# Patient Record
Sex: MALE | Race: WHITE | Employment: OTHER | URBAN - METROPOLITAN AREA
[De-identification: names, ages, dates, MRNs, and addresses within clinical notes are randomized per-mention and may not be internally consistent; named-entity substitution may affect disease eponyms.]

---

## 2017-05-03 DIAGNOSIS — N18.30 CKD (CHRONIC KIDNEY DISEASE) STAGE 3, GFR 30-59 ML/MIN (HCC): ICD-10-CM

## 2017-05-03 DIAGNOSIS — K21.9 GASTROESOPHAGEAL REFLUX DISEASE WITHOUT ESOPHAGITIS: ICD-10-CM

## 2017-05-03 DIAGNOSIS — Z86.73 HISTORY OF TIA (TRANSIENT ISCHEMIC ATTACK): ICD-10-CM

## 2017-05-03 DIAGNOSIS — E78.5 HYPERLIPIDEMIA, UNSPECIFIED HYPERLIPIDEMIA TYPE: ICD-10-CM

## 2017-05-03 DIAGNOSIS — I10 ESSENTIAL HYPERTENSION WITH GOAL BLOOD PRESSURE LESS THAN 140/90: ICD-10-CM

## 2017-05-03 DIAGNOSIS — C73 THYROID CANCER (HCC): ICD-10-CM

## 2017-05-05 RX ORDER — LISINOPRIL 5 MG/1
TABLET ORAL
Qty: 90 TAB | Refills: 3 | Status: SHIPPED | OUTPATIENT
Start: 2017-05-05 | End: 2018-06-26

## 2017-05-05 RX ORDER — ATORVASTATIN CALCIUM 20 MG/1
TABLET, FILM COATED ORAL
Qty: 90 TAB | Refills: 3 | Status: SHIPPED | OUTPATIENT
Start: 2017-05-05 | End: 2018-06-21 | Stop reason: SDUPTHER

## 2017-05-05 RX ORDER — AMLODIPINE BESYLATE 5 MG/1
TABLET ORAL
Qty: 90 TAB | Refills: 3 | Status: SHIPPED | OUTPATIENT
Start: 2017-05-05 | End: 2018-06-21 | Stop reason: SDUPTHER

## 2017-05-05 RX ORDER — LEVOTHYROXINE SODIUM 125 UG/1
TABLET ORAL
Qty: 90 TAB | Refills: 3 | Status: SHIPPED | OUTPATIENT
Start: 2017-05-05 | End: 2018-02-05 | Stop reason: SDUPTHER

## 2017-05-05 RX ORDER — CLOPIDOGREL BISULFATE 75 MG/1
TABLET ORAL
Qty: 90 TAB | Refills: 3 | Status: SHIPPED | OUTPATIENT
Start: 2017-05-05 | End: 2018-05-13 | Stop reason: SDUPTHER

## 2017-05-05 RX ORDER — PANTOPRAZOLE SODIUM 40 MG/1
TABLET, DELAYED RELEASE ORAL
Qty: 90 TAB | Refills: 3 | Status: SHIPPED | OUTPATIENT
Start: 2017-05-05 | End: 2018-06-21 | Stop reason: SDUPTHER

## 2017-07-24 DIAGNOSIS — I10 ESSENTIAL HYPERTENSION WITH GOAL BLOOD PRESSURE LESS THAN 140/90: ICD-10-CM

## 2017-07-24 DIAGNOSIS — K21.9 GASTROESOPHAGEAL REFLUX DISEASE WITHOUT ESOPHAGITIS: ICD-10-CM

## 2017-07-24 DIAGNOSIS — Z86.73 HISTORY OF TIA (TRANSIENT ISCHEMIC ATTACK): ICD-10-CM

## 2017-07-24 DIAGNOSIS — C73 THYROID CANCER (HCC): ICD-10-CM

## 2017-07-24 DIAGNOSIS — E78.5 HYPERLIPIDEMIA, UNSPECIFIED HYPERLIPIDEMIA TYPE: ICD-10-CM

## 2017-07-24 DIAGNOSIS — N18.30 CKD (CHRONIC KIDNEY DISEASE) STAGE 3, GFR 30-59 ML/MIN (HCC): ICD-10-CM

## 2017-07-24 RX ORDER — ATORVASTATIN CALCIUM 20 MG/1
TABLET, FILM COATED ORAL
Qty: 90 TAB | Refills: 3 | Status: CANCELLED | OUTPATIENT
Start: 2017-07-24

## 2017-07-24 RX ORDER — CLOPIDOGREL BISULFATE 75 MG/1
TABLET ORAL
Qty: 90 TAB | Refills: 3 | Status: CANCELLED | OUTPATIENT
Start: 2017-07-24

## 2017-07-24 RX ORDER — AMLODIPINE BESYLATE 5 MG/1
TABLET ORAL
Qty: 90 TAB | Refills: 3 | Status: CANCELLED | OUTPATIENT
Start: 2017-07-24

## 2017-07-24 RX ORDER — LISINOPRIL 5 MG/1
TABLET ORAL
Qty: 90 TAB | Refills: 3 | Status: CANCELLED | OUTPATIENT
Start: 2017-07-24

## 2017-07-24 RX ORDER — LEVOTHYROXINE SODIUM 125 UG/1
TABLET ORAL
Qty: 90 TAB | Refills: 3 | Status: CANCELLED | OUTPATIENT
Start: 2017-07-24

## 2017-07-24 RX ORDER — PANTOPRAZOLE SODIUM 40 MG/1
TABLET, DELAYED RELEASE ORAL
Qty: 90 TAB | Refills: 3 | Status: CANCELLED | OUTPATIENT
Start: 2017-07-24

## 2017-08-18 ENCOUNTER — OFFICE VISIT (OUTPATIENT)
Dept: FAMILY MEDICINE CLINIC | Age: 82
End: 2017-08-18

## 2017-08-18 ENCOUNTER — HOSPITAL ENCOUNTER (OUTPATIENT)
Dept: LAB | Age: 82
Discharge: HOME | End: 2017-08-18
Payer: MEDICARE

## 2017-08-18 VITALS
RESPIRATION RATE: 17 BRPM | TEMPERATURE: 98.2 F | HEIGHT: 72 IN | WEIGHT: 230 LBS | DIASTOLIC BLOOD PRESSURE: 82 MMHG | OXYGEN SATURATION: 96 % | HEART RATE: 90 BPM | BODY MASS INDEX: 31.15 KG/M2 | SYSTOLIC BLOOD PRESSURE: 124 MMHG

## 2017-08-18 DIAGNOSIS — Z86.39 HISTORY OF HYPERPARATHYROIDISM: ICD-10-CM

## 2017-08-18 DIAGNOSIS — Z13.31 SCREENING FOR DEPRESSION: ICD-10-CM

## 2017-08-18 DIAGNOSIS — M47.812 CERVICAL ARTHRITIS: ICD-10-CM

## 2017-08-18 DIAGNOSIS — N18.30 CKD (CHRONIC KIDNEY DISEASE) STAGE 3, GFR 30-59 ML/MIN (HCC): ICD-10-CM

## 2017-08-18 DIAGNOSIS — E78.5 HYPERLIPIDEMIA, UNSPECIFIED HYPERLIPIDEMIA TYPE: ICD-10-CM

## 2017-08-18 DIAGNOSIS — Z85.850 HISTORY OF THYROID CANCER: ICD-10-CM

## 2017-08-18 DIAGNOSIS — Z13.39 SCREENING FOR ALCOHOLISM: ICD-10-CM

## 2017-08-18 DIAGNOSIS — I10 ESSENTIAL HYPERTENSION: ICD-10-CM

## 2017-08-18 DIAGNOSIS — Z00.00 GENERAL MEDICAL EXAM: Primary | ICD-10-CM

## 2017-08-18 DIAGNOSIS — Z86.73 HISTORY OF CVA (CEREBROVASCULAR ACCIDENT): ICD-10-CM

## 2017-08-18 PROCEDURE — 80053 COMPREHEN METABOLIC PANEL: CPT

## 2017-08-18 PROCEDURE — 85025 COMPLETE CBC W/AUTO DIFF WBC: CPT

## 2017-08-18 PROCEDURE — 84443 ASSAY THYROID STIM HORMONE: CPT

## 2017-08-18 PROCEDURE — 36415 COLL VENOUS BLD VENIPUNCTURE: CPT

## 2017-08-18 PROCEDURE — 80061 LIPID PANEL: CPT

## 2017-08-18 PROCEDURE — 83970 ASSAY OF PARATHORMONE: CPT

## 2017-08-18 NOTE — MR AVS SNAPSHOT
Visit Information Date & Time Provider Department Dept. Phone Encounter #  
 8/18/2017 10:15 AM Stephanie Muse MD [address] [facility] [phone] [id number] Upcoming Health Maintenance Date Due INFLUENZA AGE 9 TO ADULT 8/1/2017 MEDICARE YEARLY EXAM 8/3/2017 GLAUCOMA SCREENING Q2Y 2/2/2018 COLONOSCOPY 8/1/2018 DTaP/Tdap/Td series (2 - Td) 10/14/2026 Allergies as of 8/18/2017  Review Complete On: 8/18/2017 By: Stephanie Muse MD  
  
 Severity Noted Reaction Type Reactions Sulfa (Sulfonamide Antibiotics)  10/26/2011    Hives Current Immunizations  Reviewed on 11/14/2016 Name Date Influenza High Dose Vaccine PF 9/22/2014 Pneumococcal Conjugate (PCV-13) 8/2/2016 Not reviewed this visit You Were Diagnosed With   
  
 Codes Comments History of hyperparathyroidism    -  Primary ICD-10-CM: Z86.39 
ICD-9-CM: V12.29 History of thyroid cancer     ICD-10-CM: Z85.850 ICD-9-CM: V10.87 Hyperlipidemia, unspecified hyperlipidemia type     ICD-10-CM: E78.5 ICD-9-CM: 272.4 CKD (chronic kidney disease) stage 3, GFR 30-59 ml/min     ICD-10-CM: N18.3 ICD-9-CM: 719. 3 Essential hypertension     ICD-10-CM: I10 
ICD-9-CM: 401.9 Cervical arthritis (HCC)     ICD-10-CM: M46.92 
ICD-9-CM: 721.0 Vitals BP Pulse Temp Resp Height(growth percentile) Weight(growth percentile) 124/82 (BP 1 Location: Left arm, BP Patient Position: Sitting) 90 98.2 °F (36.8 °C) (Oral) 17 6' (1.829 m) 230 lb (104.3 kg) SpO2 BMI Smoking Status 96% 31.19 kg/m2 Former Smoker Vitals History BMI and BSA Data Body Mass Index Body Surface Area  
 31.19 kg/m 2 2.3 m 2 Preferred Pharmacy Pharmacy Name Phone [address] [facility], [address] [place] [address] [address] Your Updated Medication List  
  
   
This list is accurate as of: 8/18/17 11:31 AM.  Always use your most recent med list.  
  
 amLODIPine 5 mg tablet Commonly known as:  [place] Bars Take 1 tablet by mouth  daily  
  
 atorvastatin 20 mg tablet Commonly known as:  LIPITOR Take 1 tablet by mouth  daily  
  
 clopidogrel 75 mg Tab Commonly known as:  PLAVIX Take 1 tablet by mouth  daily  
  
 levothyroxine 125 mcg tablet Commonly known as:  SYNTHROID Take 1 tablet by mouth  daily  
  
 lisinopril 5 mg tablet Commonly known as:  Dorathy Massed Take 1 tablet by mouth  daily  
  
 pantoprazole 40 mg tablet Commonly known as:  PROTONIX Take 1 tablet by mouth  daily VITAMIN D3 2,000 unit Tab Generic drug:  cholecalciferol (vitamin D3) Take  by mouth daily. We Performed the Following CBC WITH AUTOMATED DIFF [83354 CPT(R)] LIPID PANEL [88373 CPT(R)] METABOLIC PANEL, COMPREHENSIVE [22156 CPT(R)] PTH INTACT [00356 CPT(R)] TSH 3RD GENERATION [86934 CPT(R)] Patient Instructions Medicare Wellness Visit, Male The best way to live healthy is to have a healthy lifestyle by eating a well-balanced diet, exercising regularly, limiting alcohol and stopping smoking. Regular physical exams and screening tests are another way to keep healthy. Preventive exams provided by your health care provider can find health problems before they become diseases or illnesses. Preventive services including immunizations, screening tests, monitoring and exams can help you take care of your own health. All people over age 72 should have a pneumovax  and and a prevnar shot to prevent pneumonia. These are once in a lifetime unless you and your provider decide differently. All people over 65 should have a yearly flu shot and a tetanus vaccine every 10 years. Screening for diabetes mellitus with a blood sugar test should be done every year.  
 
Glaucoma is a disease of the eye due to increased ocular pressure that can lead to blindness and it should be done every year by an eye professional. 
 
Cardiovascular screening tests that check for elevated lipids (fatty part of blood) which can lead to heart disease and strokes should be done every 5 years. Colorectal screening that evaluates for blood or polyps in your colon should be done yearly as a stool test or every five years as a flexible sigmoidoscope or every 10 years as a colonoscopy up to age 76. Men up to age 76 may need a screening blood test for prostate cancer at certain intervals, depending on their personal and family history. This decision is between the patient and his provider. If you have been a smoker or had family history of abdominal aortic aneurysms, you and your provider may decide to schedule an ultrasound test of your aorta. Hepatitis C screening is also recommended for anyone born between [birth year] through [address]. A shingles vaccine is also recommended once in a lifetime after age 61. Your Medicare Wellness Exam is recommended annually. Here is a list of your current Health Maintenance items with a due date: 
Health Maintenance Due Topic Date Due  
 Flu Vaccine  [facility]! Dear Naeem Palomo: Thank you for requesting a milabent account. Our records indicate that you already have an active milabent account. You can access your account anytime at [email or web address]. "[employer] Did you know that you can access your hospital and ER discharge instructions at any time in milabent? You can also review all of your test results from your hospital stay or ER visit. Additional Information If you have questions, please visit the Frequently Asked Questions section of the milabent website at [email or web address]. "[employer] Remember, [employer]ent is NOT to be used for urgent needs. For medical emergencies, dial 911. Now available from your iPhone and Android! Please provide this summary of care documentation to your next provider. Your primary care clinician is listed as Herve Valdovinos. If you have any questions after today's visit, please call [phone].

## 2017-08-18 NOTE — PROGRESS NOTES
This is a Subsequent Medicare Annual Wellness Visit providing Personalized Prevention Plan Services (PPPS) (Performed 12 months after initial AWV and PPPS )    I have reviewed the patient's medical history in detail and updated the computerized patient record. History     Past Medical History:   Diagnosis Date    Carotid artery stenosis, asymptomatic 8/1/2014    Right side 50-79%     Chronic kidney disease     stage 3    GERD (gastroesophageal reflux disease)     Gout     Hyperlipidemia     [facility]par[facility]roidism ([facility] [address]     Hypertension     Long term (current) use of anticoagulants     Prostate cancer ([facility] [address]     seeds, then XRT, then seed again.   Dr. German Solis Stroke [facility]) 2002    TIA, Dr. Roma Aguayo Thyroid cancer [facility]) Jan 2015    Unspecified vitamin D deficiency       Past Surgical History:   Procedure Laterality Date   Catalino Carcamo HX CHOLECYSTECTOMY      HX HEENT      tonsillectomy    HX OTHER SURGICAL      vasectomy    HX PARATHYROIDECTOMY  01/14/2015    right superior parathyroid gland removed and left superior parathyroid gland removed    HX PARTIAL THYROIDECTOMY  1/14/15    right lobectomy    HX PARTIAL THYROIDECTOMY  1/2015    HX UROLOGICAL      Seeds for prostate cancer , 4 dialations     HX UROLOGICAL  1/14/15    BLADDER NECK INCISION, Cold knife incision of bladder neck contracture, cystoscopy     Social History   Substance Use Topics    Smoking status: Former Smoker     Years: 5.00     Quit date: 2/5/1955    Smokeless tobacco: Never Used    Alcohol use Yes      Comment: occassional mixed drink or beer     Current Outpatient Prescriptions   Medication Sig Dispense Refill    pantoprazole (PROTONIX) 40 mg tablet Take 1 tablet by mouth  daily 90 Tab 3    levothyroxine (SYNTHROID) 125 mcg tablet Take 1 tablet by mouth  daily 90 Tab 3    amLODIPine (NORVASC) 5 mg tablet Take 1 tablet by mouth  daily 90 Tab 3    atorvastatin (LIPITOR) 20 mg tablet Take 1 tablet by mouth  daily 90 Tab 3    clopidogrel (PLAVIX) 75 mg tab Take 1 tablet by mouth  daily 90 Tab 3    lisinopril (PRINIVIL, ZESTRIL) 5 mg tablet Take 1 tablet by mouth  daily 90 Tab 3    cholecalciferol, vitamin D3, (VITAMIN D3) 2,000 unit tab Take  by mouth daily. Allergies   Allergen Reactions    Sulfa (Sulfonamide Antibiotics) Hives     Family History   Problem Relation Age of Onset   [de-identified] Cancer Mother      hodgkins disease    Heart Disease Father     Hypertension Father     Other Neg Hx      no hypercalcemia or kidney stones       Patient Active Problem List    Diagnosis    Stenosis of both internal carotid arteries    History of thyroid cancer     Found on biopsy 2015, per endocrine does not need treatment, just monitor TSH - see 8/15 note. 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 Calcium and PTH monitored by Dr. Ana Wood, renal.  Levels normalized after surgery. 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 Some residual bladder dysfunction. Dr. Hamilton Marguerite      HBP (high blood pressure)    GERD (gastroesophageal reflux disease)    CKD (chronic kidney disease) stage 3, GFR 30-59 ml/min     Since last visit, patient's wife has passed away. He is still living with family members. He states he is doing okay. He is sleeping and eating well. He had been the caregiver for his wife who had severe dementia, and he is consult that Krish Caruso is in a better place\". He is tearful speaks of her loss. Carissa Quinonez He denies any chest pain, shortness of breath, mass, cough.   He knows he has missed some follow-up appointments with his specialists in the time surrounding his wife's death. However, he has seen his nephrologist.  He is not having any problems with headaches, he denies abdominal pain, he has had no syncopal episodes. Patient Care Team:  Joseph Li MD as PCP - General (Internal Medicine)  Ricardo Abebe MD as Surgeon (General Surgery)  Marissa Siu MD as Physician (Nephrology)  Silvestre Llanes MD as Physician (Cardiology)  Rosalva Sibley MD (Endocrinology)    Depression Risk Factor Screening:     PHQ over the last two weeks 8/18/2017   Little interest or pleasure in doing things Not at all   Feeling down, depressed or hopeless Not at all   Total Score PHQ 2 0     Alcohol Risk Factor Screening: On any occasion during the past 3 months, have you had more than 4 drinks containing alcohol? No    Do you average more than 14 drinks per week? No      Functional Ability and Level of Safety:     Fall Risk   Fall Risk Assessment, last 12 mths 8/18/2017   Able to walk? Yes   Fall in past 12 months? No       Hearing Loss   mild    Activities of Daily Living   Self-care. ADL Assessment 8/2/2016   Feeding yourself No Help Needed   Getting from bed to chair No Help Needed   Getting dressed No Help Needed   Bathing or showering No Help Needed   Walk across the room (includes cane/walker) No Help Needed   Using the telphone No Help Needed   Taking your medications No Help Needed   Preparing meals No Help Needed   Managing money (expenses/bills) No Help Needed   Moderately strenuous housework (laundry) No Help Needed   Shopping for personal items (toiletries/medicines) No Help Needed   Shopping for groceries No Help Needed   Driving No Help Needed   Climbing a flight of stairs No Help Needed   Getting to places beyond walking distances No Help Needed       Abuse Screen   Patient is not abused    Social History     Social History Narrative     Had 6 children and one daughter passed away in 1984 in a car accident. Has one living daughter and 4 sons.   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  April 2017 after his wife suffered with dementia. Review of Systems   A comprehensive review of systems was negative except for that written in the HPI. Physical Examination     Evaluation of Cognitive Function:  Mood/affect:  happy  Appearance: age appropriate  Family member/caregiver input: none    Visit Vitals    /82 (BP 1 Location: Left arm, BP Patient Position: Sitting)    Pulse 90    Temp 98.2 °F (36.8 °C) (Oral)    Resp 17    Ht 6' (1.829 m)    Wt 230 lb (104.3 kg)    SpO2 96%    BMI 31.19 kg/m2     Gen: alert, oriented, no acute distress  Ears: external auditory canals clear, TMs without erythema or effusion  Eyes: pupils equal round reactive to light, sclera clear, conjunctiva clear  Oral: moist mucus membranes, no oral lesions, no pharyngeal inflammation or exudate  Neck: thyroid symmetric and not enlarged, no carotid bruits, no jugular vein distention  Resp: no increase work of breathing, lungs clear to ausculation bilaterally, no wheezing, rales or rhonchi  CV: S1, S2 normal. No murmurs, rubs, or gallops. Abd: soft, not tender, not distended. No hepatosplenomegaly. Normal bowel sounds. Neuro: cranial nerves intact, normal strength and movement in all extremities, sensation intact and symmetric. Skin: no lesion or rash  Extremities: no cyanosis or edema      Advice/Referrals/Counseling   Education and counseling provided:  Are appropriate based on today's review and evaluation  End-of-Life planning (with patient's consent)  Pneumococcal Vaccine  Influenza Vaccine  Colorectal cancer screening tests      Assessment/Plan     1. General medical exam  Age appropriate health maintenance measures discussed with patient and ordered. 2. History of hyperparathyroidism  No change pending lab work. - PTH INTACT    3. History of thyroid cancer  No change pending lab work.   - TSH 3RD GENERATION    4. Hyperlipidemia, unspecified hyperlipidemia type  Continue current medicines pending lab work. - METABOLIC PANEL, COMPREHENSIVE  - LIPID PANEL    5. CKD (chronic kidney disease) stage 3, GFR 30-59 ml/min  Continue current medicines pending lab work. - CBC WITH AUTOMATED DIFF    6. Essential hypertension  At goal on current medication.  - CBC WITH AUTOMATED DIFF    7. Cervical arthritis (HCC)  No signs of radiculopathy. 8. Screening for depression  Currently grieving, not depressed. 9. Screening for alcoholism    10. History of CVA (cerebrovascular accident)  Continue statin, blood pressure control, and aspirin. Recommended healthy diet low in carbohydrates, fats, sodium and cholesterol. Recommended regular cardiovascular exercise 3-6 times per week for 30-60 minutes daily. Current Outpatient Prescriptions   Medication Sig Dispense Refill    pantoprazole (PROTONIX) 40 mg tablet Take 1 tablet by mouth  daily 90 Tab 3    levothyroxine (SYNTHROID) 125 mcg tablet Take 1 tablet by mouth  daily 90 Tab 3    amLODIPine (NORVASC) 5 mg tablet Take 1 tablet by mouth  daily 90 Tab 3    atorvastatin (LIPITOR) 20 mg tablet Take 1 tablet by mouth  daily 90 Tab 3    clopidogrel (PLAVIX) 75 mg tab Take 1 tablet by mouth  daily 90 Tab 3    lisinopril (PRINIVIL, ZESTRIL) 5 mg tablet Take 1 tablet by mouth  daily 90 Tab 3    cholecalciferol, vitamin D3, (VITAMIN D3) 2,000 unit tab Take  by mouth daily. Verbal and written instructions (see AVS) provided. Patient expresses understanding of diagnosis and treatment plan.     Tyson Lieberman MD

## 2017-08-18 NOTE — PATIENT INSTRUCTIONS

## 2017-08-19 LAB
ALBUMIN SERPL-MCNC: 4.2 G/DL (ref 3.5–4.7)
ALBUMIN/GLOB SERPL: 1.7 {RATIO} (ref 1.2–2.2)
ALP SERPL-CCNC: 89 IU/L (ref 39–117)
ALT SERPL-CCNC: 20 IU/L (ref 0–44)
AST SERPL-CCNC: 21 IU/L (ref 0–40)
BASOPHILS # BLD AUTO: 0 X10E3/UL (ref 0–0.2)
BASOPHILS NFR BLD AUTO: 0 %
BILIRUB SERPL-MCNC: 0.6 MG/DL (ref 0–1.2)
BUN SERPL-MCNC: 17 MG/DL (ref 8–27)
BUN/CREAT SERPL: 9 (ref 10–24)
CALCIUM SERPL-MCNC: 9 MG/DL (ref 8.6–10.2)
CHLORIDE SERPL-SCNC: 100 MMOL/L (ref 96–106)
CHOLEST SERPL-MCNC: 188 MG/DL (ref 100–199)
CO2 SERPL-SCNC: 22 MMOL/L (ref 18–29)
CREAT SERPL-MCNC: 1.81 MG/DL (ref 0.76–1.27)
EOSINOPHIL # BLD AUTO: 0.1 X10E3/UL (ref 0–0.4)
EOSINOPHIL NFR BLD AUTO: 1 %
ERYTHROCYTE [DISTWIDTH] IN BLOOD BY AUTOMATED COUNT: 13.7 % (ref 12.3–15.4)
GLOBULIN SER CALC-MCNC: 2.5 G/DL (ref 1.5–4.5)
GLUCOSE SERPL-MCNC: 92 MG/DL (ref 65–99)
HCT VFR BLD AUTO: 43.7 % (ref 37.5–51)
HDLC SERPL-MCNC: 71 MG/DL
HGB BLD-MCNC: 14.9 G/DL (ref 12.6–17.7)
IMM GRANULOCYTES # BLD: 0 X10E3/UL (ref 0–0.1)
IMM GRANULOCYTES NFR BLD: 0 %
INTERPRETATION, 910389: NORMAL
INTERPRETATION: NORMAL
LDLC SERPL CALC-MCNC: 92 MG/DL (ref 0–99)
LYMPHOCYTES # BLD AUTO: 1.6 X10E3/UL (ref 0.7–3.1)
LYMPHOCYTES NFR BLD AUTO: 19 %
MCH RBC QN AUTO: 32.2 PG (ref 26.6–33)
MCHC RBC AUTO-ENTMCNC: 34.1 G/DL (ref 31.5–35.7)
MCV RBC AUTO: 94 FL (ref 79–97)
MONOCYTES # BLD AUTO: 0.5 X10E3/UL (ref 0.1–0.9)
MONOCYTES NFR BLD AUTO: 7 %
NEUTROPHILS # BLD AUTO: 5.9 X10E3/UL (ref 1.4–7)
NEUTROPHILS NFR BLD AUTO: 73 %
PDF IMAGE, 910387: NORMAL
PLATELET # BLD AUTO: 327 X10E3/UL (ref 150–379)
POTASSIUM SERPL-SCNC: 5.1 MMOL/L (ref 3.5–5.2)
PROT SERPL-MCNC: 6.7 G/DL (ref 6–8.5)
PTH-INTACT SERPL-MCNC: 39 PG/ML (ref 15–65)
RBC # BLD AUTO: 4.63 X10E6/UL (ref 4.14–5.8)
SODIUM SERPL-SCNC: 138 MMOL/L (ref 134–144)
TRIGL SERPL-MCNC: 124 MG/DL (ref 0–149)
TSH SERPL DL<=0.005 MIU/L-ACNC: 0.39 UIU/ML (ref 0.45–4.5)
VLDLC SERPL CALC-MCNC: 25 MG/DL (ref 5–40)
WBC # BLD AUTO: 8.1 X10E3/UL (ref 3.4–10.8)

## 2017-08-28 PROBLEM — Z86.73 HISTORY OF CVA (CEREBROVASCULAR ACCIDENT): Status: ACTIVE | Noted: 2017-08-28

## 2017-09-26 ENCOUNTER — HOSPITAL ENCOUNTER (EMERGENCY)
Age: 82
Discharge: HOME | End: 2017-09-26
Attending: EMERGENCY MEDICINE
Payer: MEDICARE

## 2017-09-26 VITALS
DIASTOLIC BLOOD PRESSURE: 79 MMHG | HEART RATE: 75 BPM | TEMPERATURE: 98.2 F | HEIGHT: 72 IN | BODY MASS INDEX: 31.23 KG/M2 | SYSTOLIC BLOOD PRESSURE: 153 MMHG | RESPIRATION RATE: 16 BRPM | OXYGEN SATURATION: 96 % | WEIGHT: 230.6 LBS

## 2017-09-26 DIAGNOSIS — R42 POSTURAL DIZZINESS WITH PRESYNCOPE: Primary | ICD-10-CM

## 2017-09-26 DIAGNOSIS — R55 POSTURAL DIZZINESS WITH PRESYNCOPE: Primary | ICD-10-CM

## 2017-09-26 LAB
ALBUMIN SERPL-MCNC: 3.9 G/DL (ref 3.5–5)
ALBUMIN/GLOB SERPL: 1 {RATIO} (ref 1.1–2.2)
ALP SERPL-CCNC: 103 U/L (ref 45–117)
ALT SERPL-CCNC: 27 U/L (ref 12–78)
ANION GAP SERPL CALC-SCNC: 8 MMOL/L (ref 5–15)
AST SERPL-CCNC: 17 U/L (ref 15–37)
BASOPHILS # BLD: 0 K/UL (ref 0–0.1)
BASOPHILS NFR BLD: 0 % (ref 0–1)
BILIRUB SERPL-MCNC: 0.7 MG/DL (ref 0.2–1)
BUN SERPL-MCNC: 22 MG/DL (ref 6–20)
BUN/CREAT SERPL: 12 (ref 12–20)
CALCIUM SERPL-MCNC: 9 MG/DL (ref 8.5–10.1)
CHLORIDE SERPL-SCNC: 103 MMOL/L (ref 97–108)
CO2 SERPL-SCNC: 24 MMOL/L (ref 21–32)
CREAT SERPL-MCNC: 1.78 MG/DL (ref 0.7–1.3)
EOSINOPHIL # BLD: 0 K/UL (ref 0–0.4)
EOSINOPHIL NFR BLD: 0 % (ref 0–7)
ERYTHROCYTE [DISTWIDTH] IN BLOOD BY AUTOMATED COUNT: 13.3 % (ref 11.5–14.5)
GLOBULIN SER CALC-MCNC: 3.9 G/DL (ref 2–4)
GLUCOSE SERPL-MCNC: 115 MG/DL (ref 65–100)
HCT VFR BLD AUTO: 43.8 % (ref 36.6–50.3)
HGB BLD-MCNC: 15.1 G/DL (ref 12.1–17)
LYMPHOCYTES # BLD: 1.1 K/UL (ref 0.8–3.5)
LYMPHOCYTES NFR BLD: 9 % (ref 12–49)
MCH RBC QN AUTO: 31.5 PG (ref 26–34)
MCHC RBC AUTO-ENTMCNC: 34.5 G/DL (ref 30–36.5)
MCV RBC AUTO: 91.4 FL (ref 80–99)
MONOCYTES # BLD: 0.4 K/UL (ref 0–1)
MONOCYTES NFR BLD: 3 % (ref 5–13)
NEUTS SEG # BLD: 10.8 K/UL (ref 1.8–8)
NEUTS SEG NFR BLD: 88 % (ref 32–75)
PLATELET # BLD AUTO: 323 K/UL (ref 150–400)
POTASSIUM SERPL-SCNC: 4 MMOL/L (ref 3.5–5.1)
PROT SERPL-MCNC: 7.8 G/DL (ref 6.4–8.2)
RBC # BLD AUTO: 4.79 M/UL (ref 4.1–5.7)
SODIUM SERPL-SCNC: 135 MMOL/L (ref 136–145)
WBC # BLD AUTO: 12.4 K/UL (ref 4.1–11.1)

## 2017-09-26 PROCEDURE — 36415 COLL VENOUS BLD VENIPUNCTURE: CPT | Performed by: EMERGENCY MEDICINE

## 2017-09-26 PROCEDURE — 74011250636 HC RX REV CODE- 250/636: Performed by: EMERGENCY MEDICINE

## 2017-09-26 PROCEDURE — 99285 EMERGENCY DEPT VISIT HI MDM: CPT

## 2017-09-26 PROCEDURE — 96361 HYDRATE IV INFUSION ADD-ON: CPT

## 2017-09-26 PROCEDURE — 80053 COMPREHEN METABOLIC PANEL: CPT | Performed by: EMERGENCY MEDICINE

## 2017-09-26 PROCEDURE — 96360 HYDRATION IV INFUSION INIT: CPT

## 2017-09-26 PROCEDURE — 85025 COMPLETE CBC W/AUTO DIFF WBC: CPT | Performed by: EMERGENCY MEDICINE

## 2017-09-26 PROCEDURE — 93005 ELECTROCARDIOGRAM TRACING: CPT

## 2017-09-26 RX ADMIN — SODIUM CHLORIDE 1000 ML: 900 INJECTION, SOLUTION INTRAVENOUS at 20:32

## 2017-09-27 LAB
ATRIAL RATE: 85 BPM
CALCULATED P AXIS, ECG09: 59 DEGREES
CALCULATED R AXIS, ECG10: -3 DEGREES
CALCULATED T AXIS, ECG11: 30 DEGREES
DIAGNOSIS, 93000: NORMAL
P-R INTERVAL, ECG05: 216 MS
Q-T INTERVAL, ECG07: 380 MS
QRS DURATION, ECG06: 90 MS
QTC CALCULATION (BEZET), ECG08: 452 MS
VENTRICULAR RATE, ECG03: 85 BPM

## 2017-09-27 NOTE — ED NOTES
Discharge instructions reviewed with pt. Discharge instructions given to pt per Dr. Horace Ennis. Pt able to return/verbalize discharge instructions. Copy of discharge instructions given. Pt condition stable, respiratory status within normal limits, neuro status intact. Pt ambulatory with steady gait out of ER, accompanied by daughter.

## 2017-09-27 NOTE — ED PROVIDER NOTES
[facility].  EMERGENCY DEPARTMENT HISTORY AND PHYSICAL EXAM       Date of Service: 9/26/2017   Patient Name: Jericho Brandon. YOB: 1935  Medical Record Number: [id number]    History of Presenting Illness     Chief Complaint   Patient presents with    Dizziness     Pt reports \"I almost thought I turned around too fast\". Pt denies falling and denies hitting his head. Pt reports sitting down in the chair. Pt ambulatory to triage with steady gait. Pt reports having a near syncopal episode around 1700 this evening that lasted about 45 minutes. Pt reports he \"felt sick\" and reports his stomach was \"hurting\". PT reports having diarrhea. Pt reports \"my stomach is queezy like\". Pt's daughter reports \"this has happened before\". Pt denies having any dizziness in triage.  Abdominal Pain     epigatric     Numbness     Pt reports having intermittent \"numbness\" in bilateral arms since last year. Pt reports intermittently having \"blurry vision\" from time to time and it goes away. History Provided By:  patient and daughter    Additional History:   Jericho Luis is a 81 y.o. male with PMHx significant for HTN, HCL, TIA, CKD, and right carotid stenosis who presents ambulatory to the ED with cc of one episode of light-headedness and nausea at 1700 today and lasted ~ 45 minutes. He states that his episode onset while putting groceries in his refrigerator. He notes that he was frequently bending over when onset of symptoms occurred. He also c/o epigastric abdominal pain, but he states that it has resolved since arrival to the ER. He reports that he has had a recent cardiac catheterization this year with no significant findings. He notes that he has had frequent loose stools, but he denies diarrhea. He notes that he had a double cheeseburger today at 1400. He denies a history of CHF. He specifically denies vomiting, chest pain, SOB, or syncope.     Social Hx: - Tobacco (former), + EtOH, - Illicit Drugs    There are no other complaints, changes or physical findings at this time. 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   Dr. Jazmyn Chapman Stroke [facility]) 2002    TIA, Dr. Bryan Nicole Thyroid cancer [facility]) Jan 2015    Unspecified vitamin D deficiency         Past Surgical History:   Past Surgical History:   Procedure Laterality Date   [facility] Cath   Dr. Jose Kerns HX CHOLECYSTECTOMY      HX HEENT      tonsillectomy    HX OTHER SURGICAL      vasectomy    HX PARATHYROIDECTOMY  01/14/2015    right superior parathyroid gland removed and left superior parathyroid gland removed    HX PARTIAL THYROIDECTOMY  1/14/15    right lobectomy    HX PARTIAL THYROIDECTOMY  1/2015    HX UROLOGICAL      Seeds for prostate cancer , 4 dialations     HX UROLOGICAL  1/14/15    BLADDER NECK INCISION, Cold knife incision of bladder neck contracture, cystoscopy        Family History:   Family History   Problem Relation Age of Onset    Cancer Mother      hodgkins disease    Heart Disease Father     Hypertension Father     Other Neg Hx      no hypercalcemia or kidney stones        Social History:   Social History   Substance Use Topics    Smoking status: Former Smoker     Years: 5.00     Quit date: 2/5/1955    Smokeless tobacco: Never Used    Alcohol use Yes      Comment: occassional mixed drink or beer        Allergies: Allergies   Allergen Reactions    Sulfa (Sulfonamide Antibiotics) Hives         Review of Systems   Review of Systems   Constitutional: Negative for chills and fever. Respiratory: Negative for cough and shortness of breath. Cardiovascular: Negative for chest pain. Gastrointestinal: Positive for abdominal pain and nausea. Negative for constipation, diarrhea and vomiting. Neurological: Positive for light-headedness. Negative for weakness and numbness. All other systems reviewed and are negative. Physical Exam  Physical Exam   Constitutional: He is oriented to person, place, and time. He appears well-developed and well-nourished. HENT:   Head: Normocephalic and atraumatic. Mouth/Throat: Mucous membranes are dry. Eyes: Conjunctivae and EOM are normal. Pupils are equal, round, and reactive to light. Neck: Normal range of motion. Neck supple. Cardiovascular: Normal rate and regular rhythm. Pulmonary/Chest: Effort normal and breath sounds normal. No respiratory distress. Abdominal: Soft. He exhibits no distension. There is no tenderness. Musculoskeletal: Normal range of motion. Neurological: He is alert and oriented to person, place, and time. No cranial nerve deficit. CN 2-12 intact, 5/5 strength in all 4 extremities, face is symmetric. Skin: Skin is warm and dry. Psychiatric: He has a normal mood and affect. Nursing note and vitals reviewed. Medical Decision Making   I am the first provider for this patient. I reviewed the vital signs, available nursing notes, past medical history, past surgical history, family history and social history. Old Medical Records: Old medical records. Nursing notes. Provider Notes:   Pt presents with lightheadedness. Most likely due to repeated positional changes with putting away groceries.  DDx: dehydration, anemia, electrolyte abnormality, infection, orthostatic hypotension    ED Course:  8:09 PM   Initial assessment performed. The patients presenting problems have been discussed, and they are in agreement with the care plan formulated and outlined with them. I have encouraged them to ask questions as they arise throughout their visit. Progress Note:  9:05 PM  The patient was updated on his available results, and he is in understanding of these results. Pt was also counseled on adequate fluid intake given his chronic kidney disease, and he is in understanding of these results. Written by Karen Fuller ED Scribe, as dictated by Tree Sanchez MD.    Diagnostic Study Results     Labs -      Recent Results (from the past 12 hour(s))   EKG, 12 LEAD, INITIAL    Collection Time: 09/26/17  7:52 PM   Result Value Ref Range    Ventricular Rate 85 BPM    Atrial Rate 85 BPM    P-R Interval 216 ms    QRS Duration 90 ms    Q-T Interval 380 ms    QTC Calculation (Bezet) 452 ms    Calculated P Axis 59 degrees    Calculated R Axis -3 degrees    Calculated T Axis 30 degrees    Diagnosis       Sinus rhythm with 1st degree AV block  Otherwise normal ECG  When compared with ECG of 14-JAN-2015 11:47,  No significant change was found     CBC WITH AUTOMATED DIFF    Collection Time: 09/26/17  8:17 PM   Result Value Ref Range    WBC 12.4 (H) 4.1 - 11.1 K/uL    RBC 4.79 4.10 - 5.70 M/uL    HGB 15.1 12.1 - 17.0 g/dL    HCT 43.8 36.6 - 50.3 %    MCV 91.4 80.0 - 99.0 FL    MCH 31.5 26.0 - 34.0 PG    MCHC 34.5 30.0 - 36.5 g/dL    RDW 13.3 11.5 - 14.5 %    PLATELET 143 802 - 966 K/uL    NEUTROPHILS 88 (H) 32 - 75 %    LYMPHOCYTES 9 (L) 12 - 49 %    MONOCYTES 3 (L) 5 - 13 %    EOSINOPHILS 0 0 - 7 %    BASOPHILS 0 0 - 1 %    ABS. NEUTROPHILS 10.8 (H) 1.8 - 8.0 K/UL    ABS. LYMPHOCYTES 1.1 0.8 - 3.5 K/UL    ABS. MONOCYTES 0.4 0.0 - 1.0 K/UL    ABS. EOSINOPHILS 0.0 0.0 - 0.4 K/UL    ABS.  BASOPHILS 0.0 0.0 - 0.1 K/UL   METABOLIC PANEL, COMPREHENSIVE    Collection Time: 09/26/17  8:17 PM   Result Value Ref Range    Sodium 135 (L) 136 - 145 mmol/L    Potassium 4.0 3.5 - 5.1 mmol/L    Chloride 103 97 - 108 mmol/L    CO2 24 21 - 32 mmol/L    Anion gap 8 5 - 15 mmol/L    Glucose 115 (H) 65 - 100 mg/dL    BUN 22 (H) 6 - 20 MG/DL    Creatinine 1.78 (H) 0.70 - 1.30 MG/DL    BUN/Creatinine ratio 12 12 - 20      GFR est AA 45 (L) >60 ml/min/1.73m2    GFR est non-AA 37 (L) >60 ml/min/1.73m2    Calcium 9.0 8.5 - 10.1 MG/DL    Bilirubin, total 0.7 0.2 - 1.0 MG/DL    ALT (SGPT) 27 12 - 78 U/L    AST (SGOT) 17 15 - 37 U/L    Alk. phosphatase 103 45 - 117 U/L    Protein, total 7.8 6.4 - 8.2 g/dL    Albumin 3.9 3.5 - 5.0 g/dL    Globulin 3.9 2.0 - 4.0 g/dL    A-G Ratio 1.0 (L) 1.1 - 2.2         Vital Signs-Reviewed the patient's vital signs. Patient Vitals for the past 12 hrs:   Temp Pulse Resp BP SpO2   09/26/17 2145 - 75 16 153/79 96 %   09/26/17 2130 - 82 18 162/84 99 %   09/26/17 2115 - 65 14 140/64 97 %   09/26/17 2100 - 72 15 158/81 97 %   09/26/17 2045 - 82 15 154/77 96 %   09/26/17 2030 - 97 11 (!) 176/91 99 %   09/26/17 2029 - 94 17 (!) 161/93 99 %   09/26/17 2027 - 86 14 159/75 97 %   09/26/17 2015 - 94 16 173/90 98 %   09/26/17 2004 - 87 17 - 98 %   09/26/17 2003 - 85 17 - 99 %   09/26/17 2002 - - - 151/81 -   09/26/17 1945 98.2 °F (36.8 °C) 95 17 (!) 174/101 99 %       Medications Given in the ED:  Medications   sodium chloride 0.9 % bolus infusion 1,000 mL (0 mL IntraVENous IV Completed 9/26/17 8033)       EKG interpretation: (Preliminary) 19:52  Rhythm: Sinus rhythm with 1st degree AV block; and regular . Rate (approx.): 85; Axis: normal; MI interval: normal; QRS interval: normal ; ST/T wave: normal; Other findings: No other findings. Written by ROBIN Lehmanibbonnie, as dictated by Thanh Jefferson MD    Diagnosis   Clinical Impression:   1.  Postural dizziness with presyncope         Plan:  1:   Follow-up Information     Follow up With Details Comments Contact Info    Rishi Yu MD  As needed [address], [address] [place] [address]  [phone]          2:   Discharge Medication List as of 9/26/2017  9:04 PM        Return to ED if Worse    Disposition Note:  Discharge Note:  9:05 PM  The pt is ready for discharge. The pt's signs, symptoms, diagnosis, and discharge instructions have been discussed and pt has conveyed their understanding. The pt is to follow up as recommended or return to ER should their symptoms worsen. Plan has been discussed and pt is in agreement. Attestations:     Lena Person, attest that this documentation has been prepared under the direction and in the presence of Keira Arauz MD.  9/26/2017 10:29 PM    I, Keira Arauz MD, personally performed the services described in this documentation. All medical record entries made by the scribe were at my direction and in my presence. I have reviewed the chart and discharge instructions and agree that the record reflects my personal performance and is accurate and complete.

## 2017-09-27 NOTE — ED NOTES
Pt presents thru triage with daughter. Pt reports near syncopal episode PTA \"after finished putting the groceries away--when it hit me\". + positional changes. Pt complains of upper abdominal pain; non radiating; that has resolved PTA. Pt currently A&Ox4, and complains of \"feeling woozy\". Monitor x 3. Call bell in reach. Bed in lowest position, with side rails up x 2. Pt updated on plan of care.

## 2017-09-27 NOTE — DISCHARGE INSTRUCTIONS
Dizziness: Care Instructions  Your Care Instructions  Dizziness is the feeling of unsteadiness or fuzziness in your head. It is different than having vertigo, which is a feeling that the room is spinning or that you are moving or falling. It is also different from lightheadedness, which is the feeling that you are about to faint. It can be hard to know what causes dizziness. Some people feel dizzy when they have migraine headaches. Sometimes bouts of flu can make you feel dizzy. Some medical conditions, such as heart problems or high blood pressure, can make you feel dizzy. Many medicines can cause dizziness, including medicines for high blood pressure, pain, or anxiety. If a medicine causes your symptoms, your doctor may recommend that you stop or change the medicine. If it is a problem with your heart, you may need medicine to help your heart work better. If there is no clear reason for your symptoms, your doctor may suggest watching and waiting for a while to see if the dizziness goes away on its own. Follow-up care is a key part of your treatment and safety. Be sure to make and go to all appointments, and call your doctor if you are having problems. It's also a good idea to know your test results and keep a list of the medicines you take. How can you care for yourself at home? · If your doctor recommends or prescribes medicine, take it exactly as directed. Call your doctor if you think you are having a problem with your medicine. · Do not drive while you feel dizzy. · Try to prevent falls. Steps you can take include:  ¨ Using nonskid mats, adding grab bars near the tub, and using night-lights. ¨ Clearing your home so that walkways are free of anything you might trip on. ¨ Letting family and friends know that you have been feeling dizzy. This will help them know how to help you. When should you call for help? Call 911 anytime you think you may need emergency care.  For example, call if:  · You passed out (lost consciousness). · You have dizziness along with symptoms of a heart attack. These may include:  ¨ Chest pain or pressure, or a strange feeling in the chest.  ¨ Sweating. ¨ Shortness of breath. ¨ Nausea or vomiting. ¨ Pain, pressure, or a strange feeling in the back, neck, jaw, or upper belly or in one or both shoulders or arms. ¨ Lightheadedness or sudden weakness. ¨ A fast or irregular heartbeat. · You have symptoms of a stroke. These may include:  ¨ Sudden numbness, tingling, weakness, or loss of movement in your face, arm, or leg, especially on only one side of your body. ¨ Sudden vision changes. ¨ Sudden trouble speaking. ¨ Sudden confusion or trouble understanding simple statements. ¨ Sudden problems with walking or balance. ¨ A sudden, severe headache that is different from past headaches. Call your doctor now or seek immediate medical care if:  · You feel dizzy and have a fever, headache, or ringing in your ears. · You have new or increased nausea and vomiting. · Your dizziness does not go away or comes back. Watch closely for changes in your health, and be sure to contact your doctor if:  · You do not get better as expected. Where can you learn more? Go to http://jagdeep-pham.info/. Enter B007 in the search box to learn more about \"Dizziness: Care Instructions. \"  Current as of: March 20, 2017  Content Version: 11.3  © 7609-6186 [employer]. Care instructions adapted under license by [employer] (which disclaims liability or warranty for this information). If you have questions about a medical condition or this instruction, always ask your healthcare professional. [place] [address] any warranty or liability for your use of this information.

## 2017-12-15 ENCOUNTER — CLINICAL SUPPORT (OUTPATIENT)
Dept: FAMILY MEDICINE CLINIC | Age: 82
End: 2017-12-15

## 2017-12-15 DIAGNOSIS — Z23 ENCOUNTER FOR IMMUNIZATION: ICD-10-CM

## 2017-12-15 NOTE — PATIENT INSTRUCTIONS
Vaccine Information Statement    Influenza (Flu) Vaccine (Inactivated or Recombinant): What you need to know    Many Vaccine Information Statements are available in [place] and other languages. See www.immunize.org/vis  Hojas de Información Sobre Vacunas están disponibles en Español y en muchos otros idiomas. Visite www.immunize.org/vis    1. Why get vaccinated? Influenza (flu) is a contagious disease that spreads around the [place] every year, usually between October and May. Flu is caused by influenza viruses, and is spread mainly by coughing, sneezing, and close contact. Anyone can get flu. Flu strikes suddenly and can last several days. Symptoms vary by age, but can include:   fever/chills   sore throat   muscle aches   fatigue   cough   headache    runny or stuffy nose    Flu can also lead to pneumonia and blood infections, and cause diarrhea and seizures in children. If you have a medical condition, such as heart or lung disease, flu can make it worse. Flu is more dangerous for some people. Infants and young children, people 72years of age and older, pregnant women, and people with certain health conditions or a weakened immune system are at greatest risk. Each year thousands of people in the [employer] die from flu, and many more are hospitalized. Flu vaccine can:   keep you from getting flu,   make flu less severe if you do get it, and   keep you from spreading flu to your family and other people. 2. Inactivated and recombinant flu vaccines    A dose of flu vaccine is recommended every flu season. Children 6 months through 6years of age may need two doses during the same flu season. Everyone else needs only one dose each flu season.        Some inactivated flu vaccines contain a very small amount of a mercury-based preservative called thimerosal. Studies have not shown thimerosal in vaccines to be harmful, but flu vaccines that do not contain thimerosal are available. There is no live flu virus in flu shots. They cannot cause the flu. There are many flu viruses, and they are always changing. Each year a new flu vaccine is made to protect against three or four viruses that are likely to cause disease in the upcoming flu season. But even when the vaccine doesnt exactly match these viruses, it may still provide some protection    Flu vaccine cannot prevent:   flu that is caused by a virus not covered by the vaccine, or   illnesses that look like flu but are not. It takes about 2 weeks for protection to develop after vaccination, and protection lasts through the flu season. 3. Some people should not get this vaccine    Tell the person who is giving you the vaccine:     If you have any severe, life-threatening allergies. If you ever had a life-threatening allergic reaction after a dose of flu vaccine, or have a severe allergy to any part of this vaccine, you may be advised not to get vaccinated. Most, but not all, types of flu vaccine contain a small amount of egg protein.  If you ever had Guillain-Barré Syndrome (also called GBS). Some people with a history of GBS should not get this vaccine. This should be discussed with your doctor.  If you are not feeling well. It is usually okay to get flu vaccine when you have a mild illness, but you might be asked to come back when you feel better. 4. Risks of a vaccine reaction    With any medicine, including vaccines, there is a chance of reactions. These are usually mild and go away on their own, but serious reactions are also possible. Most people who get a flu shot do not have any problems with it.      Minor problems following a flu shot include:    soreness, redness, or swelling where the shot was given     hoarseness   sore, red or itchy eyes   cough   fever   aches   headache   itching   fatigue  If these problems occur, they usually begin soon after the shot and last 1 or 2 days. More serious problems following a flu shot can include the following:     There may be a small increased risk of Guillain-Barré Syndrome (GBS) after inactivated flu vaccine. This risk has been estimated at 1 or 2 additional cases per million people vaccinated. This is much lower than the risk of severe complications from flu, which can be prevented by flu vaccine.  Young children who get the flu shot along with pneumococcal vaccine (PCV13) and/or DTaP vaccine at the same time might be slightly more likely to have a seizure caused by fever. Ask your doctor for more information. Tell your doctor if a child who is getting flu vaccine has ever had a seizure. Problems that could happen after any injected vaccine:      People sometimes faint after a medical procedure, including vaccination. Sitting or lying down for about 15 minutes can help prevent fainting, and injuries caused by a fall. Tell your doctor if you feel dizzy, or have vision changes or ringing in the ears.  Some people get severe pain in the shoulder and have difficulty moving the arm where a shot was given. This happens very rarely.  Any medication can cause a severe allergic reaction. Such reactions from a vaccine are very rare, estimated at about 1 in a million doses, and would happen within a few minutes to a few hours after the vaccination. As with any medicine, there is a very remote chance of a vaccine causing a serious injury or death. The safety of vaccines is always being monitored. For more information, visit: www.cdc.gov/vaccinesafety/    5. What if there is a serious reaction? What should I look for?  Look for anything that concerns you, such as signs of a severe allergic reaction, very high fever, or unusual behavior.     Signs of a severe allergic reaction can include hives, swelling of the face and throat, difficulty breathing, a fast heartbeat, dizziness, and weakness - usually within a few minutes to a few hours after the vaccination. What should I do?  If you think it is a severe allergic reaction or other emergency that cant wait, call 9-1-1 and get the person to the nearest hospital. Otherwise, call your doctor.  Reactions should be reported to the Vaccine Adverse Event Reporting System (VAERS). Your doctor should file this report, or you can do it yourself through  the VAERS web site at www.vaers. [facility].gov, or by calling 3-281.224.9564. VAERS does not give medical advice. 6. The National Vaccine Injury Compensation Program    The [facility] Vaccine Injury Compensation Program (VICP) is a federal program that was created to compensate people who may have been injured by certain vaccines. Persons who believe they may have been injured by a vaccine can learn about the program and about filing a claim by calling 2-154.562.8476 or visiting the [employer] website at www.[facility].gov/vaccinecompensation. There is a time limit to file a claim for compensation. 7. How can I learn more?  Ask your healthcare provider. He or she can give you the vaccine package insert or suggest other sources of information.  Call your local or state health department.  Contact the Centers for Disease Control and Prevention (CDC):  - Call 9-539.749.4352 (1-800-CDC-INFO) or  - Visit CDCs website at www.cdc.gov/flu    Vaccine Information Statement   Inactivated Influenza Vaccine   8/7/2015  [id number]    Department of Health and Human Services  Centers for Disease Control and Prevention    Office Use Only

## 2017-12-15 NOTE — PROGRESS NOTES
Kel Leon is a 81 y.o. male who presents for routine immunizations. He denies any symptoms , reactions or allergies that would exclude them from being immunized today. Risks and adverse reactions were discussed and the VIS was given to them. All questions were addressed. He was observed for 15 min post injection. There were no reactions observed.     Federico Denise LPN

## 2017-12-15 NOTE — MR AVS SNAPSHOT
Visit Information Date & Time Provider Department Dept. Phone Encounter #  
 12/15/2017 10:10 AM Laura Schmid [place] [id number] [phone] [id number] Upcoming Health Maintenance Date Due Influenza Age 5 to Adult 8/1/2017 GLAUCOMA SCREENING Q2Y 2/2/2018 COLONOSCOPY 8/1/2018 MEDICARE YEARLY EXAM 8/19/2018 DTaP/Tdap/Td series (2 - Td) 10/14/2026 Allergies as of 12/15/2017  In Progress On: 9/26/2017 By: Louise Moreno RN Severity Noted Reaction Type Reactions Sulfa (Sulfonamide Antibiotics)  10/26/2011    Hives Current Immunizations  Reviewed on 12/15/2017 Name Date Influenza High Dose Vaccine PF  Incomplete, 9/22/2014 Influenza Vaccine 10/19/2016 12:00 AM  
 Pneumococcal Conjugate (PCV-13) 8/2/2016 Reviewed by Carmela Hinkle LPN on 30/30/6390 at 10:18 AM  
You Were Diagnosed With   
  
 Codes Comments Encounter for immunization     ICD-10-CM: G44 ICD-9-CM: V03.89 Vitals Smoking Status Former Smoker Preferred Pharmacy Pharmacy Name Phone 305 [facility], [address] [place] [address] [address] [facility] Your Updated Medication List  
  
   
This list is accurate as of: 12/15/17 10:18 AM.  Always use your most recent med list. amLODIPine 5 mg tablet Commonly known as:  Elyn Coffer Take 1 tablet by mouth  daily  
  
 atorvastatin 20 mg tablet Commonly known as:  LIPITOR Take 1 tablet by mouth  daily  
  
 clopidogrel 75 mg Tab Commonly known as:  PLAVIX Take 1 tablet by mouth  daily  
  
 levothyroxine 125 mcg tablet Commonly known as:  SYNTHROID Take 1 tablet by mouth  daily  
  
 lisinopril 5 mg tablet Commonly known as:  Robertha Roup Take 1 tablet by mouth  daily  
  
 pantoprazole 40 mg tablet Commonly known as:  PROTONIX Take 1 tablet by mouth  daily VITAMIN D3 2,000 unit Tab Generic drug:  cholecalciferol (vitamin D3) Take  by mouth daily. We Performed the Following ADMIN INFLUENZA VIRUS VAC [ [facility]] INFLUENZA VIRUS VACCINE, HIGH DOSE SEASONAL, PRESERVATIVE FREE [35874 CPT(R)] Patient Instructions Vaccine Information Statement Influenza (Flu) Vaccine (Inactivated or Recombinant): What you need to know Many Vaccine Information Statements are available in Swedish and other languages. See www.immunize.org/vis Hojas de Información Sobre Vacunas están disponibles en Español y en muchos otros idiomas. Visite www.immunize.org/vis 1. Why get vaccinated? Influenza (flu) is a contagious disease that spreads around the [facility] every year, usually between October and May. Flu is caused by influenza viruses, and is spread mainly by coughing, sneezing, and close contact. Anyone can get flu. Flu strikes suddenly and can last several days. Symptoms vary by age, but can include: 
 fever/chills  sore throat  muscle aches  fatigue  cough  headache  runny or stuffy nose Flu can also lead to pneumonia and blood infections, and cause diarrhea and seizures in children. If you have a medical condition, such as heart or lung disease, flu can make it worse. Flu is more dangerous for some people. Infants and young children, people 72years of age and older, pregnant women, and people with certain health conditions or a weakened immune system are at greatest risk. Each year thousands of people in the [facility] die from flu, and many more are hospitalized. Flu vaccine can: 
 keep you from getting flu, 
 make flu less severe if you do get it, and 
 keep you from spreading flu to your family and other people. 2. Inactivated and recombinant flu vaccines A dose of flu vaccine is recommended every flu season. Children 6 months through 6years of age may need two doses during the same flu season. Everyone else needs only one dose each flu season. Some inactivated flu vaccines contain a very small amount of a mercury-based preservative called thimerosal. Studies have not shown thimerosal in vaccines to be harmful, but flu vaccines that do not contain thimerosal are available. There is no live flu virus in flu shots. They cannot cause the flu. There are many flu viruses, and they are always changing. Each year a new flu vaccine is made to protect against three or four viruses that are likely to cause disease in the upcoming flu season. But even when the vaccine doesnt exactly match these viruses, it may still provide some protection Flu vaccine cannot prevent: 
 flu that is caused by a virus not covered by the vaccine, or 
 illnesses that look like flu but are not. It takes about 2 weeks for protection to develop after vaccination, and protection lasts through the flu season. 3. Some people should not get this vaccine Tell the person who is giving you the vaccine:  If you have any severe, life-threatening allergies. If you ever had a life-threatening allergic reaction after a dose of flu vaccine, or have a severe allergy to any part of this vaccine, you may be advised not to get vaccinated. Most, but not all, types of flu vaccine contain a small amount of egg protein.  If you ever had Guillain-Barré Syndrome (also called GBS). Some people with a history of GBS should not get this vaccine. This should be discussed with your doctor.  If you are not feeling well. It is usually okay to get flu vaccine when you have a mild illness, but you might be asked to come back when you feel better. 4. Risks of a vaccine reaction With any medicine, including vaccines, there is a chance of reactions. These are usually mild and go away on their own, but serious reactions are also possible. Most people who get a flu shot do not have any problems with it. Minor problems following a flu shot include:  
 soreness, redness, or swelling where the shot was given  hoarseness  sore, red or itchy eyes  cough  fever  aches  headache  itching  fatigue If these problems occur, they usually begin soon after the shot and last 1 or 2 days. More serious problems following a flu shot can include the following:  There may be a small increased risk of Guillain-Barré Syndrome (GBS) after inactivated flu vaccine. This risk has been estimated at 1 or 2 additional cases per million people vaccinated. This is much lower than the risk of severe complications from flu, which can be prevented by flu vaccine.  Young children who get the flu shot along with pneumococcal vaccine (PCV13) and/or DTaP vaccine at the same time might be slightly more likely to have a seizure caused by fever. Ask your doctor for more information. Tell your doctor if a child who is getting flu vaccine has ever had a seizure. Problems that could happen after any injected vaccine:  People sometimes faint after a medical procedure, including vaccination. Sitting or lying down for about 15 minutes can help prevent fainting, and injuries caused by a fall. Tell your doctor if you feel dizzy, or have vision changes or ringing in the ears.  Some people get severe pain in the shoulder and have difficulty moving the arm where a shot was given. This happens very rarely.  Any medication can cause a severe allergic reaction. Such reactions from a vaccine are very rare, estimated at about 1 in a million doses, and would happen within a few minutes to a few hours after the vaccination. As with any medicine, there is a very remote chance of a vaccine causing a serious injury or death. The safety of vaccines is always being monitored. For more information, visit: www.cdc.gov/vaccinesafety/ 
 
5. What if there is a serious reaction? What should I look for?  Look for anything that concerns you, such as signs of a severe allergic reaction, very high fever, or unusual behavior. Signs of a severe allergic reaction can include hives, swelling of the face and throat, difficulty breathing, a fast heartbeat, dizziness, and weakness  usually within a few minutes to a few hours after the vaccination. What should I do?  If you think it is a severe allergic reaction or other emergency that cant wait, call 9-1-1 and get the person to the nearest hospital. Otherwise, call your doctor.  Reactions should be reported to the Vaccine Adverse Event Reporting System (VAERS). Your doctor should file this report, or you can do it yourself through  the VAERS web site at www.vaers. [facility].gov, or by calling 0-846.785.9480. VAERS does not give medical advice. 6. The National Vaccine Injury Compensation Program 
 
The [facility] Vaccine Injury Compensation Program (VICP) is a federal program that was created to compensate people who may have been injured by certain vaccines. Persons who believe they may have been injured by a vaccine can learn about the program and about filing a claim by calling 7-442.651.2731 or visiting the [facility] [address] website at www.[facility].gov/vaccinecompensation. There is a time limit to file a claim for compensation. 7. How can I learn more?  Ask your healthcare provider. He or she can give you the vaccine package insert or suggest other sources of information.  Call your local or state health department.  Contact the Centers for Disease Control and Prevention (CDC): 
- Call 7-489.912.7659 (1-800-CDC-INFO) or 
- Visit CDCs website at www.cdc.gov/flu Vaccine Information Statement Inactivated Influenza Vaccine 8/7/2015 
[address] WILL[address]ilda[address] MontesinosCushing [id number]-[id number] Department of [facility] and [employer] Centers for Disease Control and Prevention Office Use Only Introducing [facility] SERVICES! Dear Maribel Gandhi: Thank you for requesting a [employer] account. Our records indicate that you already have an active [employer] account. You can access your account anytime at [email or web address]. "[email or web address]/[email or web address] Did you know that you can access your hospital and ER discharge instructions at any time in [employer]? You can also review all of your test results from your hospital stay or ER visit. Additional Information If you have questions, please visit the Frequently Asked Questions section of the [employer] website at [email or web address] "[email or web address][email or web address]/. Remember, [employer] is NOT to be used for urgent needs. For medical emergencies, dial 911. Now available from your iPhone and Android! Please provide this summary of care documentation to your next provider. Your primary care clinician is listed as Nia Saldaña. If you have any questions after today's visit, please call [phone].

## 2017-12-28 ENCOUNTER — OFFICE VISIT (OUTPATIENT)
Dept: FAMILY MEDICINE CLINIC | Age: 82
End: 2017-12-28

## 2017-12-28 VITALS
HEART RATE: 100 BPM | DIASTOLIC BLOOD PRESSURE: 73 MMHG | WEIGHT: 224.6 LBS | TEMPERATURE: 98.4 F | BODY MASS INDEX: 30.42 KG/M2 | HEIGHT: 72 IN | OXYGEN SATURATION: 96 % | RESPIRATION RATE: 18 BRPM | SYSTOLIC BLOOD PRESSURE: 114 MMHG

## 2017-12-28 DIAGNOSIS — J06.9 UPPER RESPIRATORY TRACT INFECTION, UNSPECIFIED TYPE: ICD-10-CM

## 2017-12-28 DIAGNOSIS — R68.89 FLU-LIKE SYMPTOMS: Primary | ICD-10-CM

## 2017-12-28 LAB
FLUAV+FLUBV AG NOSE QL IA.RAPID: NEGATIVE POS/NEG
FLUAV+FLUBV AG NOSE QL IA.RAPID: NEGATIVE POS/NEG
VALID INTERNAL CONTROL?: YES

## 2017-12-28 RX ORDER — ACETAMINOPHEN 500 MG
500 TABLET ORAL
COMMUNITY

## 2017-12-28 RX ORDER — AZITHROMYCIN 250 MG/1
TABLET, FILM COATED ORAL
Qty: 6 TAB | Refills: 0 | Status: SHIPPED | OUTPATIENT
Start: 2017-12-28 | End: 2018-01-02

## 2017-12-28 NOTE — PROGRESS NOTES
JOHNNY Pablo . is a 81 y.o. male who complains of congestion, sneezing, sore throat, swollen glands, nasal blockage, productive cough, myalgias and headache for 5 days. He denies a history of chills, nausea, shortness of breath, vomiting and wheezing and denies a history of asthma. Patient does not smoke cigarettes. Respiratory ROS: no cough, shortness of breath, or wheezing    ROS:  Per HPI    Allergies   Allergen Reactions    Sulfa (Sulfonamide Antibiotics) Hives       Outpatient Prescriptions Marked as Taking for the 12/28/17 encounter (Office Visit) with Yoel Trimble MD   Medication Sig Dispense Refill    GUAIFENESIN/DEXTROMETHORPHAN (CORICIDIN HBP PO) Take  by mouth.  acetaminophen (TYLENOL EXTRA STRENGTH) 500 mg tablet Take  by mouth every six (6) hours as needed for Pain.  azithromycin (ZITHROMAX) 250 mg tablet Take 2 tablets today, then take 1 tablet daily 6 Tab 0    pantoprazole (PROTONIX) 40 mg tablet Take 1 tablet by mouth  daily 90 Tab 3    levothyroxine (SYNTHROID) 125 mcg tablet Take 1 tablet by mouth  daily 90 Tab 3    amLODIPine (NORVASC) 5 mg tablet Take 1 tablet by mouth  daily 90 Tab 3    atorvastatin (LIPITOR) 20 mg tablet Take 1 tablet by mouth  daily 90 Tab 3    clopidogrel (PLAVIX) 75 mg tab Take 1 tablet by mouth  daily 90 Tab 3    lisinopril (PRINIVIL, ZESTRIL) 5 mg tablet Take 1 tablet by mouth  daily 90 Tab 3    cholecalciferol, vitamin D3, (VITAMIN D3) 2,000 unit tab Take  by mouth daily.          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 Dr. Wily Valdes Stroke [facility]) 2002    TIA, Dr. Satya Pedroza Thyroid cancer [facility]) Jan 2015    Unspecified vitamin D deficiency        History   Smoking Status    Former Smoker    Years: 5.00    Quit date: 2/5/1955   Smokeless Tobacco    Never Used       Social History     Social History    Marital status:      Spouse name: N/A    Number of children: N/A    Years of education: N/A     Social History Main Topics    Smoking status: Former Smoker     Years: 5.00     Quit date: 2/5/1955    Smokeless tobacco: Never Used    Alcohol use Yes      Comment: occassional mixed drink or beer    Drug use: No    Sexual activity: Yes     Partners: Female     Birth control/ protection: None     Other Topics Concern    None     Social History Narrative     Had 6 children and one daughter passed away in 1984 in a car accident. Has one living daughter and 4 sons. 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  April 2017 after his wife suffered with dementia. Family History   Problem Relation Age of Onset    Cancer Mother      hodgkins disease    Heart Disease Father     Hypertension Father     Other Neg Hx      no hypercalcemia or kidney stones         PE:  Visit Vitals    /73 (BP 1 Location: Left arm, BP Patient Position: Sitting)    Pulse 100    Temp 98.4 °F (36.9 °C) (Oral)    Resp 18    Ht 6' (1.829 m)    Wt 224 lb 9.6 oz (101.9 kg)    SpO2 96%    BMI 30.46 kg/m2     Gen: alert, oriented, no acute distress  Head: normocephalic, atraumatic  Ears: external auditory canals clear, TMs normal bilaterally  Eyes:  sclera clear, conjunctiva clear  Nose: Sinuses nontender to palpation. Normal turbinates. No nasal drainage. Oral: moist mucus membranes, no oral lesions, no pharyngeal exudate or erythema  Neck:  No LAD  Resp: Normal work of breathing, lungs CTAB, no w/r/r  CV: S1, S2 normal.  No murmurs, rubs, or gallops. Results for orders placed or performed in visit on 12/28/17   AMB POC SHILA INFLUENZA A/B TEST   Result Value Ref Range    VALID INTERNAL CONTROL POC Yes     Influenza A Ag POC Negative Negative Pos/Neg    Influenza B Ag POC Negative Negative Pos/Neg       ASSESSMENT:   1. Flu-like symptoms    2. Upper respiratory tract infection, unspecified type          PLAN:  Symptomatic therapy suggested: push fluids, rest and return office visit prn if symptoms persist or worsen. Call or return to clinic prn if these symptoms worsen or fail to improve as anticipated. Orders Placed This Encounter    AMB POC SHILA INFLUENZA A/B TEST    GUAIFENESIN/DEXTROMETHORPHAN (CORICIDIN HBP PO)     Sig: Take  by mouth.  acetaminophen (TYLENOL EXTRA STRENGTH) 500 mg tablet     Sig: Take  by mouth every six (6) hours as needed for Pain.  azithromycin (ZITHROMAX) 250 mg tablet     Sig: Take 2 tablets today, then take 1 tablet daily     Dispense:  6 Tab     Refill:  0     Flu neg. Given delayed script. Verbal and written instructions (see AVS) provided.  Patient expresses understanding of diagnosis and treatment plan.     Sandee Chiu MD

## 2017-12-28 NOTE — PROGRESS NOTES
Chief Complaint   Patient presents with    Cold Symptoms     x6 days     1. Have you been to the ER, urgent care clinic since your last visit? Hospitalized since your last visit? [facility] dizziness. 2. Have you seen or consulted any other health care providers outside of the [address] [place] [address] since your last visit? Include any pap smears or colon screening.  No

## 2018-01-29 ENCOUNTER — TELEPHONE (OUTPATIENT)
Dept: FAMILY MEDICINE CLINIC | Age: 83
End: 2018-01-29

## 2018-02-01 ENCOUNTER — OFFICE VISIT (OUTPATIENT)
Dept: FAMILY MEDICINE CLINIC | Age: 83
End: 2018-02-01

## 2018-02-01 ENCOUNTER — HOSPITAL ENCOUNTER (OUTPATIENT)
Dept: LAB | Age: 83
Discharge: HOME | End: 2018-02-01
Payer: MEDICARE

## 2018-02-01 VITALS
HEIGHT: 72 IN | TEMPERATURE: 98 F | HEART RATE: 97 BPM | DIASTOLIC BLOOD PRESSURE: 77 MMHG | WEIGHT: 225 LBS | SYSTOLIC BLOOD PRESSURE: 139 MMHG | RESPIRATION RATE: 16 BRPM | OXYGEN SATURATION: 97 % | BODY MASS INDEX: 30.48 KG/M2

## 2018-02-01 DIAGNOSIS — Z86.39 HISTORY OF HYPERPARATHYROIDISM: ICD-10-CM

## 2018-02-01 DIAGNOSIS — I45.5 SINUS PAUSE: ICD-10-CM

## 2018-02-01 DIAGNOSIS — R53.83 FATIGUE, UNSPECIFIED TYPE: ICD-10-CM

## 2018-02-01 DIAGNOSIS — R05.9 COUGH: Primary | ICD-10-CM

## 2018-02-01 DIAGNOSIS — Z85.850 HISTORY OF THYROID CANCER: ICD-10-CM

## 2018-02-01 PROCEDURE — 83880 ASSAY OF NATRIURETIC PEPTIDE: CPT

## 2018-02-01 PROCEDURE — 84443 ASSAY THYROID STIM HORMONE: CPT

## 2018-02-01 PROCEDURE — 80053 COMPREHEN METABOLIC PANEL: CPT

## 2018-02-01 PROCEDURE — 36415 COLL VENOUS BLD VENIPUNCTURE: CPT

## 2018-02-01 PROCEDURE — 83970 ASSAY OF PARATHORMONE: CPT

## 2018-02-01 PROCEDURE — 85025 COMPLETE CBC W/AUTO DIFF WBC: CPT

## 2018-02-01 NOTE — PROGRESS NOTES
Chief Complaint   Patient presents with    Cough     Patient is here to be seen for persistent cough.

## 2018-02-01 NOTE — PATIENT INSTRUCTIONS
Dr Demond Evans will see you at 1:00 this afternoon. Cardiac Arrhythmia: Care Instructions  Your Care Instructions    A cardiac arrhythmia is a change in the normal rhythm of the heart. Your heart may beat too fast or too slow or beat with an irregular or skipping rhythm. A change in the heart's rhythm may feel like a really strong heartbeat or a fluttering in your chest. A severe heart rhythm problem can keep the body from getting the blood it needs. This can result in shortness of breath, lightheadedness, and fainting. You may take medicine to treat your condition. Your doctor may recommend a pacemaker or recommend catheter ablation to destroy small parts of the heart that are causing a rhythm problem. Another possible treatment is an implantable cardioverter-defibrillator (ICD). An ICD is a device that gives the heart a shock to return the heart to a normal rhythm. Follow-up care is a key part of your treatment and safety. Be sure to make and go to all appointments, and call your doctor if you are having problems. It's also a good idea to know your test results and keep a list of the medicines you take. How can you care for yourself at home? ?General care  ? · Be safe with medicines. Take your medicines exactly as prescribed. Call your doctor if you think you are having a problem with your medicine. You will get more details on the specific medicines your doctor prescribes. ? · If you received a pacemaker or an ICD, you will get a fact sheet about it. ? · Wear medical alert jewelry that says you have an abnormal heart rhythm. You can buy this at most drugstores. ? Lifestyle changes  ? · Eat a heart-healthy diet. ? · Stay at a healthy weight. Lose weight if you need to. ? · Avoid nicotine, too much alcohol, and illegal drugs (meth, speed, and cocaine). Also, get enough sleep and do not overeat. ? · Ask your doctor whether you can take over-the-counter medicines (such as decongestants).  These can make your heart beat fast.   ? · Talk to your doctor about any limits to activities, such as driving, or tasks where you use power tools or ladders. Activity  ? · Start light exercise if your doctor says you can. Even a small amount will help you get stronger, have more energy, and manage your stress. ? · Get regular exercise. Try for 30 minutes on most days of the week. Ask your doctor what level of exercise is safe for you. If activity is not likely to cause health problems, you probably do not have limits on the type or level of activity that you can do. You may want to walk, swim, bike, or do other activities. ? · When you exercise, watch for signs that your heart is working too hard. You are pushing too hard if you cannot talk while you exercise. If you become short of breath or dizzy or have chest pain, sit down and rest.   ? · Check your pulse daily. Place two fingers on the artery at the palm side of your wrist, in line with your thumb. If your heartbeat seems uneven, talk to your doctor. When should you call for help? Call 911 anytime you think you may need emergency care. For example, call if:  ? · You have symptoms of sudden heart failure. These may include:  ¨ Severe trouble breathing. ¨ A fast or irregular heartbeat. ¨ Coughing up pink, foamy mucus. ¨ You passed out. ? · You have signs of a stroke. These include:  ¨ Sudden numbness, paralysis, or weakness in your face, arm, or leg, especially on only one side of your body. ¨ New problems with walking or balance. ¨ Sudden vision changes. ¨ Drooling or slurred speech. ¨ New problems speaking or understanding simple statements, or feeling confused. ¨ A sudden, severe headache that is different from past headaches. ?Call your doctor now or seek immediate medical care if:  ? · You have new or changed symptoms of heart failure, such as:  ¨ New or increased shortness of breath. ¨ New or worse swelling in your legs, ankles, or feet.   ¨ Sudden weight gain, such as more than 2 to 3 pounds in a day or 5 pounds in a week. (Your doctor may suggest a different range of weight gain.)  ¨ Feeling dizzy or lightheaded or like you may faint. ¨ Feeling so tired or weak that you cannot do your usual activities. ¨ Not sleeping well. Shortness of breath wakes you at night. You need extra pillows to prop yourself up to breathe easier. ? Watch closely for changes in your health, and be sure to contact your doctor if:  ? · You do not get better as expected. Where can you learn more? Go to http://jagdeep-pham.info/. Enter Q665 in the search box to learn more about \"Cardiac Arrhythmia: Care Instructions. \"  Current as of: September 21, 2016  Content Version: 11.4  © 3042-0644 [employer]. Care instructions adapted under license by [employer] (which disclaims liability or warranty for this information). If you have questions about a medical condition or this instruction, always ask your healthcare professional. [address] any warranty or liability for your use of this information.

## 2018-02-01 NOTE — MR AVS SNAPSHOT
[address] [facility] 
 
 
 [address] 
[phone] Patient: Tamar Dupont. MRN: [id number]:[id number] Visit Information Date & Time Provider Department Dept. Phone Encounter #  
 2/1/2018  8:15 AM Sabrina Lundberg Jn [place] [id number] [phone] [id number] Upcoming Health Maintenance Date Due  
 GLAUCOMA SCREENING Q2Y 2/2/2018 COLONOSCOPY 8/1/2018 MEDICARE YEARLY EXAM 8/19/2018 DTaP/Tdap/Td series (2 - Td) 10/14/2026 Allergies as of 2/1/2018  Review Complete On: 2/1/2018 By: Sabrina Lundberg MD  
  
 Severity Noted Reaction Type Reactions Sulfa (Sulfonamide Antibiotics)  10/26/2011    Hives Current Immunizations  Reviewed on 12/15/2017 Name Date Influenza High Dose Vaccine PF 12/15/2017, 9/22/2014 Influenza Vaccine 10/19/2016 12:00 AM  
 Pneumococcal Conjugate (PCV-13) 8/2/2016 Not reviewed this visit You Were Diagnosed With   
  
 Codes Comments Cough    -  Primary ICD-10-CM: B15 ICD-9-CM: 786.2 Fatigue, unspecified type     ICD-10-CM: R53.83 ICD-9-CM: 780.79 History of thyroid cancer     ICD-10-CM: Z85.850 ICD-9-CM: V10.87 History of hyperparathyroidism     ICD-10-CM: Z86.39 
ICD-9-CM: V12.29 Sinus pause     ICD-10-CM: I45.5 ICD-9-CM: 426.6 Vitals BP Pulse Temp Resp Height(growth percentile) Weight(growth percentile) 139/77 (BP 1 Location: Left arm, BP Patient Position: Sitting) 97 98 °F (36.7 °C) (Oral) 16 6' (1.829 m) 225 lb (102.1 kg) SpO2 BMI Smoking Status 97% 30.52 kg/m2 Former Smoker Vitals History BMI and BSA Data Body Mass Index Body Surface Area 30.52 kg/m 2 2.28 m 2 Preferred Pharmacy Pharmacy Name Phone [facility] [address] [phone] Your Updated Medication List  
  
   
 This list is accurate as of: 2/1/18  9:25 AM.  Always use your most recent med list. amLODIPine 5 mg tablet Commonly known as:  Jillyn Boast Take 1 tablet by mouth  daily  
  
 atorvastatin 20 mg tablet Commonly known as:  LIPITOR Take 1 tablet by mouth  daily  
  
 clopidogrel 75 mg Tab Commonly known as:  PLAVIX Take 1 tablet by mouth  daily CORICIDIN HBP PO Take  by mouth.  
  
 levothyroxine 125 mcg tablet Commonly known as:  SYNTHROID Take 1 tablet by mouth  daily  
  
 lisinopril 5 mg tablet Commonly known as:  Mallard Ketan Take 1 tablet by mouth  daily  
  
 pantoprazole 40 mg tablet Commonly known as:  PROTONIX Take 1 tablet by mouth  daily TYLENOL EXTRA STRENGTH 500 mg tablet Generic drug:  acetaminophen Take  by mouth every six (6) hours as needed for Pain. VITAMIN D3 2,000 unit Tab Generic drug:  cholecalciferol (vitamin D3) Take  by mouth daily. We Performed the Following AMB POC EKG ROUTINE W/ 12 LEADS, INTER & REP [80050 CPT(R)] BNP [id number] CPT(R)] CBC WITH AUTOMATED DIFF [40013 CPT(R)] METABOLIC PANEL, COMPREHENSIVE [42203 CPT(R)] PTH INTACT [13745 CPT(R)] TSH 3RD GENERATION [08827 CPT(R)] To-Do List   
 02/01/2018 Imaging:  XR CHEST PA LAT Patient Instructions Cardiac Arrhythmia: Care Instructions Your Care Instructions A cardiac arrhythmia is a change in the normal rhythm of the heart. Your heart may beat too fast or too slow or beat with an irregular or skipping rhythm. A change in the heart's rhythm may feel like a really strong heartbeat or a fluttering in your chest. A severe heart rhythm problem can keep the body from getting the blood it needs. This can result in shortness of breath, lightheadedness, and fainting. You may take medicine to treat your condition.  Your doctor may recommend a pacemaker or recommend catheter ablation to destroy small parts of the heart that are causing a rhythm problem. Another possible treatment is an implantable cardioverter-defibrillator (ICD). An ICD is a device that gives the heart a shock to return the heart to a normal rhythm. Follow-up care is a key part of your treatment and safety. Be sure to make and go to all appointments, and call your doctor if you are having problems. It's also a good idea to know your test results and keep a list of the medicines you take. How can you care for yourself at home? ?General care ? · Be safe with medicines. Take your medicines exactly as prescribed. Call your doctor if you think you are having a problem with your medicine. You will get more details on the specific medicines your doctor prescribes. ? · If you received a pacemaker or an ICD, you will get a fact sheet about it. ? · Wear medical alert jewelry that says you have an abnormal heart rhythm. You can buy this at most drugstores. ? Lifestyle changes ? · Eat a heart-healthy diet. ? · Stay at a healthy weight. Lose weight if you need to. ? · Avoid nicotine, too much alcohol, and illegal drugs (meth, speed, and cocaine). Also, get enough sleep and do not overeat. ? · Ask your doctor whether you can take over-the-counter medicines (such as decongestants). These can make your heart beat fast.  
? · Talk to your doctor about any limits to activities, such as driving, or tasks where you use power tools or ladders. Activity ? · Start light exercise if your doctor says you can. Even a small amount will help you get stronger, have more energy, and manage your stress. ? · Get regular exercise. Try for 30 minutes on most days of the week. Ask your doctor what level of exercise is safe for you. If activity is not likely to cause health problems, you probably do not have limits on the type or level of activity that you can do. You may want to walk, swim, bike, or do other activities. ? · When you exercise, watch for signs that your heart is working too hard. You are pushing too hard if you cannot talk while you exercise. If you become short of breath or dizzy or have chest pain, sit down and rest.  
? · Check your pulse daily. Place two fingers on the artery at the palm side of your wrist, in line with your thumb. If your heartbeat seems uneven, talk to your doctor. When should you call for help? Call 911 anytime you think you may need emergency care. For example, call if: 
? · You have symptoms of sudden heart failure. These may include: ¨ Severe trouble breathing. ¨ A fast or irregular heartbeat. ¨ Coughing up pink, foamy mucus. ¨ You passed out. ? · You have signs of a stroke. These include: 
¨ Sudden numbness, paralysis, or weakness in your face, arm, or leg, especially on only one side of your body. ¨ New problems with walking or balance. ¨ Sudden vision changes. ¨ Drooling or slurred speech. ¨ New problems speaking or understanding simple statements, or feeling confused. ¨ A sudden, severe headache that is different from past headaches. ?Call your doctor now or seek immediate medical care if: 
? · You have new or changed symptoms of heart failure, such as: ¨ New or increased shortness of breath. ¨ New or worse swelling in your legs, ankles, or feet. ¨ Sudden weight gain, such as more than 2 to 3 pounds in a day or 5 pounds in a week. (Your doctor may suggest a different range of weight gain.) ¨ Feeling dizzy or lightheaded or like you may faint. ¨ Feeling so tired or weak that you cannot do your usual activities. ¨ Not sleeping well. Shortness of breath wakes you at night. You need extra pillows to prop yourself up to breathe easier. ? Watch closely for changes in your health, and be sure to contact your doctor if: 
? · You do not get better as expected. Where can you learn more? Go to http://jagdeep-pham.info/. Enter H052 in the search box to learn more about \"Cardiac Arrhythmia: Care Instructions. \" Current as of: September 21, 2016 Content Version: 11.4 © 7657-9696 [employer]. Care instructions adapted under license by [employer] (which disclaims liability or warranty for this information). If you have questions about a medical condition or this instruction, always ask your healthcare professional. [address] any warranty or liability for your use of this information. Introducing [facility]! Dear Shawn Galloway: Thank you for requesting a Twingly account. Our records indicate that you already have an active Twingly account. You can access your account anytime at [email or web address] Did you know that you can access your hospital and ER discharge instructions at any time in Twingly? You can also review all of your test results from your hospital stay or ER visit. Additional Information If you have questions, please visit the Frequently Asked Questions section of the Twingly website at [email or web address] Remember, Twingly is NOT to be used for urgent needs. For medical emergencies, dial 911. Now available from your iPhone and Android! Please provide this summary of care documentation to your next provider. Your primary care clinician is listed as Paxton Thompson. If you have any questions after today's visit, please call [phone].

## 2018-02-01 NOTE — PROGRESS NOTES
Subjective: Lorenzo Suresh is a 81 y.o. male who complains of cough treated for flu like illness 12/28. Seems to have resolved with supportive care - never filled zpack. Then noted cough worsening again  Over past week or two. Coughing a lot when he lays down. No fever, no appetite change, no change in energy level with this recurrence. Some abdominal muscle soreness, no chest pain. Denies dyspnea except sometimes after showering and shaving has to take a rest. Or breathes heavy after carrying groceries. In September he was seen in the emergency room for a syncopal episode that happened after he carried in a batch of groceries. He states he felt some heart palpitations at that time right before he felt like he was going to pass out. He states after he gets showered and cleaned up some days he feels so wiped out that he needs to lay down. Denies any weight change or leg swelling. Denies any PND. 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   Dr. Jay Jay Ramirez Stroke [facility]) 2002    TIA, Dr. Leilani Clark Thyroid cancer [facility]) Jan 2015    Unspecified vitamin D deficiency      Social History   Substance Use Topics    Smoking status: Former Smoker     Years: 5.00     Quit date: 2/5/1955    Smokeless tobacco: Never Used    Alcohol use Yes      Comment: occassional mixed drink or beer     Outpatient Prescriptions Marked as Taking for the 2/1/18 encounter (Office Visit) with Addie Briseno MD   Medication Sig Dispense Refill    GUAIFENESIN/DEXTROMETHORPHAN (CORICIDIN HBP PO) Take  by mouth.  acetaminophen (TYLENOL EXTRA STRENGTH) 500 mg tablet Take  by mouth every six (6) hours as needed for Pain.  pantoprazole (PROTONIX) 40 mg tablet Take 1 tablet by mouth  daily 90 Tab 3    levothyroxine (SYNTHROID) 125 mcg tablet Take 1 tablet by mouth  daily 90 Tab 3    amLODIPine (NORVASC) 5 mg tablet Take 1 tablet by mouth  daily 90 Tab 3    atorvastatin (LIPITOR) 20 mg tablet Take 1 tablet by mouth  daily 90 Tab 3    clopidogrel (PLAVIX) 75 mg tab Take 1 tablet by mouth  daily 90 Tab 3    lisinopril (PRINIVIL, ZESTRIL) 5 mg tablet Take 1 tablet by mouth  daily 90 Tab 3    cholecalciferol, vitamin D3, (VITAMIN D3) 2,000 unit tab Take  by mouth daily. Allergies   Allergen Reactions    Sulfa (Sulfonamide Antibiotics) Hives        Review of Systems  A comprehensive review of systems was negative except for that written in the HPI. Objective:     Visit Vitals    /77 (BP 1 Location: Left arm, BP Patient Position: Sitting)    Pulse 97    Temp 98 °F (36.7 °C) (Oral)    Resp 16    Ht 6' (1.829 m)    Wt 225 lb (102.1 kg)    SpO2 97%    BMI 30.52 kg/m2     General:   alert, cooperative and no distress   Eyes: conjunctivae/scleras clear. PERRL, EOM's intact       Sinuses/Nose: No maxillary or frontal tenderness. No rhinorrhea present. Mouth:  No oral lesions, mild pharyngeal erythema, no exudates       Heart:  Irregular rhythm,no murmurs noted    Lungs:  Crackles at bases, no increased work of breathing       Extremities:  Trace edema at ankles          Reviewed echo results and stress test results from last year. Both were normal with the exception of mild aortic sclerosis. EKG today shows sinus pause and borderline first-degree block    Assessment/Plan:     81year-old with chronic cough worse when he is laying down at night. On exam has an arrhythmia. His EKG shows a sinus pause.   He was seen for syncopal episode in September in the emergency room an EKG had a first-degree AV block at that time. I suspect his chronic cough and dyspneic/lightheaded episodes may be related to the arrhythmia. Called his cardiologist and arranged appointment for this afternoon. Labs drawn to assess for underlying electrolyte or thyroid abnormality. ICD-10-CM ICD-9-CM    1. Cough R05 786.2 CBC WITH AUTOMATED DIFF      METABOLIC PANEL, COMPREHENSIVE      XR CHEST PA LAT   2. Fatigue, unspecified type R53.83 780.79 CBC WITH AUTOMATED DIFF      METABOLIC PANEL, COMPREHENSIVE      AMB POC EKG ROUTINE W/ 12 LEADS, INTER & REP      XR CHEST PA LAT   3. History of thyroid cancer Z85.850 V10.87 TSH 3RD GENERATION   4. History of hyperparathyroidism Z86.39 V12.29 PTH INTACT   5. Sinus pause I45.5 426.6 BNP         Orders Placed This Encounter    XR CHEST PA LAT     Standing Status:   Future     Standing Expiration Date:   3/1/2019     Order Specific Question:   Reason for Exam     Answer:   cough    CBC WITH AUTOMATED DIFF    METABOLIC PANEL, COMPREHENSIVE    TSH 3RD GENERATION    PTH INTACT    BNP    AMB POC EKG ROUTINE W/ 12 LEADS, INTER & REP     Order Specific Question:   Reason for Exam:     Answer:   irregular heartbeat       Verbal and written instructions (see AVS) provided. Patient expresses understanding of diagnosis and treatment plan.

## 2018-02-02 LAB
ALBUMIN SERPL-MCNC: 4.2 G/DL (ref 3.5–4.7)
ALBUMIN/GLOB SERPL: 1.6 {RATIO} (ref 1.2–2.2)
ALP SERPL-CCNC: 89 IU/L (ref 39–117)
ALT SERPL-CCNC: 20 IU/L (ref 0–44)
AST SERPL-CCNC: 20 IU/L (ref 0–40)
BASOPHILS # BLD AUTO: 0 X10E3/UL (ref 0–0.2)
BASOPHILS NFR BLD AUTO: 0 %
BILIRUB SERPL-MCNC: 0.5 MG/DL (ref 0–1.2)
BNP SERPL-MCNC: 40.2 PG/ML (ref 0–100)
BUN SERPL-MCNC: 20 MG/DL (ref 8–27)
BUN/CREAT SERPL: 12 (ref 10–24)
CALCIUM SERPL-MCNC: 9.3 MG/DL (ref 8.6–10.2)
CHLORIDE SERPL-SCNC: 99 MMOL/L (ref 96–106)
CO2 SERPL-SCNC: 24 MMOL/L (ref 18–29)
CREAT SERPL-MCNC: 1.61 MG/DL (ref 0.76–1.27)
EOSINOPHIL # BLD AUTO: 0.2 X10E3/UL (ref 0–0.4)
EOSINOPHIL NFR BLD AUTO: 2 %
ERYTHROCYTE [DISTWIDTH] IN BLOOD BY AUTOMATED COUNT: 14.5 % (ref 12.3–15.4)
GFR SERPLBLD CREATININE-BSD FMLA CKD-EPI: 40 ML/MIN/1.73
GFR SERPLBLD CREATININE-BSD FMLA CKD-EPI: 46 ML/MIN/1.73
GLOBULIN SER CALC-MCNC: 2.6 G/DL (ref 1.5–4.5)
GLUCOSE SERPL-MCNC: 96 MG/DL (ref 65–99)
HCT VFR BLD AUTO: 42.3 % (ref 37.5–51)
HGB BLD-MCNC: 14 G/DL (ref 13–17.7)
IMM GRANULOCYTES # BLD: 0 X10E3/UL (ref 0–0.1)
IMM GRANULOCYTES NFR BLD: 0 %
INTERPRETATION: NORMAL
LYMPHOCYTES # BLD AUTO: 1.6 X10E3/UL (ref 0.7–3.1)
LYMPHOCYTES NFR BLD AUTO: 18 %
MCH RBC QN AUTO: 31 PG (ref 26.6–33)
MCHC RBC AUTO-ENTMCNC: 33.1 G/DL (ref 31.5–35.7)
MCV RBC AUTO: 94 FL (ref 79–97)
MONOCYTES # BLD AUTO: 0.6 X10E3/UL (ref 0.1–0.9)
MONOCYTES NFR BLD AUTO: 7 %
NEUTROPHILS # BLD AUTO: 6.4 X10E3/UL (ref 1.4–7)
NEUTROPHILS NFR BLD AUTO: 73 %
PLATELET # BLD AUTO: 339 X10E3/UL (ref 150–379)
POTASSIUM SERPL-SCNC: 5.3 MMOL/L (ref 3.5–5.2)
PROT SERPL-MCNC: 6.8 G/DL (ref 6–8.5)
PTH-INTACT SERPL-MCNC: 42 PG/ML (ref 15–65)
RBC # BLD AUTO: 4.51 X10E6/UL (ref 4.14–5.8)
SODIUM SERPL-SCNC: 137 MMOL/L (ref 134–144)
TSH SERPL DL<=0.005 MIU/L-ACNC: 0.33 UIU/ML (ref 0.45–4.5)
WBC # BLD AUTO: 8.9 X10E3/UL (ref 3.4–10.8)

## 2018-02-05 DIAGNOSIS — C73 THYROID CANCER (HCC): ICD-10-CM

## 2018-02-05 RX ORDER — LEVOTHYROXINE SODIUM 112 UG/1
112 TABLET ORAL
Qty: 30 TAB | Refills: 1 | Status: SHIPPED | OUTPATIENT
Start: 2018-02-05 | End: 2018-02-06 | Stop reason: SDUPTHER

## 2018-02-05 NOTE — PROGRESS NOTES
Need to decrease the levothyroxine dose a little. If he's on 125, will decrease to 112. I've sent script to pharmacy.

## 2018-02-06 ENCOUNTER — TELEPHONE (OUTPATIENT)
Dept: FAMILY MEDICINE CLINIC | Age: 83
End: 2018-02-06

## 2018-02-06 DIAGNOSIS — C73 THYROID CANCER (HCC): ICD-10-CM

## 2018-02-06 RX ORDER — LEVOTHYROXINE SODIUM 112 UG/1
112 TABLET ORAL
Qty: 90 TAB | Refills: 3 | Status: SHIPPED | OUTPATIENT
Start: 2018-02-06 | End: 2018-12-10 | Stop reason: SDUPTHER

## 2018-02-06 NOTE — TELEPHONE ENCOUNTER
Pt notified and verbalized understanding. Pt states that he would rather have Levothyroxine sent to [facility] instead of [facility]. Script sent as requested.

## 2018-02-06 NOTE — TELEPHONE ENCOUNTER
----- Message from Dayna Wheat MD sent at 2/5/2018  5:44 PM EST -----  Need to decrease the levothyroxine dose a little. If he's on 125, will decrease to 112. I've sent script to pharmacy.

## 2018-04-06 LAB — CREATININE, EXTERNAL: 1.65

## 2018-05-13 DIAGNOSIS — Z86.73 HISTORY OF TIA (TRANSIENT ISCHEMIC ATTACK): ICD-10-CM

## 2018-05-14 RX ORDER — CLOPIDOGREL BISULFATE 75 MG/1
TABLET ORAL
Qty: 90 TAB | Refills: 3 | Status: SHIPPED | OUTPATIENT
Start: 2018-05-14 | End: 2018-07-19 | Stop reason: SDUPTHER

## 2018-06-21 DIAGNOSIS — I10 ESSENTIAL HYPERTENSION WITH GOAL BLOOD PRESSURE LESS THAN 140/90: ICD-10-CM

## 2018-06-21 DIAGNOSIS — E78.5 HYPERLIPIDEMIA, UNSPECIFIED HYPERLIPIDEMIA TYPE: ICD-10-CM

## 2018-06-21 DIAGNOSIS — K21.9 GASTROESOPHAGEAL REFLUX DISEASE WITHOUT ESOPHAGITIS: ICD-10-CM

## 2018-06-21 RX ORDER — ATORVASTATIN CALCIUM 20 MG/1
TABLET, FILM COATED ORAL
Qty: 90 TAB | Refills: 3 | Status: SHIPPED | OUTPATIENT
Start: 2018-06-21 | End: 2018-07-19 | Stop reason: SDUPTHER

## 2018-06-21 RX ORDER — AMLODIPINE BESYLATE 5 MG/1
TABLET ORAL
Qty: 90 TAB | Refills: 3 | Status: SHIPPED | OUTPATIENT
Start: 2018-06-21 | End: 2018-07-19 | Stop reason: SDUPTHER

## 2018-06-21 RX ORDER — PANTOPRAZOLE SODIUM 40 MG/1
TABLET, DELAYED RELEASE ORAL
Qty: 90 TAB | Refills: 3 | Status: SHIPPED | OUTPATIENT
Start: 2018-06-21 | End: 2018-07-19 | Stop reason: SDUPTHER

## 2018-06-25 ENCOUNTER — TELEPHONE (OUTPATIENT)
Dept: FAMILY MEDICINE CLINIC | Age: 83
End: 2018-06-25

## 2018-06-26 ENCOUNTER — OFFICE VISIT (OUTPATIENT)
Dept: FAMILY MEDICINE CLINIC | Age: 83
End: 2018-06-26

## 2018-06-26 VITALS
WEIGHT: 222 LBS | TEMPERATURE: 97.8 F | BODY MASS INDEX: 30.07 KG/M2 | HEART RATE: 77 BPM | HEIGHT: 72 IN | RESPIRATION RATE: 18 BRPM | DIASTOLIC BLOOD PRESSURE: 68 MMHG | OXYGEN SATURATION: 96 % | SYSTOLIC BLOOD PRESSURE: 112 MMHG

## 2018-06-26 DIAGNOSIS — R10.10 UPPER ABDOMINAL PAIN: ICD-10-CM

## 2018-06-26 DIAGNOSIS — K21.9 GASTROESOPHAGEAL REFLUX DISEASE, ESOPHAGITIS PRESENCE NOT SPECIFIED: Primary | ICD-10-CM

## 2018-06-26 RX ORDER — APIXABAN 2.5 MG/1
2.5 TABLET, FILM COATED ORAL 2 TIMES DAILY
COMMUNITY
Start: 2018-05-03

## 2018-06-26 RX ORDER — VALSARTAN 80 MG/1
TABLET ORAL
COMMUNITY
Start: 2018-05-21 | End: 2018-09-11

## 2018-06-26 NOTE — PROGRESS NOTES
Chief Complaint   Patient presents with    Epigastric Pain    Diarrhea     1. Have you been to the ER, urgent care clinic since your last visit? Hospitalized since your last visit? No    2. Have you seen or consulted any other health care providers outside of the [address] [place] [address] since your last visit? Include any pap smears or colon screening. No    Pt reports that he feels like \"something is stuck in my upper abdomen. \" Pt reports that after he eats he has loose stool the morning after he eats and can hear his stomach digesting. Pt reports that the above symptoms have been occurring for around 6 months. Pt reports it almost feels like a burning sensation in his upper abdomen. Health Maintenance reviewed - Eye exam 3/2018 Jeanne Payor). Pt has never been to see GI. Pt has fasted this morning.

## 2018-06-26 NOTE — MR AVS SNAPSHOT
[address] [facility] 
 
 
 [address] [place] [address] 
[phone] Patient: Milli Metcalf. MRN: [id number]:[id number] Visit Information Date & Time Provider Department Dept. Phone Encounter #  
 6/26/2018 10:45 AM Aldo Jenkinsjuan [place] [address] [phone] [id number] Upcoming Health Maintenance Date Due  
 GLAUCOMA SCREENING Q2Y 2/2/2018 COLONOSCOPY 8/1/2018 Influenza Age 5 to Adult 8/1/2018 MEDICARE YEARLY EXAM 8/19/2018 DTaP/Tdap/Td series (2 - Td) 10/14/2026 Allergies as of 6/26/2018  Review Complete On: 6/26/2018 By: Gregorio Dolan NP Severity Noted Reaction Type Reactions Sulfa (Sulfonamide Antibiotics)  10/26/2011    Hives Current Immunizations  Reviewed on 12/15/2017 Name Date Influenza High Dose Vaccine PF 12/15/2017, 9/22/2014 Influenza Vaccine 10/19/2016 12:00 AM  
 Pneumococcal Conjugate (PCV-13) 8/2/2016 Not reviewed this visit You Were Diagnosed With   
  
 Codes Comments Gastroesophageal reflux disease, esophagitis presence not specified    -  Primary ICD-10-CM: K21.9 ICD-9-CM: 530.81 Upper abdominal pain     ICD-10-CM: R10.10 ICD-9-CM: 789.09 Vitals BP Pulse Temp Resp Height(growth percentile) Weight(growth percentile) 112/68 (BP 1 Location: Left arm, BP Patient Position: Sitting) 77 97.8 °F (36.6 °C) (Oral) 18 6' (1.829 m) 222 lb (100.7 kg) SpO2 BMI Smoking Status 96% 30.11 kg/m2 Former Smoker Vitals History BMI and BSA Data Body Mass Index Body Surface Area  
 30.11 kg/m 2 2.26 m 2 Preferred Pharmacy Pharmacy Name Phone [address] [facility], [address] [address] [place] [address] [address] Your Updated Medication List  
  
   
This list is accurate as of 6/26/18 11:59 AM.  Always use your most recent med list. amLODIPine 5 mg tablet Commonly known as:  Ronan Lute TAKE 1 TABLET BY MOUTH  DAILY  
  
 atorvastatin 20 mg tablet Commonly known as:  LIPITOR  
TAKE 1 TABLET BY MOUTH  DAILY  
  
 clopidogrel 75 mg Tab Commonly known as:  PLAVIX TAKE 1 TABLET BY MOUTH  DAILY CORICIDIN HBP PO Take  by mouth. ELIQUIS 2.5 mg tablet Generic drug:  apixaban  
  
 levothyroxine 112 mcg tablet Commonly known as:  SYNTHROID Take 1 Tab by mouth Daily (before breakfast). pantoprazole 40 mg tablet Commonly known as:  PROTONIX  
TAKE 1 TABLET BY MOUTH  DAILY  
  
 TYLENOL EXTRA STRENGTH 500 mg tablet Generic drug:  acetaminophen Take  by mouth every six (6) hours as needed for Pain.  
  
 valsartan 80 mg tablet Commonly known as:  DIOVAN  
  
 VITAMIN D3 2,000 unit Tab Generic drug:  cholecalciferol (vitamin D3) Take  by mouth daily. We Performed the Following REFERRAL TO GASTROENTEROLOGY [KOA36 Custom] Comments:  
 Please evaluate patient for upper abdominal pain and GERD symptoms (not controlled). Referral Information Referral ID Referred By Referred To  
  
 [id number] [facility] [address] [place], [facility] Status Start Date End Date 1 New Request 6/26/18 6/26/19 If your referral has a status of pending review or denied, additional information will be sent to support the outcome of this decision. Patient Instructions Gastroesophageal Reflux Disease (GERD): Care Instructions Your Care Instructions Gastroesophageal reflux disease (GERD) is the backward flow of stomach acid into the esophagus. The esophagus is the tube that leads from your throat to your stomach. A one-way valve prevents the stomach acid from moving up into this tube. When you have GERD, this valve does not close tightly enough.  
If you have mild GERD symptoms including heartburn, you may be able to control the problem with antacids or over-the-counter medicine. Changing your diet, losing weight, and making other lifestyle changes can also help reduce symptoms. Follow-up care is a key part of your treatment and safety. Be sure to make and go to all appointments, and call your doctor if you are having problems. It's also a good idea to know your test results and keep a list of the medicines you take. How can you care for yourself at home? · Take your medicines exactly as prescribed. Call your doctor if you think you are having a problem with your medicine. · Your doctor may recommend over-the-counter medicine. For mild or occasional indigestion, antacids, such as Tums, Gaviscon, Mylanta, or Maalox, may help. Your doctor also may recommend over-the-counter acid reducers, such as Pepcid AC, Tagamet HB, Zantac 75, or Prilosec. Read and follow all instructions on the label. If you use these medicines often, talk with your doctor. · Change your eating habits. ¨ It's best to eat several small meals instead of two or three large meals. ¨ After you eat, wait 2 to 3 hours before you lie down. ¨ Chocolate, mint, and alcohol can make GERD worse. ¨ Spicy foods, foods that have a lot of acid (like tomatoes and oranges), and coffee can make GERD symptoms worse in some people. If your symptoms are worse after you eat a certain food, you may want to stop eating that food to see if your symptoms get better. · Do not smoke or chew tobacco. Smoking can make GERD worse. If you need help quitting, talk to your doctor about stop-smoking programs and medicines. These can increase your chances of quitting for good. · If you have GERD symptoms at night, raise the head of your bed 6 to 8 inches by putting the frame on blocks or placing a foam wedge under the head of your mattress. (Adding extra pillows does not work.) · Do not wear tight clothing around your middle. · Lose weight if you need to. Losing just 5 to 10 pounds can help. When should you call for help? Call your doctor now or seek immediate medical care if: 
? · You have new or different belly pain. ? · Your stools are black and tarlike or have streaks of blood. ? Watch closely for changes in your health, and be sure to contact your doctor if: 
? · Your symptoms have not improved after 2 days. ? · Food seems to catch in your throat or chest.  
Where can you learn more? Go to http://jagdeep-pham.info/. Enter B373 in the search box to learn more about \"Gastroesophageal Reflux Disease (GERD): Care Instructions. \" Current as of: May 12, 2017 Content Version: 11.4 © 7512-0120 [employer]. Care instructions adapted under license by [employer] (which disclaims liability or warranty for this information). If you have questions about a medical condition or this instruction, always ask your healthcare professional. [place] [address] any warranty or liability for your use of this information. Introducing [facility]! Dear Edwin Washington: Thank you for requesting a MobAppCreator account. Our records indicate that you already have an active MobAppCreator account. You can access your account anytime at [email or web address] Group Did you know that you can access your hospital and ER discharge instructions at any time in MobAppCreator? You can also review all of your test results from your hospital stay or ER visit. Additional Information If you have questions, please visit the Frequently Asked Questions section of the MobAppCreator website at [email or web address] Remember, MobAppCreator is NOT to be used for urgent needs. For medical emergencies, dial 911. Now available from your iPhone and Android! Please provide this summary of care documentation to your next provider. Your primary care clinician is listed as Brian December. If you have any questions after today's visit, please call [phone].

## 2018-06-26 NOTE — PROGRESS NOTES
Subjective:     Chief Complaint   Patient presents with    Epigastric Pain    Diarrhea        HPI:  Cynthia Oreilly. is a 81 y.o. male, (here with his daughter) here for intermittent pain in upper abdomen. Aches most of the time, sometimes burns. Says that he has a known abdominal hernia \"for years but it never really bothered me\" that is mid abdomen and is visualized if he tightens abdominal muscles (diastis recti). Pain is usually felt/worse about 1-2 hours after eating, \"like my food is getting caught up in there and not digesting properly. Then after a couple hours it passes\". Denies any sharp pain or radiation of the pain. He says that he has had loose stools daily for at least 6 months (usually only has BM 1-2 times per day, sometimes just loose and sometimes has urgency \"with explosive stool, it comes out really fast and hard\". No blood in stool, no pain with bowel movement. No changes in stools over the past 6 months. He has had ongoing cough for several months, non productive \"just annoying\". Of note, also has history of prostate cancer     Of note, he was seen by Dr Michelle Godfrey in 11/2016 for similar complaints. Has been on Protonix daily. Had abdominal US done then and showed fatty liver but no acute findings. Had not seen GI in the past, colonoscopy about 10 years ago. The patient denies anorexia, chills, constipation, dysuria, fever, frequency, hematochezia, hematuria, melena, nausea and vomiting. No hospital, ER or specialist visits since last primary care visit except as noted above.     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 Dr. Mario Wright Stroke [facility]) 2002    TIA, Dr. Bhupendra Wilson Thyroid cancer [facility]) Jan 2015    Unspecified vitamin D deficiency        Social History   Substance Use Topics    Smoking status: Former Smoker     Years: 5.00     Quit date: 2/5/1955    Smokeless tobacco: Never Used    Alcohol use Yes      Comment: occassional mixed drink or beer       Outpatient Prescriptions Marked as Taking for the 6/26/18 encounter (Office Visit) with Bushra Chowdhury NP   Medication Sig Dispense Refill    ELIQUIS 2.5 mg tablet       valsartan (DIOVAN) 80 mg tablet       amLODIPine (NORVASC) 5 mg tablet TAKE 1 TABLET BY MOUTH  DAILY 90 Tab 3    pantoprazole (PROTONIX) 40 mg tablet TAKE 1 TABLET BY MOUTH  DAILY 90 Tab 3    atorvastatin (LIPITOR) 20 mg tablet TAKE 1 TABLET BY MOUTH  DAILY 90 Tab 3    clopidogrel (PLAVIX) 75 mg tab TAKE 1 TABLET BY MOUTH  DAILY 90 Tab 3    levothyroxine (SYNTHROID) 112 mcg tablet Take 1 Tab by mouth Daily (before breakfast). 90 Tab 3    GUAIFENESIN/DEXTROMETHORPHAN (CORICIDIN HBP PO) Take  by mouth.  acetaminophen (TYLENOL EXTRA STRENGTH) 500 mg tablet Take  by mouth every six (6) hours as needed for Pain.  cholecalciferol, vitamin D3, (VITAMIN D3) 2,000 unit tab Take  by mouth daily.          Allergies   Allergen Reactions    Sulfa (Sulfonamide Antibiotics) Hives       Health Maintenance reviewed -      ROS:  Gen: no fatigue, no fever, no chills, no unexplained weight loss or weight gain  Eyes: no excessive tearing, itching, or discharge  Nose: no rhinorrhea, no sinus pain  Mouth: no oral lesions, no sore throat, no difficulty swallowing  Resp: no shortness of breath, no wheezing, no cough  CV: no chest pain, no orthopnea, no paroxysmal nocturnal dyspnea, no lower extremity edema, no palpitations  Abd: no nausea,  no constipation  Neuro: no headaches, no syncope or presyncopal episodes  Endo: no polyuria, no polydipsia. : no hematuria, no dysuria, no frequency, no incontinence  Heme: no lymphadenopathy, no easy bruising or bleeding, no night sweats  MSK: no joint pain or swelling    PE:  Visit Vitals    /68 (BP 1 Location: Left arm, BP Patient Position: Sitting)    Pulse 77    Temp 97.8 °F (36.6 °C) (Oral)    Resp 18    Ht 6' (1.829 m)    Wt 222 lb (100.7 kg)    SpO2 96%    BMI 30.11 kg/m2     Gen: alert, oriented, no acute distress  Head: normocephalic, atraumatic  Ears: external auditory canals clear, TMs without erythema or effusion  Eyes: pupils equal round reactive to light, sclera clear, conjunctiva clear  Oral: moist mucus membranes, no oral lesions, no pharyngeal inflammation or exudate  Neck: symmetric normal sized thyroid, no carotid bruits, no jugular vein distention  Resp: no increase work of breathing, lungs clear to ausculation bilaterally, no wheezing, rales or rhonchi  CV: irregular rhythm regular rate,  No murmurs, rubs, or gallops. Abd: soft, not tender, not distended. No hepatosplenomegaly. Normal bowel sounds. Soft, non tender Bulge felt at diastasis recti. no umbilical hernia. No abdominal or renal bruits. Neuro: cranial nerves intact, normal strength and movement in all extremities, reflexes and sensation intact and symmetric. Skin: no lesion or rash  Extremities: no cyanosis or edema      Assessment/Plan:    ICD-10-CM ICD-9-CM    1. Gastroesophageal reflux disease, esophagitis presence not specified K21.9 530.81 REFERRAL TO GASTROENTEROLOGY   2. Upper abdominal pain R10.10 789.09 REFERRAL TO GASTROENTEROLOGY     Referral to GI. Called and made him an appointment with Dr Mary Rivers on 7/6/18. Will likely need EGD and possible colonoscopy. ?hernia or ulcer causing issues vs some IBS. Discussed GERD diet and handout given. May take Maalox PRN and he will continue protonix. Discussed BMI and healthy weight.  Encouraged patient to work to implement changes including diet high in raw fruits and vegetables, lean protein and good fats. Limit refined, processed carbohydrates and sugar. Encouraged regular exercise. Recommended regular cardiovascular exercise 3-6 times per week for 30-60 minutes daily. I have discussed the diagnosis with the patient and the intended plan as seen in the above orders. The patient has received an after-visit summary and questions were answered concerning future plans. I have discussed medication side effects and warnings with the patient as well. The patient verbalizes understanding and agreement with the plan.

## 2018-06-26 NOTE — PATIENT INSTRUCTIONS

## 2018-07-19 DIAGNOSIS — E78.5 HYPERLIPIDEMIA, UNSPECIFIED HYPERLIPIDEMIA TYPE: ICD-10-CM

## 2018-07-19 DIAGNOSIS — I10 ESSENTIAL HYPERTENSION WITH GOAL BLOOD PRESSURE LESS THAN 140/90: ICD-10-CM

## 2018-07-19 DIAGNOSIS — K21.9 GASTROESOPHAGEAL REFLUX DISEASE WITHOUT ESOPHAGITIS: ICD-10-CM

## 2018-07-19 DIAGNOSIS — Z86.73 HISTORY OF TIA (TRANSIENT ISCHEMIC ATTACK): ICD-10-CM

## 2018-07-20 RX ORDER — PANTOPRAZOLE SODIUM 40 MG/1
TABLET, DELAYED RELEASE ORAL
Qty: 90 TAB | Refills: 3 | Status: SHIPPED | OUTPATIENT
Start: 2018-07-20 | End: 2020-01-28

## 2018-07-20 RX ORDER — CLOPIDOGREL BISULFATE 75 MG/1
TABLET ORAL
Qty: 90 TAB | Refills: 3 | Status: SHIPPED | OUTPATIENT
Start: 2018-07-20 | End: 2019-07-22 | Stop reason: SDUPTHER

## 2018-07-20 RX ORDER — AMLODIPINE BESYLATE 5 MG/1
TABLET ORAL
Qty: 90 TAB | Refills: 3 | Status: SHIPPED | OUTPATIENT
Start: 2018-07-20 | End: 2018-09-11

## 2018-07-20 RX ORDER — ATORVASTATIN CALCIUM 20 MG/1
TABLET, FILM COATED ORAL
Qty: 90 TAB | Refills: 3 | Status: ON HOLD | OUTPATIENT
Start: 2018-07-20 | End: 2018-09-17

## 2018-09-11 RX ORDER — METOPROLOL SUCCINATE 50 MG/1
50 TABLET, EXTENDED RELEASE ORAL DAILY
COMMUNITY

## 2018-09-11 RX ORDER — LOSARTAN POTASSIUM 50 MG/1
50 TABLET ORAL DAILY
COMMUNITY
End: 2020-01-28

## 2018-09-11 NOTE — PERIOP NOTES
[facility] Ambulatory Surgery Unit Pre-operative Instructions for Endo Procedures Procedure Date  Monday, Sept 17, 2018            Tentative Arrival Time 4202 1. On the day of your procedure, please report to the Ambulatory Surgery Unit Registration Desk and sign in at your designated time. The Ambulatory Surgery Unit is located in [facility] on the [facility] side of the [facility] [place] across from the [address] [place] [address]. Please have all of your health insurance cards and a photo ID. 2. You must have someone with you to drive you home, as you should not drive a car for 24 hours following anesthesia. Please make arrangements for a responsible adult friend or family member to stay with you for at least the first 24 hours after your procedure. 3. Do not have anything to eat or drink (including water, gum, mints, coffee, juice) after 11:59 p.m., Sunday. This may not apply to medications prescribed by your physician. (Please note below the special instructions with medications to take the morning of your procedure.) 4. If applicable, follow the clear liquid diet and bowel prep instructions provided by your physician's office. If you do not have this information, or have any questions, please contact your physician's office. 5. We recommend you do not drink any alcoholic beverages for 24 hours before and after your procedure. 6. Contact your surgeons office for instructions on the following medications: non-steroidal anti-inflammatory drugs (i.e. Advil, Aleve), vitamins, and supplements. (Some surgeons will want you to stop these medications prior to surgery and others may allow you to take them) **If you are currently taking Plavix, Coumadin, Aspirin and/or other blood-thinning agents, contact your surgeon for instructions. ** Your surgeon will partner with the physician prescribing these medications to determine if it is safe to stop or if you need to continue taking. Please do not stop taking these medications without instructions from your surgeon. 7. In an effort to help prevent surgical site infection, we ask that you shower with an anti-bacterial soap (i.e. Dial or Safeguard) on the morning of your procedure. Do not apply any lotions, powders, or deodorants after showering. 8. Wear comfortable clothes. Wear glasses instead of contacts. Do not bring any jewelry or money (other than copays or fees as instructed). Do not wear make-up, particularly mascara, the morning of your procedure. Wear your hair loose or down, no ponytails, buns, sandoval pins or clips. All body piercings must be removed. 9. You should understand that if you do not follow these instructions your procedure may be cancelled. If your physical condition changes (i.e. fever, cold or flu) please contact your surgeon as soon as possible. 10. It is important that you be on time. If a situation occurs where you may be late, or if you have any questions or problems, please call [phone]. 11. Your procedure time may be subject to change. You will receive a phone call the day prior to confirm your arrival time. Special Instructions: Take all medications and inhalers, as prescribed, on the morning of surgery with a sip of water EXCEPT: no over the counter medications day of surgery I understand a pre-operative phone call will be made to verify my procedure time. In the event that I am not available, I give permission for a message to be left on my answering service and/or with another person? yes Preop instructions reviewed  Pt verbalized understanding.  
 
 ___________________      ___________________      ___________________ 
(Signature of Patient)          (Witness)                   (Date and Time)

## 2018-09-14 ENCOUNTER — ANESTHESIA EVENT (OUTPATIENT)
Dept: SURGERY | Age: 83
End: 2018-09-14
Payer: MEDICARE

## 2018-09-17 ENCOUNTER — HOSPITAL ENCOUNTER (OUTPATIENT)
Age: 83
Setting detail: OUTPATIENT SURGERY
Discharge: HOME | End: 2018-09-17
Attending: INTERNAL MEDICINE | Admitting: INTERNAL MEDICINE
Payer: MEDICARE

## 2018-09-17 ENCOUNTER — ANESTHESIA (OUTPATIENT)
Dept: SURGERY | Age: 83
End: 2018-09-17
Payer: MEDICARE

## 2018-09-17 VITALS
RESPIRATION RATE: 16 BRPM | SYSTOLIC BLOOD PRESSURE: 157 MMHG | OXYGEN SATURATION: 98 % | HEART RATE: 72 BPM | BODY MASS INDEX: 29.8 KG/M2 | DIASTOLIC BLOOD PRESSURE: 82 MMHG | HEIGHT: 72 IN | WEIGHT: 220 LBS | TEMPERATURE: 97.8 F

## 2018-09-17 LAB
H PYLORI FROM TISSUE: NEGATIVE
KIT LOT NO., HCLOLOT: NORMAL
NEGATIVE CONTROL: NEGATIVE
POSITIVE CONTROL: POSITIVE

## 2018-09-17 PROCEDURE — 76030000000 HC AMB SURG OR TIME 0.5 TO 1: Performed by: INTERNAL MEDICINE

## 2018-09-17 PROCEDURE — 76210000040 HC AMBSU PH I REC FIRST 0.5 HR: Performed by: INTERNAL MEDICINE

## 2018-09-17 PROCEDURE — 77030003657 HC NDL SCLER BSC -B: Performed by: INTERNAL MEDICINE

## 2018-09-17 PROCEDURE — 88342 IMHCHEM/IMCYTCHM 1ST ANTB: CPT | Performed by: INTERNAL MEDICINE

## 2018-09-17 PROCEDURE — 77030010936 HC CLP LIG BSC -C: Performed by: INTERNAL MEDICINE

## 2018-09-17 PROCEDURE — 74011250636 HC RX REV CODE- 250/636: Performed by: ANESTHESIOLOGY

## 2018-09-17 PROCEDURE — 87077 CULTURE AEROBIC IDENTIFY: CPT | Performed by: INTERNAL MEDICINE

## 2018-09-17 PROCEDURE — 77030013992 HC SNR POLYP ENDOSC BSC -B: Performed by: INTERNAL MEDICINE

## 2018-09-17 PROCEDURE — 76210000050 HC AMBSU PH II REC 0.5 TO 1 HR: Performed by: INTERNAL MEDICINE

## 2018-09-17 PROCEDURE — 76060000061 HC AMB SURG ANES 0.5 TO 1 HR: Performed by: INTERNAL MEDICINE

## 2018-09-17 PROCEDURE — 77030020255 HC SOL INJ LR 1000ML BG: Performed by: INTERNAL MEDICINE

## 2018-09-17 PROCEDURE — 77030021352 HC CBL LD SYS DISP COVD -B: Performed by: INTERNAL MEDICINE

## 2018-09-17 PROCEDURE — 74011250636 HC RX REV CODE- 250/636

## 2018-09-17 PROCEDURE — 77030009426 HC FCPS BIOP ENDOSC BSC -B: Performed by: INTERNAL MEDICINE

## 2018-09-17 DEVICE — WORKING LENGTH 235CM, WORKING CHANNEL 2.8MM
Type: IMPLANTABLE DEVICE | Site: TRANSVERSE COLON | Status: FUNCTIONAL
Brand: RESOLUTION 360 CLIP

## 2018-09-17 RX ORDER — SODIUM CHLORIDE, SODIUM LACTATE, POTASSIUM CHLORIDE, CALCIUM CHLORIDE 600; 310; 30; 20 MG/100ML; MG/100ML; MG/100ML; MG/100ML
25 INJECTION, SOLUTION INTRAVENOUS CONTINUOUS
Status: DISCONTINUED | OUTPATIENT
Start: 2018-09-17 | End: 2018-09-17 | Stop reason: HOSPADM

## 2018-09-17 RX ORDER — SODIUM CHLORIDE 0.9 % (FLUSH) 0.9 %
5-10 SYRINGE (ML) INJECTION AS NEEDED
Status: DISCONTINUED | OUTPATIENT
Start: 2018-09-17 | End: 2018-09-17 | Stop reason: HOSPADM

## 2018-09-17 RX ORDER — DIPHENHYDRAMINE HYDROCHLORIDE 50 MG/ML
12.5 INJECTION, SOLUTION INTRAMUSCULAR; INTRAVENOUS AS NEEDED
Status: DISCONTINUED | OUTPATIENT
Start: 2018-09-17 | End: 2018-09-17 | Stop reason: HOSPADM

## 2018-09-17 RX ORDER — HYDROMORPHONE HYDROCHLORIDE 1 MG/ML
0.2 INJECTION, SOLUTION INTRAMUSCULAR; INTRAVENOUS; SUBCUTANEOUS
Status: DISCONTINUED | OUTPATIENT
Start: 2018-09-17 | End: 2018-09-17 | Stop reason: HOSPADM

## 2018-09-17 RX ORDER — ATORVASTATIN CALCIUM 20 MG/1
TABLET, FILM COATED ORAL DAILY
COMMUNITY
End: 2019-07-22 | Stop reason: SDUPTHER

## 2018-09-17 RX ORDER — LIDOCAINE HYDROCHLORIDE 20 MG/ML
INJECTION, SOLUTION EPIDURAL; INFILTRATION; INTRACAUDAL; PERINEURAL AS NEEDED
Status: DISCONTINUED | OUTPATIENT
Start: 2018-09-17 | End: 2018-09-17 | Stop reason: HOSPADM

## 2018-09-17 RX ORDER — PROPOFOL 10 MG/ML
INJECTION, EMULSION INTRAVENOUS AS NEEDED
Status: DISCONTINUED | OUTPATIENT
Start: 2018-09-17 | End: 2018-09-17 | Stop reason: HOSPADM

## 2018-09-17 RX ORDER — FENTANYL CITRATE 50 UG/ML
25 INJECTION, SOLUTION INTRAMUSCULAR; INTRAVENOUS
Status: DISCONTINUED | OUTPATIENT
Start: 2018-09-17 | End: 2018-09-17 | Stop reason: HOSPADM

## 2018-09-17 RX ORDER — LIDOCAINE HYDROCHLORIDE 10 MG/ML
0.1 INJECTION, SOLUTION EPIDURAL; INFILTRATION; INTRACAUDAL; PERINEURAL AS NEEDED
Status: DISCONTINUED | OUTPATIENT
Start: 2018-09-17 | End: 2018-09-17 | Stop reason: HOSPADM

## 2018-09-17 RX ORDER — SODIUM CHLORIDE 0.9 % (FLUSH) 0.9 %
5-10 SYRINGE (ML) INJECTION EVERY 8 HOURS
Status: DISCONTINUED | OUTPATIENT
Start: 2018-09-17 | End: 2018-09-17 | Stop reason: HOSPADM

## 2018-09-17 RX ORDER — SUCRALFATE 1 G/10ML
1 SUSPENSION ORAL 4 TIMES DAILY
Qty: 840 ML | Refills: 0 | Status: SHIPPED | OUTPATIENT
Start: 2018-09-17 | End: 2018-10-08

## 2018-09-17 RX ADMIN — PROPOFOL 30 MG: 10 INJECTION, EMULSION INTRAVENOUS at 08:27

## 2018-09-17 RX ADMIN — PROPOFOL 20 MG: 10 INJECTION, EMULSION INTRAVENOUS at 08:51

## 2018-09-17 RX ADMIN — PROPOFOL 20 MG: 10 INJECTION, EMULSION INTRAVENOUS at 08:49

## 2018-09-17 RX ADMIN — PROPOFOL 20 MG: 10 INJECTION, EMULSION INTRAVENOUS at 08:41

## 2018-09-17 RX ADMIN — PROPOFOL 20 MG: 10 INJECTION, EMULSION INTRAVENOUS at 08:43

## 2018-09-17 RX ADMIN — PROPOFOL 20 MG: 10 INJECTION, EMULSION INTRAVENOUS at 08:32

## 2018-09-17 RX ADMIN — PROPOFOL 20 MG: 10 INJECTION, EMULSION INTRAVENOUS at 08:35

## 2018-09-17 RX ADMIN — PROPOFOL 20 MG: 10 INJECTION, EMULSION INTRAVENOUS at 08:47

## 2018-09-17 RX ADMIN — PROPOFOL 20 MG: 10 INJECTION, EMULSION INTRAVENOUS at 08:30

## 2018-09-17 RX ADMIN — LIDOCAINE HYDROCHLORIDE 100 MG: 20 INJECTION, SOLUTION EPIDURAL; INFILTRATION; INTRACAUDAL; PERINEURAL at 08:14

## 2018-09-17 RX ADMIN — PROPOFOL 30 MG: 10 INJECTION, EMULSION INTRAVENOUS at 08:24

## 2018-09-17 RX ADMIN — PROPOFOL 20 MG: 10 INJECTION, EMULSION INTRAVENOUS at 08:16

## 2018-09-17 RX ADMIN — PROPOFOL 20 MG: 10 INJECTION, EMULSION INTRAVENOUS at 08:21

## 2018-09-17 RX ADMIN — PROPOFOL 20 MG: 10 INJECTION, EMULSION INTRAVENOUS at 08:37

## 2018-09-17 RX ADMIN — PROPOFOL 30 MG: 10 INJECTION, EMULSION INTRAVENOUS at 08:19

## 2018-09-17 RX ADMIN — SODIUM CHLORIDE, SODIUM LACTATE, POTASSIUM CHLORIDE, AND CALCIUM CHLORIDE: 600; 310; 30; 20 INJECTION, SOLUTION INTRAVENOUS at 08:01

## 2018-09-17 RX ADMIN — PROPOFOL 20 MG: 10 INJECTION, EMULSION INTRAVENOUS at 08:45

## 2018-09-17 RX ADMIN — SODIUM CHLORIDE, SODIUM LACTATE, POTASSIUM CHLORIDE, AND CALCIUM CHLORIDE 25 ML/HR: 600; 310; 30; 20 INJECTION, SOLUTION INTRAVENOUS at 07:08

## 2018-09-17 RX ADMIN — PROPOFOL 20 MG: 10 INJECTION, EMULSION INTRAVENOUS at 08:18

## 2018-09-17 RX ADMIN — PROPOFOL 20 MG: 10 INJECTION, EMULSION INTRAVENOUS at 08:25

## 2018-09-17 RX ADMIN — PROPOFOL 20 MG: 10 INJECTION, EMULSION INTRAVENOUS at 08:39

## 2018-09-17 RX ADMIN — PROPOFOL 100 MG: 10 INJECTION, EMULSION INTRAVENOUS at 08:14

## 2018-09-17 NOTE — DISCHARGE INSTRUCTIONS
Cost Office: [phone]    Chriss Solorio .  [id number]  1935    EGD/COLONOSCOPY DISCHARGE INSTRUCTIONS  Discomfort:  Sore throat- throat lozenges or warm salt water gargle  redness at IV site- apply warm compress to area; if redness or soreness persist- contact your physician  Gaseous discomfort- walking, belching will help relieve any discomfort  You may not operate a vehicle for 12 hours  You may not engage in an occupation involving machinery or appliances for rest of today. You may not drink alcoholic beverages for at least 12 hours  Avoid making any critical decisions for at least 24 hour  DIET  You may resume your regular diet - however -  remember your colon is empty and a heavy meal will produce gas. Avoid these foods:  fried / greasy foods, excessive carbonated drinks or too much caffeine  MEDICATIONS   Regarding Aspirin or Nonsteroidal medications specifically, please see below. ACTIVITY  You may resume your normal daily activities. Spend the remainder of the day resting -  avoid any strenuous activity. CALL M.D. ANY SIGN OF   Increasing pain, nausea, vomiting  Abdominal distension (swelling)  New increased bleeding (oral or rectal)  Fever (chills)  Pain in chest area  Bloody discharge from nose or mouth  Shortness of breath    You may not take any Advil, Aspirin, Ibuprofen, Motrin, Aleve, or Goodys for 5 days, ONLY  Tylenol as needed for pain. Restart Plavix & Eliquis in 3 days' time    Follow-up Instructions:   Call  Raleigh Beltran MD for any questions or concerns  Results of procedure / biopsy in 7 days   Telephone # [phone]      Follow-up Information     None                   DO NOT TAKE SLEEPING MEDICATIONS OR ANTIANXIETY MEDICATIONS WHILE TAKING NARCOTIC PAIN MEDICATIONS,  ESPECIALLY THE NIGHT OF ANESTHESIA. CPAP PATIENTS BE SURE TO WEAR MACHINE WHENEVER NAPPING OR SLEEPING.     DISCHARGE SUMMARY from Nurse    The following personal items collected during your admission are returned to you:   Dental Appliance: Dental Appliances: None  Vision: Visual Aid: Glasses (pacu) RETURNED TO PT. Hearing Aid:    Jewelry:    Clothing:    Other Valuables:    Valuables sent to safe:        PATIENT INSTRUCTIONS:    After General Anesthesia or Intravenous Sedation, for 24 hours or while taking prescription Narcotics:        Someone should be with you for the next 24 hours. For your own safety, a responsible adult must drive you home. · Limit your activities  · Recommended activity: Rest today, up with assistance today. Do not climb stairs or shower unattended for the next 24 hours. · Please start with a soft bland diet and advance as tolerated (no nausea) to regular diet. · If you have a sore throat you should try the following: fluids, warm salt water gargles, or throat lozenges. If it does not improve after several days please follow up with your primary physician. · Do not drive and operate hazardous machinery  · Do not make important personal or business decisions  · Do  not drink alcoholic beverages  · If you have not urinated within 8 hours after discharge, please contact your surgeon on call. Report the following to your surgeon:  · Excessive pain, swelling, redness or odor of or around the surgical area  · Temperature over 100.5  · Nausea and vomiting lasting longer than 4 hours or if unable to take medications  · Any signs of decreased circulation or nerve impairment to extremity: change in color, persistent  numbness, tingling, coldness or increase pain      · You will receive a Post Operative Call from one of the Recovery Room Nurses on the day after your surgery to check on you. It is very important for us to know how you are recovering after your surgery. If you have an issue or need to speak with someone, please call your surgeon, do not wait for the post operative call.     · You may receive an e-mail or letter in the mail from [facility] regarding your experience with us in the Ambulatory Surgery Unit. Your feedback is valuable to us and we appreciate your participation in the survey. · If the above instructions are not adequate, please contact Negrito Hillman RN, [facility] anesthesia Nurse Manager or our Anesthesiologist, at [phone]. If you are having problems after your surgery, call the physician at his office number. · We wish you a speedy recovery ? What to do at Home:      *  Please give a list of your current medications to your Primary Care Provider. *  Please update this list whenever your medications are discontinued, doses are      changed, or new medications (including over-the-counter products) are added. *  Please carry medication information at all times in case of emergency situations. These are general instructions for a healthy lifestyle:    No smoking/ No tobacco products/ Avoid exposure to second hand smoke    Surgeon General's Warning:  Quitting smoking now greatly reduces serious risk to your health. Obesity, smoking, and sedentary lifestyle greatly increases your risk for illness    A healthy diet, regular physical exercise & weight monitoring are important for maintaining a healthy lifestyle    You may be retaining fluid if you have a history of heart failure or if you experience any of the following symptoms:  Weight gain of 3 pounds or more overnight or 5 pounds in a week, increased swelling in our hands or feet or shortness of breath while lying flat in bed. Please call your doctor as soon as you notice any of these symptoms; do not wait until your next office visit. Recognize signs and symptoms of STROKE:    B - Balance  E - Eyes    F-  Face looks uneven    A-  Arms unable to move or move even    S-  Speech slurred or non-existent    T-  Time-call 911 as soon as signs and symptoms begin-DO NOT go       Back to bed or wait to see if you get better-TIME IS BRAIN.       If you have not received your influenza and/or pneumococcal vaccine, please follow up with your primary care physician. The discharge information has been reviewed with the patient and caregiver. The patient and caregiver verbalized understanding.

## 2018-09-17 NOTE — PERIOP NOTES
Excell Serve Sr. 
1935 
[id number] Situation: 
Verbal report given from: IFEANYI Krause Procedure: Procedure(s) with comments: 
COLONOSCOPY 
ESOPHAGOGASTRODUODENOSCOPY (EGD) ESOPHAGOGASTRODUODENAL (EGD) BIOPSY ENDOSCOPIC POLYPECTOMY - GASTRIC POLYPS & COLON POLYPS  
COLON BIOPSY ENDOSCOPIC MUCOSAL RESECTION 
RESOLUTION CLIP - X2 TO TRANSVERSE COLON POLYP REMOVAL SITE Background: 
 
Preoperative diagnosis: EPIGASTRIC PAIN, CHANGE IN BOWEL HABITS Postoperative diagnosis: GASTRITIS, GASTRIC POLYPS, HIATAL HERNIA, ESOPHAGITIS & ULCERS, COLON POLYPS, HEMORRHOIDS :  Dr. Jose Miguel Wilkinson 
 
Assistant(s): Circ-2: Cate Monteiro RN Scrub Tech-1: [place] [employer] Specimens:  
ID Type Source Tests Collected by Time Destination 1 : DUODENUM BIOPSY  Preservative Duodenum  Juany Lee MD 9/17/2018 0820 Pathology 2 : GASTRIC BIOPSY  Preservative Gastric  Raleigh Perez MD 9/17/2018 4439 Pathology 3 : GASTRIC POLYPS Preservative Gastric  Raleigh Perez MD 9/17/2018 1441 Pathology 4 : [address], MD 9/17/2018 5357 Pathology 5 : ASCENDING COLON POLYPS Preservative   Raleigh Perez MD 9/17/2018 9516 Pathology 6 : TRANSVERSE COLON POLYPS Preservative   Raleigh Perez MD 9/17/2018 4312 Pathology 7 : SIGMOID COLON POLYPS BIOPSY  Preservative   Juany Lee MD 9/17/2018 8444 Pathology Assessment: 
Intra-procedure medications Anesthesia gave intra-procedure sedation and medications, see anesthesia flow sheet Intravenous fluids: Edward Roers Vital signs stable Recommendation: 
 
Permission to share finding with dtr : yes

## 2018-09-17 NOTE — IP AVS SNAPSHOT
[address] [facility] 
[phone] Patient: Librado Jesus. MRN: [id number] UFN:[id number] About your hospitalization You were admitted on:  September 17, 2018 You last received care in the:  [facility] PACU You were discharged on:  September 17, 2018 Why you were hospitalized Your primary diagnosis was:  Not on File Follow-up Information None Discharge Orders None A check terri indicates which time of day the medication should be taken. My Medications START taking these medications Instructions Each Dose to Equal  
 Morning Noon Evening Bedtime  
 sucralfate 100 mg/mL suspension Commonly known as:  Gladystine Robert Take 10 mL by mouth four (4) times daily for 21 days. 1 g ASK your doctor about these medications Instructions Each Dose to Equal  
 Morning Noon Evening Bedtime  
 clopidogrel 75 mg Tab Commonly known as:  PLAVIX Notes to Patient:  RESTART IN 3 DAYS!! TAKE 1 TABLET BY MOUTH  DAILY CORICIDIN HBP PO Take 1 Tab by mouth as needed. 1 Tab  
    
   
   
   
  
 ELIQUIS 2.5 mg tablet Generic drug:  apixaban Notes to Patient:  RESTART IN 3 DAYS!! levothyroxine 112 mcg tablet Commonly known as:  SYNTHROID Take 1 Tab by mouth Daily (before breakfast). 112 mcg  
    
   
   
   
  
 LIPITOR 20 mg tablet Generic drug:  atorvastatin Take  by mouth daily. losartan 50 mg tablet Commonly known as:  COZAAR Take 50 mg by mouth daily. 50 mg  
    
   
   
   
  
 metoprolol succinate 50 mg XL tablet Commonly known as:  TOPROL-XL Take 50 mg by mouth daily. 50 mg  
    
   
   
   
  
 pantoprazole 40 mg tablet Commonly known as:  PROTONIX  
   
 TAKE 1 TABLET BY MOUTH  DAILY TYLENOL EXTRA STRENGTH 500 mg tablet Generic drug:  acetaminophen Take  by mouth every six (6) hours as needed for Pain. VITAMIN D3 2,000 unit Tab Generic drug:  cholecalciferol (vitamin D3) Take  by mouth nightly. Where to Get Your Medications Information on where to get these meds will be given to you by the nurse or doctor. ! Ask your nurse or doctor about these medications  
  sucralfate 100 mg/mL suspension Discharge Instructions [place] Office: [phone] Maria E Soler. 
[phone] 
1935 EGD/COLONOSCOPY DISCHARGE INSTRUCTIONS Discomfort: 
Sore throat- throat lozenges or warm salt water gargle 
redness at IV site- apply warm compress to area; if redness or soreness persist- contact your physician Gaseous discomfort- walking, belching will help relieve any discomfort You may not operate a vehicle for 12 hours You may not engage in an occupation involving machinery or appliances for rest of today. You may not drink alcoholic beverages for at least 12 hours Avoid making any critical decisions for at least 24 hour DIET You may resume your regular diet  however -  remember your colon is empty and a heavy meal will produce gas. Avoid these foods:  fried / greasy foods, excessive carbonated drinks or too much caffeine MEDICATIONS Regarding Aspirin or Nonsteroidal medications specifically, please see below. ACTIVITY You may resume your normal daily activities. Spend the remainder of the day resting -  avoid any strenuous activity. CALL M.D. ANY SIGN OF Increasing pain, nausea, vomiting Abdominal distension (swelling) New increased bleeding (oral or rectal) Fever (chills) Pain in chest area Bloody discharge from nose or mouth Shortness of breath You may not take any Advil, Aspirin, Ibuprofen, Motrin, Aleve, or Goodys for 5 days, ONLY  Tylenol as needed for pain. Restart Plavix & Eliquis in 3 days' time Follow-up Instructions: 
 Call  Raleigh Chase MD for any questions or concerns Results of procedure / biopsy in 7 days Telephone # [phone] Follow-up Information None DO NOT TAKE SLEEPING MEDICATIONS OR ANTIANXIETY MEDICATIONS WHILE TAKING NARCOTIC PAIN MEDICATIONS,  ESPECIALLY THE NIGHT OF ANESTHESIA. CPAP PATIENTS BE SURE TO WEAR MACHINE WHENEVER NAPPING OR SLEEPING. DISCHARGE SUMMARY from Nurse The following personal items collected during your admission are returned to you:  
Dental Appliance: Dental Appliances: None Vision: Visual Aid: Glasses (pacu) RETURNED TO PT. Hearing Aid:   
Jewelry:   
Clothing:   
Other Valuables:   
Valuables sent to safe:   
 
 
PATIENT INSTRUCTIONS: 
 
 
B - Balance E - Eyes F-  Face looks uneven A-  Arms unable to move or move even S-  Speech slurred or non-existent T-  Time-call 911 as soon as signs and symptoms begin-DO NOT go Back to bed or wait to see if you get better-TIME IS BRAIN. If you have not received your influenza and/or pneumococcal vaccine, please follow up with your primary care physician. The discharge information has been reviewed with the patient and caregiver. The patient and caregiver verbalized understanding. ACO Transitions of Care Introducing [employer] [address] Renetta Maya offers a voluntary care coordination program to provide high quality service and care to [facility] fee-for-service beneficiaries. Tiffany Felder was designed to help you enhance your health and well-being through the following services: ? Transitions of Care  support for individuals who are transitioning from one care setting to another (example: Hospital to home). ? Chronic and Complex Care Coordination  support for individuals and caregivers of those with serious or chronic illnesses or with more than one chronic (ongoing) condition and those who take a number of different medications. If you meet specific medical criteria, a [facility] [address] may call you directly to coordinate your care with your primary care physician and your other care providers. For questions about the [facility] programs, please, contact your physicians office. For general questions or additional information about Accountable Care Organizations: Please visit www.medicare.gov/acos. html or call 1-800-MEDICARE (1-480.780.3845) TTY users should call 3-923.323.2827. Introducing [facility]! Dear Joni Sheldon: Thank you for requesting a [employer] account. Our records indicate that you already have an active [employer] account. You can access your account anytime at [email or web address] Did you know that you can access your hospital and ER discharge instructions at any time in [employer]? You can also review all of your test results from your hospital stay or ER visit. Additional Information If you have questions, please visit the Frequently Asked Questions section of the [employer] website at [email or web address] Remember, [employer] is NOT to be used for urgent needs. For medical emergencies, dial 911. Now available from your iPhone and Android! Introducing Brett Sal As a [employer] patient, I wanted to make you aware of our electronic visit tool called Brett Sal. [employer] 24/7 allows you to connect within minutes with a medical provider 24 hours a day, seven days a week via a mobile device or tablet or logging into a secure website from your computer. You can access Brett Sal from anywhere in the [place]. A virtual visit might be right for you when you have a simple condition and feel like you just dont want to get out of bed, or cant get away from work for an appointment, when your regular [employer] provider is not available (evenings, weekends or holidays), or when youre out of town and need minor care. Electronic visits cost only $49 and if the [employer] 24/7 provider determines a prescription is needed to treat your condition, one can be electronically transmitted to a nearby pharmacy*. Please take a moment to enroll today if you have not already done so. The enrollment process is free and takes just a few minutes.   To enroll, please download the [employer] 24/7 tryo to your tablet or phone, or visit [email or web address] to enroll on your computer. And, as an [address] [place] [address] patient with a [employer] account, the results of your visits will be scanned into your electronic medical record and your primary care provider will be able to view the scanned results. We urge you to continue to see your regular [employer] provider for your ongoing medical care. And while your primary care provider may not be the one available when you seek a [employer] virtual visit, the peace of mind you get from getting a real diagnosis real time can be priceless. For more information on [employer], view our Frequently Asked Questions (FAQs) at [email or web address]. Sincerely, 
 
Pari Luis MD 
Chief Medical Officer Maryanne Maya *:  certain medications cannot be prescribed via [employer] Providers Seen During Your Hospitalization Provider Specialty Primary office phone Viral Medel MD Gastroenterology [phone] Your Primary Care Physician (PCP) Primary Care Physician Office Phone Office Fax [phone] 89 [phone] You are allergic to the following Allergen Reactions Sulfa (Sulfonamide Antibiotics) Hives Recent Documentation Height Weight BMI Smoking Status 1.829 m 99.8 kg 29.84 kg/m2 Former Smoker Emergency Contacts Name Discharge Info Relation Home Work Mobile [address] [facility] CAREGIVER [3] Son [22] [phone] Nasir Luna  Child [2] [phone] Radha Howard CAREGIVER [3] Daughter [21]   [phone] Patient Belongings The following personal items are in your possession at time of discharge: 
  Dental Appliances: None  Visual Aid: Glasses (pacu)   Hearing Aids/Status: Does not own Please provide this summary of care documentation to your next provider. Signatures-by signing, you are acknowledging that this After Visit Summary has been reviewed with you and you have received a copy. Patient Signature:  ____________________________________________________________ Date:  ____________________________________________________________  
  
[facility] Provider Signature:  ____________________________________________________________ Date:  ____________________________________________________________

## 2018-09-17 NOTE — ANESTHESIA PREPROCEDURE EVALUATION
Anesthetic History No history of anesthetic complications Review of Systems / Medical History Patient summary reviewed, nursing notes reviewed and pertinent labs reviewed Pulmonary Within defined limits Neuro/Psych CVA TIA Cardiovascular Hypertension Exercise tolerance: >4 METS 
  
GI/Hepatic/Renal 
  
GERD: well controlled Renal disease: CRI Hiatal hernia Comments: dysphagia Endo/Other Hypothyroidism Other Findings Physical Exam 
 
Airway Mallampati: III 
TM Distance: 4 - 6 cm Neck ROM: normal range of motion, short neck Mouth opening: Normal 
 
Comments: May benefit from a glidescope or similar Cardiovascular Regular rate and rhythm,  S1 and S2 normal,  no murmur, click, rub, or gallop Rhythm: regular Rate: normal 
 
 
 
 Dental 
No notable dental hx Pulmonary Breath sounds clear to auscultation Abdominal 
GI exam deferred Other Findings Anesthetic Plan ASA: 3 Anesthesia type: general and total IV anesthesia Induction: Intravenous Anesthetic plan and risks discussed with: Patient

## 2018-09-17 NOTE — H&P
Pre-endoscopy H and P The patient was seen and examined in the room/pre-op holding area. The airway was assessed and documented. The problem list, past medical history, and medications were reviewed. Patient Active Problem List  
Diagnosis Code  TIA (transient ischemic attack) G45.9  
 H/O prostate cancer Z85.46  
 HBP (high blood pressure) I10  
 GERD (gastroesophageal reflux disease) K21.9  CKD (chronic kidney disease) stage 3, GFR 30-59 ml/min N18.3  Hyperlipidemia E78.5  Carotid artery stenosis, asymptomatic I65.29  
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  
 Stenosis of both internal carotid arteries I65.23  
 History of CVA (cerebrovascular accident) I29.04 Social History Social History  Marital status:  Spouse name: N/A  
 Number of children: N/A  
 Years of education: N/A Occupational History  Not on file. Social History Main Topics  Smoking status: Former Smoker Years: 5.00 Quit date: 2/5/1955  Smokeless tobacco: Never Used  Alcohol use Yes Comment: occassional mixed drink or beer  Drug use: No  
 Sexual activity: Yes  
  Partners: Female Birth control/ protection: None Other Topics Concern  Not on file Social History Narrative Had 6 children and one daughter passed away in 1984 in a car accident. Has one living daughter and 4 sons. 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  April 2017 after his wife suffered with dementia. Past Medical History:  
Diagnosis Date  Carotid artery stenosis, asymptomatic 8/1/2014 Right side 50-79%  Chronic kidney disease   
 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 Dr. Paresh Kowalski  Stroke [facility]) 2002 TIA, Dr. Davin Zafar, no residual effects as of 9/2018  Thyroid cancer [facility]) Jan 2015  Unspecified vitamin D deficiency Prior to Admission Medications Prescriptions Last Dose Informant Patient Reported? Taking? ELIQUIS 2.5 mg tablet 9/14/2018  Yes Yes GUAIFENESIN/DEXTROMETHORPHAN (CORICIDIN HBP PO) Not Taking at Unknown time  Yes No  
Sig: Take 1 Tab by mouth as needed. acetaminophen (TYLENOL EXTRA STRENGTH) 500 mg tablet Unknown at Unknown time  Yes No  
Sig: Take  by mouth every six (6) hours as needed for Pain. atorvastatin (LIPITOR) 20 mg tablet 9/15/2018  Yes Yes Sig: Take  by mouth daily. cholecalciferol, vitamin D3, (VITAMIN D3) 2,000 unit tab 9/15/2018  Yes Yes Sig: Take  by mouth nightly. clopidogrel (PLAVIX) 75 mg tab 9/10/2018  No Yes Sig: TAKE 1 TABLET BY MOUTH  DAILY  
levothyroxine (SYNTHROID) 112 mcg tablet 9/16/2018 at am  No Yes Sig: Take 1 Tab by mouth Daily (before breakfast). losartan (COZAAR) 50 mg tablet 9/17/2018 at 0600  Yes Yes Sig: Take 50 mg by mouth daily. metoprolol succinate (TOPROL-XL) 50 mg XL tablet 9/17/2018 at 0600  Yes Yes Sig: Take 50 mg by mouth daily. pantoprazole (PROTONIX) 40 mg tablet 9/17/2018 at 0600  No Yes Sig: TAKE 1 TABLET BY MOUTH  DAILY Facility-Administered Medications: None The review of systems is:  Negative  for shortness of breath or chest pain The heart, lungs, and mental status were satisfactory for the administration of deep sedation and for the procedure. I discussed with the patient the objectives, risks, consequences and alternatives to the procedure.    
 
Shelbi Camacho MD 
9/17/2018 
7:33 AM

## 2018-09-17 NOTE — PROCEDURES
[place] Office: [phone]      Esophagogastroduodenoscopy Procedure Note      Cassius Brown .  1935  [id number]    Indication:  Abdominal pain, epigastric, GERD     : Rafaela Jones MD    Referring Provider:  Diana Yousif MD    Sedation:  MAC anesthesia Propofol    Procedure Details:  After detailed informed consent was obtained for the procedure, with all risks and benefits of procedure explained the patient was taken to the endoscopy suite and placed in the left lateral decubitus position. Following sequential administration of sedation as per above, the endoscope was inserted into the mouth and advanced under direct vision to second portion of the duodenum. A careful inspection was made as the gastroscope was withdrawn, including a retroflexed view of the proximal stomach; findings and interventions are described below. Findings:     Esophagus: The esophageal mucosa in the proximal and mid is normal.   2 small GE junction ulcers with esophagitis are noted. The squamo-columnar junction is at 4 2cm where the Z-line was noted. There is a small hiatal hernia. Stomach: The gastric mucosa has shiny diffuse erythema  GARFIELD testing done and mucosal biopsies were obtained. 3 polyps are noted. 2 in the body and one in the fundus. All were removed with a hot snare(1 got fulgurated)  The angularis is normal.  There is some gastric bile. Duodenum:   The bulb and post bulbar mucosa is normal in appearance. The duodenal folds are normal. Biopsied. Therapies:    biopsy of stomach body, antrum, and GARFIELD. Biopsy of duodenum  3 complete polypectomy were performed using hot snare  and the polyps were  retrieved    Specimen: Specimens were collected as described and send to the laboratory. Complications:   None were encountered during the procedure. EBL:  None. Recommendations:     -Continue acid suppression. ,   -Await pathology. ,   -Await GARFIELD test result and treat for Helicobacter pylori if positive. ,  -Follow symptoms. ,   -Repeat EGD in 2 months time to confirm ulcer healing      Raleigh Colin MD  9/17/2018  9:07 AM

## 2018-09-17 NOTE — ANESTHESIA POSTPROCEDURE EVALUATION
Post-Anesthesia Evaluation and Assessment Patient: Andres Ramos MRN: [id number]  SSN: [id number] YOB: 1935  Age: 81 y.o. Sex: male Cardiovascular Function/Vital Signs Visit Vitals  /82  Pulse 72  Temp 36.6 °C (97.8 °F)  Resp 16  
 Ht 6' (1.829 m)  Wt 99.8 kg (220 lb)  SpO2 98%  BMI 29.84 kg/m2 Patient is status post general, total IV anesthesia anesthesia for Procedure(s): 
COLONOSCOPY 
ESOPHAGOGASTRODUODENOSCOPY (EGD) ESOPHAGOGASTRODUODENAL (EGD) BIOPSY ENDOSCOPIC POLYPECTOMY 
COLON BIOPSY ENDOSCOPIC MUCOSAL RESECTION 
RESOLUTION CLIP. Nausea/Vomiting: None Postoperative hydration reviewed and adequate. Pain: 
Pain Scale 1: Numeric (0 - 10) (09/17/18 0934) Pain Intensity 1: 0 (09/17/18 0934) Managed Neurological Status:  
Neuro (WDL): Within Defined Limits (09/17/18 0925) At baseline Mental Status and Level of Consciousness: Arousable Pulmonary Status:  
O2 Device: Room air (09/17/18 0934) Adequate oxygenation and airway patent Complications related to anesthesia: None Post-anesthesia assessment completed. No concerns Signed By: Nidia Cedillo MD   
 September 17, 2018

## 2018-09-17 NOTE — PROCEDURES
Colonoscopy Procedure Note    Carolina Rosales Sr.  1935  [id number]    Indications:  Please see below. Pre-operative Diagnosis: CHANGE IN BOWEL HABITS    Post-operative Diagnosis: Redundant colon, COLON POLYPS, HEMORRHOIDS       : Raleigh Renteria MD    Referring Provider: Luisito Cazares MD    Sedation:  MAC anesthesia Propofol        Procedure Details:    After detailed informed consent was obtained with all risks and benefits of procedure explained and preoperative exam completed, the patient was taken to the endoscopy suite and placed in the left lateral decubitus position. Upon sequential sedation as per above, a digital rectal exam was performed  And was normal.  The Olympus videocolonoscope  was inserted in the rectum and carefully advanced to the cecum, which was identified by the ileocecal valve and appendiceal orifice. The quality of preparation was good. The colonoscope was slowly withdrawn with careful evaluation between folds. Retroflexion in the rectum was performed. Findings:     · Colon is floppy and redundant needing pressure and placement on the back to reach the cecum. · A 7 mm sessile asceding colon polyp was removed with a hot snare  · Another 6 mm transverse colon polyp was removed with a hot snare. · A sessile 1 cm distal transverse polyp was raised with  Eleview 3 cc. Snare removal was still not possible using EMR. I then removed it with a hot snare. Mild oozing was noted. 2 BS Resolution clips applied at the site. · A 1.7 cm pedunculated polyp with an eroded tip was removed with a hot snare. · Random mucosal biopsies taken.   · There are small internal hemorrhoids           Therapies:    biopsy of colon random colon, and polyp(hot bx-transverse,distal)  injection of 3 cc of Eleview  3 complete polypectomies were performed using hot snare  and the polyps were  retrieved  endoclip x 2  placed for hemostasis    Specimen:   Specimens were collected as described above and sent to pathology. Complications: None were encountered during the procedure. EBL:  None. Recommendations:     -Await pathology. -Repeat colonoscopy in 1 year    Naturally, for new bleeding, unexplained weight loss,change in bowel habits and anemia, an earlier colonoscopy should be considered. Raleigh Burt MD  9/17/2018  9:00 AM

## 2018-09-17 NOTE — PERIOP NOTES
Permission received to review discharge instructions and discuss private health information with daughter John Pratt.

## 2018-09-22 ENCOUNTER — HOSPITAL ENCOUNTER (EMERGENCY)
Age: 83
Discharge: HOME | End: 2018-09-22
Attending: EMERGENCY MEDICINE
Payer: MEDICARE

## 2018-09-22 ENCOUNTER — APPOINTMENT (OUTPATIENT)
Dept: CT IMAGING | Age: 83
End: 2018-09-22
Attending: EMERGENCY MEDICINE
Payer: MEDICARE

## 2018-09-22 VITALS
TEMPERATURE: 97.9 F | HEIGHT: 72 IN | DIASTOLIC BLOOD PRESSURE: 92 MMHG | WEIGHT: 217.15 LBS | RESPIRATION RATE: 16 BRPM | HEART RATE: 68 BPM | BODY MASS INDEX: 29.41 KG/M2 | SYSTOLIC BLOOD PRESSURE: 176 MMHG | OXYGEN SATURATION: 95 %

## 2018-09-22 DIAGNOSIS — I10 HYPERTENSION, UNSPECIFIED TYPE: ICD-10-CM

## 2018-09-22 DIAGNOSIS — R14.0 ABDOMINAL BLOATING: Primary | ICD-10-CM

## 2018-09-22 LAB
ALBUMIN SERPL-MCNC: 3.5 G/DL (ref 3.5–5)
ALBUMIN/GLOB SERPL: 1.1 {RATIO} (ref 1.1–2.2)
ALP SERPL-CCNC: 86 U/L (ref 45–117)
ALT SERPL-CCNC: 25 U/L (ref 12–78)
ANION GAP SERPL CALC-SCNC: 7 MMOL/L (ref 5–15)
APPEARANCE UR: CLEAR
AST SERPL-CCNC: 19 U/L (ref 15–37)
BACTERIA URNS QL MICRO: NEGATIVE /HPF
BASOPHILS # BLD: 0 K/UL (ref 0–0.1)
BASOPHILS NFR BLD: 1 % (ref 0–1)
BILIRUB SERPL-MCNC: 0.8 MG/DL (ref 0.2–1)
BILIRUB UR QL: NEGATIVE
BUN SERPL-MCNC: 12 MG/DL (ref 6–20)
BUN/CREAT SERPL: 9 (ref 12–20)
CALCIUM SERPL-MCNC: 8.4 MG/DL (ref 8.5–10.1)
CHLORIDE SERPL-SCNC: 102 MMOL/L (ref 97–108)
CO2 SERPL-SCNC: 27 MMOL/L (ref 21–32)
COLOR UR: NORMAL
CREAT SERPL-MCNC: 1.4 MG/DL (ref 0.7–1.3)
DIFFERENTIAL METHOD BLD: NORMAL
EOSINOPHIL # BLD: 0.1 K/UL (ref 0–0.4)
EOSINOPHIL NFR BLD: 1 % (ref 0–7)
EPITH CASTS URNS QL MICRO: NORMAL /LPF
ERYTHROCYTE [DISTWIDTH] IN BLOOD BY AUTOMATED COUNT: 12.8 % (ref 11.5–14.5)
GLOBULIN SER CALC-MCNC: 3.2 G/DL (ref 2–4)
GLUCOSE SERPL-MCNC: 96 MG/DL (ref 65–100)
GLUCOSE UR STRIP.AUTO-MCNC: NEGATIVE MG/DL
HCT VFR BLD AUTO: 42.4 % (ref 36.6–50.3)
HGB BLD-MCNC: 14.4 G/DL (ref 12.1–17)
HGB UR QL STRIP: NEGATIVE
HYALINE CASTS URNS QL MICRO: NORMAL /LPF (ref 0–5)
IMM GRANULOCYTES # BLD: 0 K/UL (ref 0–0.04)
IMM GRANULOCYTES NFR BLD AUTO: 0 % (ref 0–0.5)
KETONES UR QL STRIP.AUTO: NEGATIVE MG/DL
LEUKOCYTE ESTERASE UR QL STRIP.AUTO: NEGATIVE
LYMPHOCYTES # BLD: 1.4 K/UL (ref 0.8–3.5)
LYMPHOCYTES NFR BLD: 18 % (ref 12–49)
MCH RBC QN AUTO: 31.3 PG (ref 26–34)
MCHC RBC AUTO-ENTMCNC: 34 G/DL (ref 30–36.5)
MCV RBC AUTO: 92.2 FL (ref 80–99)
MONOCYTES # BLD: 0.5 K/UL (ref 0–1)
MONOCYTES NFR BLD: 7 % (ref 5–13)
NEUTS SEG # BLD: 5.4 K/UL (ref 1.8–8)
NEUTS SEG NFR BLD: 73 % (ref 32–75)
NITRITE UR QL STRIP.AUTO: NEGATIVE
NRBC # BLD: 0 K/UL (ref 0–0.01)
NRBC BLD-RTO: 0 PER 100 WBC
PH UR STRIP: 7 [PH] (ref 5–8)
PLATELET # BLD AUTO: 235 K/UL (ref 150–400)
PMV BLD AUTO: 9.2 FL (ref 8.9–12.9)
POTASSIUM SERPL-SCNC: 4 MMOL/L (ref 3.5–5.1)
PROT SERPL-MCNC: 6.7 G/DL (ref 6.4–8.2)
PROT UR STRIP-MCNC: NEGATIVE MG/DL
RBC # BLD AUTO: 4.6 M/UL (ref 4.1–5.7)
RBC #/AREA URNS HPF: NORMAL /HPF (ref 0–5)
SODIUM SERPL-SCNC: 136 MMOL/L (ref 136–145)
SP GR UR REFRACTOMETRY: 1.01 (ref 1–1.03)
UA: UC IF INDICATED,UAUC: NORMAL
UROBILINOGEN UR QL STRIP.AUTO: 0.2 EU/DL (ref 0.2–1)
WBC # BLD AUTO: 7.4 K/UL (ref 4.1–11.1)
WBC URNS QL MICRO: NORMAL /HPF (ref 0–4)

## 2018-09-22 PROCEDURE — 81001 URINALYSIS AUTO W/SCOPE: CPT | Performed by: EMERGENCY MEDICINE

## 2018-09-22 PROCEDURE — 80053 COMPREHEN METABOLIC PANEL: CPT | Performed by: EMERGENCY MEDICINE

## 2018-09-22 PROCEDURE — 74176 CT ABD & PELVIS W/O CONTRAST: CPT

## 2018-09-22 PROCEDURE — 99284 EMERGENCY DEPT VISIT MOD MDM: CPT

## 2018-09-22 PROCEDURE — 93005 ELECTROCARDIOGRAM TRACING: CPT

## 2018-09-22 PROCEDURE — 70450 CT HEAD/BRAIN W/O DYE: CPT

## 2018-09-22 PROCEDURE — 36415 COLL VENOUS BLD VENIPUNCTURE: CPT | Performed by: EMERGENCY MEDICINE

## 2018-09-22 PROCEDURE — 85025 COMPLETE CBC W/AUTO DIFF WBC: CPT | Performed by: EMERGENCY MEDICINE

## 2018-09-22 NOTE — ED PROVIDER NOTES
EMERGENCY DEPARTMENT HISTORY AND PHYSICAL EXAM 
 
 
Date: 9/22/2018 Patient Name: Delia Cruz. 
 
History of Presenting Illness Chief Complaint Patient presents with  Constipation Ambualtory w/ daughter w/ c/o constipation since Monday, pt reports colonoscopy & EGD Monday. Reports taking stool softener last night.  Hypertension Checked BP at home & was elevated, took metoprolol at 10am.   
 
 
History Provided By: Patient and Patient's Daughter HPI: Ta Armstrong ., 81 y.o. male with PMHx significant for HTN, A Fib, CA, HLD, hiatal hernia, CVA, and hyperparathyroidism, presents ambulatory to the ED with cc of elevated BP x \"several days\". Pt denies any associated sxs, nor any modifying factors. Pt explains that his blood pressure has been elevated for the past several days, and his BP was 181 systolic upon checking this morning. Pt reports that he recently had an endoscopy/colonoscopy performed by Dr. Mike Shelton before which he was taken off of his Plavix one week prior, and he was taken off of his Eliquis 2 days prior. Pt reports that he was held off of his anticoagulants for 3 days following his procedure as well. Pt additionally complains of constipation which began after his colonoscopy 5 days ago. Pt reports that he has been able to pass a small amount of loose stools and flatus, but he has additionally been feeling increasingly bloated over the past few days. Of note, pt also states that he had an episode over the past week during which his \"leg felt draggy,\" but is unable to be more specific. Pt specifically denies chest pain, SOB, abdominal pain, or fever, nausea, vomiting, urinary sx There are no other complaints, changes, or physical findings at this time. PCP: Lily Hanson MD 
GI: Mike Shelton MD 
 
Current Outpatient Prescriptions Medication Sig Dispense Refill  atorvastatin (LIPITOR) 20 mg tablet Take  by mouth daily.  sucralfate (CARAFATE) 100 mg/mL suspension Take 10 mL by mouth four (4) times daily for 21 days. 840 mL 0  
 losartan (COZAAR) 50 mg tablet Take 50 mg by mouth daily.  metoprolol succinate (TOPROL-XL) 50 mg XL tablet Take 50 mg by mouth daily.  pantoprazole (PROTONIX) 40 mg tablet TAKE 1 TABLET BY MOUTH  DAILY 90 Tab 3  clopidogrel (PLAVIX) 75 mg tab TAKE 1 TABLET BY MOUTH  DAILY 90 Tab 3  
 ELIQUIS 2.5 mg tablet  levothyroxine (SYNTHROID) 112 mcg tablet Take 1 Tab by mouth Daily (before breakfast). 90 Tab 3  
 GUAIFENESIN/DEXTROMETHORPHAN (CORICIDIN HBP PO) Take 1 Tab by mouth as needed.  acetaminophen (TYLENOL EXTRA STRENGTH) 500 mg tablet Take  by mouth every six (6) hours as needed for Pain.  cholecalciferol, vitamin D3, (VITAMIN D3) 2,000 unit tab Take  by mouth nightly. Past History Past Medical History: 
Past Medical History:  
Diagnosis Date  Carotid artery stenosis, asymptomatic 8/1/2014 Right side 50-79%  Chronic kidney disease   
 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 Dr. Clarissa Venegas  Stroke [facility]) 2002 TIA, Dr. Emir Ramirez, no residual effects as of 9/2018  Thyroid cancer [facility]) Jan 2015  Unspecified vitamin D deficiency Past Surgical History: 
Past Surgical History:  
Procedure Laterality Date  CARDIAC SURG PROCEDURE UNLIST Cardiac Cath   Dr. Kaden Haines  COLONOSCOPY N/A 9/17/2018 COLONOSCOPY performed by Aldair Sood MD at hospitals AMBULATORY OR  
 HX APPENDECTOMY  HX CHOLECYSTECTOMY  HX HEENT    
 tonsillectomy  HX OTHER SURGICAL    
 vasectomy  HX PARATHYROIDECTOMY  01/14/2015 right superior parathyroid gland removed and left superior parathyroid gland removed  HX PARTIAL THYROIDECTOMY  1/14/15  
 right lobectomy  HX UROLOGICAL Seeds for prostate cancer , 4 dialations  HX UROLOGICAL  1/14/15 BLADDER NECK INCISION, Cold knife incision of bladder neck contracture, cystoscopy Family History: 
Family History Problem Relation Age of Onset  Cancer Mother   
  hodgkins disease  Heart Disease Father  Hypertension Father  Other Neg Hx   
  no hypercalcemia or kidney stones Social History: 
Social History Substance Use Topics  Smoking status: Former Smoker Years: 5.00 Quit date: 2/5/1955  Smokeless tobacco: Never Used  Alcohol use Yes Comment: occassional mixed drink or beer Allergies: Allergies Allergen Reactions  Sulfa (Sulfonamide Antibiotics) Hives Review of Systems Review of Systems Constitutional: Negative for chills and fever. HENT: Negative for congestion, rhinorrhea and sore throat. Respiratory: Negative for cough and shortness of breath. Cardiovascular: Negative for chest pain. Gastrointestinal: Positive for abdominal distention and constipation. Negative for abdominal pain, nausea and vomiting. Genitourinary: Negative for dysuria and urgency. Skin: Negative for rash. Neurological: Negative for dizziness, light-headedness and headaches. All other systems reviewed and are negative. Physical Exam  
Physical Exam  
Constitutional: He is oriented to person, place, and time. He appears well-developed and well-nourished. No distress. HENT:  
Head: Normocephalic and atraumatic. Eyes: Conjunctivae and EOM are normal. Pupils are equal, round, and reactive to light. Neck: Normal range of motion. Cardiovascular: Normal rate, regular rhythm and intact distal pulses. Pulmonary/Chest: Effort normal and breath sounds normal. No stridor. No respiratory distress. Abdominal: Soft. He exhibits no distension. There is tenderness in the left lower quadrant. There is no rebound and no guarding. Musculoskeletal: Normal range of motion. Neurological: He is alert and oriented to person, place, and time. He has normal strength. No cranial nerve deficit or sensory deficit. Coordination normal. GCS eye subscore is 4. GCS verbal subscore is 5. GCS motor subscore is 6. Skin: Skin is warm and dry. Psychiatric: He has a normal mood and affect. Nursing note and vitals reviewed. Diagnostic Study Results Labs - Recent Results (from the past 12 hour(s)) EKG, 12 LEAD, INITIAL Collection Time: 09/22/18 12:03 PM  
Result Value Ref Range Ventricular Rate 74 BPM  
 Atrial Rate 74 BPM  
 P-R Interval 242 ms QRS Duration 92 ms Q-T Interval 404 ms QTC Calculation (Bezet) 448 ms Calculated P Axis 41 degrees Calculated R Axis -2 degrees Calculated T Axis 18 degrees Diagnosis Sinus rhythm with 1st degree AV block When compared with ECG of 26-SEP-2017 19:52, Nonspecific T wave abnormality no longer evident in Anterior leads CBC WITH AUTOMATED DIFF Collection Time: 09/22/18 12:20 PM  
Result Value Ref Range WBC 7.4 4.1 - 11.1 K/uL  
 RBC 4.60 4.10 - 5.70 M/uL  
 HGB 14.4 12.1 - 17.0 g/dL HCT 42.4 36.6 - 50.3 % MCV 92.2 80.0 - 99.0 FL  
 MCH 31.3 26.0 - 34.0 PG  
 MCHC 34.0 30.0 - 36.5 g/dL  
 RDW 12.8 11.5 - 14.5 % PLATELET 927 820 - 191 K/uL MPV 9.2 8.9 - 12.9 FL  
 NRBC 0.0 0  WBC ABSOLUTE NRBC 0.00 0.00 - 0.01 K/uL NEUTROPHILS 73 32 - 75 % LYMPHOCYTES 18 12 - 49 % MONOCYTES 7 5 - 13 % EOSINOPHILS 1 0 - 7 % BASOPHILS 1 0 - 1 % IMMATURE GRANULOCYTES 0 0.0 - 0.5 % ABS. NEUTROPHILS 5.4 1.8 - 8.0 K/UL  
 ABS. LYMPHOCYTES 1.4 0.8 - 3.5 K/UL  
 ABS. MONOCYTES 0.5 0.0 - 1.0 K/UL  
 ABS. EOSINOPHILS 0.1 0.0 - 0.4 K/UL  
 ABS. BASOPHILS 0.0 0.0 - 0.1 K/UL  
 ABS. IMM. GRANS. 0.0 0.00 - 0.04 K/UL DF AUTOMATED METABOLIC PANEL, COMPREHENSIVE Collection Time: 09/22/18 12:20 PM  
Result Value Ref Range Sodium 136 136 - 145 mmol/L Potassium 4.0 3.5 - 5.1 mmol/L Chloride 102 97 - 108 mmol/L  
 CO2 27 21 - 32 mmol/L Anion gap 7 5 - 15 mmol/L Glucose 96 65 - 100 mg/dL BUN 12 6 - 20 MG/DL Creatinine 1.40 (H) 0.70 - 1.30 MG/DL  
 BUN/Creatinine ratio 9 (L) 12 - 20 GFR est AA 59 (L) >60 ml/min/1.73m2 GFR est non-AA 49 (L) >60 ml/min/1.73m2 Calcium 8.4 (L) 8.5 - 10.1 MG/DL Bilirubin, total 0.8 0.2 - 1.0 MG/DL  
 ALT (SGPT) 25 12 - 78 U/L  
 AST (SGOT) 19 15 - 37 U/L Alk. phosphatase 86 45 - 117 U/L Protein, total 6.7 6.4 - 8.2 g/dL Albumin 3.5 3.5 - 5.0 g/dL Globulin 3.2 2.0 - 4.0 g/dL A-G Ratio 1.1 1.1 - 2.2 URINALYSIS W/ REFLEX CULTURE Collection Time: 09/22/18  1:12 PM  
Result Value Ref Range Color YELLOW/STRAW Appearance CLEAR CLEAR Specific gravity 1.009 1.003 - 1.030    
 pH (UA) 7.0 5.0 - 8.0 Protein NEGATIVE  NEG mg/dL Glucose NEGATIVE  NEG mg/dL Ketone NEGATIVE  NEG mg/dL Bilirubin NEGATIVE  NEG Blood NEGATIVE  NEG Urobilinogen 0.2 0.2 - 1.0 EU/dL Nitrites NEGATIVE  NEG Leukocyte Esterase NEGATIVE  NEG    
 WBC 0-4 0 - 4 /hpf  
 RBC 0-5 0 - 5 /hpf Epithelial cells FEW FEW /lpf Bacteria NEGATIVE  NEG /hpf  
 UA:UC IF INDICATED CULTURE NOT INDICATED BY UA RESULT CNI Hyaline cast 0-2 0 - 5 /lpf Radiologic Studies -  
 
CT Results  (Last 48 hours) 09/22/18 1152  CT HEAD WO CONT Final result Impression:  IMPRESSION: No acute intracranial abnormality and no change. Narrative:  EXAM:  CT HEAD WITHOUT CONTRAST INDICATION: TIA. COMPARISON: 10/26/2011. CONTRAST: None. TECHNIQUE: Unenhanced CT of the head was performed using 5 mm images. Brain and  
bone windows were generated. Sagittal and coronal reformations were generated. CT dose reduction was achieved through use of a standardized protocol tailored  
for this examination and automatic exposure control for dose modulation. FINDINGS:  
The ventricles and sulci are enlarged in a generalized fashion consistent with  
volume loss. There is no significant white matter disease. There is no  
intracranial hemorrhage. There is no extra-axial collection, mass, mass effect  
or midline shift. The basilar cisterns are open. No acute infarct is  
identified. The bone windows demonstrate no abnormalities. The visualized  
portions of the paranasal sinuses and mastoid air cells are clear. 09/22/18 1152  CT ABD PELV WO CONT Final result Impression:  IMPRESSION:  
   
1. No evidence of acute process in the abdomen or pelvis. 2. An indeterminate exophytic lesion arising from the lower pole of the left  
kidney measuring 1.8 cm, which appears new since 2006. This may represent a  
complex cyst. This can be further evaluated on an outpatient basis with renal  
ultrasound. 3. A 17 mm linear calcific density in the transverse colon, which likely  
represents an ingested bone. No evidence of colitis. 4. A 3.1 cm infrarenal abdominal aortic aneurysm. Narrative:  EXAM:  CT ABD PELV WO CONT INDICATION: abdominal pain COMPARISON: CT abdomen 10/26/2006. CONTRAST:  None. TECHNIQUE:   
Thin axial images were obtained through the abdomen and pelvis. Coronal and  
sagittal reconstructions were generated. Oral contrast was not administered. CT  
dose reduction was achieved through use of a standardized protocol tailored for  
this examination and automatic exposure control for dose modulation. FINDINGS:   
Lower Thorax:  
Lung Bases: There is bilateral subpleural reticulation, which may represent  
atelectasis or mild fibrosis. No pleural effusion or consolidation. Heart: The heart is normal in size. No pericardial effusion. Abdomen/Pelvis:  
Evaluation of the solid organs is markedly limited without the use of IV  
contrast.  
   
Liver:  No focal liver lesions. Biliary system: Gallbladder is surgically absent. Spleen: Normal.  
   
Pancreas: Normal.  
   
Kidneys/Ureters/Bladder: Simple cyst in the upper pole of the right kidney  
measuring 5.9 x 4.4 cm. Unchanged cyst with peripheral calcifications in the  
lower pole of the left kidney measuring 2.9 x 2.8 cm. Adjacent to this cyst,  
there is an indeterminate exophytic lesion measuring 1.8 x 1.6 cm, which appears  
new from prior CT examination. No renal or ureteral calculi. No hydronephrosis  
or hydroureter. The bladder is decompressed but unremarkable. Adrenals: Normal.  
   
Stomach/bowel: There is a 17 mm linear calcific density within the transverse  
colon. No dilation or abnormal wall thickening is present. No free  
intraperitoneal air noted. Reproductive Organs: Numerous beads noted in the prostate. Vasculature: Infrarenal abdominal aortic aneurysm measuring 3.1 cm. Nodes: No pathologically enlarged lymph nodes. Fluid: No free fluid. Bones/Soft Tissue: No acute fractures or aggressive osseous lesions are seen. Medical Decision Making I am the first provider for this patient. I reviewed the vital signs, available nursing notes, past medical history, past surgical history, family history and social history. Vital Signs-Reviewed the patient's vital signs. Patient Vitals for the past 12 hrs: 
 Temp Pulse Resp BP SpO2  
09/22/18 1400 - 68 16 (!) 176/92 95 % 09/22/18 1245 - 70 19 (!) 181/98 95 % 09/22/18 1100 97.9 °F (36.6 °C) 77 19 (!) 187/99 99 % Pulse Oximetry Analysis - 99% on RA Cardiac Monitor:  
Rate: 76 bpm 
Rhythm: Normal Sinus Rhythm EKG interpretation: (Preliminary) 12:03 Rhythm: sinus rhythm with 1st degree AV block; and regular .  Rate (approx.): 74 bpm; Axis: normal; UT interval: normal; QRS interval: normal ; ST/T wave: normal. 
Written by Yrn Kevin ED Scribe, as dictated by Nilam Wall MD. Records Reviewed: Nursing Notes, Old Medical Records, Previous electrocardiograms, Ambulance Run Sheet, Previous Radiology Studies and Previous Laboratory Studies Provider Notes (Medical Decision Making): DDx: TIA, diverticulitis, constipation, abdominal cramps, gas pain On exam, patient is well appearing. He is hypertensive, however is not having any chest pain and has no neurologic complaints at this time. He does have some mild LLQ tenderness on exam. Given recent colo/endo, will check CT abdomen. Will also check basic lab work and UA. Will get CT head given reports of neuro sx yesterday, though the patient is somewhat vague and it is unclear if he truly had neuro sx, he was off his antiplatelet and anticoagulant for the colo. ED Course:  
Initial assessment performed. The patients presenting problems have been discussed, and they are in agreement with the care plan formulated and outlined with them. I have encouraged them to ask questions as they arise throughout their visit. Lab work at the patient's baseline. CT head unchanged from prior. Incidental findings of cystic lesion on kidney and foreign body in colon discussed at length with the patient as his daughter. He already has an appointment with nephrology next week. He did seem somewhat unclear on the medication changes made post-colonoscopy. He was encouraged to call his GI doctor regarding these. Critical Care Time:  
0 Disposition: 
2:22 PM 
The patient has been re-evaluated and is ready for discharge. Reviewed available results with patient. Counseled patient on diagnosis and care plan. Patient has expressed understanding, and all questions have been answered.  Patient agrees with plan and agrees to follow up as recommended, or return to the ED if their symptoms worsen. Discharge instructions have been provided and explained to the patient, along with reasons to return to the ED. PLAN: 
1. Follow-up Information Follow up With Details Comments Contact Info Naty Man MD Schedule an appointment as soon as possible for a visit  [address] [facility] [address] 
[phone] 
  
 your nephrologist  As needed, If symptoms worsen [facility] EMERGENCY DEPT  As needed, If symptoms worsen [address] [facility] 
[phone] Return to ED if worse Diagnosis Clinical Impression: 1. Abdominal bloating 2. Hypertension, unspecified type Attestations: This note is prepared by Joe Reid, acting as Scribe for Gurmeet Iniguez MD. Gurmeet Iniguez MD: The scribe's documentation has been prepared under my direction and personally reviewed by me in its entirety. I confirm that the note above accurately reflects all work, treatment, procedures, and medical decision making performed by me.

## 2018-09-22 NOTE — ED NOTES
Discharge instructions reviewed with patient, copy given by Dr. Jose Grant. Pt is accomponied by family, denies use of wheelchair.

## 2018-09-22 NOTE — ED NOTES
Assumed care of patient. Patient is alert and oriented, does not appear to be in distress. Patient ambulatory to ED with c/o constipation and left leg weakness yesterday but sx have resolved. Had colonoscopy on Monday, and has been off of his Eliquis for the procedure.

## 2018-09-22 NOTE — DISCHARGE INSTRUCTIONS
Gas and Bloating: Care Instructions  Your Care Instructions    Gas and bloating can be uncomfortable and embarrassing problems. All people pass gas, but some people produce more gas than others, sometimes enough to cause distress. It is normal to pass gas from 6 to 20 times per day. Excess gas usually is not caused by a serious health problem. Gas and bloating usually are caused by something you eat or drink, including some food supplements and medicines. Gas and bloating are usually harmless and go away without treatment. However, changing your diet can help end the problem. Some over-the-counter medicines can help prevent gas and relieve bloating. Follow-up care is a key part of your treatment and safety. Be sure to make and go to all appointments, and call your doctor if you are having problems. It's also a good idea to know your test results and keep a list of the medicines you take. How can you care for yourself at home? · Keep a food diary if you think a food gives you gas. Write down what you eat or drink. Also record when you get gas. If you notice that a food seems to cause your gas each time, avoid it and see if the gas goes away. Examples of foods that cause gas include:  ¨ Fried and fatty foods. ¨ Beans. ¨ Vegetables such as artichokes, asparagus, broccoli, brussels sprouts, cabbage, cauliflower, cucumbers, green peppers, onions, peas, radishes, and raw potatoes. ¨ Fruits such as apricots, bananas, melons, peaches, pears, prunes, and raw apples. ¨ Wheat and wheat bran. · Soak dry beans in water overnight, then dump the water and cook the soaked beans in new water. This can help prevent gas and bloating. · If you have problems with lactose, avoid dairy products such as milk and cheese. · Try not to swallow air. Do not drink through a straw, gulp your food, or chew gum. · Take an over-the-counter medicine. Read and follow all instructions on the label.   ¨ Food enzymes, such as Beano, can be added to gas-producing foods to prevent gas. ¨ Antacids, such as Maalox Anti-Gas and Mylanta Gas, can relieve bloating by making you burp. Be careful when you take over-the-counter antacid medicines. Many of these medicines have aspirin in them. Read the label to make sure that you are not taking more than the recommended dose. Too much aspirin can be harmful. ¨ Activated charcoal tablets, such as CharcoCaps, may decrease odor from gas you pass. ¨ If you have problems with lactose, you can take medicines such as Dairy Ease and Lactaid with dairy products to prevent gas and bloating. · Get some exercise regularly. When should you call for help? Call 911 anytime you think you may need emergency care. For example, call if:    · You have gas and signs of a heart attack, such as:  ¨ Chest pain or pressure. ¨ Sweating. ¨ Shortness of breath. ¨ Nausea or vomiting. ¨ Pain that spreads from the chest to the neck, jaw, or one or both shoulders or arms. ¨ Dizziness or lightheadedness. ¨ A fast or uneven pulse. After calling 911, chew 1 adult-strength aspirin. Wait for an ambulance. Do not try to drive yourself.    Call your doctor now or seek immediate medical care if:    · You have severe belly pain.     · You have blood in your stool.    Watch closely for changes in your health, and be sure to contact your doctor if:    · You have blood or pus in your urine.     · Your urine is cloudy or smells bad.     · You are burping and have trouble swallowing.     · You feel bloated and have swelling in your belly.     · You do not get better as expected. Where can you learn more? Go to http://jagdeep-pham.info/. Enter U496 in the search box to learn more about \"Gas and Bloating: Care Instructions. \"  Current as of: November 20, 2017  Content Version: 11.7  © 3799-7147 [employer].  Care instructions adapted under license by [employer] (which disclaims liability or warranty for this information). If you have questions about a medical condition or this instruction, always ask your healthcare professional. [place] [address] any warranty or liability for your use of this information.

## 2018-09-23 LAB
ATRIAL RATE: 74 BPM
CALCULATED P AXIS, ECG09: 41 DEGREES
CALCULATED R AXIS, ECG10: -2 DEGREES
CALCULATED T AXIS, ECG11: 18 DEGREES
DIAGNOSIS, 93000: NORMAL
P-R INTERVAL, ECG05: 242 MS
Q-T INTERVAL, ECG07: 404 MS
QRS DURATION, ECG06: 92 MS
QTC CALCULATION (BEZET), ECG08: 448 MS
VENTRICULAR RATE, ECG03: 74 BPM

## 2018-10-17 ENCOUNTER — APPOINTMENT (OUTPATIENT)
Dept: CT IMAGING | Age: 83
DRG: 552 | End: 2018-10-17
Attending: NURSE PRACTITIONER
Payer: MEDICARE

## 2018-10-17 ENCOUNTER — HOSPITAL ENCOUNTER (INPATIENT)
Age: 83
LOS: 5 days | Discharge: HOME | DRG: 552 | End: 2018-10-22
Attending: EMERGENCY MEDICINE | Admitting: INTERNAL MEDICINE
Payer: MEDICARE

## 2018-10-17 ENCOUNTER — TELEPHONE (OUTPATIENT)
Dept: FAMILY MEDICINE CLINIC | Age: 83
End: 2018-10-17

## 2018-10-17 DIAGNOSIS — I65.23 BILATERAL CAROTID ARTERY STENOSIS: ICD-10-CM

## 2018-10-17 DIAGNOSIS — M48.02 DEGENERATIVE CERVICAL SPINAL STENOSIS: ICD-10-CM

## 2018-10-17 DIAGNOSIS — G45.1 TRANSIENT ISCHEMIC ATTACK INVOLVING RIGHT INTERNAL CAROTID ARTERY: ICD-10-CM

## 2018-10-17 DIAGNOSIS — G45.0 VERTEBROBASILAR ARTERY INSUFFICIENCY: ICD-10-CM

## 2018-10-17 DIAGNOSIS — I63.9 CEREBROVASCULAR ACCIDENT (CVA), UNSPECIFIED MECHANISM (HCC): ICD-10-CM

## 2018-10-17 DIAGNOSIS — E87.1 HYPONATREMIA: ICD-10-CM

## 2018-10-17 DIAGNOSIS — R53.1 ACUTE RIGHT-SIDED WEAKNESS: ICD-10-CM

## 2018-10-17 DIAGNOSIS — G44.52 NEW DAILY PERSISTENT HEADACHE: Primary | ICD-10-CM

## 2018-10-17 LAB
ALBUMIN SERPL-MCNC: 3.6 G/DL (ref 3.5–5)
ALBUMIN/GLOB SERPL: 1.1 {RATIO} (ref 1.1–2.2)
ALP SERPL-CCNC: 84 U/L (ref 45–117)
ALT SERPL-CCNC: 31 U/L (ref 12–78)
ANION GAP SERPL CALC-SCNC: 8 MMOL/L (ref 5–15)
APTT PPP: 34.8 SEC (ref 22.1–32)
AST SERPL-CCNC: 22 U/L (ref 15–37)
BASOPHILS # BLD: 0 K/UL (ref 0–0.1)
BASOPHILS NFR BLD: 0 % (ref 0–1)
BILIRUB SERPL-MCNC: 0.8 MG/DL (ref 0.2–1)
BUN SERPL-MCNC: 12 MG/DL (ref 6–20)
BUN/CREAT SERPL: 9 (ref 12–20)
CALCIUM SERPL-MCNC: 8.4 MG/DL (ref 8.5–10.1)
CHLORIDE SERPL-SCNC: 93 MMOL/L (ref 97–108)
CK SERPL-CCNC: 58 U/L (ref 39–308)
CO2 SERPL-SCNC: 27 MMOL/L (ref 21–32)
CREAT SERPL-MCNC: 1.35 MG/DL (ref 0.7–1.3)
CREAT UR-MCNC: 50.3 MG/DL
DIFFERENTIAL METHOD BLD: NORMAL
EOSINOPHIL # BLD: 0.1 K/UL (ref 0–0.4)
EOSINOPHIL NFR BLD: 1 % (ref 0–7)
ERYTHROCYTE [DISTWIDTH] IN BLOOD BY AUTOMATED COUNT: 12.9 % (ref 11.5–14.5)
GLOBULIN SER CALC-MCNC: 3.2 G/DL (ref 2–4)
GLUCOSE SERPL-MCNC: 87 MG/DL (ref 65–100)
HCT VFR BLD AUTO: 42.6 % (ref 36.6–50.3)
HGB BLD-MCNC: 14.9 G/DL (ref 12.1–17)
IMM GRANULOCYTES # BLD: 0 K/UL (ref 0–0.04)
IMM GRANULOCYTES NFR BLD AUTO: 0 % (ref 0–0.5)
INR PPP: 1 (ref 0.9–1.1)
LYMPHOCYTES # BLD: 1.6 K/UL (ref 0.8–3.5)
LYMPHOCYTES NFR BLD: 22 % (ref 12–49)
MCH RBC QN AUTO: 31.8 PG (ref 26–34)
MCHC RBC AUTO-ENTMCNC: 35 G/DL (ref 30–36.5)
MCV RBC AUTO: 90.8 FL (ref 80–99)
MONOCYTES # BLD: 0.7 K/UL (ref 0–1)
MONOCYTES NFR BLD: 10 % (ref 5–13)
NEUTS SEG # BLD: 4.8 K/UL (ref 1.8–8)
NEUTS SEG NFR BLD: 66 % (ref 32–75)
NRBC # BLD: 0 K/UL (ref 0–0.01)
NRBC BLD-RTO: 0 PER 100 WBC
OSMOLALITY SERPL: 272 MOSM/KG H2O
OSMOLALITY UR: 280 MOSM/KG H2O
PLATELET # BLD AUTO: 246 K/UL (ref 150–400)
PMV BLD AUTO: 9.1 FL (ref 8.9–12.9)
POTASSIUM SERPL-SCNC: 4.6 MMOL/L (ref 3.5–5.1)
POTASSIUM UR-SCNC: 18 MMOL/L
PROT SERPL-MCNC: 6.8 G/DL (ref 6.4–8.2)
PROTHROMBIN TIME: 10.2 SEC (ref 9–11.1)
RBC # BLD AUTO: 4.69 M/UL (ref 4.1–5.7)
SODIUM SERPL-SCNC: 128 MMOL/L (ref 136–145)
SODIUM UR-SCNC: 70 MMOL/L
THERAPEUTIC RANGE,PTTT: ABNORMAL SECS (ref 58–77)
TROPONIN I SERPL-MCNC: <0.05 NG/ML
WBC # BLD AUTO: 7.3 K/UL (ref 4.1–11.1)

## 2018-10-17 PROCEDURE — 82550 ASSAY OF CK (CPK): CPT | Performed by: EMERGENCY MEDICINE

## 2018-10-17 PROCEDURE — 74011250637 HC RX REV CODE- 250/637: Performed by: INTERNAL MEDICINE

## 2018-10-17 PROCEDURE — 74011250636 HC RX REV CODE- 250/636: Performed by: NURSE PRACTITIONER

## 2018-10-17 PROCEDURE — 84484 ASSAY OF TROPONIN QUANT: CPT | Performed by: EMERGENCY MEDICINE

## 2018-10-17 PROCEDURE — 85610 PROTHROMBIN TIME: CPT | Performed by: EMERGENCY MEDICINE

## 2018-10-17 PROCEDURE — 99285 EMERGENCY DEPT VISIT HI MDM: CPT

## 2018-10-17 PROCEDURE — 84300 ASSAY OF URINE SODIUM: CPT | Performed by: NURSE PRACTITIONER

## 2018-10-17 PROCEDURE — 36415 COLL VENOUS BLD VENIPUNCTURE: CPT | Performed by: EMERGENCY MEDICINE

## 2018-10-17 PROCEDURE — 85025 COMPLETE CBC W/AUTO DIFF WBC: CPT | Performed by: EMERGENCY MEDICINE

## 2018-10-17 PROCEDURE — 85730 THROMBOPLASTIN TIME PARTIAL: CPT | Performed by: EMERGENCY MEDICINE

## 2018-10-17 PROCEDURE — 65660000000 HC RM CCU STEPDOWN

## 2018-10-17 PROCEDURE — 93005 ELECTROCARDIOGRAM TRACING: CPT

## 2018-10-17 PROCEDURE — 83935 ASSAY OF URINE OSMOLALITY: CPT | Performed by: NURSE PRACTITIONER

## 2018-10-17 PROCEDURE — 80053 COMPREHEN METABOLIC PANEL: CPT | Performed by: EMERGENCY MEDICINE

## 2018-10-17 PROCEDURE — 84133 ASSAY OF URINE POTASSIUM: CPT | Performed by: NURSE PRACTITIONER

## 2018-10-17 PROCEDURE — 70450 CT HEAD/BRAIN W/O DYE: CPT

## 2018-10-17 PROCEDURE — 83930 ASSAY OF BLOOD OSMOLALITY: CPT | Performed by: EMERGENCY MEDICINE

## 2018-10-17 PROCEDURE — 82570 ASSAY OF URINE CREATININE: CPT | Performed by: NURSE PRACTITIONER

## 2018-10-17 RX ORDER — LOSARTAN POTASSIUM 50 MG/1
50 TABLET ORAL DAILY
Status: DISCONTINUED | OUTPATIENT
Start: 2018-10-18 | End: 2018-10-18

## 2018-10-17 RX ORDER — SODIUM CHLORIDE 0.9 % (FLUSH) 0.9 %
5-10 SYRINGE (ML) INJECTION EVERY 8 HOURS
Status: DISCONTINUED | OUTPATIENT
Start: 2018-10-17 | End: 2018-10-22 | Stop reason: HOSPADM

## 2018-10-17 RX ORDER — SODIUM CHLORIDE 0.9 % (FLUSH) 0.9 %
5-10 SYRINGE (ML) INJECTION AS NEEDED
Status: DISCONTINUED | OUTPATIENT
Start: 2018-10-17 | End: 2018-10-22 | Stop reason: HOSPADM

## 2018-10-17 RX ORDER — ACETAMINOPHEN 650 MG/1
650 SUPPOSITORY RECTAL
Status: DISCONTINUED | OUTPATIENT
Start: 2018-10-17 | End: 2018-10-22 | Stop reason: HOSPADM

## 2018-10-17 RX ORDER — ATORVASTATIN CALCIUM 20 MG/1
20 TABLET, FILM COATED ORAL DAILY
Status: DISCONTINUED | OUTPATIENT
Start: 2018-10-18 | End: 2018-10-22 | Stop reason: HOSPADM

## 2018-10-17 RX ORDER — LABETALOL HYDROCHLORIDE 5 MG/ML
5 INJECTION, SOLUTION INTRAVENOUS
Status: DISCONTINUED | OUTPATIENT
Start: 2018-10-17 | End: 2018-10-22 | Stop reason: HOSPADM

## 2018-10-17 RX ORDER — CLOPIDOGREL BISULFATE 75 MG/1
75 TABLET ORAL DAILY
Status: DISCONTINUED | OUTPATIENT
Start: 2018-10-18 | End: 2018-10-22 | Stop reason: HOSPADM

## 2018-10-17 RX ORDER — ACETAMINOPHEN 325 MG/1
650 TABLET ORAL
Status: DISCONTINUED | OUTPATIENT
Start: 2018-10-17 | End: 2018-10-22 | Stop reason: HOSPADM

## 2018-10-17 RX ORDER — LEVOTHYROXINE SODIUM 112 UG/1
112 TABLET ORAL
Status: DISCONTINUED | OUTPATIENT
Start: 2018-10-18 | End: 2018-10-22 | Stop reason: HOSPADM

## 2018-10-17 RX ORDER — METOPROLOL SUCCINATE 50 MG/1
50 TABLET, EXTENDED RELEASE ORAL DAILY
Status: DISCONTINUED | OUTPATIENT
Start: 2018-10-18 | End: 2018-10-22 | Stop reason: HOSPADM

## 2018-10-17 RX ADMIN — ATORVASTATIN CALCIUM 20 MG: 20 TABLET, FILM COATED ORAL at 23:34

## 2018-10-17 RX ADMIN — SODIUM CHLORIDE 500 ML: 900 INJECTION, SOLUTION INTRAVENOUS at 21:00

## 2018-10-17 RX ADMIN — Medication 10 ML: at 23:34

## 2018-10-17 NOTE — ED NOTES
Assumed care from triage. Patient comes to the ED complaining of left sided numbness that started on Monday that has been intermittent for the last few days. Patient states that it comes and goes by his PCP wanted to make sure he was not having a stroke. Patient is also complaining of a headache posteriorly. Patient states that HE has had a TIA in the past and has blockage on the right side of the patients neck.

## 2018-10-17 NOTE — ED PROVIDER NOTES
EMERGENCY DEPARTMENT HISTORY AND PHYSICAL EXAM      Date: 10/17/2018  Patient Name: Ingrid Laughlin.    History of Presenting Illness     Chief Complaint   Patient presents with    Headache     patient reports waking up with headache x2 days    Numbness     right facial numbness and heavy left leg intermittently for several days; patient reports right carotid blockages       History Provided By: Patient    HPI: Antonio Ferraro Sr., 81 y.o. male with PMHx significant for HTN, prostate CA, HLD, CKD, GERD, Gout, HH, and stroke presents ambulatory to the ED with cc of right sided facial numbness, tongue tingling, and right sided weakness for the last three days. He also report daily HA upon wakening for the last two days. No CHI or blurred vision. No new medications. Pt denies fevers, chills, night sweats, chest pain, pressure, SOB, MARVIN, PND, orthopnea, abdominal pain, n/v/d, melena, hematuria, dysuria, constipation,  dizziness, and syncope. PCP: Arma Dubin, MD    Current Facility-Administered Medications   Medication Dose Route Frequency Provider Last Rate Last Dose    sodium chloride 0.9 % bolus infusion 500 mL  500 mL IntraVENous ONCE Chito Garcia NP         Current Outpatient Medications   Medication Sig Dispense Refill    atorvastatin (LIPITOR) 20 mg tablet Take  by mouth daily.  losartan (COZAAR) 50 mg tablet Take 50 mg by mouth daily.  metoprolol succinate (TOPROL-XL) 50 mg XL tablet Take 50 mg by mouth daily.  pantoprazole (PROTONIX) 40 mg tablet TAKE 1 TABLET BY MOUTH  DAILY 90 Tab 3    clopidogrel (PLAVIX) 75 mg tab TAKE 1 TABLET BY MOUTH  DAILY 90 Tab 3    ELIQUIS 2.5 mg tablet       levothyroxine (SYNTHROID) 112 mcg tablet Take 1 Tab by mouth Daily (before breakfast). 90 Tab 3    GUAIFENESIN/DEXTROMETHORPHAN (CORICIDIN HBP PO) Take 1 Tab by mouth as needed.       acetaminophen (TYLENOL EXTRA STRENGTH) 500 mg tablet Take  by mouth every six (6) hours as needed for Pain.      cholecalciferol, vitamin D3, (VITAMIN D3) 2,000 unit tab Take  by mouth nightly. 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 Dr. Junior Reyes Stroke [facility])     TIA, Dr. Joyce Llanes, no residual effects as of 2018    Thyroid cancer [facility]) 2015    Unspecified vitamin D deficiency        Past Surgical History:  Past Surgical History:   Procedure Laterality Date   [address]      Cardiac Cath   Dr. Bay Salinas HX APPENDECTOMY      HX CHOLECYSTECTOMY      HX HEENT      tonsillectomy    HX OTHER SURGICAL      vasectomy    HX PARATHYROIDECTOMY  2015    right superior parathyroid gland removed and left superior parathyroid gland removed    HX PARTIAL THYROIDECTOMY  1/14/15    right lobectomy    HX UROLOGICAL      Seeds for prostate cancer , 4 dialations     HX UROLOGICAL  1/14/15    BLADDER NECK INCISION, Cold knife incision of bladder neck contracture, cystoscopy       Family History:  Family History   Problem Relation Age of Onset    Cancer Mother         hodgkins disease    Heart Disease Father     Hypertension Father     Other Neg Hx         no hypercalcemia or kidney stones       Social History:  Social History     Tobacco Use    Smoking status: Former Smoker     Years: 5.00     Last attempt to quit: 1955     Years since quittin.7    Smokeless tobacco: Never Used   Substance Use Topics    Alcohol use: Yes     Comment: occassional mixed drink or beer    Drug use:  No Allergies: Allergies   Allergen Reactions    Sulfa (Sulfonamide Antibiotics) Hives         Review of Systems   Review of Systems    Physical Exam   Physical Exam    Diagnostic Study Results     Labs -     Recent Results (from the past 12 hour(s))   CBC WITH AUTOMATED DIFF    Collection Time: 10/17/18  5:54 PM   Result Value Ref Range    WBC 7.3 4.1 - 11.1 K/uL    RBC 4.69 4.10 - 5.70 M/uL    HGB 14.9 12.1 - 17.0 g/dL    HCT 42.6 36.6 - 50.3 %    MCV 90.8 80.0 - 99.0 FL    MCH 31.8 26.0 - 34.0 PG    MCHC 35.0 30.0 - 36.5 g/dL    RDW 12.9 11.5 - 14.5 %    PLATELET 573 994 - 828 K/uL    MPV 9.1 8.9 - 12.9 FL    NRBC 0.0 0  WBC    ABSOLUTE NRBC 0.00 0.00 - 0.01 K/uL    NEUTROPHILS 66 32 - 75 %    LYMPHOCYTES 22 12 - 49 %    MONOCYTES 10 5 - 13 %    EOSINOPHILS 1 0 - 7 %    BASOPHILS 0 0 - 1 %    IMMATURE GRANULOCYTES 0 0.0 - 0.5 %    ABS. NEUTROPHILS 4.8 1.8 - 8.0 K/UL    ABS. LYMPHOCYTES 1.6 0.8 - 3.5 K/UL    ABS. MONOCYTES 0.7 0.0 - 1.0 K/UL    ABS. EOSINOPHILS 0.1 0.0 - 0.4 K/UL    ABS. BASOPHILS 0.0 0.0 - 0.1 K/UL    ABS. IMM. GRANS. 0.0 0.00 - 0.04 K/UL    DF AUTOMATED     METABOLIC PANEL, COMPREHENSIVE    Collection Time: 10/17/18  5:54 PM   Result Value Ref Range    Sodium 128 (L) 136 - 145 mmol/L    Potassium 4.6 3.5 - 5.1 mmol/L    Chloride 93 (L) 97 - 108 mmol/L    CO2 27 21 - 32 mmol/L    Anion gap 8 5 - 15 mmol/L    Glucose 87 65 - 100 mg/dL    BUN 12 6 - 20 MG/DL    Creatinine 1.35 (H) 0.70 - 1.30 MG/DL    BUN/Creatinine ratio 9 (L) 12 - 20      GFR est AA >60 >60 ml/min/1.73m2    GFR est non-AA 51 (L) >60 ml/min/1.73m2    Calcium 8.4 (L) 8.5 - 10.1 MG/DL    Bilirubin, total 0.8 0.2 - 1.0 MG/DL    ALT (SGPT) 31 12 - 78 U/L    AST (SGOT) 22 15 - 37 U/L    Alk.  phosphatase 84 45 - 117 U/L    Protein, total 6.8 6.4 - 8.2 g/dL    Albumin 3.6 3.5 - 5.0 g/dL    Globulin 3.2 2.0 - 4.0 g/dL    A-G Ratio 1.1 1.1 - 2.2     TROPONIN I    Collection Time: 10/17/18  5:54 PM   Result Value Ref Range Troponin-I, Qt. <0.05 <0.05 ng/mL   CK W/ REFLX CKMB    Collection Time: 10/17/18  5:54 PM   Result Value Ref Range    CK 58 39 - 308 U/L   PROTHROMBIN TIME + INR    Collection Time: 10/17/18  5:54 PM   Result Value Ref Range    INR 1.0 0.9 - 1.1      Prothrombin time 10.2 9.0 - 11.1 sec   PTT    Collection Time: 10/17/18  5:54 PM   Result Value Ref Range    aPTT 34.8 (H) 22.1 - 32.0 sec    aPTT, therapeutic range     58.0 - 77.0 SECS   CREATININE, UR, RANDOM    Collection Time: 10/17/18  7:22 PM   Result Value Ref Range    Creatinine, urine 50.30 mg/dL   EKG, 12 LEAD, INITIAL    Collection Time: 10/17/18  8:30 PM   Result Value Ref Range    Ventricular Rate 58 BPM    Atrial Rate 58 BPM    P-R Interval 206 ms    QRS Duration 94 ms    Q-T Interval 438 ms    QTC Calculation (Bezet) 429 ms    Calculated P Axis 33 degrees    Calculated R Axis -6 degrees    Calculated T Axis 14 degrees    Diagnosis       Sinus bradycardia  When compared with ECG of 22-SEP-2018 12:03,  MN interval has decreased         Radiologic Studies -   CT HEAD WO CONT   Final Result        CT Results  (Last 48 hours)               10/17/18 1916  CT HEAD WO CONT Final result    Impression:  IMPRESSION: Unremarkable head CT               Narrative:  EXAM:  CT HEAD WO CONT       INDICATION:   right sided facial numbness and tingling - weakness       COMPARISON: 9/22/2018. CONTRAST:  None. TECHNIQUE: Unenhanced CT of the head was performed using 5 mm images. Brain and   bone windows were generated. CT dose reduction was achieved through use of a   standardized protocol tailored for this examination and automatic exposure   control for dose modulation. FINDINGS:   The ventricles and sulci are normal in size, shape and configuration and   midline. There is no significant white matter disease. There is no intracranial   hemorrhage, extra-axial collection, mass, mass effect or midline shift. The   basilar cisterns are open.  No acute infarct is identified. The bone windows   demonstrate no abnormalities. The visualized portions of the paranasal sinuses   and mastoid air cells are clear. CXR Results  (Last 48 hours)    None            Medical Decision Making   I am the first provider for this patient. I reviewed the vital signs, available nursing notes, past medical history, past surgical history, family history and social history. Vital Signs-Reviewed the patient's vital signs. Patient Vitals for the past 12 hrs:   Temp Pulse Resp BP SpO2   10/17/18 2030 -- (!) 59 14 165/79 97 %   10/17/18 2000 -- (!) 59 16 177/83 98 %   10/17/18 1920 -- 60 14 177/79 97 %   10/17/18 1845 -- 60 16 167/88 98 %   10/17/18 1830 -- 63 15 (!) 177/93 99 %   10/17/18 1815 -- 61 14 167/89 97 %   10/17/18 1800 -- 64 14 192/83 98 %   10/17/18 1745 -- 61 19 (!) 179/91 98 %   10/17/18 1713 97.5 °F (36.4 °C) 64 16 (!) 199/109 100 %       Pulse Oximetry Analysis - 98% on RA    Cardiac Monitor:   Rate: 63 bpm  Rhythm:     EKG interpretation: (Preliminary) 2030  Rhythm: sinus bradycardia; and regular . Rate (approx.): 58; Axis: normal; [place] interval: normal; QRS interval: normal ; ST/T wave: normal.  Written by ROBIN Matthews, as dictated by Austen Brito DO. Records Reviewed: Nursing Notes, Old Medical Records and Previous electrocardiograms    Provider Notes (Medical Decision Making):   Right sided facial numbness and tingling  Right sided weakness    Routine laboratory data and UA  EKG  CT head  Bolus NS  Consult to nephrologist  Consult to hospitalist    CONSULT NOTE:   9:10 PM  Yvonne Cordero NP spoke with Dr. Kadeem Hickman MD,   Specialty: Nephrology  Discussed pt's hx, disposition, and available diagnostic and imaging results. Reviewed care plans. Consultant agrees with plans as outlined.  Yvonne Cordero NP    CONSULT NOTE:   9:16 PM  Yvonne Cordero NP spoke with Dr. Patricia Ramos,   Specialty: Hospitalist  Discussed pt's hx, disposition, and available diagnostic and imaging results. Reviewed care plans. Consultant agrees with plans as outlined. MD to admit. Meron Shelley NP      ED Course:   Initial assessment performed. The patients presenting problems have been discussed, and they are in agreement with the care plan formulated and outlined with them. I have encouraged them to ask questions as they arise throughout their visit. Stable ambulatory pt in [facility]      Critical Care Time:   0    Disposition:  Admit to inpatient     9:16 PM  Patient is being admitted to the hospital.  The results of their tests and reasons for their admission have been discussed with them and/or available family. They convey agreement and understanding for the need to be admitted and for their admission diagnosis. Consultation has been made with the inpatient physician specialist for hospitalization. LABORATORY TESTS:  Recent Results (from the past 12 hour(s))   CBC WITH AUTOMATED DIFF    Collection Time: 10/17/18  5:54 PM   Result Value Ref Range    WBC 7.3 4.1 - 11.1 K/uL    RBC 4.69 4.10 - 5.70 M/uL    HGB 14.9 12.1 - 17.0 g/dL    HCT 42.6 36.6 - 50.3 %    MCV 90.8 80.0 - 99.0 FL    MCH 31.8 26.0 - 34.0 PG    MCHC 35.0 30.0 - 36.5 g/dL    RDW 12.9 11.5 - 14.5 %    PLATELET 324 073 - 953 K/uL    MPV 9.1 8.9 - 12.9 FL    NRBC 0.0 0  WBC    ABSOLUTE NRBC 0.00 0.00 - 0.01 K/uL    NEUTROPHILS 66 32 - 75 %    LYMPHOCYTES 22 12 - 49 %    MONOCYTES 10 5 - 13 %    EOSINOPHILS 1 0 - 7 %    BASOPHILS 0 0 - 1 %    IMMATURE GRANULOCYTES 0 0.0 - 0.5 %    ABS. NEUTROPHILS 4.8 1.8 - 8.0 K/UL    ABS. LYMPHOCYTES 1.6 0.8 - 3.5 K/UL    ABS. MONOCYTES 0.7 0.0 - 1.0 K/UL    ABS. EOSINOPHILS 0.1 0.0 - 0.4 K/UL    ABS. BASOPHILS 0.0 0.0 - 0.1 K/UL    ABS. IMM.  GRANS. 0.0 0.00 - 0.04 K/UL    DF AUTOMATED     METABOLIC PANEL, COMPREHENSIVE    Collection Time: 10/17/18  5:54 PM   Result Value Ref Range    Sodium 128 (L) 136 - 145 mmol/L    Potassium 4.6 3.5 - 5.1 mmol/L    Chloride 93 (L) 97 - 108 mmol/L    CO2 27 21 - 32 mmol/L    Anion gap 8 5 - 15 mmol/L    Glucose 87 65 - 100 mg/dL    BUN 12 6 - 20 MG/DL    Creatinine 1.35 (H) 0.70 - 1.30 MG/DL    BUN/Creatinine ratio 9 (L) 12 - 20      GFR est AA >60 >60 ml/min/1.73m2    GFR est non-AA 51 (L) >60 ml/min/1.73m2    Calcium 8.4 (L) 8.5 - 10.1 MG/DL    Bilirubin, total 0.8 0.2 - 1.0 MG/DL    ALT (SGPT) 31 12 - 78 U/L    AST (SGOT) 22 15 - 37 U/L    Alk. phosphatase 84 45 - 117 U/L    Protein, total 6.8 6.4 - 8.2 g/dL    Albumin 3.6 3.5 - 5.0 g/dL    Globulin 3.2 2.0 - 4.0 g/dL    A-G Ratio 1.1 1.1 - 2.2     TROPONIN I    Collection Time: 10/17/18  5:54 PM   Result Value Ref Range    Troponin-I, Qt. <0.05 <0.05 ng/mL   CK W/ REFLX CKMB    Collection Time: 10/17/18  5:54 PM   Result Value Ref Range    CK 58 39 - 308 U/L   PROTHROMBIN TIME + INR    Collection Time: 10/17/18  5:54 PM   Result Value Ref Range    INR 1.0 0.9 - 1.1      Prothrombin time 10.2 9.0 - 11.1 sec   PTT    Collection Time: 10/17/18  5:54 PM   Result Value Ref Range    aPTT 34.8 (H) 22.1 - 32.0 sec    aPTT, therapeutic range     58.0 - 77.0 SECS   CREATININE, UR, RANDOM    Collection Time: 10/17/18  7:22 PM   Result Value Ref Range    Creatinine, urine 50.30 mg/dL   EKG, 12 LEAD, INITIAL    Collection Time: 10/17/18  8:30 PM   Result Value Ref Range    Ventricular Rate 58 BPM    Atrial Rate 58 BPM    P-R Interval 206 ms    QRS Duration 94 ms    Q-T Interval 438 ms    QTC Calculation (Bezet) 429 ms    Calculated P Axis 33 degrees    Calculated R Axis -6 degrees    Calculated T Axis 14 degrees    Diagnosis       Sinus bradycardia  When compared with ECG of 22-SEP-2018 12:03,  DC interval has decreased         IMAGING RESULTS:  CT HEAD WO CONT   Final Result        Ct Head Wo Cont    Result Date: 10/17/2018  EXAM:  CT HEAD WO CONT INDICATION:   right sided facial numbness and tingling - weakness COMPARISON: 9/22/2018. CONTRAST:  None.  TECHNIQUE: Unenhanced CT of the head was performed using 5 mm images. Brain and bone windows were generated. CT dose reduction was achieved through use of a standardized protocol tailored for this examination and automatic exposure control for dose modulation. FINDINGS: The ventricles and sulci are normal in size, shape and configuration and midline. There is no significant white matter disease. There is no intracranial hemorrhage, extra-axial collection, mass, mass effect or midline shift. The basilar cisterns are open. No acute infarct is identified. The bone windows demonstrate no abnormalities. The visualized portions of the paranasal sinuses and mastoid air cells are clear. IMPRESSION: Unremarkable head CT       MEDICATIONS GIVEN:  Medications   sodium chloride 0.9 % bolus infusion 500 mL (not administered)       IMPRESSION:  1. New daily persistent headache    2. Acute right-sided weakness    3. Hyponatremia        PLAN:  1. Admit to Hospitalist            Diagnosis     Clinical Impression:   1. New daily persistent headache    2. Acute right-sided weakness    3.  Hyponatremia        Attestations:    Hernan Poole NP  9:17 PM

## 2018-10-17 NOTE — TELEPHONE ENCOUNTER
Called and spoke to pt's daughter. She put pt on the phone. He stated he woke up Saturday with the back of his head hurting and also had numbness and tingling in his face, jaw, and around his lips. This has been occurring off and on since Saturday. Denies headache now but still has the numbness and tingling. Pt's speech is clear, but he has left leg weakness. Daughter stated that is face is symmetrical. No visual problems. Due to pt's history advised daughter to take pt to the ER for eval. She verbalized understanding.

## 2018-10-17 NOTE — ED NOTES
Bedside and Verbal shift change report given to [address] (oncoming nurse) by [employer] RN (offgoing nurse). Report included the following information SBAR, Kardex, ED Summary, Intake/Output, MAR, Recent Results and Med Rec Status.

## 2018-10-18 ENCOUNTER — APPOINTMENT (OUTPATIENT)
Dept: MRI IMAGING | Age: 83
DRG: 552 | End: 2018-10-18
Attending: PSYCHIATRY & NEUROLOGY
Payer: MEDICARE

## 2018-10-18 PROBLEM — G45.0 VERTEBROBASILAR ARTERY INSUFFICIENCY: Status: ACTIVE | Noted: 2018-10-18

## 2018-10-18 PROBLEM — I65.23 BILATERAL CAROTID ARTERY STENOSIS: Status: ACTIVE | Noted: 2018-10-18

## 2018-10-18 PROBLEM — M48.02 DEGENERATIVE CERVICAL SPINAL STENOSIS: Status: ACTIVE | Noted: 2018-10-18

## 2018-10-18 PROBLEM — G45.1 TRANSIENT ISCHEMIC ATTACK INVOLVING RIGHT INTERNAL CAROTID ARTERY: Status: ACTIVE | Noted: 2018-10-18

## 2018-10-18 LAB
ANION GAP SERPL CALC-SCNC: 7 MMOL/L (ref 5–15)
ATRIAL RATE: 58 BPM
BUN SERPL-MCNC: 11 MG/DL (ref 6–20)
BUN/CREAT SERPL: 8 (ref 12–20)
CALCIUM SERPL-MCNC: 8.6 MG/DL (ref 8.5–10.1)
CALCULATED P AXIS, ECG09: 33 DEGREES
CALCULATED R AXIS, ECG10: -6 DEGREES
CALCULATED T AXIS, ECG11: 14 DEGREES
CHLORIDE SERPL-SCNC: 98 MMOL/L (ref 97–108)
CHOLEST SERPL-MCNC: 127 MG/DL
CO2 SERPL-SCNC: 23 MMOL/L (ref 21–32)
CREAT SERPL-MCNC: 1.34 MG/DL (ref 0.7–1.3)
DIAGNOSIS, 93000: NORMAL
ERYTHROCYTE [SEDIMENTATION RATE] IN BLOOD: 3 MM/HR (ref 0–20)
EST. AVERAGE GLUCOSE BLD GHB EST-MCNC: 100 MG/DL
GLUCOSE SERPL-MCNC: 112 MG/DL (ref 65–100)
HBA1C MFR BLD: 5.1 % (ref 4.2–6.3)
HDLC SERPL-MCNC: 74 MG/DL
HDLC SERPL: 1.7 {RATIO} (ref 0–5)
LDLC SERPL CALC-MCNC: 33.4 MG/DL (ref 0–100)
LIPID PROFILE,FLP: NORMAL
P-R INTERVAL, ECG05: 206 MS
POTASSIUM SERPL-SCNC: 4.7 MMOL/L (ref 3.5–5.1)
Q-T INTERVAL, ECG07: 438 MS
QRS DURATION, ECG06: 94 MS
QTC CALCULATION (BEZET), ECG08: 429 MS
SODIUM SERPL-SCNC: 128 MMOL/L (ref 136–145)
TRIGL SERPL-MCNC: 98 MG/DL (ref ?–150)
TSH SERPL DL<=0.05 MIU/L-ACNC: 0.63 UIU/ML (ref 0.36–3.74)
VENTRICULAR RATE, ECG03: 58 BPM
VLDLC SERPL CALC-MCNC: 19.6 MG/DL

## 2018-10-18 PROCEDURE — 74011250637 HC RX REV CODE- 250/637: Performed by: INTERNAL MEDICINE

## 2018-10-18 PROCEDURE — 70544 MR ANGIOGRAPHY HEAD W/O DYE: CPT

## 2018-10-18 PROCEDURE — 70553 MRI BRAIN STEM W/O & W/DYE: CPT

## 2018-10-18 PROCEDURE — 80048 BASIC METABOLIC PNL TOTAL CA: CPT | Performed by: INTERNAL MEDICINE

## 2018-10-18 PROCEDURE — 84443 ASSAY THYROID STIM HORMONE: CPT | Performed by: INTERNAL MEDICINE

## 2018-10-18 PROCEDURE — G8980 MOBILITY D/C STATUS: HCPCS

## 2018-10-18 PROCEDURE — 97162 PT EVAL MOD COMPLEX 30 MIN: CPT

## 2018-10-18 PROCEDURE — A9575 INJ GADOTERATE MEGLUMI 0.1ML: HCPCS | Performed by: INTERNAL MEDICINE

## 2018-10-18 PROCEDURE — 83036 HEMOGLOBIN GLYCOSYLATED A1C: CPT | Performed by: INTERNAL MEDICINE

## 2018-10-18 PROCEDURE — 93880 EXTRACRANIAL BILAT STUDY: CPT

## 2018-10-18 PROCEDURE — 97116 GAIT TRAINING THERAPY: CPT

## 2018-10-18 PROCEDURE — 74011250636 HC RX REV CODE- 250/636: Performed by: INTERNAL MEDICINE

## 2018-10-18 PROCEDURE — 97165 OT EVAL LOW COMPLEX 30 MIN: CPT

## 2018-10-18 PROCEDURE — 36415 COLL VENOUS BLD VENIPUNCTURE: CPT | Performed by: INTERNAL MEDICINE

## 2018-10-18 PROCEDURE — 93306 TTE W/DOPPLER COMPLETE: CPT

## 2018-10-18 PROCEDURE — G8979 MOBILITY GOAL STATUS: HCPCS

## 2018-10-18 PROCEDURE — 80061 LIPID PANEL: CPT | Performed by: INTERNAL MEDICINE

## 2018-10-18 PROCEDURE — G8978 MOBILITY CURRENT STATUS: HCPCS

## 2018-10-18 PROCEDURE — 85652 RBC SED RATE AUTOMATED: CPT | Performed by: INTERNAL MEDICINE

## 2018-10-18 PROCEDURE — 65660000000 HC RM CCU STEPDOWN

## 2018-10-18 PROCEDURE — 97535 SELF CARE MNGMENT TRAINING: CPT

## 2018-10-18 RX ORDER — FUROSEMIDE 10 MG/ML
20 INJECTION INTRAMUSCULAR; INTRAVENOUS ONCE
Status: COMPLETED | OUTPATIENT
Start: 2018-10-18 | End: 2018-10-18

## 2018-10-18 RX ORDER — AMLODIPINE BESYLATE 5 MG/1
5 TABLET ORAL DAILY
Status: DISCONTINUED | OUTPATIENT
Start: 2018-10-18 | End: 2018-10-22 | Stop reason: HOSPADM

## 2018-10-18 RX ORDER — GADOTERATE MEGLUMINE 376.9 MG/ML
20 INJECTION INTRAVENOUS
Status: COMPLETED | OUTPATIENT
Start: 2018-10-18 | End: 2018-10-18

## 2018-10-18 RX ADMIN — LEVOTHYROXINE SODIUM 112 MCG: 112 TABLET ORAL at 06:40

## 2018-10-18 RX ADMIN — APIXABAN 2.5 MG: 2.5 TABLET, FILM COATED ORAL at 00:08

## 2018-10-18 RX ADMIN — Medication 10 ML: at 21:19

## 2018-10-18 RX ADMIN — GADOTERATE MEGLUMINE 20 ML: 376.9 INJECTION INTRAVENOUS at 10:32

## 2018-10-18 RX ADMIN — FUROSEMIDE 20 MG: 10 INJECTION, SOLUTION INTRAMUSCULAR; INTRAVENOUS at 12:20

## 2018-10-18 RX ADMIN — CLOPIDOGREL BISULFATE 75 MG: 75 TABLET ORAL at 09:18

## 2018-10-18 RX ADMIN — LOSARTAN POTASSIUM 50 MG: 50 TABLET ORAL at 09:17

## 2018-10-18 RX ADMIN — METOPROLOL SUCCINATE 50 MG: 50 TABLET, EXTENDED RELEASE ORAL at 09:17

## 2018-10-18 RX ADMIN — ATORVASTATIN CALCIUM 20 MG: 20 TABLET, FILM COATED ORAL at 21:20

## 2018-10-18 RX ADMIN — Medication 10 ML: at 06:40

## 2018-10-18 RX ADMIN — Medication 10 ML: at 12:27

## 2018-10-18 RX ADMIN — APIXABAN 2.5 MG: 2.5 TABLET, FILM COATED ORAL at 21:20

## 2018-10-18 RX ADMIN — APIXABAN 2.5 MG: 2.5 TABLET, FILM COATED ORAL at 09:18

## 2018-10-18 NOTE — ROUTINE PROCESS
..  * No surgery found *  * No surgery found *  Bedside and Verbal shift change report given to Victor M Soto RN (oncoming nurse) by Loreta Finley (offgoing nurse). Report included the following information SBAR, Kardex and Cardiac Rhythm SB. Zone Phone:   [phone]      Significant changes during shift:  none        Patient Information    Karl Mari Sr.  81 y.o.  10/17/2018  5:23 PM by Betty Angelo MD. Karl Mari Sr. 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/14/2016    History of hyperparathyroidism 11/14/2016    Unspecified vitamin D deficiency     Hyperlipidemia 08/01/2014    Carotid artery stenosis, asymptomatic 08/01/2014    TIA (transient ischemic attack) 03/27/2013    H/O prostate cancer 03/27/2013    HBP (high blood pressure) 03/27/2013    GERD (gastroesophageal reflux disease) 03/27/2013    CKD (chronic kidney disease) stage 3, GFR 30-59 ml/min ([address] 03/27/2013     Past Medical History:   Diagnosis Date    Carotid artery stenosis, asymptomatic 8/1/2014    Right side 50-79%     Chronic kidney disease     stage 3    GERD (gastroesophageal reflux disease)     Gout     Hiatal hernia     Hyperlipidemia     Hyperparathyroidism ([address]     Hypertension     Long term (current) use of anticoagulants     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   Dr. Arnulfo Lind Stroke [facility]) 2002    TIA, Dr. Mitchell Avery, no residual effects as of 9/2018    Thyroid cancer [facility]) Jan 2015    Unspecified vitamin D deficiency          Core Measures:    CVA: Yes Yes          Activity Status:    OOB to Chair Yes  Ambulated this shift Yes   Bed Rest No          LINES AND DRAINS:piv right ac               DVT prophylaxis:    DVT prophylaxis Med- Yes  DVT prophylaxis SCD or TONY- No        Patient Safety:    Falls Score Total Score: 1  Safety Level_______  Bed Alarm On? No  Sitter?  No    Plan for upcoming shift: MRI Echo , Carotids        Discharge Plan: Yes TBD    Active Consults:  IP CONSULT TO NEPHROLOGY  IP CONSULT TO NEUROLOGY  IP CONSULT TO HOSPITALIST

## 2018-10-18 NOTE — CONSULTS
Consult  REFERRED BY:  Faina Rock MD    CHIEF COMPLAINT: Generalized weakness, worse left side, worse perioral      Subjective: Tasha Nishant is a 81 y.o. right-handed  male seen as a new patient to us, for evaluation of new problem that occurred yesterday when he developed some facial numbness on the right side and spread to the left side, and read that he seemed to be generally weak, complained of both legs feeling weak, had some posterior headaches in the craniocervical junction area. Had no fever, no meningismus, no trauma, no falls, and no other cause of these episodes. He denies any chest pain, palpitations or cardiac symptoms with this. He denied any difficulty with bowel or bladder function. He had a mild posterior headache in the craniocervical junction region, but no nausea vomiting, no other associated neurologic symptoms. Had no focal weakness noted. Speech and swallowing all remain intact. He denies any unusual physical activity, or other precipitating cause for this event. He was seen September 22 for similar problem and was found to have Elevated Blood Pressure and a Normal CT of the Head Then and Today. Patient's MRI scan does suggest moderate severe spinal stenosis at 2 levels, but MRI of the brain is negative, the MRA is negative, and Dopplers show 50-69% disease in the right internal carotid artery. Patient with history of atrial fib on average and Eliquis. Patient had a mini TIA years ago, probably in 2011 without recurrence.     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   Dr. Soumya Marshall Stroke [facility])     TIA, Dr. Yin Spine, no residual effects as of 2018    Thyroid cancer [facility]) 2015    Unspecified vitamin D deficiency       Past Surgical History:   Procedure Laterality Date   Chalo Miracle      Cardiac Cath   Dr. Riley Number    HX APPENDECTOMY      HX CHOLECYSTECTOMY      HX HEENT      tonsillectomy    HX OTHER SURGICAL      vasectomy    HX PARATHYROIDECTOMY  2015    right superior parathyroid gland removed and left superior parathyroid gland removed    HX PARTIAL THYROIDECTOMY  1/14/15    right lobectomy    HX UROLOGICAL      Seeds for prostate cancer , 4 dialations     HX UROLOGICAL  1/14/15    BLADDER NECK INCISION, Cold knife incision of bladder neck contracture, cystoscopy     Family History   Problem Relation Age of Onset    Cancer Mother         hodgkins disease    Heart Disease Father     Hypertension Father     Other Neg Hx         no hypercalcemia or kidney stones      Social History     Tobacco Use    Smoking status: Former Smoker     Years: 5.00     Last attempt to quit: 1955     Years since quittin.7    Smokeless tobacco: Never Used   Substance Use Topics    Alcohol use: Yes     Comment: occassional mixed drink or beer         Current Facility-Administered Medications:     amLODIPine (NORVASC) tablet 5 mg, 5 mg, Oral, DAILY, Rom Khan MD, Stopped at 10/18/18 1300    sodium chloride (NS) flush 5-10 mL, 5-10 mL, IntraVENous, Q8H, Sravani Mendez MD, 10 mL at 10/18/18 1227    sodium chloride (NS) flush 5-10 mL, 5-10 mL, IntraVENous, PRN, Sravani Mendez MD    acetaminophen (TYLENOL) tablet 650 mg, 650 mg, Oral, Q4H PRN **OR** acetaminophen (TYLENOL) solution 650 mg, 650 mg, Per NG tube, Q4H PRN **OR** acetaminophen (TYLENOL) suppository 650 mg, 650 mg, Rectal, Q4H PRN, Sravani Mendez MD    clopidogrel (PLAVIX) tablet 75 mg, 75 mg, Oral, DAILY, Tan, Bia Muñoz MD, 75 mg at 10/18/18 9662    labetalol (NORMODYNE;TRANDATE) injection 5 mg, 5 mg, IntraVENous, Q10MIN PRN, Rory Brown MD    atorvastatin (LIPITOR) tablet 20 mg, 20 mg, Oral, DAILY, Rory Brown MD, 20 mg at 10/17/18 2334    apixaban (ELIQUIS) tablet 2.5 mg, 2.5 mg, Oral, BID, Rory Brown MD, 2.5 mg at 10/18/18 0308    levothyroxine (SYNTHROID) tablet 112 mcg, 112 mcg, Oral, 6am, Rory Brown MD, 112 mcg at 10/18/18 0640    metoprolol succinate (TOPROL-XL) XL tablet 50 mg, 50 mg, Oral, DAILY, Rory Brown MD, 50 mg at 10/18/18 2239    influenza vaccine 2018-19 (6 mos+)(PF) (FLUARIX QUAD/FLULAVAL QUAD) injection 0.5 mL, 0.5 mL, IntraMUSCular, PRIOR TO DISCHARGE, Rosanna Granados MD        Allergies   Allergen Reactions    Sulfa (Sulfonamide Antibiotics) Hives      MRI Results (most recent):  Results from [place] encounter on 10/17/18   MRA BRAIN WO CONT    Narrative CLINICAL HISTORY: Elevated blood pressure, headaches, right facial numbness and  tingling    INDICATION: CVA. COMPARISON: CT 10/17/2018, MR 2011  TECHNIQUE: MR examination of the brain includes axial and sagittal T1 , axial  T2, axial FLAIR, axial gradient echo, axial DWI, coronal T2. Contrast: The patient was administered gadolinium-based contrast material ,  axial and sagittal T1-weighted postcontrast enhanced imaging was obtained. Next, 3-D time-of-flight MRA of the brain was performed. Multiplanar  reconstructions were obtained. FINDINGS:   There is no intracranial mass, hemorrhage or evidence of acute infarction. Minimal sphenoid sinus disease. There is sulcal and ventricular prominence. There is canal stenosis/mild cord compression at C3-C4 and at C4-C5. There is no abnormal parenchymal enhancement. There is no abnormal meningeal  enhancement. There is no Chiari or syrinx. The pituitary and infundibulum are  grossly unremarkable. There is no skull base mass.  Cerebellopontine angles are  grossly unremarkable. The major intracranial vascular flow-voids are  unremarkable. The cavernous sinuses are symmetric. Optic chiasm and infundibulum grossly  unremarkable. Orbits are grossly symmetric. Dural venous sinuses are grossly  patent. The brain architecture is normal. There is no evidence of midline shift  or mass-effect. There are no extra-axial fluid collections. The mastoid air  cells and paranasal sinuses are well pneumatized and clear. The A2 segments are unremarkable. There are A1 segments bilaterally. Ophthalmic  arteries are incidentally identified and within normal limits. M1 segments  within normal limits. Posterior communicating artery on the right. Posterior to  indicating artery on the left as well. Vertebrobasilar system is diminutive in  size. The left vertebral artery is dominant. P1 and proximal P2 segments are  grossly unremarkable as well. . The basilar artery and its branches are normal.  The internal carotid, anterior cerebral, and middle cerebral arteries are  patent. There is no flow-limiting intracranial stenosis. There is no aneurysm. There are no sizable posterior communicating arteries. Impression IMPRESSION:   No acute intracranial mass, hemorrhage or evidence of acute infarction. No major vessel occlusion, aneurysm or dissection. No hemodynamically  significant stenosis identified. Moderate to severe stenoses of the upper cervical spine with mild cord  compression at C3-4, C4-5 likely. Results from [place] encounter on 10/17/18   MRA BRAIN WO CONT    Narrative CLINICAL HISTORY: Elevated blood pressure, headaches, right facial numbness and  tingling    INDICATION: CVA. COMPARISON: CT 10/17/2018, MR 2011  TECHNIQUE: MR examination of the brain includes axial and sagittal T1 , axial  T2, axial FLAIR, axial gradient echo, axial DWI, coronal T2.     Contrast: The patient was administered gadolinium-based contrast material ,  axial and sagittal T1-weighted postcontrast enhanced imaging was obtained. Next, 3-D time-of-flight MRA of the brain was performed. Multiplanar  reconstructions were obtained. FINDINGS:   There is no intracranial mass, hemorrhage or evidence of acute infarction. Minimal sphenoid sinus disease. There is sulcal and ventricular prominence. There is canal stenosis/mild cord compression at C3-C4 and at C4-C5. There is no abnormal parenchymal enhancement. There is no abnormal meningeal  enhancement. There is no Chiari or syrinx. The pituitary and infundibulum are  grossly unremarkable. There is no skull base mass. Cerebellopontine angles are  grossly unremarkable. The major intracranial vascular flow-voids are  unremarkable. The cavernous sinuses are symmetric. Optic chiasm and infundibulum grossly  unremarkable. Orbits are grossly symmetric. Dural venous sinuses are grossly  patent. The brain architecture is normal. There is no evidence of midline shift  or mass-effect. There are no extra-axial fluid collections. The mastoid air  cells and paranasal sinuses are well pneumatized and clear. The A2 segments are unremarkable. There are A1 segments bilaterally. Ophthalmic  arteries are incidentally identified and within normal limits. M1 segments  within normal limits. Posterior communicating artery on the right. Posterior to  indicating artery on the left as well. Vertebrobasilar system is diminutive in  size. The left vertebral artery is dominant. P1 and proximal P2 segments are  grossly unremarkable as well. . The basilar artery and its branches are normal.  The internal carotid, anterior cerebral, and middle cerebral arteries are  patent. There is no flow-limiting intracranial stenosis. There is no aneurysm. There are no sizable posterior communicating arteries. Impression IMPRESSION:   No acute intracranial mass, hemorrhage or evidence of acute infarction. No major vessel occlusion, aneurysm or dissection. No hemodynamically  significant stenosis identified. Moderate to severe stenoses of the upper cervical spine with mild cord  compression at C3-4, C4-5 likely. Review of Systems:  A comprehensive review of systems was negative except for: Musculoskeletal: positive for myalgias, arthralgias, stiff joints and back pain  Neurological: positive for headaches, paresthesia, gait problems and weakness  Behvioral/Psych: positive for anxiety   Vitals:    10/18/18 0400 10/18/18 0732 10/18/18 1132 10/18/18 1409   BP: 139/85 161/87 178/82 141/88   Pulse: 64 64 66 76   Resp: 18 18 18    Temp: 98 °F (36.7 °C) 97.4 °F (36.3 °C) 97.4 °F (36.3 °C)    SpO2: 97% 98% 98%    Weight:       Height:         Objective:     I      NEUROLOGICAL EXAM:    Appearance: The patient is well developed, well nourished, provides a coherent history and is in no acute distress. Mental Status: Oriented to time, place and person, and the president, cognitive function is normal and speech is fluent and no aphasia or dysarthria. Mood and affect appropriate. Cranial Nerves:   Intact visual fields. Fundi are benign. MAUDE, EOM's full, no nystagmus, no ptosis. Facial sensation is normal. Corneal reflexes are not tested. Facial movement is symmetric. Hearing is abnormal bilaterally. Palate is midline with normal sternocleidomastoid and trapezius muscles are normal. Tongue is midline. Neck without meningismus or bruits  Temporal arteries are not tender or enlarged  TMJ areas are not tender on palpation   Motor:  5/5 strength in upper and lower proximal and distal muscles. Normal bulk and tone. No fasciculations. Reflexes:   Deep tendon reflexes 2+/4 and symmetrical.  No babinski or clonus present   Sensory:   Normal to touch, pinprick and vibration and temperature. DSS is intact   Gait:  Abnormal gait for patient's age as patient walks somewhat stiff legged. Tremor:   No tremor noted.    Cerebellar:  No cerebellar signs present on Romberg and tandem testing and finger-nose-finger exam.   Neurovascular:  Normal heart sounds and regular rhythm, peripheral pulses decreased, and no carotid bruits. Assessment:       ICD-10-CM ICD-9-CM    1. New daily persistent headache G44.52 339.42    2. Acute right-sided weakness M62.89 728.87    3. 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 I reviewed the MRI scan and Doppler and CT personally on the PACS system and I agree with report, the report shows no acute stroke, but does suggest cervical stenosis and possible myelopathy  We will check an MRI scan of the cervical spine  Patient's Doppler studies do show 50-69% disease in the internal carotid artery which is moderate only. Echocardiogram and cardiac workup being done. Patient's leg weakness may be related to his cervical disease not necessarily his brain cerebrovascular disease  Hard to explain the facial numbness however. Continue his Plavix and Eliquis.   Difficult case, we will follow carefully with you, continue excellent medical care as you are    Signed By: Helen Valenzuela MD     October 18, 2018       CC: Susanne Thapa MD  FAX: [phone]

## 2018-10-18 NOTE — PROGRESS NOTES
PT note:    Orders received and acknowledged. Chart reviewed and spoke with nursing. Patient currently off the floor for testing. Will follow up for PT evaluation.      Kayla Logan, PT, DPT

## 2018-10-18 NOTE — PROGRESS NOTES
physical Therapy neuro EVALUATION/discharge     Patient: Karl Mari Sr. (81 y.o. male)  Date: 10/18/2018  Primary Diagnosis: CVA (cerebral vascular accident) [facility])       Precautions:       CT and MRI are negative for acute CVA    ASSESSMENT :  Based on the objective data described below, the patient presents with good strength, good functional mobility, steady gait, and good balance following admission for CVA. PTA patient lives with his daughter who works during the day. Patient is independent with all aspects of functional mobility and ADLs. He denies any falls. Currently, patient received resting in bed, agreeable to PT. Patient is independent with all mobility and reports feeling at his baseline. Patient has been up ad benson in the room. Patient denies any neuro deficits and none noted on exam. Patient returned to supine independently at the conclusion of PT evaluation. Will sign off as patient with no PT needs. Skilled physical therapy is not indicated at this time. PLAN :  Discharge Recommendations: None  Further Equipment Recommendations for Discharge: none       SUBJECTIVE:   Patient stated I feel pretty good. 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 [address] 3/27/2013    Prostate cancer ([facility] [address]     seeds, then XRT, then seed again.   Dr. Arnulfo Lind Stroke [facility]) 2002    TIA, Dr. Mitchell Avery, no residual effects as of 9/2018    Thyroid cancer [facility]) Jan 2015    Unspecified vitamin D deficiency      Past Surgical History:   Procedure Laterality Date   Breann Cates Dr[facility] [place]    HX APPENDECTOMY      HX CHOLECYSTECTOMY      HX HEENT      tonsillectomy    HX OTHER SURGICAL      vasectomy    HX PARATHYROIDECTOMY  01/14/2015    right superior parathyroid gland removed and left superior parathyroid gland removed    HX PARTIAL THYROIDECTOMY  1/14/15    right lobectomy    HX UROLOGICAL      Seeds for prostate cancer , 4 dialations     HX UROLOGICAL  1/14/15    BLADDER NECK INCISION, Cold knife incision of bladder neck contracture, cystoscopy     Prior Level of Function/Home Situation: patient lives with his daughter who works during the day. Patient is independent with all aspects of functional mobility and ADLs. He denies any falls. Personal factors and/or comorbidities impacting plan of care: TIA    Home Situation  Home Environment: Private residence  # Steps to Enter: 0  One/Two Story Residence: One story  Living Alone: No  Support Systems: Child(handy)  Patient Expects to be Discharged to[de-identified] Private residence  Current DME Used/Available at Home: Walker, rolling  Tub or Shower Type: Shower    EXAMINATION/PRESENTATION/DECISION MAKING:   Critical Behavior:  Neurologic State: Alert  Orientation Level: Oriented X4  Cognition: Appropriate decision making, Follows commands, Recognition of people/places     Hearing: Auditory  Auditory Impairment: None  Skin:    Edema:   Range Of Motion:  AROM: Within functional limits           PROM: Within functional limits           Strength:    Strength: Within functional limits                    Tone & Sensation:   Tone: Normal              Sensation: Intact               Coordination:  Coordination: Within functional limits  Vision:      Functional Mobility:  Bed Mobility:  Rolling: Independent  Supine to Sit: Independent  Sit to Supine: Independent  Scooting: Independent  Transfers:  Sit to Stand: Independent  Stand to Sit: Independent                       Balance:   Sitting: Intact; Without support  Standing: Intact; Without support  Ambulation/Gait Training:  Distance (ft): 200 Feet (ft)  Assistive Device: Gait belt  Ambulation - Level of Assistance: Independent     Gait Description (WDL): Exceptions to WDL           Base of Support: Widened     Speed/Jossy: Pace decreased (<100 feet/min)                        Stair Training:                Therapeutic Exercises:       Functional Measure:  [place] Balance Test:    Sitting to Standin  Standing Unsupported: 4  Sitting with Back Unsupported: 4  Standing to Sittin  Transfers: 4  Standing Unsupported with Eyes Closed: 4  Standing Unsupported with Feet Together: 4  Reach Forward with Outstretched Arm: 4   Object: 4  Turn to Look Over Shoulders: 4  Turn 360 Degrees: 4  Alternate Foot on Step/Stool: 3  Standing Unsupported One Foot in Front: 3  Stand on One Le  Total: 50         56=Maximum possible score;   0-20=High fall risk  21-40=Moderate fall risk   41-56=Low fall risk     [place] Balance Test and G-code impairment scale:  Percentage of Impairment CH    0%   CI    1-19% CJ    20-39% CK    40-59% CL    60-79% CM    80-99% CN     100%   [place]   Score 0-56 56 45-55 34-44 23-33 12-22 1-11 0     G codes: In compliance with CMSs Claims Based Outcome Reporting, the following G-code set was chosen for this patient based on their primary functional limitation being treated: The outcome measure chosen to determine the severity of the functional limitation was the [place] with a score of 50/56 which was correlated with the impairment scale.     ? Mobility - Walking and Moving Around:     - CURRENT STATUS: CI - 1%-19% impaired, limited or restricted    - GOAL STATUS: CI - 1%-19% impaired, limited or restricted    - D/C STATUS:  CI - 1%-19% impaired, limited or restricted      Physical Therapy Evaluation Charge Determination   History Examination Presentation Decision-Making   MEDIUM  Complexity : 1-2 comorbidities / personal factors will impact the outcome/ POC  MEDIUM Complexity : 3 Standardized tests and measures addressing body structure, function, activity limitation and / or participation in recreation  LOW Complexity : Stable, uncomplicated  Other outcome measures [place]  LOW       Based on the above components, the patient evaluation is determined to be of the following complexity level: LOW     Pain:  Pain Scale 1: Numeric (0 - 10)  Pain Intensity 1: 0              Activity Tolerance:   Good, at baseline. Please refer to the flowsheet for vital signs taken during this treatment. After treatment:   []         Patient left in no apparent distress sitting up in chair  [x]         Patient left in no apparent distress in bed  [x]         Call bell left within reach  [x]         Nursing notified  []         Caregiver present  []         Bed alarm activated    COMMUNICATION/EDUCATION:   Patient with no neuro deficits at this time. Patient and/or family was verbally educated on the BE FAST acronym for signs/symptoms of CVA and TIA. BE FAST was written on patient's communication board  for visual education and reinforcement. All questions answered with patient indicating good understanding. [x]   Fall prevention education was provided and the patient/caregiver indicated understanding. [x]   Patient/family have participated as able and agree with findings and recommendations. []   Patient is unable to participate in plan of care at this time.     Findings and recommendations were discussed with: Occupational Therapist, Registered Nurse and     Thank you for this referral.  Tran Weldon, PT, DPT   Time Calculation: 19 mins

## 2018-10-18 NOTE — PROGRESS NOTES
Hospitalist Progress Note    NAME: Nic Pearson Sr.   :  1935   MRN:  [id number]       Assessment / Plan:  81 y.o.  male with pmh of afib, HTN, CKD, prostate cancer and prior TIA who presented to ED with on and off right facial numbness and tingling. Right facial numbness  TIA  Hx bilateral carotid stenosis  - CT head unremarkable. - MRI and MRA brain negative for any acute intracranial pathology nor vessel occlusion but revealed moderate to severe C4-C5 cervical stenosis with mild cord compression. -A1c 5.1. LDL 33.   - Carotid US with stable stenosis bilaterally compared to '16. Echocardiogram ordered  Neurology is following. MRI cervical spine ordered. B12, VitD, homocysteine and folate ordered. Continue plavix, lipitor, and eliquis     Hyponatremia  Hypotonic hyponatremia. Na at 128 on presentation. Low serum osmolality. Urine osmolality at 280  Received 500 ml NS bolus in ED with persistent hyponatremia at 128. Nephrology is following. Euvolemic. IV lasix given. On fluid restriction. Uric acid pending.     HTN  BP is not controlled. Hold home losartan. Start norvac. Prn hydralazine. Hx Afib  HR is controlled. continue toprol and eliquis.     CKD3  Stable. Close monitor.     Prostate cancer  -s/p seed x 2 and XRT. Follows with Dr Davina Aguirre     S/P partial thyroidectomy for cancer  -on synthroid replacement         25.0 - 29.9 Overweight / Body mass index is 29.09 kg/m². Code status: Full  Prophylaxis: eliquis  Recommended Disposition: Home w/Family     Subjective:     Chief Complaint / Reason for Physician Visit  \"patient has no complains. No headaches, nausea, no vomiting. \". Discussed with RN events overnight.      Review of Systems:  Symptom Y/N Comments  Symptom Y/N Comments   Fever/Chills n   Chest Pain n    Poor Appetite n   Edema     Cough n   Abdominal Pain n    Sputum n   Joint Pain     SOB/MARVIN n   Pruritis/Rash     Nausea/vomit n   Tolerating PT/OT y    Diarrhea n Tolerating Diet y    Constipation    Other       Could NOT obtain due to:      Objective:     VITALS:   Last 24hrs VS reviewed since prior progress note. Most recent are:  Patient Vitals for the past 24 hrs:   Temp Pulse Resp BP SpO2   10/18/18 1132 97.4 °F (36.3 °C) 66 18 178/82 98 %   10/18/18 0732 97.4 °F (36.3 °C) 64 18 161/87 98 %   10/18/18 0400 98 °F (36.7 °C) 64 18 139/85 97 %   10/17/18 2240 98.2 °F (36.8 °C) 99 18 140/67 97 %   10/17/18 2200 98.2 °F (36.8 °C) 61 12 166/78 96 %   10/17/18 2145 -- 60 16 163/85 98 %   10/17/18 2130 -- 60 18 177/86 99 %   10/17/18 2030 -- (!) 59 14 165/79 97 %   10/17/18 2015 -- (!) 59 17 165/82 97 %   10/17/18 2000 -- (!) 59 16 177/83 98 %   10/17/18 1920 -- 60 14 177/79 97 %   10/17/18 1845 -- 60 16 167/88 98 %   10/17/18 1830 -- 63 15 (!) 177/93 99 %   10/17/18 1815 -- 61 14 167/89 97 %   10/17/18 1800 -- 64 14 192/83 98 %   10/17/18 1745 -- 61 19 (!) 179/91 98 %   10/17/18 1713 97.5 °F (36.4 °C) 64 16 (!) 199/109 100 %       Intake/Output Summary (Last 24 hours) at 10/18/2018 1300  Last data filed at 10/18/2018 0818  Gross per 24 hour   Intake 600 ml   Output 900 ml   Net -300 ml        PHYSICAL EXAM:  General: Alert, cooperative, no acute distress    EENT:  Anicteric sclerae  Resp:  CTA bilaterally, no wheezing or rales. No accessory muscle use  CV:  Regular  rate,  No LE edema  GI:  Soft, Non distended, Non tender.  +Bowel sounds  Neurologic:  Alert and oriented X 3, normal speech, no gross motor or sensory deficit. Psych:   Good insight. Not anxious nor agitated  Skin:  No rashes.   No jaundice    Reviewed most current lab test results and cultures  YES  Reviewed most current radiology test results   YES  Review and summation of old records today    NO  Reviewed patient's current orders and MAR    YES  PMH/SH reviewed - no change compared to H&P  ________________________________________________________________________  ________________________________________________________________________  Jamilah Martell MD     Procedures: see electronic medical records for all procedures/Xrays and details which were not copied into this note but were reviewed prior to creation of Plan. LABS:  I reviewed today's most current labs and imaging studies.   Pertinent labs include:  Recent Labs     10/17/18  1754   WBC 7.3   HGB 14.9   HCT 42.6        Recent Labs     10/18/18  0735 10/17/18  1754   * 128*   K 4.7 4.6   CL 98 93*   CO2 23 27   * 87   BUN 11 12   CREA 1.34* 1.35*   CA 8.6 8.4*   ALB  --  3.6   TBILI  --  0.8   SGOT  --  22   ALT  --  31   INR  --  1.0       Signed: Jamilah Martell MD

## 2018-10-18 NOTE — CONSULTS
Renal-Pt at MRI. Repeat sodium remains 128-will fluid restrict, reviewed urine studies. WIll give 1 dose of iv lasix to promote free water excretion.   Sue Duarte MD

## 2018-10-18 NOTE — PROGRESS NOTES
Occupational Therapy  Chart reviewed. Attempted to see pt for OT. Pt is currently off the floor. Will continue to follow. Thank you.  Ze Alcala MS, OTR/L

## 2018-10-18 NOTE — H&P
Hospitalist Admission Note    NAME: Shaun Zheng Sr.   :  1935   MRN:  [id number]     Date/Time:  10/17/2018 9:35 PM    Patient PCP: Canelo Roldan MD  _____________________________________________________________________  Given the patient's current clinical presentation, I have a high level of concern for decompensation if discharged from the emergency department. Complex decision making was performed, which includes reviewing the patient's available past medical records, laboratory results, and x-ray films. My assessment of this patient's clinical condition and my plan of care is as follows. Assessment / Plan:    TIA  Hx right carotid stenosis (50-69% )  - CT head nothing acute  - MRI brain pending  - Carotid US pending  - Echocardiogram pending  - Permissive HTN (SBP<220/<120) for 24 hrs from symptom onset and then adjust medications for BP goal is less than 140/90  - HgBA1c and LDL pending. Continue lipitor  - Continue plavix and eliquis  - ST/OT/PT eval and treat  - Neurology consult    Hyponatremia  -urine osm, urine Na ordered  -nephrology consulted by ER. HTN  Hx Afib  -continue asa, elequis, losartan and toprol xl  -telemetry    CKD3    Prostate cancer  -s/p seed x 2 and XRT. Follows with Dr Nirav Vergara    S/P partial thyroidectomy for cancer  -on synthroid replacement      Code Status:  Full  Surrogate Decision Maker:  Daughter is mPOA    DVT Prophylaxis:  On eliquis  GI Prophylaxis: not indicated    Baseline: . Lives with daughter          Subjective:   CHIEF COMPLAINT:  Facial numbness    HISTORY OF PRESENT ILLNESS:     Shaun Zheng is a 81 y.o.  male with a history of afib, HTN, CKD, prostate cancer and prior TIA who presents with new stroke like symptoms. Patient presented to the ER on  because of elevated BP and vague symptoms of leg weakness. CT head was negative then.    This past weekend, he experienced some on and off right facial numbness and tingling. This was associated with posterior HA but no visual change, weakness, speech or gait difficulties. Today his daughter realized what was happening and called his PCP's office and was referred to the ER. His symptoms improved en route and is almost resolved by the time I saw him. CT head again showed nothing acute but his Na 128 which is new compared to that last ER visit. Nephrology consulted and we were asked to admit for work up and evaluation of the above problems. 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   Dr. Delmar Chadwick Stroke [facility])     TIA, Dr. Mila Morris, no residual effects as of 2018    Thyroid cancer [facility]) 2015    Unspecified vitamin D deficiency         Past Surgical History:   Procedure Laterality Date   Shakira England      Cardiac Cath   Dr. Johana Lkae    HX APPENDECTOMY      HX CHOLECYSTECTOMY      HX HEENT      tonsillectomy    HX OTHER SURGICAL      vasectomy    HX PARATHYROIDECTOMY  2015    right superior parathyroid gland removed and left superior parathyroid gland removed    HX PARTIAL THYROIDECTOMY  1/14/15    right lobectomy    HX UROLOGICAL      Seeds for prostate cancer , 4 dialations     HX UROLOGICAL  1/14/15    BLADDER NECK INCISION, Cold knife incision of bladder neck contracture, cystoscopy       Social History     Tobacco Use    Smoking status: Former Smoker     Years: 5.00     Last attempt to quit: 1955     Years since quittin.7    Smokeless tobacco: Never Used Substance Use Topics    Alcohol use: Yes     Comment: occassional mixed drink or beer        Family History   Problem Relation Age of Onset    Cancer Mother         hodgkins disease    Heart Disease Father     Hypertension Father     Other Neg Hx         no hypercalcemia or kidney stones     Allergies   Allergen Reactions    Sulfa (Sulfonamide Antibiotics) Hives        Prior to Admission medications    Medication Sig Start Date End Date Taking? Authorizing Provider   atorvastatin (LIPITOR) 20 mg tablet Take  by mouth daily. Provider, Historical   losartan (COZAAR) 50 mg tablet Take 50 mg by mouth daily. Provider, Historical   metoprolol succinate (TOPROL-XL) 50 mg XL tablet Take 50 mg by mouth daily. Provider, Historical   pantoprazole (PROTONIX) 40 mg tablet TAKE 1 TABLET BY MOUTH  DAILY 7/20/18   Taz Rosales MD   clopidogrel (PLAVIX) 75 mg tab TAKE 1 TABLET BY MOUTH  DAILY 7/20/18   Taz Rosales MD   ELIQUIS 2.5 mg tablet  5/3/18   Provider, Historical   levothyroxine (SYNTHROID) 112 mcg tablet Take 1 Tab by mouth Daily (before breakfast). 2/6/18   Taz Rosales MD   GUAIFENESIN/DEXTROMETHORPHAN (CORICIDIN HBP PO) Take 1 Tab by mouth as needed. Provider, Historical   acetaminophen (TYLENOL EXTRA STRENGTH) 500 mg tablet Take  by mouth every six (6) hours as needed for Pain. Provider, Historical   cholecalciferol, vitamin D3, (VITAMIN D3) 2,000 unit tab Take  by mouth nightly.     Provider, Historical       REVIEW OF SYSTEMS:       Total of 12 systems reviewed as follows:       POSITIVE= underlined text  Negative = text not underlined  General:  fever, chills, sweats, generalized weakness, weight loss/gain,      loss of appetite   Eyes:    blurred vision, eye pain, loss of vision, double vision  ENT:    rhinorrhea, pharyngitis   Respiratory:   cough, sputum production, SOB, MARVIN, wheezing, pleuritic pain   Cardiology:   chest pain, palpitations, orthopnea, PND, edema, syncope Gastrointestinal:  abdominal pain , N/V, diarrhea, dysphagia, constipation, bleeding   Genitourinary:  frequency, urgency, dysuria, hematuria, incontinence   Muskuloskeletal :  arthralgia, myalgia, back pain  Hematology:  easy bruising, nose or gum bleeding, lymphadenopathy   Dermatological: rash, ulceration, pruritis, color change / jaundice  Endocrine:   hot flashes or polydipsia   Neurological:  headache, dizziness, confusion, focal weakness, paresthesia,     Speech difficulties, memory loss, gait difficulty  Psychological: Feelings of anxiety, depression, agitation    Objective:   VITALS:    Visit Vitals  /79   Pulse (!) 59   Temp 97.5 °F (36.4 °C)   Resp 14   Ht 6' (1.829 m)   Wt 97.3 kg (214 lb 8.1 oz)   SpO2 97%   BMI 29.09 kg/m²       PHYSICAL EXAM:    General:    Alert, cooperative, no distress, appears stated age. HEENT: Atraumatic, anicteric sclerae, pink conjunctivae     No oral ulcers, mucosa moist, throat clear, dentition fair  Neck:  Supple, symmetrical,  thyroid: non tender  Lungs:   Clear to auscultation bilaterally. No Wheezing or Rhonchi. No rales. Chest wall:  No tenderness  No Accessory muscle use. Heart:   Regular  rhythm,  No  murmur   No edema  Abdomen:   Soft, non-tender. Not distended. Bowel sounds normal  Extremities: No cyanosis. No clubbing,  Skin turgor normal, Capillary refill normal, Radial dial pulse 2+  Skin:     Not pale. Not Jaundiced  No rashes   Psych:  Good insight. Not depressed. Not anxious or agitated. Neurologic: EOMs intact. No facial asymmetry. No aphasia or slurred speech. Symmetrical strength, Sensation grossly intact.  Alert and oriented X 4.     _______________________________________________________________________  Care Plan discussed with:    Comments   Patient x    Family  x  daughter   RN     Care Manager                    Consultant:      _______________________________________________________________________  Expected  Disposition:   Home with Family x   HH/PT/OT/RN    SNF/LTC    [facility]    ________________________________________________________________________  TOTAL TIME:  72   Minutes    Critical Care Provided     Minutes non procedure based      Comments    x Reviewed previous records   >50% of visit spent in counseling and coordination of care  Discussion with patient and/or family and questions answered       Given the patient's current clinical presentation, I have a high level of concern for decompensation if discharged from the ED. Complex decision making was performed which includes reviewing the patient's available past medical records, laboratory results, and Xray films. I have also directly communicated my plan and discussed this case with the involved ED physician.     ____________________________________________________________________  Carmine Bailey MD    Procedures: see electronic medical records for all procedures/Xrays and details which were not copied into this note but were reviewed prior to creation of Plan. LAB DATA REVIEWED:    Recent Results (from the past 24 hour(s))   CBC WITH AUTOMATED DIFF    Collection Time: 10/17/18  5:54 PM   Result Value Ref Range    WBC 7.3 4.1 - 11.1 K/uL    RBC 4.69 4.10 - 5.70 M/uL    HGB 14.9 12.1 - 17.0 g/dL    HCT 42.6 36.6 - 50.3 %    MCV 90.8 80.0 - 99.0 FL    MCH 31.8 26.0 - 34.0 PG    MCHC 35.0 30.0 - 36.5 g/dL    RDW 12.9 11.5 - 14.5 %    PLATELET 232 432 - 710 K/uL    MPV 9.1 8.9 - 12.9 FL    NRBC 0.0 0  WBC    ABSOLUTE NRBC 0.00 0.00 - 0.01 K/uL    NEUTROPHILS 66 32 - 75 %    LYMPHOCYTES 22 12 - 49 %    MONOCYTES 10 5 - 13 %    EOSINOPHILS 1 0 - 7 %    BASOPHILS 0 0 - 1 %    IMMATURE GRANULOCYTES 0 0.0 - 0.5 %    ABS. NEUTROPHILS 4.8 1.8 - 8.0 K/UL    ABS. LYMPHOCYTES 1.6 0.8 - 3.5 K/UL    ABS. MONOCYTES 0.7 0.0 - 1.0 K/UL    ABS. EOSINOPHILS 0.1 0.0 - 0.4 K/UL    ABS. BASOPHILS 0.0 0.0 - 0.1 K/UL    ABS. IMM.  GRANS. 0.0 0.00 - 0.04 K/UL    DF AUTOMATED     METABOLIC PANEL, COMPREHENSIVE    Collection Time: 10/17/18  5:54 PM   Result Value Ref Range    Sodium 128 (L) 136 - 145 mmol/L    Potassium 4.6 3.5 - 5.1 mmol/L    Chloride 93 (L) 97 - 108 mmol/L    CO2 27 21 - 32 mmol/L    Anion gap 8 5 - 15 mmol/L    Glucose 87 65 - 100 mg/dL    BUN 12 6 - 20 MG/DL    Creatinine 1.35 (H) 0.70 - 1.30 MG/DL    BUN/Creatinine ratio 9 (L) 12 - 20      GFR est AA >60 >60 ml/min/1.73m2    GFR est non-AA 51 (L) >60 ml/min/1.73m2    Calcium 8.4 (L) 8.5 - 10.1 MG/DL    Bilirubin, total 0.8 0.2 - 1.0 MG/DL    ALT (SGPT) 31 12 - 78 U/L    AST (SGOT) 22 15 - 37 U/L    Alk.  phosphatase 84 45 - 117 U/L    Protein, total 6.8 6.4 - 8.2 g/dL    Albumin 3.6 3.5 - 5.0 g/dL    Globulin 3.2 2.0 - 4.0 g/dL    A-G Ratio 1.1 1.1 - 2.2     TROPONIN I    Collection Time: 10/17/18  5:54 PM   Result Value Ref Range    Troponin-I, Qt. <0.05 <0.05 ng/mL   CK W/ REFLX CKMB    Collection Time: 10/17/18  5:54 PM   Result Value Ref Range    CK 58 39 - 308 U/L   PROTHROMBIN TIME + INR    Collection Time: 10/17/18  5:54 PM   Result Value Ref Range    INR 1.0 0.9 - 1.1      Prothrombin time 10.2 9.0 - 11.1 sec   PTT    Collection Time: 10/17/18  5:54 PM   Result Value Ref Range    aPTT 34.8 (H) 22.1 - 32.0 sec    aPTT, therapeutic range     58.0 - 77.0 SECS   CREATININE, UR, RANDOM    Collection Time: 10/17/18  7:22 PM   Result Value Ref Range    Creatinine, urine 50.30 mg/dL   EKG, 12 LEAD, INITIAL    Collection Time: 10/17/18  8:30 PM   Result Value Ref Range    Ventricular Rate 58 BPM    Atrial Rate 58 BPM    P-R Interval 206 ms    QRS Duration 94 ms    Q-T Interval 438 ms    QTC Calculation (Bezet) 429 ms    Calculated P Axis 33 degrees    Calculated R Axis -6 degrees    Calculated T Axis 14 degrees    Diagnosis       Sinus bradycardia  When compared with ECG of 22-SEP-2018 12:03,  NC interval has decreased

## 2018-10-18 NOTE — PROGRESS NOTES
Reason for Admission:   CVA               RRAT Score:     24 high risk              Resources/supports as identified by patient/family:   Good family support                 Top Challenges facing patient (as identified by patient/family and CM): Finances/Medication cost?     No issues - has Medicare & AARP               Transportation? Drives and his family drives              Support system or lack thereof? 5 children and grandchildren                     Living arrangements? Daughter lives with pt           Self-care/ADLs/Cognition? Independent with his ADL's and IADL's          Current Advanced Directive/Advance Care Plan:  Full Code                          Plan for utilizing home health:  Pending therapy recommendations                        Likelihood of readmission:  Low                  Transition of Care Plan:   Home with f/u appts and pending therapy recommendations     Pt is a 81 y.o  male admitted with a CVA. Pt was alert, oriented and in no distress sitting in the chair with his daughter and son at the bedside. Demographic information verified and all is correct. Pt's daughter lives with pt. Prior to admission, pt was independent with his ADL's and IADL's and driving. Pt verbalized he lost his wife last year in April and they were  for 58 yrs. They had 6 children and 1 daughter  in a car accident. Denies using DME or having home health. Pt had outpt PT a few times for his back. Preferred pharmacy is [facility] on [address]. Pt's daughter can transport pt home by car at discharge. CM will continue to follow pt for discharge planning needs. Care Management Interventions  PCP Verified by CM: Yes(Dr. Kira Vidal )  Mode of Transport at Discharge:  Other (see comment)(pt's daughter will transport by car)  Transition of Care Consult (CM Consult): Discharge Planning  Discharge Durable Medical Equipment: No  Physical Therapy Consult: Yes  Occupational Therapy Consult: Yes  Speech Therapy Consult: Yes  Current Support Network: Own Home, Other(lives with daughter )  Confirm Follow Up Transport: Family  Discharge Location  Discharge Placement: [facility] [address] [facility]

## 2018-10-18 NOTE — PROGRESS NOTES
* No surgery found *  * No surgery found *  Bedside and Verbal shift change report given to Corinna 112 (oncoming nurse) by Samaria Boland (offgoing nurse). Report included the following information SBAR, Kardex and Cardiac Rhythm SB.     Zone Phone:           Significant changes during shift:  none           Patient Information     Chavo Charlton Sr.  81 y.o.  10/17/2018  5:23 PM by Buffy Leon MD. Chavo Charlton Sr. 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    History of hyperparathyroidism 11/14/2016    Unspecified vitamin D deficiency      Hyperlipidemia 08/01/2014    Carotid artery stenosis, asymptomatic 08/01/2014    TIA (transient ischemic attack) 03/27/2013    H/O prostate cancer 03/27/2013    HBP (high blood pressure) 03/27/2013    GERD (gastroesophageal reflux disease) 03/27/2013    CKD (chronic kidney disease) stage 3, GFR 30-59 ml/min ([address] 03/27/2013           Past Medical History:   Diagnosis Date    Carotid artery stenosis, asymptomatic 8/1/2014     Right side 50-79%     Chronic kidney disease       stage 3    GERD (gastroesophageal reflux disease)      Gout      Hiatal hernia      Hyperlipidemia      Hyperparathyroidism ([address]      Hypertension      Long term (current) use of anticoagulants      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   Dr. Delia Frye Stroke [facility]) 2002     TIA, Dr. Mariah Mercer, no residual effects as of 9/2018    Thyroid cancer [facility]) Jan 2015    Unspecified vitamin D deficiency              Core Measures:     CVA: Yes Yes           Activity Status:     OOB to Chair Yes  Ambulated this shift Yes   Bed Rest No            LINES AND DRAINS:piv right ac                 DVT prophylaxis:     DVT prophylaxis Med- Yes  DVT prophylaxis SCD or TONY- No         Patient Safety:     Falls Score Total Score: 1  Safety Level_______  Bed Alarm On? No  Sitter?  No     Plan for upcoming shift: MRI Echo , Carotids           Discharge Plan: Yes TBD     Active Consults:  IP CONSULT TO NEPHROLOGY  IP CONSULT TO NEUROLOGY  IP CONSULT TO HOSPITALIST

## 2018-10-18 NOTE — ROUTINE PROCESS
TRANSFER - OUT REPORT:    Verbal report given to Taylor(name) on [employer] Sr[employer]  being transferred to Neuro(unit) for routine progression of care       Report consisted of patients Situation, Background, Assessment and   Recommendations(SBAR). Information from the following report(s) SBAR was reviewed with the receiving nurse. Lines:       Opportunity for questions and clarification was provided.       Patient transported with:   [employer]

## 2018-10-18 NOTE — PROGRESS NOTES
Occupational Therapy neurological EVALUATION with discharge  Patient: Alfonso Elias Sr. (81 y.o. male)  Date: 10/18/2018  Primary Diagnosis: CVA (cerebral vascular accident) [facility])       Precautions: none       ASSESSMENT:  Based on the objective data described below, the patient presents at his ADL and functional mobility without AD baseline s/p admission for possible CVA. MRI negative for acute process. Pt was independent for bed mobility, sit to stands and bathroom ADLs. He demonstrated intact dynamic standing balance to don and doff underwear 2* urinary incontinence. He scored a 65/66 on the [employer], indicating mild neurological deficit. Pt noted to have intention tremors on L during finger to nose testing. Daughter and pt reports this is baseline and also demonstrates head tremors while watching TV. Encouraged pt and family to follow up with neurology if warranted. Further skilled acute occupational therapy is not indicated at this time. Discharge Recommendations: None  Further Equipment Recommendations for Discharge: none     SUBJECTIVE:   Patient stated my main problem is just my sodium. 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   Dr. Jamar Venegas Stroke [facility]) 2002    TIA, Dr. Patsy Fernando, no residual effects as of 9/2018    Thyroid cancer [facility]) Jan 2015    Unspecified vitamin D deficiency      Past Surgical History:   Procedure Laterality Date   Adilene Jackson  HX CHOLECYSTECTOMY      HX HEENT      tonsillectomy    HX OTHER SURGICAL      vasectomy    HX PARATHYROIDECTOMY  01/14/2015    right superior parathyroid gland removed and left superior parathyroid gland removed    HX PARTIAL THYROIDECTOMY  1/14/15    right lobectomy    HX UROLOGICAL      Seeds for prostate cancer , 4 dialations     HX UROLOGICAL  1/14/15    BLADDER NECK INCISION, Cold knife incision of bladder neck contracture, cystoscopy       Prior Level of Function/Environment/Context: lives with his daughter, ambulated without device, independent with ADLs. Rarely performs IADLs as daughter does most of these. Pt rather sedentary but does read newspaper every day. Occupations in which the patient is/was successful, what are the barriers preventing that success:   Performance Patterns (routines, roles, habits, and rituals):   Personal Interests and/or values:   Expanded or extensive additional review of patient history:     Home Situation  Home Environment: Private residence  # Steps to Enter: 0  One/Two Story Residence: One story  Living Alone: No  Support Systems: Child(handy)  Patient Expects to be Discharged to[de-identified] Private residence  Current DME Used/Available at Home: Walker, rolling, Grab bars  Tub or Shower Type: Shower    Hand dominance: Right    EXAMINATION OF PERFORMANCE DEFICITS:  Cognitive/Behavioral Status:  Neurologic State: Alert  Orientation Level: Oriented X4  Cognition: Appropriate decision making; Follows commands  Perception: Appears intact  Perseveration: No perseveration noted  Safety/Judgement: Awareness of environment    Skin: intact    Edema: none noted    Hearing: Auditory  Auditory Impairment: None    Vision/Perceptual:    Tracking: Able to track stimulus in all quadrants w/o difficulty                           Corrective Lenses: Glasses    Range of Motion:    AROM: Within functional limits  PROM: Within functional limits                      Strength:    Strength:  Within functional limits, 5/5 B UEs                Coordination:  Coordination: Within functional limits with exception of L UE, slight tremor noted with intention   Fine Motor Skills-Upper: Left Intact; Right Intact    Gross Motor Skills-Upper: Left Intact; Right Intact    Tone & Sensation:    Tone: Normal  Sensation: Intact                      Balance:  Sitting: Intact; Without support  Standing: Intact; Without support    Functional Mobility and Transfers for ADLs:  Bed Mobility:  Rolling: Independent  Supine to Sit: Independent  Sit to Supine: Independent  Scooting: Independent    Transfers:  Sit to Stand: Independent  Functional Transfers  Bathroom Mobility: Independent  Toilet Transfer : Independent  Shower Transfer: Independent   Bed to Chair: Independent    ADL Assessment:  Feeding: Independent    Oral Facial Hygiene/Grooming: Independent    Bathing: Independent    Upper Body Dressing: Independent    Lower Body Dressing: Independent    Toileting: Independent                ADL Intervention and task modifications:        pt performed simulated task by retrieving item from high shelf and placing in low drawer, able to gather item off floor without any LOB.                               Cognitive Retraining  Safety/Judgement: Awareness of environment        Functional Measure:   Fugl-Orozco Assessment of Motor Recovery after Stroke:     Reflex Activity  Flexors/Biceps/Fingers: Can be elicited  Extensors/Triceps: Can be elicited  Reflex Subtotal: 4    Volitional Movement Within Synergies  Shoulder Retraction: Full  Shoulder Elevation: Full  Shoulder Abduction (90 degrees): Full  Shoulder External Rotation: Full  Elbow Flexion: Full  Forearm Supination: Full  Shoulder Adduction/Internal Rotation: Full  Elbow Extension: Full  Forearm Pronation: Full  Subtotal: 18    Volitional Movement Mixing Synergies  Hand to Lumbar Spine: Full  Shoulder Flexion (0-90 degrees): Full  Pronation-Supination: Full  Subtotal: 6    Volitional Movement With Little or No Synergy  Shoulder Abduction (0-90 degrees): Full  Shoulder Flexion ( degrees): Full  Pronation/Supination: Full  Subtotal : 6    Normal Reflex Activity  Biceps, Triceps, Finger Flexors: Full  Subtotal : 2    Upper Extremity Total   Upper Extremity Total: 36    Wrist  Stability at 15 Degree Dorsiflexion: Full  Repeated Dorsiflexion/ Volar Flexion: Full  Stability at 15 Degree Dorsiflexion: Full  Repeated Dorsiflexion/ Volar Flexion: Full  Circumduction: Full  Wrist Total: 10    Hand  Mass Flexion: Full  Mass Extension: Full  Grasp A: Full  Grasp B: Full  Grasp C: Full  Grasp D: Full  Grasp E: Full  Hand Total: 14    Coordination/Speed  Tremor: Slight  Dysmetria: None  Time: <1s  Coordination/Speed Total : 5    Total A-D  Total A-D (Motor Function): 65/66     Percentage of impairment CH  0% CI  1-19% CJ  20-39% CK  40-59% CL  60-79% CM  80-99% CN  100%   Fugl-Orozco score: 0-66 66 53-65 39-52 26-38 13-25 1-12   0      This is a reliable/valid measure of arm function after a neurological event. It has established value to characterize functional status and for measuring spontaneous and therapy-induced recovery; tests proximal and distal motor functions. Fugl-Orozco Assessment - UE scores recorded between five and 30 days post neurologic event can be used to predict UE recovery at six months post neurologic event. Severe = 0-21 points   Moderately Severe = 22-33 points   Moderate = 34-47 points   Mild = 48-66 points  ANASTASIA Dolan, MIKE Alexander, & ALEXIS Fraser (1992). Measurement of motor recovery after stroke: Outcome assessment and sample size requirements.  Stroke, 23, pp. 8072-0591.   ------------------------------------------------------------------------------------------------------------------------------------------------------------------  MCID:  Stroke:   Tato Haddad et al, 2001; n = 171; mean age 79 (6) years; assessed within 16 (12) days of stroke, Acute Stroke)  FMA Motor Scores from Admission to Discharge   10 point increase in FMA Upper Extremity = 1.5 change in discharge FIM   10 point increase in FMA Lower Extremity = 1.9 change in discharge FIM  MDC:   Stroke:   Clive Morocho et al, 2008, n = 14, mean age = 59.9 (14.6) years, assessed on average 14 (6.5) months post stroke, Chronic Stroke)   FMA = 5.2 points for the Upper Extremity portion of the assessment     G codes: In compliance with CMSs Claims Based Outcome Reporting, the following G-code set was chosen for this patient based on their primary functional limitation being treated: The outcome measure chosen to determine the severity of the functional limitation was the fugl price with a score of 65/66 which was correlated with the impairment scale. ? Self Care:     - CURRENT STATUS: CI - 1%-19% impaired, limited or restricted    - GOAL STATUS: CI - 1%-19% impaired, limited or restricted    - D/C STATUS:  CI - 1%-19% impaired, limited or restricted      Occupational Therapy Evaluation Charge Determination   History Examination Decision-Making   LOW Complexity : Brief history review  LOW Complexity : 1-3 performance deficits relating to physical, cognitive , or psychosocial skils that result in activity limitations and / or participation restrictions  LOW Complexity : No comorbidities that affect functional and no verbal or physical assistance needed to complete eval tasks       Based on the above components, the patient evaluation is determined to be of the following complexity level: LOW     Pain:  Pain Scale 1: Numeric (0 - 10)  Pain Intensity 1: 0              Activity Tolerance:   VSS  Please refer to the flowsheet for vital signs taken during this treatment.   After treatment:   []  Patient left in no apparent distress sitting up in chair  [x]  Patient left in no apparent distress in bed  [x]  Call bell left within reach  [x]  Nursing notified  [x]  Caregiver present  []  Bed alarm activated    COMMUNICATION/EDUCATION:   Findings and recommendations were discussed with: Physical Therapist and Registered Nurse        Patient and/or family was verbally educated on the BE FAST acronym for signs/symptoms of CVA and TIA. BE FAST was written on patient's communication board  for visual education and reinforcement. All questions answered with patient indicating good understanding. []      Home safety education was provided and the patient/caregiver indicated understanding. [x]      Patient/family have participated as able and agree with findings and recommendations. []      Patient is unable to participate in plan of care at this time.     Thank you for this referral.  Liz Noe OT  Time Calculation: 31 mins

## 2018-10-18 NOTE — PROCEDURES
[facility]  *** FINAL REPORT ***    Name: Ruthy Roberts  MRN: [id number]    Inpatient  : 1935  HIS Order #: [id number]  [address] [facility] Visit #: [id number]  Date: 18 Oct 2018    TYPE OF TEST: Cerebrovascular Duplex    REASON FOR TEST  Transient ischemic attacks    Right Carotid:-             Proximal               Mid                 Distal  cm/s  Systolic  Diastolic  Systolic  Diastolic  Systolic  Diastolic  CCA:     95.7      10.0                            55.0      14.0  Bulb:  ECA:     75.0       0.0  ICA:    203.0      64.0      150.0      23.0       60.0      18.0  ICA/CCA:  3.7       4.6    ICA Stenosis: 50-69%    Right Vertebral:-  Finding: Antegrade  Sys:       37.0  Ne:        7.0    Right Subclavian: Normal    Left Carotid:-            Proximal                Mid                 Distal  cm/s  Systolic  Diastolic  Systolic  Diastolic  Systolic  Diastolic  CCA:     27.7      14.0                            67.0      13.0  Bulb:  ECA:     67.0       0.0  ICA:     33.0      10.0       44.0      16.0       42.0      13.0  ICA/CCA:  0.5       0.8    ICA Stenosis: <50%    Left Vertebral:-  Finding: Antegrade  Sys:       29.0  Ne:        6.0    Left Subclavian: Normal    INTERPRETATION/FINDINGS  PROCEDURE:  Carotid Duplex Examination using B-mode, color and  spectral Doppler of the extracranial cerebrovascular arteries. 1. 50-69% stenosis in the right internal carotid artery. 2. <50% stenosis in the left internal carotid artery. 3. No significant stenosis in the external carotid arteries  bilaterally. 4. Antegrade flow in both vertebral arteries. 5. Normal flow in both subclavian arteries. Plaque Morphology:  1. Heterogeneous plaque in the bulb and right ICA. 2. Heterogeneous plaque in the bulb and left ICA. ADDITIONAL COMMENTS    I have personally reviewed the data relevant to the interpretation of  this  study. TECHNOLOGIST: Mila Calhoun.  ROSIE Beck, RDMS  Signed: 10/18/2018 01:30 PM    PHYSICIAN: Lo Underwood MD  Signed: 10/18/2018 03:53 PM

## 2018-10-18 NOTE — PROGRESS NOTES
TRANSFER - IN REPORT:    Verbal report received from [facility]) on [employer] Sr[employer]  being received from ER(unit) for routine progression of care      Report consisted of patients Situation, Background, Assessment and   Recommendations(SBAR). Information from the following report(s) SBAR, Kardex, Recent Results, Med Rec Status and Cardiac Rhythm SR was reviewed with the receiving nurse. Opportunity for questions and clarification was provided.       Assessment  Will be completed by Yousif Bartholomew RN

## 2018-10-18 NOTE — PROGRESS NOTES
Speech pathology note  Reviewed chart and note MRI showed no acute intracranial mass, hemorrhage or evidence of acute infarction. Per chart review, L numbness has resolved. Discussed case with RN who reported no concerns with swallowing, speech, or cognition. Formal SLP evaluation not clinically indicated at this time. Will sign off. Please re-consult if further needs arise. Thank you.     Lynnell Brinks, Willia Osgood., CCC-SLP

## 2018-10-18 NOTE — PROGRESS NOTES
[id number] [facility]  Preliminary Report:  Carotid Duplex Scan    Right:  Mild plaque noted in the right carotid system. Right ICA velocities suggest 50-69% diameter reduction. Right vertebral artery flow is antegrade. Left:  Mild plaque noted in the left carotid system. Left ICA velocities suggest less than 50% diameter reduction. Left vertebral artery flow is antegrade. Final report to follow.

## 2018-10-19 ENCOUNTER — APPOINTMENT (OUTPATIENT)
Dept: MRI IMAGING | Age: 83
DRG: 552 | End: 2018-10-19
Attending: PSYCHIATRY & NEUROLOGY
Payer: MEDICARE

## 2018-10-19 ENCOUNTER — APPOINTMENT (OUTPATIENT)
Dept: GENERAL RADIOLOGY | Age: 83
DRG: 552 | End: 2018-10-19
Attending: INTERNAL MEDICINE
Payer: MEDICARE

## 2018-10-19 LAB
ANION GAP SERPL CALC-SCNC: 8 MMOL/L (ref 5–15)
BUN SERPL-MCNC: 14 MG/DL (ref 6–20)
BUN/CREAT SERPL: 10 (ref 12–20)
CALCIUM SERPL-MCNC: 8.1 MG/DL (ref 8.5–10.1)
CHLORIDE SERPL-SCNC: 95 MMOL/L (ref 97–108)
CO2 SERPL-SCNC: 25 MMOL/L (ref 21–32)
CREAT SERPL-MCNC: 1.35 MG/DL (ref 0.7–1.3)
FOLATE SERPL-MCNC: 14.5 NG/ML (ref 5–21)
GLUCOSE SERPL-MCNC: 94 MG/DL (ref 65–100)
HCYS SERPL-SCNC: 29.3 UMOL/L (ref 3.7–13.9)
MAGNESIUM SERPL-MCNC: 1.8 MG/DL (ref 1.6–2.4)
PHOSPHATE SERPL-MCNC: 3.8 MG/DL (ref 2.6–4.7)
POTASSIUM SERPL-SCNC: 4.2 MMOL/L (ref 3.5–5.1)
SODIUM SERPL-SCNC: 128 MMOL/L (ref 136–145)
URATE SERPL-MCNC: 4.1 MG/DL (ref 3.5–7.2)
VIT B12 SERPL-MCNC: 77 PG/ML (ref 193–986)

## 2018-10-19 PROCEDURE — 86235 NUCLEAR ANTIGEN ANTIBODY: CPT | Performed by: PSYCHIATRY & NEUROLOGY

## 2018-10-19 PROCEDURE — 86038 ANTINUCLEAR ANTIBODIES: CPT | Performed by: PSYCHIATRY & NEUROLOGY

## 2018-10-19 PROCEDURE — 74011250636 HC RX REV CODE- 250/636: Performed by: INTERNAL MEDICINE

## 2018-10-19 PROCEDURE — 84100 ASSAY OF PHOSPHORUS: CPT | Performed by: INTERNAL MEDICINE

## 2018-10-19 PROCEDURE — 86225 DNA ANTIBODY NATIVE: CPT | Performed by: PSYCHIATRY & NEUROLOGY

## 2018-10-19 PROCEDURE — 80048 BASIC METABOLIC PNL TOTAL CA: CPT | Performed by: INTERNAL MEDICINE

## 2018-10-19 PROCEDURE — 83516 IMMUNOASSAY NONANTIBODY: CPT | Performed by: PSYCHIATRY & NEUROLOGY

## 2018-10-19 PROCEDURE — 83090 ASSAY OF HOMOCYSTEINE: CPT | Performed by: PSYCHIATRY & NEUROLOGY

## 2018-10-19 PROCEDURE — 74011250637 HC RX REV CODE- 250/637: Performed by: INTERNAL MEDICINE

## 2018-10-19 PROCEDURE — 84550 ASSAY OF BLOOD/URIC ACID: CPT | Performed by: INTERNAL MEDICINE

## 2018-10-19 PROCEDURE — 36415 COLL VENOUS BLD VENIPUNCTURE: CPT | Performed by: PSYCHIATRY & NEUROLOGY

## 2018-10-19 PROCEDURE — 82607 VITAMIN B-12: CPT | Performed by: INTERNAL MEDICINE

## 2018-10-19 PROCEDURE — 71046 X-RAY EXAM CHEST 2 VIEWS: CPT

## 2018-10-19 PROCEDURE — 65660000000 HC RM CCU STEPDOWN

## 2018-10-19 PROCEDURE — 83735 ASSAY OF MAGNESIUM: CPT | Performed by: INTERNAL MEDICINE

## 2018-10-19 PROCEDURE — 82746 ASSAY OF FOLIC ACID SERUM: CPT | Performed by: INTERNAL MEDICINE

## 2018-10-19 PROCEDURE — 72141 MRI NECK SPINE W/O DYE: CPT

## 2018-10-19 RX ORDER — CYANOCOBALAMIN 1000 UG/ML
1000 INJECTION, SOLUTION INTRAMUSCULAR; SUBCUTANEOUS
Status: DISCONTINUED | OUTPATIENT
Start: 2018-10-19 | End: 2018-10-22 | Stop reason: HOSPADM

## 2018-10-19 RX ORDER — FUROSEMIDE 10 MG/ML
20 INJECTION INTRAMUSCULAR; INTRAVENOUS ONCE
Status: COMPLETED | OUTPATIENT
Start: 2018-10-19 | End: 2018-10-19

## 2018-10-19 RX ADMIN — Medication 10 ML: at 21:17

## 2018-10-19 RX ADMIN — APIXABAN 2.5 MG: 2.5 TABLET, FILM COATED ORAL at 21:16

## 2018-10-19 RX ADMIN — Medication 10 ML: at 13:30

## 2018-10-19 RX ADMIN — Medication 10 ML: at 02:52

## 2018-10-19 RX ADMIN — ATORVASTATIN CALCIUM 20 MG: 20 TABLET, FILM COATED ORAL at 21:16

## 2018-10-19 RX ADMIN — CLOPIDOGREL BISULFATE 75 MG: 75 TABLET ORAL at 09:18

## 2018-10-19 RX ADMIN — LEVOTHYROXINE SODIUM 112 MCG: 112 TABLET ORAL at 06:10

## 2018-10-19 RX ADMIN — AMLODIPINE BESYLATE 5 MG: 5 TABLET ORAL at 09:18

## 2018-10-19 RX ADMIN — APIXABAN 2.5 MG: 2.5 TABLET, FILM COATED ORAL at 09:18

## 2018-10-19 RX ADMIN — FUROSEMIDE 20 MG: 10 INJECTION, SOLUTION INTRAMUSCULAR; INTRAVENOUS at 13:30

## 2018-10-19 RX ADMIN — METOPROLOL SUCCINATE 50 MG: 50 TABLET, EXTENDED RELEASE ORAL at 09:18

## 2018-10-19 RX ADMIN — CYANOCOBALAMIN 1000 MCG: 1000 INJECTION, SOLUTION INTRAMUSCULAR at 09:18

## 2018-10-19 NOTE — PROGRESS NOTES
Neurology Progress Note    Patient ID:  Chacorta Cameoj.  [id number]  81 y.o.  1935      CHIEF COMPLAINT: Bilateral leg weakness     Subjective:      Patient has no complaint of leg weakness today, but his MRI scan does show severe is cervical spinal stenosis at several levels in the cervical cord from C3-C6 with a lot of neural foraminal disease, we have consulted neurosurgery, he could probably see them as an inpatient or outpatient. His MRI scan of the brain was negative and this is more a spinal problem. Discussed with the patient and his wife in detail. They agree with plans and therapy. I would continue aspirin therapy empirically however. He can follow-up with us as needed. .    Current Facility-Administered Medications   Medication Dose Route Frequency    cyanocobalamin (VITAMIN B12) injection 1,000 mcg  1,000 mcg IntraMUSCular Q7D    amLODIPine (NORVASC) tablet 5 mg  5 mg Oral DAILY    sodium chloride (NS) flush 5-10 mL  5-10 mL IntraVENous Q8H    sodium chloride (NS) flush 5-10 mL  5-10 mL IntraVENous PRN    acetaminophen (TYLENOL) tablet 650 mg  650 mg Oral Q4H PRN    Or    acetaminophen (TYLENOL) solution 650 mg  650 mg Per NG tube Q4H PRN    Or    acetaminophen (TYLENOL) suppository 650 mg  650 mg Rectal Q4H PRN    clopidogrel (PLAVIX) tablet 75 mg  75 mg Oral DAILY    labetalol (NORMODYNE;TRANDATE) injection 5 mg  5 mg IntraVENous Q10MIN PRN    atorvastatin (LIPITOR) tablet 20 mg  20 mg Oral DAILY    apixaban (ELIQUIS) tablet 2.5 mg  2.5 mg Oral BID    levothyroxine (SYNTHROID) tablet 112 mcg  112 mcg Oral 6am    metoprolol succinate (TOPROL-XL) XL tablet 50 mg  50 mg Oral DAILY    influenza vaccine 2018-19 (6 mos+)(PF) (FLUARIX QUAD/FLULAVAL QUAD) injection 0.5 mL  0.5 mL IntraMUSCular PRIOR TO DISCHARGE        Past Medical History:   Diagnosis Date    Carotid artery stenosis, asymptomatic 8/1/2014    Right side 50-79%     Chronic kidney disease     stage 3    GERD (gastroesophageal reflux disease)     Gout     Hiatal hernia     Hyperlipidemia     [facility]     Hypertension     Long term (current) use of anticoagulants     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   Dr. Berenice Back Stroke [facility])     TIA, Dr. Jesenia Bliss, no residual effects as of 2018    Thyroid cancer [facility]) 2015    Unspecified vitamin D deficiency        Past Surgical History:   Procedure Laterality Date   Nickie Schrader      Cardiac Cath   Dr. Arroyo Sic HX CHOLECYSTECTOMY      HX HEENT      tonsillectomy    HX OTHER SURGICAL      vasectomy    HX PARATHYROIDECTOMY  2015    right superior parathyroid gland removed and left superior parathyroid gland removed    HX PARTIAL THYROIDECTOMY  1/14/15    right lobectomy    HX UROLOGICAL      Seeds for prostate cancer , 4 dialations     HX UROLOGICAL  1/14/15    BLADDER NECK INCISION, Cold knife incision of bladder neck contracture, cystoscopy       [unfilled]    Social History     Tobacco Use    Smoking status: Former Smoker     Years: 5.00     Last attempt to quit: 1955     Years since quittin.7    Smokeless tobacco: Never Used   Substance Use Topics    Alcohol use: Yes     Comment: occassional mixed drink or beer       Current Facility-Administered Medications   Medication Dose Route Frequency Provider Last Rate Last Dose    cyanocobalamin (VITAMIN B12) injection 1,000 mcg  1,000 mcg IntraMUSCular Q7D Giorgio Ye MD   1,000 mcg at 10/19/18 0918    amLODIPine (NORVASC) tablet 5 mg  5 mg Oral DAILY Giorgio Ye MD   5 mg at 10/19/18 4336    sodium chloride (NS) flush 5-10 mL  5-10 mL IntraVENous Q8H Andrez Mullins MD   10 mL at 10/19/18 1330    sodium chloride (NS) flush 5-10 mL  5-10 mL IntraVENous PRN Andrez Mullins MD   10 mL at 10/19/18 4074    acetaminophen (TYLENOL) tablet 650 mg  650 mg Oral Q4H PRN Neeraj Munoz MD        Or    acetaminophen (TYLENOL) solution 650 mg  650 mg Per NG tube Q4H PRN Neeraj Munoz MD        Or    acetaminophen (TYLENOL) suppository 650 mg  650 mg Rectal Q4H PRN Neeraj Munoz MD        clopidogrel (PLAVIX) tablet 75 mg  75 mg Oral DAILY Neeraj Munoz MD   75 mg at 10/19/18 2557    labetalol (NORMODYNE;TRANDATE) injection 5 mg  5 mg IntraVENous Q10MIN PRN Neeraj Munoz MD        atorvastatin (LIPITOR) tablet 20 mg  20 mg Oral DAILY Neeraj Munoz MD   20 mg at 10/18/18 2120    apixaban (ELIQUIS) tablet 2.5 mg  2.5 mg Oral BID Neeraj Munoz MD   2.5 mg at 10/19/18 7913    levothyroxine (SYNTHROID) tablet 112 mcg  112 mcg Oral Brandi Vogt MD   112 mcg at 10/19/18 2893    metoprolol succinate (TOPROL-XL) XL tablet 50 mg  50 mg Oral DAILY Neeraj Munoz MD   50 mg at 10/19/18 1258    influenza vaccine 2018-19 (6 mos+)(PF) (FLUARIX QUAD/FLULAVAL QUAD) injection 0.5 mL  0.5 mL IntraMUSCular PRIOR TO DISCHARGE Blaze Ponce MD           Allergies   Allergen Reactions    Sulfa (Sulfonamide Antibiotics) Hives       Review of Systems:    A comprehensive review of systems was negative except for: Musculoskeletal: positive for myalgias, arthralgias, neck pain and muscle weakness  Neurological: positive for paresthesia, coordination problems, gait problems and weakness    Objective:    Objective:     Patient Vitals for the past 24 hrs:   BP Temp Pulse Resp SpO2   10/19/18 1517 108/70 97.8 °F (36.6 °C) 77 16 94 %   10/19/18 0744 155/77 98 °F (36.7 °C) 65 16 97 %   10/19/18 0301 109/52 98.1 °F (36.7 °C) 62 16 98 %   10/18/18 2301 132/77 98 °F (36.7 °C) 64 16 96 %   10/18/18 1929 133/75 97.9 °F (36.6 °C) 67 16 100 %         Lab Review   Recent Results (from the past 24 hour(s))   HOMOCYSTEINE, PLASMA    Collection Time: 10/19/18  2:37 AM   Result Value Ref Range    Homocysteine, plasma 29.3 (H) 3.7 - 62.7 umol/L   METABOLIC PANEL, BASIC    Collection Time: 10/19/18  2:37 AM   Result Value Ref Range    Sodium 128 (L) 136 - 145 mmol/L    Potassium 4.2 3.5 - 5.1 mmol/L    Chloride 95 (L) 97 - 108 mmol/L    CO2 25 21 - 32 mmol/L    Anion gap 8 5 - 15 mmol/L    Glucose 94 65 - 100 mg/dL    BUN 14 6 - 20 MG/DL    Creatinine 1.35 (H) 0.70 - 1.30 MG/DL    BUN/Creatinine ratio 10 (L) 12 - 20      GFR est AA >60 >60 ml/min/1.73m2    GFR est non-AA 51 (L) >60 ml/min/1.73m2    Calcium 8.1 (L) 8.5 - 10.1 MG/DL   MAGNESIUM    Collection Time: 10/19/18  2:37 AM   Result Value Ref Range    Magnesium 1.8 1.6 - 2.4 mg/dL   PHOSPHORUS    Collection Time: 10/19/18  2:37 AM   Result Value Ref Range    Phosphorus 3.8 2.6 - 4.7 MG/DL   URIC ACID    Collection Time: 10/19/18  2:37 AM   Result Value Ref Range    Uric acid 4.1 3.5 - 7.2 MG/DL   VITAMIN B12    Collection Time: 10/19/18  2:37 AM   Result Value Ref Range    Vitamin B12 77 (L) 193 - 986 pg/mL   FOLATE    Collection Time: 10/19/18  2:37 AM   Result Value Ref Range    Folate 14.5 5.0 - 21.0 ng/mL           Additional comments:I personally viewed and interpreted the patient's MRI of the cervical spine  MRI Results (most recent):  Results from Hospital Encounter encounter on 10/17/18   MRI CERV SPINE WO CONT    Narrative EXAM:  MRI CERV SPINE WO CONT    INDICATION:  myelopathy. COMPARISON: 3/6/2009 x-rays. TECHNIQUE: MR imaging of the cervical spine was performed using the following  sequences: sagittal T1, T2, STIR;  axial T2, T1 bilateral uncovertebral joint  hypertrophy. Severe bilateral neuroforaminal narrowing. Mild spinal canal  stenosis. CONTRAST:  None. FINDINGS:    There is 2 mm anterior subluxation C7 on T1. Vertebral body heights are  maintained. Marrow signal is normal. Severe disc space is noted at C3-C4, C4-C5,  and C6-C7. Osteophytic endplate changes are noted throughout the cervical spine.   Minimal acute Modic type endplate changes are present at C3-C4. The craniocervical junction is intact. The course, caliber, and signal  intensity of the spinal cord are normal.      The paraspinal soft tissues are within normal limits. C2-C3:  Small disc osteophyte complex with superimposed large right paracentral  disc protrusion effacing the right lateral recess. Mild/moderate right and mild  left facet arthropathy. Ligamentum flavum thickening. No neuroforaminal  narrowing. Mild spinal canal stenosis. C3-C4:  Large disc osteophyte complex. Both lateral recesses are effaced and  there is marked deformity of the ventral cord. Minimal bilateral facet  arthropathy. Bilateral uncovertebral joint hypertrophy. Ligamentum flavum  thickening. Severe left and moderate right neuroforaminal narrowing. Severe  spinal canal stenosis with AP dimension of the canal measuring about 5 mm. C4-C5:  No herniation or stenosis. Moderate size disc osteophyte complex with  left paracentral component. Both lateral recesses are effaced, left greater than  right. Bilateral uncovertebral joint hypertrophy. Ligamentum flavum thickening. Severe bilateral neuroforaminal narrowing. Moderate/severe spinal canal  stenosis. C5-C6:  Moderate size disc osteophyte complex. Both lateral recesses are  effaced, left greater than right. Bilateral uncovertebral joint hypertrophy. Mild ligamentum flavum thickening. Severe bilateral neuroforaminal narrowing. Moderate/severe spinal canal stenosis. C6-C7:  Small disc osteophyte complex effacing both lateral recesses bilateral  uncovertebral joint hypertrophy. Severe left and moderate right neuroforaminal  narrowing. Mild spinal canal stenosis. C7-T1:  Minimal disc bulge. Mild left facet arthropathy. No neuroforaminal  narrowing or spinal canal stenosis. Impression IMPRESSION:  Multilevel severe degenerative disc disease and degenerative changes. Severe  neuroforaminal narrowing at C3-C4, C4-C5, C5-C6, and C6-C7.  Severe and  moderate/severe spinal canal stenosis at C3-C4, C4-C5, and C5-C6. Results from [place] encounter on 10/17/18   MRI CERV SPINE WO CONT    Narrative EXAM:  MRI CERV SPINE WO CONT    INDICATION:  myelopathy. COMPARISON: 3/6/2009 x-rays. TECHNIQUE: MR imaging of the cervical spine was performed using the following  sequences: sagittal T1, T2, STIR;  axial T2, T1 bilateral uncovertebral joint  hypertrophy. Severe bilateral neuroforaminal narrowing. Mild spinal canal  stenosis. CONTRAST:  None. FINDINGS:    There is 2 mm anterior subluxation C7 on T1. Vertebral body heights are  maintained. Marrow signal is normal. Severe disc space is noted at C3-C4, C4-C5,  and C6-C7. Osteophytic endplate changes are noted throughout the cervical spine. Minimal acute Modic type endplate changes are present at C3-C4. The craniocervical junction is intact. The course, caliber, and signal  intensity of the spinal cord are normal.      The paraspinal soft tissues are within normal limits. C2-C3:  Small disc osteophyte complex with superimposed large right paracentral  disc protrusion effacing the right lateral recess. Mild/moderate right and mild  left facet arthropathy. Ligamentum flavum thickening. No neuroforaminal  narrowing. Mild spinal canal stenosis. C3-C4:  Large disc osteophyte complex. Both lateral recesses are effaced and  there is marked deformity of the ventral cord. Minimal bilateral facet  arthropathy. Bilateral uncovertebral joint hypertrophy. Ligamentum flavum  thickening. Severe left and moderate right neuroforaminal narrowing. Severe  spinal canal stenosis with AP dimension of the canal measuring about 5 mm. C4-C5:  No herniation or stenosis. Moderate size disc osteophyte complex with  left paracentral component. Both lateral recesses are effaced, left greater than  right. Bilateral uncovertebral joint hypertrophy. Ligamentum flavum thickening.   Severe bilateral neuroforaminal narrowing. Moderate/severe spinal canal  stenosis. C5-C6:  Moderate size disc osteophyte complex. Both lateral recesses are  effaced, left greater than right. Bilateral uncovertebral joint hypertrophy. Mild ligamentum flavum thickening. Severe bilateral neuroforaminal narrowing. Moderate/severe spinal canal stenosis. C6-C7:  Small disc osteophyte complex effacing both lateral recesses bilateral  uncovertebral joint hypertrophy. Severe left and moderate right neuroforaminal  narrowing. Mild spinal canal stenosis. C7-T1:  Minimal disc bulge. Mild left facet arthropathy. No neuroforaminal  narrowing or spinal canal stenosis. Impression IMPRESSION:  Multilevel severe degenerative disc disease and degenerative changes. Severe  neuroforaminal narrowing at C3-C4, C4-C5, C5-C6, and C6-C7. Severe and  moderate/severe spinal canal stenosis at C3-C4, C4-C5, and C5-C6. NEUROLOGICAL EXAM:    Appearance: The patient is well developed, well nourished, provides a coherent history and is in no acute distress. Mental Status: Oriented to time, place and person and the president, cognitive function and fund of knowledge is normal. Speech is fluent without aphasia or dysarthria. Mood and affect appropriate. Cranial Nerves:   Intact visual fields. Fundi are benign. MAUDE, EOM's full, no nystagmus, no ptosis. Facial sensation is normal. Corneal reflexes are not tested. Facial movement is symmetric. Hearing is normal bilaterally. Palate is midline with normal sternocleidomastoid and trapezius muscles are normal. Tongue is midline. Neck without meningismus or bruits   Motor:  5/5 strength in upper and lower proximal and distal muscles. Normal bulk and tone. No fasciculations. Reflexes:   Deep tendon reflexes 2+/4 and symmetrical.  No babinski or clonus present   Sensory:   Normal to touch, pinprick and temperature and vibration. DSS is intact   Gait:  Normal gait.    Tremor:   No tremor noted.   Cerebellar:  No cerebellar signs present. Neurovascular:  Normal heart sounds and regular rhythm, peripheral pulses intact, and no carotid bruits. Assessment:         ICD-10-CM ICD-9-CM    1. New daily persistent headache G44.52 339.42    2. Acute right-sided weakness M62.89 728.87    3. Hyponatremia E87.1 276.1    4. Bilateral carotid artery stenosis I65.23 433.10      433.30    5. Cerebrovascular accident (CVA), unspecified mechanism ([address] I63.9 434.91    6. Degenerative cervical spinal stenosis M48.02 723.0    7. Transient ischemic attack involving right internal carotid artery G45.1 435.8    8. 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 435.3      Active Problems:    CVA (cerebral vascular accident) ([facility] [address] (10/17/2018)      Bilateral carotid artery stenosis (10/18/2018)      Vertebrobasilar artery insufficiency (10/18/2018)      Transient ischemic attack involving right internal carotid artery (10/18/2018)      Degenerative cervical spinal stenosis (10/18/2018)        Plan:       Patient has no complaint of leg weakness today, but his MRI scan does show severe is cervical spinal stenosis at several levels in the cervical cord from C3-C6 with a lot of neural foraminal disease, we have consulted neurosurgery, he could probably see them as an inpatient or outpatient. His MRI scan of the brain was negative and this is more a spinal problem. Discussed with the patient and his wife in detail. They agree with plans and therapy. I would continue aspirin therapy empirically however. He can follow-up with us as needed. Call if we can help.         Signed:  Del Mckeon MD  10/19/2018  6:54 PM    [address], [address] [facility],[address]

## 2018-10-19 NOTE — ROUTINE PROCESS
Bedside and Verbal shift change report given to [facility],  RN (oncoming nurse) by Lewis Morgan, SANTIAGO (offgoing nurse).  Report included the following information SBAR, Kardex and Cardiac Rhythm SB.     Zone Phone:   [phone]        Significant changes during shift: fluid restriction for 1000 mL, MRI of spine completed           Patient Information     Jaya Antis Sr.  81 y.o.  10/17/2018  5:23 PM by Akiko Uribe MD. Isaías Cheryl Sr. was admitted from Home     Problem List             Patient Active Problem List     Diagnosis Date Noted    CVA (cerebral vascular accident) ([address] 10/17/2018    History of CVA (cerebrovascular accident) 08/28/2017    Stenosis of both internal carotid arteries 11/22/2016    History of thyroid cancer 11/14/2016    Parathyroid adenoma 11/14/2016    History of hyperparathyroidism 11/14/2016    Unspecified vitamin D deficiency      Hyperlipidemia 08/01/2014    Carotid artery stenosis, asymptomatic 08/01/2014    TIA (transient ischemic attack) 03/27/2013    H/O prostate cancer 03/27/2013    HBP (high blood pressure) 03/27/2013    GERD (gastroesophageal reflux disease) 03/27/2013    CKD (chronic kidney disease) stage 3, GFR 30-59 ml/min ([address] 03/27/2013              Past Medical History:   Diagnosis Date    Carotid artery stenosis, asymptomatic 8/1/2014     Right side 50-79%     Chronic kidney disease       stage 3    GERD (gastroesophageal reflux disease)      Gout      Hiatal hernia      Hyperlipidemia      Hyperparathyroidism ([address]      Hypertension      Long term (current) use of anticoagulants      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  Dr. Ken Nunez Stroke [facility]) 2002     TIA, Dr. Jb Remy, no residual effects as of 9/2018    Thyroid cancer [facility]) Jan 2015    Unspecified vitamin D deficiency              Core Measures:     CVA: Yes Yes        Activity Status:     OOB to Chair No  Ambulated this shift Yes   Bed Rest No     DVT prophylaxis:     DVT prophylaxis Med- Yes  DVT prophylaxis SCD or TONY- No         Patient Safety:     Falls Score Total Score: 1  Safety Level_______  Bed Alarm On? No  Sitter? No     Plan for upcoming shift:  safety, fluid restriction 1000 ml           Discharge Plan: Yes TBD     Active Consults:  IP CONSULT TO NEPHROLOGY  IP CONSULT TO NEUROLOGY  IP CONSULT TO HOSPITALIST

## 2018-10-19 NOTE — PROGRESS NOTES
* No surgery found *  * No surgery found *  Bedside and Verbal shift change report given to Adenike  RN (oncoming nurse) by Alyssa Blanco RN (offgoing nurse). Report included the following information SBAR, Kardex and Cardiac Rhythm SB.     Zone Phone:   [phone]        Significant changes during shift:  none           Patient Information     Jo Ann Denise Sr.  81 y.o.  10/17/2018  5:23 PM by Shahriar Gallego MD. Jo Ann Denise Sr. 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    History of hyperparathyroidism 11/14/2016    Unspecified vitamin D deficiency      Hyperlipidemia 08/01/2014    Carotid artery stenosis, asymptomatic 08/01/2014    TIA (transient ischemic attack) 03/27/2013    H/O prostate cancer 03/27/2013    HBP (high blood pressure) 03/27/2013    GERD (gastroesophageal reflux disease) 03/27/2013    CKD (chronic kidney disease) stage 3, GFR 30-59 ml/min ([address] 03/27/2013           Past Medical History:   Diagnosis Date    Carotid artery stenosis, asymptomatic 8/1/2014     Right side 50-79%     Chronic kidney disease       stage 3    GERD (gastroesophageal reflux disease)      Gout      Hiatal hernia      Hyperlipidemia      Hyperparathyroidism ([address]      Hypertension      Long term (current) use of anticoagulants      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   Dr. Hoffman Cage Stroke [facility]) 2002     TIA, Dr. Mary Triplett, no residual effects as of 9/2018    Thyroid cancer [facility]) Jan 2015    Unspecified vitamin D deficiency              Core Measures:     CVA: Yes Yes           Activity Status:     OOB to Chair Yes  Ambulated this shift Yes   Bed Rest No            LINES AND DRAINS:  Peripheral IV                  DVT prophylaxis:     DVT prophylaxis Med- Yes  DVT prophylaxis SCD or TONY- No         Patient Safety:     Falls Score Total Score: 1  Safety Level_______  Bed Alarm On? No  Sitter?  No     Plan for upcoming shift:  safety, MRI of spine in AM, fluid restriction 1000 ml           Discharge Plan: Yes TBD     Active Consults:  IP CONSULT TO NEPHROLOGY  IP CONSULT TO NEUROLOGY  IP CONSULT TO HOSPITALIST

## 2018-10-19 NOTE — PROGRESS NOTES
Hospitalist Progress Note    NAME: Chavo Charlton Sr.   :  1935   MRN:  [id number]       Assessment / Plan:  81 y.o.  male with pmh of afib, HTN, CKD, prostate cancer and prior TIA who presented to ED with bilateral leg weakness, and right facial numbness. Right facial numbness  Cervical stenosis  Hx bilateral carotid stenosis  - CT head unremarkable. - MRI and MRA brain negative for any acute intracranial pathology nor vessel occlusion but revealed moderate to severe C4-C5 cervical stenosis with mild cord compression. -A1c 5.1. LDL 33.   - Carotid US with stable stenosis bilaterally compared to '16. Echocardiogram ordered  Neurology is following. Continue plavix, lipitor. MRI cervical spine ordered. Hyponatremia  Euvolemic. Hypotonic hyponatremia. Na at 128 on presentation. uric acid WNL. Urine Na is elevated. Low serum osmolality. Urine osmolality at 280  Received 500 ml NS bolus in ED. Nephrology is following. Sodium still at 128. On IV lasix and fluid restriction. Close monitor     B12 deficiency  High Homocysteine. Folate WNL. Vit D pending results. Start subQ B12 supplementation weekly for 4 weeks. Then recheck b12 levels. HTN  BP is better controlled. Hold home losartan. On norvac. Prn hydralazine. Hx Afib  HR is controlled. continue toprol and eliquis.     CKD3  Stable. Close monitor.     Prostate cancer  -s/p seed x 2 and XRT. Follows with Dr Salcedo Sessions     S/P partial thyroidectomy for cancer  -on synthroid replacement         25.0 - 29.9 Overweight / Body mass index is 29.09 kg/m². Code status: Full  Prophylaxis: eliquis  Recommended Disposition: Home w/Family     Subjective:     Chief Complaint / Reason for Physician Visit  \"patient has no complains. No headaches, nausea, no vomiting. \". Discussed with RN events overnight.      Review of Systems:  Symptom Y/N Comments  Symptom Y/N Comments   Fever/Chills n   Chest Pain n    Poor Appetite n   Edema     Cough n Abdominal Pain n    Sputum n   Joint Pain     SOB/MARVIN n   Pruritis/Rash     Nausea/vomit n   Tolerating PT/OT y    Diarrhea n   Tolerating Diet y    Constipation    Other       Could NOT obtain due to:      Objective:     VITALS:   Last 24hrs VS reviewed since prior progress note. Most recent are:  Patient Vitals for the past 24 hrs:   Temp Pulse Resp BP SpO2   10/19/18 1517 97.8 °F (36.6 °C) 77 16 108/70 94 %   10/19/18 0744 98 °F (36.7 °C) 65 16 155/77 97 %   10/19/18 0301 98.1 °F (36.7 °C) 62 16 109/52 98 %   10/18/18 2301 98 °F (36.7 °C) 64 16 132/77 96 %   10/18/18 1929 97.9 °F (36.6 °C) 67 16 133/75 100 %       Intake/Output Summary (Last 24 hours) at 10/19/2018 1701  Last data filed at 10/19/2018 0829  Gross per 24 hour   Intake 555 ml   Output 425 ml   Net 130 ml        PHYSICAL EXAM:  General: Alert, cooperative, no acute distress    EENT:  Anicteric sclerae  Resp:  CTA bilaterally, no wheezing or rales. No accessory muscle use  CV:  Regular  rate,  No LE edema  GI:  Soft, Non distended, Non tender.  +Bowel sounds  Neurologic:  Alert and oriented X 3, normal speech, no gross motor or sensory deficit. Psych:   Good insight. Not anxious nor agitated  Skin:  No rashes. No jaundice    Reviewed most current lab test results and cultures  YES  Reviewed most current radiology test results   YES  Review and summation of old records today    NO  Reviewed patient's current orders and MAR    YES  PMH/SH reviewed - no change compared to H&P  ________________________________________________________________________  ________________________________________________________________________  Levi Martinez MD     Procedures: see electronic medical records for all procedures/Xrays and details which were not copied into this note but were reviewed prior to creation of Plan. LABS:  I reviewed today's most current labs and imaging studies.   Pertinent labs include:  Recent Labs     10/17/18  1754   WBC 7.3 HGB 14.9   HCT 42.6        Recent Labs     10/19/18  0237 10/18/18  0735 10/17/18  1754   * 128* 128*   K 4.2 4.7 4.6   CL 95* 98 93*   CO2 25 23 27   GLU 94 112* 87   BUN 14 11 12   CREA 1.35* 1.34* 1.35*   CA 8.1* 8.6 8.4*   MG 1.8  --   --    PHOS 3.8  --   --    ALB  --   --  3.6   TBILI  --   --  0.8   SGOT  --   --  22   ALT  --   --  31   INR  --   --  1.0       Signed: Jamilah Martell MD

## 2018-10-19 NOTE — CONSULTS
[facility] Consult  Note        NAME: Ryland Rhoades Sr.       :  1935       MRN:  [id number]     Date/Time: 10/19/2018    Risk of deterioration: medium       Assessment:    Plan:  Hyponatremia-  Carotid stenosis  Cervical stenosis with cord compression  CKD  Known (B) renal cysts ua today  Uric acid normal-(typically low with SIADH)  Reviewed office records as pt follows with dr. Velasquez Males was trending down earlier in month-in fact it  Went from 136, to 133-he was asked to restrict his free water-  Perhaps his recent CNS events stimulated ADH release causing his hyponatremia. He is not on hctz, he does not have excessive water intake as an outpatient and he had no other recent med changes  He received lasix x 1 yesterday to help promote free water excretion  Will redose today     Asked to see for hyponatremia. Pt follows with dr. Graham Parents in the office. Typically sodium is normal, however at last office visit sodium had declined to 133-now 128. Sita 70, no ua done. Carotid doppler showing  (%) 50-70% blocked, us from 2016 with bilateral renal cysts and ECho with EF 55%, no RWMA and mild MR/TR  Subjective:     Chief Complaint:  I'm feeling much better    Review of Systems: no n/v/cp/sob    Objective:     VITALS:   Last 24hrs VS reviewed since prior progress note.  Most recent are:  Visit Vitals  /77 (BP 1 Location: Right arm, BP Patient Position: At rest)   Pulse 65   Temp 98 °F (36.7 °C)   Resp 16   Ht 6' (1.829 m)   Wt 97.3 kg (214 lb 8.1 oz)   SpO2 97%   BMI 29.09 kg/m²     SpO2 Readings from Last 6 Encounters:   10/19/18 97%   18 95%   18 98%   18 96%   18 97%   17 96%            Intake/Output Summary (Last 24 hours) at 10/19/2018 1012  Last data filed at 10/19/2018 0829  Gross per 24 hour   Intake 655 ml   Output 1200 ml   Net -545 ml        Telemetry Reviewed        PHYSICAL EXAM:    General   well developed, well nourished, appears stated age, in no acute distress  EENT  Normocephalic, Atraumatic, PERRLA, EOMI, sclera clear, nares clear, pharynx normal  Respiratory   Clear To Auscultation bilaterally  crackles  Cardiology  Regular Rate and Rythmn    Abdominal  Soft, non-tender, non-distended, positive bowel sounds, no hepatosplenomegaly  Extremities  No clubbing, cyanosis, or edema. Pulses intact.               Lab Data Reviewed: (see below)    Medications Reviewed: (see below)    PMH/ reviewed - no change compared to H&P  ___________________________________________________      ___________________________________________________    Attending Physician: Ny Angel MD     ____________________________________________________  MEDICATIONS:  Current Facility-Administered Medications   Medication Dose Route Frequency    cyanocobalamin (VITAMIN B12) injection 1,000 mcg  1,000 mcg IntraMUSCular Q7D    amLODIPine (NORVASC) tablet 5 mg  5 mg Oral DAILY    sodium chloride (NS) flush 5-10 mL  5-10 mL IntraVENous Q8H    sodium chloride (NS) flush 5-10 mL  5-10 mL IntraVENous PRN    acetaminophen (TYLENOL) tablet 650 mg  650 mg Oral Q4H PRN    Or    acetaminophen (TYLENOL) solution 650 mg  650 mg Per NG tube Q4H PRN    Or    acetaminophen (TYLENOL) suppository 650 mg  650 mg Rectal Q4H PRN    clopidogrel (PLAVIX) tablet 75 mg  75 mg Oral DAILY    labetalol (NORMODYNE;TRANDATE) injection 5 mg  5 mg IntraVENous Q10MIN PRN    atorvastatin (LIPITOR) tablet 20 mg  20 mg Oral DAILY    apixaban (ELIQUIS) tablet 2.5 mg  2.5 mg Oral BID    levothyroxine (SYNTHROID) tablet 112 mcg  112 mcg Oral 6am    metoprolol succinate (TOPROL-XL) XL tablet 50 mg  50 mg Oral DAILY    influenza vaccine 2018-19 (6 mos+)(PF) (FLUARIX QUAD/FLULAVAL QUAD) injection 0.5 mL  0.5 mL IntraMUSCular PRIOR TO DISCHARGE        LABS:  Recent Labs     10/17/18  1754   WBC 7.3   HGB 14.9   HCT 42.6        Recent Labs     10/19/18  0237 10/18/18  0735 10/17/18  1754   * 128* 128*   K 4.2 4.7 4.6   CL 95* 98 93*   CO2 25 23 27   BUN 14 11 12   CREA 1.35* 1.34* 1.35*   GLU 94 112* 87   CA 8.1* 8.6 8.4*   MG 1.8  --   --    PHOS 3.8  --   --    URICA 4.1  --   --      Recent Labs     10/17/18  1754   SGOT 22   ALT 31   AP 84   TBILI 0.8   TP 6.8   ALB 3.6   GLOB 3.2     Recent Labs     10/17/18  1754   INR 1.0   PTP 10.2   APTT 34.8*      No results for input(s): FE, TIBC, PSAT, FERR in the last 72 hours. No results for input(s): PH, PCO2, PO2 in the last 72 hours.   Recent Labs     10/17/18  1754   TROIQ <0.05     No results found for: Rudy Kinjal

## 2018-10-19 NOTE — PROGRESS NOTES
Bedside and Verbal shift change report given to [facility],  RN (oncoming nurse) by Maria D Maria RN (offgoing nurse). Report included the following information SBAR, Kardex and Cardiac Rhythm SB.     Zone Phone:   [phone]        Significant changes during shift:  none           Patient Information     Alfonso Elias Sr.  81 y.o.  10/17/2018  5:23 PM by Padilla Ramachandran MD. Alfonso Elias Sr. 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    History of hyperparathyroidism 11/14/2016    Unspecified vitamin D deficiency      Hyperlipidemia 08/01/2014    Carotid artery stenosis, asymptomatic 08/01/2014    TIA (transient ischemic attack) 03/27/2013    H/O prostate cancer 03/27/2013    HBP (high blood pressure) 03/27/2013    GERD (gastroesophageal reflux disease) 03/27/2013    CKD (chronic kidney disease) stage 3, GFR 30-59 ml/min ([address] 03/27/2013           Past Medical History:   Diagnosis Date    Carotid artery stenosis, asymptomatic 8/1/2014     Right side 50-79%     Chronic kidney disease       stage 3    GERD (gastroesophageal reflux disease)      Gout      Hiatal hernia      Hyperlipidemia      Hyperparathyroidism ([address]      Hypertension      Long term (current) use of anticoagulants      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   Dr. Jamar Venegas Stroke [facility]) 2002     TIA, Dr. Patsy Fernando, no residual effects as of 9/2018    Thyroid cancer [facility]) Jan 2015    Unspecified vitamin D deficiency              Core Measures:     CVA: Yes Yes           Activity Status:     OOB to Chair No  Ambulated this shift Yes   Bed Rest No     DVT prophylaxis:     DVT prophylaxis Med- Yes  DVT prophylaxis SCD or TONY- No         Patient Safety:     Falls Score Total Score: 1  Safety Level_______  Bed Alarm On? No  Sitter?  No     Plan for upcoming shift:  safety, MRI of spine in AM, fluid restriction 1000 ml           Discharge Plan: Yes TBD     Active Consults:  IP CONSULT TO NEPHROLOGY  IP CONSULT TO NEUROLOGY  IP CONSULT TO HOSPITALIST

## 2018-10-19 NOTE — PROGRESS NOTES
Problem: Falls - Risk of  Goal: *Absence of Falls  Document [place] Fall Risk and appropriate interventions in the flowsheet.   Outcome: Progressing Towards Goal  Fall Risk Interventions:            Medication Interventions: Patient to call before getting OOB, Teach patient to arise slowly

## 2018-10-20 LAB
ANA SER QL: POSITIVE
ANION GAP SERPL CALC-SCNC: 7 MMOL/L (ref 5–15)
ANTI-CENTROMERE B, RCEBT: 1 AI (ref 0–0.9)
ANTI-RIBOSOMAL P A, RRPAT: <0.2 AI (ref 0–0.9)
ANTICHROMATIN ABS, ACHRLT: 0.2 AI (ref 0–0.9)
APPEARANCE UR: CLEAR
BILIRUB UR QL: NEGATIVE
BUN SERPL-MCNC: 22 MG/DL (ref 6–20)
BUN/CREAT SERPL: 15 (ref 12–20)
CALCIUM SERPL-MCNC: 8.1 MG/DL (ref 8.5–10.1)
CHLORIDE SERPL-SCNC: 94 MMOL/L (ref 97–108)
CHLORIDE UR-SCNC: 13 MMOL/L
CO2 SERPL-SCNC: 27 MMOL/L (ref 21–32)
COLOR UR: NORMAL
CREAT SERPL-MCNC: 1.46 MG/DL (ref 0.7–1.3)
DSDNA AB SER-ACNC: 1 IU/ML (ref 0–9)
ENA SM AB SER-ACNC: <0.2 AI (ref 0–0.9)
ENA SM+RNP AB SER-ACNC: <0.2 AI (ref 0–0.9)
ENA SS-A AB SER-ACNC: <0.2 AI (ref 0–0.9)
ENA SS-B AB SER-ACNC: <0.2 AI (ref 0–0.9)
GLUCOSE SERPL-MCNC: 90 MG/DL (ref 65–100)
GLUCOSE UR STRIP.AUTO-MCNC: NEGATIVE MG/DL
HGB UR QL STRIP: NEGATIVE
JO-1 AB, RJO1T: <0.2 AI (ref 0–0.9)
KETONES UR QL STRIP.AUTO: NEGATIVE MG/DL
LEUKOCYTE ESTERASE UR QL STRIP.AUTO: NEGATIVE
MAGNESIUM SERPL-MCNC: 1.9 MG/DL (ref 1.6–2.4)
NITRITE UR QL STRIP.AUTO: NEGATIVE
OSMOLALITY SERPL: 279 MOSM/KG H2O
OSMOLALITY UR: 488 MOSM/KG H2O
PH UR STRIP: 5.5 [PH] (ref 5–8)
PHOSPHATE SERPL-MCNC: 3.7 MG/DL (ref 2.6–4.7)
POTASSIUM SERPL-SCNC: 4.4 MMOL/L (ref 3.5–5.1)
POTASSIUM UR-SCNC: 29 MMOL/L
PROT UR STRIP-MCNC: NEGATIVE MG/DL
RNP ABS, RNPRLT: 0.6 AI (ref 0–0.9)
SCLERODERMA AB, RSCLT: <0.2 AI (ref 0–0.9)
SEE BELOW:, 164879: ABNORMAL
SODIUM SERPL-SCNC: 128 MMOL/L (ref 136–145)
SODIUM UR-SCNC: 39 MMOL/L
SP GR UR REFRACTOMETRY: 1.02 (ref 1–1.03)
UROBILINOGEN UR QL STRIP.AUTO: 0.2 EU/DL (ref 0.2–1)

## 2018-10-20 PROCEDURE — 80048 BASIC METABOLIC PNL TOTAL CA: CPT | Performed by: INTERNAL MEDICINE

## 2018-10-20 PROCEDURE — 83935 ASSAY OF URINE OSMOLALITY: CPT | Performed by: INTERNAL MEDICINE

## 2018-10-20 PROCEDURE — 36415 COLL VENOUS BLD VENIPUNCTURE: CPT | Performed by: INTERNAL MEDICINE

## 2018-10-20 PROCEDURE — 81003 URINALYSIS AUTO W/O SCOPE: CPT | Performed by: INTERNAL MEDICINE

## 2018-10-20 PROCEDURE — 83930 ASSAY OF BLOOD OSMOLALITY: CPT | Performed by: INTERNAL MEDICINE

## 2018-10-20 PROCEDURE — 84100 ASSAY OF PHOSPHORUS: CPT | Performed by: INTERNAL MEDICINE

## 2018-10-20 PROCEDURE — 82436 ASSAY OF URINE CHLORIDE: CPT | Performed by: INTERNAL MEDICINE

## 2018-10-20 PROCEDURE — 74011250637 HC RX REV CODE- 250/637: Performed by: INTERNAL MEDICINE

## 2018-10-20 PROCEDURE — 83735 ASSAY OF MAGNESIUM: CPT | Performed by: INTERNAL MEDICINE

## 2018-10-20 PROCEDURE — 84133 ASSAY OF URINE POTASSIUM: CPT | Performed by: INTERNAL MEDICINE

## 2018-10-20 PROCEDURE — 65660000000 HC RM CCU STEPDOWN

## 2018-10-20 PROCEDURE — 84300 ASSAY OF URINE SODIUM: CPT | Performed by: INTERNAL MEDICINE

## 2018-10-20 RX ADMIN — METOPROLOL SUCCINATE 50 MG: 50 TABLET, EXTENDED RELEASE ORAL at 08:22

## 2018-10-20 RX ADMIN — Medication 10 ML: at 02:28

## 2018-10-20 RX ADMIN — CLOPIDOGREL BISULFATE 75 MG: 75 TABLET ORAL at 08:23

## 2018-10-20 RX ADMIN — AMLODIPINE BESYLATE 5 MG: 5 TABLET ORAL at 08:23

## 2018-10-20 RX ADMIN — Medication 10 ML: at 14:24

## 2018-10-20 RX ADMIN — LEVOTHYROXINE SODIUM 112 MCG: 112 TABLET ORAL at 05:39

## 2018-10-20 RX ADMIN — APIXABAN 2.5 MG: 2.5 TABLET, FILM COATED ORAL at 21:17

## 2018-10-20 RX ADMIN — ATORVASTATIN CALCIUM 20 MG: 20 TABLET, FILM COATED ORAL at 21:17

## 2018-10-20 RX ADMIN — Medication 10 ML: at 21:18

## 2018-10-20 RX ADMIN — APIXABAN 2.5 MG: 2.5 TABLET, FILM COATED ORAL at 08:23

## 2018-10-20 NOTE — PROGRESS NOTES
Hospitalist Progress Note    NAME: Shyrl Phalen Sr.   :  1935   MRN:  [id number]       Assessment / Plan:  81 y.o.  male with pmh of afib, HTN, CKD, prostate cancer and prior TIA who presented to ED with bilateral leg weakness, and right facial numbness. Right facial numbness  Cervical stenosis  Hx bilateral carotid stenosis  - CT head unremarkable. - MRI and MRA brain negative for any acute intracranial pathology nor vessel occlusion but revealed moderate to severe C4-C5 cervical stenosis with mild cord compression. -A1c 5.1. LDL 33.   - Carotid US with stable stenosis bilaterally compared to '16. Echocardiogram ordered  Neurology is following. Continue plavix, lipitor. -MRI cervical spine showed Multilevel severe degenerative disc disease and degenerative changes. Severe neuroforaminal narrowing at C3-C4, C4-C5, C5-C6, and C6-C7. Severe and moderate/severe spinal canal stenosis at C3-C4, C4-C5, and C5-C6. Discussed with neurosurgery, no intervention at this time. He will need to f/u with Dr Iwona Garland . Hyponatremia  Euvolemic. Hypotonic hyponatremia. Na at 128 on presentation. uric acid WNL. Urine Na is elevated. Low serum osmolality. Urine osmolality at 280  Nephrology is following. Sodium still at 128. On IV lasix and fluid restriction. Close monitor     B12 deficiency  High Homocysteine. Folate WNL. Vit D pending results. Start subQ B12 supplementation weekly for 4 weeks. Then recheck b12 levels. HTN  BP is better controlled. Hold home losartan. On norvac. Prn hydralazine. Hx Afib  HR is controlled. continue toprol and eliquis.     CKD3  Stable. Close monitor.     Prostate cancer  -s/p seed x 2 and XRT. Follows with Dr Rekha Valdez     S/P partial thyroidectomy for cancer  -on synthroid replacement         25.0 - 29.9 Overweight / Body mass index is 29.09 kg/m².     Code status: Full  Prophylaxis: eliquis  Recommended Disposition: Home w/Family     Subjective:     Chief Complaint / Reason for Physician Visit  Pt seen at bedside. Denies complaints. Discussed with RN events overnight. Review of Systems:  Symptom Y/N Comments  Symptom Y/N Comments   Fever/Chills n   Chest Pain n    Poor Appetite n   Edema     Cough n   Abdominal Pain n    Sputum n   Joint Pain     SOB/MARVIN n   Pruritis/Rash     Nausea/vomit n   Tolerating PT/OT y    Diarrhea n   Tolerating Diet y    Constipation    Other       Could NOT obtain due to:      Objective:     VITALS:   Last 24hrs VS reviewed since prior progress note. Most recent are:  Patient Vitals for the past 24 hrs:   Temp Pulse Resp BP SpO2   10/20/18 0736 97.8 °F (36.6 °C) 62 18 145/80 99 %   10/20/18 0311 98 °F (36.7 °C) 61 16 120/65 94 %   10/19/18 2302 97.6 °F (36.4 °C) 62 18 121/68 94 %   10/19/18 2025 97.9 °F (36.6 °C) 69 18 100/74 96 %   10/19/18 1517 97.8 °F (36.6 °C) 77 16 108/70 94 %       Intake/Output Summary (Last 24 hours) at 10/20/2018 1107  Last data filed at 10/20/2018 0924  Gross per 24 hour   Intake 960 ml   Output 525 ml   Net 435 ml        PHYSICAL EXAM:  General: Alert, cooperative, no acute distress    EENT:  Anicteric sclerae  Resp:  CTA bilaterally, no wheezing or rales. No accessory muscle use  CV:  Regular  rate,  No LE edema  GI:  Soft, Non distended, Non tender.  +Bowel sounds  Neurologic:  Alert and oriented X 3, normal speech, no gross motor or sensory deficit. Psych:   Good insight. Not anxious nor agitated  Skin:  No rashes.   No jaundice    Reviewed most current lab test results and cultures  YES  Reviewed most current radiology test results   YES  Review and summation of old records today    NO  Reviewed patient's current orders and MAR    YES  PMH/SH reviewed - no change compared to H&P  ________________________________________________________________________  ________________________________________________________________________  Celi Fuller, MD     Procedures: see electronic medical records for all procedures/Xrays and details which were not copied into this note but were reviewed prior to creation of Plan. LABS:  I reviewed today's most current labs and imaging studies.   Pertinent labs include:  Recent Labs     10/17/18  1754   WBC 7.3   HGB 14.9   HCT 42.6        Recent Labs     10/20/18  0225 10/19/18  0237 10/18/18  0735 10/17/18  1754   * 128* 128* 128*   K 4.4 4.2 4.7 4.6   CL 94* 95* 98 93*   CO2 27 25 23 27   GLU 90 94 112* 87   BUN 22* 14 11 12   CREA 1.46* 1.35* 1.34* 1.35*   CA 8.1* 8.1* 8.6 8.4*   MG 1.9 1.8  --   --    PHOS 3.7 3.8  --   --    ALB  --   --   --  3.6   TBILI  --   --   --  0.8   SGOT  --   --   --  22   ALT  --   --   --  31   INR  --   --   --  1.0       Signed: Yaquelin Jones MD

## 2018-10-20 NOTE — PROGRESS NOTES
Full note dictated  No immediate neurosurgical intervention necessary He can f/u in my office after discharge  Thank you

## 2018-10-20 NOTE — PROGRESS NOTES
Bedside and Verbal shift change report given to Adenike  RN (oncoming nurse) by Hai Cowan RN (offgoing nurse).  Report included the following information SBAR, Kardex and Cardiac Rhythm SB.     Zone Phone:   [phone]        Significant changes during shift:  none           Patient Information     Christopher Mercedes Sr.  81 y.o.  10/17/2018  5:23 PM by Silvestre Garcia MD. Isaías Luna Sr. was admitted from Home     Problem List             Patient Active Problem List     Diagnosis Date Noted    CVA (cerebral vascular accident) ([address] 10/17/2018    History of CVA (cerebrovascular accident) 08/28/2017    Stenosis of both internal carotid arteries 11/22/2016    History of thyroid cancer 11/14/2016    Parathyroid adenoma 11/14/2016    History of hyperparathyroidism 11/14/2016    Unspecified vitamin D deficiency      Hyperlipidemia 08/01/2014    Carotid artery stenosis, asymptomatic 08/01/2014    TIA (transient ischemic attack) 03/27/2013    H/O prostate cancer 03/27/2013    HBP (high blood pressure) 03/27/2013    GERD (gastroesophageal reflux disease) 03/27/2013    CKD (chronic kidney disease) stage 3, GFR 30-59 ml/min ([address] 03/27/2013              Past Medical History:   Diagnosis Date    Carotid artery stenosis, asymptomatic 8/1/2014     Right side 50-79%     Chronic kidney disease       stage 3    GERD (gastroesophageal reflux disease)      Gout      Hiatal hernia      Hyperlipidemia      Hyperparathyroidism ([address]      Hypertension      Long term (current) use of anticoagulants      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  Dr. Beau Lockwood Stroke [facility]) 2002     TIA, Dr. José Reyes, no residual effects as of 9/2018    Thyroid cancer [facility]) Jan 2015    Unspecified vitamin D deficiency              Core Measures:     CVA: Yes Yes        Activity Status:     OOB to Chair No  Ambulated this shift Yes   Bed Rest No     DVT prophylaxis:     DVT prophylaxis Med- Yes  DVT prophylaxis SCD or TONY- No         Patient Safety:     Falls Score Total Score: 1  Safety Level_______  Bed Alarm On? No  Sitter? No     Plan for upcoming shift:  safety, MRI of spine in AM, fluid restriction 1000 ml           Discharge Plan: Yes TBD     Active Consults:  IP CONSULT TO NEPHROLOGY  IP CONSULT TO NEUROLOGY  IP CONSULT TO HOSPITALIST

## 2018-10-20 NOTE — PROGRESS NOTES
Bedside and Verbal shift change report given to Kei Harrell RN (oncoming nurse) by Juvenal Alba RN (offgoing nurse). Report included the following information SBAR, Kardex and Cardiac Rhythm SB.     Zone Phone:   [phone]        Significant changes during shift:  none           Patient Information     Subha Barrios Sr.  81 y.o.  10/17/2018  5:23 PM by Marbin Smith MD. Subha Barrios Sr. 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    History of hyperparathyroidism 11/14/2016    Unspecified vitamin D deficiency      Hyperlipidemia 08/01/2014    Carotid artery stenosis, asymptomatic 08/01/2014    TIA (transient ischemic attack) 03/27/2013    H/O prostate cancer 03/27/2013    HBP (high blood pressure) 03/27/2013    GERD (gastroesophageal reflux disease) 03/27/2013    CKD (chronic kidney disease) stage 3, GFR 30-59 ml/min ([address] 03/27/2013           Past Medical History:   Diagnosis Date    Carotid artery stenosis, asymptomatic 8/1/2014     Right side 50-79%     Chronic kidney disease       stage 3    GERD (gastroesophageal reflux disease)      Gout      Hiatal hernia      Hyperlipidemia      Hyperparathyroidism ([address]      Hypertension      Long term (current) use of anticoagulants      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   Dr. Ying Adams Stroke [facility]) 2002     TIA, Dr. Stephanie Kumar, no residual effects as of 9/2018    Thyroid cancer [facility]) Jan 2015    Unspecified vitamin D deficiency              Core Measures:     CVA: Yes Yes           Activity Status:     OOB to Chair No  Ambulated this shift Yes   Bed Rest No     DVT prophylaxis:     DVT prophylaxis Med- Yes  DVT prophylaxis SCD or TONY- No         Patient Safety:     Falls Score Total Score: 1  Safety Level_______  Bed Alarm On? No  Sitter?  No     Plan for upcoming shift:  safety, MRI of spine in AM, fluid restriction 1000 ml           Discharge Plan: Yes TBD     Active Consults:  IP CONSULT TO NEPHROLOGY  IP CONSULT TO NEUROLOGY  IP CONSULT TO HOSPITALIST

## 2018-10-20 NOTE — CONSULTS
RAFIQ Mancuso  MR#: [id number]  : 1935  ACCOUNT #: [de-identified]   DATE OF SERVICE: 10/20/2018    REQUESTING PHYSICIAN:  Sandra Canas MD    CHIEF COMPLAINT:  Facial numbness and cervical stenosis on imaging studies. HISTORY OF PRESENT ILLNESS:  An  56-year-old man taken to the emergency room when he began complaining of facial numbness on the right side. Eventual imaging studies included an MRI of the cervical spine and brain. Cervical spine MRI revealed rather significant cervical stenosis. The patient states that several years ago, he noticed that when he extended the neck backwards he got some pain. It tended to radiate around the shoulders, but he did not describe any electric shock like feelings are typical Lhermitte sign. He has had some vague symptoms of leg weakness and after imaging studies revealed current findings, neurosurgery was consulted. He is currently on anticoagulant medication and his sodium is low and he is being treated with fluid restriction. PAST MEDICAL HISTORY:  Carotid artery stenosis, stage III renal disease, GERD, gout, hiatal hernia, hyperlipidemia, hyperparathyroidism, hypertension, prostate cancer. SURGICAL HISTORY:  Appendectomy, cholecystectomy, tonsillectomy, parathyroidectomy thyroidectomy right lobe, seeds for prostate cancer. SOCIAL HISTORY:  Former smoker. Occasional alcohol use, no drug use. FAMILY HISTORY:  Unremarkable. ALLERGIES:  ALLERGIC TO SULFA. MEDICATIONS:  Lipitor, Cozaar, Toprol, Protonix, Plavix, Synthroid, Coricidin, Tylenol, vitamin D3.    REVIEW OF SYSTEMS:  No other GI, , respiratory, cardiac, musculoskeletal, dermatologic, endocrine, neurologic, psychiatric complaints. PHYSICAL EXAMINATION:  On admission, blood pressure was 165/79 afebrile, respiratory rate 14, pulse rate is 60. He is awake and alert, sitting up in a chair. He has no complaints today.   He feels like his strength is good in both upper and lower extremities. Recent and remote memory are normal.  Pupils equal and reactive. Extraocular muscles are intact. Face is symmetric. Facial sensation is normal.  He moves all 4 extremities equally. Toes are downgoing. No clonus at the ankles. Gait and station are not tested a fear of his being somewhat unsteady. There is no Sandra sign in the hand. There are no long tract signs or other myelopathic signs. The MRI of the cervical spine does indeed show rather significant cervical stenosis, but he has no appreciable signs of myelopathy. ASSESSMENT AND PLAN:  He is not particularly interested in any surgical intervention. I think it would be appropriate to have him follow up with me in my office. I gave him my office contact number and hopefully he will go ahead and make a followup appointment after discharge. I would be happy to reevaluate. Thus, I spoke with Dr. Varinder Guzman one of the hospitalist who agrees with that plan. Thank you for asking us to see the patient.       MD MIGUEL Juarez / KISHA  D: 10/20/2018 09:52     T: 10/20/2018 13:12  JOB #: [id number]  CC: Leticia Lim MD

## 2018-10-20 NOTE — PROGRESS NOTES
Problem: Falls - Risk of  Goal: *Absence of Falls  Document Clover Fall Risk and appropriate interventions in the flowsheet.   Outcome: Progressing Towards Goal  Fall Risk Interventions:            Medication Interventions: Bed/chair exit alarm, Evaluate medications/consider consulting pharmacy, Patient to call before getting OOB

## 2018-10-20 NOTE — PROGRESS NOTES
* No surgery found *  * No surgery found *  Bedside and Verbal shift change report given to Adenike  RN (oncoming nurse) by Lea Ayala RN (offgoing nurse). Report included the following information SBAR, Kardex and Cardiac Rhythm SB.     Zone Phone:   [phone]        Significant changes during shift:  none           Patient Information     Christopher Mercedes Sr.  81 y.o.  10/17/2018  5:23 PM by Silvestre Garcia MD. Christopher Mercedes Sr. 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    History of hyperparathyroidism 11/14/2016    Unspecified vitamin D deficiency      Hyperlipidemia 08/01/2014    Carotid artery stenosis, asymptomatic 08/01/2014    TIA (transient ischemic attack) 03/27/2013    H/O prostate cancer 03/27/2013    HBP (high blood pressure) 03/27/2013    GERD (gastroesophageal reflux disease) 03/27/2013    CKD (chronic kidney disease) stage 3, GFR 30-59 ml/min ([address] 03/27/2013           Past Medical History:   Diagnosis Date    Carotid artery stenosis, asymptomatic 8/1/2014     Right side 50-79%     Chronic kidney disease       stage 3    GERD (gastroesophageal reflux disease)      Gout      Hiatal hernia      Hyperlipidemia      Hyperparathyroidism ([address]      Hypertension      Long term (current) use of anticoagulants      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   Dr. Beau Lockwood Stroke [facility]) 2002     TIA, Dr. José Reyes, no residual effects as of 9/2018    Thyroid cancer [facility]) Jan 2015    Unspecified vitamin D deficiency              Core Measures:     CVA: Yes Yes           Activity Status:     OOB to Chair Yes  Ambulated this shift Yes   Bed Rest No            LINES AND DRAINS:  Peripheral IV                  DVT prophylaxis:     DVT prophylaxis Med- Yes  DVT prophylaxis SCD or TONY- No         Patient Safety:     Falls Score Total Score: 1  Safety Level_______  Bed Alarm On? No  Sitter?  No     Plan for upcoming shift:  safety,  fluid restriction 1000 ml, neurosurgeon to see pt           Discharge Plan: Yes TBD     Active Consults:  IP CONSULT TO NEPHROLOGY  IP CONSULT TO NEUROLOGY  IP CONSULT TO HOSPITALIST

## 2018-10-21 LAB
ANION GAP SERPL CALC-SCNC: 8 MMOL/L (ref 5–15)
BUN SERPL-MCNC: 19 MG/DL (ref 6–20)
BUN/CREAT SERPL: 15 (ref 12–20)
CALCIUM SERPL-MCNC: 8.3 MG/DL (ref 8.5–10.1)
CHLORIDE SERPL-SCNC: 95 MMOL/L (ref 97–108)
CO2 SERPL-SCNC: 25 MMOL/L (ref 21–32)
CREAT SERPL-MCNC: 1.29 MG/DL (ref 0.7–1.3)
GLUCOSE SERPL-MCNC: 84 MG/DL (ref 65–100)
POTASSIUM SERPL-SCNC: 4.3 MMOL/L (ref 3.5–5.1)
SODIUM SERPL-SCNC: 128 MMOL/L (ref 136–145)

## 2018-10-21 PROCEDURE — 74011250637 HC RX REV CODE- 250/637: Performed by: INTERNAL MEDICINE

## 2018-10-21 PROCEDURE — 80048 BASIC METABOLIC PNL TOTAL CA: CPT | Performed by: INTERNAL MEDICINE

## 2018-10-21 PROCEDURE — 36415 COLL VENOUS BLD VENIPUNCTURE: CPT | Performed by: INTERNAL MEDICINE

## 2018-10-21 PROCEDURE — 74011250636 HC RX REV CODE- 250/636: Performed by: INTERNAL MEDICINE

## 2018-10-21 PROCEDURE — 65660000000 HC RM CCU STEPDOWN

## 2018-10-21 RX ORDER — LANOLIN ALCOHOL/MO/W.PET/CERES
1000 CREAM (GRAM) TOPICAL DAILY
Qty: 30 TAB | Refills: 0 | Status: SHIPPED | OUTPATIENT
Start: 2018-10-21 | End: 2018-10-22 | Stop reason: SDUPTHER

## 2018-10-21 RX ORDER — SODIUM CHLORIDE 9 MG/ML
75 INJECTION, SOLUTION INTRAVENOUS CONTINUOUS
Status: DISCONTINUED | OUTPATIENT
Start: 2018-10-21 | End: 2018-10-22 | Stop reason: HOSPADM

## 2018-10-21 RX ADMIN — ATORVASTATIN CALCIUM 20 MG: 20 TABLET, FILM COATED ORAL at 22:00

## 2018-10-21 RX ADMIN — CLOPIDOGREL BISULFATE 75 MG: 75 TABLET ORAL at 08:58

## 2018-10-21 RX ADMIN — APIXABAN 2.5 MG: 2.5 TABLET, FILM COATED ORAL at 21:00

## 2018-10-21 RX ADMIN — Medication 10 ML: at 14:05

## 2018-10-21 RX ADMIN — METOPROLOL SUCCINATE 50 MG: 50 TABLET, EXTENDED RELEASE ORAL at 08:58

## 2018-10-21 RX ADMIN — Medication 10 ML: at 22:00

## 2018-10-21 RX ADMIN — AMLODIPINE BESYLATE 5 MG: 5 TABLET ORAL at 08:58

## 2018-10-21 RX ADMIN — Medication 10 ML: at 03:30

## 2018-10-21 RX ADMIN — SODIUM CHLORIDE 75 ML/HR: 900 INJECTION, SOLUTION INTRAVENOUS at 06:56

## 2018-10-21 RX ADMIN — APIXABAN 2.5 MG: 2.5 TABLET, FILM COATED ORAL at 08:58

## 2018-10-21 RX ADMIN — LEVOTHYROXINE SODIUM 112 MCG: 112 TABLET ORAL at 05:12

## 2018-10-21 NOTE — PROGRESS NOTES
Problem: Falls - Risk of  Goal: *Absence of Falls  Document [place] Fall Risk and appropriate interventions in the flowsheet.   Outcome: Progressing Towards Goal  Fall Risk Interventions:            Medication Interventions: Bed/chair exit alarm, Evaluate medications/consider consulting pharmacy, Patient to call before getting OOB, Teach patient to arise slowly

## 2018-10-21 NOTE — PROGRESS NOTES
Bedside and Verbal shift change report given to Jodi Treadwell RN (oncoming nurse) by Maria D Maria RN (offgoing nurse). Report included the following information SBAR, Kardex and Cardiac Rhythm SB.     Zone Phone:   [phone]        Significant changes during shift:  none           Patient Information     Alfonso Elias Sr.  81 y.o.  10/17/2018  5:23 PM by Padilla Ramachandran MD. Alfonso Elias Sr. 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    History of hyperparathyroidism 11/14/2016    Unspecified vitamin D deficiency      Hyperlipidemia 08/01/2014    Carotid artery stenosis, asymptomatic 08/01/2014    TIA (transient ischemic attack) 03/27/2013    H/O prostate cancer 03/27/2013    HBP (high blood pressure) 03/27/2013    GERD (gastroesophageal reflux disease) 03/27/2013    CKD (chronic kidney disease) stage 3, GFR 30-59 ml/min ([address] 03/27/2013           Past Medical History:   Diagnosis Date    Carotid artery stenosis, asymptomatic 8/1/2014     Right side 50-79%     Chronic kidney disease       stage 3    GERD (gastroesophageal reflux disease)      Gout      Hiatal hernia      Hyperlipidemia      Hyperparathyroidism ([address]      Hypertension      Long term (current) use of anticoagulants      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   Dr. Jamar Venegas Stroke [facility]) 2002     TIA, Dr. Patsy Fernando, no residual effects as of 9/2018    Thyroid cancer [facility]) Jan 2015    Unspecified vitamin D deficiency              Core Measures:     CVA: Yes Yes           Activity Status:     OOB to Chair No  Ambulated this shift Yes   Bed Rest No     DVT prophylaxis:     DVT prophylaxis Med- Yes  DVT prophylaxis SCD or TONY- No         Patient Safety:     Falls Score Total Score: 1  Safety Level_______  Bed Alarm On? No  Sitter?  No     Plan for upcoming shift:  safety, MRI of spine in AM, fluid restriction 1000 ml           Discharge Plan: Yes TBD     Active Consults:  IP CONSULT TO NEPHROLOGY  IP CONSULT TO NEUROLOGY  IP CONSULT TO HOSPITALIST

## 2018-10-21 NOTE — PROGRESS NOTES
Renal-labs/urine studies reviewed. Should respond to NS. Will hydrate overnight.     Anahi Garcia MD

## 2018-10-21 NOTE — PROGRESS NOTES
Hospitalist Progress Note    NAME: Emory Armstrong Sr.   :  1935   MRN:  [id number]       Assessment / Plan:  81 y.o.  male with pmh of afib, HTN, CKD, prostate cancer and prior TIA who presented to ED with bilateral leg weakness, and right facial numbness. Right facial numbness  Cervical stenosis  Hx bilateral carotid stenosis  - CT head unremarkable. - MRI and MRA brain negative for any acute intracranial pathology nor vessel occlusion but revealed moderate to severe C4-C5 cervical stenosis with mild cord compression. -A1c 5.1. LDL 33.   - Carotid US with stable stenosis bilaterally compared to '16. Echocardiogram ordered  Neurology is following. Continue plavix, lipitor. -MRI cervical spine showed Multilevel severe degenerative disc disease and degenerative changes. Severe neuroforaminal narrowing at C3-C4, C4-C5, C5-C6, and C6-C7. Severe and moderate/severe spinal canal stenosis at C3-C4, C4-C5, and C5-C6. Discussed with neurosurgery, no intervention at this time. He will need to f/u with Dr Delio Caruso . -hoping to be discharge in the AM once Na improves    Hyponatremia  Euvolemic. Hypotonic hyponatremia. Na at 128 on presentation. uric acid WNL. Urine Na is elevated. Low serum osmolality. Urine osmolality at 280  Nephrology is following. Sodium still at 128. Received IV lasix and fluid restriction. Started on NS today, repeat bmp in the AM, if improved. Can likely be discharge tomorrow     B12 deficiency  High Homocysteine. Folate WNL. Vit D pending results. Start subQ B12 supplementation weekly for 4 weeks. Then recheck b12 levels. HTN  BP is better controlled. Hold home losartan. On norvac. Prn hydralazine. Hx Afib  HR is controlled. continue toprol and eliquis.     CKD3  Stable. Close monitor.     Prostate cancer  -s/p seed x 2 and XRT.   Follows with Dr Soham Mathews     S/P partial thyroidectomy for cancer  -on synthroid replacement         25.0 - 29.9 Overweight / Body mass index is 29.09 kg/m². Code status: Full  Prophylaxis: eliquis  Recommended Disposition: Home w/Family     Subjective:     Chief Complaint / Reason for Physician Visit  Pt seen at bedside. Denies complaints. Discussed with RN events overnight. Review of Systems:  Symptom Y/N Comments  Symptom Y/N Comments   Fever/Chills n   Chest Pain n    Poor Appetite n   Edema     Cough n   Abdominal Pain n    Sputum n   Joint Pain     SOB/MARVIN n   Pruritis/Rash     Nausea/vomit n   Tolerating PT/OT y    Diarrhea n   Tolerating Diet y    Constipation    Other       Could NOT obtain due to:      Objective:     VITALS:   Last 24hrs VS reviewed since prior progress note. Most recent are:  Patient Vitals for the past 24 hrs:   Temp Pulse Resp BP SpO2   10/21/18 0733 97.6 °F (36.4 °C) 61 16 144/63 99 %   10/21/18 0341 97.9 °F (36.6 °C) 63 16 135/61 96 %   10/20/18 2323 97.5 °F (36.4 °C) 63 18 119/68 98 %   10/20/18 1505 98 °F (36.7 °C) 65 18 140/74 100 %   10/20/18 1114 97.6 °F (36.4 °C) 61 18 131/62 98 %       Intake/Output Summary (Last 24 hours) at 10/21/2018 0855  Last data filed at 10/21/2018 0656  Gross per 24 hour   Intake 840 ml   Output 775 ml   Net 65 ml        PHYSICAL EXAM:  General: Alert, cooperative, no acute distress    EENT:  Anicteric sclerae  Resp:  CTA bilaterally, no wheezing or rales. No accessory muscle use  CV:  Regular  rate,  No LE edema  GI:  Soft, Non distended, Non tender.  +Bowel sounds  Neurologic:  Alert and oriented X 3, normal speech, no gross motor or sensory deficit. Psych:   Good insight. Not anxious nor agitated  Skin:  No rashes.   No jaundice    Reviewed most current lab test results and cultures  YES  Reviewed most current radiology test results   YES  Review and summation of old records today    NO  Reviewed patient's current orders and MAR    YES  PMH/SH reviewed - no change compared to H&P  ________________________________________________________________________  ________________________________________________________________________  Macrina Person MD     Procedures: see electronic medical records for all procedures/Xrays and details which were not copied into this note but were reviewed prior to creation of Plan. LABS:  I reviewed today's most current labs and imaging studies. Pertinent labs include:  No results for input(s): WBC, HGB, HCT, PLT, HGBEXT, HCTEXT, PLTEXT, HGBEXT, HCTEXT, PLTEXT in the last 72 hours.   Recent Labs     10/21/18  0334 10/20/18  0225 10/19/18  0237   * 128* 128*   K 4.3 4.4 4.2   CL 95* 94* 95*   CO2 25 27 25   GLU 84 90 94   BUN 19 22* 14   CREA 1.29 1.46* 1.35*   CA 8.3* 8.1* 8.1*   MG  --  1.9 1.8   PHOS  --  3.7 3.8       Signed: Macrina Person MD

## 2018-10-21 NOTE — DISCHARGE SUMMARY
Discharge Summary      Name: Yaw Gayle  [id number]  YOB: 1935 (Age: 81 y.o.)   Date of Admission: 10/17/2018  Date of Discharge: 10/22/2018  Attending Physician: Kusum Jaimes MD    Discharge Diagnosis:   Right facial numbness  Cervical stenosis  Hx bilateral carotid stenosis  Hyponatremia  B12 deficiency  HTN  Hx Afib  CKD3  Prostate cancer  S/P partial thyroidectomy for cancer    Consultations:  IP CONSULT TO NEPHROLOGY  IP CONSULT TO NEUROSURGERY    Brief Admission History/Reason for Admission Per Wolf Carter MD:   Antonio Ferraro is a 81 y.o.  male with a history of afib, HTN, CKD, prostate cancer and prior TIA who presents with new stroke like symptoms. Patient presented to the ER on 9/22 because of elevated BP and vague symptoms of leg weakness. CT head was negative then. This past weekend, he experienced some on and off right facial numbness and tingling. This was associated with posterior HA but no visual change, weakness, speech or gait difficulties. Today his daughter realized what was happening and called his PCP's office and was referred to the ER. His symptoms improved en route and is almost resolved by the time I saw him. CT head again showed nothing acute but his Na 128 which is new compared to that last ER visit. Nephrology consulted and we were asked to admit for work up and evaluation of the above problems.   [address] Problems:   Right facial numbness in the setting of cervical stenosis  Hx bilateral carotid stenosis  CT head unremarkable. MRI and MRA brain negative for any acute intracranial pathology nor vessel occlusion but revealed moderate to severe C4-C5 cervical stenosis with mild cord compression. A1c 5.1. LDL 33. Carotid US with stable stenosis bilaterally compared to '16. Echo cardiogram with EF 55-60%, mild concentric hypertrophy.   Pt evaluated by neurology in consultation, recommended to cont' plavix, lipitor and MRI cerival spine with neurosurgery consultation. MRI cervical spine showed Multilevel severe degenerative disc disease and degenerative changes. Severe neuroforaminal narrowing at C3-C4, C4-C5, C5-C6, and C6-C7. Severe and moderate/severe spinal canal stenosis at C3-C4, C4-C5, and C5-C6. Discussed with neurosurgery, no intervention at this time. He will need to f/u with Dr Twin Zamudio . Pt remains hemodynamically stable, labs are stable. He is being discharge to home with without any needs as per PT/OT's eval.     Hyponatremia  Na at 128 on presentation. Uric acid WNL. Urine Na is elevated. Low serum osmolality. Urine osmolality at 280. Na improved with NS. Follow up with nephrology outpt as needed. Discussed fluid restriction of 1L/day with patient.     B12 deficiency   High Homocysteine. Folate WNL. B12 77. He received Subq b12 supplementation while inpt. Will cont' with high dose vit B12 PO route on discharge. He will need to have his B12 recheck in a few weeks at PCP's follow up. HTN, stable, cont' home meds. Hx Afib  HR is controlled. continue toprol and eliquis.     CKD3  Stable. Close monitor.     Prostate cancer  S/p seed x 2 and XRT.  Follows with Dr Rekha Valdez     S/P partial thyroidectomy for cancer  Cont' with synthroid replacement      25.0 - 29.9 Overweight / Body mass index is 29.09 kg/m². Discharge Exam:  Patient seen and examined by me on discharge day. Pertinent Findings:  Visit Vitals  /73 (BP 1 Location: Left arm, BP Patient Position: At rest)   Pulse 64   Temp 97.6 °F (36.4 °C)   Resp 18   Ht 6' (1.829 m)   Wt 97.3 kg (214 lb 8.1 oz)   SpO2 100%   BMI 29.09 kg/m²     Gen:    Not in distress  Chest: Clear lungs  CVS:   Regular rhythm.   No edema  Abd:  Soft, not distended, not tender    Discharge/Recent Laboratory Results:  Recent Labs     10/22/18  0305  10/20/18  0225   *   < > 128*   K 4.3   < > 4.4   CL 98   < > 94*   CO2 27   < > 27   BUN 19   < > 22*   GLU 83   < > 90   CA 8.1*   < > 8.1*   PHOS  --   --  3.7   MG  --   --  1.9    < > = values in this interval not displayed. No results for input(s): HGB, HCT, WBC, PLT, HGBEXT, HCTEXT, PLTEXT, HGBEXT, HCTEXT, PLTEXT in the last 72 hours. Discharge Medications:  Current Discharge Medication List      START taking these medications    Details   cyanocobalamin (VITAMIN B-12) 1,000 mcg tablet Take 1 Tab by mouth daily. Qty: 30 Tab, Refills: 0         CONTINUE these medications which have NOT CHANGED    Details   atorvastatin (LIPITOR) 20 mg tablet Take  by mouth daily. losartan (COZAAR) 50 mg tablet Take 50 mg by mouth daily. metoprolol succinate (TOPROL-XL) 50 mg XL tablet Take 50 mg by mouth daily. clopidogrel (PLAVIX) 75 mg tab TAKE 1 TABLET BY MOUTH  DAILY  Qty: 90 Tab, Refills: 3    Associated Diagnoses: History of TIA (transient ischemic attack)      ELIQUIS 2.5 mg tablet       levothyroxine (SYNTHROID) 112 mcg tablet Take 1 Tab by mouth Daily (before breakfast). Qty: 90 Tab, Refills: 3    Associated Diagnoses: Thyroid cancer (HCC)      cholecalciferol, vitamin D3, (VITAMIN D3) 2,000 unit tab Take  by mouth nightly. pantoprazole (PROTONIX) 40 mg tablet TAKE 1 TABLET BY MOUTH  DAILY  Qty: 90 Tab, Refills: 3    Associated Diagnoses: Gastroesophageal reflux disease without esophagitis      GUAIFENESIN/DEXTROMETHORPHAN (CORICIDIN HBP PO) Take 1 Tab by mouth as needed. acetaminophen (TYLENOL EXTRA STRENGTH) 500 mg tablet Take  by mouth every six (6) hours as needed for Pain.              DISPOSITION:    Home with Family: x   Home with HH/PT/OT/RN:    SNF/LTC:    ERICKSON:    OTHER:          Follow up with:   PCP : Claven Hamman, MD  Follow-up Information     Follow up With Specialties Details Why Contact Info    Edna Wick NP Pediatrics Go on 10/26/2018 Please follow up on October 26, 2018 at 9:30 [address] [facility] [address] [place] [address]      Evin Morse MD Neurology Go on 11/29/2018 Please follow up on November 29, 2018 at 10:00 arriving at 9:30 to register for your appointment with photo ID, insurance card and current list of medication  Irina Contreras [facility]  [phone]      Skyla Tan MD Neurosurgery In 2 weeks Office will call to schedule follow up appointment  [address] [facility]  [phone]            Code: Full  Diet: cardiac    Total time in minutes spent coordinating this discharge (includes going over instructions, follow-up, prescriptions, and preparing report for sign off to her PCP) :  35 minutes

## 2018-10-21 NOTE — PROGRESS NOTES
Bedside and Verbal shift change report given to Noe Napoles RN (oncoming nurse) by Allen Weiner RN (offgoing nurse). Report included the following information SBAR, Kardex, MAR, Recent Results and Cardiac Rhythm NSR. Bedside and Verbal shift change report given to [address] [place] [address] Evens (oncoming nurse) by Noe Napoles RN (offgoing nurse). Report included the following information SBAR, Kardex, Med Rec Status and Cardiac Rhythm NSR.

## 2018-10-22 VITALS
OXYGEN SATURATION: 100 % | RESPIRATION RATE: 18 BRPM | DIASTOLIC BLOOD PRESSURE: 73 MMHG | BODY MASS INDEX: 29.05 KG/M2 | HEIGHT: 72 IN | HEART RATE: 64 BPM | WEIGHT: 214.51 LBS | TEMPERATURE: 97.6 F | SYSTOLIC BLOOD PRESSURE: 144 MMHG

## 2018-10-22 LAB
ANION GAP SERPL CALC-SCNC: 6 MMOL/L (ref 5–15)
BUN SERPL-MCNC: 19 MG/DL (ref 6–20)
BUN/CREAT SERPL: 17 (ref 12–20)
CALCIUM SERPL-MCNC: 8.1 MG/DL (ref 8.5–10.1)
CHLORIDE SERPL-SCNC: 98 MMOL/L (ref 97–108)
CO2 SERPL-SCNC: 27 MMOL/L (ref 21–32)
CREAT SERPL-MCNC: 1.14 MG/DL (ref 0.7–1.3)
GLUCOSE SERPL-MCNC: 83 MG/DL (ref 65–100)
POTASSIUM SERPL-SCNC: 4.3 MMOL/L (ref 3.5–5.1)
SODIUM SERPL-SCNC: 131 MMOL/L (ref 136–145)

## 2018-10-22 PROCEDURE — 36415 COLL VENOUS BLD VENIPUNCTURE: CPT | Performed by: INTERNAL MEDICINE

## 2018-10-22 PROCEDURE — 74011250636 HC RX REV CODE- 250/636: Performed by: INTERNAL MEDICINE

## 2018-10-22 PROCEDURE — 90686 IIV4 VACC NO PRSV 0.5 ML IM: CPT | Performed by: INTERNAL MEDICINE

## 2018-10-22 PROCEDURE — 80048 BASIC METABOLIC PNL TOTAL CA: CPT | Performed by: INTERNAL MEDICINE

## 2018-10-22 PROCEDURE — 74011250637 HC RX REV CODE- 250/637: Performed by: INTERNAL MEDICINE

## 2018-10-22 PROCEDURE — 90471 IMMUNIZATION ADMIN: CPT

## 2018-10-22 RX ORDER — LANOLIN ALCOHOL/MO/W.PET/CERES
1000 CREAM (GRAM) TOPICAL DAILY
Qty: 30 TAB | Refills: 0 | Status: SHIPPED | OUTPATIENT
Start: 2018-10-22 | End: 2022-10-03

## 2018-10-22 RX ADMIN — APIXABAN 2.5 MG: 2.5 TABLET, FILM COATED ORAL at 08:45

## 2018-10-22 RX ADMIN — Medication 10 ML: at 06:35

## 2018-10-22 RX ADMIN — METOPROLOL SUCCINATE 50 MG: 50 TABLET, EXTENDED RELEASE ORAL at 08:45

## 2018-10-22 RX ADMIN — INFLUENZA VIRUS VACCINE 0.5 ML: 15; 15; 15; 15 SUSPENSION INTRAMUSCULAR at 09:32

## 2018-10-22 RX ADMIN — AMLODIPINE BESYLATE 5 MG: 5 TABLET ORAL at 08:45

## 2018-10-22 RX ADMIN — CLOPIDOGREL BISULFATE 75 MG: 75 TABLET ORAL at 08:45

## 2018-10-22 RX ADMIN — LEVOTHYROXINE SODIUM 112 MCG: 112 TABLET ORAL at 06:00

## 2018-10-22 NOTE — PROGRESS NOTES
CM provided the 2nd  Medicare letter, pt understood and pt's daughter signed it. Copy left with pt. F/u appts made and documented on the discharge paperwork. Pt's daughter will transport pt home by car. Care Management Interventions  PCP Verified by CM: Yes(Dr. Rubia Butcher )  Mode of Transport at Discharge:  Other (see comment)(pt's daughter transporting by car)  Hospital Transport Time of Discharge: 1000  Transition of Care Consult (CM Consult): Discharge Planning  Discharge Durable Medical Equipment: No  Physical Therapy Consult: Yes  Occupational Therapy Consult: Yes  Speech Therapy Consult: Yes  Current Support Network: Own Home, Other(lives with daughter )  Confirm Follow Up Transport: Family  Plan discussed with Pt/Family/Caregiver: Yes  Discharge Location  Discharge Placement: [address]

## 2018-10-22 NOTE — PROGRESS NOTES
Bedside and Verbal shift change report given to joselito guzman (oncoming nurse) by Letitia Millan (offgoing nurse).  Report included the following information SBAR, Kardex, Intake/Output, MAR and Recent Results. '

## 2018-10-22 NOTE — PROGRESS NOTES
Completed discharge teaching with pt, pt verbalized understanding of discharge teaching. Follow-up appts verbalized with pt, pt received prescription. Dietary teaching completed, pt verbalized understanding. Discharged pt to daughter, pt wheeled in a wheelchair by volunteer services.

## 2018-10-23 ENCOUNTER — PATIENT OUTREACH (OUTPATIENT)
Dept: FAMILY MEDICINE CLINIC | Age: 83
End: 2018-10-23

## 2018-10-23 NOTE — PROGRESS NOTES
Hospital Discharge Follow-Up Date/Time:  10/23/2018 4:59 PM 
 
Patient was admitted to [facility] on 10-17-18 and discharged on 10-22-18 for: 
 
Discharge Diagnosis:  
Right facial numbness Cervical stenosis Hx bilateral carotid stenosis Hyponatremia B12 deficiency HTN Hx Afib 
CKD3 Prostate cancer S/P partial thyroidectomy for cancer The physician discharge summary was available at the time of outreach. Patient was contacted within two business days of discharge. Challenges reviewed with the provider - Patient taking Eliquis and Plavix - Patient not taking Protonix 40mg day and Guaifenesin - Patient awaiting call back from office of Dr. Wes Vasquez/neurosurgery to schedule follow-up appointment - Fluid restriction of 1000ml day, patient states he is measuring all fluid intake Method of communication with provider :chart routing Inpatient RRAT score: 24 on 10-22-18. Was this a readmission? no  
Patient stated reason for the readmission: not applicable Nurse Navigator (NN) contacted the patient by telephone to perform post hospital discharge assessment. Verified name and  with patient as identifiers. Provided introduction to self, and explanation of the Nurse Navigator role. Reviewed discharge instructions and red flags with patient who verbalized understanding. Patient given an opportunity to ask questions and does not have any further questions or concerns at this time. The patient agrees to contact the PCP office for questions related to their healthcare. NN provided contact information for future reference. Disease Specific:   N/A Summary of patient's problems: 1. Patient taking Eliquis 2.5mg in the morning and 2.5mg at night, and Plavix 75mg tab daily. 2. Patient not taking Protonix 40mg day and Guaifenesin, notations made on medication reconciliation 10-23-18.   
3. Patient awaiting call back from office of Dr. Wes Vasquez/neurosurgery to schedule follow-up appointment. Patient contacted office of Dr. Denny Justice on 10-23-18. 4. Fluid restriction of 1000ml day, patient states he is measuring all fluid intake; however patient is unable to report how much fluid intake he has had today, 10-23-18. Home Health orders at discharge: none Home Health company: not applicable Date of initial visit: not applicable Durable Medical Equipment ordered/company: none upon discharge Durable Medical Equipment received: not applicable Barriers to care? ineffective coping, lack of knowledge about disease, stages of grief, transportation (daughter currently providing all transportation). Advance Care Planning:  
Does patient have an Advance Directive:  reviewed and current, Patient has AMD scanned into [facility] dated 3-. Medications per [facility] After Visit Summary dated 10-22-18:  
Discharge Medications: 
    
Current Discharge Medication List  
   
    
START taking these medications  
  Details  
cyanocobalamin (VITAMIN B-12) 1,000 mcg tablet Take 1 Tab by mouth daily. Qty: 30 Tab, Refills: 0  
   
   
    
CONTINUE these medications which have NOT CHANGED  
  Details  
atorvastatin (LIPITOR) 20 mg tablet Take  by mouth daily.  
   
losartan (COZAAR) 50 mg tablet Take 50 mg by mouth daily.  
   
metoprolol succinate (TOPROL-XL) 50 mg XL tablet Take 50 mg by mouth daily.  
   
clopidogrel (PLAVIX) 75 mg tab TAKE 1 TABLET BY MOUTH  DAILY Qty: 90 Tab, Refills: 3  
  Associated Diagnoses: History of TIA (transient ischemic attack)  
   
ELIQUIS 2.5 mg tablet    
   
levothyroxine (SYNTHROID) 112 mcg tablet Take 1 Tab by mouth Daily (before breakfast). Qty: 90 Tab, Refills: 3  
  Associated Diagnoses: Thyroid cancer (HCC)  
   
cholecalciferol, vitamin D3, (VITAMIN D3) 2,000 unit tab Take  by mouth nightly.  
   
pantoprazole (PROTONIX) 40 mg tablet TAKE 1 TABLET BY MOUTH  DAILY Qty: 90 Tab, Refills: 3   Associated Diagnoses: Gastroesophageal reflux disease without esophagitis  
   
GUAIFENESIN/DEXTROMETHORPHAN (CORICIDIN HBP PO) Take 1 Tab by mouth as needed.  
   
acetaminophen (TYLENOL EXTRA STRENGTH) 500 mg tablet Take  by mouth every six (6) hours as needed for Pain. Medication reconciliation was performed with patient, who verbalizes understanding of administration of home medications. There were no barriers to obtaining medications identified at this time. Patient states he is taking all medications as ordered. Patient taking Eliquis 2.5mg in the morning and 2.5mg at night, and Plavix 75mg tab daily. Referral to Pharm D needed: no  
 
Current Outpatient Medications Medication Sig  cyanocobalamin (VITAMIN B-12) 1,000 mcg tablet Take 1 Tab by mouth daily.  atorvastatin (LIPITOR) 20 mg tablet Take  by mouth daily.  losartan (COZAAR) 50 mg tablet Take 50 mg by mouth daily.  metoprolol succinate (TOPROL-XL) 50 mg XL tablet Take 50 mg by mouth daily.  clopidogrel (PLAVIX) 75 mg tab TAKE 1 TABLET BY MOUTH  DAILY  ELIQUIS 2.5 mg tablet  levothyroxine (SYNTHROID) 112 mcg tablet Take 1 Tab by mouth Daily (before breakfast).  acetaminophen (TYLENOL EXTRA STRENGTH) 500 mg tablet Take  by mouth every six (6) hours as needed for Pain.  cholecalciferol, vitamin D3, (VITAMIN D3) 2,000 unit tab Take  by mouth nightly.  pantoprazole (PROTONIX) 40 mg tablet TAKE 1 TABLET BY MOUTH  DAILY  GUAIFENESIN/DEXTROMETHORPHAN (CORICIDIN HBP PO) Take 1 Tab by mouth as needed. No current facility-administered medications for this visit. [facility] follow up appointment(s):  
Future Appointments Date Time Provider Anup Bauer 10/26/2018  9:30 AM Abhishek Phillips NP [facility]  
11/29/2018 10:00 AM Rock Abel MD [address] [facility] follow up appointment(s): Awaiting call back from the office of Dr. Josephine Vasquez/neurosurgery to schedule follow-up appointment. Patient has appointment on 11-2-18 at the office of Dr. Laura Regan/nephrology for lab work. [facility]:  out of service area, BLAIR KAUFMAN scheduled with Lilliana Centeno NP on 10-26-18. Goals  Attends follow-up appointments as directed. 10-23-18: Patient has ESPERANZA scheduled on 10-26-18, with Lilliana Centeno NP. Patient has neurology  follow-up scheduled with Dr. Kayla Glass on 11-29-18 and nephrology follow-up with Dr. Leilani Loving on 11-2-18. Patient states he is awaiting call back from the office of Dr. Josephine Vasquez/ava to schedule an appointment, patient states he contacted the office of Dr. Haylie Ngo today. ELIZABET  
  
  Returns to baseline activity level. 10-23-18: Patient has not returned to his baseline activity level. [id number] [facility] resources in place to maintain patient in the community (ie. Home Health, DME equipment, refer to, medication assistant plan, etc.)   
  10-23-18: Patient states he has 4 sons that live nearby and his daughter, Constance Martínez, resides with patient in his private residence. Home health services are not in place as patient is not home-bound at this time.  Shanelle Pedersen

## 2018-10-23 NOTE — Clinical Note
ESPERANZA scheduled for 10-26-18 with Julisa Booker NP. Patient currently taking Eliquis and Plavix.

## 2018-10-26 ENCOUNTER — OFFICE VISIT (OUTPATIENT)
Dept: FAMILY MEDICINE CLINIC | Age: 83
End: 2018-10-26

## 2018-10-26 VITALS
BODY MASS INDEX: 28.63 KG/M2 | WEIGHT: 211.4 LBS | SYSTOLIC BLOOD PRESSURE: 128 MMHG | TEMPERATURE: 98.2 F | HEART RATE: 72 BPM | HEIGHT: 72 IN | RESPIRATION RATE: 20 BRPM | OXYGEN SATURATION: 96 % | DIASTOLIC BLOOD PRESSURE: 74 MMHG

## 2018-10-26 DIAGNOSIS — I10 ESSENTIAL HYPERTENSION: ICD-10-CM

## 2018-10-26 DIAGNOSIS — E87.1 HYPONATREMIA: ICD-10-CM

## 2018-10-26 DIAGNOSIS — Z09 HOSPITAL DISCHARGE FOLLOW-UP: Primary | ICD-10-CM

## 2018-10-26 DIAGNOSIS — K59.00 CONSTIPATION, UNSPECIFIED CONSTIPATION TYPE: ICD-10-CM

## 2018-10-26 NOTE — PROGRESS NOTES
Chief Complaint Patient presents with  Transitions Of Care 1. Have you been to the ER, urgent care clinic since your last visit? Hospitalized since your last visit? Yes Reason for visit: CVA 2. Have you seen or consulted any other health care providers outside of the [address] [place] [address] since your last visit? Include any pap smears or colon screening. No  
 
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 Pt was at hospital from 10/17-10/22. Neurology: Dr. Patricia Lombardo. Pt is on fluid restriction 1 liter of fluid a day.

## 2018-10-26 NOTE — PATIENT INSTRUCTIONS
Hyponatremia: Care Instructions Your Care Instructions Hyponatremia (say \"be-ob-uav-TREE-patricia-uh\") means that you don't have enough sodium in your blood. It can cause nausea, vomiting, and headaches. Or you may not feel hungry. In serious cases, it can cause seizures, a coma, or even death. Hyponatremia is not a disease. It is a problem caused by something else, such as medicines or exercising for a long time in hot weather. You can get hyponatremia if you lose a lot of fluids and then you drink a lot of water or other liquids that don't have much sodium. You can also get it if you have kidney, liver, heart, or other health problems. Treatment is focused on getting your sodium levels back to normal. 
Follow-up care is a key part of your treatment and safety. Be sure to make and go to all appointments, and call your doctor if you are having problems. It's also a good idea to know your test results and keep a list of the medicines you take. How can you care for yourself at home? · If your doctor recommends it, drink fluids that have sodium. Sports drinks are a good choice. Or you can eat salty foods. · If your doctor recommends it, limit the amount of water you drink. And limit fluids that are mostly water. These include tea, coffee, and juice. · Take your medicines exactly as prescribed. Call your doctor if you have any problems with your medicine. · Get your sodium levels tested when your doctor tells you to. When should you call for help? Call 911 anytime you think you may need emergency care.  For example, call if: 
  · You have a seizure.  
  · You passed out (lost consciousness).  
 Call your doctor now or seek immediate medical care if: 
  · You are confused or it is hard to focus.  
  · You have little or no appetite.  
  · You feel sick to your stomach or you vomit.  
  · You have a headache.  
  · You have mood changes.  
  · You feel more tired than usual.  
  Watch closely for changes in your health, and be sure to contact your doctor if: 
  · You do not get better as expected. Where can you learn more? Go to http://jagdeep-pham.info/. Enter N890 in the search box to learn more about \"Hyponatremia: Care Instructions. \" Current as of: June 26, 2018 Content Version: 11.8 © 2694-1472 [employer]. Care instructions adapted under license by [employer] (which disclaims liability or warranty for this information). If you have questions about a medical condition or this instruction, always ask your healthcare professional. [place] [address] any warranty or liability for your use of this information.

## 2018-10-26 NOTE — PROGRESS NOTES
Subjective: Chief Complaint Patient presents with  Transitions Of Care HPI: 
Shai Gomze. is a 81 y.o. male presents for hospital follow up. Mr. Beryle Nickels is a 81y.o. year old male, he is seen today for Transition of Care services following a hospital discharge. He was admitted to ED [facility] 10/17/18 to 10/22/18 with discharge diagnosis of  
Right facial numbness Cervical stenosis Hx bilateral carotid stenosis Hyponatremia B12 deficiency HTN Hx Afib 
CKD3 Prostate cancer S/P partial thyroidectomy for cancer Our office Nurse Navigator performed an outreach to Mr. Emmanuel Browning on 10/23/18 (within 2 business days of discharge) to complete medication reconciliation and a telephonic assessment of his condition. ER note:  \"presents ambulatory to the ED with cc of right sided facial numbness, tongue tingling, and right sided weakness for the last three days. He also report daily HA upon wakening for the last two days\". Hospital discharge summary: \"He has history of afib, HTN, CKD, prostate cancer and prior TIA who presents with new stroke like symptoms.  Patient presented to the ER on 9/22 because of elevated BP and vague symptoms of leg weakness.  CT head was negative then.   This past weekend, he experienced some on and off right facial numbness and tingling.  This was associated with posterior HA but no visual change, weakness, speech or gait difficulties. Heath Felipe his daughter realized what was happening and called his PCP's office and was referred to the ER.  His symptoms improved en route and is almost resolved by the time I saw him.  CT head again showed nothing acute but his Na 128 which is new compared to that last ER visit.   Nephrology consulted and we were asked to admit for work up and evaluation of the above problems.  
[address] Course by Main Problems:  
Right facial numbness in the setting of cervical stenosis Hx bilateral carotid stenosis CT head unremarkable. MRI and MRA brain negative for any acute intracranial pathology nor vessel occlusion but revealed moderate to severe C4-C5 cervical stenosis with mild cord compression. A1c 5.1. LDL 33. Carotid US with stable stenosis bilaterally compared to '16. Echo cardiogram with EF 55-60%, mild concentric hypertrophy. Pt evaluated by neurology in consultation, recommended to cont' plavix, lipitor and MRI cerival spine with neurosurgery consultation. MRI cervical spine showed Multilevel severe degenerative disc disease and degenerative changes. Severe neuroforaminal narrowing at C3-C4, C4-C5, C5-C6, and C6-C7. Severe and moderate/severe spinal canal stenosis at C3-C4, C4-C5, and C5-C6.   Discussed with neurosurgery, no intervention at this time. Lori Rivas will need to f/u with Dr Sonya Mohan . Pt remains hemodynamically stable, labs are stable. He is being discharge to home with without any needs as per PT/OT's eval. 
  
Hyponatremia Na at 128 on presentation. Uric acid WNL. Urine Na is elevated. Low serum o smolality. Urine osmolality at 280. Na improved with NS. Follow up with nephrology outpt as needed. Discussed fluid restriction of 1L/day with patient. 
  
B12 deficiency High Homocysteine. Folate WNL. B12 77. He received Subq b12 supplementation while inpt. Will cont' with high dose vit B12 PO route on discharge. He will need to have his B12 recheck in a few weeks at PCP's follow up. 
  
HTN, stable, cont' home meds.  
  
Hx Afib HR is controlled. continue toprol and eliquis. 
  
CKD3 Stable. Close monitor. 
  
Prostate cancer S/p seed x 2 and XRT.  Follows with Dr Zackery Mulligan 
  
S/P partial thyroidectomy for cancer Cont' with synthroid replacement\"    
  
Patient reports that he is feeling better since discharge but  that he has not really felt \"right\" since he had colonoscopy 9/17/18, states that has had persistent complaints of constipation.   He says that he still has some constipation, has had to use magnesium citrate (once) and was drinking some prune juice which seemed to help. He has also had some issues with hyponatremia as well; is on 1 liter per day fluid restriction since discharge from hospital.  
He was seen by nephrology and will need to have follow up (Dr Amy Corona) and has appointment on 11/2/18 for blood work. He tells me that cardiology Discontinued lisinopril earlier in the year and was put on Valsartan. Then valsartan was recalled and changed to losartan. Then in September was taken off amlodipine and put on Metoprolol. He notes that since he was taken off the lisinopril and put on Valsartan he has had increase in urinary frequency and some urinary incontinence. Has follow up with Dr Josephine Nichols (neurosurgery) this coming Monday to discuss the findings on MRI scan \"does show severe is cervical spinal stenosis at several levels in the cervical cord from C3-C6 with a lot of neural foraminal disease\". He was seen by Dr John Sow in the hospital who did not appreciate any particular need for immediate intervention. Will discuss further at upcoming appointment. There is thought that his intermittent weakness is coming from the spinal stenosis No hospital, ER or specialist visits since last primary care visit except as noted above. Past Medical History:  
Diagnosis Date  Carotid artery stenosis, asymptomatic 8/1/2014 Right side 50-79%  Chronic kidney disease   
 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 Dr. Zackery Mulligan  Stroke [facility]) 2002 TIA, Dr. Maci Mauricio, no residual effects as of 9/2018  Thyroid cancer [facility]) Jan 2015  Unspecified vitamin D deficiency Social History Tobacco Use  Smoking status: Former Smoker Years: 5.00 Last attempt to quit: 1955 Years since quittin.7  Smokeless tobacco: Never Used Substance Use Topics  Alcohol use: Yes Comment: occassional mixed drink or beer  Drug use: No  
 
 
Outpatient Medications Marked as Taking for the 10/26/18 encounter (Office Visit) with Ami Bales NP Medication Sig Dispense Refill  cyanocobalamin (VITAMIN B-12) 1,000 mcg tablet Take 1 Tab by mouth daily. 30 Tab 0  
 atorvastatin (LIPITOR) 20 mg tablet Take  by mouth daily.  losartan (COZAAR) 50 mg tablet Take 50 mg by mouth daily.  metoprolol succinate (TOPROL-XL) 50 mg XL tablet Take 50 mg by mouth daily.  clopidogrel (PLAVIX) 75 mg tab TAKE 1 TABLET BY MOUTH  DAILY 90 Tab 3  
 ELIQUIS 2.5 mg tablet  levothyroxine (SYNTHROID) 112 mcg tablet Take 1 Tab by mouth Daily (before breakfast). 90 Tab 3  
 GUAIFENESIN/DEXTROMETHORPHAN (CORICIDIN HBP PO) Take 1 Tab by mouth as needed.  acetaminophen (TYLENOL EXTRA STRENGTH) 500 mg tablet Take  by mouth every six (6) hours as needed for Pain.  cholecalciferol, vitamin D3, (VITAMIN D3) 2,000 unit tab Take  by mouth nightly. Allergies Allergen Reactions  Sulfa (Sulfonamide Antibiotics) [facility] Maintenance reviewed -. ROS: 
Gen: no fatigue, no fever, no chills, no unexplained weight loss or weight gain Eyes: no excessive tearing, itching, or discharge Nose: no rhinorrhea, no sinus pain Mouth: no oral lesions, no sore throat, no difficulty swallowing Resp: no shortness of breath, no wheezing, no cough CV: no chest pain, no orthopnea, no paroxysmal nocturnal dyspnea, no lower extremity edema, no palpitations Abd: no nausea, no heartburn, no diarrhea, no abdominal pain Neuro: no headaches, no syncope or presyncopal episodes Endo: no polyuria, no polydipsia. : no hematuria, no dysuria, Heme: no lymphadenopathy, no easy bruising or bleeding, no night sweats MSK: no joint pain or swelling PE: 
Visit Vitals /74 (BP 1 Location: Left arm, BP Patient Position: Sitting) Pulse 72 Temp 98.2 °F (36.8 °C) (Oral) Resp 20 Ht 6' (1.829 m) Wt 211 lb 6.4 oz (95.9 kg) SpO2 96% BMI 28.67 kg/m² Gen: alert, oriented, no acute distress Head: normocephalic, atraumatic Ears: external auditory canals clear, TMs without erythema or effusion Eyes: pupils equal round reactive to light, sclera clear, conjunctiva clear Oral: moist mucus membranes, no oral lesions, no pharyngeal inflammation or exudate Neck: symmetric normal sized thyroid, no carotid bruits, no jugular vein distention Resp: no increase work of breathing, lungs clear to ausculation bilaterally, no wheezing, rales or rhonchi CV: S1, S2 normal.  No murmurs, rubs, or gallops. Abd: soft, not tender, not distended. No hepatosplenomegaly. Normal bowel sounds. No hernias. No abdominal or renal bruits. Neuro: cranial nerves intact, normal strength and movement in all extremities, reflexes and sensation intact and symmetric. Skin: no lesion or rash Extremities: no cyanosis or edema No results found for this visit on 10/26/18. Assessment/Plan: 
Differential diagnosis and treatment options reviewed with patient who is in agreement with treatment plan as outlined below. ICD-10-CM ICD-9-CM 1. Hospital discharge follow-up Z09 V67.59   
2. Hyponatremia E87.1 276.1 3. Essential hypertension I10 401.9 4. Constipation, unspecified constipation type K59.00 564.00   
 
BP at goal. No change in medication at this time. Will follow up with neurosurgery as scheduled next week Continue fluid restriction for hyponatremia. Will have labs done next week as well and follow up with Nephrology.    
Discussed constipation:  May do some daily warm prune juice and if no improvement, may take daily stool softener. If still no improvement, can take miralax. Discussed BMI and healthy weight. Encouraged patient to work to implement changes including diet high in raw fruits and vegetables, lean protein and good fats. Limit refined, processed carbohydrates and sugar. Encouraged regular exercise. Recommended regular cardiovascular exercise 3-6 times per week for 30-60 minutes daily. Follow up 2 weeks or sooner if needed. Needs wellness exam at that time. I have discussed the diagnosis with the patient and the intended plan as seen in the above orders. The patient has received an after-visit summary and questions were answered concerning future plans. I have discussed medication side effects and warnings with the patient as well. The patient verbalizes understanding and agreement with the plan.

## 2018-11-02 ENCOUNTER — PATIENT OUTREACH (OUTPATIENT)
Dept: FAMILY MEDICINE CLINIC | Age: 83
End: 2018-11-02

## 2018-11-02 NOTE — PROGRESS NOTES
Goals  Attends follow-up appointments as directed. 10-23-18: Patient has ESPERANZA scheduled on 10-26-18, with Deanne Hester NP. Patient has neurology  follow-up scheduled with Dr. Flash Muñoz on 11-29-18 and nephrology follow-up with Dr. Lebron Hill on 11-2-18. Patient states he is awaiting call back from the office of Dr. Pari Vasquez/neurosurgery to schedule an appointment, patient states he contacted the office of Dr. Sj Mallory today. Anne Blanc  
 
11-2-18: Patient attended [facility] visit with Deanne Hester NP on 10-26-18 as scheduled. Patient visited the office of Dr. Ge Regan/nephrology on 11-2-18 for lab work and next nephrology follow-up is scheduled for 4-. Patient saw Dr. Pari Vasquez/neurosurgery on 10-29-18 as scheduled. Patient reminded of upcoming appointment with Dr. Winnie Green/neurology on 11-29-18 and patient state he plans to attend. NN offered to move next PCP follow-up to 11-9-18, as Deanne Hester NP, stated in her 10-26-18 note that patient should return in 2 weeks; however patient declined moving appointment and desires to leave appointment on 11-29-18. Patient has attended all appointments to date. ELIZABET 
  
  Returns to baseline activity level. 10-23-18: Patient has not returned to his baseline activity level. Anne Blanc 
 
11-2-18: Patient states he has returned to his baseline activity level. [id number] [facility] resources in place to maintain patient in the community (ie. Home Health, DME equipment, refer to, medication assistant plan, etc.)   
  10-23-18: Patient states he has 4 sons that live nearby and his daughter, Kasia Isidro, resides with patient in his private residence. Home health services are not in place as patient is not home-bound at this time. ELIZABET 
 
11-2-18: Good support network remains in place.  Anne Blanc

## 2018-11-09 ENCOUNTER — TELEPHONE (OUTPATIENT)
Dept: FAMILY MEDICINE CLINIC | Age: 83
End: 2018-11-09

## 2018-11-09 ENCOUNTER — OFFICE VISIT (OUTPATIENT)
Dept: FAMILY MEDICINE CLINIC | Age: 83
End: 2018-11-09

## 2018-11-09 VITALS
WEIGHT: 212.6 LBS | HEART RATE: 67 BPM | OXYGEN SATURATION: 95 % | DIASTOLIC BLOOD PRESSURE: 76 MMHG | BODY MASS INDEX: 28.83 KG/M2 | TEMPERATURE: 97.6 F | SYSTOLIC BLOOD PRESSURE: 132 MMHG

## 2018-11-09 DIAGNOSIS — J40 BRONCHITIS: Primary | ICD-10-CM

## 2018-11-09 DIAGNOSIS — R06.2 WHEEZE: ICD-10-CM

## 2018-11-09 DIAGNOSIS — J45.20 MILD INTERMITTENT ASTHMA WITHOUT COMPLICATION: ICD-10-CM

## 2018-11-09 RX ORDER — BENZONATATE 200 MG/1
200 CAPSULE ORAL
Qty: 21 CAP | Refills: 1 | Status: SHIPPED | OUTPATIENT
Start: 2018-11-09 | End: 2018-11-16

## 2018-11-09 RX ORDER — DOCUSATE SODIUM 100 MG/1
100 CAPSULE, LIQUID FILLED ORAL 2 TIMES DAILY
COMMUNITY
End: 2020-09-24

## 2018-11-09 RX ORDER — DOXYCYCLINE 100 MG/1
100 TABLET ORAL 2 TIMES DAILY
Qty: 20 TAB | Refills: 0 | Status: SHIPPED | OUTPATIENT
Start: 2018-11-09 | End: 2018-11-19

## 2018-11-09 RX ORDER — ALBUTEROL SULFATE 0.83 MG/ML
2.5 SOLUTION RESPIRATORY (INHALATION)
Qty: 5 PACKAGE | Refills: 3 | Status: SHIPPED | OUTPATIENT
Start: 2018-11-09 | End: 2020-01-28

## 2018-11-09 RX ORDER — NEBULIZER AND COMPRESSOR
1 EACH MISCELLANEOUS
Qty: 1 EACH | Refills: 0 | Status: SHIPPED | OUTPATIENT
Start: 2018-11-09 | End: 2020-09-08 | Stop reason: ALTCHOICE

## 2018-11-09 RX ORDER — ALBUTEROL SULFATE 0.83 MG/ML
2.5 SOLUTION RESPIRATORY (INHALATION) ONCE
Qty: 1 EACH | Refills: 0
Start: 2018-11-09 | End: 2018-11-09

## 2018-11-09 NOTE — TELEPHONE ENCOUNTER
Daughter is calling needing to speak with nurse in ref to pt and needing to know if he needs to be seen or what needs to be done

## 2018-11-09 NOTE — TELEPHONE ENCOUNTER
Called and spoke to pt's daughter. Pt is feeling tired, weak and congested. She has tried OTC medications to treat congestion and nothing is helping.  Gave appt to see Dr. Tom Govea today at 1:40pm

## 2018-11-09 NOTE — PATIENT INSTRUCTIONS
Learning About Nebulizers  What is a nebulizer? A nebulizer is a tool that delivers liquid medicine as a fine mist. You breathe in the medicine through a mouthpiece or face mask. This sends the medicine directly to your airways and lungs. You breathe in the medicine for a few minutes. What is it used for? A nebulizer may be used to treat respiratory problems. These include asthma and chronic obstructive pulmonary disease (COPD). If you have asthma, it can be used with daily controller medicines or with quick-relief medicine during an attack or flare-up. A nebulizer can make inhaling medicines easier. It may be very helpful if it is hard for you to breathe or use an inhaler. How do you use a nebulizer? 1. Put the medicine into the medicine container. Be sure to measure the right amount. 2. Make sure that the container is connected to the mouthpiece or face mask. 3. Turn on the air compressor. 4. Take deep, slow breaths through the mouthpiece or mask. Hold each breath for about 2 seconds. 5. Continue breathing until the medicine is gone from the container. When the medicine is gone, there will be no more mist coming out. This may take about 10 minutes. 6. Shake the container to make sure you get all the medicine. Where can you learn more? Go to http://jagdeep-pham.info/. Enter X044 in the search box to learn more about \"Learning About Nebulizers. \"  Current as of: December 6, 2017  Content Version: 11.8  © 4808-7124 [employer]. Care instructions adapted under license by [employer] (which disclaims liability or warranty for this information). If you have questions about a medical condition or this instruction, always ask your healthcare professional. [place] [address] any warranty or liability for your use of this information.

## 2018-11-09 NOTE — PROGRESS NOTES
JUANA Rhoades . is a 81 y.o. male who presents with congestion, cough. Started 5 days ago. ok sleep, drinking fluids. Other symptoms include none. Denies fever, wheezing. Recently got out of the hospital there is a little concerned about his sodium. Had a sodium of 131 at his nephrologist on 11/2. Will early to recheck it. Has tried Coricidin with mild relief. 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 Dr. Alaniz Liliane Stroke [facility]) 2002    TIA, Dr. Teto Booker, no residual effects as of 9/2018    Thyroid cancer [facility]) Jan 2015    Unspecified vitamin D deficiency        Meds:   Current Outpatient Medications   Medication Sig Dispense Refill    docusate sodium (COLACE) 100 mg capsule Take 100 mg by mouth two (2) times a day.  benzonatate (TESSALON) 200 mg capsule Take 1 Cap by mouth three (3) times daily as needed for Cough for up to 7 days. 21 Cap 1    doxycycline (ADOXA) 100 mg tablet Take 1 Tab by mouth two (2) times a day for 10 days. 20 Tab 0    cyanocobalamin (VITAMIN B-12) 1,000 mcg tablet Take 1 Tab by mouth daily. 30 Tab 0    atorvastatin (LIPITOR) 20 mg tablet Take  by mouth daily.  losartan (COZAAR) 50 mg tablet Take 50 mg by mouth daily.  metoprolol succinate (TOPROL-XL) 50 mg XL tablet Take 50 mg by mouth daily.       clopidogrel (PLAVIX) 75 mg tab TAKE 1 TABLET BY MOUTH  DAILY 90 Tab 3    ELIQUIS 2.5 mg tablet       levothyroxine (SYNTHROID) 112 mcg tablet Take 1 Tab by mouth Daily (before breakfast). 90 Tab 3    GUAIFENESIN/DEXTROMETHORPHAN (CORICIDIN HBP PO) Take 1 Tab by mouth as needed.  acetaminophen (TYLENOL EXTRA STRENGTH) 500 mg tablet Take  by mouth every six (6) hours as needed for Pain.  cholecalciferol, vitamin D3, (VITAMIN D3) 2,000 unit tab Take  by mouth nightly.  pantoprazole (PROTONIX) 40 mg tablet TAKE 1 TABLET BY MOUTH  DAILY 90 Tab 3       Allergies: Allergies   Allergen Reactions    Sulfa (Sulfonamide Antibiotics) Hives       Smoker:  Social History     Tobacco Use   Smoking Status Former Smoker    Years: 5.00    Last attempt to quit: 1955    Years since quittin.8   Smokeless Tobacco Never Used       ETOH:   Social History     Substance and Sexual Activity   Alcohol Use Yes    Comment: occassional mixed drink or beer       FH:   Family History   Problem Relation Age of Onset    Cancer Mother         hodgkins disease    Heart Disease Father     Hypertension Father     Other Neg Hx         no hypercalcemia or kidney stones        ROS:  As listed in HPI. In addition:  Constitutional:   No headache, fever, fatigue, weight loss or weight gain      Eyes:   No redness, pruritis, pain, visual changes, swelling, or discharge      Ears:    No pain, loss or changes in hearing     Cardiac:    No chest pain      Resp:   No shortness of breath or wheeze     Neuro   No loss of consciousness, dizziness, seizure    Physical Exam:  Blood pressure 132/76, pulse 67, temperature 97.6 °F (36.4 °C), temperature source Oral, weight 212 lb 9.6 oz (96.4 kg), SpO2 95 %. GEN: No apparent distress. EYES:  Conjunctiva clear  EAR: TM are clear and without effusion. NOSE: Turbinates are congested  OROPHYARYNX: No erythema or tonsilar exudates  NECK:  Submandibular LAD.  Non tender         LUNGS: Respirations unlabored; Little wheezes in the bases, rhonchi, cleared nicely with cough  CARDIOVASCULAR: Regular, rate, and rhythm  ABDOMEN: Soft; nontender;nl BS  SKIN: No obvious rashes. Assessment and Plan     Bronchitis  Bilateral gunky, low suspicion for pneumonia  Antibiotics because of age and comorbidity-doxycycline  Albuterol trial, felt better  Tessalon  For upper respiratory symptoms try nasal steroids, warm water, continue the fluid restriction, he appears well-hydrated      RTC if ssx worsen or fail to improve as expected      ICD-10-CM ICD-9-CM    1. Bronchitis J40 490 benzonatate (TESSALON) 200 mg capsule      doxycycline (ADOXA) 100 mg tablet   2. Wheeze R06.2 786.07    3. Mild intermittent asthma without complication V42.49 463.88 albuterol (PROVENTIL VENTOLIN) 2.5 mg /3 mL (0.083 %) nebulizer solution      ALBUTEROL, INHAL. SOL., FDA-APPROVED FINAL, NON-COMPOUND UNIT DOSE, 1 MG      INHAL RX, AIRWAY OBST/DX SPUTUM INDUCT      albuterol (PROVENTIL VENTOLIN) 2.5 mg /3 mL (0.083 %) nebulizer solution      Nebulizer & Compressor machine       AVS given.  Pt expressed understanding of instructions

## 2018-11-23 ENCOUNTER — HOSPITAL ENCOUNTER (OUTPATIENT)
Dept: LAB | Age: 83
Discharge: HOME | End: 2018-11-23
Payer: MEDICARE

## 2018-11-23 ENCOUNTER — OFFICE VISIT (OUTPATIENT)
Dept: FAMILY MEDICINE CLINIC | Age: 83
End: 2018-11-23

## 2018-11-23 VITALS
BODY MASS INDEX: 28.17 KG/M2 | HEART RATE: 72 BPM | SYSTOLIC BLOOD PRESSURE: 143 MMHG | WEIGHT: 208 LBS | DIASTOLIC BLOOD PRESSURE: 78 MMHG | OXYGEN SATURATION: 96 % | HEIGHT: 72 IN | TEMPERATURE: 98.2 F | RESPIRATION RATE: 12 BRPM

## 2018-11-23 DIAGNOSIS — E53.8 B12 DEFICIENCY: ICD-10-CM

## 2018-11-23 DIAGNOSIS — Z00.00 ROUTINE GENERAL MEDICAL EXAMINATION AT A HEALTH CARE FACILITY: Primary | ICD-10-CM

## 2018-11-23 DIAGNOSIS — E87.1 HYPONATREMIA: ICD-10-CM

## 2018-11-23 PROCEDURE — 80053 COMPREHEN METABOLIC PANEL: CPT

## 2018-11-23 PROCEDURE — 36415 COLL VENOUS BLD VENIPUNCTURE: CPT

## 2018-11-23 PROCEDURE — 82607 VITAMIN B-12: CPT

## 2018-11-23 NOTE — PATIENT INSTRUCTIONS
Medicare Part B Preventive Services Limitations Recommendation Scheduled   Bone Mass Measurement  (age 72 & older, biennial) Requires diagnosis related to osteoporosis or estrogen deficiency. Biennial benefit unless patient has history of long-term glucocorticoid tx or baseline is needed because initial test was by other method     Cardiovascular Screening Blood Tests (every 5 years)  Total cholesterol, HDL, Triglycerides Order as a panel if possible     Colorectal Cancer Screening  -Fecal occult blood test (annual)  -Flexible sigmoidoscopy (5y)  -Screening colonoscopy (10y)  -Barium Enema      Counseling to Prevent Tobacco Use (up to 8 sessions per year)  - Counseling greater than 3 and up to 10 minutes  - Counseling greater than 10 minutes Patients must be asymptomatic of tobacco-related conditions to receive as preventive service     Diabetes Screening Tests (at least every 3 years, Medicare covers annually or at 6-month intervals for prediabetic patients)    Fasting blood sugar (FBS) or glucose tolerance test (GTT) Patient must be diagnosed with one of the following:  -Hypertension, Dyslipidemia, obesity, previous impaired FBS or GTT  Or any two of the following: overweight, FH of diabetes, age ? 72, history of gestational diabetes, birth of baby weighing more than 9 pounds     Diabetes Self-Management Training (DSMT) (no USPSTF recommendation) Requires referral by treating physician for patient with diabetes or renal disease. 10 hours of initial DSMT session of no less than 30 minutes each in a continuous 12-month period. 2 hours of follow-up DSMT in subsequent years.      Glaucoma Screening (no USPSTF recommendation) Diabetes mellitus, family history, , age 48 or over,  American, age 72 or over     Human Immunodeficiency Virus (HIV) Screening (annually for increased risk patients)  HIV-1 and HIV-2 by EIA, HILARIO, rapid antibody test, or oral mucosa transudate Patient must be at increased risk for HIV infection per USPSTF guidelines or pregnant. Tests covered annually for patients at increased risk. Pregnant patients may receive up to 3 test during pregnancy. Medical Nutrition Therapy (MNT) (for diabetes or renal disease not recommended schedule) Requires referral by treating physician for patient with diabetes or renal disease. Can be provided in same year as diabetes self-management training (DSMT), and CMS recommends medical nutrition therapy take place after DSMT. Up to 3 hours for initial year and 2 hours in subsequent years. Prostate Cancer Screening (annually up to age 76)  - Digital rectal exam (BÁRBARA)  - Prostate specific antigen (PSA) Annually (age 48 or over), BÁRBARA not paid separately when covered E/M service is provided on same date     Seasonal Influenza Vaccination (annually)      Pneumococcal Vaccination (once after 72)      Hepatitis B Vaccinations (if medium/high risk) Medium/high risk factors:  End-stage renal disease,  Hemophiliacs who received Factor VIII or IX concentrates, Clients of institutions for the mentally retarded, Persons who live in the same house as a HepB virus carrier, Homosexual men, Illicit injectable drug abusers. Screening Mammography (biennial age 54-69)? Annually (age 36 or over)     Screening Pap Tests and Pelvic Examination (up to age 79 and after 79 if unknown history or abnormal study last 10 years) Every 25 months except high risk     Ultrasound Screening for Abdominal Aortic Aneurysm (AAA) (once) Patient must be referred through IPPE and not have had a screening for abdominal aortic aneurysm before under Medicare.   Limited to patients who meet one of the following criteria:  - Men who are 73-68 years old and have smoked more than 100 cigarettes in their lifetime.  -Anyone with a FH of AAA  -Anyone recommended for screening by USPSTF     Family Practice Management 2011    Health Maintenance Due   Topic Date Due    Shingles Vaccine (1 of 2) 02/25/1986    Glaucoma Screening   02/02/2018    Annual Well Visit  08/19/2018

## 2018-11-23 NOTE — PROGRESS NOTES
Medicare Annual Wellness Visit     I have reviewed the patient's medical history in detail and updated the computerized patient record. History   Jerrell Mendez is a 81 y.o. male who presents for a wellness visit. Recall that he was hospitalized in October for right facial numbness. Received a TIA workup which revealed significant cervical stenosis. Neurosurgery evaluated and did not feel that intervention was necessary. Hyponatremia with sodium 128 on presentation, currently being treated with fluid restriction and managed by nephrology. Low B12 in the hospital, got a shot. 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   Dr. Delmar Chadwick Stroke [facility]) 2002    TIA, Dr. Mila Morris, no residual effects as of 9/2018    Thyroid cancer [facility]) Jan 2015    Unspecified vitamin D deficiency       Past Surgical History:   Procedure Laterality Date   Shakira England      Cardiac Cath   Dr. Johana Lake    HX APPENDECTOMY      HX CHOLECYSTECTOMY      HX HEENT      tonsillectomy    HX OTHER SURGICAL      vasectomy    HX PARATHYROIDECTOMY  01/14/2015    right superior parathyroid gland removed and left superior parathyroid gland removed    HX PARTIAL THYROIDECTOMY  1/14/15    right lobectomy    HX UROLOGICAL      Seeds for prostate cancer , 4 dialations     HX UROLOGICAL  1/14/15    BLADDER NECK INCISION, Cold knife incision of bladder neck contracture, cystoscopy     Current Outpatient Medications   Medication Sig Dispense Refill    varicella-zoster recombinant, PF, (SHINGRIX, PF,) 50 mcg/0.5 mL susr injection 0.5 mL by IntraMUSCular route every three (3) months for 2 doses. 1 Each 1    docusate sodium (COLACE) 100 mg capsule Take 100 mg by mouth two (2) times a day.  albuterol (PROVENTIL VENTOLIN) 2.5 mg /3 mL (0.083 %) nebulizer solution 3 mL by Nebulization route every four (4) hours as needed for Wheezing. 5 Package 3    Nebulizer & Compressor machine 1 Each by Does Not Apply route every four (4) hours as needed. 1 Each 0    atorvastatin (LIPITOR) 20 mg tablet Take  by mouth daily.  losartan (COZAAR) 50 mg tablet Take 50 mg by mouth daily.  metoprolol succinate (TOPROL-XL) 50 mg XL tablet Take 50 mg by mouth daily.  clopidogrel (PLAVIX) 75 mg tab TAKE 1 TABLET BY MOUTH  DAILY 90 Tab 3    ELIQUIS 2.5 mg tablet       levothyroxine (SYNTHROID) 112 mcg tablet Take 1 Tab by mouth Daily (before breakfast). 90 Tab 3    acetaminophen (TYLENOL EXTRA STRENGTH) 500 mg tablet Take  by mouth every six (6) hours as needed for Pain.  cholecalciferol, vitamin D3, (VITAMIN D3) 2,000 unit tab Take  by mouth nightly.  cyanocobalamin (VITAMIN B-12) 1,000 mcg tablet Take 1 Tab by mouth daily. 30 Tab 0    pantoprazole (PROTONIX) 40 mg tablet TAKE 1 TABLET BY MOUTH  DAILY 90 Tab 3    GUAIFENESIN/DEXTROMETHORPHAN (CORICIDIN HBP PO) Take 1 Tab by mouth as needed.        Allergies   Allergen Reactions    Sulfa (Sulfonamide Antibiotics) Hives     Family History   Problem Relation Age of Onset    Cancer Mother         hodgkins disease    Heart Disease Father     Hypertension Father     Other Neg Hx         no hypercalcemia or kidney stones     Social History     Tobacco Use    Smoking status: Former Smoker     Years: 5.00     Last attempt to quit: 1955     Years since quittin.8    Smokeless tobacco: Never Used   Substance Use Topics    Alcohol use: Yes     Comment: occassional mixed drink or [facility]     Patient Active Problem List   Diagnosis Code    TIA (transient ischemic attack) G45.9    H/O prostate cancer Z85.46    HBP (high blood pressure) I10    GERD (gastroesophageal reflux disease) K21.9    CKD (chronic kidney disease) stage 3, GFR 30-59 ml/min ([facility]) N18.3    Hyperlipidemia E78.5    Carotid artery stenosis, asymptomatic I65.29    Unspecified vitamin D deficiency E55.9    History of thyroid cancer Z85.850    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    History of hyperparathyroidism Z86.39    Stenosis of both internal carotid arteries I65.23    History of CVA (cerebrovascular accident) Z80.78    CVA (cerebral vascular accident) ([facility] [address] I63.9    Bilateral carotid artery stenosis I65.23    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 No    Do you average more than 14 drinks per week? No      Functional Ability and Level of Safety:     Hearing Loss   none    Activities of Daily Living   Self-care. Requires assistance with: no ADLs    Fall Risk     Fall Risk Assessment, last 12 mths 10/26/2018   Able to walk? Yes   Fall in past 12 months? No     Abuse Screen   Patient is not abused    Review of Systems   ROS:  As listed in HPI.  In addition:  Constitutional:   No headache, fever, fatigue, weight loss or weight gain      Eyes:   No redness, pruritis, pain, visual changes, swelling, or discharge      Ears:    No pain, loss or changes in hearing     Cardiac:    No chest pain      Resp:   No cough or shortness of breath      Neuro   No loss of consciousness, dizziness, seizure    Physical Examination     Evaluation of Cognitive Function:  Mood/affect:  happy  Appearance: age appropriate  Family member/caregiver input:     Physical Exam:  Blood pressure 143/78, pulse 72, temperature 98.2 °F (36.8 °C), resp. rate 12, height 6' (1.829 m), weight 208 lb (94.3 kg), SpO2 96 %. GEN: No apparent distress. Alert and oriented and responds to all questions appropriately. EYES:  Conjunctiva clear; pupils round and reactive to light; extraocular movements are intact. EAR: External ears are normal.  Tympanic membranes are clear and without effusion. NOSE: Turbinates are within normal limits. No drainage  OROPHYARYNX: No oral lesions or exudates. NECK:  Supple; no masses; thyroid normal           LUNGS: Respirations unlabored; clear to auscultation bilaterally  CARDIOVASCULAR: Regular, rate, and rhythm without murmurs   ABDOMEN: Soft; nontender; nondistended; normoactive bowel sounds; no masses or organomegaly  NEUROLOGIC:  No focal neurologic deficits. Strength and sensation grossly intact. Coordination and gait grossly intact. EXT: Well perfused. No edema. SKIN: No obvious rashes. Patient Care Team:  Bhanu Sahni MD as PCP - General (Internal Medicine)  Richard Peterson MD as Surgeon (General Surgery)  Neida Negron MD as Physician (Nephrology)  Jeremiah Zelaya MD as Physician (Cardiology)  Libby Kaufman MD (Endocrinology)  Chanel Crum, RN as Ambulatory Care Navigator [facility])    Advice/Referrals/Counseling   Education and counseling provided:    Glaucoma screening with Dr. Stalin Gillette. Get records    Recommended shingles vaccine    Advanced care plan is on file and up-to-date.   Reviewed this with patient today    Assessment/Plan     Surveillance labs  Labs were done last month as part of his hospital stay (stroke workup) HDL was 74, LDL 33, A1c 5.1%    B12 deficiency  Continue B12 supplement  B12 lab    Cervical stenosis  Had significant cervical spinal stenosis noted as part of that stroke workup. Sent to neurosurgery on 11/2. Verdict is he is not having significant symptoms so we will follow-up in 3 months. No surgery planned    Hyponatremia  Has been followed by nephrology for hyponatremia. Thinks that he remembers hearing his most recent sodium was 131. Suggested he get it checked about now. Asks for that to be drawn here for his convenience. We will send the results to nephrology. Defer to nephrology for how often and that he will need to follow-up on his hyponatremia    He has been more or less keeping to his 1 L fluid restriction. Had canned beans as part of Thanksgiving yesterday. Fasting today        ICD-10-CM ICD-9-CM    1. Routine general medical examination at a health care facility Z00.00 V70.0    2. Hyponatremia P39.9 333.5 METABOLIC PANEL, COMPREHENSIVE   3.  B12 deficiency E53.8 266.2 VITAMIN B12 & FOLATE

## 2018-11-23 NOTE — PROGRESS NOTES
.  Chief Complaint   Patient presents with   [facility] Wellness Visit     . Richard Flores

## 2018-11-23 NOTE — ACP (ADVANCE CARE PLANNING)
Advance Care Planning (ACP) Provider Conversation Snapshot    Date of ACP Conversation: 11/23/18  Has an advanced directive on file

## 2018-11-24 LAB
ALBUMIN SERPL-MCNC: 3.9 G/DL (ref 3.5–4.7)
ALBUMIN/GLOB SERPL: 1.6 {RATIO} (ref 1.2–2.2)
ALP SERPL-CCNC: 79 IU/L (ref 39–117)
ALT SERPL-CCNC: 24 IU/L (ref 0–44)
AST SERPL-CCNC: 24 IU/L (ref 0–40)
BILIRUB SERPL-MCNC: 0.8 MG/DL (ref 0–1.2)
BUN SERPL-MCNC: 14 MG/DL (ref 8–27)
BUN/CREAT SERPL: 11 (ref 10–24)
CALCIUM SERPL-MCNC: 9.4 MG/DL (ref 8.6–10.2)
CHLORIDE SERPL-SCNC: 96 MMOL/L (ref 96–106)
CO2 SERPL-SCNC: 26 MMOL/L (ref 20–29)
CREAT SERPL-MCNC: 1.33 MG/DL (ref 0.76–1.27)
FOLATE SERPL-MCNC: 8.4 NG/ML
GLOBULIN SER CALC-MCNC: 2.4 G/DL (ref 1.5–4.5)
GLUCOSE SERPL-MCNC: 92 MG/DL (ref 65–99)
INTERPRETATION: NORMAL
POTASSIUM SERPL-SCNC: 4.9 MMOL/L (ref 3.5–5.2)
PROT SERPL-MCNC: 6.3 G/DL (ref 6–8.5)
SODIUM SERPL-SCNC: 135 MMOL/L (ref 134–144)
VIT B12 SERPL-MCNC: 531 PG/ML (ref 232–1245)

## 2018-12-04 ENCOUNTER — PATIENT OUTREACH (OUTPATIENT)
Dept: FAMILY MEDICINE CLINIC | Age: 83
End: 2018-12-04

## 2018-12-04 NOTE — PROGRESS NOTES
Patient has graduated from the Transitions of Care Coordination  program on 12-4-18. Patient's symptoms are stable at this time. Patient/family has the ability to self-manage. Care management goals have been completed at this time. No further nurse navigator follow up scheduled. Goals Addressed This Visit's Progress  COMPLETED: Attends follow-up appointments as directed. 10-23-18: Patient has ESPERANZA scheduled on 10-26-18, with hTeodore Jaramillo NP. Patient has neurology  follow-up scheduled with Dr. Estefany Williamson on 11-29-18 and nephrology follow-up with Dr. Elio Dickinson on 11-2-18. Patient states he is awaiting call back from the office of Dr. Wes Vasquez/neurosurgery to schedule an appointment, patient states he contacted the office of Dr. Rajwinder Mckeon today. Darin Rued  
 
11-2-18: Patient attended [facility] visit with Theodore Jaramillo NP on 10-26-18 as scheduled. Patient visited the office of Dr. Kourtney Regan/nephrology on 11-2-18 for lab work and next nephrology follow-up is scheduled for 4-. Patient saw Dr. Wes Vasquez/neurosurgery on 10-29-18 as scheduled. Patient reminded of upcoming appointment with Dr. Emilee Green/neurology on 11-29-18 and patient state he plans to attend. NN offered to move next PCP follow-up to 11-9-18, as Theodore Jaramillo NP, stated in her 10-26-18 note that patient should return in 2 weeks; however patient declined moving appointment and desires to leave appointment on 11-29-18. Patient has attended all appointments to date. Darin Rued 
 
12-4-18: Patient has next neurosurgery follow-up scheduled for January of 2019. Patient states he cancelled neurology follow-up with Dr. Estefany Williamson and has not rescheduled because he does not feel that he needs neurology follow-up at this time. Goal not met. ELIZABET  
  
  COMPLETED: Returns to baseline activity level. 10-23-18: Patient has not returned to his baseline activity level.  Darin Rued 
 
 11-2-18: Patient states he has returned to his baseline activity level. Tl Sood 
 
12-4-18: Patient states he remains at his baseline activity level. Goal met. ELIZABET  
  
  COMPLETED: Supportive resources in place to maintain patient in the community (ie. Home Health, DME equipment, refer to, medication assistant plan, etc.)     
  10-23-18: Patient states he has 4 sons that live nearby and his daughter, Cuate Burgos, resides with patient in his private residence. Home health services are not in place as patient is not home-bound at this time. ELIZABET 
 
11-2-18: Good support network remains in place. ELIZABET  
 
12-4-18: Good support network remains in place. Goal met. Tl Sood Patient has nurse navigator's contact information for any further questions, concerns, or needs. Patients upcoming visits:  No future appointments.

## 2018-12-10 DIAGNOSIS — C73 THYROID CANCER (HCC): ICD-10-CM

## 2018-12-10 RX ORDER — LEVOTHYROXINE SODIUM 112 UG/1
TABLET ORAL
Qty: 90 TAB | Refills: 1 | Status: SHIPPED | OUTPATIENT
Start: 2018-12-10 | End: 2019-07-22 | Stop reason: SDUPTHER

## 2019-02-27 ENCOUNTER — TELEPHONE (OUTPATIENT)
Dept: FAMILY MEDICINE CLINIC | Age: 84
End: 2019-02-27

## 2019-02-27 NOTE — TELEPHONE ENCOUNTER
Called patients daughter, Eddie. She is on disclosure. She verified his name and . She states \" my fathers ankles are very swollen. I do not know if he is sob. He coughs all the time. He is taking all of his medications. \"  Does he need to be sooner than 3/12/19?

## 2019-02-27 NOTE — TELEPHONE ENCOUNTER
Patient's daughter calling to speak to a nurse. She says that patient needs to be seen sooner than the appt I gave them (March 12).  She is on disclosure

## 2019-02-28 NOTE — TELEPHONE ENCOUNTER
Patient does not have history of CHF. He was told to be on a 1 L fluid restriction when I saw him last.  Make sure that he is sticking to that unless his kidney doctor told him to do something different since I saw him. If ankle swelling is his only symptom, he does not need to be seen more urgently.   Reasons to be seen sooner are if he is having trouble breathing when lying flat,  short of breath with exertion or other concerning symptom

## 2019-02-28 NOTE — TELEPHONE ENCOUNTER
Talked with Ms George Ambrose and let her know Dr. Ariadna Hurley said  Patient does not have history of CHF.       He was told to be on a 1 L fluid restriction when I saw him last.  Make sure that he is sticking to that unless his kidney doctor told him to do something different since I saw him.     If ankle swelling is his only symptom, he does not need to be seen more urgently. Reasons to be seen sooner are if he is having trouble breathing when lying flat,  short of breath with exertion or other concerning symptom. She said he is not having any other symptoms and that she will make sure he is following the fluid restriction.

## 2019-03-12 ENCOUNTER — OFFICE VISIT (OUTPATIENT)
Dept: FAMILY MEDICINE CLINIC | Age: 84
End: 2019-03-12

## 2019-03-12 ENCOUNTER — HOSPITAL ENCOUNTER (OUTPATIENT)
Dept: LAB | Age: 84
Discharge: HOME | End: 2019-03-12
Payer: MEDICARE

## 2019-03-12 VITALS
WEIGHT: 214 LBS | HEIGHT: 72 IN | BODY MASS INDEX: 28.99 KG/M2 | HEART RATE: 75 BPM | RESPIRATION RATE: 12 BRPM | DIASTOLIC BLOOD PRESSURE: 83 MMHG | TEMPERATURE: 97.9 F | OXYGEN SATURATION: 96 % | SYSTOLIC BLOOD PRESSURE: 145 MMHG

## 2019-03-12 DIAGNOSIS — R60.0 LEG EDEMA: Primary | ICD-10-CM

## 2019-03-12 DIAGNOSIS — I10 ESSENTIAL HYPERTENSION: ICD-10-CM

## 2019-03-12 DIAGNOSIS — E87.1 HYPONATREMIA: ICD-10-CM

## 2019-03-12 PROCEDURE — 85025 COMPLETE CBC W/AUTO DIFF WBC: CPT

## 2019-03-12 PROCEDURE — 80053 COMPREHEN METABOLIC PANEL: CPT

## 2019-03-12 PROCEDURE — 83880 ASSAY OF NATRIURETIC PEPTIDE: CPT

## 2019-03-12 NOTE — PROGRESS NOTES
JUANA Bullock Sr. is a 81 y.o. male who presents at the request of his daughter for lower extremity edema this been going on since January. He cannot think of anything that is changed to cause this. It is mild and is not really bothering the patient not much. He just noticed that is being something out of the ordinary. Recall that she has hospitalized in October and got a TIA workup. Among other things he had hyponatremia with a sodium of 128 on presentation and was asked to fluid restrict 1 L by nephrology. When I saw him last his sodium had normalized. He feels that he is keeping up with his fluid restriction. He thinks that he is maintaining a normal salt intake. 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 Dr. Ciara Jaimes Stroke [facility]) 2002    TIA, Dr. Josesito Villalta, no residual effects as of 9/2018    Thyroid cancer [facility]) Jan 2015    Unspecified vitamin D deficiency        Meds:   Current Outpatient Medications   Medication Sig Dispense Refill    levothyroxine (SYNTHROID) 112 mcg tablet TAKE 1 TABLET BY MOUTH  DAILY BEFORE BREAKFAST 90 Tab 1    docusate sodium (COLACE) 100 mg capsule Take 100 mg by mouth two (2) times a day.  cyanocobalamin (VITAMIN B-12) 1,000 mcg tablet Take 1 Tab by mouth daily. 30 Tab 0    atorvastatin (LIPITOR) 20 mg tablet Take  by mouth daily.  losartan (COZAAR) 50 mg tablet Take 50 mg by mouth daily.       metoprolol succinate (TOPROL-XL) 50 mg XL tablet Take 50 mg by mouth daily.  clopidogrel (PLAVIX) 75 mg tab TAKE 1 TABLET BY MOUTH  DAILY 90 Tab 3    ELIQUIS 2.5 mg tablet       acetaminophen (TYLENOL EXTRA STRENGTH) 500 mg tablet Take  by mouth every six (6) hours as needed for Pain.  cholecalciferol, vitamin D3, (VITAMIN D3) 2,000 unit tab Take  by mouth nightly.  albuterol (PROVENTIL VENTOLIN) 2.5 mg /3 mL (0.083 %) nebulizer solution 3 mL by Nebulization route every four (4) hours as needed for Wheezing. 5 Package 3    Nebulizer & Compressor machine 1 Each by Does Not Apply route every four (4) hours as needed. 1 Each 0    pantoprazole (PROTONIX) 40 mg tablet TAKE 1 TABLET BY MOUTH  DAILY 90 Tab 3    GUAIFENESIN/DEXTROMETHORPHAN (CORICIDIN HBP PO) Take 1 Tab by mouth as needed. Allergies: Allergies   Allergen Reactions    Sulfa (Sulfonamide Antibiotics) Hives       Smoker:  Social History     Tobacco Use   Smoking Status Former Smoker    Years: 5.00    Last attempt to quit: 1955    Years since quittin.1   Smokeless Tobacco Never Used       ETOH:   Social History     Substance and Sexual Activity   Alcohol Use Yes    Comment: occassional mixed drink or beer       FH:   Family History   Problem Relation Age of Onset    Cancer Mother         hodgkins disease    Heart Disease Father     Hypertension Father     Other Neg Hx         no hypercalcemia or kidney stones       ROS:   As listed in HPI. In addition:  Constitutional:   No headache, fever, fatigue, weight loss or weight gain      Cardiac:    No chest pain      Resp:   No cough or shortness of breath      Neuro   No loss of consciousness, dizziness, seizures      Physical Exam:  Blood pressure 145/83, pulse 75, temperature 97.9 °F (36.6 °C), resp. rate 12, height 6' (1.829 m), weight 214 lb (97.1 kg), SpO2 96 %. GEN: No apparent distress. Alert and oriented and responds to all questions appropriately. NEUROLOGIC:  No focal neurologic deficits.  Strength and sensation grossly intact. Coordination and gait grossly intact. EXT: Well perfused. Trace edema. To the mid calf  SKIN: No obvious rashes. Lungs clear to auscultation bilaterally  CV regular rate and rhythm no murmur       Assessment and Plan     Lower extremity edema, mild. Trace edema to the midcalf  Patient admits this is a little better than it has been recently  Salt/fluid imbalance versus venous insufficiency versus CHF  CHF is unlikely because there are no other symptoms but will check a BNP  CBC, CMP because of recent history of hyponatremia due to polydipsia    History of parathyroid adenoma resected 2015    Blood pressure elevated but reasonably controlled for his age      ICD-10-CM ICD-9-CM    1. Leg edema R60.0 782.3 CBC WITH AUTOMATED DIFF      METABOLIC PANEL, COMPREHENSIVE      NT-PRO BNP   2. Hyponatremia E87.1 276.1    3. Essential hypertension I10 401.9        AVS given.  Pt expressed understanding of instructions

## 2019-03-12 NOTE — PROGRESS NOTES
..  Chief Complaint   Patient presents with    Check Up     .1. Have you been to the ER, urgent care clinic since your last visit? Hospitalized since your last visit? no    2. Have you seen or consulted any other health care providers outside of the [address] [place] [address] since your last visit? Include any pap smears or colon screening. no    .  Health Maintenance Due   Topic Date Due    Shingrix Vaccine Age 49> (1 of 2) 02/25/1986    Pneumococcal 65+ Low/Medium Risk (2 of 2 - PPSV23) 08/02/2017    GLAUCOMA SCREENING Q2Y  02/02/2018     . Shai Arriaga

## 2019-03-13 LAB
ALBUMIN SERPL-MCNC: 4 G/DL (ref 3.5–4.7)
ALBUMIN/GLOB SERPL: 1.4 {RATIO} (ref 1.2–2.2)
ALP SERPL-CCNC: 87 IU/L (ref 39–117)
ALT SERPL-CCNC: 19 IU/L (ref 0–44)
AST SERPL-CCNC: 21 IU/L (ref 0–40)
BASOPHILS # BLD AUTO: 0 X10E3/UL (ref 0–0.2)
BASOPHILS NFR BLD AUTO: 0 %
BILIRUB SERPL-MCNC: 0.8 MG/DL (ref 0–1.2)
BUN SERPL-MCNC: 17 MG/DL (ref 8–27)
BUN/CREAT SERPL: 13 (ref 10–24)
CALCIUM SERPL-MCNC: 9 MG/DL (ref 8.6–10.2)
CHLORIDE SERPL-SCNC: 100 MMOL/L (ref 96–106)
CO2 SERPL-SCNC: 22 MMOL/L (ref 20–29)
CREAT SERPL-MCNC: 1.27 MG/DL (ref 0.76–1.27)
EOSINOPHIL # BLD AUTO: 0.1 X10E3/UL (ref 0–0.4)
EOSINOPHIL NFR BLD AUTO: 1 %
ERYTHROCYTE [DISTWIDTH] IN BLOOD BY AUTOMATED COUNT: 13.3 % (ref 12.3–15.4)
GLOBULIN SER CALC-MCNC: 2.8 G/DL (ref 1.5–4.5)
GLUCOSE SERPL-MCNC: 88 MG/DL (ref 65–99)
HCT VFR BLD AUTO: 43.3 % (ref 37.5–51)
HGB BLD-MCNC: 14.7 G/DL (ref 13–17.7)
IMM GRANULOCYTES # BLD AUTO: 0 X10E3/UL (ref 0–0.1)
IMM GRANULOCYTES NFR BLD AUTO: 0 %
INTERPRETATION: NORMAL
LYMPHOCYTES # BLD AUTO: 1.6 X10E3/UL (ref 0.7–3.1)
LYMPHOCYTES NFR BLD AUTO: 20 %
MCH RBC QN AUTO: 32.2 PG (ref 26.6–33)
MCHC RBC AUTO-ENTMCNC: 33.9 G/DL (ref 31.5–35.7)
MCV RBC AUTO: 95 FL (ref 79–97)
MONOCYTES # BLD AUTO: 0.6 X10E3/UL (ref 0.1–0.9)
MONOCYTES NFR BLD AUTO: 8 %
NEUTROPHILS # BLD AUTO: 5.3 X10E3/UL (ref 1.4–7)
NEUTROPHILS NFR BLD AUTO: 71 %
NT-PROBNP SERPL-MCNC: 369 PG/ML (ref 0–486)
PLATELET # BLD AUTO: 267 X10E3/UL (ref 150–379)
POTASSIUM SERPL-SCNC: 4.7 MMOL/L (ref 3.5–5.2)
PROT SERPL-MCNC: 6.8 G/DL (ref 6–8.5)
RBC # BLD AUTO: 4.57 X10E6/UL (ref 4.14–5.8)
SODIUM SERPL-SCNC: 138 MMOL/L (ref 134–144)
WBC # BLD AUTO: 7.7 X10E3/UL (ref 3.4–10.8)

## 2019-04-19 LAB — CREATININE, EXTERNAL: 1.59

## 2019-07-22 DIAGNOSIS — Z86.73 HISTORY OF TIA (TRANSIENT ISCHEMIC ATTACK): ICD-10-CM

## 2019-07-22 DIAGNOSIS — C73 THYROID CANCER (HCC): ICD-10-CM

## 2019-07-22 RX ORDER — ATORVASTATIN CALCIUM 20 MG/1
20 TABLET, FILM COATED ORAL DAILY
Qty: 90 TAB | Refills: 3 | Status: SHIPPED | OUTPATIENT
Start: 2019-07-22 | End: 2021-02-08

## 2019-07-22 RX ORDER — CLOPIDOGREL BISULFATE 75 MG/1
TABLET ORAL
Qty: 90 TAB | Refills: 3 | Status: SHIPPED | OUTPATIENT
Start: 2019-07-22

## 2019-07-22 RX ORDER — LEVOTHYROXINE SODIUM 112 UG/1
TABLET ORAL
Qty: 90 TAB | Refills: 1 | Status: SHIPPED | OUTPATIENT
Start: 2019-07-22 | End: 2019-12-05 | Stop reason: SDUPTHER

## 2019-07-22 NOTE — TELEPHONE ENCOUNTER
Requested Prescriptions     Pending Prescriptions Disp Refills    clopidogrel (PLAVIX) 75 mg tab 90 Tab 3     Sig: TAKE 1 TABLET BY MOUTH  DAILY    levothyroxine (SYNTHROID) 112 mcg tablet 90 Tab 1    atorvastatin (LIPITOR) 20 mg tablet       Sig: Take  by mouth daily. Requested by [facility].

## 2019-12-03 LAB — CREATININE, EXTERNAL: 1.28

## 2019-12-05 DIAGNOSIS — C73 THYROID CANCER (HCC): ICD-10-CM

## 2019-12-05 RX ORDER — LEVOTHYROXINE SODIUM 112 UG/1
TABLET ORAL
Qty: 90 TAB | Refills: 3 | Status: SHIPPED | OUTPATIENT
Start: 2019-12-05 | End: 2021-02-02

## 2020-01-03 ENCOUNTER — TELEPHONE (OUTPATIENT)
Dept: FAMILY MEDICINE CLINIC | Age: 85
End: 2020-01-03

## 2020-01-03 NOTE — TELEPHONE ENCOUNTER
[employer] is calling from [facility] needing last labs faxed to them faxed to  [phone] she can be reached at [phone]

## 2020-01-06 ENCOUNTER — TELEPHONE (OUTPATIENT)
Dept: FAMILY MEDICINE CLINIC | Age: 85
End: 2020-01-06

## 2020-01-06 NOTE — TELEPHONE ENCOUNTER
[facility] is calling from Nephrologist office needing last labs on pt faxed to them at [phone] if any questions she can be reached at [phone]

## 2020-01-28 ENCOUNTER — OFFICE VISIT (OUTPATIENT)
Dept: FAMILY MEDICINE CLINIC | Age: 85
End: 2020-01-28

## 2020-01-28 VITALS
WEIGHT: 227.2 LBS | RESPIRATION RATE: 16 BRPM | SYSTOLIC BLOOD PRESSURE: 164 MMHG | OXYGEN SATURATION: 95 % | TEMPERATURE: 98.1 F | BODY MASS INDEX: 30.77 KG/M2 | DIASTOLIC BLOOD PRESSURE: 99 MMHG | HEIGHT: 72 IN | HEART RATE: 75 BPM

## 2020-01-28 DIAGNOSIS — D35.1 PARATHYROID ADENOMA: ICD-10-CM

## 2020-01-28 DIAGNOSIS — E78.5 HYPERLIPIDEMIA, UNSPECIFIED HYPERLIPIDEMIA TYPE: ICD-10-CM

## 2020-01-28 DIAGNOSIS — Z00.00 ROUTINE GENERAL MEDICAL EXAMINATION AT A HEALTH CARE FACILITY: Primary | ICD-10-CM

## 2020-01-28 DIAGNOSIS — I10 ESSENTIAL HYPERTENSION: ICD-10-CM

## 2020-01-28 DIAGNOSIS — Z85.46 H/O PROSTATE CANCER: ICD-10-CM

## 2020-01-28 DIAGNOSIS — I63.9 CEREBROVASCULAR ACCIDENT (CVA), UNSPECIFIED MECHANISM (HCC): ICD-10-CM

## 2020-01-28 DIAGNOSIS — Z23 ENCOUNTER FOR IMMUNIZATION: ICD-10-CM

## 2020-01-28 DIAGNOSIS — N18.30 CKD (CHRONIC KIDNEY DISEASE) STAGE 3, GFR 30-59 ML/MIN (HCC): ICD-10-CM

## 2020-01-28 RX ORDER — LOSARTAN POTASSIUM 100 MG/1
100 TABLET ORAL DAILY
COMMUNITY
Start: 2020-01-19

## 2020-01-28 NOTE — PROGRESS NOTES
1. Have you been to the ER, urgent care clinic since your last visit? Hospitalized since your last visit? No    2. Have you seen or consulted any other health care providers outside of the [address] [place] [address] since your last visit? Include any pap smears or colon screening.  No     Health Maintenance Due   Topic Date Due    Shingrix Vaccine Age 49> (1 of 2) 02/25/1986    Pneumococcal 65+ years (2 of 2 - PPSV23) 08/02/2017    Influenza Age 9 to Adult  08/01/2019    MEDICARE YEARLY EXAM  11/24/2019    GLAUCOMA SCREENING Q2Y  02/09/2020

## 2020-01-28 NOTE — PROGRESS NOTES
Medicare Annual Wellness Visit     I have reviewed the patient's medical history in detail and updated the computerized patient record. History   Brenda Person Sr. is a 81 y.o. male who presents to fu on chronic issues    Recall that he was hospitalized in October 2017  for right facial numbness. Received a TIA workup which revealed significant cervical stenosis. Neurosurgery evaluated and did not feel that intervention was necessary. Also had hyponatremia with sodium 128 on presentation, currently being treated with fluid restriction and managed by nephrology. 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   Dr. Marco Antonio Quiles Stroke [facility]) 2002    TIA, Dr. Michael Hernandez, no residual effects as of 9/2018    Thyroid cancer [facility]) Jan 2015    Unspecified vitamin D deficiency       Past Surgical History:   Procedure Laterality Date   Shan Wadsworth    COLONOSCOPY N/A 9/17/2018    COLONOSCOPY [facility]    HX APPENDECTOMY      HX CHOLECYSTECTOMY      HX HEENT      tonsillectomy    HX OTHER SURGICAL      vasectomy    HX PARATHYROIDECTOMY  01/14/2015    right superior parathyroid gland removed and left superior parathyroid gland removed    HX PARTIAL THYROIDECTOMY  1/14/15    right lobectomy    HX UROLOGICAL      Seeds for prostate cancer , 4 dialations     HX UROLOGICAL  1/14/15    BLADDER NECK INCISION, Cold knife incision of bladder neck contracture, cystoscopy     Current Outpatient Medications   Medication Sig Dispense Refill    losartan (COZAAR) 100 mg tablet Take 100 mg by mouth daily.  levothyroxine (SYNTHROID) 112 mcg tablet TAKE 1 TABLET BY MOUTH  DAILY BEFORE BREAKFAST 90 Tab 3    clopidogrel (PLAVIX) 75 mg tab TAKE 1 TABLET BY MOUTH  DAILY 90 Tab 3    atorvastatin (LIPITOR) 20 mg tablet Take 1 Tab by mouth daily. 90 Tab 3    docusate sodium (COLACE) 100 mg capsule Take 100 mg by mouth two (2) times a day.  Nebulizer & Compressor machine 1 Each by Does Not Apply route every four (4) hours as needed. 1 Each 0    cyanocobalamin (VITAMIN B-12) 1,000 mcg tablet Take 1 Tab by mouth daily. 30 Tab 0    metoprolol succinate (TOPROL-XL) 50 mg XL tablet Take 50 mg by mouth daily.  ELIQUIS 2.5 mg tablet       acetaminophen (TYLENOL EXTRA STRENGTH) 500 mg tablet Take  by mouth every six (6) hours as needed for Pain.  cholecalciferol, vitamin D3, (VITAMIN D3) 2,000 unit tab Take  by mouth nightly.        Allergies   Allergen Reactions    Sulfa (Sulfonamide Antibiotics) Hives     Family History   Problem Relation Age of Onset   [facility] Cancer Mother         hodgkins disease    Heart Disease Father     Hypertension Father     Other Neg Hx         no hypercalcemia or kidney stones     Social History     Tobacco Use    Smoking status: Former Smoker     Years: 5.00     Last attempt to quit: 1955     Years since quittin.0    Smokeless tobacco: Never Used   Substance Use Topics    Alcohol use: Yes     Comment: occassional mixed drink or beer     Patient Active Problem List   Diagnosis Code    TIA (transient ischemic attack) G45.9    H/O prostate cancer Z85.46    HBP (high blood pressure) I10    GERD (gastroesophageal reflux disease) K21.9    CKD (chronic kidney disease) stage 3, GFR 30-59 ml/min ([facility]) N18.3    Hyperlipidemia E78.5    Carotid artery stenosis, asymptomatic I65.29    Unspecified vitamin D deficiency E55.9    History of thyroid cancer Z85.850    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    History of hyperparathyroidism Z86.39    Stenosis of both internal carotid arteries I65.23    History of CVA (cerebrovascular accident) Z80.78    CVA (cerebral vascular accident) ([facility] [address] I63.9    Bilateral carotid artery stenosis I65.23    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 No    Do you average more than 14 drinks per week? No      Functional Ability and Level of Safety:     Hearing Loss   none    Activities of Daily Living   Self-care. Requires assistance with: no ADLs    Fall Risk     Fall Risk Assessment, last 12 mths 1/28/2020   Able to walk? Yes   Fall in past 12 months? No     Abuse Screen   Patient is not abused    Review of Systems   ROS:  As listed in HPI. In addition:  Constitutional:   No headache, fever, fatigue, weight loss or weight gain      Eyes:   No redness, pruritis, pain, visual changes, swelling, or discharge      Ears:    No pain, loss or changes in hearing     Cardiac:    No chest pain      Resp:   No cough or shortness of breath      Neuro   No loss of consciousness, dizziness, seizure    Physical Examination     Evaluation of Cognitive Function:  Mood/affect:  happy  Appearance: age appropriate  Family member/caregiver input:     Physical Exam:  Blood pressure (!) 164/99, pulse 75, temperature 98.1 °F (36.7 °C), temperature source Oral, resp. rate 16, height 6' (1.829 m), weight 227 lb 3.2 oz (103.1 kg), SpO2 95 %. GEN: No apparent distress.  Alert and oriented and responds to all questions appropriately. NECK:  Supple; no masses; thyroid normal           LUNGS: Respirations unlabored; clear to auscultation bilaterally  CARDIOVASCULAR: Regular, rate, and rhythm without murmurs   ABDOMEN: Soft; nontender; nondistended; normoactive bowel sounds; no masses or organomegaly  NEUROLOGIC:  No focal neurologic deficits. Strength and sensation grossly intact. Coordination and gait grossly intact. EXT: Well perfused. No edema. SKIN: No obvious rashes. Patient Care Team:  Zenon Wu MD as PCP - General (Internal Medicine)  Torsten Rawls MD as PCP - [facility] Provider  Compa Coley MD as Surgeon (General Surgery)  Bharat Martinez MD as Physician (Nephrology)  Omega Sharma MD as Physician (Cardiology)  Nataliya Jefferson MD (Endocrinology)    Advice/Referrals/Counseling   Education and counseling provided:    Glaucoma screening with Dr. Kely Valdes. Get records     Recommended shingles vaccine     Advanced care plan is on file and up-to-date. Assessment/Plan     Hypertension  Poorly controlled today. Cardiology recently doubled his losartan from 50 up to 100 in August.  He was checking at home and reports that after that change his blood pressure was in the 130s. Has not checked it recently  Suggest he return in 2 weeks with his cuff to check for accuracy. In the meantime check your blood pressure at home  He is opted for no medication change today  If we do a medication change need to keep his history of hyponatremia in mind. Might need to go with calcium channel blocker? History hyponatremia  Keeping his 1 L fluid restriction  Following with nephrology  They noticed low phosphorus last time he was in to see them and recommended he increase his dietary phosphorus intake. He is requesting phosphorus checked today    History prostate cancer no longer seeing urology. Requesting PSA test today. ICD-10-CM ICD-9-CM    1.  Cerebrovascular accident (CVA), unspecified mechanism ([facility] [address] I63.9 434.91    2. Encounter for immunization Z23 V03.89 INFLUENZA VIRUS VACCINE, HIGH DOSE SEASONAL, PRESERVATIVE FREE      ADMIN INFLUENZA VIRUS VAC      PNEUMOCOCCAL POLYSACCHARIDE VACCINE, 23-VALENT, ADULT OR IMMUNOSUPPRESSED PT DOSE,      ADMIN PNEUMOCOCCAL VACCINE   3. Hyperlipidemia, unspecified hyperlipidemia type E78.5 272.4 LIPID PANEL   4. CKD (chronic kidney disease) stage 3, GFR 30-59 ml/min ([facility]) N18.3 585.3 CBC WITH AUTOMATED DIFF      METABOLIC PANEL, COMPREHENSIVE      TSH 3RD GENERATION      PHOSPHORUS   5. 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 227.1    6. H/O prostate cancer Z85.46 V10.46 PSA, DIAGNOSTIC (PROSTATE SPECIFIC AG)   7.  Essential hypertension I10 401.9 CBC WITH AUTOMATED DIFF      METABOLIC PANEL, COMPREHENSIVE      TSH 3RD GENERATION

## 2020-01-28 NOTE — PATIENT INSTRUCTIONS
Vaccine Information Statement Influenza (Flu) Vaccine (Inactivated or Recombinant): What You Need to Know Many Vaccine Information Statements are available in [place] and other languages. See www.immunize.org/vis Hojas de información sobre vacunas están disponibles en español y en muchos otros idiomas. Visite www.immunize.org/vis 1. Why get vaccinated? Influenza vaccine can prevent influenza (flu). Flu is a contagious disease that spreads around the [facility] every year, usually between October and May. Anyone can get the flu, but it is more dangerous for some people. Infants and young children, people 72years of age and older, pregnant women, and people with certain health conditions or a weakened immune system are at greatest risk of flu complications. Pneumonia, bronchitis, sinus infections and ear infections are examples of flu-related complications. If you have a medical condition, such as heart disease, cancer or diabetes, flu can make it worse. Flu can cause fever and chills, sore throat, muscle aches, fatigue, cough, headache, and runny or stuffy nose. Some people may have vomiting and diarrhea, though this is more common in children than adults. Each year thousands of people in the [facility] die from flu, and many more are hospitalized. Flu vaccine prevents millions of illnesses and flu-related visits to the doctor each year. 2. Influenza vaccines CDC recommends everyone 10months of age and older get vaccinated every flu season. Children 6 months through 6years of age may need 2 doses during a single flu season. Everyone else needs only 1 dose each flu season. It takes about 2 weeks for protection to develop after vaccination. There are many flu viruses, and they are always changing. Each year a new flu vaccine is made to protect against three or four viruses that are likely to cause disease in the upcoming flu season.  Even when the vaccine doesnt exactly match these viruses, it may still provide some protection. Influenza vaccine does not cause flu. Influenza vaccine may be given at the same time as other vaccines. 3. Talk with your health care provider Tell your vaccine provider if the person getting the vaccine: 
 Has had an allergic reaction after a previous dose of influenza vaccine, or has any severe, life-threatening allergies.  Has ever had Guillain-Barré Syndrome (also called GBS). In some cases, your health care provider may decide to postpone influenza vaccination to a future visit. People with minor illnesses, such as a cold, may be vaccinated. People who are moderately or severely ill should usually wait until they recover before getting influenza vaccine. Your health care provider can give you more information. 4. Risks of a reaction  Soreness, redness, and swelling where shot is given, fever, muscle aches, and headache can happen after influenza vaccine.  There may be a very small increased risk of Guillain-Barré Syndrome (GBS) after inactivated influenza vaccine (the flu shot). [facility] children who get the flu shot along with pneumococcal vaccine (PCV13), and/or DTaP vaccine at the same time might be slightly more likely to have a seizure caused by fever. Tell your health care provider if a child who is getting flu vaccine has ever had a seizure. People sometimes faint after medical procedures, including vaccination. Tell your provider if you feel dizzy or have vision changes or ringing in the ears. As with any medicine, there is a very remote chance of a vaccine causing a severe allergic reaction, other serious injury, or death. 5. What if there is a serious problem? An allergic reaction could occur after the vaccinated person leaves the clinic.  If you see signs of a severe allergic reaction (hives, swelling of the face and throat, difficulty breathing, a fast heartbeat, dizziness, or weakness), call 9-1-1 and get the person to the nearest hospital. 
 
For other signs that concern you, call your health care provider. Adverse reactions should be reported to the Vaccine Adverse Event Reporting System (VAERS). Your health care provider will usually file this report, or you can do it yourself. Visit the VAERS website at www.vaers. hhs.gov or call 5-818.202.1484. VAERS is only for reporting reactions, and VAERS staff do not give medical advice. 6. The National Vaccine Injury Compensation Program 
 
The [facility] Vaccine Injury Compensation Program (VICP) is a federal program that was created to compensate people who may have been injured by certain vaccines. Visit the VICP website at www.hrsa.gov/vaccinecompensation or call 6-871.660.7493 to learn about the program and about filing a claim. There is a time limit to file a claim for compensation. 7. How can I learn more?  Ask your health care provider.  Call your local or state health department.  Contact the Centers for Disease Control and Prevention (CDC): 
- Call 6-850.306.3545 (3-702-RBQ-INFO) or 
- Visit CDCs influenza website at www.cdc.gov/flu Vaccine Information Statement (Interim) Inactivated Influenza Vaccine 8/15/2019 
[address] [facility] [id number]-82 Department of Health and [place] Centers for Disease Control and Prevention Office Use Only Vaccine Information Statement Pneumococcal Polysaccharide Vaccine (PPSV23): What You Need to Know Many Vaccine Information Statements are available in [place]olan and other languages. See www.immunize.org/vis Hojas de información sobre vacunas están disponibles en español y en muchos otros idiomas. Visite www.immunize.org/vis 1. Why get vaccinated? Pneumococcal polysaccharide vaccine (PPSV23) can prevent pneumococcal disease. Pneumococcal disease refers to any illness caused by pneumococcal bacteria.  These bacteria can cause many types of illnesses, including pneumonia, which is an infection of the lungs. Pneumococcal bacteria are one of the most common causes of pneumonia. Besides pneumonia, pneumococcal bacteria can also cause: 
 Ear infections  Sinus infections  Meningitis (infection of the tissue covering the brain and spinal cord)  Bacteremia (bloodstream infection) Anyone can get pneumococcal disease, but children under 3years of age, people with certain medical conditions, adults 72 years or older, and cigarette smokers are at the highest risk. Most pneumococcal infections are mild. However, some can result in long-term problems, such as brain damage or hearing loss. Meningitis, bacteremia, and pneumonia caused by pneumococcal disease can be fatal.  
 
2. PPSV23  
 
PPSV23 protects against 23 types of bacteria that cause pneumococcal disease. PPSV23 is recommended for:  All adults 72 years or older,  Anyone 2 years or older with certain medical conditions that can lead to an increased risk for pneumococcal disease. Most people need only one dose of PPSV23. A second dose of PPSV23, and another type of pneumococcal vaccine called PCV13, are recommended for certain high-risk groups. Your health care provider can give you more information. People 65 years or older should get a dose of PPSV23 even if they have already gotten one or more doses of the vaccine before they turned 72. 
 
3. Talk with your health care provider Tell your vaccine provider if the person getting the vaccine: 
 Has had an allergic reaction after a previous dose of PPSV23, or has any severe, life-threatening allergies. In some cases, your health care provider may decide to postpone PPSV23 vaccination to a future visit. People with minor illnesses, such as a cold, may be vaccinated. People who are moderately or severely ill should usually wait until they recover before getting PPSV23. Your health care provider can give you more information. 4. Risks of a vaccine reaction  Redness or pain where the shot is given, feeling tired, fever, or muscle aches can happen after PPSV23. People sometimes faint after medical procedures, including vaccination. Tell your provider if you feel dizzy or have vision changes or ringing in the ears. As with any medicine, there is a very remote chance of a vaccine causing a severe allergic reaction, other serious injury, or death. 5. What if there is a serious problem? An allergic reaction could occur after the vaccinated person leaves the clinic. If you see signs of a severe allergic reaction (hives, swelling of the face and throat, difficulty breathing, a fast heartbeat, dizziness, or weakness), call 9-1-1 and get the person to the nearest hospital. 
 
For other signs that concern you, call your health care provider. Adverse reactions should be reported to the Vaccine Adverse Event Reporting System (VAERS). Your health care provider will usually file this report, or you can do it yourself. Visit the VAERS website at www.vaers. [facility].gov or call 9-116.704.1944. VAERS is only for reporting reactions, and VAERS staff do not give medical advice. 6. How can I learn more?  Ask your health care provider.  Call your local or state health department.  Contact the Centers for Disease Control and Prevention (CDC): 
- Call 7-291.126.4543 (1-800-CDC-INFO) or 
- Visit CDCs website at www.cdc.gov/vaccines Vaccine Information Statement PPSV23  
10/30/2019 Department of [facility] and [employer] Centers for Disease Control and Prevention Office Use Only

## 2020-01-29 ENCOUNTER — TELEPHONE (OUTPATIENT)
Dept: FAMILY MEDICINE CLINIC | Age: 85
End: 2020-01-29

## 2020-01-29 LAB
ALBUMIN SERPL-MCNC: 4.1 G/DL (ref 3.6–4.6)
ALBUMIN/GLOB SERPL: 1.7 {RATIO} (ref 1.2–2.2)
ALP SERPL-CCNC: 91 IU/L (ref 39–117)
ALT SERPL-CCNC: 23 IU/L (ref 0–44)
AST SERPL-CCNC: 26 IU/L (ref 0–40)
BASOPHILS # BLD AUTO: 0 X10E3/UL (ref 0–0.2)
BASOPHILS NFR BLD AUTO: 0 %
BILIRUB SERPL-MCNC: 0.7 MG/DL (ref 0–1.2)
BUN SERPL-MCNC: 15 MG/DL (ref 8–27)
BUN/CREAT SERPL: 10 (ref 10–24)
CALCIUM SERPL-MCNC: 9.2 MG/DL (ref 8.6–10.2)
CHLORIDE SERPL-SCNC: 97 MMOL/L (ref 96–106)
CHOLEST SERPL-MCNC: 174 MG/DL (ref 100–199)
CO2 SERPL-SCNC: 24 MMOL/L (ref 20–29)
CREAT SERPL-MCNC: 1.47 MG/DL (ref 0.76–1.27)
EOSINOPHIL # BLD AUTO: 0.1 X10E3/UL (ref 0–0.4)
EOSINOPHIL NFR BLD AUTO: 1 %
ERYTHROCYTE [DISTWIDTH] IN BLOOD BY AUTOMATED COUNT: 12.5 % (ref 11.6–15.4)
GLOBULIN SER CALC-MCNC: 2.4 G/DL (ref 1.5–4.5)
GLUCOSE SERPL-MCNC: 93 MG/DL (ref 65–99)
HCT VFR BLD AUTO: 41.7 % (ref 37.5–51)
HDLC SERPL-MCNC: 73 MG/DL
HGB BLD-MCNC: 15.3 G/DL (ref 13–17.7)
IMM GRANULOCYTES # BLD AUTO: 0.1 X10E3/UL (ref 0–0.1)
IMM GRANULOCYTES NFR BLD AUTO: 1 %
INTERPRETATION, 910389: NORMAL
INTERPRETATION: NORMAL
LDLC SERPL CALC-MCNC: 79 MG/DL (ref 0–99)
LYMPHOCYTES # BLD AUTO: 1.4 X10E3/UL (ref 0.7–3.1)
LYMPHOCYTES NFR BLD AUTO: 18 %
MCH RBC QN AUTO: 34 PG (ref 26.6–33)
MCHC RBC AUTO-ENTMCNC: 36.7 G/DL (ref 31.5–35.7)
MCV RBC AUTO: 93 FL (ref 79–97)
MONOCYTES # BLD AUTO: 0.5 X10E3/UL (ref 0.1–0.9)
MONOCYTES NFR BLD AUTO: 6 %
MORPHOLOGY BLD-IMP: ABNORMAL
NEUTROPHILS # BLD AUTO: 5.7 X10E3/UL (ref 1.4–7)
NEUTROPHILS NFR BLD AUTO: 74 %
PDF IMAGE, 910387: NORMAL
PHOSPHATE SERPL-MCNC: 2.5 MG/DL (ref 2.8–4.1)
PLATELET # BLD AUTO: 295 X10E3/UL (ref 150–450)
POTASSIUM SERPL-SCNC: 4.9 MMOL/L (ref 3.5–5.2)
PROT SERPL-MCNC: 6.5 G/DL (ref 6–8.5)
PSA SERPL-MCNC: 3.6 NG/ML (ref 0–4)
RBC # BLD AUTO: 4.5 X10E6/UL (ref 4.14–5.8)
SODIUM SERPL-SCNC: 133 MMOL/L (ref 134–144)
TRIGL SERPL-MCNC: 109 MG/DL (ref 0–149)
TSH SERPL DL<=0.005 MIU/L-ACNC: 1 UIU/ML (ref 0.45–4.5)
VLDLC SERPL CALC-MCNC: 22 MG/DL (ref 5–40)
WBC # BLD AUTO: 7.8 X10E3/UL (ref 3.4–10.8)

## 2020-01-29 NOTE — TELEPHONE ENCOUNTER
Called pt, verified name and . Informed pt that per Dr. Chucky Maynard is still low. His nephrologist was following this. He would like us to fax these results to his nephrologist. Can you please provide his info.      PSA is 3.4. Previously undetectable so this is a big increase. It would be a good idea for him to see his urologist. He states he has a urologist and he will make an appt.

## 2020-01-29 NOTE — TELEPHONE ENCOUNTER
Labs reviewed  Phosphorus is still low. His nephrologist was following this. Does he want us to fax these results to his nephrologist?    PSA is 3.4. Previously undetectable so this is a big increase.   It would be a good idea for him to see his urologist.  Does he need a referral?

## 2020-02-11 ENCOUNTER — CLINICAL SUPPORT (OUTPATIENT)
Dept: FAMILY MEDICINE CLINIC | Age: 85
End: 2020-02-11

## 2020-02-11 VITALS — HEART RATE: 82 BPM | DIASTOLIC BLOOD PRESSURE: 87 MMHG | SYSTOLIC BLOOD PRESSURE: 138 MMHG

## 2020-02-11 DIAGNOSIS — Z01.30 BLOOD PRESSURE CHECK: Primary | ICD-10-CM

## 2020-02-11 NOTE — PROGRESS NOTES
The patient presents with blood pressure check. Home Pump  159/93, 81  (Left Arm)                          147/90,  84 (Right Arm)    Dr. Harley Urena notified of results. Advised patient per Dr. Harley Urena if he feels comfortable purchasing a new machine; that we could test it within a 30 day timeframe. Patient feels comfortable with his machine.

## 2020-02-24 ENCOUNTER — HOSPITAL ENCOUNTER (OUTPATIENT)
Dept: NUCLEAR MEDICINE | Age: 85
Discharge: HOME | End: 2020-02-24
Attending: UROLOGY
Payer: MEDICARE

## 2020-02-24 ENCOUNTER — HOSPITAL ENCOUNTER (OUTPATIENT)
Dept: CT IMAGING | Age: 85
Discharge: HOME | End: 2020-02-24
Attending: UROLOGY
Payer: MEDICARE

## 2020-02-24 DIAGNOSIS — Z85.46 HISTORY OF PROSTATE CANCER: ICD-10-CM

## 2020-02-24 PROCEDURE — A9503 TC99M MEDRONATE: HCPCS

## 2020-02-24 PROCEDURE — 74176 CT ABD & PELVIS W/O CONTRAST: CPT

## 2020-02-24 PROCEDURE — 78306 BONE IMAGING WHOLE BODY: CPT

## 2020-03-26 PROBLEM — Z86.018 HISTORY OF BENIGN PARATHYROID TUMOR: Status: ACTIVE | Noted: 2020-03-26

## 2020-09-03 ENCOUNTER — HOSPITAL ENCOUNTER (EMERGENCY)
Age: 85
Discharge: HOME | End: 2020-09-03
Attending: STUDENT IN AN ORGANIZED HEALTH CARE EDUCATION/TRAINING PROGRAM
Payer: MEDICARE

## 2020-09-03 VITALS
BODY MASS INDEX: 28.28 KG/M2 | TEMPERATURE: 97.6 F | DIASTOLIC BLOOD PRESSURE: 88 MMHG | RESPIRATION RATE: 19 BRPM | WEIGHT: 208.78 LBS | HEART RATE: 72 BPM | SYSTOLIC BLOOD PRESSURE: 162 MMHG | HEIGHT: 72 IN | OXYGEN SATURATION: 96 %

## 2020-09-03 DIAGNOSIS — R25.1 TREMOR: Primary | ICD-10-CM

## 2020-09-03 LAB
ALBUMIN SERPL-MCNC: 3.5 G/DL (ref 3.5–5)
ALBUMIN/GLOB SERPL: 1 {RATIO} (ref 1.1–2.2)
ALP SERPL-CCNC: 96 U/L (ref 45–117)
ALT SERPL-CCNC: 28 U/L (ref 12–78)
ANION GAP SERPL CALC-SCNC: 2 MMOL/L (ref 5–15)
APPEARANCE UR: CLEAR
AST SERPL-CCNC: 26 U/L (ref 15–37)
BASOPHILS # BLD: 0 K/UL (ref 0–0.1)
BASOPHILS NFR BLD: 0 % (ref 0–1)
BILIRUB SERPL-MCNC: 1.5 MG/DL (ref 0.2–1)
BILIRUB UR QL: NEGATIVE
BUN SERPL-MCNC: 12 MG/DL (ref 6–20)
BUN/CREAT SERPL: 9 (ref 12–20)
CALCIUM SERPL-MCNC: 8.7 MG/DL (ref 8.5–10.1)
CHLORIDE SERPL-SCNC: 97 MMOL/L (ref 97–108)
CO2 SERPL-SCNC: 30 MMOL/L (ref 21–32)
COLOR UR: NORMAL
CREAT SERPL-MCNC: 1.34 MG/DL (ref 0.7–1.3)
DIFFERENTIAL METHOD BLD: ABNORMAL
EOSINOPHIL # BLD: 0.1 K/UL (ref 0–0.4)
EOSINOPHIL NFR BLD: 1 % (ref 0–7)
ERYTHROCYTE [DISTWIDTH] IN BLOOD BY AUTOMATED COUNT: 13.2 % (ref 11.5–14.5)
GLOBULIN SER CALC-MCNC: 3.4 G/DL (ref 2–4)
GLUCOSE SERPL-MCNC: 101 MG/DL (ref 65–100)
GLUCOSE UR STRIP.AUTO-MCNC: NEGATIVE MG/DL
HCT VFR BLD AUTO: 42.2 % (ref 36.6–50.3)
HGB BLD-MCNC: 14.8 G/DL (ref 12.1–17)
HGB UR QL STRIP: NEGATIVE
IMM GRANULOCYTES # BLD AUTO: 0.1 K/UL (ref 0–0.04)
IMM GRANULOCYTES NFR BLD AUTO: 1 % (ref 0–0.5)
KETONES UR QL STRIP.AUTO: NEGATIVE MG/DL
LEUKOCYTE ESTERASE UR QL STRIP.AUTO: NEGATIVE
LYMPHOCYTES # BLD: 1.2 K/UL (ref 0.8–3.5)
LYMPHOCYTES NFR BLD: 14 % (ref 12–49)
MCH RBC QN AUTO: 32.4 PG (ref 26–34)
MCHC RBC AUTO-ENTMCNC: 35.1 G/DL (ref 30–36.5)
MCV RBC AUTO: 92.3 FL (ref 80–99)
MONOCYTES # BLD: 0.6 K/UL (ref 0–1)
MONOCYTES NFR BLD: 7 % (ref 5–13)
NEUTS SEG # BLD: 6.6 K/UL (ref 1.8–8)
NEUTS SEG NFR BLD: 77 % (ref 32–75)
NITRITE UR QL STRIP.AUTO: NEGATIVE
NRBC # BLD: 0 K/UL (ref 0–0.01)
NRBC BLD-RTO: 0 PER 100 WBC
PH UR STRIP: 7 [PH] (ref 5–8)
PLATELET # BLD AUTO: 284 K/UL (ref 150–400)
PMV BLD AUTO: 9.1 FL (ref 8.9–12.9)
POTASSIUM SERPL-SCNC: 4.5 MMOL/L (ref 3.5–5.1)
PROT SERPL-MCNC: 6.9 G/DL (ref 6.4–8.2)
PROT UR STRIP-MCNC: NEGATIVE MG/DL
RBC # BLD AUTO: 4.57 M/UL (ref 4.1–5.7)
SODIUM SERPL-SCNC: 129 MMOL/L (ref 136–145)
SP GR UR REFRACTOMETRY: 1 (ref 1–1.03)
UROBILINOGEN UR QL STRIP.AUTO: 1 EU/DL (ref 0.2–1)
WBC # BLD AUTO: 8.6 K/UL (ref 4.1–11.1)

## 2020-09-03 PROCEDURE — 99284 EMERGENCY DEPT VISIT MOD MDM: CPT

## 2020-09-03 PROCEDURE — 81003 URINALYSIS AUTO W/O SCOPE: CPT

## 2020-09-03 PROCEDURE — 74011250636 HC RX REV CODE- 250/636: Performed by: STUDENT IN AN ORGANIZED HEALTH CARE EDUCATION/TRAINING PROGRAM

## 2020-09-03 PROCEDURE — 93005 ELECTROCARDIOGRAM TRACING: CPT

## 2020-09-03 PROCEDURE — 80053 COMPREHEN METABOLIC PANEL: CPT

## 2020-09-03 PROCEDURE — 85025 COMPLETE CBC W/AUTO DIFF WBC: CPT

## 2020-09-03 PROCEDURE — 36415 COLL VENOUS BLD VENIPUNCTURE: CPT

## 2020-09-03 RX ADMIN — SODIUM CHLORIDE 1000 ML: 9 INJECTION, SOLUTION INTRAVENOUS at 12:54

## 2020-09-03 NOTE — ED NOTES
Pt discharged by Dr. Judy Lr. Pt provided with discharge instructions Rx and instructions on follow up care. Pt out of ED ambulatory accompanied by daughter.

## 2020-09-03 NOTE — ED PROVIDER NOTES
EMERGENCY DEPARTMENT HISTORY AND PHYSICAL EXAM      Date: 9/3/2020  Patient Name: Maura King.    History of Presenting Illness     Chief Complaint   Patient presents with    Other     Pt comes in with c/o right side of head feeling \"funny\", denies pain. Also reports tremors all over worsening over the last month. Sees Dr. Griselda Tejeda nuerology for \"a blockage in my neck\"          HPI: Maura King., 81 y.o. male presents to the ED with cc of feeling shaky. He has had a tremor for a long time, however it seems to be worse in the last month. He reports shaking in all of his extremities, especially with stimulation or with movement. He denies any neck or back pain, no trauma, no new weakness, numbness or tingling in the arms or legs. He does have issues with his prostate, however no new bowel or bladder symptoms. No fevers. There are no other complaints, changes, or physical findings at this time. PCP: Sabrina Hope MD    No current facility-administered medications on file prior to encounter. Current Outpatient Medications on File Prior to Encounter   Medication Sig Dispense Refill    losartan (COZAAR) 100 mg tablet Take 100 mg by mouth daily.  levothyroxine (SYNTHROID) 112 mcg tablet TAKE 1 TABLET BY MOUTH  DAILY BEFORE BREAKFAST 90 Tab 3    clopidogrel (PLAVIX) 75 mg tab TAKE 1 TABLET BY MOUTH  DAILY 90 Tab 3    atorvastatin (LIPITOR) 20 mg tablet Take 1 Tab by mouth daily. 90 Tab 3    docusate sodium (COLACE) 100 mg capsule Take 100 mg by mouth two (2) times a day.  Nebulizer & Compressor machine 1 Each by Does Not Apply route every four (4) hours as needed. 1 Each 0    cyanocobalamin (VITAMIN B-12) 1,000 mcg tablet Take 1 Tab by mouth daily. 30 Tab 0    metoprolol succinate (TOPROL-XL) 50 mg XL tablet Take 50 mg by mouth daily.       ELIQUIS 2.5 mg tablet       acetaminophen (TYLENOL EXTRA STRENGTH) 500 mg tablet Take  by mouth every six (6) hours as needed for Pain.      cholecalciferol, vitamin D3, (VITAMIN D3) 2,000 unit tab Take  by mouth nightly. 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 Calcium and PTH monitored by Dr. Brook Viveros, renal.  Levels normalized after surgery.  Prostate cancer ([facility] [address]     seeds, then XRT, then seed again.   Dr. Tae Hein Stroke [facility]) 2002    TIA, Dr. Ruddy Hess, no residual effects as of 9/2018    Thyroid cancer [facility]) Jan 2015    Unspecified vitamin D deficiency        Past Surgical History:  Past Surgical History:   Procedure Laterality Date   Anil Gamboa Signs    COLONOSCOPY N/A 9/17/2018    COLONOSCOPY [facility]    HX APPENDECTOMY      HX CHOLECYSTECTOMY      HX HEENT      tonsillectomy    HX OTHER SURGICAL      vasectomy    HX PARATHYROIDECTOMY  01/14/2015    right superior parathyroid gland removed and left superior parathyroid gland removed    HX PARTIAL THYROIDECTOMY  1/14/15    right lobectomy    HX UROLOGICAL      Seeds for prostate cancer , 4 dialations     HX UROLOGICAL  1/14/15    BLADDER NECK INCISION, Cold knife incision of bladder neck contracture, cystoscopy       Family History:  Family History   Problem Relation Age of Onset    Cancer Mother         hodgkins disease    Heart Disease Father     Hypertension Father     Other Neg Hx         no hypercalcemia or kidney stones       Social History:  Social History     Tobacco Use    Smoking status: Former Smoker     Years: 5.00     Last attempt to quit: 1955     Years since quittin.6    Smokeless tobacco: Never Used   Substance Use Topics    Alcohol use: Yes     Comment: occassional mixed drink or beer    Drug use: No       Allergies: Allergies   Allergen Reactions    Sulfa (Sulfonamide Antibiotics) Hives         Review of Systems   no fever  no eye pain  no ear pain  no shortness of breath  no chest pain  no abdominal pain  no dysuria  no leg pain  no rash  no lymphadenopathy  no weight loss    Physical Exam   Physical Exam  Constitutional:       Appearance: Normal appearance. HENT:      Head: Normocephalic and atraumatic. Nose: Nose normal.      Mouth/Throat:      Mouth: Mucous membranes are moist.   Eyes:      Extraocular Movements: Extraocular movements intact. Neck:      Musculoskeletal: Neck supple. Cardiovascular:      Rate and Rhythm: Normal rate and regular rhythm. Pulses: Normal pulses. Pulmonary:      Effort: Pulmonary effort is normal.      Breath sounds: Normal breath sounds. Abdominal:      Palpations: Abdomen is soft. Tenderness: There is no abdominal tenderness. Musculoskeletal: Normal range of motion. General: No swelling or tenderness. Skin:     General: Skin is warm and dry. Neurological:      General: No focal deficit present. Mental Status: He is alert and oriented to person, place, and time. Cranial Nerves: No cranial nerve deficit. Comments: 5 out of 5 strength of ankle flexion and extension and bicep flexion and extension bilaterally. Sensation to light touch intact over upper and lower extremities bilaterally. Normal finger-to-nose test bilaterally. Negative Romberg sign, gait is regular and not ataxic. No midline tenderness of the cervical, thoracic or lumbar spine. There is a slight tremor intermittently of upper extremities.          Diagnostic Study Results     Labs -     Recent Results (from the past 24 hour(s))   EKG, 12 LEAD, INITIAL    Collection Time: 09/03/20 11:32 AM   Result Value Ref Range    Ventricular Rate 72 BPM    Atrial Rate 72 BPM    P-R Interval 220 ms    QRS Duration 76 ms    Q-T Interval 386 ms    QTC Calculation (Bezet) 422 ms    Calculated P Axis 27 degrees    Calculated R Axis -4 degrees    Calculated T Axis 4 degrees    Diagnosis       Sinus rhythm with 1st degree AV block  When compared with ECG of 17-OCT-2018 20:30,  No significant change was found     CBC WITH AUTOMATED DIFF    Collection Time: 09/03/20 11:48 AM   Result Value Ref Range    WBC 8.6 4.1 - 11.1 K/uL    RBC 4.57 4.10 - 5.70 M/uL    HGB 14.8 12.1 - 17.0 g/dL    HCT 42.2 36.6 - 50.3 %    MCV 92.3 80.0 - 99.0 FL    MCH 32.4 26.0 - 34.0 PG    MCHC 35.1 30.0 - 36.5 g/dL    RDW 13.2 11.5 - 14.5 %    PLATELET 126 472 - 983 K/uL    MPV 9.1 8.9 - 12.9 FL    NRBC 0.0 0  WBC    ABSOLUTE NRBC 0.00 0.00 - 0.01 K/uL    NEUTROPHILS 77 (H) 32 - 75 %    LYMPHOCYTES 14 12 - 49 %    MONOCYTES 7 5 - 13 %    EOSINOPHILS 1 0 - 7 %    BASOPHILS 0 0 - 1 %    IMMATURE GRANULOCYTES 1 (H) 0.0 - 0.5 %    ABS. NEUTROPHILS 6.6 1.8 - 8.0 K/UL    ABS. LYMPHOCYTES 1.2 0.8 - 3.5 K/UL    ABS. MONOCYTES 0.6 0.0 - 1.0 K/UL    ABS. EOSINOPHILS 0.1 0.0 - 0.4 K/UL    ABS. BASOPHILS 0.0 0.0 - 0.1 K/UL    ABS. IMM.  GRANS. 0.1 (H) 0.00 - 0.04 K/UL    DF AUTOMATED     METABOLIC PANEL, COMPREHENSIVE    Collection Time: 09/03/20 11:48 AM   Result Value Ref Range    Sodium 129 (L) 136 - 145 mmol/L    Potassium 4.5 3.5 - 5.1 mmol/L    Chloride 97 97 - 108 mmol/L    CO2 30 21 - 32 mmol/L    Anion gap 2 (L) 5 - 15 mmol/L    Glucose 101 (H) 65 - 100 mg/dL    BUN 12 6 - 20 MG/DL    Creatinine 1.34 (H) 0.70 - 1.30 MG/DL    BUN/Creatinine ratio 9 (L) 12 - 20      GFR est AA >60 >60 ml/min/1.73m2    GFR est non-AA 51 (L) >60 ml/min/1.73m2    Calcium 8.7 8.5 - 10.1 MG/DL    Bilirubin, total 1.5 (H) 0.2 - 1.0 MG/DL    ALT (SGPT) 28 12 - 78 U/L    AST (SGOT) 26 15 - 37 U/L    Alk. phosphatase 96 45 - 117 U/L    Protein, total 6.9 6.4 - 8.2 g/dL    Albumin 3.5 3.5 - 5.0 g/dL    Globulin 3.4 2.0 - 4.0 g/dL    A-G Ratio 1.0 (L) 1.1 - 2.2     URINALYSIS W/ RFLX MICROSCOPIC    Collection Time: 09/03/20 12:51 PM   Result Value Ref Range    Color YELLOW/STRAW      Appearance CLEAR CLEAR      Specific gravity 1.005 1.003 - 1.030      pH (UA) 7.0 5.0 - 8.0      Protein Negative NEG mg/dL    Glucose Negative NEG mg/dL    Ketone Negative NEG mg/dL    Bilirubin Negative NEG      Blood Negative NEG      Urobilinogen 1.0 0.2 - 1.0 EU/dL    Nitrites Negative NEG      Leukocyte Esterase Negative NEG         Radiologic Studies -   No orders to display     CT Results  (Last 48 hours)    None        CXR Results  (Last 48 hours)    None            Medical Decision Making   I am the first provider for this patient. I reviewed the vital signs, available nursing notes, past medical history, past surgical history, family history and social history. Vital Signs-Reviewed the patient's vital signs. Patient Vitals for the past 24 hrs:   Temp Pulse Resp BP SpO2   09/03/20 1230  72 19 162/88    09/03/20 1212     96 %   09/03/20 1036 97.6 °F (36.4 °C) 80 16 (!) 149/110 100 %         Provider Notes (Medical Decision Making):   81-year-old male presenting with tremor. This seems to be subacute in nature, developing over about a month. No focal findings on neurologic exam, he does have a known history of cervical spine stenosis, however his neurologic exam is normal here and no findings concerning for any acute emergent progression of this. No symptoms of any spinal cord compression. Will assess for possible dehydration or electrolyte abnormalities. ED Course:     Initial assessment performed. The patients presenting problems have been discussed, and they are in agreement with the care plan formulated and outlined with them.   I have encouraged them to ask questions as they arise throughout their visit. Otherwise, no infectious symptoms, Well-appearing with normal vital signs. CBC is negative for leukocytosis or anemia, UA is negative for UTI. Basic metabolic panel does show elevated creatinine, he does have a history of CKD. Sodium is slightly low at 129, this is consistent with slight dehydration, he will be given 1 L normal saline bolus. On reevaluation, the patient is resting comfortably and states that he feels stable. Request to be discharged. Patient is counseled on supportive care and return precautions. Will return to the ED for any worsening tremor, any new weakness, numbness or tingling or any bowel or bladder symptoms. Will followup with neurologist and primary care doctor within 2-3 days. Critical Care Time:         Disposition:  Home    PLAN:  1. Discharge Medication List as of 9/3/2020  1:44 PM        2.    Follow-up Information     Follow up With Specialties Details Why Contact Info    Samantha Jackson MD Pediatric Medicine Call in 1 day  [address] [address] [phone]      Your neurologist  Schedule an appointment as soon as possible for a visit in 3 days      [facility] EMERGENCY DEPT Emergency Medicine  As needed, If symptoms worsen [address] [place] [address]  [phone]        Return to ED if worse     Diagnosis     Clinical Impression: Acute tremor

## 2020-09-05 LAB
ATRIAL RATE: 72 BPM
CALCULATED P AXIS, ECG09: 27 DEGREES
CALCULATED R AXIS, ECG10: -4 DEGREES
CALCULATED T AXIS, ECG11: 4 DEGREES
DIAGNOSIS, 93000: NORMAL
P-R INTERVAL, ECG05: 220 MS
Q-T INTERVAL, ECG07: 386 MS
QRS DURATION, ECG06: 76 MS
QTC CALCULATION (BEZET), ECG08: 422 MS
VENTRICULAR RATE, ECG03: 72 BPM

## 2020-09-08 ENCOUNTER — OFFICE VISIT (OUTPATIENT)
Dept: NEUROLOGY | Age: 85
End: 2020-09-08
Payer: MEDICARE

## 2020-09-08 VITALS
SYSTOLIC BLOOD PRESSURE: 100 MMHG | DIASTOLIC BLOOD PRESSURE: 62 MMHG | OXYGEN SATURATION: 95 % | WEIGHT: 208 LBS | HEART RATE: 83 BPM | HEIGHT: 72 IN | RESPIRATION RATE: 16 BRPM | BODY MASS INDEX: 28.17 KG/M2 | TEMPERATURE: 97.5 F

## 2020-09-08 DIAGNOSIS — I65.23 BILATERAL CAROTID ARTERY STENOSIS: ICD-10-CM

## 2020-09-08 DIAGNOSIS — E53.8 B12 DEFICIENCY: ICD-10-CM

## 2020-09-08 DIAGNOSIS — G45.1 TRANSIENT ISCHEMIC ATTACK INVOLVING RIGHT INTERNAL CAROTID ARTERY: ICD-10-CM

## 2020-09-08 DIAGNOSIS — G25.0 BENIGN ESSENTIAL TREMOR SYNDROME: ICD-10-CM

## 2020-09-08 DIAGNOSIS — E11.42 DIABETIC PERIPHERAL NEUROPATHY ASSOCIATED WITH TYPE 2 DIABETES MELLITUS (HCC): ICD-10-CM

## 2020-09-08 DIAGNOSIS — M48.02 DEGENERATIVE CERVICAL SPINAL STENOSIS: ICD-10-CM

## 2020-09-08 DIAGNOSIS — I65.23 STENOSIS OF BOTH INTERNAL CAROTID ARTERIES: ICD-10-CM

## 2020-09-08 DIAGNOSIS — Z85.46 H/O PROSTATE CANCER: ICD-10-CM

## 2020-09-08 DIAGNOSIS — Z85.46 H/O PROSTATE CANCER: Primary | ICD-10-CM

## 2020-09-08 DIAGNOSIS — E55.9 VITAMIN D DEFICIENCY: ICD-10-CM

## 2020-09-08 DIAGNOSIS — R41.3 DISTURBANCE OF MEMORY: ICD-10-CM

## 2020-09-08 DIAGNOSIS — G95.9 CERVICAL MYELOPATHY WITH CERVICAL RADICULOPATHY (HCC): ICD-10-CM

## 2020-09-08 DIAGNOSIS — M54.12 CERVICAL MYELOPATHY WITH CERVICAL RADICULOPATHY (HCC): ICD-10-CM

## 2020-09-08 PROCEDURE — G8754 DIAS BP LESS 90: HCPCS | Performed by: PSYCHIATRY & NEUROLOGY

## 2020-09-08 PROCEDURE — 99205 OFFICE O/P NEW HI 60 MIN: CPT | Performed by: PSYCHIATRY & NEUROLOGY

## 2020-09-08 PROCEDURE — G8432 DEP SCR NOT DOC, RNG: HCPCS | Performed by: PSYCHIATRY & NEUROLOGY

## 2020-09-08 PROCEDURE — 1101F PT FALLS ASSESS-DOCD LE1/YR: CPT | Performed by: PSYCHIATRY & NEUROLOGY

## 2020-09-08 PROCEDURE — G8752 SYS BP LESS 140: HCPCS | Performed by: PSYCHIATRY & NEUROLOGY

## 2020-09-08 PROCEDURE — G8417 CALC BMI ABV UP PARAM F/U: HCPCS | Performed by: PSYCHIATRY & NEUROLOGY

## 2020-09-08 PROCEDURE — G8536 NO DOC ELDER MAL SCRN: HCPCS | Performed by: PSYCHIATRY & NEUROLOGY

## 2020-09-08 PROCEDURE — G8427 DOCREV CUR MEDS BY ELIG CLIN: HCPCS | Performed by: PSYCHIATRY & NEUROLOGY

## 2020-09-08 NOTE — LETTER
9/8/20 Patient: Pb Delgado. YOB: 1935 Date of Visit: 9/8/2020 Ryan Anderson MD 
Addie Zamora [address] [facility] [address] [id number] VIA Facsimile: [phone] Dear Ryan Anderson MD, Thank you for referring Mr. Maricruz Wilson to [address] [place] [address] for evaluation. My notes for this consultation are attached. Consult REFERRED BY: 
Real Molina MD 
 
CHIEF COMPLAINT: Tremors and weakness and numbness in his right arm and generalized weakness and difficulty walking. Subjective: Pb Delgado is a 81 y.o. left-handed  male seen at the request of Dr. Meño Caraballo for evaluation of new problem of increasing difficulty with weakness in his legs and right arm and numbness radiating down his right arm that is been progressively worse and referred to us for further evaluation to rule out a stroke. Patient had a TIA back in 2018 with a negative MRI of the brain and a negative MR a of the brain, but had carotid stenosis of 60 to 79% in the right internal carotid artery and less than 50% in the left. He had some right-sided numbness at that time. Patient did have a cervical MRI scan in 2018 that did show severe spinal stenosis from C3-C6, and diffuse neuroforaminal disease from C3-C7, and was sent to neurosurgery and saw Dr. Marcelle Eisenberg and the patient at that time did not have many symptoms, it was thought best not to operate on him at his age. He also was seen in the emergency room on September 3, 2020 when he became so weak he could not get out of bed, and was found to have a tremor, with his head shaking his hand shaking with tachycardia and the patient seemed to be anxious, but nothing particular was seen to be found. His tremor varies, and seems to be more when he is trying to do things. For his work-up he had normal metabolic studies, but no real imaging was done.   He does complain of neck pain is increasingly severe over the last several months. He has had no recent falls or injuries. Patient does not have any headache, fever, meningismus, or recent falls or injuries or low back pain. 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 He has some bladder problem but has had several bladder operations. He is continent of his bowels. Patient has chronic A. fib and is on chronic anticoagulation. Past Medical History:  
Diagnosis Date  Carotid artery stenosis, asymptomatic 8/1/2014 Right side 50-79%  Chronic kidney disease   
 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  
 resected Jan 2015. Calcium and PTH monitored by Dr. Sharon Guillory, renal.  Levels normalized after surgery.  Prostate cancer ([facility] [address] seeds, then XRT, then seed again. Dr. Zoe Peacock  Stroke [facility] 2002 TIA, Dr. Caridad Castillo, no residual effects as of 9/2018  Thyroid cancer [facility]) Jan 2015  Unspecified vitamin D deficiency Past Surgical History:  
Procedure Laterality Date  CARDIAC SURG PROCEDURE UNLIST Cardiac Cath   Dr. Corinn Riedel  COLONOSCOPY N/A 9/17/2018 COLONOSCOPY performed by Twin Brooke MD at [facility] AMBULATORY OR  
 HX APPENDECTOMY  HX CHOLECYSTECTOMY  HX HEENT    
 tonsillectomy  HX OTHER SURGICAL    
 vasectomy  HX PARATHYROIDECTOMY  01/14/2015  
 right superior parathyroid gland removed and left superior parathyroid gland removed  HX PARTIAL THYROIDECTOMY  1/14/15  
 right lobectomy  HX UROLOGICAL Seeds for prostate cancer , 4 dialations  HX UROLOGICAL  1/14/15 BLADDER NECK INCISION, Cold knife incision of bladder neck contracture, cystoscopy Family History Problem Relation Age of Onset  Cancer Mother   
     hodgkins disease  Heart Disease Father  Hypertension Father  Other Neg Hx   
     no hypercalcemia or kidney stones Social History Tobacco Use  Smoking status: Former Smoker Years: 5.00 Last attempt to quit: 1955 Years since quittin.6  Smokeless tobacco: Never Used Substance Use Topics  Alcohol use: Yes Comment: occassional mixed drink or beer Current Outpatient Medications:  
  losartan (COZAAR) 100 mg tablet, Take 100 mg by mouth daily. , Disp: , Rfl:  
  levothyroxine (SYNTHROID) 112 mcg tablet, TAKE 1 TABLET BY MOUTH  DAILY BEFORE BREAKFAST, Disp: 90 Tab, Rfl: 3 
  clopidogrel (PLAVIX) 75 mg tab, TAKE 1 TABLET BY MOUTH  DAILY, Disp: 90 Tab, Rfl: 3 
  atorvastatin (LIPITOR) 20 mg tablet, Take 1 Tab by mouth daily. , Disp: 90 Tab, Rfl: 3 
  docusate sodium (COLACE) 100 mg capsule, Take 100 mg by mouth two (2) times a day., Disp: , Rfl:  
  cyanocobalamin (VITAMIN B-12) 1,000 mcg tablet, Take 1 Tab by mouth daily. , Disp: 30 Tab, Rfl: 0 
  metoprolol succinate (TOPROL-XL) 50 mg XL tablet, Take 50 mg by mouth daily. , Disp: , Rfl:  
  ELIQUIS 2.5 mg tablet, , Disp: , Rfl:  
  acetaminophen (TYLENOL EXTRA STRENGTH) 500 mg tablet, Take  by mouth every six (6) hours as needed for Pain., Disp: , Rfl:  
  cholecalciferol, vitamin D3, (VITAMIN D3) 2,000 unit tab, Take  by mouth nightly., Disp: , Rfl:  
 
 
 
Allergies Allergen Reactions  Sulfa (Sulfonamide Antibiotics) Hives MRI Results (most recent): 
Results from Hospital Encounter encounter on 10/17/18 MRI CERV SPINE WO CONT Narrative EXAM:  MRI CERV SPINE WO CONT INDICATION:  myelopathy. COMPARISON: 3/6/2009 x-rays. TECHNIQUE: MR imaging of the cervical spine was performed using the following 
sequences: sagittal T1, T2, STIR;  axial T2, T1 bilateral uncovertebral joint hypertrophy. Severe bilateral neuroforaminal narrowing. Mild spinal canal 
stenosis. CONTRAST:  None. FINDINGS: 
 
There is 2 mm anterior subluxation C7 on T1. Vertebral body heights are 
maintained. Marrow signal is normal. Severe disc space is noted at C3-C4, C4-C5, 
and C6-C7. Osteophytic endplate changes are noted throughout the cervical spine. Minimal acute Modic type endplate changes are present at C3-C4. The craniocervical junction is intact. The course, caliber, and signal 
intensity of the spinal cord are normal.   
 
The paraspinal soft tissues are within normal limits. C2-C3:  Small disc osteophyte complex with superimposed large right paracentral 
disc protrusion effacing the right lateral recess. Mild/moderate right and mild 
left facet arthropathy. Ligamentum flavum thickening. No neuroforaminal 
narrowing. Mild spinal canal stenosis. C3-C4:  Large disc osteophyte complex. Both lateral recesses are effaced and 
there is marked deformity of the ventral cord. Minimal bilateral facet 
arthropathy. Bilateral uncovertebral joint hypertrophy. Ligamentum flavum 
thickening. Severe left and moderate right neuroforaminal narrowing. Severe 
spinal canal stenosis with AP dimension of the canal measuring about 5 mm. C4-C5:  No herniation or stenosis. Moderate size disc osteophyte complex with 
left paracentral component. Both lateral recesses are effaced, left greater than 
right. Bilateral uncovertebral joint hypertrophy. Ligamentum flavum thickening. Severe bilateral neuroforaminal narrowing. Moderate/severe spinal canal 
stenosis. C5-C6:  Moderate size disc osteophyte complex. Both lateral recesses are 
effaced, left greater than right. Bilateral uncovertebral joint hypertrophy. Mild ligamentum flavum thickening. Severe bilateral neuroforaminal narrowing. Moderate/severe spinal canal stenosis.  
 
C6-C7:  Small disc osteophyte complex effacing both lateral recesses bilateral 
 uncovertebral joint hypertrophy. Severe left and moderate right neuroforaminal 
narrowing. Mild spinal canal stenosis. C7-T1:  Minimal disc bulge. Mild left facet arthropathy. No neuroforaminal 
narrowing or spinal canal stenosis. Impression IMPRESSION: 
Multilevel severe degenerative disc disease and degenerative changes. Severe 
neuroforaminal narrowing at C3-C4, C4-C5, C5-C6, and C6-C7. Severe and 
moderate/severe spinal canal stenosis at C3-C4, C4-C5, and C5-C6. Results from Hospital Encounter encounter on 10/17/18 MRI CERV SPINE WO CONT Narrative EXAM:  MRI CERV SPINE WO CONT INDICATION:  myelopathy. COMPARISON: 3/6/2009 x-rays. TECHNIQUE: MR imaging of the cervical spine was performed using the following 
sequences: sagittal T1, T2, STIR;  axial T2, T1 bilateral uncovertebral joint 
hypertrophy. Severe bilateral neuroforaminal narrowing. Mild spinal canal 
stenosis. CONTRAST:  None. FINDINGS: 
 
There is 2 mm anterior subluxation C7 on T1. Vertebral body heights are 
maintained. Marrow signal is normal. Severe disc space is noted at C3-C4, C4-C5, 
and C6-C7. Osteophytic endplate changes are noted throughout the cervical spine. Minimal acute Modic type endplate changes are present at C3-C4. The craniocervical junction is intact. The course, caliber, and signal 
intensity of the spinal cord are normal.   
 
The paraspinal soft tissues are within normal limits. C2-C3:  Small disc osteophyte complex with superimposed large right paracentral 
disc protrusion effacing the right lateral recess. Mild/moderate right and mild 
left facet arthropathy. Ligamentum flavum thickening. No neuroforaminal 
narrowing. Mild spinal canal stenosis. C3-C4:  Large disc osteophyte complex. Both lateral recesses are effaced and 
there is marked deformity of the ventral cord. Minimal bilateral facet 
arthropathy. Bilateral uncovertebral joint hypertrophy. Ligamentum flavum thickening. Severe left and moderate right neuroforaminal narrowing. Severe 
spinal canal stenosis with AP dimension of the canal measuring about 5 mm. C4-C5:  No herniation or stenosis. Moderate size disc osteophyte complex with 
left paracentral component. Both lateral recesses are effaced, left greater than 
right. Bilateral uncovertebral joint hypertrophy. Ligamentum flavum thickening. Severe bilateral neuroforaminal narrowing. Moderate/severe spinal canal 
stenosis. C5-C6:  Moderate size disc osteophyte complex. Both lateral recesses are 
effaced, left greater than right. Bilateral uncovertebral joint hypertrophy. Mild ligamentum flavum thickening. Severe bilateral neuroforaminal narrowing. Moderate/severe spinal canal stenosis. C6-C7:  Small disc osteophyte complex effacing both lateral recesses bilateral 
uncovertebral joint hypertrophy. Severe left and moderate right neuroforaminal 
narrowing. Mild spinal canal stenosis. C7-T1:  Minimal disc bulge. Mild left facet arthropathy. No neuroforaminal 
narrowing or spinal canal stenosis. Impression IMPRESSION: 
Multilevel severe degenerative disc disease and degenerative changes. Severe 
neuroforaminal narrowing at C3-C4, C4-C5, C5-C6, and C6-C7. Severe and 
moderate/severe spinal canal stenosis at C3-C4, C4-C5, and C5-C6. Review of Systems: A comprehensive review of systems was negative except for: Constitutional: positive for fatigue and malaise Cardiovascular: positive for chest pressure/discomfort, palpitations, irregular heart beats Musculoskeletal: positive for myalgias, arthralgias, stiff joints, neck pain, back pain and muscle weakness Neurological: positive for memory problems, paresthesia, coordination problems, gait problems, tremor and weakness Behvioral/Psych: positive for anxiety and borderline personality disorder Vitals:  
 09/08/20 1340 BP: 100/62 Pulse: 83 Resp: 16 Temp: 97.5 °F (36.4 °C) TempSrc: Oral  
SpO2: 95% Weight: 208 lb (94.3 kg) Height: 6' (1.829 m) Objective: I 
 
 
NEUROLOGICAL EXAM: 
 
Appearance: The patient is well developed, well nourished, provides a poor history and is in no acute distress. Mental Status: Oriented to time, place and person, and the president, patient very poor historian, cognitive function is appears abnormal and speech is fluent and no aphasia or dysarthria. Mood and affect appropriate but anxious. Cranial Nerves:   Intact visual fields. Fundi are benign, disc are flat, no lesions seen on funduscopy. MAUDE, EOM's full, no nystagmus, no ptosis. Facial sensation is normal. Corneal reflexes are not tested. Facial movement is symmetric. Hearing is abnormal bilaterally. Palate is midline with normal sternocleidomastoid and trapezius muscles are normal. Tongue is midline. Neck without meningismus or bruits Temporal arteries are not tender or enlarged TMJ areas are not tender on palpation Motor:  4/5 strength in upper and lower proximal and distal muscles in the left arm and 3/5 in both legs, and patient has strength about 2/5 in his deltoid, biceps and brachioradialis, and about 3/5 in his triceps, finger extensors and the rest of his right upper extremity muscles. . Normal bulk and tone. No fasciculations. Rapid alternating movement is symmetric and slow bilaterally Reflexes:   Deep tendon reflexes 1+/4 and symmetrical. 
No babinski or clonus present Sensory:   Normal to touch, pinprick and vibration and temperature mildly decreased in both feet. DSS is intact Gait:  Abnormal gait as patient walks with a slightly wide-based and unsteady gait Tremor:    Very mild intention tremor noted. Cerebellar: Moderately abnormal cerebellar signs present on Romberg and tandem testing and finger-nose-finger exam just shows a mild tremor. Neurovascular:  Normal heart sounds and regular rhythm, peripheral pulses decreased, and no carotid bruits. Assessment: ICD-10-CM ICD-9-CM 1. H/O prostate cancer  Z85.46 V10.46 DUPLEX CAROTID BILATERAL  
   VITAMIN D, 25 HYDROXY  
   VITAMIN B12 & FOLATE  
   TSH 3RD GENERATION  
   CK  
   MYASTHENIA GRAVIS EVALUATION  
   MRI CERV SPINE WO CONT  
   EMG LIMITED T4 (THYROXINE) T3 TOTAL  
   HEMOGLOBIN A1C WITH EAG  
   METABOLIC PANEL, COMPREHENSIVE  
   CBC WITH AUTOMATED DIFF DEBORA COMPREHENSIVE PLUS PANEL  
   MAGNESIUM  
   XR SPINE CERV W OBL/FLEX/EXT MIN 6 V COMP 2. Degenerative cervical spinal stenosis  M48.02 723.0 DUPLEX CAROTID BILATERAL  
   VITAMIN D, 25 HYDROXY  
   VITAMIN B12 & FOLATE  
   TSH 3RD GENERATION  
   CK  
   MYASTHENIA GRAVIS EVALUATION  
   MRI CERV SPINE WO CONT  
   EMG LIMITED T4 (THYROXINE) T3 TOTAL  
   HEMOGLOBIN A1C WITH EAG  
   METABOLIC PANEL, COMPREHENSIVE  
   CBC WITH AUTOMATED DIFF DEBORA COMPREHENSIVE PLUS PANEL  
   MAGNESIUM  
   XR SPINE CERV W OBL/FLEX/EXT MIN 6 V COMP 3. Stenosis of both internal carotid arteries  I65.23 433.10 DUPLEX CAROTID BILATERAL  
  433.30 VITAMIN D, 25 HYDROXY  
   VITAMIN B12 & FOLATE  
   TSH 3RD GENERATION  
   CK  
   MYASTHENIA GRAVIS EVALUATION  
   MRI CERV SPINE WO CONT  
   EMG LIMITED T4 (THYROXINE) T3 TOTAL  
   HEMOGLOBIN A1C WITH EAG  
   METABOLIC PANEL, COMPREHENSIVE  
   CBC WITH AUTOMATED DIFF DEBORA COMPREHENSIVE PLUS PANEL  
   MAGNESIUM  
   XR SPINE CERV W OBL/FLEX/EXT MIN 6 V COMP 4. Bilateral carotid artery stenosis  I65.23 433.10 DUPLEX CAROTID BILATERAL  
  433.30 VITAMIN D, 25 HYDROXY  
   VITAMIN B12 & FOLATE  
   TSH 3RD GENERATION  
   CK  
   MYASTHENIA GRAVIS EVALUATION  
   MRI CERV SPINE WO CONT  
   EMG LIMITED T4 (THYROXINE) T3 TOTAL  
   HEMOGLOBIN A1C WITH EAG  
   METABOLIC PANEL, COMPREHENSIVE  
   CBC WITH AUTOMATED DIFF DEBORA COMPREHENSIVE PLUS PANEL  
   MAGNESIUM  
   XR SPINE CERV W OBL/FLEX/EXT MIN 6 V COMP 5. Transient ischemic attack involving right internal carotid artery  G45.1 435.8 DUPLEX CAROTID BILATERAL  
   VITAMIN D, 25 HYDROXY  
   VITAMIN B12 & FOLATE  
   TSH 3RD GENERATION  
   CK  
   MYASTHENIA GRAVIS EVALUATION  
   MRI CERV SPINE WO CONT  
   EMG LIMITED T4 (THYROXINE) T3 TOTAL  
   HEMOGLOBIN A1C WITH EAG  
   METABOLIC PANEL, COMPREHENSIVE  
   CBC WITH AUTOMATED DIFF DEBORA COMPREHENSIVE PLUS PANEL  
   MAGNESIUM  
   XR SPINE CERV W OBL/FLEX/EXT MIN 6 V COMP 6. Benign essential tremor syndrome  G25.0 333.1 DUPLEX CAROTID BILATERAL  
   VITAMIN D, 25 HYDROXY  
   VITAMIN B12 & FOLATE  
   TSH 3RD GENERATION  
   CK  
   MYASTHENIA GRAVIS EVALUATION  
   MRI CERV SPINE WO CONT  
   EMG LIMITED T4 (THYROXINE) T3 TOTAL  
   HEMOGLOBIN A1C WITH EAG  
   METABOLIC PANEL, COMPREHENSIVE  
   CBC WITH AUTOMATED DIFF DEBORA COMPREHENSIVE PLUS PANEL  
   MAGNESIUM  
   XR SPINE CERV W OBL/FLEX/EXT MIN 6 V COMP 7. Cervical myelopathy with cervical radiculopathy  M47.12 721.1 DUPLEX CAROTID BILATERAL  
   VITAMIN D, 25 HYDROXY  
   VITAMIN B12 & FOLATE  
   TSH 3RD GENERATION  
   CK  
   MYASTHENIA GRAVIS EVALUATION  
   MRI CERV SPINE WO CONT  
   EMG LIMITED T4 (THYROXINE) T3 TOTAL  
   HEMOGLOBIN A1C WITH EAG  
   METABOLIC PANEL, COMPREHENSIVE  
   CBC WITH AUTOMATED DIFF DEBORA COMPREHENSIVE PLUS PANEL  
   MAGNESIUM  
   XR SPINE CERV W OBL/FLEX/EXT MIN 6 V COMP 8. B12 deficiency  E53.8 266.2 T4 (THYROXINE) T3 TOTAL  
   HEMOGLOBIN A1C WITH EAG  
   METABOLIC PANEL, COMPREHENSIVE  
   CBC WITH AUTOMATED DIFF DEBORA COMPREHENSIVE PLUS PANEL  
   MAGNESIUM  
   XR SPINE CERV W OBL/FLEX/EXT MIN 6 V COMP 9. Vitamin D deficiency  E55.9 268.9 T4 (THYROXINE) T3 TOTAL  
   HEMOGLOBIN A1C WITH EAG  
   METABOLIC PANEL, COMPREHENSIVE  
   CBC WITH AUTOMATED DIFF    DEBORA COMPREHENSIVE PLUS PANEL  
   MAGNESIUM  
 XR SPINE CERV W OBL/FLEX/EXT MIN 6 V COMP 10. Diabetic peripheral neuropathy associated with type 2 diabetes mellitus (HCC)  E11.42 250.60 T4 (THYROXINE) 357.2 T3 TOTAL  
   HEMOGLOBIN A1C WITH EAG  
   METABOLIC PANEL, COMPREHENSIVE  
   CBC WITH AUTOMATED DIFF DEBORA COMPREHENSIVE PLUS PANEL  
   MAGNESIUM  
   XR SPINE CERV W OBL/FLEX/EXT MIN 6 V COMP 11. Disturbance of memory  R41.3 780.93 Active Problems: * No active hospital problems. * 
 
 
Plan:  
 
Patient with complicated problems of probable severe spinal stenosis and a cervical myelopathy that is causing him a quadriparesis and he needs a repeat MRI of the cervical spine and perhaps a referral to neurosurgery. He is walking with a walker now for stability and we advised him to continue doing that and offered him physical therapy but he wants to hold on the physical therapy for now. We will check carotid Doppler studies to make sure his stenosis in his right carotid is not getting any worse. Also to make sure that he has not had a stroke. Needs an EMG study of his arms to see how much denervation he has, and of his leg to make sure he does not have neuropathy in addition. Seems to have some mild memory problems, but he does not complain of that and he has a more serious problem with a cervical myelopathy, so we will address that later. His tremor seems to be more of a benign essential tremor is not too significant right now and probably just gets aggravated by his anxiety. We discussed this condition with the patient and his daughter in detail and agree with plans and therapy for work-up as above. He may need a neuropathy work-up in addition.  
1 hour spent with the patient and his daughter going over his history, reviewing his previous MRI scans personally on the PACS system, and discussing his diagnosis and prognosis, and we will refer him back to Dr. Solomon Reed continue does have severe spinal stenosis because he may need surgery because he is so symptomatic. Very difficult case with his age. Signed By: Ronald Houston MD   
 September 8, 2020 CC: Mely Finley MD 
FAX: [phone] This note will not be viewable in [address] If you have questions, please do not hesitate to call me. I look forward to following your patient along with you. Sincerely, Ronald Houston MD

## 2020-09-08 NOTE — PROGRESS NOTES
Consult  REFERRED BY:  Supriya Mixon MD    CHIEF COMPLAINT: Tremors and weakness and numbness in his right arm and generalized weakness and difficulty walking. Subjective: Herb Cho is a 81 y.o. left-handed  male seen at the request of Dr. Randa Sanders for evaluation of new problem of increasing difficulty with weakness in his legs and right arm and numbness radiating down his right arm that is been progressively worse and referred to us for further evaluation to rule out a stroke. Patient had a TIA back in 2018 with a negative MRI of the brain and a negative MR a of the brain, but had carotid stenosis of 60 to 79% in the right internal carotid artery and less than 50% in the left. He had some right-sided numbness at that time. Patient did have a cervical MRI scan in 2018 that did show severe spinal stenosis from C3-C6, and diffuse neuroforaminal disease from C3-C7, and was sent to neurosurgery and saw Dr. Zully Saini and the patient at that time did not have many symptoms, it was thought best not to operate on him at his age. He also was seen in the emergency room on September 3, 2020 when he became so weak he could not get out of bed, and was found to have a tremor, with his head shaking his hand shaking with tachycardia and the patient seemed to be anxious, but nothing particular was seen to be found. His tremor varies, and seems to be more when he is trying to do things. For his work-up he had normal metabolic studies, but no real imaging was done. He does complain of neck pain is increasingly severe over the last several months. He has had no recent falls or injuries. Patient does not have any headache, fever, meningismus, or recent falls or injuries or low back pain. 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 He has some bladder problem but has had several bladder operations. He is continent of his bowels.   Patient has chronic A. fib and is on chronic anticoagulation. 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 Calcium and PTH monitored by Dr. Grace Hill, renal.  Levels normalized after surgery.  Prostate cancer ([address]     seeds, then XRT, then seed again.   Dr. Bernard Aid Stroke [facility])     TIA, Dr. Leatha Scott, no residual effects as of 2018    Thyroid cancer [facility]) 2015    Unspecified vitamin D deficiency       Past Surgical History:   Procedure Laterality Date   Erlin Alston     COLONOSCOPY N/A 2018    COLONOSCOPY performed by Derian Velasco MD at [facility] AM[facility]UL[facility] OR    HX APPENDECTOMY      HX CHOLECYSTECTOMY      HX HEENT      tonsillectomy    HX OTHER SURGICAL      vasectomy    HX PARATHYROIDECTOMY  2015    right superior parathyroid gland removed and left superior parathyroid gland removed    HX PARTIAL THYROIDECTOMY  1/14/15    right lobectomy    HX UROLOGICAL      Seeds for prostate cancer , 4 dialations     HX UROLOGICAL  1/14/15    BLADDER NECK INCISION, Cold knife incision of bladder neck contracture, cystoscopy     Family History   Problem Relation Age of Onset    Cancer Mother         hodgkins disease    Heart Disease Father     Hypertension Father     Other Neg Hx         no hypercalcemia or kidney stones      Social History     Tobacco Use    Smoking status: Former Smoker     Years: 5.00     Last attempt to quit: 1955     Years since quittin.6    Smokeless tobacco: Never Used Substance Use Topics    Alcohol use: Yes     Comment: occassional mixed drink or beer         Current Outpatient Medications:     losartan (COZAAR) 100 mg tablet, Take 100 mg by mouth daily. , Disp: , Rfl:     levothyroxine (SYNTHROID) 112 mcg tablet, TAKE 1 TABLET BY MOUTH  DAILY BEFORE BREAKFAST, Disp: 90 Tab, Rfl: 3    clopidogrel (PLAVIX) 75 mg tab, TAKE 1 TABLET BY MOUTH  DAILY, Disp: 90 Tab, Rfl: 3    atorvastatin (LIPITOR) 20 mg tablet, Take 1 Tab by mouth daily. , Disp: 90 Tab, Rfl: 3    docusate sodium (COLACE) 100 mg capsule, Take 100 mg by mouth two (2) times a day., Disp: , Rfl:     cyanocobalamin (VITAMIN B-12) 1,000 mcg tablet, Take 1 Tab by mouth daily. , Disp: 30 Tab, Rfl: 0    metoprolol succinate (TOPROL-XL) 50 mg XL tablet, Take 50 mg by mouth daily. , Disp: , Rfl:     ELIQUIS 2.5 mg tablet, , Disp: , Rfl:     acetaminophen (TYLENOL EXTRA STRENGTH) 500 mg tablet, Take  by mouth every six (6) hours as needed for Pain., Disp: , Rfl:     cholecalciferol, vitamin D3, (VITAMIN D3) 2,000 unit tab, Take  by mouth nightly., Disp: , Rfl:         Allergies   Allergen Reactions    Sulfa (Sulfonamide Antibiotics) Hives      MRI Results (most recent):  Results from Hospital Encounter encounter on 10/17/18   MRI CERV SPINE WO CONT    Narrative EXAM:  MRI CERV SPINE WO CONT    INDICATION:  myelopathy. COMPARISON: 3/6/2009 x-rays. TECHNIQUE: MR imaging of the cervical spine was performed using the following  sequences: sagittal T1, T2, STIR;  axial T2, T1 bilateral uncovertebral joint  hypertrophy. Severe bilateral neuroforaminal narrowing. Mild spinal canal  stenosis. CONTRAST:  None. FINDINGS:    There is 2 mm anterior subluxation C7 on T1. Vertebral body heights are  maintained. Marrow signal is normal. Severe disc space is noted at C3-C4, C4-C5,  and C6-C7. Osteophytic endplate changes are noted throughout the cervical spine.   Minimal acute Modic type endplate changes are present at C3-C4.    The craniocervical junction is intact. The course, caliber, and signal  intensity of the spinal cord are normal.      The paraspinal soft tissues are within normal limits. C2-C3:  Small disc osteophyte complex with superimposed large right paracentral  disc protrusion effacing the right lateral recess. Mild/moderate right and mild  left facet arthropathy. Ligamentum flavum thickening. No neuroforaminal  narrowing. Mild spinal canal stenosis. C3-C4:  Large disc osteophyte complex. Both lateral recesses are effaced and  there is marked deformity of the ventral cord. Minimal bilateral facet  arthropathy. Bilateral uncovertebral joint hypertrophy. Ligamentum flavum  thickening. Severe left and moderate right neuroforaminal narrowing. Severe  spinal canal stenosis with AP dimension of the canal measuring about 5 mm. C4-C5:  No herniation or stenosis. Moderate size disc osteophyte complex with  left paracentral component. Both lateral recesses are effaced, left greater than  right. Bilateral uncovertebral joint hypertrophy. Ligamentum flavum thickening. Severe bilateral neuroforaminal narrowing. Moderate/severe spinal canal  stenosis. C5-C6:  Moderate size disc osteophyte complex. Both lateral recesses are  effaced, left greater than right. Bilateral uncovertebral joint hypertrophy. Mild ligamentum flavum thickening. Severe bilateral neuroforaminal narrowing. Moderate/severe spinal canal stenosis. C6-C7:  Small disc osteophyte complex effacing both lateral recesses bilateral  uncovertebral joint hypertrophy. Severe left and moderate right neuroforaminal  narrowing. Mild spinal canal stenosis. C7-T1:  Minimal disc bulge. Mild left facet arthropathy. No neuroforaminal  narrowing or spinal canal stenosis. Impression IMPRESSION:  Multilevel severe degenerative disc disease and degenerative changes. Severe  neuroforaminal narrowing at C3-C4, C4-C5, C5-C6, and C6-C7.  Severe and  moderate/severe spinal canal stenosis at C3-C4, C4-C5, and C5-C6. Results from [place] encounter on 10/17/18   MRI CERV SPINE WO CONT    Narrative EXAM:  MRI CERV SPINE WO CONT    INDICATION:  myelopathy. COMPARISON: 3/6/2009 x-rays. TECHNIQUE: MR imaging of the cervical spine was performed using the following  sequences: sagittal T1, T2, STIR;  axial T2, T1 bilateral uncovertebral joint  hypertrophy. Severe bilateral neuroforaminal narrowing. Mild spinal canal  stenosis. CONTRAST:  None. FINDINGS:    There is 2 mm anterior subluxation C7 on T1. Vertebral body heights are  maintained. Marrow signal is normal. Severe disc space is noted at C3-C4, C4-C5,  and C6-C7. Osteophytic endplate changes are noted throughout the cervical spine. Minimal acute Modic type endplate changes are present at C3-C4. The craniocervical junction is intact. The course, caliber, and signal  intensity of the spinal cord are normal.      The paraspinal soft tissues are within normal limits. C2-C3:  Small disc osteophyte complex with superimposed large right paracentral  disc protrusion effacing the right lateral recess. Mild/moderate right and mild  left facet arthropathy. Ligamentum flavum thickening. No neuroforaminal  narrowing. Mild spinal canal stenosis. C3-C4:  Large disc osteophyte complex. Both lateral recesses are effaced and  there is marked deformity of the ventral cord. Minimal bilateral facet  arthropathy. Bilateral uncovertebral joint hypertrophy. Ligamentum flavum  thickening. Severe left and moderate right neuroforaminal narrowing. Severe  spinal canal stenosis with AP dimension of the canal measuring about 5 mm. C4-C5:  No herniation or stenosis. Moderate size disc osteophyte complex with  left paracentral component. Both lateral recesses are effaced, left greater than  right. Bilateral uncovertebral joint hypertrophy. Ligamentum flavum thickening.   Severe bilateral neuroforaminal narrowing. Moderate/severe spinal canal  stenosis. C5-C6:  Moderate size disc osteophyte complex. Both lateral recesses are  effaced, left greater than right. Bilateral uncovertebral joint hypertrophy. Mild ligamentum flavum thickening. Severe bilateral neuroforaminal narrowing. Moderate/severe spinal canal stenosis. C6-C7:  Small disc osteophyte complex effacing both lateral recesses bilateral  uncovertebral joint hypertrophy. Severe left and moderate right neuroforaminal  narrowing. Mild spinal canal stenosis. C7-T1:  Minimal disc bulge. Mild left facet arthropathy. No neuroforaminal  narrowing or spinal canal stenosis. Impression IMPRESSION:  Multilevel severe degenerative disc disease and degenerative changes. Severe  neuroforaminal narrowing at C3-C4, C4-C5, C5-C6, and C6-C7. Severe and  moderate/severe spinal canal stenosis at C3-C4, C4-C5, and C5-C6. Review of Systems:  A comprehensive review of systems was negative except for: Constitutional: positive for fatigue and malaise  Cardiovascular: positive for chest pressure/discomfort, palpitations, irregular heart beats  Musculoskeletal: positive for myalgias, arthralgias, stiff joints, neck pain, back pain and muscle weakness  Neurological: positive for memory problems, paresthesia, coordination problems, gait problems, tremor and weakness  Behvioral/Psych: positive for anxiety and borderline personality disorder   Vitals:    09/08/20 1340   BP: 100/62   Pulse: 83   Resp: 16   Temp: 97.5 °F (36.4 °C)   TempSrc: Oral   SpO2: 95%   Weight: 208 lb (94.3 kg)   Height: 6' (1.829 m)     Objective:     I      NEUROLOGICAL EXAM:    Appearance: The patient is well developed, well nourished, provides a poor history and is in no acute distress. Mental Status: Oriented to time, place and person, and the president, patient very poor historian, cognitive function is appears abnormal and speech is fluent and no aphasia or dysarthria. Mood and affect appropriate but anxious. Cranial Nerves:   Intact visual fields. Fundi are benign, disc are flat, no lesions seen on funduscopy. MAUDE, EOM's full, no nystagmus, no ptosis. Facial sensation is normal. Corneal reflexes are not tested. Facial movement is symmetric. Hearing is abnormal bilaterally. Palate is midline with normal sternocleidomastoid and trapezius muscles are normal. Tongue is midline. Neck without meningismus or bruits  Temporal arteries are not tender or enlarged  TMJ areas are not tender on palpation   Motor:  4/5 strength in upper and lower proximal and distal muscles in the left arm and 3/5 in both legs, and patient has strength about 2/5 in his deltoid, biceps and brachioradialis, and about 3/5 in his triceps, finger extensors and the rest of his right upper extremity muscles. . Normal bulk and tone. No fasciculations. Rapid alternating movement is symmetric and slow bilaterally   Reflexes:   Deep tendon reflexes 1+/4 and symmetrical.  No babinski or clonus present   Sensory:   Normal to touch, pinprick and vibration and temperature mildly decreased in both feet. DSS is intact   Gait:  Abnormal gait as patient walks with a slightly wide-based and unsteady gait   Tremor:    Very mild intention tremor noted. Cerebellar: Moderately abnormal cerebellar signs present on Romberg and tandem testing and finger-nose-finger exam just shows a mild tremor. Neurovascular:  Normal heart sounds and regular rhythm, peripheral pulses decreased, and no carotid bruits. Assessment:       ICD-10-CM ICD-9-CM    1.  H/O prostate cancer  Z85.46 V10.46 DUPLEX CAROTID BILATERAL      VITAMIN D, 25 HYDROXY      VITAMIN B12 & FOLATE      TSH 3RD GENERATION      CK      MYASTHENIA GRAVIS EVALUATION      MRI CERV SPINE WO CONT      EMG LIMITED      T4 (THYROXINE)      T3 TOTAL      HEMOGLOBIN A1C WITH EAG      METABOLIC PANEL, COMPREHENSIVE      CBC WITH AUTOMATED DIFF      DEBORA COMPREHENSIVE PLUS PANEL      MAGNESIUM      XR SPINE CERV W OBL/FLEX/EXT MIN 6 V COMP   2. Degenerative cervical spinal stenosis  M48.02 723.0 DUPLEX CAROTID BILATERAL      VITAMIN D, 25 HYDROXY      VITAMIN B12 & FOLATE      TSH 3RD GENERATION      CK      MYASTHENIA GRAVIS EVALUATION      MRI CERV SPINE WO CONT      EMG LIMITED      T4 (THYROXINE)      T3 TOTAL      HEMOGLOBIN A1C WITH EAG      METABOLIC PANEL, COMPREHENSIVE      CBC WITH AUTOMATED DIFF      DEBORA COMPREHENSIVE PLUS PANEL      MAGNESIUM      XR SPINE CERV W OBL/FLEX/EXT MIN 6 V COMP   3. Stenosis of both internal carotid arteries  I65.23 433.10 DUPLEX CAROTID BILATERAL     433.30 VITAMIN D, 25 HYDROXY      VITAMIN B12 & FOLATE      TSH 3RD GENERATION      CK      MYASTHENIA GRAVIS EVALUATION      MRI CERV SPINE WO CONT      EMG LIMITED      T4 (THYROXINE)      T3 TOTAL      HEMOGLOBIN A1C WITH EAG      METABOLIC PANEL, COMPREHENSIVE      CBC WITH AUTOMATED DIFF      DEBORA COMPREHENSIVE PLUS PANEL      MAGNESIUM      XR SPINE CERV W OBL/FLEX/EXT MIN 6 V COMP   4. Bilateral carotid artery stenosis  I65.23 433.10 DUPLEX CAROTID BILATERAL     433.30 VITAMIN D, 25 HYDROXY      VITAMIN B12 & FOLATE      TSH 3RD GENERATION      CK      MYASTHENIA GRAVIS EVALUATION      MRI CERV SPINE WO CONT      EMG LIMITED      T4 (THYROXINE)      T3 TOTAL      HEMOGLOBIN A1C WITH EAG      METABOLIC PANEL, COMPREHENSIVE      CBC WITH AUTOMATED DIFF      DEBORA COMPREHENSIVE PLUS PANEL      MAGNESIUM      XR SPINE CERV W OBL/FLEX/EXT MIN 6 V COMP   5.  Transient ischemic attack involving right internal carotid artery  G45.1 435.8 DUPLEX CAROTID BILATERAL      VITAMIN D, 25 HYDROXY      VITAMIN B12 & FOLATE      TSH 3RD GENERATION      CK      MYASTHENIA GRAVIS EVALUATION      MRI CERV SPINE WO CONT      EMG LIMITED      T4 (THYROXINE)      T3 TOTAL      HEMOGLOBIN A1C WITH EAG      METABOLIC PANEL, COMPREHENSIVE      CBC WITH AUTOMATED DIFF      DEBORA COMPREHENSIVE PLUS PANEL      MAGNESIUM XR SPINE CERV W OBL/FLEX/EXT MIN 6 V COMP   6. Benign essential tremor syndrome  G25.0 333.1 DUPLEX CAROTID BILATERAL      VITAMIN D, 25 HYDROXY      VITAMIN B12 & FOLATE      TSH 3RD GENERATION      CK      MYASTHENIA GRAVIS EVALUATION      MRI CERV SPINE WO CONT      EMG LIMITED      T4 (THYROXINE)      T3 TOTAL      HEMOGLOBIN A1C WITH EAG      METABOLIC PANEL, COMPREHENSIVE      CBC WITH AUTOMATED DIFF      DEBORA COMPREHENSIVE PLUS PANEL      MAGNESIUM      XR SPINE CERV W OBL/FLEX/EXT MIN 6 V COMP   7. Cervical myelopathy with cervical radiculopathy  M47.12 721.1 DUPLEX CAROTID BILATERAL      VITAMIN D, 25 HYDROXY      VITAMIN B12 & FOLATE      TSH 3RD GENERATION      CK      MYASTHENIA GRAVIS EVALUATION      MRI CERV SPINE WO CONT      EMG LIMITED      T4 (THYROXINE)      T3 TOTAL      HEMOGLOBIN A1C WITH EAG      METABOLIC PANEL, COMPREHENSIVE      CBC WITH AUTOMATED DIFF      DEBORA COMPREHENSIVE PLUS PANEL      MAGNESIUM      XR SPINE CERV W OBL/FLEX/EXT MIN 6 V COMP   8. B12 deficiency  E53.8 266.2 T4 (THYROXINE)      T3 TOTAL      HEMOGLOBIN A1C WITH EAG      METABOLIC PANEL, COMPREHENSIVE      CBC WITH AUTOMATED DIFF      DEBORA COMPREHENSIVE PLUS PANEL      MAGNESIUM      XR SPINE CERV W OBL/FLEX/EXT MIN 6 V COMP   9. Vitamin D deficiency  E55.9 268.9 T4 (THYROXINE)      T3 TOTAL      HEMOGLOBIN A1C WITH EAG      METABOLIC PANEL, COMPREHENSIVE      CBC WITH AUTOMATED DIFF      DEBORA COMPREHENSIVE PLUS PANEL      MAGNESIUM      XR SPINE CERV W OBL/FLEX/EXT MIN 6 V COMP   10. Diabetic peripheral neuropathy associated with type 2 diabetes mellitus (HCC)  E11.42 250.60 T4 (THYROXINE)     357.2 T3 TOTAL      HEMOGLOBIN A1C WITH EAG      METABOLIC PANEL, COMPREHENSIVE      CBC WITH AUTOMATED DIFF      DEBORA COMPREHENSIVE PLUS PANEL      MAGNESIUM      XR SPINE CERV W OBL/FLEX/EXT MIN 6 V COMP   11. Disturbance of memory  R41.3 780.93      Active Problems:    * No active hospital problems.  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 He is walking with a walker now for stability and we advised him to continue doing that and offered him physical therapy but he wants to hold on the physical therapy for now. We will check carotid Doppler studies to make sure his stenosis in his right carotid is not getting any worse. Also to make sure that he has not had a stroke. Needs an EMG study of his arms to see how much denervation he has, and of his leg to make sure he does not have neuropathy in addition. Seems to have some mild memory problems, but he does not complain of that and he has a more serious problem with a cervical myelopathy, so we will address that later. His tremor seems to be more of a benign essential tremor is not too significant right now and probably just gets aggravated by his anxiety. We discussed this condition with the patient and his daughter in detail and agree with plans and therapy for work-up as above. He may need a neuropathy work-up in addition. 1 hour spent with the patient and his daughter going over his history, reviewing his previous MRI scans personally on the PACS system, and discussing his diagnosis and prognosis, and we will refer him back to Dr. Melo Better continue does have severe spinal stenosis because he may need surgery because he is so symptomatic. Very difficult case with his age. Signed By: Delia Castillo MD     September 8, 2020       CC: Marva Quinn MD  FAX: [phone]    This note will not be viewable in [address]

## 2020-09-10 ENCOUNTER — APPOINTMENT (OUTPATIENT)
Dept: GENERAL RADIOLOGY | Age: 85
End: 2020-09-10
Attending: EMERGENCY MEDICINE
Payer: MEDICARE

## 2020-09-10 ENCOUNTER — HOSPITAL ENCOUNTER (EMERGENCY)
Age: 85
Discharge: HOME | End: 2020-09-10
Attending: EMERGENCY MEDICINE
Payer: MEDICARE

## 2020-09-10 ENCOUNTER — TELEPHONE (OUTPATIENT)
Dept: NEUROLOGY | Age: 85
End: 2020-09-10

## 2020-09-10 ENCOUNTER — APPOINTMENT (OUTPATIENT)
Dept: MRI IMAGING | Age: 85
End: 2020-09-10
Attending: EMERGENCY MEDICINE
Payer: MEDICARE

## 2020-09-10 VITALS
WEIGHT: 208 LBS | HEIGHT: 69 IN | BODY MASS INDEX: 30.81 KG/M2 | OXYGEN SATURATION: 100 % | SYSTOLIC BLOOD PRESSURE: 139 MMHG | TEMPERATURE: 98 F | HEART RATE: 82 BPM | RESPIRATION RATE: 22 BRPM | DIASTOLIC BLOOD PRESSURE: 87 MMHG

## 2020-09-10 DIAGNOSIS — M48.02 DEGENERATIVE CERVICAL SPINAL STENOSIS: ICD-10-CM

## 2020-09-10 DIAGNOSIS — R29.898 UPPER EXTREMITY WEAKNESS: ICD-10-CM

## 2020-09-10 DIAGNOSIS — M54.12 CERVICAL MYELOPATHY WITH CERVICAL RADICULOPATHY (HCC): Primary | ICD-10-CM

## 2020-09-10 DIAGNOSIS — I65.23 STENOSIS OF BOTH INTERNAL CAROTID ARTERIES: ICD-10-CM

## 2020-09-10 DIAGNOSIS — G95.9 CERVICAL MYELOPATHY WITH CERVICAL RADICULOPATHY (HCC): Primary | ICD-10-CM

## 2020-09-10 DIAGNOSIS — G45.1 TRANSIENT ISCHEMIC ATTACK INVOLVING RIGHT INTERNAL CAROTID ARTERY: ICD-10-CM

## 2020-09-10 DIAGNOSIS — G95.9 CERVICAL MYELOPATHY WITH CERVICAL RADICULOPATHY (HCC): ICD-10-CM

## 2020-09-10 DIAGNOSIS — G25.0 BENIGN ESSENTIAL TREMOR SYNDROME: ICD-10-CM

## 2020-09-10 DIAGNOSIS — Z85.46 H/O PROSTATE CANCER: ICD-10-CM

## 2020-09-10 DIAGNOSIS — M54.12 CERVICAL MYELOPATHY WITH CERVICAL RADICULOPATHY (HCC): ICD-10-CM

## 2020-09-10 DIAGNOSIS — I65.23 BILATERAL CAROTID ARTERY STENOSIS: ICD-10-CM

## 2020-09-10 LAB
ALBUMIN SERPL-MCNC: 3.7 G/DL (ref 3.5–5)
ALBUMIN/GLOB SERPL: 1.1 {RATIO} (ref 1.1–2.2)
ALP SERPL-CCNC: 102 U/L (ref 45–117)
ALT SERPL-CCNC: 31 U/L (ref 12–78)
ANION GAP SERPL CALC-SCNC: 7 MMOL/L (ref 5–15)
APPEARANCE UR: CLEAR
AST SERPL-CCNC: 26 U/L (ref 15–37)
BACTERIA URNS QL MICRO: NEGATIVE /HPF
BASOPHILS # BLD: 0 K/UL (ref 0–0.1)
BASOPHILS NFR BLD: 0 % (ref 0–1)
BILIRUB SERPL-MCNC: 1.3 MG/DL (ref 0.2–1)
BILIRUB UR QL CFM: NEGATIVE
BUN SERPL-MCNC: 16 MG/DL (ref 6–20)
BUN/CREAT SERPL: 11 (ref 12–20)
CALCIUM SERPL-MCNC: 9.1 MG/DL (ref 8.5–10.1)
CHLORIDE SERPL-SCNC: 93 MMOL/L (ref 97–108)
CK MB CFR SERPL CALC: 2.5 % (ref 0–2.5)
CK MB SERPL-MCNC: 3.2 NG/ML (ref 5–25)
CK SERPL-CCNC: 128 U/L (ref 39–308)
CO2 SERPL-SCNC: 27 MMOL/L (ref 21–32)
COLOR UR: ABNORMAL
CREAT SERPL-MCNC: 1.5 MG/DL (ref 0.7–1.3)
DIFFERENTIAL METHOD BLD: ABNORMAL
EOSINOPHIL # BLD: 0 K/UL (ref 0–0.4)
EOSINOPHIL NFR BLD: 0 % (ref 0–7)
EPITH CASTS URNS QL MICRO: ABNORMAL /LPF
ERYTHROCYTE [DISTWIDTH] IN BLOOD BY AUTOMATED COUNT: 12.8 % (ref 11.5–14.5)
GLOBULIN SER CALC-MCNC: 3.5 G/DL (ref 2–4)
GLUCOSE BLD STRIP.AUTO-MCNC: 115 MG/DL (ref 65–100)
GLUCOSE SERPL-MCNC: 104 MG/DL (ref 65–100)
GLUCOSE UR STRIP.AUTO-MCNC: NEGATIVE MG/DL
HCT VFR BLD AUTO: 42.7 % (ref 36.6–50.3)
HGB BLD-MCNC: 15.1 G/DL (ref 12.1–17)
HGB UR QL STRIP: NEGATIVE
HYALINE CASTS URNS QL MICRO: ABNORMAL /LPF (ref 0–5)
IMM GRANULOCYTES # BLD AUTO: 0 K/UL (ref 0–0.04)
IMM GRANULOCYTES NFR BLD AUTO: 0 % (ref 0–0.5)
INR PPP: 1 (ref 0.9–1.1)
KETONES UR QL STRIP.AUTO: ABNORMAL MG/DL
LEUKOCYTE ESTERASE UR QL STRIP.AUTO: ABNORMAL
LYMPHOCYTES # BLD: 1 K/UL (ref 0.8–3.5)
LYMPHOCYTES NFR BLD: 11 % (ref 12–49)
MAGNESIUM SERPL-MCNC: 1.9 MG/DL (ref 1.6–2.4)
MCH RBC QN AUTO: 31.7 PG (ref 26–34)
MCHC RBC AUTO-ENTMCNC: 35.4 G/DL (ref 30–36.5)
MCV RBC AUTO: 89.7 FL (ref 80–99)
MONOCYTES # BLD: 0.7 K/UL (ref 0–1)
MONOCYTES NFR BLD: 8 % (ref 5–13)
MUCOUS THREADS URNS QL MICRO: ABNORMAL /LPF
NEUTS SEG # BLD: 7.5 K/UL (ref 1.8–8)
NEUTS SEG NFR BLD: 81 % (ref 32–75)
NITRITE UR QL STRIP.AUTO: NEGATIVE
NRBC # BLD: 0 K/UL (ref 0–0.01)
NRBC BLD-RTO: 0 PER 100 WBC
PH UR STRIP: 7 [PH] (ref 5–8)
PLATELET # BLD AUTO: 336 K/UL (ref 150–400)
PMV BLD AUTO: 8.9 FL (ref 8.9–12.9)
POTASSIUM SERPL-SCNC: 4.6 MMOL/L (ref 3.5–5.1)
PROT SERPL-MCNC: 7.2 G/DL (ref 6.4–8.2)
PROT UR STRIP-MCNC: 30 MG/DL
PROTHROMBIN TIME: 10.3 SEC (ref 9–11.1)
RBC # BLD AUTO: 4.76 M/UL (ref 4.1–5.7)
RBC #/AREA URNS HPF: ABNORMAL /HPF (ref 0–5)
SERVICE CMNT-IMP: ABNORMAL
SODIUM SERPL-SCNC: 127 MMOL/L (ref 136–145)
SP GR UR REFRACTOMETRY: 1.02 (ref 1–1.03)
TSH SERPL DL<=0.05 MIU/L-ACNC: 1.05 UIU/ML (ref 0.36–3.74)
UA: UC IF INDICATED,UAUC: ABNORMAL
UROBILINOGEN UR QL STRIP.AUTO: 1 EU/DL (ref 0.2–1)
WBC # BLD AUTO: 9.3 K/UL (ref 4.1–11.1)
WBC URNS QL MICRO: ABNORMAL /HPF (ref 0–4)

## 2020-09-10 PROCEDURE — 99285 EMERGENCY DEPT VISIT HI MDM: CPT

## 2020-09-10 PROCEDURE — 93880 EXTRACRANIAL BILAT STUDY: CPT | Performed by: PSYCHIATRY & NEUROLOGY

## 2020-09-10 PROCEDURE — 72141 MRI NECK SPINE W/O DYE: CPT

## 2020-09-10 PROCEDURE — 71045 X-RAY EXAM CHEST 1 VIEW: CPT

## 2020-09-10 PROCEDURE — 93005 ELECTROCARDIOGRAM TRACING: CPT

## 2020-09-10 PROCEDURE — 83735 ASSAY OF MAGNESIUM: CPT

## 2020-09-10 PROCEDURE — 96374 THER/PROPH/DIAG INJ IV PUSH: CPT

## 2020-09-10 PROCEDURE — 84443 ASSAY THYROID STIM HORMONE: CPT

## 2020-09-10 PROCEDURE — 85025 COMPLETE CBC W/AUTO DIFF WBC: CPT

## 2020-09-10 PROCEDURE — 74011250636 HC RX REV CODE- 250/636: Performed by: EMERGENCY MEDICINE

## 2020-09-10 PROCEDURE — 36415 COLL VENOUS BLD VENIPUNCTURE: CPT

## 2020-09-10 PROCEDURE — 81001 URINALYSIS AUTO W/SCOPE: CPT

## 2020-09-10 PROCEDURE — 82962 GLUCOSE BLOOD TEST: CPT

## 2020-09-10 PROCEDURE — 85610 PROTHROMBIN TIME: CPT

## 2020-09-10 PROCEDURE — 82550 ASSAY OF CK (CPK): CPT

## 2020-09-10 PROCEDURE — 80053 COMPREHEN METABOLIC PANEL: CPT

## 2020-09-10 RX ORDER — LORAZEPAM 1 MG/1
1 TABLET ORAL
Status: DISCONTINUED | OUTPATIENT
Start: 2020-09-10 | End: 2020-09-10

## 2020-09-10 RX ORDER — SODIUM CHLORIDE 0.9 % (FLUSH) 0.9 %
5-40 SYRINGE (ML) INJECTION EVERY 8 HOURS
Status: DISCONTINUED | OUTPATIENT
Start: 2020-09-10 | End: 2020-09-10 | Stop reason: HOSPADM

## 2020-09-10 RX ORDER — SODIUM CHLORIDE 0.9 % (FLUSH) 0.9 %
5-40 SYRINGE (ML) INJECTION AS NEEDED
Status: DISCONTINUED | OUTPATIENT
Start: 2020-09-10 | End: 2020-09-10 | Stop reason: HOSPADM

## 2020-09-10 RX ORDER — LORAZEPAM 2 MG/ML
1 INJECTION INTRAMUSCULAR
Status: COMPLETED | OUTPATIENT
Start: 2020-09-10 | End: 2020-09-10

## 2020-09-10 RX ADMIN — Medication 10 ML: at 12:28

## 2020-09-10 RX ADMIN — LORAZEPAM 1 MG: 2 INJECTION INTRAMUSCULAR; INTRAVENOUS at 14:39

## 2020-09-10 NOTE — TELEPHONE ENCOUNTER
Patient came in today for his carotid doppler test. Upon leaving, the daughter stopped me and notified me that the patient has been acting \"off\" since this morning. Was not really able to move when they were trying to get up, staring at his pajama pants and just seemed to be staring off, and thinking he just really couldn't do things today. I advised her to take the patient to the ER for a thorough and possibly urgent work up if it is needed.

## 2020-09-10 NOTE — ED PROVIDER NOTES
EMERGENCY DEPARTMENT HISTORY AND PHYSICAL EXAM      Date: 9/10/2020  Patient Name: Rocio Ocampo.    History of Presenting Illness     Chief Complaint   Patient presents with    Altered mental status     pt woke up with ringing in his ear and generalized confusion. Family states pt has been jittery for 2 days and was altered this morning. Last known well way yesterday afternoon. Pt has  notified MD Amrit Rhoades pt not a stroke alert at this time.  Dysarthria       History Provided By: Patient's Daughter    HPI: Rocio Ocampo., 81 y.o. male presents to the ED with cc of altered mental status. Patient has been seen Dr. Vinnie Walker with [facility] neurology group for quite some time. Had an MRI a couple of years ago and that showed significant and severe cervical stenosis. He was referred to see Dr. Wen Linares with neurosurgery who felt that he did not want to operate on him secondary to his advanced age. At the time patient was not having any significant symptoms. Patient currently being worked up for several different complaints and was scheduled to try to obtain a MRI of the cervical spine in addition to carotid Dopplers given prior stroke history. Today patient was complaining of some ringing in his ears and there was some generalized confusion. Patient also states he has had intermittent pain as well as weakness and numbness and tingling in his right upper extremity and most likely this is because of his cervical stenosis. Patient was referred to the emergency department by Dr. Mohsen mancini. He denies any chest pain or shortness of breath. He denies any abdominal pain, nausea, vomiting or diarrhea. There is been no recent urinary symptoms including frequency, urgency or burning with urination. He denies any fever or chills. There is been no recent contact with known COVID positive patients. There are no other complaints, changes, or physical findings at this time.     PCP: Shelbi Sanchez, MD    No current facility-administered medications on file prior to encounter. Current Outpatient Medications on File Prior to Encounter   Medication Sig Dispense Refill    losartan (COZAAR) 100 mg tablet Take 100 mg by mouth daily.  levothyroxine (SYNTHROID) 112 mcg tablet TAKE 1 TABLET BY MOUTH  DAILY BEFORE BREAKFAST 90 Tab 3    clopidogrel (PLAVIX) 75 mg tab TAKE 1 TABLET BY MOUTH  DAILY 90 Tab 3    atorvastatin (LIPITOR) 20 mg tablet Take 1 Tab by mouth daily. 90 Tab 3    docusate sodium (COLACE) 100 mg capsule Take 100 mg by mouth two (2) times a day.  cyanocobalamin (VITAMIN B-12) 1,000 mcg tablet Take 1 Tab by mouth daily. 30 Tab 0    metoprolol succinate (TOPROL-XL) 50 mg XL tablet Take 50 mg by mouth daily.  ELIQUIS 2.5 mg tablet       acetaminophen (TYLENOL EXTRA STRENGTH) 500 mg tablet Take  by mouth every six (6) hours as needed for Pain.  cholecalciferol, vitamin D3, (VITAMIN D3) 2,000 unit tab Take  by mouth nightly. 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 Calcium and PTH monitored by Dr. Kiley Govea, renal.  Levels normalized after surgery.  Prostate cancer ([address]     seeds, then XRT, then seed again.   Dr. Rigoberto Zurita Stroke [facility]) 2002    TIA, Dr. Ronni Campbell, no residual effects as of 9/2018    Thyroid cancer [facility]) Jan 2015    Unspecified vitamin D deficiency        Past Surgical History:  Past Surgical History:   Procedure Laterality Date    [facility]   Dr. Marlon Chauhan    COLONOSCOPY N/A 2018    COL[facility] AMBULATORY OR    HX APPENDECTOMY      HX CHOLECYSTECTOMY      HX HEENT      tonsillectomy    HX OTHER SURGICAL      vasectomy    HX PARATHYROIDECTOMY  2015    right superior parathyroid gland removed and left superior parathyroid gland removed    HX PARTIAL THYROIDECTOMY  1/14/15    right lobectomy    HX UROLOGICAL      Seeds for prostate cancer , 4 dialations     HX UROLOGICAL  1/14/15    BLADDER NECK INCISION, Cold knife incision of bladder neck contracture, cystoscopy       Family History:  Family History   Problem Relation Age of Onset    Cancer Mother         hodgkins disease    Heart Disease Father     Hypertension Father     Other Neg Hx         no hypercalcemia or kidney stones       Social History:  Social History     Tobacco Use    Smoking status: Former Smoker     Years: 5.00     Last attempt to quit: 1955     Years since quittin.6    Smokeless tobacco: Never Used   Substance Use Topics    Alcohol use: Yes     Comment: occassional mixed drink or beer    Drug use: No       Allergies: Allergies   Allergen Reactions    Sulfa (Sulfonamide Antibiotics) Hives         Review of Systems   Review of Systems   Unable to perform ROS: Dementia       Physical Exam   Physical Exam  Vitals signs and nursing note reviewed. Constitutional:       General: He is not in acute distress. Appearance: He is well-developed. He is obese. He is not diaphoretic. HENT:      Head: Normocephalic and atraumatic. Mouth/Throat:      Mouth: Mucous membranes are moist.      Pharynx: No oropharyngeal exudate. Eyes:      Extraocular Movements: Extraocular movements intact. Conjunctiva/sclera: Conjunctivae normal.      Pupils: Pupils are equal, round, and reactive to light. Neck:      Musculoskeletal: Normal range of motion and neck supple. Vascular: No JVD. Trachea: No tracheal deviation. Cardiovascular:      Rate and Rhythm: Normal rate and regular rhythm. Heart sounds: Normal heart sounds. No murmur. Pulmonary:      Effort: Pulmonary effort is normal. No respiratory distress. Breath sounds: Normal breath sounds. No stridor. No wheezing or rales. Abdominal:      General: There is no distension. Palpations: Abdomen is soft. Tenderness: There is no abdominal tenderness. There is no guarding or rebound. Musculoskeletal: Normal range of motion. General: No tenderness. Skin:     General: Skin is warm and dry. Neurological:      Mental Status: He is alert. Mental status is at baseline. He is confused. Cranial Nerves: No cranial nerve deficit or dysarthria. Sensory: No sensory deficit. Comments: Weakness noted in right upper ext, no deficit in leg, no facial asymmetry, slurred speech, word finding difficulty   Psychiatric:         Behavior: Behavior normal.         Diagnostic Study Results     Labs -     Recent Results (from the past 12 hour(s))   GLUCOSE, POC    Collection Time: 09/10/20 11:45 AM   Result Value Ref Range    Glucose (POC) 115 (H) 65 - 100 mg/dL    Performed by Bethany Sheth    CBC WITH AUTOMATED DIFF    Collection Time: 09/10/20 12:18 PM   Result Value Ref Range    WBC 9.3 4.1 - 11.1 K/uL    RBC 4.76 4.10 - 5.70 M/uL    HGB 15.1 12.1 - 17.0 g/dL    HCT 42.7 36.6 - 50.3 %    MCV 89.7 80.0 - 99.0 FL    MCH 31.7 26.0 - 34.0 PG    MCHC 35.4 30.0 - 36.5 g/dL    RDW 12.8 11.5 - 14.5 %    PLATELET 542 561 - 876 K/uL    MPV 8.9 8.9 - 12.9 FL    NRBC 0.0 0  WBC    ABSOLUTE NRBC 0.00 0.00 - 0.01 K/uL    NEUTROPHILS 81 (H) 32 - 75 %    LYMPHOCYTES 11 (L) 12 - 49 %    MONOCYTES 8 5 - 13 %    EOSINOPHILS 0 0 - 7 %    BASOPHILS 0 0 - 1 %    IMMATURE GRANULOCYTES 0 0.0 - 0.5 %    ABS. NEUTROPHILS 7.5 1.8 - 8.0 K/UL    ABS. LYMPHOCYTES 1.0 0.8 - 3.5 K/UL    ABS.  MONOCYTES 0.7 0.0 - 1.0 K/UL    ABS. EOSINOPHILS 0.0 0.0 - 0.4 K/UL    ABS. BASOPHILS 0.0 0.0 - 0.1 K/UL    ABS. IMM. GRANS. 0.0 0.00 - 0.04 K/UL    DF AUTOMATED     PROTHROMBIN TIME + INR    Collection Time: 09/10/20 12:18 PM   Result Value Ref Range    INR 1.0 0.9 - 1.1      Prothrombin time 10.3 9.0 - 11.1 sec   CK W/ CKMB & INDEX    Collection Time: 09/10/20 12:18 PM   Result Value Ref Range    CK - MB 3.2 <3.6 NG/ML    CK-MB Index 2.5 0.0 - 2.5       39 - 308 U/L   TSH 3RD GENERATION    Collection Time: 09/10/20 12:18 PM   Result Value Ref Range    TSH 1.05 0.36 - 3.74 uIU/mL   MAGNESIUM    Collection Time: 09/10/20 12:18 PM   Result Value Ref Range    Magnesium 1.9 1.6 - 2.4 mg/dL   METABOLIC PANEL, COMPREHENSIVE    Collection Time: 09/10/20 12:18 PM   Result Value Ref Range    Sodium 127 (L) 136 - 145 mmol/L    Potassium 4.6 3.5 - 5.1 mmol/L    Chloride 93 (L) 97 - 108 mmol/L    CO2 27 21 - 32 mmol/L    Anion gap 7 5 - 15 mmol/L    Glucose 104 (H) 65 - 100 mg/dL    BUN 16 6 - 20 MG/DL    Creatinine 1.50 (H) 0.70 - 1.30 MG/DL    BUN/Creatinine ratio 11 (L) 12 - 20      GFR est AA 54 (L) >60 ml/min/1.73m2    GFR est non-AA 45 (L) >60 ml/min/1.73m2    Calcium 9.1 8.5 - 10.1 MG/DL    Bilirubin, total 1.3 (H) 0.2 - 1.0 MG/DL    ALT (SGPT) 31 12 - 78 U/L    AST (SGOT) 26 15 - 37 U/L    Alk.  phosphatase 102 45 - 117 U/L    Protein, total 7.2 6.4 - 8.2 g/dL    Albumin 3.7 3.5 - 5.0 g/dL    Globulin 3.5 2.0 - 4.0 g/dL    A-G Ratio 1.1 1.1 - 2.2     URINALYSIS W/ REFLEX CULTURE    Collection Time: 09/10/20 12:41 PM    Specimen: Miscellaneous sample    Urine specimen   Result Value Ref Range    Color DARK YELLOW      Appearance CLEAR CLEAR      Specific gravity 1.018 1.003 - 1.030      pH (UA) 7.0 5.0 - 8.0      Protein 30 (A) NEG mg/dL    Glucose Negative NEG mg/dL    Ketone TRACE (A) NEG mg/dL    Blood Negative NEG      Urobilinogen 1.0 0.2 - 1.0 EU/dL    Nitrites Negative NEG      Leukocyte Esterase SMALL (A) NEG      WBC 0-4 0 - 4 /hpf    RBC 0-5 0 - 5 /hpf    Epithelial cells FEW FEW /lpf    Bacteria Negative NEG /hpf    UA:UC IF INDICATED CULTURE NOT INDICATED BY UA RESULT CNI      Mucus TRACE (A) NEG /lpf    Hyaline cast 0-2 0 - 5 /lpf   BILIRUBIN, CONFIRM    Collection Time: 09/10/20 12:41 PM   Result Value Ref Range    Bilirubin UA, confirm Negative NEG         Radiologic Studies -   XR CHEST PORT   Final Result   IMPRESSION:    1. No acute cardiopulmonary process. 2. Unchanged reticular opacities at the lung bases, likely representing   interstitial lung disease/fibrosis. MRI CERV SPINE WO CONT    (Results Pending)     CT Results  (Last 48 hours)    None        CXR Results  (Last 48 hours)               09/10/20 1316  XR CHEST PORT Final result    Impression:  IMPRESSION:    1. No acute cardiopulmonary process. 2. Unchanged reticular opacities at the lung bases, likely representing   interstitial lung disease/fibrosis. Narrative:  EXAM:  XR CHEST PORT       INDICATION:   AMS       COMPARISON: Chest radiograph 10/9/2018. FINDINGS: AP radiograph of the chest was obtained. No evidence of focal consolidation. There are unchanged reticular opacities at   bilateral lung bases. No pleural effusion or pneumothorax. Heart, kevin,   mediastinum are within normal limits. No acute osseous abnormalities. Medical Decision Making   I am the first provider for this patient. I reviewed the vital signs, available nursing notes, past medical history, past surgical history, family history and social history. Vital Signs-Reviewed the patient's vital signs.   Patient Vitals for the past 12 hrs:   Temp Pulse Resp BP SpO2   09/10/20 1330  86 18 136/90 100 %   09/10/20 1300  88 15 (!) 147/92 97 %   09/10/20 1230    (!) 143/93 96 %   09/10/20 1225     98 %   09/10/20 1149 98 °F (36.7 °C) 94 18 144/77 98 %       EKG interpretation: (Preliminary)  Sinus, 1st degree AV block, rate 91, normal axis/pr/qrs, no acute ST changes    Records Reviewed: Nursing Notes, Old Medical Records, Previous electrocardiograms, Previous Radiology Studies and Previous Laboratory Studies    Provider Notes (Medical Decision Making):   DDx- Cervical stenosis with radiculopathy, electrolyte abnormality, UTI    ED Course:   Initial assessment performed. The patients presenting problems have been discussed, and they are in agreement with the care plan formulated and outlined with them. I have encouraged them to ask questions as they arise throughout their visit. Dicussed with Neurology, they have asked if we are able to get MRI done today it would benefit the pt. Will have Dr. Jay Mandujano follow up to make sure there is no surgical emergency. Disposition:  Discharge home, f/u with Neurosurgery      DISCHARGE PLAN:  1. Discharge Medication List as of 9/10/2020  4:01 PM        2. Follow-up Information     Follow up With Specialties Details Why Contact Info    Nidia Varela MD Pediatric Medicine   . [facility] [address]  [address] [phone] [facility]      Ella Andre MD Neurosurgery   [address] [facility]  [phone]          3. Return to ED if worse     Diagnosis     Clinical Impression:   1. Cervical myelopathy with cervical radiculopathy    2. Upper extremity weakness        Attestations:    Marlyn Armstrong, DO    Please note that this dictation was completed with [employer], the computer voice recognition software. Quite often unanticipated grammatical, syntax, homophones, and other interpretive errors are inadvertently transcribed by the computer software. Please disregard these errors. Please excuse any errors that have escaped final proofreading. Thank you.

## 2020-09-11 LAB
ATRIAL RATE: 91 BPM
CALCULATED P AXIS, ECG09: 37 DEGREES
CALCULATED R AXIS, ECG10: -6 DEGREES
CALCULATED T AXIS, ECG11: 13 DEGREES
DIAGNOSIS, 93000: NORMAL
P-R INTERVAL, ECG05: 234 MS
Q-T INTERVAL, ECG07: 376 MS
QRS DURATION, ECG06: 92 MS
QTC CALCULATION (BEZET), ECG08: 462 MS
VENTRICULAR RATE, ECG03: 91 BPM

## 2020-09-14 ENCOUNTER — TELEPHONE (OUTPATIENT)
Dept: FAMILY MEDICINE CLINIC | Age: 85
End: 2020-09-14

## 2020-09-14 DIAGNOSIS — E87.1 HYPONATREMIA: Primary | ICD-10-CM

## 2020-09-14 NOTE — TELEPHONE ENCOUNTER
Daughter called back . She stated that pt wants to hear from us that it is ok to take miralax with all of the meds he is on. Informed her that it is safe for pt to continue to take miralax and see if he gets any results and if not they should notify us. Also informed her to make sure pt is drinking plenty of water and eating high fiber foods. She stated understanding.

## 2020-09-14 NOTE — TELEPHONE ENCOUNTER
Daughter is calling wanting to speak with nurse in ref to pt not having a bowel movement for 8 days and she gave him Miralax and it caused it to be nothing but liquid and he wont take again because he thinks it will go against his med she states he isn't eating or drinking and she needs to speak with someone to know what to do

## 2020-09-15 DIAGNOSIS — I65.23 STENOSIS OF BOTH INTERNAL CAROTID ARTERIES: ICD-10-CM

## 2020-09-15 DIAGNOSIS — G95.9 CERVICAL MYELOPATHY WITH CERVICAL RADICULOPATHY (HCC): ICD-10-CM

## 2020-09-15 DIAGNOSIS — M48.02 DEGENERATIVE CERVICAL SPINAL STENOSIS: ICD-10-CM

## 2020-09-15 DIAGNOSIS — M54.12 CERVICAL MYELOPATHY WITH CERVICAL RADICULOPATHY (HCC): ICD-10-CM

## 2020-09-15 DIAGNOSIS — G25.0 BENIGN ESSENTIAL TREMOR SYNDROME: ICD-10-CM

## 2020-09-15 DIAGNOSIS — I65.23 BILATERAL CAROTID ARTERY STENOSIS: ICD-10-CM

## 2020-09-15 DIAGNOSIS — Z85.46 H/O PROSTATE CANCER: ICD-10-CM

## 2020-09-15 DIAGNOSIS — G45.1 TRANSIENT ISCHEMIC ATTACK INVOLVING RIGHT INTERNAL CAROTID ARTERY: ICD-10-CM

## 2020-09-18 ENCOUNTER — TELEPHONE (OUTPATIENT)
Dept: FAMILY MEDICINE CLINIC | Age: 85
End: 2020-09-18

## 2020-09-18 NOTE — TELEPHONE ENCOUNTER
Pts daughter called stating that she is very worried about pt. He is very weak, not eating and has not had a bowel movement in 12 days now. She states that they have tried miralax and on Wed night they had a hospice nurse friend come over and administer a suppository. All that pt passed was brown liquid. She states that they have purchased senna s (laxative & stool softener in one) and are going to start giving that to him today. She states that she is going to call GI and see if they can get him in based on some concerns they saw in a colonoscopy years ago. In the meantime she wants to know if there is anything else she can do to give him relief over the weekend? She states that pt is not drinking but a few sips of water and she is sure he is dehydrated and all he has eaten was a 1/2 c applesauce and some yogurt. Daughter stated that hospice nurse friends advised them to ask for mirtazapine 15mg to increase his appetite. She would like to know if this is possible? Please advise.

## 2020-09-18 NOTE — TELEPHONE ENCOUNTER
They do not want to use mirtazapine at the moment if he is dehydrated. He will have side effects    Dehydration can make him feel sluggish and constipate him. If he is not getting fluids by mouth and he may need to get by IV. The more dehydrated he get the less likely you are to drink fluids because your tummy hurts. This would be a reasonable emergency room visit. He can be given MiraLAX multiple times a day, if he can drink it.   Suppository was reasonable and would be the best option if he is not taking anything by mouth

## 2020-09-21 ENCOUNTER — HOSPITAL ENCOUNTER (INPATIENT)
Age: 85
LOS: 2 days | Discharge: HOME HEALTH | DRG: 644 | End: 2020-09-24
Attending: EMERGENCY MEDICINE | Admitting: HOSPITALIST
Payer: MEDICARE

## 2020-09-21 ENCOUNTER — APPOINTMENT (OUTPATIENT)
Dept: CT IMAGING | Age: 85
DRG: 644 | End: 2020-09-21
Attending: EMERGENCY MEDICINE
Payer: MEDICARE

## 2020-09-21 ENCOUNTER — APPOINTMENT (OUTPATIENT)
Dept: GENERAL RADIOLOGY | Age: 85
DRG: 644 | End: 2020-09-21
Attending: EMERGENCY MEDICINE
Payer: MEDICARE

## 2020-09-21 DIAGNOSIS — R55 SYNCOPE AND COLLAPSE: Primary | ICD-10-CM

## 2020-09-21 DIAGNOSIS — E87.1 HYPONATREMIA: ICD-10-CM

## 2020-09-21 DIAGNOSIS — R62.7 FAILURE TO THRIVE IN ADULT: ICD-10-CM

## 2020-09-21 DIAGNOSIS — R56.9 SEIZURE-LIKE ACTIVITY (HCC): ICD-10-CM

## 2020-09-21 DIAGNOSIS — R55 SYNCOPE, UNSPECIFIED SYNCOPE TYPE: ICD-10-CM

## 2020-09-21 DIAGNOSIS — R53.1 WEAKNESS GENERALIZED: ICD-10-CM

## 2020-09-21 PROBLEM — I48.91 ATRIAL FIBRILLATION (HCC): Chronic | Status: ACTIVE | Noted: 2020-09-21

## 2020-09-21 LAB
ALBUMIN SERPL-MCNC: 3.7 G/DL (ref 3.5–5)
ALBUMIN/GLOB SERPL: 1 {RATIO} (ref 1.1–2.2)
ALP SERPL-CCNC: 116 U/L (ref 45–117)
ALT SERPL-CCNC: 48 U/L (ref 12–78)
ANION GAP SERPL CALC-SCNC: 7 MMOL/L (ref 5–15)
AST SERPL-CCNC: 33 U/L (ref 15–37)
BASOPHILS # BLD: 0.1 K/UL (ref 0–0.1)
BASOPHILS NFR BLD: 0 % (ref 0–1)
BILIRUB SERPL-MCNC: 1.2 MG/DL (ref 0.2–1)
BUN SERPL-MCNC: 17 MG/DL (ref 6–20)
BUN/CREAT SERPL: 10 (ref 12–20)
CALCIUM SERPL-MCNC: 9 MG/DL (ref 8.5–10.1)
CHLORIDE SERPL-SCNC: 90 MMOL/L (ref 97–108)
CK SERPL-CCNC: 129 U/L (ref 39–308)
CO2 SERPL-SCNC: 27 MMOL/L (ref 21–32)
CREAT SERPL-MCNC: 1.63 MG/DL (ref 0.7–1.3)
DIFFERENTIAL METHOD BLD: ABNORMAL
EOSINOPHIL # BLD: 0.2 K/UL (ref 0–0.4)
EOSINOPHIL NFR BLD: 1 % (ref 0–7)
ERYTHROCYTE [DISTWIDTH] IN BLOOD BY AUTOMATED COUNT: 12.9 % (ref 11.5–14.5)
GLOBULIN SER CALC-MCNC: 3.6 G/DL (ref 2–4)
GLUCOSE SERPL-MCNC: 104 MG/DL (ref 65–100)
HCT VFR BLD AUTO: 47 % (ref 36.6–50.3)
HGB BLD-MCNC: 16.6 G/DL (ref 12.1–17)
IMM GRANULOCYTES # BLD AUTO: 0.1 K/UL (ref 0–0.04)
IMM GRANULOCYTES NFR BLD AUTO: 1 % (ref 0–0.5)
LYMPHOCYTES # BLD: 1.5 K/UL (ref 0.8–3.5)
LYMPHOCYTES NFR BLD: 14 % (ref 12–49)
MCH RBC QN AUTO: 31.9 PG (ref 26–34)
MCHC RBC AUTO-ENTMCNC: 35.3 G/DL (ref 30–36.5)
MCV RBC AUTO: 90.2 FL (ref 80–99)
MONOCYTES # BLD: 0.7 K/UL (ref 0–1)
MONOCYTES NFR BLD: 6 % (ref 5–13)
NEUTS SEG # BLD: 8.7 K/UL (ref 1.8–8)
NEUTS SEG NFR BLD: 78 % (ref 32–75)
NRBC # BLD: 0 K/UL (ref 0–0.01)
NRBC BLD-RTO: 0 PER 100 WBC
PLATELET # BLD AUTO: 435 K/UL (ref 150–400)
PMV BLD AUTO: 8.7 FL (ref 8.9–12.9)
POTASSIUM SERPL-SCNC: 4.5 MMOL/L (ref 3.5–5.1)
PROT SERPL-MCNC: 7.3 G/DL (ref 6.4–8.2)
RBC # BLD AUTO: 5.21 M/UL (ref 4.1–5.7)
SODIUM SERPL-SCNC: 124 MMOL/L (ref 136–145)
TROPONIN I SERPL-MCNC: <0.05 NG/ML
WBC # BLD AUTO: 11.2 K/UL (ref 4.1–11.1)

## 2020-09-21 PROCEDURE — 36415 COLL VENOUS BLD VENIPUNCTURE: CPT

## 2020-09-21 PROCEDURE — 99285 EMERGENCY DEPT VISIT HI MDM: CPT

## 2020-09-21 PROCEDURE — 74011250636 HC RX REV CODE- 250/636: Performed by: EMERGENCY MEDICINE

## 2020-09-21 PROCEDURE — 93005 ELECTROCARDIOGRAM TRACING: CPT

## 2020-09-21 PROCEDURE — 85025 COMPLETE CBC W/AUTO DIFF WBC: CPT

## 2020-09-21 PROCEDURE — 80053 COMPREHEN METABOLIC PANEL: CPT

## 2020-09-21 PROCEDURE — 96374 THER/PROPH/DIAG INJ IV PUSH: CPT

## 2020-09-21 PROCEDURE — 82550 ASSAY OF CK (CPK): CPT

## 2020-09-21 PROCEDURE — 96361 HYDRATE IV INFUSION ADD-ON: CPT

## 2020-09-21 PROCEDURE — 70450 CT HEAD/BRAIN W/O DYE: CPT

## 2020-09-21 PROCEDURE — 84484 ASSAY OF TROPONIN QUANT: CPT

## 2020-09-21 PROCEDURE — 71045 X-RAY EXAM CHEST 1 VIEW: CPT

## 2020-09-21 RX ORDER — ONDANSETRON 2 MG/ML
4 INJECTION INTRAMUSCULAR; INTRAVENOUS
Status: COMPLETED | OUTPATIENT
Start: 2020-09-21 | End: 2020-09-21

## 2020-09-21 RX ADMIN — SODIUM CHLORIDE 1000 ML: 900 INJECTION, SOLUTION INTRAVENOUS at 19:54

## 2020-09-21 RX ADMIN — ONDANSETRON 4 MG: 2 INJECTION INTRAMUSCULAR; INTRAVENOUS at 19:54

## 2020-09-21 NOTE — ED NOTES
Informed by registration that pt \"looks like he is having a seizure\". Reassessing pt. MD called to triage to evaluate pt. Pt is A&O x 4 but is diaphoretic. Wife reports pt stated, \"I have never felt this way and that he started breathing \"funny. \"    MD ordered EKG

## 2020-09-21 NOTE — TELEPHONE ENCOUNTER
Pt notified and voiced understanding she states he will take him to the ER for fluids. She states he's been bed bound for a week and he he's barely eating or drinking. She would like home health or hospice to step in for more support for at home care. I let her know the hospital may be able to set up home health or hospice depending on patients condition if not she will call us after ER visit.

## 2020-09-22 ENCOUNTER — APPOINTMENT (OUTPATIENT)
Dept: GENERAL RADIOLOGY | Age: 85
DRG: 644 | End: 2020-09-22
Attending: NURSE PRACTITIONER
Payer: MEDICARE

## 2020-09-22 PROBLEM — E87.1 HYPONATREMIA: Status: ACTIVE | Noted: 2020-09-22

## 2020-09-22 PROBLEM — R55 SYNCOPE: Status: ACTIVE | Noted: 2020-09-22

## 2020-09-22 LAB
ALBUMIN SERPL-MCNC: 2.8 G/DL (ref 3.5–5)
ALBUMIN/GLOB SERPL: 1 {RATIO} (ref 1.1–2.2)
ALP SERPL-CCNC: 85 U/L (ref 45–117)
ALT SERPL-CCNC: 36 U/L (ref 12–78)
ANION GAP SERPL CALC-SCNC: 5 MMOL/L (ref 5–15)
APPEARANCE UR: CLEAR
AST SERPL-CCNC: 26 U/L (ref 15–37)
ATRIAL RATE: 59 BPM
BACTERIA URNS QL MICRO: ABNORMAL /HPF
BASOPHILS # BLD: 0 K/UL (ref 0–0.1)
BASOPHILS NFR BLD: 0 % (ref 0–1)
BILIRUB SERPL-MCNC: 1.1 MG/DL (ref 0.2–1)
BILIRUB UR QL: NEGATIVE
BUN SERPL-MCNC: 17 MG/DL (ref 6–20)
BUN/CREAT SERPL: 14 (ref 12–20)
CALCIUM SERPL-MCNC: 8.3 MG/DL (ref 8.5–10.1)
CALCULATED R AXIS, ECG10: 9 DEGREES
CALCULATED T AXIS, ECG11: 31 DEGREES
CHLORIDE SERPL-SCNC: 96 MMOL/L (ref 97–108)
CO2 SERPL-SCNC: 26 MMOL/L (ref 21–32)
COLOR UR: ABNORMAL
CREAT SERPL-MCNC: 1.22 MG/DL (ref 0.7–1.3)
DIAGNOSIS, 93000: NORMAL
DIFFERENTIAL METHOD BLD: ABNORMAL
EOSINOPHIL # BLD: 0.1 K/UL (ref 0–0.4)
EOSINOPHIL NFR BLD: 1 % (ref 0–7)
EPITH CASTS URNS QL MICRO: ABNORMAL /LPF
ERYTHROCYTE [DISTWIDTH] IN BLOOD BY AUTOMATED COUNT: 13.1 % (ref 11.5–14.5)
GLOBULIN SER CALC-MCNC: 2.8 G/DL (ref 2–4)
GLUCOSE SERPL-MCNC: 86 MG/DL (ref 65–100)
GLUCOSE UR STRIP.AUTO-MCNC: NEGATIVE MG/DL
HCT VFR BLD AUTO: 39.3 % (ref 36.6–50.3)
HGB BLD-MCNC: 13.4 G/DL (ref 12.1–17)
HGB UR QL STRIP: NEGATIVE
HYALINE CASTS URNS QL MICRO: ABNORMAL /LPF (ref 0–5)
IMM GRANULOCYTES # BLD AUTO: 0.1 K/UL (ref 0–0.04)
IMM GRANULOCYTES NFR BLD AUTO: 1 % (ref 0–0.5)
KETONES UR QL STRIP.AUTO: ABNORMAL MG/DL
LEUKOCYTE ESTERASE UR QL STRIP.AUTO: NEGATIVE
LYMPHOCYTES # BLD: 1.1 K/UL (ref 0.8–3.5)
LYMPHOCYTES NFR BLD: 11 % (ref 12–49)
MAGNESIUM SERPL-MCNC: 2 MG/DL (ref 1.6–2.4)
MCH RBC QN AUTO: 31.2 PG (ref 26–34)
MCHC RBC AUTO-ENTMCNC: 34.1 G/DL (ref 30–36.5)
MCV RBC AUTO: 91.4 FL (ref 80–99)
MONOCYTES # BLD: 0.7 K/UL (ref 0–1)
MONOCYTES NFR BLD: 7 % (ref 5–13)
NEUTS SEG # BLD: 7.7 K/UL (ref 1.8–8)
NEUTS SEG NFR BLD: 80 % (ref 32–75)
NITRITE UR QL STRIP.AUTO: NEGATIVE
NRBC # BLD: 0 K/UL (ref 0–0.01)
NRBC BLD-RTO: 0 PER 100 WBC
OSMOLALITY SERPL: 263 MOSM/KG H2O
OSMOLALITY UR: 274 MOSM/KG H2O
P-R INTERVAL, ECG05: 212 MS
PH UR STRIP: 6.5 [PH] (ref 5–8)
PLATELET # BLD AUTO: 285 K/UL (ref 150–400)
PMV BLD AUTO: 8.6 FL (ref 8.9–12.9)
POTASSIUM SERPL-SCNC: 5.1 MMOL/L (ref 3.5–5.1)
POTASSIUM UR-SCNC: 16 MMOL/L
PROT SERPL-MCNC: 5.6 G/DL (ref 6.4–8.2)
PROT UR STRIP-MCNC: NEGATIVE MG/DL
Q-T INTERVAL, ECG07: 406 MS
QRS DURATION, ECG06: 82 MS
QTC CALCULATION (BEZET), ECG08: 401 MS
RBC # BLD AUTO: 4.3 M/UL (ref 4.1–5.7)
RBC #/AREA URNS HPF: ABNORMAL /HPF (ref 0–5)
SODIUM SERPL-SCNC: 127 MMOL/L (ref 136–145)
SODIUM UR-SCNC: 37 MMOL/L
SP GR UR REFRACTOMETRY: 1.01 (ref 1–1.03)
UA: UC IF INDICATED,UAUC: ABNORMAL
UROBILINOGEN UR QL STRIP.AUTO: 1 EU/DL (ref 0.2–1)
VENTRICULAR RATE, ECG03: 59 BPM
WBC # BLD AUTO: 9.6 K/UL (ref 4.1–11.1)
WBC URNS QL MICRO: ABNORMAL /HPF (ref 0–4)

## 2020-09-22 PROCEDURE — 85025 COMPLETE CBC W/AUTO DIFF WBC: CPT

## 2020-09-22 PROCEDURE — 77030038269 HC DRN EXT URIN PURWCK BARD -A

## 2020-09-22 PROCEDURE — 84133 ASSAY OF URINE POTASSIUM: CPT

## 2020-09-22 PROCEDURE — 65660000000 HC RM CCU STEPDOWN

## 2020-09-22 PROCEDURE — 97116 GAIT TRAINING THERAPY: CPT

## 2020-09-22 PROCEDURE — 83735 ASSAY OF MAGNESIUM: CPT

## 2020-09-22 PROCEDURE — 83935 ASSAY OF URINE OSMOLALITY: CPT

## 2020-09-22 PROCEDURE — 84300 ASSAY OF URINE SODIUM: CPT

## 2020-09-22 PROCEDURE — 80053 COMPREHEN METABOLIC PANEL: CPT

## 2020-09-22 PROCEDURE — 97161 PT EVAL LOW COMPLEX 20 MIN: CPT

## 2020-09-22 PROCEDURE — 83930 ASSAY OF BLOOD OSMOLALITY: CPT

## 2020-09-22 PROCEDURE — 36415 COLL VENOUS BLD VENIPUNCTURE: CPT

## 2020-09-22 PROCEDURE — 74011250637 HC RX REV CODE- 250/637: Performed by: NURSE PRACTITIONER

## 2020-09-22 PROCEDURE — 74018 RADEX ABDOMEN 1 VIEW: CPT

## 2020-09-22 PROCEDURE — 74011250636 HC RX REV CODE- 250/636: Performed by: EMERGENCY MEDICINE

## 2020-09-22 PROCEDURE — 81001 URINALYSIS AUTO W/SCOPE: CPT

## 2020-09-22 RX ORDER — LANOLIN ALCOHOL/MO/W.PET/CERES
1000 CREAM (GRAM) TOPICAL DAILY
Status: DISCONTINUED | OUTPATIENT
Start: 2020-09-22 | End: 2020-09-24 | Stop reason: HOSPADM

## 2020-09-22 RX ORDER — LOSARTAN POTASSIUM 100 MG/1
100 TABLET ORAL DAILY
Status: DISCONTINUED | OUTPATIENT
Start: 2020-09-22 | End: 2020-09-24 | Stop reason: HOSPADM

## 2020-09-22 RX ORDER — ONDANSETRON 2 MG/ML
4 INJECTION INTRAMUSCULAR; INTRAVENOUS
Status: DISCONTINUED | OUTPATIENT
Start: 2020-09-22 | End: 2020-09-24 | Stop reason: HOSPADM

## 2020-09-22 RX ORDER — ACETAMINOPHEN 325 MG/1
650 TABLET ORAL
Status: DISCONTINUED | OUTPATIENT
Start: 2020-09-22 | End: 2020-09-24 | Stop reason: HOSPADM

## 2020-09-22 RX ORDER — LEVOTHYROXINE SODIUM 112 UG/1
112 TABLET ORAL
Status: DISCONTINUED | OUTPATIENT
Start: 2020-09-22 | End: 2020-09-24 | Stop reason: HOSPADM

## 2020-09-22 RX ORDER — SODIUM CHLORIDE 0.9 % (FLUSH) 0.9 %
5-40 SYRINGE (ML) INJECTION EVERY 8 HOURS
Status: DISCONTINUED | OUTPATIENT
Start: 2020-09-22 | End: 2020-09-24 | Stop reason: HOSPADM

## 2020-09-22 RX ORDER — CLOPIDOGREL BISULFATE 75 MG/1
75 TABLET ORAL DAILY
Status: DISCONTINUED | OUTPATIENT
Start: 2020-09-22 | End: 2020-09-24 | Stop reason: HOSPADM

## 2020-09-22 RX ORDER — METOPROLOL SUCCINATE 50 MG/1
50 TABLET, EXTENDED RELEASE ORAL DAILY
Status: DISCONTINUED | OUTPATIENT
Start: 2020-09-22 | End: 2020-09-24 | Stop reason: HOSPADM

## 2020-09-22 RX ORDER — POLYETHYLENE GLYCOL 3350 17 G/17G
17 POWDER, FOR SOLUTION ORAL DAILY PRN
Status: DISCONTINUED | OUTPATIENT
Start: 2020-09-22 | End: 2020-09-23

## 2020-09-22 RX ORDER — PANTOPRAZOLE SODIUM 40 MG/1
40 TABLET, DELAYED RELEASE ORAL
Status: DISCONTINUED | OUTPATIENT
Start: 2020-09-22 | End: 2020-09-23

## 2020-09-22 RX ORDER — PROMETHAZINE HYDROCHLORIDE 25 MG/1
12.5 TABLET ORAL
Status: DISCONTINUED | OUTPATIENT
Start: 2020-09-22 | End: 2020-09-24 | Stop reason: HOSPADM

## 2020-09-22 RX ORDER — ACETAMINOPHEN 650 MG/1
650 SUPPOSITORY RECTAL
Status: DISCONTINUED | OUTPATIENT
Start: 2020-09-22 | End: 2020-09-24 | Stop reason: HOSPADM

## 2020-09-22 RX ORDER — MELATONIN
2000
Status: DISCONTINUED | OUTPATIENT
Start: 2020-09-22 | End: 2020-09-24 | Stop reason: HOSPADM

## 2020-09-22 RX ORDER — ATORVASTATIN CALCIUM 20 MG/1
20 TABLET, FILM COATED ORAL
Status: DISCONTINUED | OUTPATIENT
Start: 2020-09-22 | End: 2020-09-24 | Stop reason: HOSPADM

## 2020-09-22 RX ORDER — SODIUM CHLORIDE 0.9 % (FLUSH) 0.9 %
5-40 SYRINGE (ML) INJECTION AS NEEDED
Status: DISCONTINUED | OUTPATIENT
Start: 2020-09-22 | End: 2020-09-24 | Stop reason: HOSPADM

## 2020-09-22 RX ADMIN — ATORVASTATIN CALCIUM 20 MG: 20 TABLET, FILM COATED ORAL at 21:39

## 2020-09-22 RX ADMIN — PANTOPRAZOLE SODIUM 40 MG: 40 TABLET, DELAYED RELEASE ORAL at 09:02

## 2020-09-22 RX ADMIN — METOPROLOL SUCCINATE 50 MG: 50 TABLET, EXTENDED RELEASE ORAL at 09:02

## 2020-09-22 RX ADMIN — Medication 10 ML: at 21:41

## 2020-09-22 RX ADMIN — Medication 40 ML: at 01:51

## 2020-09-22 RX ADMIN — APIXABAN 2.5 MG: 2.5 TABLET, FILM COATED ORAL at 09:01

## 2020-09-22 RX ADMIN — LOSARTAN POTASSIUM 100 MG: 100 TABLET, FILM COATED ORAL at 09:01

## 2020-09-22 RX ADMIN — ATORVASTATIN CALCIUM 20 MG: 20 TABLET, FILM COATED ORAL at 02:59

## 2020-09-22 RX ADMIN — VITAMIN D, TAB 1000IU (100/BT) 2 TABLET: 25 TAB at 21:38

## 2020-09-22 RX ADMIN — Medication 10 ML: at 17:20

## 2020-09-22 RX ADMIN — APIXABAN 2.5 MG: 2.5 TABLET, FILM COATED ORAL at 17:21

## 2020-09-22 RX ADMIN — SODIUM CHLORIDE 1000 ML: 900 INJECTION, SOLUTION INTRAVENOUS at 00:33

## 2020-09-22 RX ADMIN — LEVOTHYROXINE SODIUM 112 MCG: 112 TABLET ORAL at 06:59

## 2020-09-22 RX ADMIN — CLOPIDOGREL BISULFATE 75 MG: 75 TABLET ORAL at 09:02

## 2020-09-22 RX ADMIN — Medication 10 ML: at 07:00

## 2020-09-22 NOTE — PROGRESS NOTES
Laying 174/91, HR 69. Sitting 123/83, HR 76.  Standing 148/89, HR 77 Patient denies any dizziness with position change , just voiced \"I feel weak with the position change\" oxygen sats 96-98 % on room air .

## 2020-09-22 NOTE — PROGRESS NOTES
Hospitalist Progress Note    NAME: Eugene Mackey Sr.   :  1935   MRN:  [id number]       Assessment / Plan:    Hyponatremia POA, sodium 124  CKD stage III  Received IVF, sodium improved to 127  Nephrology following, management as per nephrology  Monitor renal function  Avoid nephrotoxins    Syncope, in ED  -? Likely secondary to hypotension  -Get echocardiogram     Atrial fibrillation ([facility] (2020)    Assessment: Is followed by cardiologist, Dr. Inocencio Bosworth for proximal atrial fibrillation and has been placed on Plavix, Eliquis, and metoprolol    Plan:  Continue home regimen of Plavix, Eliquis, and metoprolol       Severe spinal stenosis, cervical myelopathy  -Causing quadriparesis  -Reports worsening of activity level, now requiring walker for past 3 weeks  -Has saw Dr. Jay Bender in , he referred him to neurosurgery, has not seen neurosurgery yet  -We will consult neurosurgery                 Code Status: DNR  Surrogate Decision Maker: Daughter, Tripp Mcneill  [phone]     DVT Prophylaxis: Is on Eliquis and Plavix  GI Prophylaxis: Protonix     Activity at baseline: usually requires no assistive device but has been using a walker past 3 weeks     Lives with: Daughter     Subjective:     Chief Complaint / Reason for Physician Visit  Seen and examined today, follow-up for hyponatremia and weakness  Review of Systems:  Symptom Y/N Comments  Symptom Y/N Comments   Fever/Chills n   Chest Pain n    Poor Appetite y   Edema n    Cough n   Abdominal Pain n    Sputum n   Joint Pain     SOB/MARVIN    Pruritis/Rash     Nausea/vomit    Tolerating PT/OT     Diarrhea    Tolerating Diet     Constipation    Other       Could NOT obtain due to:      Objective:     VITALS:   Last 24hrs VS reviewed since prior progress note.  Most recent are:  Patient Vitals for the past 24 hrs:   Temp Pulse Resp BP SpO2   20 1109 97.9 °F (36.6 °C) 73 18 115/78 96 %   20 1041  66  (!) 154/85    20 1036  73  128/84    09/22/20 1032  70  129/78    09/22/20 1029  63  (!) 151/58 97 %   09/22/20 0734  77  (!) 148/89 96 %   09/22/20 0731  76  123/83 97 %   09/22/20 0730 97.8 °F (36.6 °C) 69 16 (!) 174/91 99 %   09/22/20 0628 97.8 °F (36.6 °C) 65 16 (!) 188/92 98 %   09/22/20 0515  (!) 52 12 (!) 147/65 98 %   09/22/20 0445  (!) 59 19 (!) 158/63 100 %   09/22/20 0200  (!) 55 10 139/88 100 %   09/22/20 0030 97.7 °F (36.5 °C) 61 15 (!) 155/81 98 %   09/21/20 2256  60 15 135/71 98 %   09/21/20 2226  (!) 57 10 132/64 98 %   09/21/20 2156  (!) 56 12 117/66 99 %   09/21/20 2126  (!) 58 16 127/69 98 %   09/21/20 2056  60 17 (!) 150/76 100 %   09/21/20 2026  (!) 54 12 (!) 140/64 99 %   09/21/20 2015  (!) 54 12 (!) 117/58 97 %   09/21/20 1956  60 18 123/62 97 %   09/21/20 1945  (!) 59 16 127/68 97 %   09/21/20 1926  (!) 59 18 120/69 97 %   09/21/20 1915  (!) 59 14 116/71 96 %   09/21/20 1845  67 17 (!) 135/105 97 %   09/21/20 1826  (!) 118 22 (!) 92/54    09/21/20 1745 97.2 °F (36.2 °C) 80 16 106/71 100 %       Intake/Output Summary (Last 24 hours) at 9/22/2020 1255  Last data filed at 9/22/2020 0834  Gross per 24 hour   Intake 1030 ml   Output 300 ml   Net 730 ml        PHYSICAL EXAM:  General: WD, WN. Alert, cooperative, no acute distress    EENT:  EOMI. Anicteric sclerae. MMM  Resp:  CTA bilaterally, no wheezing or rales. No accessory muscle use  CV:  Regular  rhythm,  No edema  GI:  Soft, Non distended, Non tender.  +Bowel sounds  Neurologic:  Alert and oriented X 3, normal speech,   Psych:   Good insight. Not anxious nor agitated  Skin:  No rashes.   No jaundice    Reviewed most current lab test results and cultures  YES  Reviewed most current radiology test results   YES  Review and summation of old records today    NO  Reviewed patient's current orders and MAR    YES  PMH/SH reviewed - no change compared to H&P  ________________________________________________________________________  Care Plan discussed with:    Comments   Patient x    Family      RN x    Care Manager     Consultant                        Multidiciplinary team rounds were held today with , nursing, pharmacist and clinical coordinator. Patient's plan of care was discussed; medications were reviewed and discharge planning was addressed. ________________________________________________________________________  Total NON critical care TIME:  40   Minutes    Total CRITICAL CARE TIME Spent:   Minutes non procedure based      Comments   >50% of visit spent in counseling and coordination of care     ________________________________________________________________________  Radha Nunez MD     Procedures: see electronic medical records for all procedures/Xrays and details which were not copied into this note but were reviewed prior to creation of Plan. LABS:  I reviewed today's most current labs and imaging studies.   Pertinent labs include:  Recent Labs     09/22/20  0434 09/21/20  1804   WBC 9.6 11.2*   HGB 13.4 16.6   HCT 39.3 47.0    435*     Recent Labs     09/22/20  0434 09/21/20  1804   * 124*   K 5.1 4.5   CL 96* 90*   CO2 26 27   GLU 86 104*   BUN 17 17   CREA 1.22 1.63*   CA 8.3* 9.0   MG 2.0  --    ALB 2.8* 3.7   TBILI 1.1* 1.2*   ALT 36 48       Signed: Radha Nunez MD

## 2020-09-22 NOTE — PROGRESS NOTES
Spiritual Care Assessment/Progress Note  [facility]      NAME: Yelena Ellis Sr. MRN: [id number]  AGE: 81 y.o. SEX: male  Advent Affiliation: Anglican   Language: English     9/22/2020     Total Time (in minutes): 21     Spiritual Assessment begun in [facility] through conversation with:         [x]Patient        [x] Family    [] Friend(s)        Reason for Consult: Palliative Care, Initial/Spiritual Assessment     Spiritual beliefs: (Please include comment if needed)     [x] Identifies with a nat tradition:         [] Supported by a nat community:            [] Claims no spiritual orientation:           [] Seeking spiritual identity:                [] Adheres to an individual form of spirituality:           [] Not able to assess:                           Identified resources for coping:      [] Prayer                               [] Music                  [] Guided Imagery     [x] Family/friends                 [] Pet visits     [] Devotional reading                         [] Unknown     [] Other:                                              Interventions offered during this visit: (See comments for more details)    Patient Interventions: Affirmation of emotions/emotional suffering, Affirmation of nat, Catharsis/review of pertinent events in supportive environment, Iconic (affirming the presence of God/Higher Power), Normalization of emotional/spiritual concerns     Family/Friend(s):  Affirmation of emotions/emotional suffering     Plan of Care:     [] Support spiritual and/or cultural needs    [] Support AMD and/or advance care planning process      [] Support grieving process   [] Coordinate Rites and/or Rituals    [] Coordination with community clergy   [] No spiritual needs identified at this time   [] Detailed Plan of Care below (See Comments)  [] Make referral to Music Therapy  [] Make referral to Pet Therapy     [] Make referral to Addiction services  [] Make referral to [facility]  [] Make referral to Spiritual Care Partner  [] No future visits requested        [x] Follow up visits as needed     Visited ppt for initial spiritual assessment. Pt was in bed with his daughter, Peter Goddard at bedside. Often tearful he spoke of his multiple hospital visits this month and the illnesses he contends with. He lives with Osiris Chu and reports good support from her. He yet grieves over the death of his wife. Assured both of ongoing  support.    Chaplain Chadwick, MDiv, MS, [phone]  [phone])

## 2020-09-22 NOTE — PROGRESS NOTES
Palliative Medicine  [place]: [phone] ([phone])  [facility]: [phone])      GOALS OF CARE: Full Restorative care at this time. Daughter, Geovanna Glass, is realistic, acknowledges multiple ED visits this month and would like to avoid these, if anything can be put in place on an outpatient basis so patient does not have to come to ED. TREATMENT PREFERENCES:   Code Status: DNR    Advance Care Planning:  [x] The [facility] Interdisciplinary Team has updated the ACP Navigator with [address] and Patient Capacity  Patient is not fully cognizant to make complex medical decisions at this time. He is still confused at times, this is likely to improve as patient is treated for his dehydration and hyponatremia. His daughter was asked to sign the DDNR today as a result. Patient did confirm that he would not want CPR at end of life. He was a little slow to respond at times, he was quiet for lengthy periods, his processing appears a bit slow and daughter Sundar Cloud noted that he is not back to baseline. Primary Decision MakerMaryjo Meri Son - [phone]    Primary Decision Maker: Austen Adamsbonnie - Daughter - [phone]  Advance Care Planning 10/17/2018   Patient's Healthcare Decision Maker is: Legal Next of Kin   Confirm Advance Directive Yes, not on file         Patient / Family Encounter Documentation    Participants (names): Patient, daughter Geovanna Glass, this writer. LCSW spoke briefly to Dr Vinnie Lebron who had just finished with patient and daughter. Narrative: Remberto Bustillos is a 81 y.o.  male with medical history including atrial fibrillation, chronic kidney disease, hyponatremia, TIA, hyperlipidemia, prostate cancer who presents with complaint of significant fatigue and weakness rendering him bedbound for past 6 days.  Daughter shared that he recently had oral surgery, had several teeth removed, he hasn't been able to eat much as a result and he has also lost a significant amount of weight in the last month, \"about 25 lbs\". Patient was dehydrated at time of this admission, this is being treated. Patient, as noted above, is clear at times mentally, but seems a bit slow in responding at times. His daughter is with him, she lives with him. Patient's wife  in , daughter retired from work to help with wife / her mother (wife had hospice for 3 years), daughter returned to help her father. Seth Morales works from home, her job seems to have some flexibility. She does have to go to the office at times. Patient has a total of five children, all in the area (four sons, one daughter. Another daughter  at the age of 29 due to a car accident. Patient became tearful recounting this). Psychosocial Issues Identified: 1. Daughter has a good understanding of patient's issues, she realizes that he has been to the ED numerous times this month, she is hoping for an alternate plan for his dehydration and hyponatremia issues. Explained to her that it would be a good idea to discuss this with nephrology or hospitalist. Unsure what can be done other than blood tests to check sodium level. 2. Daughter and patient confirmed DDNR status: DDNr signed today and original given to daughter for home, copy placed in hard chart for scanning. 3. Explained role of Palliative Medicine: No other GOC that need to be discussed at this time. Patient has AMD and now DDNR. Seth Morales is aware that at anytime, if MD visits and hospital visits become burdensome, that patient can choose to focus on comfort. At this time, patient is able to get to his MD appointments, as long as he is not confused and sodium level is normalized. Goals of Care / Plan: Support patient and daughter as needed. No other palliative needs at this time.

## 2020-09-22 NOTE — PROGRESS NOTES
Bedside and Verbal shift change report given to Delvis MCPHERSON (oncoming nurse) by Catrachita Che (offgoing nurse). Report included the following information SBAR, Kardex, MAR and Recent Results.

## 2020-09-22 NOTE — ED NOTES
Bedside and Verbal shift change report given to Deepika Whittington, RN (oncoming nurse) by Trell Zamorano RN (offgoing nurse). Report included the following information SBAR, Kardex, ED Summary, [facility] and Recent Results     This nurse will f/u with ED MD about admission orders. 0532: TRANSFER - OUT REPORT:    Verbal report given to Yoana Tovar RN (name) on [facility].  being transferred to Ortho (unit) for routine progression of care       Report consisted of patients Situation, Background, Assessment and   Recommendations(SBAR). Information from the following report(s) SBAR, Kardex, ED Summary, [facility] and Recent Results was reviewed with the receiving nurse. Lines:   Peripheral IV 09/21/20 Right Antecubital (Active)        Opportunity for questions and clarification was provided.       Patient transported with:   Shippable

## 2020-09-22 NOTE — PROGRESS NOTES
Palliative Medicine  [place]: [phone] ([phone])  [facility]: [phone] ([phone])      Consult for Care Decisions received. Patient just admitted, 9/21/20. He has had multiple ED visits/ hospital stays for similar issues, dehydration and hyponatremia. He has diagnosis of Stage IV CKD. Hx of TIA, Prostate CA. He lives with his daughter Aleyda Aponte. Per chart, patient has lost a significant amount of weight (about 25 lbs per daughter) since August and he has basically been bed-bound over the past week. Patient is a DNR (needs DDNR). Has AMD and has appointed both his children, Aleyda Aponte and son Sharlene Garcia. (Mac) as MPOAs who can act alone or together in terms of decision making. He does not want life prolonging measures to extend his life, if he is at EOL. Awaiting Nephrology Consult. LCSW will contact daughter Clemencia Almendarez, who he lives with, to note his functioning prior to a week ago and what her goals are for him. Clemencia Almendarez shared that she was coming to hospital shortly, will meet her in room.

## 2020-09-22 NOTE — ACP (ADVANCE CARE PLANNING)
Primary Decision MakeSaravanan Valverde - [phone]    Primary Decision Maker: Shannan Bynum - [phone]  Advance Care Planning 9/22/2020   Patient's Healthcare Decision Maker is: Named in scanned ACP document   Confirm Advance Directive Yes, on file   Does the patient have other document types Do Not Resuscitate       Both of patient's children can serve individually or together as decision makers. DDNR signed today by Matilde Dillon as patient not fully cognizant yet, to make complex medical decisions. He confirmed that he does not want life prolonging measures at end of life and he also stated he does not want CPR to bring him \"back\". He indicated, Teto Gomez can sign for me - you are my POA, right? \"    DDNr signed by Matilde Dillon and copies made for chart. Matilde Dillon and patient given original DDNR.

## 2020-09-22 NOTE — ED PROVIDER NOTES
EMERGENCY DEPARTMENT HISTORY AND PHYSICAL EXAM      Date: 9/21/2020  Patient Name: Isaias Bethea.    History of Presenting Illness     Chief Complaint   Patient presents with    Fatigue     pt was seen here on 09/10. pt has been bed ridden for one week and is confusion. pt has Stage IV CKD. pt is not vomiting. oral intake is vastly reduced. pt is having problems swallowing.  Weight Loss     pt weighed 221 on 08/03. Today he weighs 196lb       History Provided By: Patient    HPI: Isaias Bethea., 81 y.o. male  presents to the ED with cc of fatigue, weight loss, loss of appetite, and lethargy. Patient has basically been laying in bed for the past 5 to 6 days. Family states he has not been eating or drinking anything. They state he is really not even left the bed. Patient states he has been feeling very tired and lightheaded. In the emergency department waiting room the patient had an event which the  said look like a seizure but family described it as him just plain his head back and his eyes rolling into the back of his head. May have been more just syncope. He was more fatigued and lethargic after the event and felt more weak and lightheaded. He has not had any chest pain or shortness of breath. He has not had vomiting or diarrhea. He does have a history of stage IV chronic kidney disease. 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   Calcium and PTH monitored by Dr. Rula Wells, renal.  Levels normalized after surgery.  Prostate cancer ([address]     seeds, then XRT, then seed again. Dr. Jose Thurman Stroke [facility]) 2002    TIA, Dr. Olivier Pink, no residual effects as of 9/2018    Thyroid cancer [facility]) Jan 2015    Unspecified vitamin D deficiency        Past Surgical History:  Past Surgical History:   Procedure Laterality Date   Nikko Arreola    COLONOSCOPY N/A 9/17/2018    COLONOSCOPY performed by Simin Gastelum [facility] at [facility] AMBULAT[facility]    HX APPENDECTOMY      HX CHOLECYSTECTOMY      HX HEENT      tonsillectomy    HX OTHER SURGICAL      vasectomy    HX PARATHYROIDECTOMY  01/14/2015    right superior parathyroid gland removed and left superior parathyroid gland removed    HX PARTIAL THYROIDECTOMY  1/14/15    right lobectomy    HX UROLOGICAL      Seeds for prostate cancer , 4 dialations     HX UROLOGICAL  1/14/15    BLADDER NECK INCISION, Cold knife incision of bladder neck contracture, cystoscopy       Medications:  No current facility-administered medications on file prior to encounter. Current Outpatient Medications on File Prior to Encounter   Medication Sig Dispense Refill    losartan (COZAAR) 100 mg tablet Take 100 mg by mouth daily.  levothyroxine (SYNTHROID) 112 mcg tablet TAKE 1 TABLET BY MOUTH  DAILY BEFORE BREAKFAST 90 Tab 3    clopidogrel (PLAVIX) 75 mg tab TAKE 1 TABLET BY MOUTH  DAILY 90 Tab 3    atorvastatin (LIPITOR) 20 mg tablet Take 1 Tab by mouth daily. (Patient taking differently: Take 20 mg by mouth nightly.) 90 Tab 3    cyanocobalamin (VITAMIN B-12) 1,000 mcg tablet Take 1 Tab by mouth daily. 30 Tab 0    metoprolol succinate (TOPROL-XL) 50 mg XL tablet Take 50 mg by mouth daily.  ELIQUIS 2.5 mg tablet       acetaminophen (TYLENOL EXTRA STRENGTH) 500 mg tablet Take  by mouth every six (6) hours as needed for Pain.       cholecalciferol, vitamin D3, (VITAMIN D3) 2,000 unit tab Take  by mouth nightly.  docusate sodium (COLACE) 100 mg capsule Take 100 mg by mouth two (2) times a day. Family History:  Family History   Problem Relation Age of Onset    Cancer Mother         hodgkins disease    Heart Disease Father     Hypertension Father     Other Neg Hx         no hypercalcemia or kidney stones       Social History:  Social History     Tobacco Use    Smoking status: Former Smoker     Years: 5.00     Last attempt to quit: 1955     Years since quittin.6    Smokeless tobacco: Never Used   Substance Use Topics    Alcohol use: Yes     Comment: occassional mixed drink or beer    Drug use: No       Allergies: Allergies   Allergen Reactions    Sulfa (Sulfonamide Antibiotics) Hives       All the above components of the past  history are auto-populated from the electronic record. They have been reviewed and the patient has been interviewed for any pertinent past history that pertains to the patient's chief complaint and reason for visit. Not all pre-populated components may be accurate at the time this note was generated. Review of Systems   Review of Systems   Constitutional: Positive for activity change, appetite change, diaphoresis, fatigue and unexpected weight change. Negative for chills and fever. HENT: Negative for congestion, ear pain, rhinorrhea, sore throat and trouble swallowing. Eyes: Negative for visual disturbance. Respiratory: Negative for cough, chest tightness and shortness of breath. Cardiovascular: Negative for chest pain and palpitations. Gastrointestinal: Negative for abdominal pain, blood in stool, constipation, diarrhea, nausea and vomiting. Genitourinary: Negative for decreased urine volume, difficulty urinating, dysuria and frequency. Musculoskeletal: Negative for back pain and neck pain. Skin: Negative for color change and rash. Neurological: Positive for dizziness, weakness and light-headedness. Negative for headaches. Physical Exam   Physical Exam  Vitals signs and nursing note reviewed. Constitutional:       General: He is not in acute distress. Appearance: He is well-developed. He is ill-appearing and diaphoretic. Eyes:      Conjunctiva/sclera: Conjunctivae normal.   Neck:      Musculoskeletal: Neck supple. Cardiovascular:      Rate and Rhythm: Regular rhythm. Tachycardia present. Pulmonary:      Effort: Pulmonary effort is normal. No accessory muscle usage or respiratory distress. Breath sounds: Normal breath sounds. Abdominal:      General: There is no distension. Palpations: Abdomen is soft. Tenderness: There is no abdominal tenderness. Lymphadenopathy:      Cervical: No cervical adenopathy. Skin:     General: Skin is warm. Neurological:      Mental Status: He is alert and oriented to person, place, and time. Cranial Nerves: No cranial nerve deficit. Sensory: No sensory deficit. Diagnostic Study Results     Labs -     Recent Results (from the past 24 hour(s))   CBC WITH AUTOMATED DIFF    Collection Time: 09/21/20  6:04 PM   Result Value Ref Range    WBC 11.2 (H) 4.1 - 11.1 K/uL    RBC 5.21 4.10 - 5.70 M/uL    HGB 16.6 12.1 - 17.0 g/dL    HCT 47.0 36.6 - 50.3 %    MCV 90.2 80.0 - 99.0 FL    MCH 31.9 26.0 - 34.0 PG    MCHC 35.3 30.0 - 36.5 g/dL    RDW 12.9 11.5 - 14.5 %    PLATELET 832 (H) 770 - 400 K/uL    MPV 8.7 (L) 8.9 - 12.9 FL    NRBC 0.0 0  WBC    ABSOLUTE NRBC 0.00 0.00 - 0.01 K/uL    NEUTROPHILS 78 (H) 32 - 75 %    LYMPHOCYTES 14 12 - 49 %    MONOCYTES 6 5 - 13 %    EOSINOPHILS 1 0 - 7 %    BASOPHILS 0 0 - 1 %    IMMATURE GRANULOCYTES 1 (H) 0.0 - 0.5 %    ABS. NEUTROPHILS 8.7 (H) 1.8 - 8.0 K/UL    ABS. LYMPHOCYTES 1.5 0.8 - 3.5 K/UL    ABS. MONOCYTES 0.7 0.0 - 1.0 K/UL    ABS. EOSINOPHILS 0.2 0.0 - 0.4 K/UL    ABS. BASOPHILS 0.1 0.0 - 0.1 K/UL    ABS. IMM.  GRANS. 0.1 (H) 0.00 - 0.04 K/UL    DF AUTOMATED     METABOLIC PANEL, COMPREHENSIVE    Collection Time: 09/21/20  6:04 PM   Result Value Ref Range    Sodium 124 (L) 136 - 145 mmol/L    Potassium 4.5 3.5 - 5.1 mmol/L    Chloride 90 (L) 97 - 108 mmol/L    CO2 27 21 - 32 mmol/L    Anion gap 7 5 - 15 mmol/L    Glucose 104 (H) 65 - 100 mg/dL    BUN 17 6 - 20 MG/DL    Creatinine 1.63 (H) 0.70 - 1.30 MG/DL    BUN/Creatinine ratio 10 (L) 12 - 20      GFR est AA 49 (L) >60 ml/min/1.73m2    GFR est non-AA 41 (L) >60 ml/min/1.73m2    Calcium 9.0 8.5 - 10.1 MG/DL    Bilirubin, total 1.2 (H) 0.2 - 1.0 MG/DL    ALT (SGPT) 48 12 - 78 U/L    AST (SGOT) 33 15 - 37 U/L    Alk. phosphatase 116 45 - 117 U/L    Protein, total 7.3 6.4 - 8.2 g/dL    Albumin 3.7 3.5 - 5.0 g/dL    Globulin 3.6 2.0 - 4.0 g/dL    A-G Ratio 1.0 (L) 1.1 - 2.2     CK W/ REFLX CKMB    Collection Time: 09/21/20  6:04 PM   Result Value Ref Range     39 - 308 U/L   TROPONIN I    Collection Time: 09/21/20  6:04 PM   Result Value Ref Range    Troponin-I, Qt. <0.05 <0.05 ng/mL       Radiologic Studies -   XR CHEST PORT   Final Result   IMPRESSION: No Acute Disease. CT HEAD WO CONT   Final Result   IMPRESSION: No intracranial disease apparent on unenhanced Head CT. CT Results  (Last 48 hours)               09/21/20 2043  CT HEAD WO CONT Final result    Impression:  IMPRESSION: No intracranial disease apparent on unenhanced Head CT. Narrative:  INDICATION: possible seizure       EXAM: CT HEAD without contrast.       TECHNIQUE: Unenhanced CT Head is performed. CT dose reduction was achieved   through use of a standardized protocol tailored for this examination and   automatic exposure control for dose modulation. FINDINGS: Brain parenchyma shows no CT apparent ischemia. There is no apparent   mass on unenhanced imaging. There is no bleed, shift, obstructive hydrocephalus   or significant extra-axial fluid collection. Bone windows are unremarkable.                CXR Results  (Last 48 hours)               09/21/20 7236  XR CHEST PORT Final result    Impression:  IMPRESSION: No Acute Disease. Narrative:  EXAM: Portable CXR. 2103 hours. INDICATION: weakness       FINDINGS:   The lungs show no consolidation. Heart is normal in size. There is no pulmonary   edema. There is no evident pneumothorax, adenopathy or pleural effusion. Medical Decision Making     I reviewed the vital signs, available nursing notes, past medical history, past surgical history, family history and social history. Vital Signs-I have reviewed the vital signs that have been made available during the patient's emergency department visit. The vital signs auto-populated below are obtained mostly by electronic means through monitoring devices that have been downloaded into the patient's chart by the nursing staff. Some vital signs are not downloaded into the chart until after the patient has been discharged and this note has been completed, therefore some vital signs may not be available to the physician for review prior to patient's discharge or admission. The physician has reviewed the patient's triage vital signs, monitored the electronic monitoring devices remotely for any significant vital sign abnormalities, and have reviewed vital signs prior to discharge. Some vital signs reviewed at bedside or remotely utilizing electronic monitoring devices may be different than the vital signs downloaded into the electronic medical record. Some vital signs may be erroneous and inaccurate since they are obtained by electronic monitoring devices, and not all vital signs are verified for accuracy by nursing staff prior to downloading into the patient's chart.   Patient Vitals for the past 24 hrs:   Temp Pulse Resp BP SpO2   09/21/20 2256  60 15 135/71 98 %   09/21/20 2226  (!) 57 10 132/64 98 %   09/21/20 2156  (!) 56 12 117/66 99 %   09/21/20 2126  (!) 58 16 127/69 98 %   09/21/20 2056  60 17 (!) 150/76 100 %   09/21/20 2026  (!) 54 12 (!) 140/64 99 %   09/21/20 2015  (!) 54 12 (!) 117/58 97 %   09/21/20 1956  60 18 123/62 97 %   09/21/20 1945  (!) 59 16 127/68 97 %   09/21/20 1926  (!) 59 18 120/69 97 %   09/21/20 1915  (!) 59 14 116/71 96 %   09/21/20 1845  67 17 (!) 135/105 97 %   09/21/20 1826  (!) 118 22 (!) 92/54    09/21/20 1745 97.2 °F (36.2 °C) 80 16 106/71 100 %         Records Reviewed: Nursing notes for today's visit have been reviewed. I have also reviewed most recent medical records pertinent to today's complaints, if available in our medical record system. I have also reviewed all labs and imaging results from previous results in comparison to results obtained today. If an EKG was obtained today, it has been compared to previous EKGs, if available. If arriving via EMS, the EMS report has been reviewed if made available to us within the patient's time in the emergency department. Provider Notes (Medical Decision Making):   Patient presents with fatigue, weight loss, lethargy that has been progressively worsening over the past 5 to 6 days. Patient had syncope versus a small seizure in the waiting room. I fully expect him to be severely dehydrated or possibly rhabdomyolysis but his labs actually look pretty good. He does have hyponatremia with a downtrending sodium level over the past several days to weeks. He was mildly hypotensive after his event in the waiting room. He may have been orthostatic. May have had a vagal event. Will resuscitate with IV fluids which did help him feel better and stronger. He has no ALISSA. No rhabdomyolysis. No signs of cardiac ischemia. Head CT shows no intracerebral hemorrhage or mass lesion. Chest x-ray does not show any infiltrates or effusions. No pulmonary edema on x-ray. Will recommend admission to the hospital to improve his sodium levels. Patient may need further work-up for his syncope. If he did have a small seizure was likely related to the hyponatremia.     ED Course:   Initial assessment performed. The patients presenting problems have been discussed, and they are in agreement with the care plan formulated and outlined with them. I have encouraged them to ask questions as they arise throughout their visit. Orders Placed This Encounter    XR CHEST PORT    CT HEAD WO CONT    CBC WITH AUTOMATED DIFF    METABOLIC PANEL, COMPREHENSIVE    URINALYSIS W/ REFLEX CULTURE    CK W/REFLEX CKMB    TROPONIN I    DO NOT RESUSCITATE    EKG, 12 LEAD, INITIAL    INSERT PERIPHERAL IV ONE TIME STAT    sodium chloride 0.9 % bolus infusion 1,000 mL    ondansetron (ZOFRAN) injection 4 mg    sodium chloride 0.9 % bolus infusion 1,000 mL       EKG    Date/Time: 9/21/2020 6:30 PM  Performed by: Marquis Garcia MD  Authorized by: Marquis Garcia MD     ECG reviewed by ED Physician in the absence of a cardiologist: yes    Rate:     ECG rate:  59    ECG rate assessment: bradycardic    Rhythm:     Rhythm: sinus bradycardia    Ectopy:     Ectopy: none    QRS:     QRS axis:  Normal  Conduction:     Conduction: abnormal      Abnormal conduction: 1st degree    ST segments:     ST segments:  Normal  T waves:     T waves: normal            Critical Care Time:   0    Disposition:  Admit        Diagnosis     Clinical Impression:   1. Syncope and collapse    2. Seizure-like activity ([address]    3. Hyponatremia    4. Failure to thrive in adult            This note will not be viewable in [address]

## 2020-09-22 NOTE — H&P
Hospitalist Admission Note    NAME: Kinjal Tolentino Sr.   :  1935   MRN:  [id number]     Date/Time:  2020 11:28 PM    Patient PCP: Hamilton Willard MD  ______________________________________________________________________  Given the patient's current clinical presentation, I have a high level of concern for decompensation if discharged from the emergency department. Complex decision making was performed, which includes reviewing the patient's available past medical records, laboratory results, and x-ray films. My assessment of this patient's clinical condition and my plan of care is as follows. Assessment / Plan:  Patient Active Hospital Problem List:     Hyponatremia (2020)    Assessment: Sodium 124. Of note is that patient does have chronic hyponatremia and in January of this year he had been followed by nephrologist and had been placed on 1 L/day fluid restriction. Plan:   Patient did receive 1 L normal saline bolus in the emergency department  We will start patient on 1 L/day fluid restriction and will consult patient's nephrologist, Dr. Riri Mccullough. We will continue to monitor sodium level. He may require additional sodium chloride tablets supplement     CKD (chronic kidney disease) stage 3, GFR 30-59 ml/min ([facility]) (3/27/2013)    Assessment: Chronic with hyponatremia. Followed by nephrologist    Plan:   Monitor renal function  Avoid nephrotoxins  Consult patient's nephrologist     Atrial fibrillation ([facility] [address] (2020)    Assessment: Is followed by cardiologist, Dr. Christopher Abbasi for proximal atrial fibrillation and has been placed on Plavix, Eliquis, and metoprolol    Plan:  Continue home regimen of Plavix, Eliquis, and metoprolol  May require cardiology consult         Syncope (2020)    Assessment: Patient's daughter states that patient had a syncopal episode out in the waiting room.   She describes it as he had fallen asleep and was very difficult to awaken. Plan:   Monitor labs  Correct sodium level   patient did have a duplex bilateral carotid Doppler study on September 8 which reported minimal stenosis in the right CCA. Intimal thickening in the right CCA and moderate stenosis in the right ICA 50 to 69% and at the proximal segment with moderate and heterogeneous plaque in the right ICA. Minimal stenosis in the left CCA and intimal thickening present in the left CV with mild stenosis in left ICA less than 50% and proximal segment. There was also intimal thickening present in the left ICA with some heterogeneous plaque        Code Status: DNR  Surrogate Decision Maker: Daughter, Jaclyn Sheriff  [phone]    DVT Prophylaxis: Is on Eliquis and Plavix  GI Prophylaxis: Protonix    Activity at baseline: usually requires no assistive device but has been using a walker past 3 weeks    Lives with: Daughter        Subjective:     CHIEF COMPLAINT: Severe weakness, loss of appetite    HISTORY OF PRESENT ILLNESS:     Jenifer Mendez is a 81 y.o. WHITE [place]  male with medical history including atrial fibrillation, chronic kidney disease, hyponatremia, TIA, hyperlipidemia, prostate cancer who presents with complaint of significant fatigue and weakness rendering him bedbound for past 6 days. He is accompanied by his daughter who he lives with. In the emergency department waiting room the patient had an event which the  said look like a seizure but family described it as him just plain his head back and his eyes rolling into the back of his head. May have been more just syncope. He was more fatigued and lethargic after the event and felt more weak and lightheaded. Patient had presented to the emergency room on September 3, 2020 with complaints of feeling shaky. He has history of tremors and he states that his tremor presents itself when he is under a lot of stress.   He had reported shaking in all of his extremities especially with movement. At that time after being evaluated in the emergency room he was diagnosed with dehydration, received fluid replacement, and was discharged home. According to emergency room provider who had evaluated patient at that time there were no focal findings on neurologic exam and therefore he was evaluated for electrolyte imbalance. At that time his CBC was negative for leukocytosis and UA was negative for urinary tract infection. Basic metabolic panel did reveal elevated creatinine as he does have history of chronic kidney disease, hyponatremia at 129. He was given 1 L normal saline bolus. He was then discharged home. He presented to the emergency room again on September 10, 2020 with complaints of altered mental status and increased tremors along with neck pain, right-sided weakness, delayed speech and mental fogginess. He had an MRI of cervical spine without contrast and also bilateral to carotid ultrasound. MRI reported:  \"1. Moderate to severe spinal canal stenosis and severe bilateral neural  foraminal stenosis at C4-C5. 2. Moderate to severe spinal canal stenosis, severe left and moderate right  neural foraminal stenosis at C3-C4. 3. Moderate spinal canal stenosis and severe bilateral neural foraminal stenosis  at C5-C6. 4. Mild spinal canal stenosis and severe bilateral neural foraminal stenosis at  C6-C7. 5. Remaining degenerative changes as detailed above, overall not significantly  changed since 2018'    And the duplex bilateral carotid ultrasound reported minimal stenosis in the right CCA. Intimal thickening in the right CCA and moderate stenosis in the right ICA 50 to 69% and at the proximal segment with moderate and heterogeneous plaque in the right ICA. Minimal stenosis in the left CCA and intimal thickening present in the left CV with mild stenosis in left ICA less than 50% and proximal segment.   There was also intimal thickening present in the left ICA with some heterogeneous plaque. He was discharged home with instruction to follow-up with neurosurgery and diagnosed with cervical myelopathy with cervical radiculopathy. Patient's daughter states that patient status has significantly deteriorated to the point that he has been bedridden for past 6 days due to weakness. She states that patient usually ambulates without any assistive device however 3 weeks ago, he started to require a walker for ambulation and again for past 6 days he has been bedridden. States patient has lost his appetite and does not feel like eating or drinking. She states patient has had a 25 pound weight loss since August of this year. States has not had a proper BM since 2020. [facility] tried miralax but only brown water came out    We were asked to admit for work up and evaluation of the above problems. Advance Care Planning Note      NAME: Chavez Riky Li.   :  1935   MRN:  [id number]     Date/Time:  2020     Active Diagnoses:  Hospital Problems  Date Reviewed: 2020          Codes Class Noted POA    * (Principal) Hyponatremia ICD-10-CM: E87.1  ICD-9-CM: 276.1  2020 Unknown        Syncope ICD-10-CM: R55  ICD-9-CM: 780.2  2020 Unknown        Atrial fibrillation (HCC) (Chronic) ICD-10-CM: I48.91  ICD-9-CM: 427.31  2020 Unknown        CKD (chronic kidney disease) stage 3, GFR 30-59 ml/min (HCC) ICD-10-CM: N18.3  ICD-9-CM: 585.3  3/27/2013 Yes              These active diagnoses are of sufficient risk that focused discussion on advance care planning is indicated in order to allow the patient to thoughtfully consider personal goals of care, and if situations arise that prevent the ability to personally give input, to ensure appropriate representation of their personal desires for different levels and aggressiveness of care. Discussion:   Code status addressed and wants to be a DNR / DNI.   Patient wants central line and vasopressors if needed. In regards to tube feeding, patient states that he would only like tube feeding if it is temporary. He states he does not want tube feeding if it would only prolong his life without adding quality of life. Patient  would like to assign his daughter, Kaykay Mccormick  [phone]  as the surrogate decision maker. Persons present and participating in discussion: Amanda Oliva, Verena Smith, [facility], Kaykay Mccormick  [phone]      Time Spent:   Total time spent face-to-face in education and discussion:   20  minutes. Tube feeding only if temporary and is not prolonging life without adding quality. 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 Calcium and PTH monitored by Dr. Marianela Britt, renal.  Levels normalized after surgery.  Prostate cancer ([address]     seeds, then XRT, then seed again.   Dr. Rey Phillips Stroke [facility]) 2002    TIA, Dr. Raghav Rushing, no residual effects as of 9/2018    Thyroid cancer [facility]) Jan 2015    Unspecified vitamin D deficiency         Past Surgical History:   Procedure Laterality Date   Bee Pollard    COLONOSCOPY N/A 9/17/2018    COLONOSCOPY performed by Kanu Engel MD at [facility] AMBULATORY OR    HX APPENDECTOMY      HX CHOLECYSTECTOMY      HX HEENT      tonsillectomy    HX OTHER SURGICAL      vasectomy    HX PARATHYROIDECTOMY  01/14/2015    right superior parathyroid gland removed and left superior parathyroid gland removed    HX PARTIAL THYROIDECTOMY  1/14/15    right lobectomy    HX UROLOGICAL      Seeds for prostate cancer , 4 dialations     HX UROLOGICAL  1/14/15    BLADDER NECK INCISION, Cold knife incision of bladder neck contracture, cystoscopy       Social History     Tobacco Use    Smoking status: Former Smoker     Years: 5.00     Last attempt to quit: 1955     Years since quittin.6    Smokeless tobacco: Never Used   Substance Use Topics    Alcohol use: Yes     Comment: occassional mixed drink or beer        Family History   Problem Relation Age of Onset    Cancer Mother         hodgkins disease    Heart Disease Father     Hypertension Father     Other Neg Hx         no hypercalcemia or kidney stones     Allergies   Allergen Reactions    Sulfa (Sulfonamide Antibiotics) Hives        Prior to Admission medications    Medication Sig Start Date End Date Taking? Authorizing Provider   losartan (COZAAR) 100 mg tablet Take 100 mg by mouth daily. 20  Yes Provider, Historical   levothyroxine (SYNTHROID) 112 mcg tablet TAKE 1 TABLET BY MOUTH  DAILY BEFORE BREAKFAST 19  Yes Hellen Real MD   clopidogrel (PLAVIX) 75 mg tab TAKE 1 TABLET BY MOUTH  DAILY 19  Yes Hellen Real MD   atorvastatin (LIPITOR) 20 mg tablet Take 1 Tab by mouth daily. Patient taking differently: Take 20 mg by mouth nightly. 19  Yes Hellen Real MD   cyanocobalamin (VITAMIN B-12) 1,000 mcg tablet Take 1 Tab by mouth daily. 10/22/18  Yes Keny Brunner MD   metoprolol succinate (TOPROL-XL) 50 mg XL tablet Take 50 mg by mouth daily. Yes Provider, Historical   ELIQUIS 2.5 mg tablet  5/3/18  Yes Provider, Historical   acetaminophen (TYLENOL EXTRA STRENGTH) 500 mg tablet Take  by mouth every six (6) hours as needed for Pain. Yes Provider, Historical   cholecalciferol, vitamin D3, (VITAMIN D3) 2,000 unit tab Take  by mouth nightly.    Yes Provider, Historical   docusate sodium (COLACE) 100 mg capsule Take 100 mg by mouth two (2) times a day. Provider, Historical       REVIEW OF SYSTEMS:         Review of Systems   Constitutional: Positive for activity change, appetite change and unexpected weight change (25 lb weight loss since august). HENT: Negative for rhinorrhea and sore throat. Eyes: Negative for pain and discharge. Respiratory: Negative for cough and shortness of breath. Cardiovascular: Negative for chest pain, palpitations and leg swelling. Gastrointestinal: Positive for constipation (denies having a BM since 09/06/2020). Negative for abdominal pain, blood in stool, nausea and vomiting. Genitourinary: Negative for difficulty urinating, dysuria and hematuria. Urinary incontinence   Musculoskeletal: Positive for arthralgias, back pain, gait problem and neck pain. Neurological: Positive for syncope, weakness and headaches. Negative for dizziness, tremors and light-headedness. Psychiatric/Behavioral: Positive for confusion (staes he just feels \"fuzzy\"). Objective:   VITALS:    Visit Vitals  /71   Pulse 60   Temp 97.2 °F (36.2 °C)   Resp 15   Ht 6' (1.829 m)   Wt 89 kg (196 lb 3.4 oz)   SpO2 98%   BMI 26.61 kg/m²           Physical Exam  Constitutional:       Appearance: He is ill-appearing. HENT:      Head: Normocephalic and atraumatic. Right Ear: External ear normal.      Left Ear: External ear normal.      Nose: Nose normal.      Mouth/Throat:      Mouth: Mucous membranes are moist.   Eyes:      Conjunctiva/sclera: Conjunctivae normal.   Neck:      Musculoskeletal: Neck supple. Cardiovascular:      Rate and Rhythm: Normal rate and regular rhythm. Occasional extrasystoles are present. Pulmonary:      Effort: Pulmonary effort is normal. No respiratory distress. Breath sounds: Normal breath sounds. No wheezing, rhonchi or rales. Abdominal:      General: Bowel sounds are normal.      Palpations: Abdomen is soft. Tenderness: There is no abdominal tenderness. Musculoskeletal:      Right lower leg: No edema. Left lower leg: No edema. Skin:     General: Skin is warm and dry. Capillary Refill: Capillary refill takes less than 2 seconds. Neurological:      Mental Status: He is alert and oriented to person, place, and time. Cranial Nerves: No dysarthria or facial asymmetry. Motor: Weakness present. Gait: Gait abnormal.   Psychiatric:         Mood and Affect: Mood normal.         Behavior: Behavior normal. Behavior is cooperative. Procedures: see electronic medical records for all procedures/Xrays and details which were not copied into this note but were reviewed prior to creation of Plan. Recent Imaging studies(If Any)    CXR Results  (Last 48 hours)               09/21/20 2108  XR CHEST PORT Final result    Impression:  IMPRESSION: No Acute Disease. Narrative:  EXAM: Portable CXR. 2103 hours. INDICATION: weakness       FINDINGS:   The lungs show no consolidation. Heart is normal in size. There is no pulmonary   edema. There is no evident pneumothorax, adenopathy or pleural effusion. Echo Results  (Last 48 hours)    None           CT Results  (Last 48 hours)               09/21/20 2043  CT HEAD WO CONT Final result    Impression:  IMPRESSION: No intracranial disease apparent on unenhanced Head CT. Narrative:  INDICATION: possible seizure       EXAM: CT HEAD without contrast.       TECHNIQUE: Unenhanced CT Head is performed. CT dose reduction was achieved   through use of a standardized protocol tailored for this examination and   automatic exposure control for dose modulation. FINDINGS: Brain parenchyma shows no CT apparent ischemia. There is no apparent   mass on unenhanced imaging. There is no bleed, shift, obstructive hydrocephalus   or significant extra-axial fluid collection. Bone windows are unremarkable.                   EKG RESULTS     Procedure Component Value Units Date/Time    EKG, 12 LEAD, INITIAL [[id number]]     Order Status:  Sent     EKG, 12 LEAD, INITIAL [[id number]] Collected:  09/10/20 1202    Order Status:  Completed Updated:  20 1632     Ventricular Rate 91 BPM      Atrial Rate 91 BPM      P-R Interval 234 ms      QRS Duration 92 ms      Q-T Interval 376 ms      QTC Calculation (Bezet) 462 ms      Calculated P Axis 37 degrees      Calculated R Axis -6 degrees      Calculated T Axis 13 degrees      Diagnosis --     Sinus rhythm with 1st degree AV block  Minimal voltage criteria for LVH, may be normal variant  When compared with ECG of 03-SEP-2020 11:32,  No significant change was found  Confirmed by Fly Eastman ([id number]) on 2020 4:32:15 PM      EKG, 12 LEAD, INITIAL [[id number]] Collected:  20 1132    Order Status:  Completed Updated:  20     Ventricular Rate 72 BPM      Atrial Rate 72 BPM      P-R Interval 220 ms      QRS Duration 76 ms      Q-T Interval 386 ms      QTC Calculation (Bezet) 422 ms      Calculated P Axis 27 degrees      Calculated R Axis -4 degrees      Calculated T Axis 4 degrees      Diagnosis --     Sinus rhythm with 1st degree AV block  When compared with ECG of 17-OCT-2018 20:30,  No significant change was found  Confirmed by Thai Beltrán ([id number]) on 2020 8:14:40 PM              Christophe  Sr.   DUPLEX CAROTID BILATERAL   Order# [id number]   Reading physician: Violeta Jurado MD  Ordering physician: Violeta Jurado MD  Study date: 9/10/20    Patient Information     Patient Name   Christophe Gonzalez Sr. MRN   [id number]  Sex   Male       1935 (81 y.o.)    Vascular History        Reason for Exam   Priority: Routine   stenosis    Dx: H/O prostate cancer [Z85.46 (ICD-10-CM)]; Degenerative cervical spinal stenosis [M48.02 (ICD-10-CM)]; Stenosis of both internal carotid arteries [I65.23 (ICD-10-CM)]; Bilateral carotid artery stenosis [I65.23 (ICD-10-CM)];  Transient ischemic attack involving right internal carotid artery [G45.1 (ICD-10-CM)]; Benign essential tremor syndrome [G25.0 (ICD-10-CM)]; Cervical myelopathy with cervical radiculopathy [M47.12 (ICD-10-CM)]    Comments: 11 AM    Procedure Technique     Duplex images were obtained using 2-D gray scale, color flow, and spectral Doppler analysis. Vitals     Weight  Height  BSA (calculated - sq m)  BP  Pulse (Heart Rate)           Interpretation Summary     This study consisted of pulsed wave Doppler examination, color flow imaging, and duplex imaging of both right and left carotid systems and both vertebral arteries     Imaging of both right and left carotid systems showed mild mixed plaque in the bifurcations and proximal and distal internal carotid arteries bilaterally with stenosis in the range of 16-49% only and with no flow abnormalities identified except for the proximal right internal carotid artery which did show a peak systolic flow velocity of 149 cm/s over 35 cm/s indicating a stenosis in the range of 50 to 79% but probably on the lower end of that scale.        Both external carotid arteries showed no flow abnormalities or stenosis and both common carotid arteries showed showed normal antegrade flow, and showed mild disease in the range of 16-49% only without associated flow abnormalities.     Left vertebral artery showed normal antegrade flow, but the right vertebral artery was not able to be well visualized, most likely secondary to technical difficulty, but cannot completely rule out obstructive or congenital cerebrovascular disease, and if clinically indicated other imaging modalities like CTA or MRA may be of further diagnostic benefit.     Clinical correlation recommended.      Cerebrovascular Findings     Right Carotid     There is minimal stenosis in the right CCA. There is intimal thickening present in the right CCA. There is moderate stenosis in the right ICA (50-69%) and at the proximal segment.  The right ICA has moderate and heterogeneous plaque. The right ECA is patent. The right vertebral is not visualized. The right subclavian is normal.    Left Carotid     There is minimal stenosis in the left CCA. There is intimal thickening present in the left CCA. There is mild stenosis in the left ICA (<50%) and at the proximal segment . There is intimal thickening present in the left ICA. The left ICA has heterogeneous plaque. The left ECA is patent. The left vertebral is antegrade. The left subclavian is normal.                            Procedure Staff     Technologist/Clinician: Jenny Coombs  Supporting Staff: None  Performing Physician/Midlevel: None     Exam Completion Date/Time: 9/10/20 11:25 AM            Recent Microbiology Data(If Any)  . All Micro Results     None             LAB DATA REVIEWED:    Recent Results (from the past 24 hour(s))   CBC WITH AUTOMATED DIFF    Collection Time: 09/21/20  6:04 PM   Result Value Ref Range    WBC 11.2 (H) 4.1 - 11.1 K/uL    RBC 5.21 4.10 - 5.70 M/uL    HGB 16.6 12.1 - 17.0 g/dL    HCT 47.0 36.6 - 50.3 %    MCV 90.2 80.0 - 99.0 FL    MCH 31.9 26.0 - 34.0 PG    MCHC 35.3 30.0 - 36.5 g/dL    RDW 12.9 11.5 - 14.5 %    PLATELET 756 (H) 334 - 400 K/uL    MPV 8.7 (L) 8.9 - 12.9 FL    NRBC 0.0 0  WBC    ABSOLUTE NRBC 0.00 0.00 - 0.01 K/uL    NEUTROPHILS 78 (H) 32 - 75 %    LYMPHOCYTES 14 12 - 49 %    MONOCYTES 6 5 - 13 %    EOSINOPHILS 1 0 - 7 %    BASOPHILS 0 0 - 1 %    IMMATURE GRANULOCYTES 1 (H) 0.0 - 0.5 %    ABS. NEUTROPHILS 8.7 (H) 1.8 - 8.0 K/UL    ABS. LYMPHOCYTES 1.5 0.8 - 3.5 K/UL    ABS. MONOCYTES 0.7 0.0 - 1.0 K/UL    ABS. EOSINOPHILS 0.2 0.0 - 0.4 K/UL    ABS. BASOPHILS 0.1 0.0 - 0.1 K/UL    ABS. IMM.  GRANS. 0.1 (H) 0.00 - 0.04 K/UL    DF AUTOMATED     METABOLIC PANEL, COMPREHENSIVE    Collection Time: 09/21/20  6:04 PM   Result Value Ref Range    Sodium 124 (L) 136 - 145 mmol/L    Potassium 4.5 3.5 - 5.1 mmol/L    Chloride 90 (L) 97 - 108 mmol/L    CO2 27 21 - 32 mmol/L    Anion gap 7 5 - 15 mmol/L    Glucose 104 (H) 65 - 100 mg/dL    BUN 17 6 - 20 MG/DL    Creatinine 1.63 (H) 0.70 - 1.30 MG/DL    BUN/Creatinine ratio 10 (L) 12 - 20      GFR est AA 49 (L) >60 ml/min/1.73m2    GFR est non-AA 41 (L) >60 ml/min/1.73m2    Calcium 9.0 8.5 - 10.1 MG/DL    Bilirubin, total 1.2 (H) 0.2 - 1.0 MG/DL    ALT (SGPT) 48 12 - 78 U/L    AST (SGOT) 33 15 - 37 U/L    Alk. phosphatase 116 45 - 117 U/L    Protein, total 7.3 6.4 - 8.2 g/dL    Albumin 3.7 3.5 - 5.0 g/dL    Globulin 3.6 2.0 - 4.0 g/dL    A-G Ratio 1.0 (L) 1.1 - 2.2     CK W/ REFLX CKMB    Collection Time: 09/21/20  6:04 PM   Result Value Ref Range     39 - 308 U/L   TROPONIN I    Collection Time: 09/21/20  6:04 PM   Result Value Ref Range    Troponin-I, Qt. <0.05 <0.05 ng/mL                  _______________________________________________________________________  Care Plan discussed with:    Comments   Patient x    Family  x    RN     Care Manager                    Consultant:      _______________________________________________________________________  Expected  Disposition:   Home with Family    HH/PT/OT/RN x   SNF/LTC    [facility]    ________________________________________________________________________  TOTAL TIME:  39 Minutes + 20 minutes ACP    Critical Care Provided     Minutes non procedure based      Comments    x Reviewed previous records   >50% of visit spent in counseling and coordination of care x Discussion with patient and/or family and questions answered           Patient hemodynamically stable at time of admission    ________________________________________________________________________  Signed: Lynda Leroy, SHELLIE, ACNP-BC    Please note that this note was dictated using Dragon computer voice recognition software. Quite often unanticipated grammatical, syntax, homophones, and other interpretive errors are inadvertently transcribed by the computer software. Please disregard these errors.   Please excuse any errors that have escaped final proofreading.

## 2020-09-22 NOTE — CONSULTS
Consultation Note    NAME: Franklyn Griffith Sr.   :  1935   MRN:  [id number]     Date/Time:  2020 10:22 AM    I have been asked to see this patient by Archana Dailey  for advice/opinion re: Hyponatremia. Assessment :    Plan:  Hyponatremia (follows with Dr. Harvinder Alexander)  CKD stage 3  Resolved ALISSA  HTN  Hypothyroid  Afib  Syncope  Weakness  Anorexia Sodium essie 124 on , today is 127; looks like SIADH; check urine osm, serum osm; urine Na, urine K, check am cortisol    Continue on 1 liter/day fluid restriction; avoid salt tabs given his HTN;     TSH ok    [place] urine    Creatinine 1.63 on , now 1.22       Subjective:   CHIEF COMPLAINT:  Hyponatremia/ckd    HISTORY OF PRESENT ILLNESS:     Yocasta Montano is a 81 y.o.   male who has a history of the below. Not feeling well for a few weeks. Had teeth removed in late August and decreased PO intake since. He has been told to follow fluid restriction in the past. He came to the er with weakness. He has a syncopal/presyncopal episode in the er yesterday. He denies current lh/dz. Still not himself according to his daughter. 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 Calcium and PTH monitored by Dr. Tonio Key, renal.  Levels normalized after surgery.  Prostate cancer ([address]     seeds, then XRT, then seed again.   Dr. Heidi Hodgkins Stroke [facility])     TIA, Dr. Ailyn Cornelius, no residual effects as of 2018    Thyroid cancer [facility]) 2015    Unspecified vitamin D deficiency       Past Surgical History:   Procedure Laterality Date   Adalgisaamanda Delgado    COLONOSCOPY N/A 2018    COLONOSCOPY performed by Jamarcus Dennsi [facility] OR    HX APPENDECTOMY      HX CHOLECYSTECTOMY      HX HEENT      tonsillectomy    HX OTHER SURGICAL      vasectomy    HX PARATHYROIDECTOMY  2015    right superior parathyroid gland removed and left superior parathyroid gland removed    HX PARTIAL THYROIDECTOMY  1/14/15    right lobectomy    HX UROLOGICAL      Seeds for prostate cancer , 4 dialations     HX UROLOGICAL  1/14/15    BLADDER NECK INCISION, Cold knife incision of bladder neck contracture, cystoscopy     Social History     Tobacco Use    Smoking status: Former Smoker     Years: 5.00     Last attempt to quit: 1955     Years since quittin.6    Smokeless tobacco: Never Used   Substance Use Topics    Alcohol use: Yes     Comment: occassional mixed drink or beer      Family History   Problem Relation Age of Onset    Cancer Mother         hodgkins disease    Heart Disease Father     Hypertension Father     Other Neg Hx         no hypercalcemia or kidney stones      Allergies   Allergen Reactions    Sulfa (Sulfonamide Antibiotics) Hives      Prior to Admission medications    Medication Sig Start Date End Date Taking? Authorizing Provider   losartan (COZAAR) 100 mg tablet Take 100 mg by mouth daily. 20  Yes Provider, Historical   levothyroxine (SYNTHROID) 112 mcg tablet TAKE 1 TABLET BY MOUTH  DAILY BEFORE BREAKFAST 19  Yes Suyapa Burk MD   clopidogrel (PLAVIX) 75 mg tab TAKE 1 TABLET BY MOUTH  DAILY 19  Yes Suyapa Burk MD   atorvastatin (LIPITOR) 20 mg tablet Take 1 Tab by mouth daily. Patient taking differently: Take 20 mg by mouth nightly. 19  Yes Suyapa Burk MD   cyanocobalamin (VITAMIN B-12) 1,000 mcg tablet Take 1 Tab by mouth daily.  10/22/18  Yes Ana Jeong MD   metoprolol succinate (TOPROL-XL) 50 mg XL tablet Take 50 mg by mouth daily. Yes Provider, Historical   ELIQUIS 2.5 mg tablet  5/3/18  Yes Provider, Historical   acetaminophen (TYLENOL EXTRA STRENGTH) 500 mg tablet Take  by mouth every six (6) hours as needed for Pain. Yes Provider, Historical   cholecalciferol, vitamin D3, (VITAMIN D3) 2,000 unit tab Take  by mouth nightly. Yes Provider, Historical   docusate sodium (COLACE) 100 mg capsule Take 100 mg by mouth two (2) times a day.     Provider, Historical     REVIEW OF SYSTEMS:     []  Unable to obtain reliable ROS due to  [] mental status  [] sedated   [] intubated   [x] Total of 12 systems reviewed as follows:  Constitutional: negative fever, negative chills, + weight loss  Eyes:   negative visual changes  ENT:   negative sore throat, tongue or lip swelling  Respiratory:  negative cough, negative dyspnea  Cards:  negative for chest pain, palpitations, lower extremity edema  GI:   negative for nausea, vomiting, diarrhea, and abdominal pain  :  negative for frequency, dysuria  Integument:  negative for rash and pruritus  Heme:  negative for easy bruising and gum/nose bleeding  Musculoskel: negative for myalgias,  back pain   Neuro:  negative for headaches, vertigo  Psych:  negative for feelings of anxiety, depression   Travel?: none    Objective:   VITALS:    Visit Vitals  BP (!) 148/89 (BP Patient Position: Standing)   Pulse 77   Temp 97.8 °F (36.6 °C)   Resp 16   Ht 6' (1.829 m)   Wt 89 kg (196 lb 3.4 oz)   SpO2 96%   BMI 26.61 kg/m²     PHYSICAL EXAM:  Gen:  [x]  WD [x]  WN  [] cachectic []  thin []  obese []  disheveled             [x]  ill apearing  []   Critical  [x]   Chronic    [x]  No acute distress    HEENT:   [x] NC/AT/PERRLA/EOMI    [] pink conjunctivae      [] pale conjunctivae                  PERRL  [] yes  [] no      [] moist mucosa    [] dry mucosa    hearing intact to voice [x] yes  [] No                 NECK:   supple [x] yes  [] no        masses [] yes  [] No               thyroid  []  non tender  []  tender    RESP:   [x] CTA bilaterally/no wheezing/rhonchi/rales/crackles    [] rhonchi bilaterally - no dullness  [] wheezing   [] rhonchi   [] crackles     use of accessory muscles [] yes [] no    CARD:   []  regular rate and rhythm/No murmurs/rubs/gallops    murmur  [] yes ()  [] no      Rubs  [] yes  [] no       Gallops [] yes  [] no    Rate []  regular  []  irregular        carotid bruits  [] Right  []  Left                 LE edema [] yes  [x] no           JVP  []  yes   []  no    ABD:    [x] soft/non distended/non tender/+bowel sounds/no HSM    []  Rigid    tenderness [] yes [] no   Liver enlargement  []  yes []  no                Spleen enlargement  []  yes []  no     distended []  yes [] no     bowel sound  [] hypoactive   [] hyperactive    LYMPH:    Neck []  yes [x]  no       Axillae []  yes []  no    SKIN:   Rashes []  yes   [x]  no    Ulcers []  yes   []  no               [] tight to palpitation    skin turgor []  good  [] poor  [] decreased               Cyanosis/clubbing []  yes []  no    NEUR:   [x] cranial nerves II-XII grossly intact       [] Cranial nerves deficit                 []  facial droop    []  slurred speech   [] aphasic     [] Strength normal     []  weakness  []  LUE  []   RUE/ []  LLE  []   RLE    follows commands  [x]  yes []  no           PSYCH:   insight [] poor [] good   Alert and Oriented to  [x] person  [x] place  []  time                    [] depressed [] anxious [] agitated  [] lethargic [] stuporous  [] sedated     LAB DATA REVIEWED:    Recent Labs     09/22/20  0434 09/21/20  1804   WBC 9.6 11.2*   HGB 13.4 16.6   HCT 39.3 47.0    435*     Recent Labs     09/22/20  0434 09/21/20  1804   * 124*   K 5.1 4.5   CL 96* 90*   CO2 26 27   BUN 17 17   CREA 1.22 1.63*   GLU 86 104*   CA 8.3* 9.0   MG 2.0  --      Recent Labs     09/22/20  0434 09/21/20  1804   ALT 36 48   AP 85 116   TBILI 1.1* 1.2*   ALB 2.8* 3.7   GLOB 2.8 3.6     No results for input(s): INR, PTP, APTT, INREXT in the last 72 hours. No results for input(s): FE, TIBC, PSAT, FERR in the last 72 hours. No results for input(s): PH, PCO2, PO2 in the last 72 hours. No results for input(s): CPK, CKMB in the last 72 hours. No lab exists for component: TROPONINI  Lab Results   Component Value Date/Time    Glucose (POC) 115 (H) 09/10/2020 11:45 AM       Procedures: see electronic medical records for all procedures/Xrays and details which were not copied into this note but were reviewed prior to creation of Plan.    ________________________________________________________________________       ___________________________________________________  Consulting Physician:  Jessica Steward MD

## 2020-09-23 ENCOUNTER — APPOINTMENT (OUTPATIENT)
Dept: CT IMAGING | Age: 85
DRG: 644 | End: 2020-09-23
Attending: PHYSICIAN ASSISTANT
Payer: MEDICARE

## 2020-09-23 ENCOUNTER — APPOINTMENT (OUTPATIENT)
Dept: NON INVASIVE DIAGNOSTICS | Age: 85
DRG: 644 | End: 2020-09-23
Attending: INTERNAL MEDICINE
Payer: MEDICARE

## 2020-09-23 LAB
ANION GAP SERPL CALC-SCNC: 5 MMOL/L (ref 5–15)
BUN SERPL-MCNC: 17 MG/DL (ref 6–20)
BUN/CREAT SERPL: 10 (ref 12–20)
CALCIUM SERPL-MCNC: 8.8 MG/DL (ref 8.5–10.1)
CHLORIDE SERPL-SCNC: 95 MMOL/L (ref 97–108)
CO2 SERPL-SCNC: 26 MMOL/L (ref 21–32)
CORTIS SERPL-MCNC: 40.1 UG/DL
CREAT SERPL-MCNC: 1.63 MG/DL (ref 0.7–1.3)
ECHO AR MAX VEL PISA: 390.59 CM/S
ECHO AV AREA PEAK VELOCITY: 3.73 CM2
ECHO AV AREA VTI: 3.83 CM2
ECHO AV AREA/BSA PEAK VELOCITY: 1.8 CM2/M2
ECHO AV AREA/BSA VTI: 1.8 CM2/M2
ECHO AV MEAN GRADIENT: 3.27 MMHG
ECHO AV MEAN VELOCITY: 86.07 CM/S
ECHO AV PEAK GRADIENT: 6.13 MMHG
ECHO AV PEAK GRADIENT: 61.03 MMHG
ECHO AV PEAK VELOCITY: 123.8 CM/S
ECHO AV REGURGITANT PHT: 0.68 S
ECHO AV VTI: 23.26 CM
ECHO LA TO AORTIC ROOT RATIO: 0.92
ECHO LA TO AORTIC ROOT RATIO: 0.92
ECHO LV E' LATERAL VELOCITY: 7.66 CM/S
ECHO LV E' SEPTAL VELOCITY: 5.52 CM/S
ECHO LV INTERNAL DIMENSION DIASTOLIC: 4.97 CM (ref 4.2–5.9)
ECHO LV INTERNAL DIMENSION SYSTOLIC: 3.56 CM
ECHO LV IVSD: 1.38 CM (ref 0.6–1)
ECHO LV MASS 2D: 279.8 G (ref 88–224)
ECHO LV MASS INDEX 2D: 132.4 G/M2 (ref 49–115)
ECHO LV POSTERIOR WALL DIASTOLIC: 1.36 CM (ref 0.6–1)
ECHO LVOT CARDIAC OUTPUT: 5.76 LITER/MINUTE
ECHO LVOT DIAM: 2.28 CM
ECHO LVOT PEAK GRADIENT: 5.13 MMHG
ECHO LVOT PEAK VELOCITY: 113.22 CM/S
ECHO LVOT SV: 89.1 ML
ECHO LVOT VTI: 21.84 CM
ECHO MV A VELOCITY: 74.87 CM/S
ECHO MV E DECELERATION TIME (DT): 0.29 S
ECHO MV E VELOCITY: 58.76 CM/S
ECHO MV E/A RATIO: 0.78
ECHO MV E/E' LATERAL: 7.67
ECHO MV E/E' RATIO (AVERAGED): 9.16
ECHO MV E/E' SEPTAL: 10.64
ECHO MV REGURGITANT VTIA: 67.11 CM
ECHO PV PEAK INSTANTANEOUS GRADIENT SYSTOLIC: 1.36 MMHG
ECHO PV REGURGITANT MAX VELOCITY: 367.8 CM/S
ECHO PV REGURGITANT MAX VELOCITY: 58.24 CM/S
ECHO RV INTERNAL DIMENSION: 3.68 CM
ECHO TV MEAN GRADIENT: 17.04 MMHG
FOLATE SERPL-MCNC: 17.8 NG/ML (ref 5–21)
GLUCOSE SERPL-MCNC: 101 MG/DL (ref 65–100)
LVOT MG: 3.1 MMHG
POTASSIUM SERPL-SCNC: 4.1 MMOL/L (ref 3.5–5.1)
SODIUM SERPL-SCNC: 126 MMOL/L (ref 136–145)
VIT B12 SERPL-MCNC: 1600 PG/ML (ref 193–986)

## 2020-09-23 PROCEDURE — 74011250637 HC RX REV CODE- 250/637: Performed by: NURSE PRACTITIONER

## 2020-09-23 PROCEDURE — 94760 N-INVAS EAR/PLS OXIMETRY 1: CPT

## 2020-09-23 PROCEDURE — 80048 BASIC METABOLIC PNL TOTAL CA: CPT

## 2020-09-23 PROCEDURE — 36415 COLL VENOUS BLD VENIPUNCTURE: CPT

## 2020-09-23 PROCEDURE — 82607 VITAMIN B-12: CPT

## 2020-09-23 PROCEDURE — 82746 ASSAY OF FOLIC ACID SERUM: CPT

## 2020-09-23 PROCEDURE — 74011250636 HC RX REV CODE- 250/636: Performed by: INTERNAL MEDICINE

## 2020-09-23 PROCEDURE — 65660000000 HC RM CCU STEPDOWN

## 2020-09-23 PROCEDURE — 74011250637 HC RX REV CODE- 250/637: Performed by: STUDENT IN AN ORGANIZED HEALTH CARE EDUCATION/TRAINING PROGRAM

## 2020-09-23 PROCEDURE — 93306 TTE W/DOPPLER COMPLETE: CPT

## 2020-09-23 PROCEDURE — 74176 CT ABD & PELVIS W/O CONTRAST: CPT

## 2020-09-23 PROCEDURE — 99223 1ST HOSP IP/OBS HIGH 75: CPT | Performed by: PSYCHIATRY & NEUROLOGY

## 2020-09-23 PROCEDURE — 82533 TOTAL CORTISOL: CPT

## 2020-09-23 RX ORDER — SIMETHICONE 80 MG
80 TABLET,CHEWABLE ORAL 4 TIMES DAILY
Status: DISCONTINUED | OUTPATIENT
Start: 2020-09-23 | End: 2020-09-24 | Stop reason: HOSPADM

## 2020-09-23 RX ORDER — POLYETHYLENE GLYCOL 3350 17 G/17G
34 POWDER, FOR SOLUTION ORAL 2 TIMES DAILY
Status: DISCONTINUED | OUTPATIENT
Start: 2020-09-23 | End: 2020-09-23

## 2020-09-23 RX ORDER — FAMOTIDINE 20 MG/1
20 TABLET, FILM COATED ORAL DAILY
Status: DISCONTINUED | OUTPATIENT
Start: 2020-09-24 | End: 2020-09-24 | Stop reason: HOSPADM

## 2020-09-23 RX ORDER — BUDESONIDE 3 MG/1
6 CAPSULE, COATED PELLETS ORAL DAILY
Status: DISCONTINUED | OUTPATIENT
Start: 2020-09-24 | End: 2020-09-24 | Stop reason: HOSPADM

## 2020-09-23 RX ORDER — SODIUM CHLORIDE 9 MG/ML
100 INJECTION, SOLUTION INTRAVENOUS CONTINUOUS
Status: DISCONTINUED | OUTPATIENT
Start: 2020-09-23 | End: 2020-09-23

## 2020-09-23 RX ORDER — POLYETHYLENE GLYCOL 3350 17 G/17G
17 POWDER, FOR SOLUTION ORAL DAILY
Status: DISCONTINUED | OUTPATIENT
Start: 2020-09-24 | End: 2020-09-23

## 2020-09-23 RX ADMIN — Medication 10 ML: at 17:50

## 2020-09-23 RX ADMIN — LEVOTHYROXINE SODIUM 112 MCG: 112 TABLET ORAL at 06:25

## 2020-09-23 RX ADMIN — Medication 10 ML: at 21:22

## 2020-09-23 RX ADMIN — VITAMIN D, TAB 1000IU (100/BT) 2 TABLET: 25 TAB at 21:23

## 2020-09-23 RX ADMIN — ATORVASTATIN CALCIUM 20 MG: 20 TABLET, FILM COATED ORAL at 21:23

## 2020-09-23 RX ADMIN — PROMETHAZINE HYDROCHLORIDE 12.5 MG: 25 TABLET ORAL at 09:52

## 2020-09-23 RX ADMIN — Medication 80 MG: at 21:23

## 2020-09-23 RX ADMIN — CYANOCOBALAMIN TAB 500 MCG 1000 MCG: 500 TAB at 09:52

## 2020-09-23 RX ADMIN — METOPROLOL SUCCINATE 50 MG: 50 TABLET, EXTENDED RELEASE ORAL at 09:53

## 2020-09-23 RX ADMIN — Medication 80 MG: at 17:50

## 2020-09-23 RX ADMIN — SODIUM CHLORIDE 100 ML/HR: 900 INJECTION, SOLUTION INTRAVENOUS at 07:42

## 2020-09-23 RX ADMIN — PANTOPRAZOLE SODIUM 40 MG: 40 TABLET, DELAYED RELEASE ORAL at 09:52

## 2020-09-23 RX ADMIN — APIXABAN 2.5 MG: 2.5 TABLET, FILM COATED ORAL at 17:50

## 2020-09-23 RX ADMIN — APIXABAN 2.5 MG: 2.5 TABLET, FILM COATED ORAL at 09:53

## 2020-09-23 RX ADMIN — CLOPIDOGREL BISULFATE 75 MG: 75 TABLET ORAL at 09:53

## 2020-09-23 RX ADMIN — LOSARTAN POTASSIUM 100 MG: 100 TABLET, FILM COATED ORAL at 09:53

## 2020-09-23 RX ADMIN — Medication 10 ML: at 06:26

## 2020-09-23 NOTE — CONSULTS
Gastroenterology Consult     Referring Physician: Dr. Skyler Almeida Date: 9/23/2020     Subjective:     Chief Complaint: weakness    History of Present Illness: Amaris Fraga is a 81 y.o. male who is seen in consultation for weight loss. Patient has lost 25lbs since the beginning of August.  He just hasn't had a good appetite. His daughter brought him to the hospital because he hadn't gotten out of bed or eaten anything in days. He was admitted with hyponatremia Na 124 and is being followed by renal.  He is also being followed by neurology for possible syncopal vs seizure episode. I note he takes Eliquis and Plavix. He denies abd pain, nausea or vomiting. No heartburn, reflux or indigestion. He does have chronic, non bloody diarrhea. His last cross sectional imaging of abd was 2/24/20 and was negative for mets from prostate cancer and did not show any GI pathology. EGD/Colon by Dr. Lo Rider 9/17/18: Findings:      · Colon is floppy and redundant needing pressure and placement on the back to reach the cecum. · A 7 mm sessile asceding colon polyp was removed with a hot snare  · Another 6 mm transverse colon polyp was removed with a hot snare. · A sessile 1 cm distal transverse polyp was raised with  Ele[place] 3 cc. Snare removal was still not possible using EMR. I then removed it with a hot snare. Mild oozing was noted. 2 BS Resolution clips applied at the site. · A 1.7 cm pedunculated polyp with an eroded tip was removed with a hot snare. · Random mucosal biopsies taken. · There are small internal hemorrhoids   · Repeat colonoscopy in 1 year (not done)    Findings:      Esophagus: The esophageal mucosa in the proximal and mid is normal.   2 small GE junction ulcers with esophagitis are noted. The squamo-columnar junction is at 4 2cm where the Z-line was noted. There is a small hiatal hernia.     Stomach:    The gastric mucosa has shiny diffuse erythema  GARFIELD testing done and mucosal biopsies were obtained. 3 polyps are noted. 2 in the body and one in the fundus. All were removed with a hot snare(1 got fulgurated)  The angularis is normal.  There is some gastric bile.     Duodenum:   The bulb and post bulbar mucosa is normal in appearance. The duodenal folds are normal. Biopsied. Recommend repeat EGD in 2 months to confirm ulcer healing (not done)     FINAL PATHOLOGIC DIAGNOSIS   1. Duodenum, biopsy:   No pathologic diagnosis; no blunting of villi or increased intraepithelial lymphocytes. 2. Gastric, biopsy: Moderate chronic gastritis. Intestinal metaplasia: Present without atrophy   Immunohistochemical stain for H. pylori is negative. 3. Gastric polyps, biopsy:   Hyperplastic polyps. 4. Colon, random biopsy:   Lymphocytic colitis. 5. Ascending colon polyp, biopsy:   Tubular adenoma (two fragments). 6. Transverse colon polyp, biopsy:   Tubular adenoma (one fragment). 7. Sigmoid colon polyp, polypectomy:   Tubular adenoma (1.0 cm). 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 Calcium and PTH monitored by Dr. Sharon Guillory, renal.  Levels normalized after surgery.  Prostate cancer ([address]     seeds, then XRT, then seed again.   Dr. Lynna Cooks Stroke [facility]) 2002    TIA, Dr. Caridad Castillo, no residual effects as of 9/2018    Thyroid cancer [facility]) Jan 2015    Unspecified vitamin D deficiency      Past Surgical History:   Procedure Laterality Date   Penney Garin Dr. Corinn Riedel  COLONOSCOPY N/A 2018    COL[facility]OSCOPY performed [facility] Tequila [facility] at [facility] AM[facility]ULATORY OR    HX APPENDECTOMY      HX CHOLECYSTECTOMY      HX HEENT      tonsillectomy    HX OTHER SURGICAL      vasectomy    HX PARATHYROIDECTOMY  2015    right superior parathyroid gland removed and left superior parathyroid gland removed    HX PARTIAL THYROIDECTOMY  1/14/15    right lobectomy    HX UROLOGICAL      Seeds for prostate cancer , 4 dialations     HX UROLOGICAL  1/14/15    BLADDER NECK INCISION, Cold knife incision of bladder neck contracture, cystoscopy      Family History   Problem Relation Age of Onset    Cancer Mother         hodgkins disease    Heart Disease Father     Hypertension Father     Other Neg Hx         no hypercalcemia or kidney stones     Social History     Tobacco Use    Smoking status: Former Smoker     Years: 5.00     Last attempt to quit: 1955     Years since quittin.6    Smokeless tobacco: Never Used   Substance Use Topics    Alcohol use: Yes     Comment: occassional mixed drink or beer      Allergies   Allergen Reactions    Sulfa (Sulfonamide Antibiotics) Hives     Current Facility-Administered Medications   Medication Dose Route Frequency    0.9% sodium chloride infusion  100 mL/hr IntraVENous CONTINUOUS    [START ON 2020] polyethylene glycol (MIRALAX) packet 17 g  17 g Oral DAILY    polyethylene glycol (MIRALAX) packet 34 g  34 g Oral BID    simethicone (MYLICON) tablet 80 mg  80 mg Oral QID    acetaminophen (TYLENOL) tablet 650 mg  650 mg Oral Q6H PRN    atorvastatin (LIPITOR) tablet 20 mg  20 mg Oral QHS    cholecalciferol (VITAMIN D3) (1000 Units /25 mcg) tablet 2 Tab  2,000 Units Oral QHS    clopidogreL (PLAVIX) tablet 75 mg  75 mg Oral DAILY    cyanocobalamin (VITAMIN B12) tablet 1,000 mcg  1,000 mcg Oral DAILY    apixaban (ELIQUIS) tablet 2.5 mg  2.5 mg Oral BID    levothyroxine (SYNTHROID) tablet 112 mcg  112 mcg Oral 6am    losartan (COZAAR) tablet 100 mg  100 mg Oral DAILY    metoprolol succinate (TOPROL-XL) XL tablet 50 mg  50 mg Oral DAILY    sodium chloride (NS) flush 5-40 mL  5-40 mL IntraVENous Q8H    sodium chloride (NS) flush 5-40 mL  5-40 mL IntraVENous PRN    acetaminophen (TYLENOL) tablet 650 mg  650 mg Oral Q6H PRN    Or    acetaminophen (TYLENOL) suppository 650 mg  650 mg Rectal Q6H PRN    promethazine (PHENERGAN) tablet 12.5 mg  12.5 mg Oral Q6H PRN    Or    ondansetron (ZOFRAN) injection 4 mg  4 mg IntraVENous Q6H PRN    pantoprazole (PROTONIX) tablet 40 mg  40 mg Oral ACB        Review of Systems:  A detailed 10 organ review of systems is obtained with pertinent positives as listed in the History of Present Illness and Past Medical History. A detailed review of systems was performed as follows:  Constitutional:  +weight loss  Eyes:  No ocular sensitivity to the sun, blurred vision or double vision. ENMT:  No nose or sinus problems. Respiratory: No coughing, wheezing or sob  Cardiac:  No chest pain, exertional chest pain or palpitations  Gastrointestinal:  See history of the present illness  :   No pain with urination or hematuria  Musculoskeletal:  No arthritis or hot swollen joints. Endocrine:  No thyroid disease or diabetes  Psychiatric: No depression or feeling blue  Integumentary:  No skin rash or sensitivity to the sun. Neurologic:  See HPI  Heme-Lymphatic:  No history of anemia, no unexplained lumps or bumps      Objective:     Physical Exam:  Visit Vitals  /66   Pulse 79   Temp 98.2 °F (36.8 °C)   Resp 16   Ht 6' (1.829 m)   Wt 89 kg (196 lb 3.4 oz)   SpO2 95%   BMI 26.61 kg/m²        Gen: White talkative male in nad  Skin:  Extremities and face reveal no rashes. HEENT: Sclerae anicteric. Cardiovascular: Regular rate and rhythm. No murmurs, gallops, or rubs. Respiratory:  Comfortable breathing with no accessory muscle use. Clear breath sounds with no wheezes, rales, or rhonchi.   GI: Abdomen nondistended, soft, and nontender. Normal active bowel sounds. No enlargement of the liver or spleen. No masses palpable. Rectal:  Deferred  Musculoskeletal:  No pitting edema of the lower legs. Neurological:  Gross memory appears intact. Patient is alert and oriented. Psychiatric:  Mood appears appropriate with judgement intact. Lymphatic:  No cervical or supraclavicular adenopathy. Lab/Data Review:  Lab Results   Component Value Date/Time    WBC 9.6 09/22/2020 04:34 AM    HGB 13.4 09/22/2020 04:34 AM    HCT 39.3 09/22/2020 04:34 AM    PLATELET 546 79/83/5013 04:34 AM    MCV 91.4 09/22/2020 04:34 AM     Lab Results   Component Value Date/Time    Sodium 126 (L) 09/23/2020 01:35 AM    Potassium 4.1 09/23/2020 01:35 AM    Chloride 95 (L) 09/23/2020 01:35 AM    CO2 26 09/23/2020 01:35 AM    Anion gap 5 09/23/2020 01:35 AM    Glucose 101 (H) 09/23/2020 01:35 AM    BUN 17 09/23/2020 01:35 AM    Creatinine 1.63 (H) 09/23/2020 01:35 AM    BUN/Creatinine ratio 10 (L) 09/23/2020 01:35 AM    GFR est AA 49 (L) 09/23/2020 01:35 AM    GFR est non-AA 41 (L) 09/23/2020 01:35 AM    Calcium 8.8 09/23/2020 01:35 AM    Bilirubin, total 1.1 (H) 09/22/2020 04:34 AM    Alk. phosphatase 85 09/22/2020 04:34 AM    Protein, total 5.6 (L) 09/22/2020 04:34 AM    Albumin 2.8 (L) 09/22/2020 04:34 AM    Globulin 2.8 09/22/2020 04:34 AM    A-G Ratio 1.0 (L) 09/22/2020 04:34 AM    ALT (SGPT) 36 09/22/2020 04:34 AM    AST (SGOT) 26 09/22/2020 04:34 AM           Assessment/Plan:     Principal Problem:    Hyponatremia (9/22/2020)    Active Problems:    CKD (chronic kidney disease) stage 3, GFR 30-59 ml/min ([facility]) (3/27/2013)      Atrial fibrillation ([address] (9/21/2020)      Syncope (9/22/2020)         His principal problem is hyponatremia. His sodium has improved from 124 to 126 and he is being followed by nephrology. For the weight loss, we recommend starting with cross sectional imaging of abd/pelvis given his hx of prostate cancer. His daughter says his PSA has been rising. I ordered a non contrast CT given elevated Crt. We will hold off on EGD/Colon as an inpatient though this should be arranged as an outpatient and he will need to hold his Eliquis and Plavix for the procedures. His diarrhea is explained by the lymphocytic colitis which does not appear to have been treated. Stop miralax and change protonix to pepcid. We will Rx with budesonide.       Macarthur Ganser, PA  09/23/20  5:27 PM

## 2020-09-23 NOTE — CONSULTS
Neurology Note    Patient ID:  Deon Viera  [id number]  81 y.o.  1935      Date of Consultation:  September 23, 2020    Referring Physician: Dr. Marbella Jesus    Reason for Consultation:  weakness    Subjective: My stomach is bothering me. History of Present Illness:   Leonardo Townsend Sr. is a 81 y.o. male history of atrial fibrillation, chronic kidney disease, hyponatremia, prior TIA, dyslipidemia who presented to the emergency department on 9/21/2020 with significant fatigue and weakness. While in the emergency department, there was a concern of a questionable seizure however his family felt it was more of a syncopal event. He had been to the emergency room 2 other prior times in September. He did have a recent carotid ultrasound which revealed moderate stenosis of the right ICA. He was seen by Dr. Pierre Sterling during that hospitalization. Upon this admission, he was found to be hyponatremic with a sodium of 124 upon admission. A syncopal event in the ER was felt to be most likely related to hypotension. The patient has been seen by neurosurgery during the hospitalization. They recommended seeing him in the outpatient setting once he recovered from his acute medical conditions. Upon my visit, the patient was very frustrated with the extent of testing and people who have come in the room. He feels discomfort in his abdomen. Reportedly, yesterday was a good day for him and he did walk around the room well with therapy. He states this morning, he did walk to the restroom with his walker without any difficulty. He denies any new numbness, tingling, or weakness this a.m.       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 Calcium and PTH monitored by Dr. Brook Viveros, renal.  Levels normalized after surgery.  Prostate cancer ([facility] [address]     seeds, then XRT, then seed again. Dr. Tae Hein Stroke [facility])     TIA, Dr. Ruddy Hess, no residual effects as of 2018    Thyroid cancer [facility]) 2015    Unspecified vitamin D deficiency         Past Surgical History:   Procedure Laterality Date   Anil Gamboa Signs    COLONOSCOPY N/A 2018    COLONOSCOPY performed by [facility] H[facility] at [facility] AMBULATORY OR    HX APPENDECTOMY      HX CHOLECYSTECTOMY      HX HEENT      tonsillectomy    HX OTHER SURGICAL      vasectomy    HX PARATHYROIDECTOMY  2015    right superior parathyroid gland removed and left superior parathyroid gland removed    HX PARTIAL THYROIDECTOMY  1/14/15    right lobectomy    HX UROLOGICAL      Seeds for prostate cancer , 4 dialations     HX UROLOGICAL  1/14/15    BLADDER NECK INCISION, Cold knife incision of bladder neck contracture, cystoscopy        Family History   Problem Relation Age of Onset    Cancer Mother         hodgkins disease    Heart Disease Father     Hypertension Father     Other Neg Hx         no hypercalcemia or kidney stones        Social History     Tobacco Use    Smoking status: Former Smoker     Years: 5.00     Last attempt to quit: 1955     Years since quittin.6    Smokeless tobacco: Never Used   Substance Use Topics    Alcohol use: Yes     Comment: occassional mixed drink or beer        Allergies   Allergen Reactions    Sulfa (Sulfonamide Antibiotics) Hives        Prior to Admission medications    Medication Sig Start Date End Date Taking? Authorizing Provider   losartan (COZAAR) 100 mg tablet Take 100 mg by mouth daily.  20 Yes Provider, Historical   levothyroxine (SYNTHROID) 112 mcg tablet TAKE 1 TABLET BY MOUTH  DAILY BEFORE BREAKFAST 12/5/19  Yes Luciano Sargent MD   clopidogrel (PLAVIX) 75 mg tab TAKE 1 TABLET BY MOUTH  DAILY 7/22/19  Yes Luciano Sargent MD   atorvastatin (LIPITOR) 20 mg tablet Take 1 Tab by mouth daily. Patient taking differently: Take 20 mg by mouth nightly. 7/22/19  Yes Luciano Sargent MD   cyanocobalamin (VITAMIN B-12) 1,000 mcg tablet Take 1 Tab by mouth daily. 10/22/18  Yes Arcelia Leavitt MD   metoprolol succinate (TOPROL-XL) 50 mg XL tablet Take 50 mg by mouth daily. Yes Provider, Historical   ELIQUIS 2.5 mg tablet  5/3/18  Yes Provider, Historical   acetaminophen (TYLENOL EXTRA STRENGTH) 500 mg tablet Take  by mouth every six (6) hours as needed for Pain. Yes Provider, Historical   cholecalciferol, vitamin D3, (VITAMIN D3) 2,000 unit tab Take  by mouth nightly. Yes Provider, Historical   docusate sodium (COLACE) 100 mg capsule Take 100 mg by mouth two (2) times a day.     Provider, Historical     Current Facility-Administered Medications   Medication Dose Route Frequency    0.9% sodium chloride infusion  100 mL/hr IntraVENous CONTINUOUS    acetaminophen (TYLENOL) tablet 650 mg  650 mg Oral Q6H PRN    atorvastatin (LIPITOR) tablet 20 mg  20 mg Oral QHS    cholecalciferol (VITAMIN D3) (1000 Units /25 mcg) tablet 2 Tab  2,000 Units Oral QHS    clopidogreL (PLAVIX) tablet 75 mg  75 mg Oral DAILY    cyanocobalamin (VITAMIN B12) tablet 1,000 mcg  1,000 mcg Oral DAILY    apixaban (ELIQUIS) tablet 2.5 mg  2.5 mg Oral BID    levothyroxine (SYNTHROID) tablet 112 mcg  112 mcg Oral 6am    losartan (COZAAR) tablet 100 mg  100 mg Oral DAILY    metoprolol succinate (TOPROL-XL) XL tablet 50 mg  50 mg Oral DAILY    sodium chloride (NS) flush 5-40 mL  5-40 mL IntraVENous Q8H    sodium chloride (NS) flush 5-40 mL  5-40 mL IntraVENous PRN    acetaminophen (TYLENOL) tablet 650 mg  650 mg Oral Q6H PRN    Or    acetaminophen (TYLENOL) suppository 650 mg  650 mg Rectal Q6H PRN    polyethylene glycol (MIRALAX) packet 17 g  17 g Oral DAILY PRN    promethazine (PHENERGAN) tablet 12.5 mg  12.5 mg Oral Q6H PRN    Or    ondansetron (ZOFRAN) injection 4 mg  4 mg IntraVENous Q6H PRN    pantoprazole (PROTONIX) tablet 40 mg  40 mg Oral ACB         Review of Systems:    General, constitutional: negative  Eyes, vision: negative  Ears, nose, throat: negative  Cardiovascular, heart: negative  Respiratory: negative  Gastrointestinal: Abdominal discomfort  Genitourinary: negative  Musculoskeletal: negative  Skin and integumentary: negative  Psychiatric: negative  Endocrine: negative  Neurological: negative, except for HPI  Hematologic/lymphatic: negative  Allergy/immunology: negative    Objective:     Visit Vitals  BP (!) 165/86 (BP 1 Location: Left arm, BP Patient Position: At rest)   Pulse 70   Temp 97.4 °F (36.3 °C)   Resp 18   Ht 6' (1.829 m)   Wt 196 lb 3.4 oz (89 kg)   SpO2 98%   BMI 26.61 kg/m²       Physical Exam:      General:  appears well nourished in no acute distress, but in abd discomfort  Neck: no carotid bruits  Lungs: clear to auscultation  Heart:  no murmurs, regular rate  Lower extremity: peripheral pulses palpable and no edema  Skin: intact    Neurological exam:    Awake, alert, oriented to person, place and time  Recent and remote memory were normal  Attention and concentration were intact  Language was intact. There was no aphasia  Speech: no dysarthria  Fund of knowledge was preserved    Cranial nerves:   II-XII were tested    Perrrla  Fundoscopic examination revealed venous pulsations and no clear abnormalities  Visual fields were full  Eomi, no evidence of nystagmus  Facial sensation:  normal and symmetric  Facial motor: normal and symmetric  Hearing intact  SCM strength intact  Tongue: midline without fasciculations    Motor:    Tone normal    No evidence of fasciculations    Strength testing:   deltoid triceps biceps Wrist ext. Wrist flex. intrinsics Hip flex. Hip ext. Knee ext. Knee flex Dorsi flex Plantar flex   Right 4 5 4 5 5 5 5 5 5 5 5 5   Left 4 5 5 5 5 5 5 5 5 5 5 5         Sensory:  Upper extremity: intact to pp,  Lower extremity: intact to pp    Reflexes:    Right Left  Biceps  1 1  Triceps 1 1  Brachiorad. 1 1  Patella  1 1  Achilles - -    Plantar response:  flexor bilaterally      Cerebellar testing:  no tremor apparent, finger/nose and suellen were intact    Gait: not assessed due to abd discomfort      Labs:     Lab Results   Component Value Date/Time    Hemoglobin A1c 5.1 10/18/2018 04:34 AM    Sodium 126 (L) 09/23/2020 01:35 AM    Potassium 4.1 09/23/2020 01:35 AM    Chloride 95 (L) 09/23/2020 01:35 AM    Glucose 101 (H) 09/23/2020 01:35 AM    BUN 17 09/23/2020 01:35 AM    Creatinine 1.63 (H) 09/23/2020 01:35 AM    Calcium 8.8 09/23/2020 01:35 AM    WBC 9.6 09/22/2020 04:34 AM    HCT 39.3 09/22/2020 04:34 AM    HGB 13.4 09/22/2020 04:34 AM    PLATELET 937 06/70/7483 04:34 AM     Vitamin b12 1600    Imaging:    Results from Hospital Encounter encounter on 09/10/20   MRI CERV SPINE WO CONT    Narrative EXAM:  MRI CERV SPINE WO CONT    INDICATION:   neck pain, muscle weakness    COMPARISON: MRI cervical spine 10/19/2018. TECHNIQUE: Multiplanar multisequence acquisition without contrast of the  cervical spine. CONTRAST: None. FINDINGS:  Evaluation is significantly limited by motion. Grade 1 anterolisthesis of C7 on T1 measuring 2 mm. Vertebral body heights are  maintained without evidence of acute fracture. There are mild degenerative  endplate marrow changes noted at multiple levels throughout the cervical spine,  most prominent at C6-C7, though without significant marrow edema. Marrow signal  is otherwise within normal limits.  Multilevel degenerative disc disease and  facet arthropathy as detailed below, overall not significantly changed since  prior exam. The cervical cord is normal in size and signal. Incidental  perineural cyst is noted within the right T3-T4 foramen. Region of the foramen  magnum is unremarkable. Visualized soft tissues are unremarkable. C2-C3: Severe left and mild right facet arthropathy. Small central disc  protrusion. No significant spinal canal or neural foraminal stenosis. C3-C4: Diffuse disc osteophyte complex with bilateral uncovertebral spurring. Moderate to severe spinal canal stenosis. Severe left and moderate right neural  foraminal stenosis. C4-C5: Diffuse disc osteophyte complex with bilateral uncovertebral spurring. Moderate to severe spinal canal stenosis. Severe bilateral neural foraminal  stenosis. C5-C6: Diffuse disc osteophyte complex with bilateral uncovertebral spurring. Moderate spinal canal stenosis. Severe bilateral neural foraminal stenosis. C6-C7: Diffuse disc osteophyte complex with bilateral uncovertebral spurring. Mild spinal canal stenosis. Severe bilateral neural foraminal stenosis. C7-T1: Grade 1 anterolisthesis with uncovering of the disc. Severe bilateral  facet arthropathy. No significant spinal canal stenosis. No significant neural  foraminal stenosis. Impression IMPRESSION:    Evaluation is significantly limited by motion. 1. Moderate to severe spinal canal stenosis and severe bilateral neural  foraminal stenosis at C4-C5. 2. Moderate to severe spinal canal stenosis, severe left and moderate right  neural foraminal stenosis at C3-C4. 3. Moderate spinal canal stenosis and severe bilateral neural foraminal stenosis  at C5-C6. 4. Mild spinal canal stenosis and severe bilateral neural foraminal stenosis at  C6-C7. 5. Remaining degenerative changes as detailed above, overall not significantly  changed since 2018. Results from [place] encounter on 09/21/20   CT HEAD WO CONT    Narrative INDICATION: possible seizure    EXAM: CT HEAD without contrast.    TECHNIQUE: Unenhanced CT Head is performed. CT dose reduction was achieved  through use of a standardized protocol tailored for this examination and  automatic exposure control for dose modulation. FINDINGS: Brain parenchyma shows no CT apparent ischemia. There is no apparent  mass on unenhanced imaging. There is no bleed, shift, obstructive hydrocephalus  or significant extra-axial fluid collection. Bone windows are unremarkable. Impression IMPRESSION: No intracranial disease apparent on unenhanced Head CT. I did independently review the head ct from 9/21/2020 which revealed mild atrophy with no acute abnormalities. Ck: 129    Assessment and Plan:    The patient is a pleasant 81year old gentleman with multiple medical conditions who was admitted with generalized weakness, syncopal event in the ER. His neurological examination does reveal mild proximal weakness in her upper extremity (R>L) related to his cervical spine disease      Generalized weakness:   Improved since admission. Most likely multifactorial (electrolyte disturbance, dehydration, underlying cervical spine disease). Improving. Continue therapy. Cervical spine disease:  Was seen by neurosurgery this am.  The patient will follow up in their clinic. Emg/ncs was scheduled for an appt with Dr. Graciela Alejo. He will work on rescheduling this appt. After discharge - it was scheduled for today. Electrolyte abnormalities:  Improving      Risk of vascular disease. He does have risk factors for stroke including afib, htn, dyslipidemia  On anti-coagulation + plavix  On statin therapy  Recent doppler results were reviewed    Syncopal event: On telemetry. Does not sound like a seizure from clinical description. Would consider additional testing, if recurs. The patient can follow up in neurology clinic after discharge.          Principal Problem:    Hyponatremia (9/22/2020)    Active Problems:    CKD (chronic kidney disease) stage 3, GFR 30-59 ml/min ([facility]) (3/27/2013)      Atrial fibrillation ([facility] [address] (9/21/2020)      Syncope (9/22/2020)                   Signed By:  Jodi Jackson DO FAAN    September 23, 2020

## 2020-09-23 NOTE — PROGRESS NOTES
Reason for Admission:  Hyponatremia                     RUR Score:          15%           Plan for utilizing home health:      Recommendations for [facility]    PCP: First and Last name:  Raisa Larios    Name of Practice:    Are you a current patient: Yes/No: Yes    Approximate date of last visit: Jan 2020   Can you participate in a virtual visit with your PCP: Yes, with family assistance                     Current Advanced Directive/Advance Care Plan: DNR-Daughter: Fran Hurd                          Transition of Care Plan:                      CM completed assessment with pt's daughter: Fran Hurd, in regards to pt's d/c needs and plans. Pt lives in a one story home with Fran Hurd and family. Pt is known to be active with PCP: see Jan 2020, and uses [facility] (360). Pt is known to usually be independent with ADLs, an drives, until 6 day prior to admission to [address] [place] [address] (per Fran Vickey). Fran Hurd reported no HHC and SNF. Fran Hurd reported that pt has rolling walker at home. Pt's daughter: Fran Hurd is listed as medical decision maker when discussing ACP. Fran Hurd reported that when she is at work during the day, additional family assist with pt care. Fran Hurd is agreeable to [facility] services if recommended to assist with pt mobility. CM will continue to follow. Care Management Interventions  PCP Verified by CM: Yes  Mode of Transport at Discharge: Other (see comment)(family totransport)  Transition of Care Consult (CM Consult): Discharge Planning  Discharge Durable Medical Equipment: No  Physical Therapy Consult: No  Occupational Therapy Consult: No  Speech Therapy Consult: No  Current Support Network:  Other, Relative's Home(lives with daughter )  Confirm Follow Up Transport: Family  Discharge Location  Discharge Placement: [facility] [address], MSW, [address] [place] [address]

## 2020-09-23 NOTE — PROGRESS NOTES
NAME: Yamil Montiel Sr.        :  1935        MRN:  [id number]        Assessment :    Plan:  --Hyponatremia (follows with Dr. Evita Barone)  CKD stage 3  Resolved ALISSA  HTN  Hypothyroid  Afib  Syncope  Weakness  Anorexia --Sodium essie 124 on , today is 126; looks like SIADH; will give NS today and Continue on 1 liter/day fluid restriction; avoid salt tabs given his HTN    Cortisol level ok   TSH ok     [place] urine     Creatinine worse again 1.22 to 1.63       Subjective:     Chief Complaint: strength a bit better. Appetite a bit better. Constipated/bloated. We discussed the above (his daughter was present). Review of Systems:    Symptom Y/N Comments  Symptom Y/N Comments   Fever/Chills    Chest Pain     Poor Appetite    Edema     Cough    Abdominal Pain     Sputum    Joint Pain     SOB/MARVIN    Pruritis/Rash     Nausea/vomit    Tolerating PT/OT     Diarrhea    Tolerating Diet     Constipation    Other       Could not obtain due to:      Objective:     VITALS:   Last 24hrs VS reviewed since prior progress note.  Most recent are:  Visit Vitals  BP (!) 161/82   Pulse (!) 56   Temp 97.8 °F (36.6 °C)   Resp 18   Ht 6' (1.829 m)   Wt 89 kg (196 lb 3.4 oz)   SpO2 96%   BMI 26.61 kg/m²       Intake/Output Summary (Last 24 hours) at 2020 0710  Last data filed at 2020 1400  Gross per 24 hour   Intake 400 ml   Output 300 ml   Net 100 ml      Telemetry Reviewed:     PHYSICAL EXAM:  General: NAD      Lab Data Reviewed: (see below)    Medications Reviewed: (see below)    PMH/SH reviewed - no change compared to H&P  ________________________________________________________________________  Care Plan discussed with:  Patient     Family      RN     Care Manager                    Consultant:          Comments   >50% of visit spent in counseling and coordination of care ________________________________________________________________________  Cliff Avendano MD     Procedures: see electronic medical records for all procedures/Xrays and details which  were not copied into this note but were reviewed prior to creation of Plan. LABS:  Recent Labs     09/22/20  0434 09/21/20  1804   WBC 9.6 11.2*   HGB 13.4 16.6   HCT 39.3 47.0    435*     Recent Labs     09/23/20  0135 09/22/20  0434 09/21/20  1804   * 127* 124*   K 4.1 5.1 4.5   CL 95* 96* 90*   CO2 26 26 27   BUN 17 17 17   CREA 1.63* 1.22 1.63*   * 86 104*   CA 8.8 8.3* 9.0   MG  --  2.0  --      Recent Labs     09/22/20  0434 09/21/20  1804   AP 85 116   TP 5.6* 7.3   ALB 2.8* 3.7   GLOB 2.8 3.6     No results for input(s): INR, PTP, APTT, INREXT in the last 72 hours. No results for input(s): FE, TIBC, PSAT, FERR in the last 72 hours. Lab Results   Component Value Date/Time    Folate 8.4 11/23/2018 12:00 PM      No results for input(s): PH, PCO2, PO2 in the last 72 hours. No results for input(s): CPK, CKMB in the last 72 hours.     No lab exists for component: TROPONINI  No components found for: [place]  Lab Results   Component Value Date/Time    Color YELLOW/STRAW 09/22/2020 12:56 AM    Appearance CLEAR 09/22/2020 12:56 AM    Specific gravity 1.014 09/22/2020 12:56 AM    pH (UA) 6.5 09/22/2020 12:56 AM    Protein Negative 09/22/2020 12:56 AM    Glucose Negative 09/22/2020 12:56 AM    Ketone TRACE (A) 09/22/2020 12:56 AM    Bilirubin Negative 09/22/2020 12:56 AM    Urobilinogen 1.0 09/22/2020 12:56 AM    Nitrites Negative 09/22/2020 12:56 AM    Leukocyte Esterase Negative 09/22/2020 12:56 AM    Epithelial cells FEW 09/22/2020 12:56 AM    Bacteria 1+ (A) 09/22/2020 12:56 AM    WBC 0-4 09/22/2020 12:56 AM    RBC 0-5 09/22/2020 12:56 AM       MEDICATIONS:  Current Facility-Administered Medications   Medication Dose Route Frequency    acetaminophen (TYLENOL) tablet 650 mg  650 mg Oral Q6H PRN    atorvastatin (LIPITOR) tablet 20 mg  20 mg Oral QHS    cholecalciferol (VITAMIN D3) (1000 Units /25 mcg) tablet 2 Tab  2,000 Units Oral QHS    clopidogreL (PLAVIX) tablet 75 mg  75 mg Oral DAILY    cyanocobalamin (VITAMIN B12) tablet 1,000 mcg  1,000 mcg Oral DAILY    apixaban (ELIQUIS) tablet 2.5 mg  2.5 mg Oral BID    levothyroxine (SYNTHROID) tablet 112 mcg  112 mcg Oral 6am    losartan (COZAAR) tablet 100 mg  100 mg Oral DAILY    metoprolol succinate (TOPROL-XL) XL tablet 50 mg  50 mg Oral DAILY    sodium chloride (NS) flush 5-40 mL  5-40 mL IntraVENous Q8H    sodium chloride (NS) flush 5-40 mL  5-40 mL IntraVENous PRN    acetaminophen (TYLENOL) tablet 650 mg  650 mg Oral Q6H PRN    Or    acetaminophen (TYLENOL) suppository 650 mg  650 mg Rectal Q6H PRN    polyethylene glycol (MIRALAX) packet 17 g  17 g Oral DAILY PRN    promethazine (PHENERGAN) tablet 12.5 mg  12.5 mg Oral Q6H PRN    Or    ondansetron (ZOFRAN) injection 4 mg  4 mg IntraVENous Q6H PRN    pantoprazole (PROTONIX) tablet 40 mg  40 mg Oral ACB

## 2020-09-23 NOTE — CONSULTS
Cardiology Consult Note    CC: weakness    PCP:Marlen Daley MD  Reason for consult: syncope   Requesting MD:  Dr. Sherif Arenas. Admit Date: 9/21/2020   Today's Date: 9/23/2020   Cardiologist:  Dr Leonila Odom. Cardiac Assessment/Plan:   1) chronic MARVIN/intermittent non-cardiac chest pain (min CAD @ cath 2011); Neg MPIs (inc 2/2018). 2) ASx PAFib 2/2018 @ MPI: Eliquis started; spont back to NSR.  3) HTN,   4) CKD III: Cr 1.6/GFR40 2016; Cr 1.3/GFR 51 12/2019. (Dr. Clifton Lambert). 5) remote TIA/carotid PVD: on chronic plavix, Carotid f/u per PCP. 6) Dyslipidemia,   7) GERD (Dr Luc Sullivan). 8) bladder surgery; parathyroidectomy/partial thyroidectomy 2014. 9) DJD (C-spine stenosis; ? L-spinal stenosis: no surgery needed in 2018): chronic tremors (Dr Rafael Brewster). 10) polypectomy 2018. 11) Prostate CA, increasing PSA 2020 (Dr. Maulik Tanner). Admitted with fatigue, poor PO intake, hypoNa: Transient LOC in ER waiting room (sitting in wheelchair);  w/ BP 92.  Neg orthostatics after IVFs. No CP/dyspnea; compliant w/ AC (PE unlikely). Rec: cont monitor on tele; Event monitor on d/c (my office will arrange); Cont monitor orthostatics. HypoNa/CKD: per Dr Freddie Cross et al.  Other issues/Dispo: per primary team.     Hospital Problem List:  Principal Problem:   Hyponatremia (9/22/2020)  CKD (chronic kidney disease) stage 3, GFR 30-59 ml/min ([facility]) (3/27/2013)  Atrial fibrillation ([address] (9/21/2020)  Syncope (9/22/2020)   ____________________________________________________________________  Desi Merck. is a 81 y.o. male presents with Hyponatremia [E87.1]  Syncope [R55]. As noted in ER: \"81 y.o. male  presents to the ED with cc of fatigue, weight loss, loss of appetite, and lethargy. Patient has basically been laying in bed for the past 5 to 6 days. Family states he has not been eating or drinking anything. They state he is really not even left the bed. Patient states he has been feeling very tired and lightheaded.   In the emergency department waiting room the patient had an event which the  said look like a seizure but family described it as him just plain his head back and his eyes rolling into the back of his head. May have been more just syncope. He was more fatigued and lethargic after the event and felt more weak and lightheaded. He has not had any chest pain or shortness of breath. He has not had vomiting or diarrhea. He does have a history of stage IV chronic kidney disease. \"    Cervical stenosis: \"no change vs 2018; no acute neurosurgical needs\" per neurosurgery. HypoNa: c/w SIADH per Dr Laytno Constable: being Rxed.    ______________________________________________________________________    The patient reports no chest pain/pressure; no dyspnea; No palpitations. No PND, orthopnea, palpitations, peripheral edema. No current complaints. His daughter was with him in ER: feeling weak, sitting in wheelchair; sudden deep breath then slumped forward; diaphoretic; his son kept him sitting upright; LOC approx 1 min per daughter; more alert w/ ER MD; SBP reportedly 92. He doesn't recall event. ECG (9/21/20): sinus la @ 59; first deg AVB, o/w unremarkable. Tele: sinus; PACs. CXR: \"No Acute Disease\"  Notable labs: Hg 13.4; Na 126 from 124; Cr 1.6 (gfr 41) from 1.2 from 1.6; Nl TSH & cortisol. Echo 9/23/20;   · LV: Estimated LVEF is 55 - 60%. Visually measured ejection fraction. Normal cavity size and systolic function (ejection fraction normal). Moderate concentric hypertrophy. Mild (grade 1) left ventricular diastolic dysfunction. · RV: Dilated right ventricle. Borderline low systolic function. · AV: Mild to moderate aortic valve regurgitation is present. · MV: Mild mitral valve regurgitation is present.   · No PAP    _________________________________________________________  Notable prior cardiac history:  @ OV 8/3/20:   Mr Ayad Pacheco has a h/o:  1) chronic MARVIN/intermittent non-cardiac chest pain (min CAD @ cath 2011); Neg MPIs (inc 2/2018). 2) ASx PAFib 2/2018 @ MPI: Eliquis started; spont back to NSR.  3) HTN,   4) CKD III: Cr 1.6/GFR40 2016; Cr 1.3/GFR 51 12/2019. (Dr. Aaron Sauceda). 5) remote TIA/carotid PVD: on chronic plavix, Carotid f/u per PCP. 6) dyslipidemia,   7) GERD   8) bladder surgery; parathyroidectomy/partial thyroidectomy 2014. 9) DJD (C-spine stenosis; ? L-spinal stenosis: no surgery needed in 2018): chronic tremors (Dr Preeti Cartwright). 10) polypectomy 2018. 11) Prostate CA, increasing PSA 2020 (Dr. Susanna Mcgrath). 8/2018:  No recent chest pain; no change in chronic dyspnea. No falls or bleeding. EGD & colonoscopy is being planned for dysphagia. 8/2019:  No chest pain. No recent dyspnea. No falls or bleeding. Rare palpitation. He has not checked his BP at home lately. 8/2020:  No chest pain; no MARVIN per se. No palpitations. Increasing tremors; neuro follow-up recommended. IMPRESSION AND PLAN  01. Paroxysmal atrial fibrillation (I48.0):  AF of unknown duration noted during 2/2018 stress test. Spont to NSR; chronic anticoagulation recommended due to asymptomatic AFib. The patient's [id number] score = 6. ECG done   02. Hyp hrt & chr kdny dis w/o hrt fail, w stg 1-4/unsp chr kdny (I13.10): This condition is stable. I have made no changes to the present regimen. 03. Mixed hyperlipidemia (E78.2):  Lipid labs drawn by PCP. The patient is tolerating lipid lowering therapy well. 04. Shortness of breath (R06.02):  Stable; Normal MPI and unremarkable echo 12/2016; and again 2/2018. Will continue to avoid CTA with CKD. We discussed the signs and symptoms of unstable angina, myocardial infarction and malignant arrhythmia. The patient knows to seek immediate medical attention should they occur. 05. Long term current use of anticoagulant (Z79.01):  Indicated for atrial fibrillation. No apparent bleeding.   He may want to change to Eliquis alternative due to cost; names were given to him & he will call us if he wishes to change. He can hold the Eliquis prior to endoscopic procedures for 48 hr & then restart after as directed by GI MD.   06. Occlusion and stenosis of bilateral carotid arteries (P54.87): Followed by Sebastian Green/Ilya. Stable. 07. Chronic kidney disease, stage 3 (moderate) (N18.3):  Managed by: Renal.   08. Body mass index (BMI) 30.0-30.9, adult (Z68.30): The patient was instructed on AHA diet and regular exercise. ORDERS:  1 ECG done    2 Dietary management education, guidance, and counseling    3 Return office visit with Adali Esposito MD in 1 Year. 4 The patient was instructed on AHA diet and regular exercise. 8/3/20 MEDICATION LIST  Medication Sig Desc Non-VCS   atorvastatin 20 mg tablet take 1 tablet by oral route  every day N   Eliquis 2.5 mg tablet take 1 tablet by oral route 2 times every day N   losartan 100 mg tablet take 1 tablet by oral route  every day N   metoprolol succinate ER 50 mg tablet,extended release 24 hr TAKE 1 TABLET BY MOUTH  EVERY DAY N   nitroglycerin 0.4 mg Sublingual Tab place 1 tablet (0.4MG)  by sublingual route at the 1st sign of attack; may repeat every 5 min until relief; if pain persists after 3 tablets in 15 min, prompt medical attention is recommended N   Plavix 75 mg tablet take 1 tablet by oral route  every day N   Synthroid 112 mcg tablet take 1 tablet by oral route  every day Y   Vitamin B-12 ER 1,000 mcg tablet,extended release take 1 mcg by Sublingual route for 1 day Y   Vitamin D 1,000 unit Cap 2 po qd N     ________________________  CARDIAC HISTORY  CAD:  1 Atypical CP [Cath, Min CAD] - 11/2011   2 MARVIN, worse gradually. [Non-Ischemic Stress] - 12/2016   3 MARVIN/fatigue (grecia after shower). [Non-Ischemic Stress] - 2/2018     ARRHYTHMIA:  1 New Onset A Fib, CVR. ASx; Unknown duration.  [Results d/w pt today; Eliquis 2.5 bid added; Bleeding risk d/w pt.] - 2/7/2018     PVD:  1 TIA - 2002   2 Right Carotid Artery Disease [u/s followed by Sebastian Green/Stacia Caraballo.] - 10/2011     RISK FACTORS:  1 Hypertension   2 Dyslipidemia   3 Peripheral Vascular Disease   4 Tobacco Use         _______________________________________________  CARDIOVASCULAR PROCEDURES  Procedure Date Results   [place] MPI  No Ischemia, @ [facility]   Carotid Duplex 10/28/2011 Less than 15% Left ICA, 50 - 79% Right ICA, Vertebral: Bilateral Antegrade Flow, @ [facility]. Carotid Duplex  \"unchanged\" per pt. Cath 11/09/2011 EF 0.65, Minimal CAD   Echo 12/08/2016 EF 0.60 (60%), Aortic Sclerosis, Impaired Relaxation Diastolic Dysfunction, Mild AR, Est RVSP 34 mmHg, Normal RV. EKG 08/03/2020 Sinus Rhythm, First Degree AVB, Otherwise unremarkable. Head MRI  Normal   Holter 02/02/2018 Sinus Rhythm, , avg 78.  2% PAC's, some blocked. MPI 02/07/2018 EF 0.81 (81%), No Ischemia, 2:00. -CP,-ecg except ASx afib of unknown duration: Results d/w pt today; Eliquis 2.5 bid added; Bleeding risk d/w pt. MPI 12/13/2016 EF 0.84 (84%), No Ischemia, 2:10. -CP,-ecg. MPI 11/03/2011 EF 0.82, No Ischemia, No Scar,  walked 5:54; Angina with ETT - rec cath. Renal Angiogram 11/09/2011 Normal       ACTIVE ALLERGIES:  Ingredient Reaction Comment   SULFA (SULFONAMIDE ANTIBIOTICS) rash Sulfonamides       PROBLEM LIST:  Problem Description Chronic   Carotid stenosis Y   Stroke N   GERD N   Hyperlipidemia N   PVD Y   Hypertension N   Thyroid cancer N       PAST MEDICAL/SURGICAL HISTORY  (Detailed)    Disease/disorder Onset Date Surgical History Date Comments     Recurrent bladder stricture interventions       Appendectomy       Prostatectomy       Cholecystectomy       Tonsillectomy       Vasectomy       Seed Implant (2003, 2007)     ? ocular migraines       BPH       Cancer, prostate 2003      Carotid artery stenosis       CKD, stage 3       GERD       gout       hyperparathyroidism       Hypertension       Renal Insufficiency, Chronic       thyroid cancer       TIA       TIA; left side numb 2002        Family History  (Detailed)  Relationship Family Member Name  Age at Death Condition Onset Age Cause of Death   Father  N  rinaldi of arteries  N     SOCIAL HISTORY  (Detailed)  Tobacco use reviewed. Preferred language is [place]. EDUCATION/EMPLOYMENT/OCCUPATION  Employment History Status Retired Restrictions    [employer]  retired      [employer]  retired      [employer]  retired       MARITAL STATUS/FAMILY/SOCIAL SUPPORT  Currently . Has children:  Smoking status: Former smoker. SMOKING STATUS  Use Status Type Smoking Status Usage Per Day Years Used Total Pack Years   yes  Former smoker            ALCOHOL  There is a history of alcohol use. consumed Occasional.    CAFFEINE  The patient uses caffeine: tea and coffee. .  ______________________________________________________________________  Patient Active Problem List    Diagnosis Date Noted    Hyponatremia 2020    Syncope 2020    Atrial fibrillation ([facility] 2020    Benign essential tremor syndrome 2020    Cervical myelopathy with cervical radiculopathy 2020    Diabetic peripheral neuropathy associated with type 2 diabetes mellitus ([facility] 2020    Vitamin D deficiency 2020    B12 deficiency 2020    Disturbance of memory 2020    History of benign parathyroid tumor 2020    Bilateral carotid artery stenosis 10/18/2018    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    Transient ischemic attack involving right internal carotid artery 10/18/2018    Degenerative cervical spinal stenosis 10/18/2018    CVA (cerebral vascular accident) ([facility] 10/17/2018    History of CVA (cerebrovascular accident) 2017    Stenosis of both internal carotid arteries 2016    History of thyroid cancer 2016    Unspecified vitamin D deficiency     Hyperlipidemia 2014    Carotid artery stenosis, asymptomatic 08/01/2014    TIA (transient ischemic attack) 03/27/2013    H/O prostate cancer 03/27/2013    HBP (high blood pressure) 03/27/2013    GERD (gastroesophageal reflux disease) 03/27/2013    CKD (chronic kidney disease) stage 3, GFR 30-59 ml/min ([address] 03/27/2013        Past Medical History:   Diagnosis Date    Carotid artery stenosis, asymptomatic 8/1/2014    Right side 50-79%     Chronic kidney disease     stage 3    GERD (gastroesophageal reflux disease)     Gout     Hiatal hernia     History of hyperparathyroidism 11/14/2016    Hyperlipidemia     Hyperparathyroidism ([address]     Hypertension     Long term (current) use of anticoagulants     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 Calcium and PTH monitored by Dr. Shaq Zuluaga, renal.  Levels normalized after surgery.  Prostate cancer ([facility]     seeds, then XRT, then seed again.   Dr. Narciso Rivera Stroke [facility]) 2002    TIA, Dr. Emmanuel Space, no residual effects as of 9/2018    Thyroid cancer [facility]) Jan 2015    Unspecified vitamin D deficiency       Past Surgical History:   Procedure Laterality Date   Bruce Roy    COLONOSCOPY N/A 9/17/2018    COLONOSCOPY performed by Crescencio Garcia MD at \[facility]\"" AMBULATORY OR    HX APPENDECTOMY      HX CHOLECYSTECTOMY      HX HEENT      tonsillectomy    HX OTHER SURGICAL      vasectomy    HX PARATHYROIDECTOMY  01/14/2015    right superior parathyroid gland removed and left superior parathyroid gland removed    HX PARTIAL THYROIDECTOMY  1/14/15    right lobectomy    HX UROLOGICAL      Seeds for prostate cancer , 4 dialations     HX UROLOGICAL  1/14/15    BLADDER NECK INCISION, Cold knife incision of bladder neck contracture, cystoscopy     Allergies   Allergen Reactions    Sulfa (Sulfonamide Antibiotics) Hives      Family History   Problem Relation Age of Onset    Cancer Mother hodgkins disease    Heart Disease Father     Hypertension Father     Other Neg Hx         no hypercalcemia or kidney stones      Social History     Socioeconomic History    Marital status:      Spouse name: Not on file    Number of children: Not on file    Years of education: Not on file    Highest education level: Not on file   Occupational History    Not on file   Social Needs    Financial resource strain: Not on file    Food insecurity     Worry: Not on file     Inability: Not on file    Transportation needs     Medical: Not on file     Non-medical: Not on file   Tobacco Use    Smoking status: Former Smoker     Years: 5.00     Last attempt to quit: 1955     Years since quittin.6    Smokeless tobacco: Never Used   Substance and Sexual Activity    Alcohol use: Yes     Comment: occassional mixed drink or beer    Drug use: No    Sexual activity: Yes     Partners: Female     Birth control/protection: None   Lifestyle    Physical activity     Days per week: Not on file     Minutes per session: Not on file    Stress: Not on file   Relationships    Social connections     Talks on phone: Not on file     Gets together: Not on file     Attends Zoroastrianism service: Not on file     Active member of club or organization: Not on file     Attends meetings of clubs or organizations: Not on file     Relationship status: Not on file    Intimate partner violence     Fear of current or ex partner: Not on file     Emotionally abused: Not on file     Physically abused: Not on file     Forced sexual activity: Not on file   Other Topics Concern    Not on file   Social History Narrative     Had 6 children and one daughter passed away in 47 in a car accident. Has one living daughter and 4 sons. 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  2017 after his wife suffered with dementia.      Current Facility-Administered Medications Medication Dose Route Frequency    0.9% sodium chloride infusion  100 mL/hr IntraVENous CONTINUOUS    [START ON 9/24/2020] polyethylene glycol (MIRALAX) packet 17 g  17 g Oral DAILY    polyethylene glycol (MIRALAX) packet 34 g  34 g Oral BID    simethicone (MYLICON) tablet 80 mg  80 mg Oral QID    acetaminophen (TYLENOL) tablet 650 mg  650 mg Oral Q6H PRN    atorvastatin (LIPITOR) tablet 20 mg  20 mg Oral QHS    cholecalciferol (VITAMIN D3) (1000 Units /25 mcg) tablet 2 Tab  2,000 Units Oral QHS    clopidogreL (PLAVIX) tablet 75 mg  75 mg Oral DAILY    cyanocobalamin (VITAMIN B12) tablet 1,000 mcg  1,000 mcg Oral DAILY    apixaban (ELIQUIS) tablet 2.5 mg  2.5 mg Oral BID    levothyroxine (SYNTHROID) tablet 112 mcg  112 mcg Oral 6am    losartan (COZAAR) tablet 100 mg  100 mg Oral DAILY    metoprolol succinate (TOPROL-XL) XL tablet 50 mg  50 mg Oral DAILY    sodium chloride (NS) flush 5-40 mL  5-40 mL IntraVENous Q8H    sodium chloride (NS) flush 5-40 mL  5-40 mL IntraVENous PRN    acetaminophen (TYLENOL) tablet 650 mg  650 mg Oral Q6H PRN    Or    acetaminophen (TYLENOL) suppository 650 mg  650 mg Rectal Q6H PRN    promethazine (PHENERGAN) tablet 12.5 mg  12.5 mg Oral Q6H PRN    Or    ondansetron (ZOFRAN) injection 4 mg  4 mg IntraVENous Q6H PRN    pantoprazole (PROTONIX) tablet 40 mg  40 mg Oral ACB        Prior to Admission Medications:  Prior to Admission medications    Medication Sig Start Date End Date Taking? Authorizing Provider   losartan (COZAAR) 100 mg tablet Take 100 mg by mouth daily. 1/19/20  Yes Provider, Historical   levothyroxine (SYNTHROID) 112 mcg tablet TAKE 1 TABLET BY MOUTH  DAILY BEFORE BREAKFAST 12/5/19  Yes Arian Rubi MD   clopidogrel (PLAVIX) 75 mg tab TAKE 1 TABLET BY MOUTH  DAILY 7/22/19  Yes Arian Rubi MD   atorvastatin (LIPITOR) 20 mg tablet Take 1 Tab by mouth daily. Patient taking differently: Take 20 mg by mouth nightly.  7/22/19  Yes Arian Rubi MD cyanocobalamin (VITAMIN B-12) 1,000 mcg tablet Take 1 Tab by mouth daily. 10/22/18  Yes Guilherme Russo MD   metoprolol succinate (TOPROL-XL) 50 mg XL tablet Take 50 mg by mouth daily. Yes Provider, Historical   ELIQUIS 2.5 mg tablet  5/3/18  Yes Provider, Historical   acetaminophen (TYLENOL EXTRA STRENGTH) 500 mg tablet Take  by mouth every six (6) hours as needed for Pain. Yes Provider, Historical   cholecalciferol, vitamin D3, (VITAMIN D3) 2,000 unit tab Take  by mouth nightly. Yes Provider, Historical   docusate sodium (COLACE) 100 mg capsule Take 100 mg by mouth two (2) times a day. Provider, Historical        Review of Symptoms: Except as noted in HPI, patient denies recent fever or chills, nausea, vomiting, diarrhea, hemoptysis, hematemesis, dysuria, myalgias, focal neurologic symptoms, ecchymosis, angioedema, odynophagia, dysphagia, sore throat, earache,rash, melena, hematochezia, depression, GERD, cold intolerance, petechia, bleeding gums,; + significant weight loss. 24 hr VS reviewed, overall VSSAF  Temp (24hrs), Av.7 °F (36.5 °C), Min:97 °F (36.1 °C), Max:98.2 °F (36.8 °C)    Patient Vitals for the past 8 hrs:   Pulse   20 1351 79   20 1345 77   20 1341 68     Patient Vitals for the past 8 hrs:   Resp   20 1341 16     Patient Vitals for the past 8 hrs:   BP   20 1407 133/66   20 1351 133/66   20 1345 134/72   20 1341 (!) 162/79     No intake or output data in the 24 hours ending 20 1646      Physical Exam (complete single organ system exam)    Cons: The patient is no distress. Appears stated age. HEENT: Normal conjunctivae and palate. No xanthelasma. Neck: Flat JVP without appreciable HJR. Resp: Normal respiratory effort with clear lungs bilaterally. CV: Regular rate and rhythm. PMI not palpated. Normal S1,S2  No gallop or rubs appreciated. Soft holosystolic murmur appreciated.   Intact carotid upstroke bilaterally without appreciated bruits. Abdominal aorta not palpated; no abdominal bruit noted. Intact pedal pulses. No peripheral edema. GI: No abd mass noted, soft; no organomegaly noted. Bowel sounds present. Muscular:  No significant kyphosis. Strength WNL for age. Ext: No cyanosis, clubbing, or stigmata of peripheral embolization. Derm: No ulcers or stasis dermatitis of lower extremities. Neuro: Alert and oriented x 3;  Grossly non-focal. Flat affect. Slow speech.     Labs:   Recent Results (from the past 24 hour(s))   OSMOLALITY, UR    Collection Time: 09/22/20  5:26 PM   Result Value Ref Range    Osmolality,urine 274 MOSM/kg H2O   POTASSIUM, UR, RANDOM    Collection Time: 09/22/20  5:26 PM   Result Value Ref Range    Potassium urine, random 16 MMOL/L   SODIUM, UR, RANDOM    Collection Time: 09/22/20  5:26 PM   Result Value Ref Range    Sodium,urine random 37 MMOL/L   OSMOLALITY, SERUM/PLASMA    Collection Time: 09/22/20  6:56 PM   Result Value Ref Range    Osmolality, serum/plasma 263 mOsm/kg H2O   CORTISOL    Collection Time: 09/23/20  1:35 AM   Result Value Ref Range    Cortisol, random 40.1 ug/dL   VITAMIN B12    Collection Time: 09/23/20  1:35 AM   Result Value Ref Range    Vitamin B12 1,600 (H) 193 - 986 pg/mL   FOLATE    Collection Time: 09/23/20  1:35 AM   Result Value Ref Range    Folate 17.8 5.0 - 95.1 ng/mL   METABOLIC PANEL, BASIC    Collection Time: 09/23/20  1:35 AM   Result Value Ref Range    Sodium 126 (L) 136 - 145 mmol/L    Potassium 4.1 3.5 - 5.1 mmol/L    Chloride 95 (L) 97 - 108 mmol/L    CO2 26 21 - 32 mmol/L    Anion gap 5 5 - 15 mmol/L    Glucose 101 (H) 65 - 100 mg/dL    BUN 17 6 - 20 MG/DL    Creatinine 1.63 (H) 0.70 - 1.30 MG/DL    BUN/Creatinine ratio 10 (L) 12 - 20      GFR est AA 49 (L) >60 ml/min/1.73m2    GFR est non-AA 41 (L) >60 ml/min/1.73m2    Calcium 8.8 8.5 - 10.1 MG/DL   ECHO ADULT COMPLETE    Collection Time: 09/23/20  3:48 PM   Result Value Ref Range    IVSd 1.38 (A) 0.6 - 1.0 cm    LVIDd 4.97 4.2 - 5.9 cm    LVIDs 3.56 cm    LVOT d 2.28 cm    LVPWd 1.36 (A) 0.6 - 1.0 cm    LVOT Cardiac Output 5.76 liter/minute    LVOT Peak Gradient 5.13 mmHg    Left Ventricular Outflow Tract Mean Gradient 3.10 mmHg    LVOT SV 89.1 mL    LVOT Peak Velocity 113.22 cm/s    LVOT VTI 21.84 cm    RVIDd 3.68 cm    Left Atrium to Aortic Root Ratio 0.92     Left Atrium to Aortic Root Ratio 0.92     Aortic Valve Area by Continuity of Peak Velocity 3.73 cm2    Aortic Valve Area by Continuity of VTI 3.83 cm2    AoV PG 61.03 mmHg    Aortic Regurgitant Pressure Half-time 0.68 s    AR Max Shyam 390.59 cm/s    AoV PG 6.13 mmHg    Aortic Valve Systolic Mean Gradient 0.15 mmHg    Aortic Valve Systolic Peak Velocity 463.73 cm/s    Aortic valve mean velocity 86.07 cm/s    AoV VTI 23.26 cm    MV A Shyam 74.87 cm/s    Mitral Valve E Wave Deceleration Time 0.29 s    MV E Shyam 58.76 cm/s    MV E/A 0.78     E/E' ratio (averaged) 9.16     E/E' lateral 7.67     E/E' septal 10.64     LV E' Lateral Velocity 7.66 cm/s    LV E' Septal Velocity 5.52 cm/s    TV MG 17.04 mmHg    Pulmonic Regurgitant End Max Velocity 367.80 cm/s    Mitral Regurgitant Velocity Time Integral 67.11 cm    Pulmonic Valve Systolic Peak Instantaneous Gradient 1.36 mmHg    Pulmonic Regurgitant End Max Velocity 58.24 cm/s    LV Mass .8 88 - 224 g    LV Mass AL Index 132.4 49 - 115 g/m2    MARITZA/BSA Pk Shyam 1.8 cm2/m2    MARITZA/BSA VTI 1.8 cm2/m2     Recent Labs     09/21/20  1804   TROIQ <0.05       Recent Labs     09/23/20  0135 09/22/20  0434 09/21/20  1804   * 127* 124*   K 4.1 5.1 4.5   CL 95* 96* 90*   CO2 26 26 27   BUN 17 17 17   CREA 1.63* 1.22 1.63*   GFRAA 49* >60 49*   * 86 104*   CA 8.8 8.3* 9.0   ALB  --  2.8* 3.7   WBC  --  9.6 11.2*   HGB  --  13.4 16.6   HCT  --  39.3 47.0   PLT  --  285 435*       Fain Cockayne, MD

## 2020-09-23 NOTE — CONSULTS
Neurosurgery Consult     Subjective:  \"I am feeling better\"       Mary Gilliam is a 81 y.o.  male who we are asked to see for cervical stenosis. Mr. Liz Wong reports an onset of global weakness over the last several weeks. He also reports occasional tingling down the right arm to the wrist. He feels a burning sensation in the right deltoid at times. He has noted trouble lifting his right arm up to brush his teeth or other overhead motions. The right shoulder discomfort is worse at night when he is lying down but it is better once he is up. He denies any neck pain or shooting pains in his extremities. He denies any numbness or tingling of the hands or feet. Over the last 4 weeks, he has started to use a rollator because he has felt generally weak and off balance but luckily he has not had any falls. He expresses very little interested in pursuing surgical intervention. In review of our practice EMR system, he previously saw Dr. Dominick Villalobos in 2018 for cervical stenosis. He was not exhibiting signs of myelopathy therefore no surgical intervention was recommended at that time. A 3 month follow up was recommended but he has not been seen since his original office visit. He was planned to see us in the clinic on 09/24/2020 due to the results of the most recent cervical spine MRI ordered by Dr. Devota Hashimoto.       Patient Active Problem List    Diagnosis Date Noted    Hyponatremia 09/22/2020    Syncope 09/22/2020    Atrial fibrillation ([facility] 09/21/2020    Benign essential tremor syndrome 09/08/2020    Cervical myelopathy with cervical radiculopathy 09/08/2020    Diabetic peripheral neuropathy associated with type 2 diabetes mellitus ([facility] 09/08/2020    Vitamin D deficiency 09/08/2020    B12 deficiency 09/08/2020    Disturbance of memory 09/08/2020    History of benign parathyroid tumor 03/26/2020    Bilateral carotid artery stenosis 10/18/2018    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    Transient ischemic attack involving right internal carotid artery 10/18/2018    Degenerative cervical spinal stenosis 10/18/2018    CVA (cerebral vascular accident) ([address] 10/17/2018    History of CVA (cerebrovascular accident) 08/28/2017    Stenosis of both internal carotid arteries 11/22/2016    History of thyroid cancer 11/14/2016    Unspecified vitamin D deficiency     Hyperlipidemia 08/01/2014    Carotid artery stenosis, asymptomatic 08/01/2014    TIA (transient ischemic attack) 03/27/2013    H/O prostate cancer 03/27/2013    HBP (high blood pressure) 03/27/2013    GERD (gastroesophageal reflux disease) 03/27/2013    CKD (chronic kidney disease) stage 3, GFR 30-59 ml/min ([address] 03/27/2013         Arthur Barnes MD       Past Medical History:   Diagnosis Date    Carotid artery stenosis, asymptomatic 8/1/2014    Right side 50-79%     Chronic kidney disease     stage 3    GERD (gastroesophageal reflux disease)     Gout     Hiatal hernia     History of hyperparathyroidism 11/14/2016    Hyperlipidemia     Hyperparathyroidism ([address]     Hypertension     Long term (current) use of anticoagulants     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 Calcium and PTH monitored by Dr. Luis Antonio Parikh, renal.  Levels normalized after surgery.  Prostate cancer ([address]     seeds, then XRT, then seed again.   Dr. Dot Lopez Stroke [facility]) 2002    TIA, Dr. Isidro Murrieta, no residual effects as of 9/2018    Thyroid cancer [facility]) Jan 2015    Unspecified vitamin D deficiency            Past Surgical History:   Procedure Laterality Date   Jacquie Galvan    COLONOSCOPY N/A 9/17/2018    COLONOS[facility]    HX APPENDECTOMY      HX CHOLECYSTECTOMY      HX HEENT      tonsillectomy    HX OTHER SURGICAL      vasectomy    HX PARATHYROIDECTOMY 01/14/2015    right superior parathyroid gland removed and left superior parathyroid gland removed    HX PARTIAL THYROIDECTOMY  1/14/15    right lobectomy    HX UROLOGICAL      Seeds for prostate cancer , 4 dialations     HX UROLOGICAL  1/14/15    BLADDER NECK INCISION, Cold knife incision of bladder neck contracture, cystoscopy          Allergies   Allergen Reactions    Sulfa (Sulfonamide Antibiotics) Hives           Family History   Problem Relation Age of Onset    Cancer Mother         hodgkins disease    Heart Disease Father     Hypertension Father     Other Neg Hx         no hypercalcemia or kidney stones        Current Facility-Administered Medications   Medication Dose Route Frequency    0.9% sodium chloride infusion  100 mL/hr IntraVENous CONTINUOUS    acetaminophen (TYLENOL) tablet 650 mg  650 mg Oral Q6H PRN    atorvastatin (LIPITOR) tablet 20 mg  20 mg Oral QHS    cholecalciferol (VITAMIN D3) (1000 Units /25 mcg) tablet 2 Tab  2,000 Units Oral QHS    clopidogreL (PLAVIX) tablet 75 mg  75 mg Oral DAILY    cyanocobalamin (VITAMIN B12) tablet 1,000 mcg  1,000 mcg Oral DAILY    apixaban (ELIQUIS) tablet 2.5 mg  2.5 mg Oral BID    levothyroxine (SYNTHROID) tablet 112 mcg  112 mcg Oral 6am    losartan (COZAAR) tablet 100 mg  100 mg Oral DAILY    metoprolol succinate (TOPROL-XL) XL tablet 50 mg  50 mg Oral DAILY    sodium chloride (NS) flush 5-40 mL  5-40 mL IntraVENous Q8H    sodium chloride (NS) flush 5-40 mL  5-40 mL IntraVENous PRN    acetaminophen (TYLENOL) tablet 650 mg  650 mg Oral Q6H PRN    Or    acetaminophen (TYLENOL) suppository 650 mg  650 mg Rectal Q6H PRN    polyethylene glycol (MIRALAX) packet 17 g  17 g Oral DAILY PRN    promethazine (PHENERGAN) tablet 12.5 mg  12.5 mg Oral Q6H PRN    Or    ondansetron (ZOFRAN) injection 4 mg  4 mg IntraVENous Q6H PRN    pantoprazole (PROTONIX) tablet 40 mg  40 mg Oral ACB         Review of Systems: A comprehensive review of systems was negative except for that written in the HPI. Objective:     Patient Vitals for the past 8 hrs:   BP Temp Pulse Resp SpO2   09/23/20 0816 (!) 165/86 97.4 °F (36.3 °C) 70 18 98 %   09/23/20 0411 (!) 161/82 97.8 °F (36.6 °C) (!) 56 18 96 %     General  Mental Status - Alert, sitting up in bed   General Appearance - Well groomed, Not Sickly. Orientation - Oriented to time, Oriented to place and Oriented to person. Build & Nutrition - Well nourished and Well developed. Head and Neck  Head - normocephalic, atraumatic with no lesions or palpable masses. Face  Global Assessment - atraumatic. Neck  Global Assessment - supple, no decreased range of motion, Lhermitte's sign not present. Neurologic  Mental Status  Affect - normal and appropriate. Speech - Normal. Cognitive function - appropriate fund of knowledge, No impairment of attention, No Impairment of concentration, No impairment of long term memory, No impairment of short term memory. Sensory - intact to light touch, pinprick, and proprioception. Motor  Bulk and Contour - Normal. Tone - Normal. Strength - 5/5 normal muscle strength - All Muscles EXCEPT RIGHT DELTOID 4/5   Slight resting tremor noted in upper and lower extremities  General Assessment of Reflexes  Normal - No Clonus, Fragoso's reflex not present. Reflexes (Dermatomes)  1/2 upper extremities 2/2 lower extremitiesl. Coordination - deferred       Musculoskeletal  Global Assessment  Gait and Station - normal posture, gait deferred  Spine/Ribs/Pelvis  Cervical Spine - Examination of the cervical spine reveals - no tenderness to palpation, no pain and normal posture.         Assessment:     Hospital Problems  Date Reviewed: 9/21/2020          Codes Class Noted POA    * (Principal) Hyponatremia ICD-10-CM: E87.1  ICD-9-CM: 276.1  9/22/2020 Unknown        Syncope ICD-10-CM: R55  ICD-9-CM: 780.2  9/22/2020 Unknown        Atrial fibrillation (HCC) (Chronic) ICD-10-CM: I48.91  ICD-9-CM: 427.31 9/21/2020 Unknown        CKD (chronic kidney disease) stage 3, GFR 30-59 ml/min ([facility]) ICD-10-CM: N18.3  ICD-9-CM: 585.3  3/27/2013 Yes                Plan:     1. Cervical stenosis   - Cervical MRI reviewed with Dr. Tyrone Mojica. There is no significant change compared to the previous MRI in 2018   - No acute neurosurgical needs   - Does have some right arm radicular symptoms but no hyperreflexia noted  - Recommend recovery from acute medical problems   - We will see him outpatient in follow up     2. Hyponatremia  - IVF   -Nephrology following, management as per nephrology  -Monitor renal function  -Avoid nephrotoxins    3. Atrial fibrillation  - Continue home regiment of Plavix, Eliquis and metoprolol  -Followed by Dr. Bessy Khan with patient and Dr. Tyrone Mojica. I offered to call patient's daughter to discuss plan of care but patient declined. Please call if questions or concerns.   Jacqueline Reyes [facility]

## 2020-09-23 NOTE — PROGRESS NOTES
Hospitalist Progress Note    NAME: Leena Teran Sr.   :  1935   MRN:  [id number]       Assessment / Plan:  Hyponatremia POA  Appears to be a recurrent issue. Concerns for SIADH. Urine sodium is 37 but also urine osm is 274. Might be low solute intake. Normal TSH. Awaited morning cortisol levels  -Nephrology input appreciated  -stopping saline infusion  -will restrict fluids to 600 cc only  -Recheck Na level in the a.m. CKD (chronic kidney disease) stage 3  -Monitor renal function  -Avoid nephrotoxins  -Nephro input appreciated    Syncope (observed in ED)  Paroxysmal Atrial fibrillation ([facility] (2020)  Observed on monitor events of sinus arrest (breif)  -consulted cardiology for further evaluation  -echo done will follow report  -will cont. Home regimen of metoprolol, plavix and apixaban      Abdominal bloating overflow diarrhea  History of multiple sessile polyps with partial polypectomies  Weight loss ~20 pounds over 2-3 months (unintentional)  -will start on Miralax twice a day and simethicone  -For further evaluation by gastroenterology    Cervical myelopathy  Weakness (more evident of right upper limb)  -Neurology and neurosurgery were consulted and will arrange outpatient follow up       Surrogate Decision Maker: DaughterBriana Ip  [phone]           25.0 - 29.9 Overweight / Body mass index is 26.61 kg/m². Code status: DNR  Prophylaxis: on Apixaban for A.  Fib  Recommended Disposition: Home w/Family     Subjective:     Chief Complaint / Reason for Physician Visit  Complaining of bloating and inability to have a good bowel movement    Review of Systems:  Symptom Y/N Comments  Symptom Y/N Comments   Fever/Chills N   Chest Pain N    Poor Appetite    Edema     Cough N   Abdominal Pain     Sputum    Joint Pain     SOB/MARVIN N   Pruritis/Rash     Nausea/vomit N   Tolerating PT/OT     Diarrhea    Tolerating Diet N    Constipation    Other       Could NOT obtain due to:      Objective: VITALS:   Last 24hrs VS reviewed since prior progress note. Most recent are:  Patient Vitals for the past 24 hrs:   Temp Pulse Resp BP SpO2   09/23/20 1407    133/66    09/23/20 1351  79  133/66    09/23/20 1345  77  134/72    09/23/20 1341 98.2 °F (36.8 °C) 68 16 (!) 162/79 95 %   09/23/20 0816 97.4 °F (36.3 °C) 70 18 (!) 165/86 98 %   09/23/20 0411 97.8 °F (36.6 °C) (!) 56 18 (!) 161/82 96 %   09/23/20 0015 97.7 °F (36.5 °C) 60 18 132/66 94 %   09/22/20 1926 97 °F (36.1 °C) (!) 58 18 121/69 98 %     No intake or output data in the 24 hours ending 09/23/20 1722     PHYSICAL EXAM:  General: WD, WN. Alert, cooperative, no acute distress    EENT:  EOMI. Anicteric sclerae. MMM  Resp:  CTA bilaterally, no wheezing or rales. No accessory muscle use  CV:  Regular  Rhythm with ectopy,  No edema  GI:  Soft, Non distended, Non tender.  +Bowel sounds  Neurologic:  Alert and oriented X 3, normal speech,   Psych:   Good insight. Not anxious nor agitated  Skin:  No rashes.   No jaundice    Reviewed most current lab test results and cultures  YES  Reviewed most current radiology test results   YES  Review and summation of old records today    YES  Reviewed patient's current orders and MAR    YES  PMH/SH reviewed - no change compared to H&P  ________________________________________________________________________  Care Plan discussed with:    Comments   Patient Y    Family  Y    [address]     Consultant                           ________________________________________________________________________  Total NON critical care TIME:  35   Minutes    Total CRITICAL CARE TIME Spent:   Minutes non procedure based      Comments   >50% of visit spent in counseling and coordination of care     ________________________________________________________________________  Rosendo Crum MD     Procedures: see electronic medical records for all procedures/Xrays and details which were not copied into this note but were reviewed prior to creation of Plan. LABS:  I reviewed today's most current labs and imaging studies.   Pertinent labs include:  Recent Labs     09/22/20  0434 09/21/20  1804   WBC 9.6 11.2*   HGB 13.4 16.6   HCT 39.3 47.0    435*     Recent Labs     09/23/20  0135 09/22/20  0434 09/21/20  1804   * 127* 124*   K 4.1 5.1 4.5   CL 95* 96* 90*   CO2 26 26 27   * 86 104*   BUN 17 17 17   CREA 1.63* 1.22 1.63*   CA 8.8 8.3* 9.0   MG  --  2.0  --    ALB  --  2.8* 3.7   TBILI  --  1.1* 1.2*   ALT  --  36 48       Signed: Rashi Starkey MD

## 2020-09-23 NOTE — PROGRESS NOTES
Physical Therapy Note:  Attempted to se for intervention. Received by the patient and his daughter. The patient adamantly deferred activity at this time. Spoke with the patient's daughter who reports increased patient confusion and anxiety this am. Deferred and will follow up as able when he is agreeable.   Grant Colin, PT, DPT

## 2020-09-23 NOTE — PROGRESS NOTES
Bedside and Verbal shift change report given to Delvis MCPHERSON (oncoming nurse) by Rubia Lopez (offgoing nurse). Report included the following information SBAR, Kardex, MAR and Recent Results.

## 2020-09-23 NOTE — PROGRESS NOTES
Cardiology consult called. Spoke with [place] and consult will be paged to a Cardiologist in the group.

## 2020-09-23 NOTE — CONSULTS
Neurosurgery    Seen this AM - full consult to follow   Alert, sitting up in bed  Strength 5/5 BUE BLE minus right deltoid 4/5   No hyperreflexia noted   Does have some occasional right arm tingling   Denies any neck pain or radiating pain in his extremities    Tells me he just started with a rollator about 1 month ago for feeling off balance  He has not had any falls    Cervical spine MRI reviewed with Dr. Miguel Ángel Hunter which looks similar to the previous MRI done in 2018. Dr. Miguel Ángel Hunter saw him in the clinic in 2018 and he did not have any signs of myelopathy so no surgery was recommended at that time. He did recommend a 3 month close follow up but patient did not follow up since original visit. He was planned to see us tomorrow in our clinic. Recommend he recovers from acute medical issues and we will be happy to reschedule his outpatient appointment in our clinic.      Please call with questions    Linda Villegas AGACNP-BC

## 2020-09-24 VITALS
TEMPERATURE: 97.8 F | RESPIRATION RATE: 20 BRPM | OXYGEN SATURATION: 94 % | DIASTOLIC BLOOD PRESSURE: 77 MMHG | HEIGHT: 72 IN | WEIGHT: 196.21 LBS | BODY MASS INDEX: 26.58 KG/M2 | SYSTOLIC BLOOD PRESSURE: 143 MMHG | HEART RATE: 77 BPM

## 2020-09-24 LAB
ANION GAP SERPL CALC-SCNC: 8 MMOL/L (ref 5–15)
BUN SERPL-MCNC: 17 MG/DL (ref 6–20)
BUN/CREAT SERPL: 11 (ref 12–20)
CALCIUM SERPL-MCNC: 9.1 MG/DL (ref 8.5–10.1)
CHLORIDE SERPL-SCNC: 96 MMOL/L (ref 97–108)
CO2 SERPL-SCNC: 24 MMOL/L (ref 21–32)
CREAT SERPL-MCNC: 1.5 MG/DL (ref 0.7–1.3)
GLUCOSE BLD STRIP.AUTO-MCNC: 80 MG/DL (ref 65–100)
GLUCOSE BLD STRIP.AUTO-MCNC: 85 MG/DL (ref 65–100)
GLUCOSE SERPL-MCNC: 82 MG/DL (ref 65–100)
MAGNESIUM SERPL-MCNC: 2.1 MG/DL (ref 1.6–2.4)
POTASSIUM SERPL-SCNC: 4 MMOL/L (ref 3.5–5.1)
SERVICE CMNT-IMP: NORMAL
SERVICE CMNT-IMP: NORMAL
SODIUM SERPL-SCNC: 128 MMOL/L (ref 136–145)

## 2020-09-24 PROCEDURE — 74011250637 HC RX REV CODE- 250/637: Performed by: NURSE PRACTITIONER

## 2020-09-24 PROCEDURE — 80048 BASIC METABOLIC PNL TOTAL CA: CPT

## 2020-09-24 PROCEDURE — 36415 COLL VENOUS BLD VENIPUNCTURE: CPT

## 2020-09-24 PROCEDURE — 74011250637 HC RX REV CODE- 250/637: Performed by: STUDENT IN AN ORGANIZED HEALTH CARE EDUCATION/TRAINING PROGRAM

## 2020-09-24 PROCEDURE — 94760 N-INVAS EAR/PLS OXIMETRY 1: CPT

## 2020-09-24 PROCEDURE — 82962 GLUCOSE BLOOD TEST: CPT

## 2020-09-24 PROCEDURE — 74011250637 HC RX REV CODE- 250/637: Performed by: PHYSICIAN ASSISTANT

## 2020-09-24 PROCEDURE — 83735 ASSAY OF MAGNESIUM: CPT

## 2020-09-24 RX ORDER — BUDESONIDE 3 MG/1
6 CAPSULE, COATED PELLETS ORAL DAILY
Qty: 60 CAP | Refills: 0 | Status: SHIPPED | OUTPATIENT
Start: 2020-09-25 | End: 2020-10-25

## 2020-09-24 RX ADMIN — CYANOCOBALAMIN TAB 500 MCG 1000 MCG: 500 TAB at 08:40

## 2020-09-24 RX ADMIN — CLOPIDOGREL BISULFATE 75 MG: 75 TABLET ORAL at 08:40

## 2020-09-24 RX ADMIN — FAMOTIDINE 20 MG: 20 TABLET, FILM COATED ORAL at 08:40

## 2020-09-24 RX ADMIN — Medication 10 ML: at 05:09

## 2020-09-24 RX ADMIN — Medication 80 MG: at 08:40

## 2020-09-24 RX ADMIN — METOPROLOL SUCCINATE 50 MG: 50 TABLET, EXTENDED RELEASE ORAL at 08:40

## 2020-09-24 RX ADMIN — LEVOTHYROXINE SODIUM 112 MCG: 112 TABLET ORAL at 05:08

## 2020-09-24 RX ADMIN — BUDESONIDE 6 MG: 3 CAPSULE, GELATIN COATED ORAL at 10:08

## 2020-09-24 RX ADMIN — LOSARTAN POTASSIUM 100 MG: 100 TABLET, FILM COATED ORAL at 10:08

## 2020-09-24 RX ADMIN — APIXABAN 2.5 MG: 2.5 TABLET, FILM COATED ORAL at 08:39

## 2020-09-24 NOTE — PROGRESS NOTES
Cardiology Progress Note  9/24/2020     Admit Date: 9/21/2020  Admit Diagnosis: Hyponatremia [E87.1]  Syncope [R55]  CC: none currently  Cardiologist:  Dr Bladimir Roy. Cardiac Assessment/Plan:    1) chronic MARVIN/intermittent non-cardiac chest pain (min CAD @ cath 2011); Neg MPIs (inc 2/2018). 2) ASx PAFib 2/2018 @ MPI: Eliquis started; spont back to NSR.  3) HTN,   4) CKD III: Cr 1.6/GFR40 2016; Cr 1.3/GFR 51 12/2019. (Dr. Erik Haynes). 5) remote TIA/carotid PVD: on chronic plavix, Carotid f/u per PCP. 6) Dyslipidemia,   7) GERD (Dr Susan Montana). 8) bladder surgery; parathyroidectomy/partial thyroidectomy 2014. 9) DJD (C-spine stenosis; ? L-spinal stenosis: no surgery needed in 2018): chronic tremors (Dr Ida Swift). 10) polypectomy 2018. 11) Prostate CA, increasing PSA 2020 (Dr. Sumit Rowe).     Admitted with fatigue, poor PO intake, hypoNa: Transient LOC in ER waiting room (sitting in wheelchair);  w/ BP 92.  Neg orthostatics after IVFs. No CP/dyspnea; compliant w/ AC (PE unlikely).     Rec: cont monitor on tele; Event monitor on d/c (my office will arrange); Cont monitor orthostatics. HypoNa/CKD: per Dr Elvin Saldaña et al.  Other issues/Dispo: per primary team.      9/24: BPs 130-190; HR 70-80s; 97% RA. Not orthostatic yesterday. Na 128; Cr 1.5. Cardiac meds: eliquis 2.5 bid; lipitor 20; plavix; losartan 100; toprol XL 50. He apparently is being discharged. My office will set up outpt EVR; I asked him to send in BPs from home (may need to change toprol to coreg: will see). Hospital Problem List:  Principal Problem:   Hyponatremia (9/22/2020)  CKD (chronic kidney disease) stage 3, GFR 30-59 ml/min ([facility]) (3/27/2013)  Atrial fibrillation ([address] (9/21/2020)  Syncope (9/22/2020)   ____________________________________________________________________  Alnatasha Flores. is a 81 y.o. male presents with Hyponatremia [E87.1]  Syncope [R55].     As noted in ER: \"85 y. o. male  presents to the ED with cc of fatigue, weight loss, loss of appetite, and lethargy.  Patient has basically been laying in bed for the past 5 to 6 days.  Family states he has not been eating or drinking anything. Nolberto Corona state he is really not even left the bed.  Patient states he has been feeling very tired and lightheaded.  In the emergency department waiting room the patient had an event which the  said look like a seizure but family described it as him just plain his head back and his eyes rolling into the back of his head.  May have been more just syncope. Cheryl Eason was more fatigued and lethargic after the event and felt more weak and lightheaded. Cheryl Eason has not had any chest pain or shortness of breath.  He has not had vomiting or diarrhea.  He does have a history of stage IV chronic kidney disease. \"     Cervical stenosis: \"no change vs 2018; no acute neurosurgical needs\" per neurosurgery.     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     ______________________________________________________________________     The patient reports no chest pain/pressure; no dyspnea; No palpitations.     No PND, orthopnea, palpitations, peripheral edema. No current complaints.     His daughter was with him in ER: feeling weak, sitting in wheelchair; sudden deep breath then slumped forward; diaphoretic; his son kept him sitting upright; LOC approx 1 min per daughter; more alert w/ ER MD; SBP reportedly 92. He doesn't recall event.     ECG (9/21/20): sinus la @ 59; first deg AVB, o/w unremarkable. Tele: sinus; PACs. CXR: \"No Acute Disease\"  Notable labs: Hg 13.4; Na 126 from 124; Cr 1.6 (gfr 41) from 1.2 from 1.6; Nl TSH & cortisol. Echo 9/23/20;   · LV: Estimated LVEF is 55 - 60%. Visually measured ejection fraction. Normal cavity size and systolic function (ejection fraction normal). Moderate concentric hypertrophy. Mild (grade 1) left ventricular diastolic dysfunction. · RV: Dilated right ventricle.  Borderline low systolic function. · AV: Mild to moderate aortic valve regurgitation is present. · MV: Mild mitral valve regurgitation is present. · No PAP     _________________________________________________________  Notable prior cardiac history:  @ OV 8/3/20:   Mr Danitza Vieira has a h/o:  1) chronic MARVIN/intermittent non-cardiac chest pain (min CAD @ cath 2011); Neg MPIs (inc 2/2018). 2) ASx PAFib 2/2018 @ MPI: Eliquis started; spont back to NSR.  3) HTN,   4) CKD III: Cr 1.6/GFR40 2016; Cr 1.3/GFR 51 12/2019. (Dr. Lorri Jacob). 5) remote TIA/carotid PVD: on chronic plavix, Carotid f/u per PCP. 6) dyslipidemia,   7) GERD   8) bladder surgery; parathyroidectomy/partial thyroidectomy 2014. 9) DJD (C-spine stenosis; ? L-spinal stenosis: no surgery needed in 2018): chronic tremors (Dr Arnel Macdonald). 10) polypectomy 2018. 11) Prostate CA, increasing PSA 2020 (Dr. Joelle Siu).     8/2018:  No recent chest pain; no change in chronic dyspnea. No falls or bleeding. EGD & colonoscopy is being planned for dysphagia.     8/2019:  No chest pain. No recent dyspnea. No falls or bleeding. Rare palpitation. He has not checked his BP at home lately.     8/2020:  No chest pain; no MARVIN per se. No palpitations. Increasing tremors; neuro follow-up recommended.     IMPRESSION AND PLAN  01. Paroxysmal atrial fibrillation (I48.0):  AF of unknown duration noted during 2/2018 stress test. Spont to NSR; chronic anticoagulation recommended due to asymptomatic AFib. The patient's [id number] score = 6. ECG done   02. Hyp hrt & chr kdny dis w/o hrt fail, w stg 1-4/unsp chr kdny (I13.10): This condition is stable. I have made no changes to the present regimen. 03. Mixed hyperlipidemia (E78.2):  Lipid labs drawn by PCP. The patient is tolerating lipid lowering therapy well. 04. Shortness of breath (R06.02):  Stable; Normal MPI and unremarkable echo 12/2016; and again 2/2018.  Will continue to avoid CTA with CKD.     We discussed the signs and symptoms of unstable angina, myocardial infarction and malignant arrhythmia. The patient knows to seek immediate medical attention should they occur. 05. Long term current use of anticoagulant (Z79.01):  Indicated for atrial fibrillation. No apparent bleeding. He may want to change to Eliquis alternative due to cost; names were given to him & he will call us if he wishes to change. He can hold the Eliquis prior to endoscopic procedures for 48 hr & then restart after as directed by GI MD.   06. Occlusion and stenosis of bilateral carotid arteries (D13.59): Followed by Sebastian Green/Ilya. Stable. 07. Chronic kidney disease, stage 3 (moderate) (N18.3):  Managed by: Renal.   08. Body mass index (BMI) 30.0-30.9, adult (Z68.30): The patient was instructed on AHA diet and regular exercise.      ORDERS:  1 ECG done   2 Dietary management education, guidance, and counseling   3 Return office visit with Sheyla Esposito MD in 1 Year. 4 The patient was instructed on AHA diet and regular exercise.         8/3/20 MEDICATION LIST  Medication Sig Desc   atorvastatin 20 mg tablet take 1 tablet by oral route  every day   Eliquis 2.5 mg tablet take 1 tablet by oral route 2 times every day   losartan 100 mg tablet take 1 tablet by oral route  every day   metoprolol succinate ER 50 mg tablet,extended release 24 hr TAKE 1 TABLET BY MOUTH  EVERY DAY   nitroglycerin 0.4 mg Sublingual Tab place 1 tablet (0.4MG)  by sublingual route at the 1st sign of attack; may repeat every 5 min until relief; if pain persists after 3 tablets in 15 min, prompt medical attention is recommended   Plavix 75 mg tablet take 1 tablet by oral route  every day   Synthroid 112 mcg tablet take 1 tablet by oral route  every day   Vitamin B-12 ER 1,000 mcg tablet,extended release take 1 mcg by Sublingual route for 1 day   Vitamin D 1,000 unit Cap 2 po qd      ________________________  CARDIAC HISTORY  CAD:  1 Atypical CP [Cath, Min CAD] - 11/2011   2 MARVIN, worse gradually. [Non-Ischemic Stress] - 12/2016   3 MARVIN/fatigue (grecia after shower). [Non-Ischemic Stress] - 2/2018      ARRHYTHMIA:  1 New Onset A Fib, CVR. ASx; Unknown duration. [Results d/w pt today; Eliquis 2.5 bid added; Bleeding risk d/w pt.] - 2/7/2018      PVD:  1 TIA - 2002   2 Right Carotid Artery Disease [u/s followed by Sebastian Green/Stacia Caraballo.] - 10/2011      RISK FACTORS:  1 Hypertension   2 Dyslipidemia   3 Peripheral Vascular Disease   4 Tobacco Use            _______________________________________________  CARDIOVASCULAR PROCEDURES  Procedure Date Results   [place] MPI   No Ischemia, @ [facility]   Carotid Duplex 10/28/2011 Less than 15% Left ICA, 50 - 79% Right ICA, Vertebral: Bilateral Antegrade Flow, @ [facility]. Carotid Duplex   \"unchanged\" per pt. Cath 11/09/2011 EF 0.65, Minimal CAD   Echo 12/08/2016 EF 0.60 (60%), Aortic Sclerosis, Impaired Relaxation Diastolic Dysfunction, Mild AR, Est RVSP 34 mmHg, Normal RV. EKG 08/03/2020 Sinus Rhythm, First Degree AVB, Otherwise unremarkable. Head MRI   Normal   Holter 02/02/2018 Sinus Rhythm, , avg 78.  2% PAC's, some blocked. MPI 02/07/2018 EF 0.81 (81%), No Ischemia, 2:00. -CP,-ecg except ASx afib of unknown duration: Results d/w pt today; Eliquis 2.5 bid added; Bleeding risk d/w pt. MPI 12/13/2016 EF 0.84 (84%), No Ischemia, 2:10. -CP,-ecg. MPI 11/03/2011 EF 0.82, No Ischemia, No Scar,  walked 5:54; Angina with ETT - rec cath. Renal Angiogram 11/09/2011 Normal       For other plans, see orders. Hospital problem list     Active Hospital Problems    Diagnosis Date Noted    Hyponatremia 09/22/2020    Syncope 09/22/2020    Atrial fibrillation ([facility] 09/21/2020    CKD (chronic kidney disease) stage 3, GFR 30-59 ml/min ([facility]) 03/27/2013        Subjective:  Leena Teran Sr. reports       Chest pain X none  consistent with:  Non-cardiac CP         Atypical CP     None now  On going  Anginal CP     Dyspnea X none  at rest  with exertion improved  unchanged  worse              PND X none  overnight       Orthopnea X none  improved  unchanged  worse   Presyncope X none  improved  unchanged  worse     Ambulated in hallway without symptoms   Yes   Ambulated in room without symptoms  Yes   Objective:     Physical Exam:  Overall VSSAF;    Visit Vitals  BP (!) 158/80   Pulse 81   Temp 97.5 °F (36.4 °C)   Resp 20   Ht 6' (1.829 m)   Wt 89 kg (196 lb 3.4 oz)   SpO2 97%   BMI 26.61 kg/m²     Temp (24hrs), Av.9 °F (36.6 °C), Min:97.5 °F (36.4 °C), Max:98.2 °F (36.8 °C)    Patient Vitals for the past 8 hrs:   Pulse   20 0823 81   20 0330 72     Patient Vitals for the past 8 hrs:   Resp   20 0823 20   20 0330 18     Patient Vitals for the past 8 hrs:   BP   20 0906 (!) 158/80   20 0831 (!) 176/100   20 0823 (!) 195/110   20 0330 138/68       Intake/Output Summary (Last 24 hours) at 2020 1103  Last data filed at 2020 6458  Gross per 24 hour   Intake 390 ml   Output 800 ml   Net -410 ml     General Appearance: Well developed, well nourished, no acute distress. Ears/Nose/Mouth/Throat:   Normal MM; anicteric. JVP: WNL   Resp:   Lungs clear to auscultation bilaterally. Nl resp effort. Cardiovascular:  RRR, S1, S2 normal, no new murmur. No gallop or rub. Abdomen:   Soft, non-tender, bowel sounds are present. Extremities: No edema bilaterally. Skin:  Neuro: Warm and dry. A/O x3, grossly nonfocal       cath site intact w/o hematoma or new bruit; distal pulse unchanged  Yes   Data Review:     Telemetry independently reviewed x sinus  chronic afib  parox afib  NSVT     ECG independently reviewed  NSR  afib  no significant changes  NSST-Tw chgs     x no new ECG provided for review   Lab results reviewed as noted below. Current medications reviewed as noted below. No results for input(s): PH, PCO2, PO2 in the last 72 hours.   Recent Labs     20  1804   TROIQ <0.05     Recent Labs 09/24/20  0234 09/23/20  0135 09/22/20  0434 09/21/20  1804   * 126* 127* 124*   K 4.0 4.1 5.1 4.5   CL 96* 95* 96* 90*   CO2 24 26 26 27   BUN 17 17 17 17   CREA 1.50* 1.63* 1.22 1.63*   GFRAA 54* 49* >60 49*   GLU 82 101* 86 104*   CA 9.1 8.8 8.3* 9.0   ALB  --   --  2.8* 3.7   WBC  --   --  9.6 11.2*   HGB  --   --  13.4 16.6   HCT  --   --  39.3 47.0   PLT  --   --  285 435*     Lab Results   Component Value Date/Time    Cholesterol, total 174 01/28/2020 10:48 AM    HDL Cholesterol 73 01/28/2020 10:48 AM    LDL, calculated 79 01/28/2020 10:48 AM    Triglyceride 109 01/28/2020 10:48 AM    CHOL/HDL Ratio 1.7 10/18/2018 04:34 AM     Recent Labs     09/22/20  0434 09/21/20  1804   AP 85 116   TP 5.6* 7.3   ALB 2.8* 3.7   GLOB 2.8 3.6     No results for input(s): INR, PTP, APTT, INREXT in the last 72 hours.    No components found for: [place]    Current Facility-Administered Medications   Medication Dose Route Frequency    simethicone (MYLICON) tablet 80 mg  80 mg Oral QID    famotidine (PEPCID) tablet 20 mg  20 mg Oral DAILY    budesonide (ENTOCORT EC) capsule 6 mg  6 mg Oral DAILY    acetaminophen (TYLENOL) tablet 650 mg  650 mg Oral Q6H PRN    atorvastatin (LIPITOR) tablet 20 mg  20 mg Oral QHS    cholecalciferol (VITAMIN D3) (1000 Units /25 mcg) tablet 2 Tab  2,000 Units Oral QHS    clopidogreL (PLAVIX) tablet 75 mg  75 mg Oral DAILY    cyanocobalamin (VITAMIN B12) tablet 1,000 mcg  1,000 mcg Oral DAILY    apixaban (ELIQUIS) tablet 2.5 mg  2.5 mg Oral BID    levothyroxine (SYNTHROID) tablet 112 mcg  112 mcg Oral 6am    losartan (COZAAR) tablet 100 mg  100 mg Oral DAILY    metoprolol succinate (TOPROL-XL) XL tablet 50 mg  50 mg Oral DAILY    sodium chloride (NS) flush 5-40 mL  5-40 mL IntraVENous Q8H    sodium chloride (NS) flush 5-40 mL  5-40 mL IntraVENous PRN    acetaminophen (TYLENOL) tablet 650 mg  650 mg Oral Q6H PRN    Or    acetaminophen (TYLENOL) suppository 650 mg  650 mg Rectal Q6H PRN    promethazine (PHENERGAN) tablet 12.5 mg  12.5 mg Oral Q6H PRN    Or    ondansetron (ZOFRAN) injection 4 mg  4 mg IntraVENous Q6H PRN        Britta Rizo MD

## 2020-09-24 NOTE — PROGRESS NOTES
Transition of Care  Home with HH: [facility]  2nd IM received  Daughter Ross Ozuna to transport home     Pt is cleared from CM's standpoint. CM notified of Pt discharging today. Pt met with Pt and daughter Ross Ozuna . HH was discussed and they are receptive to recommendation. FOC was offered, signed, and placed on chart. [facility] Medicare pt has received, reviewed,  2nd IM letter informing them of their right to appeal the discharge. Misa Ozuna Signed copy and copy has been placed on pt bedside chart. Referral sent to [facility]. [facility] has accepted.       Rosalva Traylor, [facility], [address]

## 2020-09-24 NOTE — DISCHARGE INSTRUCTIONS
HOSPITALIST DISCHARGE INSTRUCTIONS    NAME: Maritza Kent Sr.   :  1935   MRN:  [id number]     Date/Time:  2020 11:49 AM    ADMIT DATE: 2020   DISCHARGE DATE: 2020         · It is important that you take the medication exactly as they are prescribed. · Keep your medication in the bottles provided by the pharmacist and keep a list of the medication names, dosages, and times to be taken in your wallet. · Do not take other medications without consulting your doctor. What to do at Home    Recommended diet:  Regular Diet    Recommended activity: PT/OT per Home Health      If you have questions regarding the hospital related prescriptions or hospital related issues please call [facility] at [phone]. You can always direct your questions to your primary care doctor if you are unable to reach your hospital physician; your PCP works as an extension of your hospital doctor just like your hospital doctor is an extension of your PCP for your time at the hospital [facility])    If you experience any of the following symptoms then please call your primary care physician or return to the emergency room if you cannot get hold of your doctor:    Fever, chills, nausea, vomiting, or persistent diarrhea  Worsening weakness or new problems with your speech or balance  Dark stools or visible blood in your stools  New Leg swelling or shortness of breath as these could be signs of a clot    Additional Instructions: Follow up with primary provider for follow up on sodium levels and to continue follow up on soft tissue finding over left kidney. Information obtained by :  I understand that if any problems occur once I am at home I am to contact my physician. I understand and acknowledge receipt of the instructions indicated above. Physician's or R.N.'s Signature                                                                  Date/Time                                                                                                                                              Patient or Representative Signature

## 2020-09-24 NOTE — PROGRESS NOTES
Gastroenterology Progress Note    9/24/2020    Admit Date: 9/21/2020    Subjective: Follow up for:  1) weight loss    2) diarrhea, due to lymphocytic colitis     3) admitted with hyponatremia     Patient seen with daughter, Taty Morrow at bedside. Patient had a rough night. He was confused and thought he was at home. His BP was elevated this AM.      CT Results (most recent):  Results from Hospital Encounter encounter on 09/21/20   CT ABD PELV WO CONT    Narrative EXAM: CT ABD PELV WO CONT    INDICATION: weight loss    COMPARISON: 2/24/2020    CONTRAST:  None. TECHNIQUE:   Thin axial images were obtained through the abdomen and pelvis. Coronal and  sagittal reformats were generated. Oral contrast was not administered. CT dose  reduction was achieved through use of a standardized protocol tailored for this  examination and automatic exposure control for dose modulation. The absence of intravenous contrast material reduces the sensitivity for  evaluation of the vasculature and solid organs. FINDINGS:   LOWER THORAX: Peripheral fibrosis is seen in the lung bases. Coronary  atherosclerotic calcifications are present. LIVER: No mass. BILIARY TREE: Status post cholecystectomy. CBD is not dilated. SPLEEN: within normal limits. PANCREAS: No focal abnormality. ADRENALS: Unremarkable. KIDNEYS/URETERS: There is unchanged thinning and bilateral renal cortices likely  related to chronic medical renal disease. There is a 5.6 cm cyst in the superior  pole the right kidney. There is a 6 mm hypodensity in the anterior right kidney  too small fully characterize, but likely representing a cyst as well. There is a  2.6 cm left parapelvic cyst. There is a 2.5 cm cyst in the mid left kidney with  peripheral calcification. There is adjacent lobulated tissue adjacent to this  that is unchanged. No nephrolithiasis or hydronephrosis. STOMACH: Unremarkable. SMALL BOWEL: No dilatation or wall thickening.   COLON: No dilatation or wall thickening. APPENDIX: Unremarkable  PERITONEUM: No ascites or pneumoperitoneum. RETROPERITONEUM: No lymphadenopathy or aortic aneurysm. REPRODUCTIVE ORGANS: Prostate radiation seeds are in place. URINARY BLADDER: No mass or calculus. BONES: No destructive bone lesion. ABDOMINAL WALL: No mass or hernia. ADDITIONAL COMMENTS: N/A      Impression IMPRESSION:    1. No evidence of acute process. 2. Unchanged complex appearance of the left kidney. 3. Additional incidental findings as above. Current Facility-Administered Medications   Medication Dose Route Frequency    simethicone (MYLICON) tablet 80 mg  80 mg Oral QID    famotidine (PEPCID) tablet 20 mg  20 mg Oral DAILY    budesonide (ENTOCORT EC) capsule 6 mg  6 mg Oral DAILY    acetaminophen (TYLENOL) tablet 650 mg  650 mg Oral Q6H PRN    atorvastatin (LIPITOR) tablet 20 mg  20 mg Oral QHS    cholecalciferol (VITAMIN D3) (1000 Units /25 mcg) tablet 2 Tab  2,000 Units Oral QHS    clopidogreL (PLAVIX) tablet 75 mg  75 mg Oral DAILY    cyanocobalamin (VITAMIN B12) tablet 1,000 mcg  1,000 mcg Oral DAILY    apixaban (ELIQUIS) tablet 2.5 mg  2.5 mg Oral BID    levothyroxine (SYNTHROID) tablet 112 mcg  112 mcg Oral 6am    losartan (COZAAR) tablet 100 mg  100 mg Oral DAILY    metoprolol succinate (TOPROL-XL) XL tablet 50 mg  50 mg Oral DAILY    sodium chloride (NS) flush 5-40 mL  5-40 mL IntraVENous Q8H    sodium chloride (NS) flush 5-40 mL  5-40 mL IntraVENous PRN    acetaminophen (TYLENOL) tablet 650 mg  650 mg Oral Q6H PRN    Or    acetaminophen (TYLENOL) suppository 650 mg  650 mg Rectal Q6H PRN    promethazine (PHENERGAN) tablet 12.5 mg  12.5 mg Oral Q6H PRN    Or    ondansetron (ZOFRAN) injection 4 mg  4 mg IntraVENous Q6H PRN        Objective:     Blood pressure (!) 158/80, pulse 81, temperature 97.5 °F (36.4 °C), resp.  rate 20, height 6' (1.829 m), weight 89 kg (196 lb 3.4 oz), SpO2 97 %.    09/24 0701 - 09/24 1900  In: -   Out: 550 [Urine:550]    09/22 1901 - 09/24 0700  In: 56 [P.O.:490]  Out: 12 [Urine:450]    EXAM:     Gen: obese white male, figits, no acute distress  HEENT: sclera anicteric  Heart: RRR  Lungs: CTA   Abd: obese, ND, +BS, soft, NT  Ext: no edema  Neuro: oriented x 3    Data Review    Recent Results (from the past 24 hour(s))   ECHO ADULT COMPLETE    Collection Time: 09/23/20  3:48 PM   Result Value Ref Range    IVSd 1.38 (A) 0.6 - 1.0 cm    LVIDd 4.97 4.2 - 5.9 cm    LVIDs 3.56 cm    LVOT d 2.28 cm    LVPWd 1.36 (A) 0.6 - 1.0 cm    LVOT Cardiac Output 5.76 liter/minute    LVOT Peak Gradient 5.13 mmHg    Left Ventricular Outflow Tract Mean Gradient 3.10 mmHg    LVOT SV 89.1 mL    LVOT Peak Velocity 113.22 cm/s    LVOT VTI 21.84 cm    RVIDd 3.68 cm    Left Atrium to Aortic Root Ratio 0.92     Left Atrium to Aortic Root Ratio 0.92     Aortic Valve Area by Continuity of Peak Velocity 3.73 cm2    Aortic Valve Area by Continuity of VTI 3.83 cm2    AoV PG 61.03 mmHg    Aortic Regurgitant Pressure Half-time 0.68 s    AR Max Shyam 390.59 cm/s    AoV PG 6.13 mmHg    Aortic Valve Systolic Mean Gradient 3.16 mmHg    Aortic Valve Systolic Peak Velocity 513.17 cm/s    Aortic valve mean velocity 86.07 cm/s    AoV VTI 23.26 cm    MV A Shyam 74.87 cm/s    Mitral Valve E Wave Deceleration Time 0.29 s    MV E Shyam 58.76 cm/s    MV E/A 0.78     E/E' ratio (averaged) 9.16     E/E' lateral 7.67     E/E' septal 10.64     LV E' Lateral Velocity 7.66 cm/s    LV E' Septal Velocity 5.52 cm/s    TV MG 17.04 mmHg    Pulmonic Regurgitant End Max Velocity 367.80 cm/s    Mitral Regurgitant Velocity Time Integral 67.11 cm    Pulmonic Valve Systolic Peak Instantaneous Gradient 1.36 mmHg    Pulmonic Regurgitant End Max Velocity 58.24 cm/s    LV Mass .8 88 - 224 g    LV Mass AL Index 132.4 49 - 115 g/m2    MARITZA/BSA Pk Shyam 1.8 cm2/m2    MARITZA/BSA VTI 1.8 IP3/V7   METABOLIC PANEL, BASIC    Collection Time: 09/24/20  2:34 AM   Result Value Ref Range    Sodium 128 (L) 136 - 145 mmol/L    Potassium 4.0 3.5 - 5.1 mmol/L    Chloride 96 (L) 97 - 108 mmol/L    CO2 24 21 - 32 mmol/L    Anion gap 8 5 - 15 mmol/L    Glucose 82 65 - 100 mg/dL    BUN 17 6 - 20 MG/DL    Creatinine 1.50 (H) 0.70 - 1.30 MG/DL    BUN/Creatinine ratio 11 (L) 12 - 20      GFR est AA 54 (L) >60 ml/min/1.73m2    GFR est non-AA 45 (L) >60 ml/min/1.73m2    Calcium 9.1 8.5 - 10.1 MG/DL   MAGNESIUM    Collection Time: 09/24/20  2:34 AM   Result Value Ref Range    Magnesium 2.1 1.6 - 2.4 mg/dL   GLUCOSE, POC    Collection Time: 09/24/20  7:31 AM   Result Value Ref Range    Glucose (POC) 80 65 - 100 mg/dL    Performed by Stefani Nieto ([facility])      Recent Labs     09/22/20 0434 09/21/20  1804   WBC 9.6 11.2*   HGB 13.4 16.6   HCT 39.3 47.0    435*     Recent Labs     09/24/20  0234 09/23/20  0135 09/22/20  0434   * 126* 127*   K 4.0 4.1 5.1   CL 96* 95* 96*   CO2 24 26 26   BUN 17 17 17   CREA 1.50* 1.63* 1.22   GLU 82 101* 86   CA 9.1 8.8 8.3*   MG 2.1  --  2.0     Recent Labs     09/22/20  0434 09/21/20  1804   ALT 36 48   AP 85 116   TBILI 1.1* 1.2*   TP 5.6* 7.3   ALB 2.8* 3.7   GLOB 2.8 3.6     No results for input(s): INR, PTP, APTT, INREXT in the last 72 hours. No results for input(s): FE, TIBC, PSAT, FERR in the last 72 hours. Lab Results   Component Value Date/Time    Folate 17.8 09/23/2020 01:35 AM      No results for input(s): PH, PCO2, PO2 in the last 72 hours.   Recent Labs     09/21/20  1804   TROIQ <0.05     Lab Results   Component Value Date/Time    Cholesterol, total 174 01/28/2020 10:48 AM    HDL Cholesterol 73 01/28/2020 10:48 AM    LDL, calculated 79 01/28/2020 10:48 AM    Triglyceride 109 01/28/2020 10:48 AM    CHOL/HDL Ratio 1.7 10/18/2018 04:34 AM     Lab Results   Component Value Date/Time    Glucose (POC) 80 09/24/2020 07:31 AM    Glucose (POC) 115 (H) 09/10/2020 11:45 AM     Lab Results   Component Value Date/Time    Color YELLOW/STRAW 09/22/2020 12:56 AM    Appearance CLEAR 09/22/2020 12:56 AM    Specific gravity 1.014 09/22/2020 12:56 AM    pH (UA) 6.5 09/22/2020 12:56 AM    Protein Negative 09/22/2020 12:56 AM    Glucose Negative 09/22/2020 12:56 AM    Ketone TRACE (A) 09/22/2020 12:56 AM    Bilirubin Negative 09/22/2020 12:56 AM    Urobilinogen 1.0 09/22/2020 12:56 AM    Nitrites Negative 09/22/2020 12:56 AM    Leukocyte Esterase Negative 09/22/2020 12:56 AM    Epithelial cells FEW 09/22/2020 12:56 AM    Bacteria 1+ (A) 09/22/2020 12:56 AM    WBC 0-4 09/22/2020 12:56 AM    RBC 0-5 09/22/2020 12:56 AM           Assessment:     Principal Problem:    Hyponatremia (9/22/2020)    Active Problems:    CKD (chronic kidney disease) stage 3, GFR 30-59 ml/min ([facility]) (3/27/2013)      Atrial fibrillation ([facility] [address] (9/21/2020)      Syncope (9/22/2020)        Plan:     CT negative for mass lesion to explain weight loss. Continue budesonide for lymphocytic colitis. This should help his diarrhea. He will need to continue this dose at discharge until follow up. We will arrange for 1 month outpatient follow up. We will also arrange for outpatient EGD/Colon once stable. He will need to hold his blood thinners for these procedures. We will sign off. Call with questions. LENNY Camejo  09/24/20  9:13 AM     Pt examined and interviewed. Agree with trial of Budesonide for hx of lymphocytic colitis. OP follow up to arrange EGD/Colonoscopy once better. CT reviewed by me. We will see on request on while he is here otherwise.     SMA Sarthak MD

## 2020-09-24 NOTE — ROUTINE PROCESS
Reviewed discharge instructions with patient's daughter Matilde Dillon. All questions answered. IV access removed, patient discharged via wheelchair with the assistance of PCT. Nancy Gaucher, RN

## 2020-09-24 NOTE — PROGRESS NOTES
Bedside and Verbal shift change report given to  Kaylah (oncoming nurse) by Robin Denton (offgoing nurse). Report included the following information SBAR, Kardex, MAR and Recent Results.

## 2020-09-24 NOTE — DISCHARGE SUMMARY
Hospitalist Discharge Summary     Patient ID:  Samia Taylor  [id number]  81 y.o.  1935 9/21/2020    PCP on record: Mely Kumar MD    Admit date: 9/21/2020  Discharge date and time: 9/24/2020    DISCHARGE DIAGNOSIS:    Hyponatremia POA secondary to low solute intake, +/- SIADH and renal dysfunction  CKD (chronic kidney disease) stage 3  Syncope (observed in ED)  Hx Paroxysmal Atrial fibrillation on Apixaban  Hx of TIA with non severe carotid stenosis  Microscopic/lymphocytic colitis, hx of polypectomies  Cervical stenosis with Weakness (more evident of right upper limb)  Hx Prostate s/p seeds implant and with recent rise in PSA  Hx of hyperparathyroidism s/p partial thyroidectomy  HTN  DLP      CONSULTATIONS:  IP CONSULT TO NEPHROLOGY  IP CONSULT TO PALLIATIVE CARE - PROVIDER  IP CONSULT TO NEUROLOGY  IP CONSULT TO GASTROENTEROLOGY  IP CONSULT TO NEUROSURGERY  IP CONSULT TO CARDIOLOGY    Excerpted HPI   Mr. Ross Jaramillo, pleasant 81years old, PMHx significant for paroxysmal A. Fib and TIA, non severe carotid stenosis on Apixaban and plavix. Prostatce cancer with previous 2 consequtive seeds placements. Cervical spine stenosis with generalized weakness more prominent of the right upper Was admitted with complaints of generalized weakness, periods of confusion and frequent episodes of lose bowel movents. . Recently has been suffering from dental problems with significant decrease in his oral intake. Family reporting weight loss of around 20 pounds over past 2 months. During his wait in the ED it appears he suffered from a brief loss of consciousness/syncopal episode prior to arriving to the triage. chemistry in the ED showing findings of hyponatremia, which appears to be chronic reviewing his previous reading with a essie of 124 on this admission.  Which was thought might be contributory to his condition along with his known cervical stenosis. ______________________________________________________________________  DISCHARGE SUMMARY/HOSPITAL COURSE:  for full details see H&P, daily progress notes, labs, consult notes. 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 He received around 2 liter of normal saline and was managed with fluid restriction with sodium rising to 128 (around his baseline levels) with continued recommendation of restricting free water and increasing protein intake. He was also evaluated by neurology who is planning for outpatient follow up with EMG and NCS to rule out other reasons for his weakness and neurosurgery for continued follow up of his cervical spine. Cardiology had evaluated and are recommending and have arranged for an event monitor for investigating SSS. Gastroenterology had been also consulted and in review of his colon random biopsies from showing evidence lymphocytic colitis and in light of non-response withholding his PPI he was started on budesonide PO and was set up for out patient follow up. Nephrology are also going to have arrangements later for follow up of his kidney dysfunction and hyponatremia. A plain CT of his abdomen was ordered by gastroenterology and was showing only evidence of left kidney lobulated soft tissue which might represent a complex cyst which appears to be stable from previous studies. Mr. Vinnie Mcgarry and his daughter had been made aware of these findings with recommendation for continued follow up with his primary provider and urologist for this matter. Physical therapy had recommendations for \"(in order for the patient to meet his/her long term goals) Physical therapy at least 2 days/week in the home\" which was arranged.     Plan was discussed in detail with Mr. Vinnie Mcgarry and mPOA daughter Ms. Shayy Denise who were understanding and agreeable to the plan        _______________________________________________________________________  Patient seen and examined by me on discharge day. Pertinent Findings:  Gen:    Not in distress  Chest: Clear lungs  CVS:   Regular rhythm. No edema  Abd:  Soft, not distended, not tender  Neuro:  Alert, oriented to place and person but not situation  _______________________________________________________________________  DISCHARGE MEDICATIONS:   Current Discharge Medication List      START taking these medications    Details   budesonide (ENTOCORT EC) 3 mg capsule Take 2 Caps by mouth daily for 30 days. Qty: 60 Cap, Refills: 0         CONTINUE these medications which have NOT CHANGED    Details   losartan (COZAAR) 100 mg tablet Take 100 mg by mouth daily. levothyroxine (SYNTHROID) 112 mcg tablet TAKE 1 TABLET BY MOUTH  DAILY BEFORE BREAKFAST  Qty: 90 Tab, Refills: 3    Associated Diagnoses: Thyroid cancer (HCC)      clopidogrel (PLAVIX) 75 mg tab TAKE 1 TABLET BY MOUTH  DAILY  Qty: 90 Tab, Refills: 3    Associated Diagnoses: History of TIA (transient ischemic attack)      atorvastatin (LIPITOR) 20 mg tablet Take 1 Tab by mouth daily. Qty: 90 Tab, Refills: 3      cyanocobalamin (VITAMIN B-12) 1,000 mcg tablet Take 1 Tab by mouth daily. Qty: 30 Tab, Refills: 0      metoprolol succinate (TOPROL-XL) 50 mg XL tablet Take 50 mg by mouth daily. ELIQUIS 2.5 mg tablet       acetaminophen (TYLENOL EXTRA STRENGTH) 500 mg tablet Take  by mouth every six (6) hours as needed for Pain. cholecalciferol, vitamin D3, (VITAMIN D3) 2,000 unit tab Take  by mouth nightly. STOP taking these medications       docusate sodium (COLACE) 100 mg capsule Comments:   Reason for Stopping:                 Patient Follow Up Instructions: Activity: PT/OT per Home Health  Diet: Regular Diet  Wound Care: None needed    Follow-up with PCP in 5 day.   Follow-up tests/labs next week  Follow-up Information     Follow up With Specialties Details Why Contact Info    Velia Jean-Baptiste MD Family Medicine In 1 week  [address]  [facility]  [phone]      Spencer Godwin MD Cardiology In 2 months  [address]  [address] [facility]   VCS will be calling to set up an event monitor; Please send in blood pressure recordings from home (for the next week; sitting and standing).  [address]  [address]    Fozia Mills MD Neurology In 2 weeks EMG/ Risco Francesco  [address]      Jolly Pollard MD Neurosurgery In 1 week [address]      Ru Woodward MD Gastroenterology In 2 weeks FOLLOW UP FOR MICROSCOPIC COLITIS [address] [place] [address]  [phone]          ________________________________________________________________    Risk of deterioration: Moderate    Condition at Discharge:  Stable  __________________________________________________________________    Disposition  Home with family and home health services    ____________________________________________________________________    Code Status: DNR/DNI  ___________________________________________________________________      Total time in minutes spent coordinating this discharge (includes going over instructions, follow-up, prescriptions, and preparing report for sign off to her PCP) :  35 minutes    Signed:  Rosendo Crum MD

## 2020-09-24 NOTE — PROGRESS NOTES
NAME: Anyi Collier Sr.        :  1935        MRN:  [id number]        Assessment :    Plan:  --Hyponatremia (follows with Dr. Lamont Ferreira)  CKD stage 3  Resolved ALISSA  HTN  Hypothyroid  Afib  Syncope  Weakness  Anorexia --Sodium essie 124 on , better now (128); looks like SIADH; s/p NS and 1 liter/day fluid restriction; avoid salt tabs given his HTN    Cortisol level ok   TSH ok     Baca urine     Creatinine better today at 1.5    To go home today, my office will arrange outpatient f/u with Dr. Lamont Ferreira       Subjective:     Chief Complaint: strength better. Appetite  better. We discussed the above (his daughter was present). Review of Systems:    Symptom Y/N Comments  Symptom Y/N Comments   Fever/Chills    Chest Pain     Poor Appetite    Edema     Cough    Abdominal Pain     Sputum    Joint Pain     SOB/MARVIN    Pruritis/Rash     Nausea/vomit    Tolerating PT/OT     Diarrhea    Tolerating Diet     Constipation    Other       Could not obtain due to:      Objective:     VITALS:   Last 24hrs VS reviewed since prior progress note.  Most recent are:  Visit Vitals  BP (!) 143/77   Pulse 77   Temp 97.8 °F (36.6 °C)   Resp 20   Ht 6' (1.829 m)   Wt 89 kg (196 lb 3.4 oz)   SpO2 94%   BMI 26.61 kg/m²       Intake/Output Summary (Last 24 hours) at 2020 1230  Last data filed at 2020 0803  Gross per 24 hour   Intake 390 ml   Output 800 ml   Net -410 ml      Telemetry Reviewed:     PHYSICAL EXAM:  General: NAD      Lab Data Reviewed: (see below)    Medications Reviewed: (see below)    PMH/SH reviewed - no change compared to H&P  ________________________________________________________________________  Care Plan discussed with:  Patient     Family      RN     Care Manager                    Consultant:          Comments   >50% of visit spent in counseling and coordination of care ________________________________________________________________________  Cliff Avendano MD     Procedures: see electronic medical records for all procedures/Xrays and details which  were not copied into this note but were reviewed prior to creation of Plan. LABS:  Recent Labs     09/22/20  0434 09/21/20  1804   WBC 9.6 11.2*   HGB 13.4 16.6   HCT 39.3 47.0    435*     Recent Labs     09/24/20  0234 09/23/20  0135 09/22/20  0434   * 126* 127*   K 4.0 4.1 5.1   CL 96* 95* 96*   CO2 24 26 26   BUN 17 17 17   CREA 1.50* 1.63* 1.22   GLU 82 101* 86   CA 9.1 8.8 8.3*   MG 2.1  --  2.0     Recent Labs     09/22/20  0434 09/21/20  1804   AP 85 116   TP 5.6* 7.3   ALB 2.8* 3.7   GLOB 2.8 3.6     No results for input(s): INR, PTP, APTT, INREXT, INREXT in the last 72 hours. No results for input(s): FE, TIBC, PSAT, FERR in the last 72 hours. Lab Results   Component Value Date/Time    Folate 17.8 09/23/2020 01:35 AM      No results for input(s): PH, PCO2, PO2 in the last 72 hours. No results for input(s): CPK, CKMB in the last 72 hours.     No lab exists for component: TROPONINI  No components found for: [place]  Lab Results   Component Value Date/Time    Color YELLOW/STRAW 09/22/2020 12:56 AM    Appearance CLEAR 09/22/2020 12:56 AM    Specific gravity 1.014 09/22/2020 12:56 AM    pH (UA) 6.5 09/22/2020 12:56 AM    Protein Negative 09/22/2020 12:56 AM    Glucose Negative 09/22/2020 12:56 AM    Ketone TRACE (A) 09/22/2020 12:56 AM    Bilirubin Negative 09/22/2020 12:56 AM    Urobilinogen 1.0 09/22/2020 12:56 AM    Nitrites Negative 09/22/2020 12:56 AM    Leukocyte Esterase Negative 09/22/2020 12:56 AM    Epithelial cells FEW 09/22/2020 12:56 AM    Bacteria 1+ (A) 09/22/2020 12:56 AM    WBC 0-4 09/22/2020 12:56 AM    RBC 0-5 09/22/2020 12:56 AM       MEDICATIONS:  Current Facility-Administered Medications   Medication Dose Route Frequency    simethicone (MYLICON) tablet 80 mg  80 mg Oral QID    famotidine (PEPCID) tablet 20 mg  20 mg Oral DAILY    budesonide (ENTOCORT EC) capsule 6 mg  6 mg Oral DAILY    acetaminophen (TYLENOL) tablet 650 mg  650 mg Oral Q6H PRN    atorvastatin (LIPITOR) tablet 20 mg  20 mg Oral QHS    cholecalciferol (VITAMIN D3) (1000 Units /25 mcg) tablet 2 Tab  2,000 Units Oral QHS    clopidogreL (PLAVIX) tablet 75 mg  75 mg Oral DAILY    cyanocobalamin (VITAMIN B12) tablet 1,000 mcg  1,000 mcg Oral DAILY    apixaban (ELIQUIS) tablet 2.5 mg  2.5 mg Oral BID    levothyroxine (SYNTHROID) tablet 112 mcg  112 mcg Oral 6am    losartan (COZAAR) tablet 100 mg  100 mg Oral DAILY    metoprolol succinate (TOPROL-XL) XL tablet 50 mg  50 mg Oral DAILY    sodium chloride (NS) flush 5-40 mL  5-40 mL IntraVENous Q8H    sodium chloride (NS) flush 5-40 mL  5-40 mL IntraVENous PRN    acetaminophen (TYLENOL) tablet 650 mg  650 mg Oral Q6H PRN    Or    acetaminophen (TYLENOL) suppository 650 mg  650 mg Rectal Q6H PRN    promethazine (PHENERGAN) tablet 12.5 mg  12.5 mg Oral Q6H PRN    Or    ondansetron (ZOFRAN) injection 4 mg  4 mg IntraVENous Q6H PRN

## 2020-09-25 ENCOUNTER — PATIENT OUTREACH (OUTPATIENT)
Dept: CASE MANAGEMENT | Age: 85
End: 2020-09-25

## 2020-09-25 NOTE — PROGRESS NOTES
Patient was admitted to [facility] on 2020 and discharged on 2020 for hyponatremia. Patient was contacted within one business days of discharge. 2020  Initial outreach attempt unsuccessful. LMTCB    2020  Spoke to daughter, Manasa Claros (HIPAA verified dated 2020)    Top Discharge Challenges to be reviewed by the provider   Additional needs identified to be addressed with provider yes  labs- Na-128, consider repeat BMP and 1 liter fluid restriction  Discussed COVID-19 related testing which was not done at this time. Test results were not done. Patient informed of results, if available? NA   Method of communication with provider : chart routing       Advance Care Planning: yes  Does patient have an Advance Directive:  yes; reviewed and current  and health care decision makers updated    Inpatient Readmission Risk score:   Was this a readmission? no     Patients top risk factors for readmission: medical condition  Interventions to address risk factors: close follow up with cardiology/PCP    Care Transition Nurse (CTN) contacted the daughter, Leta Cuevas by telephone to perform post hospital discharge assessment. Verified name and  with family as identifiers. Provided introduction to self, and explanation of the CTN role. Leta Cuevas reports patient has chronic back pain 2/2 stenosis and right shoulder pain r/t pinched nerve. Poor PO intake. Discussed drinking Ensure. Received event monitor and wearing as of . Performs home BP monitoring. CTN reviewed discharge instructions, medical action plan and red flags with family who verbalized understanding. Family given an opportunity to ask questions and does not have any further questions or concerns at this time. The family agrees to contact the PCP office for questions related to their healthcare. Medication reconciliation was performed with family, who verbalizes understanding of administration of home medications. Advised obtaining a 90-day supply of all daily and as-needed medications. Referral to Pharm D needed: no     Home Health/Outpatient orders at discharge: home health care, PT and [address]  Date of initial visit: 9/28/2020    Durable Medical Equipment ordered at discharge: None      Covid Risk Education    Patient has following risk factors of: diabetes, chronic kidney disease and HTN. Education provided regarding infection prevention, and signs and symptoms of COVID-19 and when to seek medical attention with family who verbalized understanding. Discussed exposure protocols and quarantine From CDC: Are you at higher risk for severe illness?  and given an opportunity for questions and concerns. The family agrees to contact PCP office for questions related to COVID-19. For more information on steps you can take to protect yourself, see CDC's How to Protect Yourself     Patient/family/caregiver given information for [employer] and agrees to enroll no    Discussed follow-up appointments. If no appointment was previously scheduled, appointment scheduling offered: [facility] follow up appointment(s):   Future Appointments   Date Time Provider Anup Bauer   12/14/2020  3:00 PM MD GODFREY Bunch BS [facility]     Non-BS follow up appointment(s): 10/28 DILLAN Jordan; 11/3 Dr. Nakul Merlos for follow-up call in 10-14 days based on severity of symptoms and risk factors. CTN provided contact information for future needs. Goals Addressed                 This Visit's Progress     Attend follow up appointments on schedule          09/28/20   Will attend the following appts: 10/28 Dr. Jovanny French (GI); 11/3 Dr. Nimco Caldwell (cardio)  Prince Gideon plans to contact PCP to arrange follow up appt       Understands red flags post discharge.           09/28/20   Will adhere to one liter fluid restriction daily   Will monitor BP at least BID/PRN

## 2020-09-28 NOTE — ACP (ADVANCE CARE PLANNING)
Advance Care Planning: yes  Does patient have an Advance Directive:  yes; reviewed and current  and health care decision makers updated

## 2020-09-29 ENCOUNTER — TELEPHONE (OUTPATIENT)
Dept: FAMILY MEDICINE CLINIC | Age: 85
End: 2020-09-29

## 2020-09-29 NOTE — TELEPHONE ENCOUNTER
Carlota Mcgarry states pt was discharge from the hospital with home health and he needs his sodium recheck. [facility] home health would like for us to fax those orders to [phone].  They would like labs drawn on Friday

## 2020-09-29 NOTE — TELEPHONE ENCOUNTER
Calin Rosado is calling from [facility] in ref to pt letting you know they have opened up for Home health services

## 2020-09-29 NOTE — TELEPHONE ENCOUNTER
Patient's daughter calling to get a nurse to call her back. She said she needs to talk about getting an order to [phone] for patient.  She can be reached at [phone]

## 2020-10-07 ENCOUNTER — TELEPHONE (OUTPATIENT)
Dept: FAMILY MEDICINE CLINIC | Age: 85
End: 2020-10-07

## 2020-10-08 DIAGNOSIS — M48.02 DEGENERATIVE CERVICAL SPINAL STENOSIS: ICD-10-CM

## 2020-10-08 DIAGNOSIS — M54.12 CERVICAL MYELOPATHY WITH CERVICAL RADICULOPATHY (HCC): ICD-10-CM

## 2020-10-08 DIAGNOSIS — Z85.46 H/O PROSTATE CANCER: ICD-10-CM

## 2020-10-08 DIAGNOSIS — G45.1 TRANSIENT ISCHEMIC ATTACK INVOLVING RIGHT INTERNAL CAROTID ARTERY: ICD-10-CM

## 2020-10-08 DIAGNOSIS — G25.0 BENIGN ESSENTIAL TREMOR SYNDROME: ICD-10-CM

## 2020-10-08 DIAGNOSIS — I65.23 BILATERAL CAROTID ARTERY STENOSIS: ICD-10-CM

## 2020-10-08 DIAGNOSIS — I65.23 STENOSIS OF BOTH INTERNAL CAROTID ARTERIES: ICD-10-CM

## 2020-10-08 DIAGNOSIS — G95.9 CERVICAL MYELOPATHY WITH CERVICAL RADICULOPATHY (HCC): ICD-10-CM

## 2020-10-13 ENCOUNTER — PATIENT OUTREACH (OUTPATIENT)
Dept: CASE MANAGEMENT | Age: 85
End: 2020-10-13

## 2020-10-13 NOTE — PROGRESS NOTES
Called patient to follow up    Goals      Attend follow up appointments on schedule        09/28/20   Will attend the following appts: 10/28 Dr. Madai Schneider (GI); 11/3 Dr. Divine Sotomayor (cardio)  Luis Alberto Mckinnon plans to contact PCP to arrange follow up appt    10/13/20   Confirmed follow up appts   Will attend follow up appt with Dr. No Naranjo scheduled 10/21       Understands red flags post discharge.         09/28/20   Will adhere to one liter fluid restriction daily   Will monitor BP at least BID/PRN    10/13/20   Continued compliance with monitoring BPs   106/66 71   Continues to work with [facility] to improve strength   No falls   Wearing 30 day event monitor   Appetite improving

## 2020-10-24 ENCOUNTER — PATIENT OUTREACH (OUTPATIENT)
Dept: CASE MANAGEMENT | Age: 85
End: 2020-10-24

## 2020-10-24 NOTE — PROGRESS NOTES
Patient has graduated from the Transitions of Care Coordination  program on 10/24/2020. Patient/family has the ability to self-manage at this time Care management goals have been completed. Patient was not referred to the [facility] team for further management. Goals Addressed                 This Visit's Progress       General     COMPLETED: Attend follow up appointments on schedule          09/28/20   Will attend the following appts: 10/28 Dr. Radha James (GI); 11/3 Dr. Noble Saucedo (cardio)  Brooke Martinez plans to contact PCP to arrange follow up appt    10/13/20   Confirmed follow up appts   Will attend follow up appt with Dr. Ramakrishna Feliciano scheduled 10/21    10/24/2020  Attended Nephrology appointment  Will attend 10/28 Dr. Radha James appointment and 11/3 Dr. Ida Gastelum appointment. Urology appointment scheduled in November. PAC           Post Hospitalization     COMPLETED: Understands red flags post discharge. 09/28/20   Will adhere to one liter fluid restriction daily   Will monitor BP at least BID/PRN    10/13/20   Continued compliance with monitoring BPs   106/66 71   Continues to work with [facility] to improve strength   No falls   Wearing 30 day event monitor   Appetite improving    10/24/2020 Patient continues to do well and improve per daughter Ronna Gonzalez. He is taking BP's and recording. All within goal range. No further outreach planned at this time. PAC            Patient has Care Transition Nurse's contact information for any further questions, concerns, or needs.   Patients upcoming visits:    Future Appointments   Date Time Provider Anup Bauer   12/14/2020  3:00 PM MD GODFREY Blanchard AMB

## 2020-11-25 ENCOUNTER — TRANSCRIBE ORDER (OUTPATIENT)
Dept: SCHEDULING | Age: 85
End: 2020-11-25

## 2020-11-25 DIAGNOSIS — R19.4 CHANGE IN BOWEL HABITS: ICD-10-CM

## 2020-11-25 DIAGNOSIS — R10.13 ABDOMINAL PAIN, EPIGASTRIC: ICD-10-CM

## 2020-11-25 DIAGNOSIS — R63.4 WEIGHT LOSS: Primary | ICD-10-CM

## 2020-12-10 ENCOUNTER — HOSPITAL ENCOUNTER (OUTPATIENT)
Dept: CT IMAGING | Age: 85
Discharge: HOME | End: 2020-12-10
Attending: PHYSICIAN ASSISTANT
Payer: MEDICARE

## 2020-12-10 DIAGNOSIS — R19.4 CHANGE IN BOWEL HABITS: ICD-10-CM

## 2020-12-10 DIAGNOSIS — R63.4 WEIGHT LOSS: ICD-10-CM

## 2020-12-10 DIAGNOSIS — R10.13 ABDOMINAL PAIN, EPIGASTRIC: ICD-10-CM

## 2020-12-10 PROCEDURE — 74177 CT ABD & PELVIS W/CONTRAST: CPT

## 2020-12-10 PROCEDURE — 74011000636 HC RX REV CODE- 636: Performed by: PHYSICIAN ASSISTANT

## 2020-12-10 RX ADMIN — IOPAMIDOL 100 ML: 755 INJECTION, SOLUTION INTRAVENOUS at 11:08

## 2020-12-10 RX ADMIN — IOHEXOL 50 ML: 240 INJECTION, SOLUTION INTRATHECAL; INTRAVASCULAR; INTRAVENOUS; ORAL at 11:08

## 2021-01-31 DIAGNOSIS — C73 THYROID CANCER (HCC): ICD-10-CM

## 2021-02-02 RX ORDER — LEVOTHYROXINE SODIUM 112 UG/1
TABLET ORAL
Qty: 90 TAB | Refills: 0 | Status: SHIPPED | OUTPATIENT
Start: 2021-02-02 | End: 2021-05-07

## 2021-02-06 ENCOUNTER — HOSPITAL ENCOUNTER (OUTPATIENT)
Dept: PREADMISSION TESTING | Age: 86
Discharge: HOME | End: 2021-02-06
Payer: MEDICARE

## 2021-02-06 LAB — SARS-COV-2, COV2: NORMAL

## 2021-02-06 PROCEDURE — U0003 INFECTIOUS AGENT DETECTION BY NUCLEIC ACID (DNA OR RNA); SEVERE ACUTE RESPIRATORY SYNDROME CORONAVIRUS 2 (SARS-COV-2) (CORONAVIRUS DISEASE [COVID-19]), AMPLIFIED PROBE TECHNIQUE, MAKING USE OF HIGH THROUGHPUT TECHNOLOGIES AS DESCRIBED BY CMS-2020-01-R: HCPCS

## 2021-02-08 LAB — SARS-COV-2, COV2NT: NOT DETECTED

## 2021-02-10 ENCOUNTER — ANESTHESIA (OUTPATIENT)
Dept: ENDOSCOPY | Age: 86
End: 2021-02-10
Payer: MEDICARE

## 2021-02-10 ENCOUNTER — HOSPITAL ENCOUNTER (OUTPATIENT)
Age: 86
Setting detail: OUTPATIENT SURGERY
Discharge: HOME | End: 2021-02-10
Attending: INTERNAL MEDICINE | Admitting: INTERNAL MEDICINE
Payer: MEDICARE

## 2021-02-10 ENCOUNTER — ANESTHESIA EVENT (OUTPATIENT)
Dept: ENDOSCOPY | Age: 86
End: 2021-02-10
Payer: MEDICARE

## 2021-02-10 VITALS
WEIGHT: 201.5 LBS | BODY MASS INDEX: 28.21 KG/M2 | HEART RATE: 68 BPM | DIASTOLIC BLOOD PRESSURE: 65 MMHG | SYSTOLIC BLOOD PRESSURE: 146 MMHG | HEIGHT: 71 IN | RESPIRATION RATE: 17 BRPM | TEMPERATURE: 97.6 F | OXYGEN SATURATION: 98 %

## 2021-02-10 PROCEDURE — 76040000007: Performed by: INTERNAL MEDICINE

## 2021-02-10 PROCEDURE — 88305 TISSUE EXAM BY PATHOLOGIST: CPT

## 2021-02-10 PROCEDURE — 74011250636 HC RX REV CODE- 250/636: Performed by: NURSE ANESTHETIST, CERTIFIED REGISTERED

## 2021-02-10 PROCEDURE — 74011000250 HC RX REV CODE- 250: Performed by: NURSE ANESTHETIST, CERTIFIED REGISTERED

## 2021-02-10 PROCEDURE — 74011250636 HC RX REV CODE- 250/636: Performed by: INTERNAL MEDICINE

## 2021-02-10 PROCEDURE — 2709999900 HC NON-CHARGEABLE SUPPLY: Performed by: INTERNAL MEDICINE

## 2021-02-10 PROCEDURE — 76060000032 HC ANESTHESIA 0.5 TO 1 HR: Performed by: INTERNAL MEDICINE

## 2021-02-10 PROCEDURE — 77030013992 HC SNR POLYP ENDOSC BSC -B: Performed by: INTERNAL MEDICINE

## 2021-02-10 PROCEDURE — 77030021593 HC FCPS BIOP ENDOSC BSC -A: Performed by: INTERNAL MEDICINE

## 2021-02-10 PROCEDURE — 77030019988 HC FCPS ENDOSC DISP BSC -B: Performed by: INTERNAL MEDICINE

## 2021-02-10 RX ORDER — MIDAZOLAM HYDROCHLORIDE 1 MG/ML
.25-5 INJECTION, SOLUTION INTRAMUSCULAR; INTRAVENOUS
Status: DISCONTINUED | OUTPATIENT
Start: 2021-02-10 | End: 2021-02-10 | Stop reason: HOSPADM

## 2021-02-10 RX ORDER — NALOXONE HYDROCHLORIDE 0.4 MG/ML
0.4 INJECTION, SOLUTION INTRAMUSCULAR; INTRAVENOUS; SUBCUTANEOUS
Status: DISCONTINUED | OUTPATIENT
Start: 2021-02-10 | End: 2021-02-10 | Stop reason: HOSPADM

## 2021-02-10 RX ORDER — SODIUM CHLORIDE 0.9 % (FLUSH) 0.9 %
5-40 SYRINGE (ML) INJECTION AS NEEDED
Status: DISCONTINUED | OUTPATIENT
Start: 2021-02-10 | End: 2021-02-10 | Stop reason: HOSPADM

## 2021-02-10 RX ORDER — DEXTROMETHORPHAN/PSEUDOEPHED 2.5-7.5/.8
1.2 DROPS ORAL
Status: DISCONTINUED | OUTPATIENT
Start: 2021-02-10 | End: 2021-02-10 | Stop reason: HOSPADM

## 2021-02-10 RX ORDER — SODIUM CHLORIDE 0.9 % (FLUSH) 0.9 %
5-40 SYRINGE (ML) INJECTION EVERY 8 HOURS
Status: DISCONTINUED | OUTPATIENT
Start: 2021-02-10 | End: 2021-02-10 | Stop reason: HOSPADM

## 2021-02-10 RX ORDER — SODIUM CHLORIDE 9 MG/ML
75 INJECTION, SOLUTION INTRAVENOUS CONTINUOUS
Status: DISCONTINUED | OUTPATIENT
Start: 2021-02-10 | End: 2021-02-10 | Stop reason: HOSPADM

## 2021-02-10 RX ORDER — LIDOCAINE HYDROCHLORIDE 20 MG/ML
INJECTION, SOLUTION EPIDURAL; INFILTRATION; INTRACAUDAL; PERINEURAL AS NEEDED
Status: DISCONTINUED | OUTPATIENT
Start: 2021-02-10 | End: 2021-02-10 | Stop reason: HOSPADM

## 2021-02-10 RX ORDER — EPINEPHRINE 0.1 MG/ML
1 INJECTION INTRACARDIAC; INTRAVENOUS
Status: DISCONTINUED | OUTPATIENT
Start: 2021-02-10 | End: 2021-02-10 | Stop reason: HOSPADM

## 2021-02-10 RX ORDER — PROPOFOL 10 MG/ML
INJECTION, EMULSION INTRAVENOUS AS NEEDED
Status: DISCONTINUED | OUTPATIENT
Start: 2021-02-10 | End: 2021-02-10 | Stop reason: HOSPADM

## 2021-02-10 RX ORDER — FLUMAZENIL 0.1 MG/ML
0.2 INJECTION INTRAVENOUS
Status: DISCONTINUED | OUTPATIENT
Start: 2021-02-10 | End: 2021-02-10 | Stop reason: HOSPADM

## 2021-02-10 RX ORDER — EPHEDRINE SULFATE/0.9% NACL/PF 50 MG/5 ML
SYRINGE (ML) INTRAVENOUS AS NEEDED
Status: DISCONTINUED | OUTPATIENT
Start: 2021-02-10 | End: 2021-02-10 | Stop reason: HOSPADM

## 2021-02-10 RX ORDER — ATROPINE SULFATE 0.1 MG/ML
0.5 INJECTION INTRAVENOUS
Status: DISCONTINUED | OUTPATIENT
Start: 2021-02-10 | End: 2021-02-10 | Stop reason: HOSPADM

## 2021-02-10 RX ADMIN — Medication 20 MG: at 10:18

## 2021-02-10 RX ADMIN — PROPOFOL 50 MG: 10 INJECTION, EMULSION INTRAVENOUS at 10:10

## 2021-02-10 RX ADMIN — PROPOFOL 50 MG: 10 INJECTION, EMULSION INTRAVENOUS at 10:27

## 2021-02-10 RX ADMIN — PROPOFOL 50 MG: 10 INJECTION, EMULSION INTRAVENOUS at 10:15

## 2021-02-10 RX ADMIN — PROPOFOL 50 MG: 10 INJECTION, EMULSION INTRAVENOUS at 10:19

## 2021-02-10 RX ADMIN — Medication 10 MG: at 10:36

## 2021-02-10 RX ADMIN — LIDOCAINE HYDROCHLORIDE 40 MG: 20 INJECTION, SOLUTION EPIDURAL; INFILTRATION; INTRACAUDAL; PERINEURAL at 10:10

## 2021-02-10 RX ADMIN — PROPOFOL 50 MG: 10 INJECTION, EMULSION INTRAVENOUS at 10:13

## 2021-02-10 RX ADMIN — SODIUM CHLORIDE 75 ML/HR: 900 INJECTION, SOLUTION INTRAVENOUS at 09:59

## 2021-02-10 RX ADMIN — PROPOFOL 50 MG: 10 INJECTION, EMULSION INTRAVENOUS at 10:30

## 2021-02-10 RX ADMIN — PROPOFOL 50 MG: 10 INJECTION, EMULSION INTRAVENOUS at 10:29

## 2021-02-10 RX ADMIN — Medication 20 MG: at 10:21

## 2021-02-10 NOTE — H&P
Pre-endoscopy H and P    The patient was seen and examined in the room/pre-op holding area. The airway was assessed and documented. The problem list, past medical history, and medications were reviewed.      Patient Active Problem List   Diagnosis Code    TIA (transient ischemic attack) G45.9    H/O prostate cancer Z85.46    HBP (high blood pressure) I10    GERD (gastroesophageal reflux disease) K21.9    CKD (chronic kidney disease) stage 3, GFR 30-59 ml/min ([facility]) N18.30    Hyperlipidemia E78.5    Carotid artery stenosis, asymptomatic I65.29    Unspecified vitamin D deficiency E55.9    History of thyroid cancer Z85.850    Stenosis of both internal carotid arteries I65.23    History of CVA (cerebrovascular accident) Z80.78    CVA (cerebral vascular accident) ([facility] [address] I63.9    Bilateral carotid artery stenosis I65.23    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    Transient ischemic attack involving right internal carotid artery G45.1    Degenerative cervical spinal stenosis M48.02    History of benign parathyroid tumor Z86.39    Benign essential tremor syndrome G25.0    Cervical myelopathy with cervical radiculopathy M47.12    Diabetic peripheral neuropathy associated with type 2 diabetes mellitus ([facility]) E11.42    Vitamin D deficiency E55.9    B12 deficiency E53.8    Disturbance of memory R41.3    Atrial fibrillation ([facility]) I48.91    Hyponatremia E87.1    Syncope R55     Social History     Socioeconomic History    Marital status:      Spouse name: Not on file    Number of children: Not on file    Years of education: Not on file    Highest education level: Not on file   Occupational History    Not on file   Social Needs    Financial resource strain: Not on file    Food insecurity     Worry: Not on file     Inability: Not on file    Transportation needs     Medical: Not on file     Non-medical: Not on file   Tobacco Use    Smoking status: Former Smoker     Years: 5.00     Quit date: 1955     Years since quittin.0    Smokeless tobacco: Never Used   Substance and Sexual Activity    Alcohol use: Yes     Comment: occassional mixed drink or beer    Drug use: Never    Sexual activity: Yes     Partners: Female     Birth control/protection: None   Lifestyle    Physical activity     Days per week: Not on file     Minutes per session: Not on file    Stress: Not on file   Relationships    Social connections     Talks on phone: Not on file     Gets together: Not on file     Attends [employer]ism service: Not on file     Active member of club or organization: Not on file     Attends meetings of clubs or organizations: Not on file     Relationship status: Not on file    Intimate partner violence     Fear of current or ex partner: Not on file     Emotionally abused: Not on file     Physically abused: Not on file     Forced sexual activity: Not on file   Other Topics Concern    Not on file   Social History Narrative     Had 6 children and one daughter passed away in 47 in a car accident. Has one living daughter and 4 sons. 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  2017 after his wife suffered with dementia. 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 Calcium and PTH monitored by Dr. Mayo Mckeon, renal.  Levels normalized after surgery.     Prostate cancer ([address]     seeds, then XRT, then seed again. Dr. Grider Rater Spinal stenosis     Stroke [facility]) 2002    TIA, Dr. Sanford Gifford, no residual effects as of 9/2018    Thyroid cancer [facility]) Jan 2015    Unspecified vitamin D deficiency          Prior to Admission Medications   Prescriptions Last Dose Informant Patient Reported? Taking? ELIQUIS 2.5 mg tablet 2/7/2021  Yes No   Sig: Take 2.5 mg by mouth two (2) times a day. acetaminophen (TYLENOL EXTRA STRENGTH) 500 mg tablet Unknown at Unknown time  Yes No   Sig: Take 500 mg by mouth every six (6) hours as needed for Pain. cholecalciferol, vitamin D3, (VITAMIN D3) 2,000 unit tab 2/8/2021 at Unknown time  Yes Yes   Sig: Take 1 Tab by mouth nightly. clopidogrel (PLAVIX) 75 mg tab 2/3/2021  No No   Sig: TAKE 1 TABLET BY MOUTH  DAILY   cyanocobalamin (VITAMIN B-12) 1,000 mcg tablet 2/9/2021 at Unknown time  No Yes   Sig: Take 1 Tab by mouth daily. levothyroxine (SYNTHROID) 112 mcg tablet 2/9/2021 at Unknown time  No Yes   Sig: TAKE 1 TABLET BY MOUTH  DAILY BEFORE BREAKFAST   losartan (COZAAR) 100 mg tablet 2/9/2021 at Unknown time  Yes Yes   Sig: Take 100 mg by mouth daily. metoprolol succinate (TOPROL-XL) 50 mg XL tablet 2/9/2021 at 0900  Yes Yes   Sig: Take 50 mg by mouth daily. Facility-Administered Medications: None           The review of systems is:  Negative  for shortness of breath or chest pain      The heart, lungs, and mental status were satisfactory for the administration of deep sedation and for the procedure. I discussed with the patient the objectives, risks, consequences and alternatives to the procedure.       Lila Turner MD  2/10/2021  10:03 AM

## 2021-02-10 NOTE — ANESTHESIA POSTPROCEDURE EVALUATION
Procedure(s):  COLONOSCOPY  ESOPHAGOGASTRODUODENOSCOPY (EGD)  ESOPHAGOGASTRODUODENAL (EGD) BIOPSY  ENDOSCOPIC POLYPECTOMY  COLON BIOPSY. general, total IV anesthesia    Anesthesia Post Evaluation        Patient location during evaluation: PACU  Note status: Adequate. Level of consciousness: responsive to verbal stimuli and sleepy but conscious  Pain management: satisfactory to patient  Airway patency: patent  Anesthetic complications: no  Cardiovascular status: acceptable  Respiratory status: acceptable  Hydration status: acceptable  Comments: +Post-Anesthesia Evaluation and Assessment    Patient: Nish Hylton Sr. MRN: [id number]  SSN: [id number]   YOB: 1935  Age: 81 y.o. Sex: male      Cardiovascular Function/Vital Signs    BP (!) 146/65   Pulse 68   Temp 36.4 °C (97.6 °F)   Resp 17   Ht 5' 11\" (1.803 m)   Wt 91.4 kg (201 lb 8 oz)   SpO2 98%   BMI 28.10 kg/m²     Patient is status post Procedure(s):  COLONOSCOPY  ESOPHAGOGASTRODUODENOSCOPY (EGD)  ESOPHAGOGASTRODUODENAL (EGD) BIOPSY  ENDOSCOPIC POLYPECTOMY  COLON BIOPSY. Nausea/Vomiting: Controlled. Postoperative hydration reviewed and adequate. Pain:  Pain Scale 1: Numeric (0 - 10) (02/10/21 1118)  Pain Intensity 1: 0 (02/10/21 1118)   Managed. Neurological Status: At baseline. Mental Status and Level of Consciousness: Arousable. Pulmonary Status:   O2 Device: Room air (02/10/21 1118)   Adequate oxygenation and airway patent. Complications related to anesthesia: None    Post-anesthesia assessment completed. No concerns. Signed By: Jayant Whitlock MD    2/10/2021  Post anesthesia nausea and vomiting:  controlled      INITIAL Post-op Vital signs:   Vitals Value Taken Time   /65 02/10/21 1118   Temp 36.4 °C (97.6 °F) 02/10/21 1055   Pulse 67 02/10/21 1120   Resp 20 02/10/21 1120   SpO2 97 % 02/10/21 1120   Vitals shown include unvalidated device data.

## 2021-02-10 NOTE — PERIOP NOTES
Isaías Luna .  1935  [id number]    Situation:  Verbal report received from: SANTIAGO Cortes  Procedure: Procedure(s):  COLONOSCOPY  ESOPHAGOGASTRODUODENOSCOPY (EGD)  ESOPHAGOGASTRODUODENAL (EGD) BIOPSY  ENDOSCOPIC POLYPECTOMY  COLON BIOPSY    Background:    Preoperative diagnosis: EPIGASTRIC PAIN  CHANGE IN BOWEL HABIT  ABNORMAL WEIGHT LOSS  PERSONAL HX OF POLYPS  LYMPHOCYTIC PLASMACYTIC COLITIS  LONG TERM USE OF ANTICOAGULANTS  LONG TERM USE OF ANTIPLATELETS/ANTITHROMBOTICS  Postoperative diagnosis: EGD - gastritis  COLON - colon polyp, hemorrhoids    :  Dr. De León  Assistant(s): Endoscopy Technician-1: Bony Nolasco  Endoscopy RN-1: Sophia Cesar RN    Specimens:   ID Type Source Tests Collected by Time Destination   1 : Duodenum bx Preservative Duodenum  Raleigh De León MD 2/10/2021 1013 Pathology   2 : Gastric bx Preservative Gastric  Raleigh De León MD 2/10/2021 1015 Pathology   3 : GE Junction bx Preservative GE Junction  Raleigh De León MD 2/10/2021 1017 Pathology   4 : Transverse Colon Polyp Preservative Colon, Transverse  Raleigh De León MD 2/10/2021 1031 Pathology   5 : Random Colon bx Preservative Random colon  Raleigh De León MD 2/10/2021 1043 Pathology     H. Pylori  no    Assessment:  Intra-procedure medications   Anesthesia gave intra-procedure sedation and medications, see anesthesia flow sheet yes    Intravenous fluids: NS@ KVO     Vital signs stable     Abdominal assessment: round and soft     Recommendation:  Discharge patient per MD order.  Family or Friend   Permission to share finding with family or friend yes

## 2021-02-10 NOTE — PROCEDURES
[place] Office: [phone]      Esophagogastroduodenoscopy Procedure Note      Hali Horne Sr.  1935  [id number]    Indication: Weight loss     : Jose C Valdes MD    Referring Provider:  Aravind Gracia MD    Sedation:  MAC anesthesia Propofol    Procedure Details:  After detailed informed consent was obtained for the procedure, with all risks and benefits of procedure explained the patient was taken to the endoscopy suite and placed in the left lateral decubitus position. Following sequential administration of sedation as per above, the endoscope was inserted into the mouth and advanced under direct vision to second portion of the duodenum. A careful inspection was made as the gastroscope was withdrawn, including a retroflexed view of the proximal stomach; findings and interventions are described below. Findings:     Esophagus: The esophageal mucosa in the proximal, mid and distal esophagus is normal.   The squamo-columnar junction is at 40 cm where the Z-line was noted. GE junction biopsies taken. There is a small hiatal hernia    Stomach: The gastric mucosa has erythema in the body/antrum: biopsies. The fundus was found to be normal with no lesions noted on retroflexion. The angularis is normal as well. Duodenum:   The bulb and post bulbar mucosa is normal in appearance. Biopsies taken. The duodenal folds are normal    Therapies:    biopsy of esophagus  biopsy of stomach body  biopsy of duodenal second portion    Specimen:  Specimens were collected as described and send to the laboratory. Complications:   None were encountered during the procedure. EBL:  None. Recommendations:     -Continue acid suppression. ,   -Await pathology. ,   -Follow symptoms. Raleigh Stubbs MD  2/10/2021  10:44 AM

## 2021-02-10 NOTE — PROCEDURES
Colonoscopy Procedure Note    Nish Hylton Sr.  1935  [id number]    Indications:  Please see below. Pre-operative Diagnosis: CHANGE IN BOWEL HABIT  ABNORMAL WEIGHT LOSS  PERSONAL HX OF POLYPS  LYMPHOCYTIC PLASMACYTIC COLITIS      Post-operative Diagnosis: colon polyp, internal hemorrhoids      : Raleigh Laughlin MD    Referring Provider: William Vivar MD    Sedation:  MAC anesthesia Propofol        Procedure Details:    After detailed informed consent was obtained with all risks and benefits of procedure explained and preoperative exam completed, the patient was taken to the endoscopy suite and placed in the left lateral decubitus position. Upon sequential sedation as per above, a digital rectal exam was performed  And was normal.  The Olympus videocolonoscope  was inserted in the rectum and carefully advanced to the cecum, which was identified by the ileocecal valve and appendiceal orifice. The quality of preparation was adequate. The colonoscope was slowly withdrawn with careful evaluation between folds. Retroflexion in the rectum was performed. Findings:   · The Olympus Video High-Definition Colonoscope was advanced to the cecum identified by its typical land marks with ease. · A single 1 cm sessile polyp removed with a cold snare. · The mucosa of the colon is normal appearing to the cecum with no other mucosal pathology (polyp,mass lesion, colitis etc) noted on this colonoscopy. · I took mucosal biopsies taken. · Small internal hemorrhoids. Therapies:  biopsy of colon : random colon  1 complete polypectomy was performed using cold snare  and the polyp was retrieved    Specimen:  Specimens were collected as described above and sent to pathology. Complications: None were encountered during the procedure. EBL:  None. Recommendations:     -Await pathology. -If adenoma is present, repeat colonoscopy in 3 years.     Naturally, for new bleeding, unexplained weight loss,change in bowel habits and anemia, an earlier colonoscopy should be considered. Raleigh Katz MD  2/10/2021  10:48 AM

## 2021-02-10 NOTE — DISCHARGE INSTRUCTIONS
[place] Office: [phone]    Shamar Reason Sr.  [id number]  1935    EGD/COLONOSCOPY DISCHARGE INSTRUCTIONS  Discomfort:  Sore throat- throat lozenges or warm salt water gargle  redness at IV site- apply warm compress to area; if redness or soreness persist- contact your physician  Gaseous discomfort- walking, belching will help relieve any discomfort  You may not operate a vehicle for 12 hours  You may not engage in an occupation involving machinery or appliances for rest of today. You may not drink alcoholic beverages for at least 12 hours  Avoid making any critical decisions for at least 24 hour  DIET  You may resume your regular diet - however -  remember your colon is empty and a heavy meal will produce gas. Avoid these foods:  fried / greasy foods, excessive carbonated drinks or too much caffeine  MEDICATIONS   Regarding Aspirin or Nonsteroidal medications specifically, please see below. ACTIVITY  You may resume your normal daily activities. Spend the remainder of the day resting -  avoid any strenuous activity. CALL M.D. ANY SIGN OF   Increasing pain, nausea, vomiting  Abdominal distension (swelling)  New increased bleeding (oral or rectal)  Fever (chills)  Pain in chest area  Bloody discharge from nose or mouth  Shortness of breath    You may not take any Advil, Aspirin, Ibuprofen, Motrin, Aleve, or Goodys for 7 days, ONLY  Tylenol as needed for pain. Follow-up Instructions:   Call  Raleigh Coppola MD for any questions or concerns  Results of procedure / biopsy in 7 days   Telephone # [phone]      Follow-up Information    None

## 2021-02-16 ENCOUNTER — HOSPITAL ENCOUNTER (EMERGENCY)
Age: 86
Discharge: HOME | End: 2021-02-16
Attending: EMERGENCY MEDICINE
Payer: MEDICARE

## 2021-02-16 ENCOUNTER — APPOINTMENT (OUTPATIENT)
Dept: CT IMAGING | Age: 86
End: 2021-02-16
Attending: EMERGENCY MEDICINE
Payer: MEDICARE

## 2021-02-16 VITALS
RESPIRATION RATE: 18 BRPM | WEIGHT: 199.74 LBS | BODY MASS INDEX: 29.58 KG/M2 | SYSTOLIC BLOOD PRESSURE: 174 MMHG | TEMPERATURE: 98.2 F | DIASTOLIC BLOOD PRESSURE: 80 MMHG | HEART RATE: 64 BPM | OXYGEN SATURATION: 97 % | HEIGHT: 69 IN

## 2021-02-16 DIAGNOSIS — G25.2 COARSE TREMORS: ICD-10-CM

## 2021-02-16 DIAGNOSIS — R25.8 CHOREIFORM MOVEMENT: Primary | ICD-10-CM

## 2021-02-16 LAB
ALBUMIN SERPL-MCNC: 3.4 G/DL (ref 3.5–5)
ALBUMIN/GLOB SERPL: 1 {RATIO} (ref 1.1–2.2)
ALP SERPL-CCNC: 90 U/L (ref 45–117)
ALT SERPL-CCNC: 21 U/L (ref 12–78)
ANION GAP SERPL CALC-SCNC: 4 MMOL/L (ref 5–15)
AST SERPL-CCNC: 22 U/L (ref 15–37)
BASOPHILS # BLD: 0 K/UL (ref 0–0.1)
BASOPHILS NFR BLD: 1 % (ref 0–1)
BILIRUB SERPL-MCNC: 0.4 MG/DL (ref 0.2–1)
BUN SERPL-MCNC: 18 MG/DL (ref 6–20)
BUN/CREAT SERPL: 14 (ref 12–20)
CALCIUM SERPL-MCNC: 8.6 MG/DL (ref 8.5–10.1)
CHLORIDE SERPL-SCNC: 100 MMOL/L (ref 97–108)
CO2 SERPL-SCNC: 30 MMOL/L (ref 21–32)
CREAT SERPL-MCNC: 1.25 MG/DL (ref 0.7–1.3)
DIFFERENTIAL METHOD BLD: NORMAL
EOSINOPHIL # BLD: 0.2 K/UL (ref 0–0.4)
EOSINOPHIL NFR BLD: 2 % (ref 0–7)
ERYTHROCYTE [DISTWIDTH] IN BLOOD BY AUTOMATED COUNT: 12.7 % (ref 11.5–14.5)
GLOBULIN SER CALC-MCNC: 3.4 G/DL (ref 2–4)
GLUCOSE SERPL-MCNC: 85 MG/DL (ref 65–100)
HCT VFR BLD AUTO: 40.3 % (ref 36.6–50.3)
HGB BLD-MCNC: 13.3 G/DL (ref 12.1–17)
IMM GRANULOCYTES # BLD AUTO: 0 K/UL (ref 0–0.04)
IMM GRANULOCYTES NFR BLD AUTO: 0 % (ref 0–0.5)
LYMPHOCYTES # BLD: 1.6 K/UL (ref 0.8–3.5)
LYMPHOCYTES NFR BLD: 23 % (ref 12–49)
MCH RBC QN AUTO: 31.2 PG (ref 26–34)
MCHC RBC AUTO-ENTMCNC: 33 G/DL (ref 30–36.5)
MCV RBC AUTO: 94.6 FL (ref 80–99)
MONOCYTES # BLD: 0.7 K/UL (ref 0–1)
MONOCYTES NFR BLD: 10 % (ref 5–13)
NEUTS SEG # BLD: 4.2 K/UL (ref 1.8–8)
NEUTS SEG NFR BLD: 64 % (ref 32–75)
NRBC # BLD: 0 K/UL (ref 0–0.01)
NRBC BLD-RTO: 0 PER 100 WBC
PLATELET # BLD AUTO: 279 K/UL (ref 150–400)
PMV BLD AUTO: 9.5 FL (ref 8.9–12.9)
POTASSIUM SERPL-SCNC: 4 MMOL/L (ref 3.5–5.1)
PROT SERPL-MCNC: 6.8 G/DL (ref 6.4–8.2)
RBC # BLD AUTO: 4.26 M/UL (ref 4.1–5.7)
SODIUM SERPL-SCNC: 134 MMOL/L (ref 136–145)
WBC # BLD AUTO: 6.7 K/UL (ref 4.1–11.1)

## 2021-02-16 PROCEDURE — 74011000636 HC RX REV CODE- 636: Performed by: EMERGENCY MEDICINE

## 2021-02-16 PROCEDURE — 70450 CT HEAD/BRAIN W/O DYE: CPT

## 2021-02-16 PROCEDURE — 99283 EMERGENCY DEPT VISIT LOW MDM: CPT

## 2021-02-16 PROCEDURE — 72126 CT NECK SPINE W/DYE: CPT

## 2021-02-16 PROCEDURE — 85025 COMPLETE CBC W/AUTO DIFF WBC: CPT

## 2021-02-16 PROCEDURE — 36415 COLL VENOUS BLD VENIPUNCTURE: CPT

## 2021-02-16 PROCEDURE — 80053 COMPREHEN METABOLIC PANEL: CPT

## 2021-02-16 RX ADMIN — IOPAMIDOL 100 ML: 755 INJECTION, SOLUTION INTRAVENOUS at 18:55

## 2021-02-16 NOTE — ED PROVIDER NOTES
EMERGENCY DEPARTMENT HISTORY AND PHYSICAL EXAM      Date: 2/16/2021  Patient Name: Saturnino Nguyễn.  Patient Age and Sex: 81 y.o. male     History of Presenting Illness     Chief Complaint   Patient presents with    Tremors     x months worse since Sunday. Pt reports uncontrolable shaking and moving to left arm and head with grimacing     Neck Pain     pt reports pain to back of neck x several days pt reports he has a blockage to right side of neck. History Provided By: Patient    HPI: Saturnino Nguyễn. is an 80-year-old male with a history of CAD, rotator cuff injuries and left and right shoulder problems, cervical spine stenosis followed by Dr. Ami Cortez, neurosurgery presenting for tremors. Patient states that he has been dealing with neck pain for a long time. Back in September was diagnosed with cervical stenosis and has been managed by Dr. Ami Cortez neurosurgery. Daughter states that in the last few weeks she has noticed that patient has been having these tremors of his hands. When he is resting, his arms are constantly moving and twitching. In the last couple of days since Friday, past 4 days, the tremors have been persistent and nonstop. Seems that they have been more aggravated. No new medications, new foods, new trauma. They called the neurosurgeons office today and the nurse told him to come to the ER. There are no other complaints, changes, or physical findings at this time. PCP: Yuri Felder MD    No current facility-administered medications on file prior to encounter. Current Outpatient Medications on File Prior to Encounter   Medication Sig Dispense Refill    levothyroxine (SYNTHROID) 112 mcg tablet TAKE 1 TABLET BY MOUTH  DAILY BEFORE BREAKFAST 90 Tab 0    losartan (COZAAR) 100 mg tablet Take 100 mg by mouth daily.  clopidogrel (PLAVIX) 75 mg tab TAKE 1 TABLET BY MOUTH  DAILY 90 Tab 3    cyanocobalamin (VITAMIN B-12) 1,000 mcg tablet Take 1 Tab by mouth daily.  30 Tab 0    metoprolol succinate (TOPROL-XL) 50 mg XL tablet Take 50 mg by mouth daily.  ELIQUIS 2.5 mg tablet Take 2.5 mg by mouth two (2) times a day.  acetaminophen (TYLENOL EXTRA STRENGTH) 500 mg tablet Take 500 mg by mouth every six (6) hours as needed for Pain.  cholecalciferol, vitamin D3, (VITAMIN D3) 2,000 unit tab Take 1 Tab by mouth nightly. 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 Calcium and PTH monitored by Dr. Alpa Calderon, renal.  Levels normalized after surgery.  Prostate cancer ([facility]     seeds, then XRT, then seed again.   Dr. Mariam Lo Spinal stenosis     Stroke [facility]) 2002    TIA, Dr. Aishwarya Do, no residual effects as of 9/2018    Thyroid cancer [facility]) Jan 2015    Unspecified vitamin D deficiency        Past Surgical History:  Past Surgical History:   Procedure Laterality Date    COLONOSCOPY N/A 9/17/2018    COLONOSCOPY performed by Christina Mc[facility]ill MD at [facility] AMBULATORY OR    COLONOSCOPY N/A 2/10/2021    COLONOSCOPY performed by Nava Sarah MD at [facility] ENDOSCOPY    HX APPENDECTOMY      HX CHOLECYSTECTOMY      HX HEART CATHETERIZATION      No Stents-  Dr. Mary Martins      vasectomy    HX PARATHYROIDECTOMY  01/14/2015    right superior parathyroid gland removed and left superior parathyroid gland removed    HX PARTIAL THYROIDECTOMY  1/14/15 right lobectomy    HX TONSILLECTOMY      HX UROLOGICAL      Seeds for prostate cancer , 4 dialations     HX UROLOGICAL  1/14/15    BLADDER NECK INCISION, Cold knife incision of bladder neck contracture, cystoscopy       Family History:  Family History   Problem Relation Age of Onset    Cancer Mother         hodgkins disease    Heart Disease Father     Hypertension Father     Other Neg Hx         no hypercalcemia or kidney stones       Social History:  Social History     Tobacco Use    Smoking status: Former Smoker     Years: 5.00     Quit date: 1955     Years since quittin.0    Smokeless tobacco: Never Used   Substance Use Topics    Alcohol use: Yes     Comment: occassional mixed drink or beer    Drug use: Never       Allergies: Allergies   Allergen Reactions    Sulfa (Sulfonamide Antibiotics) Hives         Review of Systems   Review of Systems   Constitutional: Negative for chills and fever. Respiratory: Negative for cough and shortness of breath. Cardiovascular: Negative for chest pain. Gastrointestinal: Negative for abdominal pain, constipation, diarrhea, nausea and vomiting. Genitourinary: Negative for dysuria, frequency and hematuria. Musculoskeletal: Positive for neck pain (chronic) and neck stiffness. Neurological: Positive for tremors. Negative for weakness and numbness. All other systems reviewed and are negative. Physical Exam   Physical Exam  Vitals signs and nursing note reviewed. Constitutional:       Appearance: He is well-developed. HENT:      Head: Normocephalic and atraumatic. Nose: Nose normal.      Mouth/Throat:      Mouth: Mucous membranes are moist.   Eyes:      Extraocular Movements: Extraocular movements intact. Conjunctiva/sclera: Conjunctivae normal.   Neck:      Musculoskeletal: Normal range of motion and neck supple. Cardiovascular:      Rate and Rhythm: Normal rate and regular rhythm.    Pulmonary:      Effort: Pulmonary effort is normal. No respiratory distress. Breath sounds: Normal breath sounds. Abdominal:      General: There is no distension. Palpations: Abdomen is soft. Tenderness: There is no abdominal tenderness. Musculoskeletal: Normal range of motion. Skin:     General: Skin is warm and dry. Neurological:      General: No focal deficit present. Mental Status: He is alert and oriented to person, place, and time. Mental status is at baseline. Comments: When having patient do my neurologic exam tremors do ease. Cranial nerves II to XII are intact 5 out of 5 strength in all 4 extremities finger-to-nose is intact. When patient is resting and talking to me he is constantly moving his bilateral arms worse in the left hand. Almost has what looks like a twitching dystonic reaction. Not really pill-rolling. Psychiatric:         Mood and Affect: Mood normal.          Diagnostic Study Results     Labs -     Recent Results (from the past 12 hour(s))   CBC WITH AUTOMATED DIFF    Collection Time: 02/16/21  4:35 PM   Result Value Ref Range    WBC 6.7 4.1 - 11.1 K/uL    RBC 4.26 4.10 - 5.70 M/uL    HGB 13.3 12.1 - 17.0 g/dL    HCT 40.3 36.6 - 50.3 %    MCV 94.6 80.0 - 99.0 FL    MCH 31.2 26.0 - 34.0 PG    MCHC 33.0 30.0 - 36.5 g/dL    RDW 12.7 11.5 - 14.5 %    PLATELET 321 689 - 200 K/uL    MPV 9.5 8.9 - 12.9 FL    NRBC 0.0 0  WBC    ABSOLUTE NRBC 0.00 0.00 - 0.01 K/uL    NEUTROPHILS 64 32 - 75 %    LYMPHOCYTES 23 12 - 49 %    MONOCYTES 10 5 - 13 %    EOSINOPHILS 2 0 - 7 %    BASOPHILS 1 0 - 1 %    IMMATURE GRANULOCYTES 0 0.0 - 0.5 %    ABS. NEUTROPHILS 4.2 1.8 - 8.0 K/UL    ABS. LYMPHOCYTES 1.6 0.8 - 3.5 K/UL    ABS. MONOCYTES 0.7 0.0 - 1.0 K/UL    ABS. EOSINOPHILS 0.2 0.0 - 0.4 K/UL    ABS. BASOPHILS 0.0 0.0 - 0.1 K/UL    ABS. IMM.  GRANS. 0.0 0.00 - 0.04 K/UL    DF AUTOMATED     METABOLIC PANEL, COMPREHENSIVE    Collection Time: 02/16/21  4:35 PM   Result Value Ref Range    Sodium 134 (L) 136 - 145 mmol/L    Potassium 4.0 3.5 - 5.1 mmol/L    Chloride 100 97 - 108 mmol/L    CO2 30 21 - 32 mmol/L    Anion gap 4 (L) 5 - 15 mmol/L    Glucose 85 65 - 100 mg/dL    BUN 18 6 - 20 MG/DL    Creatinine 1.25 0.70 - 1.30 MG/DL    BUN/Creatinine ratio 14 12 - 20      GFR est AA >60 >60 ml/min/1.73m2    GFR est non-AA 55 (L) >60 ml/min/1.73m2    Calcium 8.6 8.5 - 10.1 MG/DL    Bilirubin, total 0.4 0.2 - 1.0 MG/DL    ALT (SGPT) 21 12 - 78 U/L    AST (SGOT) 22 15 - 37 U/L    Alk. phosphatase 90 45 - 117 U/L    Protein, total 6.8 6.4 - 8.2 g/dL    Albumin 3.4 (L) 3.5 - 5.0 g/dL    Globulin 3.4 2.0 - 4.0 g/dL    A-G Ratio 1.0 (L) 1.1 - 2.2         Radiologic Studies -   CT HEAD WO CONT   Final Result   Atrophy. No acute findings or interval change. CT SPINE CERV W CONT   Final Result   Multilevel degenerative changes with canal and foraminal stenosis as above. No   destructive bone lesion or enhancing mass demonstrated. CT Results  (Last 48 hours)               02/16/21 1855  CT HEAD WO CONT Final result    Impression:  Atrophy. No acute findings or interval change. Narrative:  EXAM: CT HEAD WO CONT       INDICATION: tremors and neck pain       COMPARISON: None. CONTRAST: None. TECHNIQUE: Unenhanced CT of the head was performed using 5 mm images. Brain and   bone windows were generated. Coronal and sagittal reformats. CT dose reduction   was achieved through use of a standardized protocol tailored for this   examination and automatic exposure control for dose modulation. FINDINGS:   The ventricles are normal in size and contour. There is mild-moderate cortical   sulcal prominence consistent with atrophy, unchanged. There is no significant   white matter disease. There is no intracranial hemorrhage, extra-axial   collection, or mass effect. The basilar cisterns are open. No CT evidence of   acute infarct. The bone windows demonstrate no abnormalities.  The visualized portions of the   paranasal sinuses and mastoid air cells are clear. 02/16/21 1855  CT SPINE CERV W CONT Final result    Impression:  Multilevel degenerative changes with canal and foraminal stenosis as above. No   destructive bone lesion or enhancing mass demonstrated. Narrative:  EXAM: CT CERVICAL SPINE WITH CONTRAST       INDICATION: neck pain and tremors. COMPARISON: MRI 9/10/2020       TECHNIQUE: Multislice helical CT of the cervical spine was performed following   uneventful rapid bolus intravenous contrast administration. Sagittal and   coronal reformats were generated. CT dose reduction was achieved through use of   a standardized protocol tailored for this examination and automatic exposure   control for dose modulation. CONTRAST: 100       FINDINGS:       Bone mineralization is normal. There is no evidence for endplate destruction. There is normal vertebral body height. Mild kyphotic inclination at C3-4 is   unchanged. There is no demonstration of listhesis. No paraspinal soft tissue   mass, collection or enhancement abnormality is shown. There is mild C2-3, severe C3-4 and C4-5, moderate to severe C5-6, severe C6-7,   moderate C7-T1 through upper thoracic spine disc space narrowing with prominent   marginal osteophyte formation and ossification of the anterior longitudinal   ligament. Endplate osteophytes are also shown posteriorly and posterolaterally   vertically at C3-4, C4-5, C5-6 and C6-7. The posterior processes are intact. There is mild-moderate left facet osteoarthrosis at C2-3 and C7-T1. There is   mild right facet osteoarthrosis at C7-T1. C2-C3: There is no canal or foraminal stenosis. C3-4: There is moderate canal and bilateral foraminal stenosis. .       C4-5: There is moderate to severe canal and bilateral foraminal stenosis. .       C5-6: There is moderate canal and moderate to severe right greater than left   bilateral foraminal stenosis. [place] C6-7: There is mild canal and moderate bilateral foraminal stenosis. .       C7-T1: There is no canal or foraminal stenosis. [place] Upper thoracic segments: There is no canal or neural foraminal stenosis. CXR Results  (Last 48 hours)    None            Medical Decision Making   I am the first provider for this patient. I reviewed the vital signs, available nursing notes, past medical history, past surgical history, family history and social history. Vital Signs-Reviewed the patient's vital signs. Patient Vitals for the past 12 hrs:   Temp Pulse Resp BP SpO2   02/16/21 1739     97 %   02/16/21 1629 98.2 °F (36.8 °C) 64 18 (!) 174/80 97 %       Records Reviewed: Nursing Notes and Old Medical Records    Provider Notes (Medical Decision Making):   Patient presenting for several weeks of tremors and twitching motions in the setting of his chronic neck pain and cervical stenosis. The way he is moving makes me concerned for Parkinson's disease or Parkinson's-like syndrome. Differential includes electrolyte abnormality, less likely stroke or ICH. We will plan to do CT of the head and CT of the cervical spine to rule out any source of infection, worsening cervical disease, or bleed. ED Course:   Initial assessment performed. The patients presenting problems have been discussed, and they are in agreement with the care plan formulated and outlined with them. I have encouraged them to ask questions as they arise throughout their visit. Critical Care Time:   0    Disposition:  Discharge Note:  The patient has been re-evaluated and is ready for discharge. Reviewed available results with patient. Counseled patient on diagnosis and care plan. Patient has expressed understanding, and all questions have been answered. Patient agrees with plan and agrees to follow up as recommended, or to return to the ED if their symptoms worsen.  Discharge instructions have been provided and explained to the patient, along with reasons to return to the ED. PLAN:  Discharge Medication List as of 2/16/2021  7:16 PM        2. Follow-up Information     Follow up With Specialties Details Why Contact Iesha Gomez MD Neurology Schedule an appointment as soon as possible for a visit   [address] [place] [address]  [address]  [place]  [phone]          3. Return to ED if worse     Diagnosis     Clinical Impression:   1. Choreiform movement    2. Coarse tremors        Attestations:    Maulik Burris M.D. Please note that this dictation was completed with [employer] the [employer] voice recognition software. Quite often unanticipated grammatical, syntax, homophones, and other interpretive errors are inadvertently transcribed by the computer software. Please disregard these errors. Please excuse any errors that have escaped final proofreading. Thank you.

## 2021-02-16 NOTE — ED NOTES
Complaint of uncoordinated movements of the bilateral upper extremities x a few months, much worse recently. Pt states that they get worse as the day draws on. Movements are not as noticeable when pt is purposefully moving.

## 2021-02-17 ENCOUNTER — PATIENT OUTREACH (OUTPATIENT)
Dept: CASE MANAGEMENT | Age: 86
End: 2021-02-17

## 2021-02-17 NOTE — DISCHARGE INSTRUCTIONS
It was a pleasure taking care of you in our Emergency Department today. We know that when you come to [address] [place] [address], you are entrusting us with your health, comfort, and safety. Our physicians and nurses honor that trust, and truly appreciate the opportunity to care for you and your loved ones. We also value your feedback. If you receive a survey about your Emergency Department experience today, please fill it out. We care about our patients' feedback, and we listen to what you have to say. Thank you!

## 2021-02-17 NOTE — ED NOTES
Deepak Spear RN reviewed discharge instructions with the patient. The patient verbalized understanding. All questions and concerns were addressed. The patient declined a wheelchair and is discharged ambulatory in the care of family members with instructions and prescriptions in hand. Pt is alert and oriented x 4. Respirations are clear and unlabored.

## 2021-03-08 ENCOUNTER — OFFICE VISIT (OUTPATIENT)
Dept: FAMILY MEDICINE CLINIC | Age: 86
End: 2021-03-08
Payer: MEDICARE

## 2021-03-08 DIAGNOSIS — E21.5 DISORDER OF PARATHYROID GLAND, UNSPECIFIED (HCC): ICD-10-CM

## 2021-03-08 DIAGNOSIS — C73 CANCER OF THYROID (HCC): ICD-10-CM

## 2021-03-08 DIAGNOSIS — R25.8 CHOREIFORM MOVEMENT: ICD-10-CM

## 2021-03-08 DIAGNOSIS — Z00.00 ROUTINE GENERAL MEDICAL EXAMINATION AT A HEALTH CARE FACILITY: Primary | ICD-10-CM

## 2021-03-08 DIAGNOSIS — I73.9 PERIPHERAL VASCULAR DISEASE (HCC): ICD-10-CM

## 2021-03-08 DIAGNOSIS — Z85.46 H/O PROSTATE CANCER: ICD-10-CM

## 2021-03-08 DIAGNOSIS — R56.9 SEIZURE-LIKE ACTIVITY (HCC): ICD-10-CM

## 2021-03-08 DIAGNOSIS — E87.1 HYPONATREMIA: ICD-10-CM

## 2021-03-08 DIAGNOSIS — I48.91 ATRIAL FIBRILLATION, UNSPECIFIED TYPE (HCC): ICD-10-CM

## 2021-03-08 DIAGNOSIS — G95.9 CERVICAL MYELOPATHY (HCC): ICD-10-CM

## 2021-03-08 DIAGNOSIS — N18.30 STAGE 3 CHRONIC KIDNEY DISEASE, UNSPECIFIED WHETHER STAGE 3A OR 3B CKD (HCC): ICD-10-CM

## 2021-03-08 PROCEDURE — G8427 DOCREV CUR MEDS BY ELIG CLIN: HCPCS | Performed by: FAMILY MEDICINE

## 2021-03-08 PROCEDURE — G8419 CALC BMI OUT NRM PARAM NOF/U: HCPCS | Performed by: FAMILY MEDICINE

## 2021-03-08 PROCEDURE — G8754 DIAS BP LESS 90: HCPCS | Performed by: FAMILY MEDICINE

## 2021-03-08 PROCEDURE — G8510 SCR DEP NEG, NO PLAN REQD: HCPCS | Performed by: FAMILY MEDICINE

## 2021-03-08 PROCEDURE — G8536 NO DOC ELDER MAL SCRN: HCPCS | Performed by: FAMILY MEDICINE

## 2021-03-08 PROCEDURE — G0463 HOSPITAL OUTPT CLINIC VISIT: HCPCS | Performed by: FAMILY MEDICINE

## 2021-03-08 PROCEDURE — 99214 OFFICE O/P EST MOD 30 MIN: CPT | Performed by: FAMILY MEDICINE

## 2021-03-08 PROCEDURE — G0439 PPPS, SUBSEQ VISIT: HCPCS | Performed by: FAMILY MEDICINE

## 2021-03-08 PROCEDURE — G8753 SYS BP > OR = 140: HCPCS | Performed by: FAMILY MEDICINE

## 2021-03-08 PROCEDURE — 1101F PT FALLS ASSESS-DOCD LE1/YR: CPT | Performed by: FAMILY MEDICINE

## 2021-03-08 RX ORDER — BUDESONIDE 3 MG/1
CAPSULE, COATED PELLETS ORAL
COMMUNITY
End: 2021-10-08

## 2021-03-08 NOTE — PROGRESS NOTES
1. Have you been to the ER, urgent care clinic since your last visit? Hospitalized since your last visit? Yes.  [facility]    2. Have you seen or consulted any other health care providers outside of the [address] [place] [address] since your last visit? Include any pap smears or colon screening. Yes.   Cardiology, Neurology, Urology, PT, Pulmonologist, Nephrology, Gastroenterology     Health Maintenance Due   Topic Date Due    Foot Exam Q1  Never done    Eye Exam Retinal or Dilated  Never done    COVID-19 Vaccine (1 of 2) Never done    Shingrix Vaccine Age 50> (1 of 2) Never done    MICROALBUMIN Q1  09/27/2018    GLAUCOMA SCREENING Q2Y  02/09/2020    Flu Vaccine (1) 09/01/2020    Medicare Yearly Exam  01/28/2021

## 2021-03-09 ENCOUNTER — OFFICE VISIT (OUTPATIENT)
Dept: NEUROLOGY | Age: 86
End: 2021-03-09
Payer: MEDICARE

## 2021-03-09 VITALS
DIASTOLIC BLOOD PRESSURE: 80 MMHG | RESPIRATION RATE: 17 BRPM | HEIGHT: 69 IN | SYSTOLIC BLOOD PRESSURE: 146 MMHG | TEMPERATURE: 97.4 F | HEART RATE: 73 BPM | OXYGEN SATURATION: 96 % | BODY MASS INDEX: 29.3 KG/M2 | WEIGHT: 197.8 LBS

## 2021-03-09 VITALS
WEIGHT: 194 LBS | OXYGEN SATURATION: 97 % | TEMPERATURE: 97.7 F | SYSTOLIC BLOOD PRESSURE: 148 MMHG | HEART RATE: 72 BPM | HEIGHT: 69 IN | BODY MASS INDEX: 28.73 KG/M2 | DIASTOLIC BLOOD PRESSURE: 79 MMHG | RESPIRATION RATE: 18 BRPM

## 2021-03-09 DIAGNOSIS — I65.21 CAROTID STENOSIS, RIGHT: ICD-10-CM

## 2021-03-09 DIAGNOSIS — G24.9 DYSKINESIA: Primary | ICD-10-CM

## 2021-03-09 PROBLEM — R55 SYNCOPE: Status: RESOLVED | Noted: 2020-09-22 | Resolved: 2021-03-09

## 2021-03-09 PROBLEM — I65.23 BILATERAL CAROTID ARTERY STENOSIS: Status: RESOLVED | Noted: 2018-10-18 | Resolved: 2021-03-09

## 2021-03-09 PROBLEM — Z86.73 HISTORY OF CVA (CEREBROVASCULAR ACCIDENT): Status: RESOLVED | Noted: 2017-08-28 | Resolved: 2021-03-09

## 2021-03-09 PROBLEM — I63.9 CVA (CEREBRAL VASCULAR ACCIDENT) (HCC): Status: RESOLVED | Noted: 2018-10-17 | Resolved: 2021-03-09

## 2021-03-09 PROBLEM — E11.42 DIABETIC PERIPHERAL NEUROPATHY ASSOCIATED WITH TYPE 2 DIABETES MELLITUS (HCC): Status: RESOLVED | Noted: 2020-09-08 | Resolved: 2021-03-09

## 2021-03-09 PROBLEM — M48.02 DEGENERATIVE CERVICAL SPINAL STENOSIS: Status: RESOLVED | Noted: 2018-10-18 | Resolved: 2021-03-09

## 2021-03-09 PROCEDURE — G8432 DEP SCR NOT DOC, RNG: HCPCS | Performed by: PSYCHIATRY & NEUROLOGY

## 2021-03-09 PROCEDURE — 1101F PT FALLS ASSESS-DOCD LE1/YR: CPT | Performed by: PSYCHIATRY & NEUROLOGY

## 2021-03-09 PROCEDURE — G8754 DIAS BP LESS 90: HCPCS | Performed by: PSYCHIATRY & NEUROLOGY

## 2021-03-09 PROCEDURE — 99214 OFFICE O/P EST MOD 30 MIN: CPT | Performed by: PSYCHIATRY & NEUROLOGY

## 2021-03-09 PROCEDURE — G8419 CALC BMI OUT NRM PARAM NOF/U: HCPCS | Performed by: PSYCHIATRY & NEUROLOGY

## 2021-03-09 PROCEDURE — G8427 DOCREV CUR MEDS BY ELIG CLIN: HCPCS | Performed by: PSYCHIATRY & NEUROLOGY

## 2021-03-09 PROCEDURE — G8753 SYS BP > OR = 140: HCPCS | Performed by: PSYCHIATRY & NEUROLOGY

## 2021-03-09 PROCEDURE — G8536 NO DOC ELDER MAL SCRN: HCPCS | Performed by: PSYCHIATRY & NEUROLOGY

## 2021-03-09 RX ORDER — CLONAZEPAM 0.5 MG/1
0.25 TABLET ORAL
Qty: 30 TAB | Refills: 1 | Status: SHIPPED
Start: 2021-03-09 | End: 2021-10-08

## 2021-03-09 NOTE — PROGRESS NOTES
Chief Complaint   Patient presents with    Tremors     f/u, went to at [facility] 2/16/21,

## 2021-03-09 NOTE — PROGRESS NOTES
Medicare Annual Wellness Visit     I have reviewed the patient's medical history in detail and updated the computerized patient record. History   Alsysa Martinez Sr. is a 81 y.o. male who Presents to follow-up on chronic medical issues. I last saw him 1 year ago and he has had an eventful year. Helpfully he provided a summary that his daughter typed up    Hospitalized September with hyponatremia that appear to cause generalized weakness and periods of confusion. The seem to be a result of change in fluid intake after dental work. 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 Follows with nephrology for this    Gastroenterology involved because of lymphocytic colitis causing diarrhea. Given a trial of budesonide. Patient unclear of how helpful its been but see recent colonoscopy showing normal-looking mucosa and pathology resulted and no pathologic diagnosis other than lymphocytic colitis    Reports a wheezing illness 2 months ago and says he had some kind of imaging that showed \"emphysema\". Do not have a record of that. Plan is to get PFTs next week and see pulmonology. Emergency room visit 1 month ago in which there is report of increased tremor and choreiform movement over the course of several weeks. Appears to have a similar presentation today judging from the emergency room physical exam.  He is writhing particularly with the right arm habitually scratching his left arm and shoulder with his right hand. Rolls his head to the right while rolling his right shoulder up. These movements cease on musculoskeletal physical exam.    He complains of reduced  strength bilaterally and inability to move his right arm up to do buttons and brush his hair. This has been present for several months and was referred to physical therapy for this with no improvement.   I see mention in the record that he has seen neurosurgery but other than being told he is not a good surgical candidate patient cannot tell me if there is any other work-up/opinion    Past Medical History:   Diagnosis Date    Atrial fibrillation ([address]     Carotid artery stenosis, asymptomatic 8/1/2014    Right side 50-79%     Chronic kidney disease     stage 3    Chronic obstructive pulmonary disease (HCC)     emphemsa     GERD (gastroesophageal reflux disease)     Gout     Hiatal hernia     Hyperlipidemia     Hyperparathyroidism ([address]     Hypertension     Long term (current) use of anticoagulants     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 Calcium and PTH monitored by Dr. William Hicks, renal.  Levels normalized after surgery.  Prostate cancer ([address]     seeds, then XRT, then seed again. Dr. Christy Garzon Spinal stenosis     Thyroid cancer [facility]) Jan 2015      Past Surgical History:   Procedure Laterality Date    COLONOSCOPY N/A 9/17/2018    COLONOSCOPY performed by Marylou العراقي MD at [facility] AMBULATORY OR    COLONOSCOPY N/A 2/10/2021    COLONOSCOPY performed by Mika Ni MD at [facility] ENDOSCOPY    HX APPENDECTOMY      HX CHOLECYSTECTOMY      HX HEART CATHETERIZATION      No Stents-  Dr. Patricia Whitfield HX OTHER SURGICAL      vasectomy    HX PARATHYROIDECTOMY  01/14/2015    right superior parathyroid gland removed and left superior parathyroid gland removed    HX PARTIAL THYROIDECTOMY  1/14/15    right lobectomy    HX TONSILLECTOMY      HX UROLOGICAL      Seeds for prostate cancer , 4 dialations     HX UROLOGICAL  1/14/15    BLADDER NECK INCISION, Cold knife incision of bladder neck contracture, cystoscopy     Current Outpatient Medications   Medication Sig Dispense Refill    levothyroxine (SYNTHROID) 112 mcg tablet TAKE 1 TABLET BY MOUTH  DAILY BEFORE BREAKFAST 90 Tab 0    losartan (COZAAR) 100 mg tablet Take 100 mg by mouth daily.       clopidogrel (PLAVIX) 75 mg tab TAKE 1 TABLET BY MOUTH  DAILY 90 Tab 3    cyanocobalamin (VITAMIN B-12) 1,000 mcg tablet Take 1 Tab by mouth daily. 30 Tab 0    metoprolol succinate (TOPROL-XL) 50 mg XL tablet Take 50 mg by mouth daily.  ELIQUIS 2.5 mg tablet Take 2.5 mg by mouth two (2) times a day.  acetaminophen (TYLENOL EXTRA STRENGTH) 500 mg tablet Take 500 mg by mouth every six (6) hours as needed for Pain.  cholecalciferol, vitamin D3, (VITAMIN D3) 2,000 unit tab Take 1 Tab by mouth nightly.       budesonide (ENTOCORT EC) 3 mg capsule budesonide DR - ER 3 mg capsule,delayed,extended release       Allergies   Allergen Reactions    Sulfa (Sulfonamide Antibiotics) Hives     Family History   Problem Relation Age of Onset    Cancer Mother         hodgkins disease    Heart Disease Father     Hypertension Father     Other Neg Hx         no hypercalcemia or kidney stones     Social History     Tobacco Use    Smoking status: Former Smoker     Years: 5.00     Quit date: 1955     Years since quittin.1    Smokeless tobacco: Never Used   Substance Use Topics    Alcohol use: Yes     Comment: occassional mixed drink or beer     Patient Active Problem List   Diagnosis Code    H/O prostate cancer Z85.46    HBP (high blood pressure) I10    GERD (gastroesophageal reflux disease) K21.9    CKD (chronic kidney disease) stage 3, GFR 30-59 ml/min ([facility]) N18.30    Hyperlipidemia E78.5    Carotid artery stenosis, asymptomatic I65.29    History of thyroid cancer Z85.850    Stenosis of both internal carotid arteries I65.23    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    Transient ischemic attack involving right internal carotid artery G45.1    History of benign parathyroid tumor Z86.39    Benign essential tremor syndrome G25.0    Cervical myelopathy with cervical radiculopathy M47.12    Vitamin D deficiency E55.9    B12 deficiency E53.8    Disturbance of memory R41.3    Atrial fibrillation ([facility]) I48.91    Hyponatremia E87.1       Depression Risk Factor Screening:     3 most recent PHQ Screens 3/8/2021   Little interest or pleasure in doing things Not at all   Feeling down, depressed, irritable, or hopeless Not at all   Total Score PHQ 2 0     Alcohol Risk Factor Screening: On any occasion during the past 3 months, have you had more than 4 drinks containing alcohol? No    Do you average more than 14 drinks per week? No      Functional Ability and Level of Safety:     Hearing Loss   none    Activities of Daily Living   Self-care. Requires assistance with: no ADLs    Fall Risk     Fall Risk Assessment, last 12 mths 3/8/2021   Able to walk? Yes   Fall in past 12 months? 0   Do you feel unsteady? 0   Are you worried about falling 0     Abuse Screen   Patient is not abused    Review of Systems   ROS:  As listed in HPI. In addition:  Constitutional:   No headache, fever, fatigue, weight loss or weight gain      Eyes:   No redness, pruritis, pain, visual changes, swelling, or discharge      Ears:    No pain, loss or changes in hearing     Cardiac:    No chest pain      Resp:   No cough or shortness of breath      Neuro   No loss of consciousness, dizziness, seizure    Physical Examination     Evaluation of Cognitive Function:  Mood/affect:  happy  Appearance: age appropriate  Family member/caregiver input:     Physical Exam:  Blood pressure (!) 146/80, pulse 73, temperature 97.4 °F (36.3 °C), temperature source Temporal, resp. rate 17, height 5' 9\" (1.753 m), weight 197 lb 12.8 oz (89.7 kg), SpO2 96 %. GEN: No apparent distress. Alert and oriented and responds to all questions appropriately. NECK:  Supple; no masses; shoulder girdle discomfort bilaterally in the trapezius  Shoulder is examined. There is no pain of the shoulder joint. There is right-sided 3/5 strength supraspinatus infraspinatus and subscapularis. 5/5 strength on the left.    strength is 4/5 bilaterally  Describes a radicular type pain to the shoulder girdle and the lateral humerus bilaterally but does not extend further       LUNGS: Respirations unlabored; clear to auscultation bilaterally  CARDIOVASCULAR: Regular, rate, and rhythm without murmurs   NEUROLOGIC: Choreiform movement as described in HPI    EXT: Well perfused. No edema. SKIN: Dry irritated skin of the back of the left hand, left shoulder girdle, upper chest.  These are the areas that he is habitually rubbing with his right hand. Says that they do not itch    Patient Care Team:  Wes Perkins MD as PCP - General (Family Medicine)  Wes Perkins MD as PCP - [facility]  Michelle Clement MD as Surgeon (General Surgery)  Amna Griggs MD as Physician (Nephrology)  Linh Benson MD as Physician (Cardiology)  Joe Muhammad MD (Endocrinology)    Advice/Referrals/Counseling   Education and counseling provided:    Discussed Covid vaccine and how to sign up for it    Assessment/Plan     With his various hospital stays he has had all the routine blood work with the exception of lipid panel recently. He requests no further blood work today    Patient is not a good historian for symptoms and recent events. Had to rely heavily on family and available medical record. In summary he has a worsening tremor and worsening cervical stenosis causing upper extremity weakness. His primary follow-up should be with neurology and neurosurgery. Rash  Dry skin, not pruritic. Probably irritated by chronic rubbing. Lotion    Tremor  Worse since the beginning of February. He really did not bring this up today. He is more concerned with upper extremity weakness. Will be seeing neurology soon  CT head February unchanged    C-spine stenosis  Apparent weakness of the upper extremities.  strength reduced bilaterally  Reviewed neurology initial consult 9/8/2020. Felt that cervical stenosis and myelopathy could be his primary problem and referred him back to Dr. Estephania Rosales.   It is unclear to me if he has followed up with neurosurgery since that consult    Prostate cancer  PSA found to be 3.4 in 2020, previously undetectable. Seen by urology and monitoring every 6 months. Will be seen next in May    Emphysema? Not sure where this diagnosis came from. Reports seen on imaging during illness a month or 2 ago. Will be seeing pulmonology next week    Hyponatremia, SIADH  1 L/day fluid restriction  Avoid salt tabs  Nephrology following    hypophosphatemia  Nephrology following this      Remote TIA/carotid PVD  Chronic Plavix    Parathyroidectomy, partial thyroidectomy 2014  Per endocrine just monitor TSH    See that diabetes ended up on his problem list.  First mention of this was September 2020. Think this was a placeholder diagnosis during a work-up. He does not have diabetes      ICD-10-CM ICD-9-CM    1. Routine general medical examination at a health care facility  Z00.00 V70.0    2. Peripheral vascular disease ([facility])  I73.9 443.9    3. Atrial fibrillation, unspecified type ([facility]  I48.91 427.31    4. Seizure-like activity ([facility])  R56.9 780.39    5. Disorder of parathyroid gland, unspecified ([facility])  E21.5 252.9    6. Cancer of thyroid ([facility]  C73 193    7. H/O prostate cancer  Z85.46 V10.46    8. Hyponatremia  E87.1 276.1    9. Stage 3 chronic kidney disease, unspecified whether stage 3a or 3b CKD  N18.30 585.3    10. Cervical myelopathy ([facility])  G95.9 721.1    11.  Choreiform movement  R25.8 781.0

## 2021-03-09 NOTE — PATIENT INSTRUCTIONS
A Healthy Lifestyle: Care Instructions Your Care Instructions A healthy lifestyle can help you feel good, stay at a healthy weight, and have plenty of energy for both work and play. A healthy lifestyle is something you can share with your whole family. A healthy lifestyle also can lower your risk for serious health problems, such as high blood pressure, heart disease, and diabetes. You can follow a few steps listed below to improve your health and the health of your family. Follow-up care is a key part of your treatment and safety. Be sure to make and go to all appointments, and call your doctor if you are having problems. It's also a good idea to know your test results and keep a list of the medicines you take. How can you care for yourself at home? · Do not eat too much sugar, fat, or fast foods. You can still have dessert and treats now and then. The goal is moderation. · Start small to improve your eating habits. Pay attention to portion sizes, drink less juice and soda pop, and eat more fruits and vegetables. ? Eat a healthy amount of food. A 3-ounce serving of meat, for example, is about the size of a deck of cards. Fill the rest of your plate with vegetables and whole grains. ? Limit the amount of soda and sports drinks you have every day. Drink more water when you are thirsty. ? Eat at least 5 servings of fruits and vegetables every day. It may seem like a lot, but it is not hard to reach this goal. A serving or helping is 1 piece of fruit, 1 cup of vegetables, or 2 cups of leafy, raw vegetables. Have an apple or some carrot sticks as an afternoon snack instead of a candy bar. Try to have fruits and/or vegetables at every meal. 
· Make exercise part of your daily routine. You may want to start with simple activities, such as walking, bicycling, or slow swimming. Try to be active 30 to 60 minutes every day. You do not need to do all 30 to 60 minutes all at once.  For example, you can exercise 3 times a day for 10 or 20 minutes. Moderate exercise is safe for most people, but it is always a good idea to talk to your doctor before starting an exercise program. 
· Keep moving. Mow the lawn, work in the garden, or clean your house. Take the stairs instead of the elevator at work. 
· If you smoke, quit. People who smoke have an increased risk for heart attack, stroke, cancer, and other lung illnesses. Quitting is hard, but there are ways to boost your chance of quitting tobacco for good. 
? Use nicotine gum, patches, or lozenges. 
? Ask your doctor about stop-smoking programs and medicines. 
? Keep trying. 
In addition to reducing your risk of diseases in the future, you will notice some benefits soon after you stop using tobacco. If you have shortness of breath or asthma symptoms, they will likely get better within a few weeks after you quit. 
· Limit how much alcohol you drink. Moderate amounts of alcohol (up to 2 drinks a day for men, 1 drink a day for women) are okay. But drinking too much can lead to liver problems, high blood pressure, and other health problems. 
Family health 
If you have a family, there are many things you can do together to improve your health. 
· Eat meals together as a family as often as possible. 
· Eat healthy foods. This includes fruits, vegetables, lean meats and dairy, and whole grains. 
· Include your family in your fitness plan. Most people think of activities such as jogging or tennis as the way to fitness, but there are many ways you and your family can be more active. Anything that makes you breathe hard and gets your heart pumping is exercise. Here are some tips: 
? Walk to do errands or to take your child to school or the bus. 
? Go for a family bike ride after dinner instead of watching TV. 
Where can you learn more? 
Go to https://www.healthwise.net/GoodHelpConnections 
Enter U807 in the search box to learn more about \"A Healthy Lifestyle: Care Instructions.\"  Current as of: January 31, 2020               Content Version: 12.6 © 5568-7552 [employer]. Care instructions adapted under license by [employer] (which disclaims liability or warranty for this information). If you have questions about a medical condition or this instruction, always ask your healthcare professional. [address] any warranty or liability for your use of this information.

## 2021-03-09 NOTE — PROGRESS NOTES
Chief Complaint: Tremors     History of Present Illness:   José Simpson Sr. is a 81 y.o. male with a history of atrial fibrillation, right carotid artery stenosis, cervical stenosis who presents to neurology clinic for evaluation of tremors. It appears his tremor started several months ago however have been becoming more severe. He recently went to the emergency room as he is movements were uncontrollable. These movements do appear to be like scratching and writhing movements where he is unable to stop them. There is also some movement in the head and the neck. He does report at certain times that the symptoms are better however are worse in the morning. He tries to write for or move around the tremor resolves completely. They are also worse when he is more nervous. He was recently found to have cervical stenosis however given his age and comorbidities decided against surgery. He is being followed by neurosurgery. He does have some weakness in the right upper extremity. He is undergoing physical therapy. 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 Calcium and PTH monitored by Dr. Padma Martel, renal.  Levels normalized after surgery.  Prostate cancer ([address]     seeds, then XRT, then seed again.   Dr. Rashel Koohire Spinal stenosis     Stroke [facility]) 2002    TIA, Dr. Shanae Rush, no residual effects as of 9/2018    Thyroid cancer [facility]) Jan 2015  Unspecified vitamin D deficiency         Past Surgical History:   Procedure Laterality Date    COLONOSCOPY N/A 2018    COLONOSCOPY performed by Rakan Ahn MD at \[facility]"" AMBULATORY OR    COLONOSCOPY N/A 2/10/2021    COLONOSCOPY performed by Madyson Franco MD at \[facility]\"" ENDOSCOPY    HX APPENDECTOMY      HX CHOLECYSTECTOMY      HX HEART CATHETERIZATION      No Stents-  Dr. Henning Person HX OTHER SURGICAL      vasectomy    HX PARATHYROIDECTOMY  2015    right superior parathyroid gland removed and left superior parathyroid gland removed    HX PARTIAL THYROIDECTOMY  1/14/15    right lobectomy    HX TONSILLECTOMY      HX UROLOGICAL      Seeds for prostate cancer , 4 dialations     HX UROLOGICAL  1/14/15    BLADDER NECK INCISION, Cold knife incision of bladder neck contracture, cystoscopy        Family History   Problem Relation Age of Onset    Cancer Mother         hodgkins disease    Heart Disease Father     Hypertension Father     Other Neg Hx         no hypercalcemia or kidney stones        Social History     Tobacco Use    Smoking status: Former Smoker     Years: 5.00     Quit date: 1955     Years since quittin.1    Smokeless tobacco: Never Used   Substance Use Topics    Alcohol use: Yes     Comment: occassional mixed drink or beer        Allergies   Allergen Reactions    Sulfa (Sulfonamide Antibiotics) Hives        Prior to Admission medications    Medication Sig Start Date End Date Taking? Authorizing Provider   budesonide (ENTOCORT EC) 3 mg capsule budesonide DR - ER 3 mg capsule,delayed,extended release    Provider, Historical   levothyroxine (SYNTHROID) 112 mcg tablet TAKE 1 TABLET BY MOUTH  DAILY BEFORE BREAKFAST 21   Samina Mullins MD   losartan (COZAAR) 100 mg tablet Take 100 mg by mouth daily.  20   Provider, Historical   clopidogrel (PLAVIX) 75 mg tab TAKE 1 TABLET BY MOUTH  DAILY 19   Samina Mullins MD   cyanocobalamin (VITAMIN B-12) 1,000 mcg tablet Take 1 Tab by mouth daily. 10/22/18   Inocencia Levy MD   metoprolol succinate (TOPROL-XL) 50 mg XL tablet Take 50 mg by mouth daily. Provider, Historical   ELIQUIS 2.5 mg tablet Take 2.5 mg by mouth two (2) times a day. 5/3/18   Provider, Historical   acetaminophen (TYLENOL EXTRA STRENGTH) 500 mg tablet Take 500 mg by mouth every six (6) hours as needed for Pain. Provider, Historical   cholecalciferol, vitamin D3, (VITAMIN D3) 2,000 unit tab Take 1 Tab by mouth nightly. Provider, Historical       Review of Systems:  General, constitutional: negative  Eyes, vision: negative  Ears, nose, throat: negative  Cardiovascular, heart: negative  Respiratory: negative  Gastrointestinal: negative  Genitourinary: negative  Musculoskeletal: negative  Skin and integumentary: negative  Psychiatric: negative  Endocrine: negative  Neurological: negative, except for HPI  Hematologic/lymphatic: negative  Allergy/immunology: negative    Visit Vitals  BP (!) 148/79   Pulse 72   Temp 97.7 °F (36.5 °C) (Temporal)   Resp 18   Ht 5' 9\" (1.753 m)   Wt 194 lb (88 kg)   SpO2 97%   BMI 28.65 kg/m²       Physical Exam:  General:  no acute distress  Neck: no carotid bruits  Lungs: clear to auscultation  Heart:  no murmurs, regular rate and rhythm   Lower extremity: no edema    Neurological exam:  Mental Status: Awake, alert, oriented to person, place and time  Attention and Concentration: able to state the days of the week backwards   Speech and Language: No dysarthria.  Able to name, repeat and follow commands   Fund of knowledge was preserved    Cranial nerves: II-XII  Pupils equal and reactive, visual fields intact by confrontation   Extraocular movements intact, no evidence of nystagmus or ptosis   Facial sensation intact   Facial movements symmetric   Hearing intact to soft rub bilaterally   Shoulder shrug symmetric and strong   Tongue protrusion full and midline without fasciculation or atrophy    Motor:   Normal tone and Bulk   Drift: No evidence of pronator drift   Abnormal movements: Did appear to have dyskinetic movements of the bilateral upper extremities. There did appear to be some facial improvements as well again appearing like dyskinesias. Strength testing:   deltoid triceps biceps Wrist ext. Wrist flex. intrinsics   Right 4 4 4 4 4 4   Left 5 5 5 5 5 5      Hip flex. Hip ext. Knee ext. Knee flex Dorsi flex Plantar flex   Right  5 5 5 5 5 5   Left  5 5 5 5 5 5       Sensory:  Station was intact to light touch and pinprick. There is no extinction. Reflexes:     Biceps Triceps  Brachiorad Patellar Achilles Plantar Hoffmans   Right  2 1 2 2 1 Down Neg   Left  2 1 2 2 1 Down Neg        Cerebellar testing:  No dysmetria. Finger-nose-finger was intact however he had some trouble with this on the right upper extremity due to the weakness. Romberg: absent    Gait: steady    Data:     INTERNAL RECORDS:  The patient's electronic medical record was reviewed. The relevant details include:    Lab Results   Component Value Date/Time    Hemoglobin A1c 5.1 10/18/2018 04:34 AM    Sodium 134 (L) 02/16/2021 04:35 PM    Potassium 4.0 02/16/2021 04:35 PM    Chloride 100 02/16/2021 04:35 PM    Glucose 85 02/16/2021 04:35 PM    BUN 18 02/16/2021 04:35 PM    Creatinine 1.25 02/16/2021 04:35 PM    Calcium 8.6 02/16/2021 04:35 PM    WBC 6.7 02/16/2021 04:35 PM    HCT 40.3 02/16/2021 04:35 PM    HGB 13.3 02/16/2021 04:35 PM    PLATELET 897 14/47/0121 04:35 PM       CT Results (maximum last 3): Results from [place] encounter on 02/16/21   CT SPINE CERV W CONT    Narrative EXAM: CT CERVICAL SPINE WITH CONTRAST    INDICATION: neck pain and tremors. COMPARISON: MRI 9/10/2020    TECHNIQUE: Multislice helical CT of the cervical spine was performed following  uneventful rapid bolus intravenous contrast administration. Sagittal and  coronal reformats were generated.   CT dose reduction was achieved through use of  a standardized protocol tailored for this examination and automatic exposure  control for dose modulation. CONTRAST: 100    FINDINGS:    Bone mineralization is normal. There is no evidence for endplate destruction. There is normal vertebral body height. Mild kyphotic inclination at C3-4 is  unchanged. There is no demonstration of listhesis. No paraspinal soft tissue  mass, collection or enhancement abnormality is shown. There is mild C2-3, severe C3-4 and C4-5, moderate to severe C5-6, severe C6-7,  moderate C7-T1 through upper thoracic spine disc space narrowing with prominent  marginal osteophyte formation and ossification of the anterior longitudinal  ligament. Endplate osteophytes are also shown posteriorly and posterolaterally  vertically at C3-4, C4-5, C5-6 and C6-7. The posterior processes are intact. There is mild-moderate left facet osteoarthrosis at C2-3 and C7-T1. There is  mild right facet osteoarthrosis at C7-T1. C2-C3: There is no canal or foraminal stenosis. C3-4: There is moderate canal and bilateral foraminal stenosis. .    C4-5: There is moderate to severe canal and bilateral foraminal stenosis. .    C5-6: There is moderate canal and moderate to severe right greater than left  bilateral foraminal stenosis. .    C6-7: There is mild canal and moderate bilateral foraminal stenosis. .    C7-T1: There is no canal or foraminal stenosis. Blanchie Bevels Upper thoracic segments: There is no canal or neural foraminal stenosis. Impression Multilevel degenerative changes with canal and foraminal stenosis as above. No  destructive bone lesion or enhancing mass demonstrated. CT HEAD WO CONT    Narrative EXAM: CT HEAD WO CONT    INDICATION: tremors and neck pain    COMPARISON: None. CONTRAST: None. TECHNIQUE: Unenhanced CT of the head was performed using 5 mm images. Brain and  bone windows were generated. Coronal and sagittal reformats.  CT dose reduction  was achieved through use of a standardized protocol tailored for this  examination and automatic exposure control for dose modulation. FINDINGS:  The ventricles are normal in size and contour. There is mild-moderate cortical  sulcal prominence consistent with atrophy, unchanged. There is no significant  white matter disease. There is no intracranial hemorrhage, extra-axial  collection, or mass effect. The basilar cisterns are open. No CT evidence of  acute infarct. The bone windows demonstrate no abnormalities. The visualized portions of the  paranasal sinuses and mastoid air cells are clear. Impression Atrophy. No acute findings or interval change. Results from [place] encounter on 12/10/20   CT ABD PELV W CONT    Narrative EXAM: CT ABD PELV W CONT    INDICATION: Weight loss and epigastric pain    COMPARISON: 9/23/2020     CONTRAST: 100 mL of Isovue-370. TECHNIQUE:   Following the uneventful intravenous administration of contrast, thin axial  images were obtained through the abdomen and pelvis. Coronal and sagittal  reconstructions were generated. Oral contrast was not administered. CT dose  reduction was achieved through use of a standardized protocol tailored for this  examination and automatic exposure control for dose modulation. FINDINGS:   LOWER THORAX: Emphysematous changes at both lung bases. LIVER: No mass. Diffuse hypodensity of the liver. BILIARY TREE: Prior cholecystectomy CBD is not dilated. SPLEEN: within normal limits. PANCREAS: No mass or ductal dilatation. ADRENALS: Unremarkable. KIDNEYS: No calculus or hydronephrosis. Bilateral renal cysts, more numerous on  the left but larger on the right. One arising from the left mid kidney  demonstrates a thin rim of peripheral calcification. Stable soft tissue noted  lateral to this. Hyperdense 1 cm left lateral renal cyst redemonstrated on image  32. STOMACH: Diffuse pyloric thickening (series 3, image 23). SMALL BOWEL: No dilatation or wall thickening.   COLON: No dilatation or wall thickening. APPENDIX: Not visualized  PERITONEUM: No ascites or pneumoperitoneum. RETROPERITONEUM: No lymphadenopathy or aortic aneurysm. REPRODUCTIVE ORGANS: Prostate seeds. URINARY BLADDER: No mass or calculus. BONES: No destructive bone lesion. ABDOMINAL WALL: No mass or hernia. ADDITIONAL COMMENTS: N/A      Impression IMPRESSION:  Duodenitis  Emphysema  Complex left renal cysts. Posttreatment changes in the prostate. No evidence for tumor recurrence. MRI Results (maximum last 3): Results from [place] encounter on 09/10/20   MRI CERV SPINE WO CONT    Narrative EXAM:  MRI CERV SPINE WO CONT    INDICATION:   neck pain, muscle weakness    COMPARISON: MRI cervical spine 10/19/2018. TECHNIQUE: Multiplanar multisequence acquisition without contrast of the  cervical spine. CONTRAST: None. FINDINGS:  Evaluation is significantly limited by motion. Grade 1 anterolisthesis of C7 on T1 measuring 2 mm. Vertebral body heights are  maintained without evidence of acute fracture. There are mild degenerative  endplate marrow changes noted at multiple levels throughout the cervical spine,  most prominent at C6-C7, though without significant marrow edema. Marrow signal  is otherwise within normal limits. Multilevel degenerative disc disease and  facet arthropathy as detailed below, overall not significantly changed since  prior exam. The cervical cord is normal in size and signal. Incidental  perineural cyst is noted within the right T3-T4 foramen. Region of the foramen  magnum is unremarkable. Visualized soft tissues are unremarkable. C2-C3: Severe left and mild right facet arthropathy. Small central disc  protrusion. No significant spinal canal or neural foraminal stenosis. C3-C4: Diffuse disc osteophyte complex with bilateral uncovertebral spurring. Moderate to severe spinal canal stenosis. Severe left and moderate right neural  foraminal stenosis.     C4-C5: Diffuse disc osteophyte complex with bilateral uncovertebral spurring. Moderate to severe spinal canal stenosis. Severe bilateral neural foraminal  stenosis. C5-C6: Diffuse disc osteophyte complex with bilateral uncovertebral spurring. Moderate spinal canal stenosis. Severe bilateral neural foraminal stenosis. C6-C7: Diffuse disc osteophyte complex with bilateral uncovertebral spurring. Mild spinal canal stenosis. Severe bilateral neural foraminal stenosis. C7-T1: Grade 1 anterolisthesis with uncovering of the disc. Severe bilateral  facet arthropathy. No significant spinal canal stenosis. No significant neural  foraminal stenosis. Impression IMPRESSION:    Evaluation is significantly limited by motion. 1. Moderate to severe spinal canal stenosis and severe bilateral neural  foraminal stenosis at C4-C5. 2. Moderate to severe spinal canal stenosis, severe left and moderate right  neural foraminal stenosis at C3-C4. 3. Moderate spinal canal stenosis and severe bilateral neural foraminal stenosis  at C5-C6. 4. Mild spinal canal stenosis and severe bilateral neural foraminal stenosis at  C6-C7. 5. Remaining degenerative changes as detailed above, overall not significantly  changed since 2018. Results from [place] encounter on 10/17/18   MRI CERV SPINE WO CONT    Narrative EXAM:  MRI CERV SPINE WO CONT    INDICATION:  myelopathy. COMPARISON: 3/6/2009 x-rays. TECHNIQUE: MR imaging of the cervical spine was performed using the following  sequences: sagittal T1, T2, STIR;  axial T2, T1 bilateral uncovertebral joint  hypertrophy. Severe bilateral neuroforaminal narrowing. Mild spinal canal  stenosis. CONTRAST:  None. FINDINGS:    There is 2 mm anterior subluxation C7 on T1. Vertebral body heights are  maintained. Marrow signal is normal. Severe disc space is noted at C3-C4, C4-C5,  and C6-C7. Osteophytic endplate changes are noted throughout the cervical spine.   Minimal acute Modic type endplate changes are present at C3-C4. The craniocervical junction is intact. The course, caliber, and signal  intensity of the spinal cord are normal.      The paraspinal soft tissues are within normal limits. C2-C3:  Small disc osteophyte complex with superimposed large right paracentral  disc protrusion effacing the right lateral recess. Mild/moderate right and mild  left facet arthropathy. Ligamentum flavum thickening. No neuroforaminal  narrowing. Mild spinal canal stenosis. C3-C4:  Large disc osteophyte complex. Both lateral recesses are effaced and  there is marked deformity of the ventral cord. Minimal bilateral facet  arthropathy. Bilateral uncovertebral joint hypertrophy. Ligamentum flavum  thickening. Severe left and moderate right neuroforaminal narrowing. Severe  spinal canal stenosis with AP dimension of the canal measuring about 5 mm. C4-C5:  No herniation or stenosis. Moderate size disc osteophyte complex with  left paracentral component. Both lateral recesses are effaced, left greater than  right. Bilateral uncovertebral joint hypertrophy. Ligamentum flavum thickening. Severe bilateral neuroforaminal narrowing. Moderate/severe spinal canal  stenosis. C5-C6:  Moderate size disc osteophyte complex. Both lateral recesses are  effaced, left greater than right. Bilateral uncovertebral joint hypertrophy. Mild ligamentum flavum thickening. Severe bilateral neuroforaminal narrowing. Moderate/severe spinal canal stenosis. C6-C7:  Small disc osteophyte complex effacing both lateral recesses bilateral  uncovertebral joint hypertrophy. Severe left and moderate right neuroforaminal  narrowing. Mild spinal canal stenosis. C7-T1:  Minimal disc bulge. Mild left facet arthropathy. No neuroforaminal  narrowing or spinal canal stenosis. Impression IMPRESSION:  Multilevel severe degenerative disc disease and degenerative changes.  Severe  neuroforaminal narrowing at C3-C4, C4-C5, C5-C6, and C6-C7. Severe and  moderate/severe spinal canal stenosis at C3-C4, C4-C5, and C5-C6. MRA BRAIN WO CONT    Narrative CLINICAL HISTORY: Elevated blood pressure, headaches, right facial numbness and  tingling    INDICATION: CVA. COMPARISON: CT 10/17/2018, MR 2011  TECHNIQUE: MR examination of the brain includes axial and sagittal T1 , axial  T2, axial FLAIR, axial gradient echo, axial DWI, coronal T2. Contrast: The patient was administered gadolinium-based contrast material ,  axial and sagittal T1-weighted postcontrast enhanced imaging was obtained. Next, 3-D time-of-flight MRA of the brain was performed. Multiplanar  reconstructions were obtained. FINDINGS:   There is no intracranial mass, hemorrhage or evidence of acute infarction. Minimal sphenoid sinus disease. There is sulcal and ventricular prominence. There is canal stenosis/mild cord compression at C3-C4 and at C4-C5. There is no abnormal parenchymal enhancement. There is no abnormal meningeal  enhancement. There is no Chiari or syrinx. The pituitary and infundibulum are  grossly unremarkable. There is no skull base mass. Cerebellopontine angles are  grossly unremarkable. The major intracranial vascular flow-voids are  unremarkable. The cavernous sinuses are symmetric. Optic chiasm and infundibulum grossly  unremarkable. Orbits are grossly symmetric. Dural venous sinuses are grossly  patent. The brain architecture is normal. There is no evidence of midline shift  or mass-effect. There are no extra-axial fluid collections. The mastoid air  cells and paranasal sinuses are well pneumatized and clear. The A2 segments are unremarkable. There are A1 segments bilaterally. Ophthalmic  arteries are incidentally identified and within normal limits. M1 segments  within normal limits. Posterior communicating artery on the right. Posterior to  indicating artery on the left as well. Vertebrobasilar system is diminutive in  size.  The left vertebral artery is dominant. P1 and proximal P2 segments are  grossly unremarkable as well. . The basilar artery and its branches are normal.  The internal carotid, anterior cerebral, and middle cerebral arteries are  patent. There is no flow-limiting intracranial stenosis. There is no aneurysm. There are no sizable posterior communicating arteries. Impression IMPRESSION:   No acute intracranial mass, hemorrhage or evidence of acute infarction. No major vessel occlusion, aneurysm or dissection. No hemodynamically  significant stenosis identified. Moderate to severe stenoses of the upper cervical spine with mild cord  compression at C3-4, C4-5 likely. Assessment and Plan   Edgar Abreu Sr. is a 81 y.o. male with a history of atrial fibrillation, right carotid artery stenosis, cervical stenosis who presents to neurology clinic for evaluation of tremors which do appear to be dyskinetic type movements as opposed to parkinsonian type movements. Unclear etiology.  -Patient does have a history of atrial fibrillation, will obtain an MRI of the brain to ensure that there is no evidence of a stroke that may have resulted in his symptoms.  -For his tremor specifically, they do appear dyskinetic, I have recommended trialing clonazepam 0.25 mg at bedtime. We will see if there is an improvement of his symptoms. Right carotid stenosis: Noted to be approximately 50 to 79% on most recent Doppler  -We will obtain an MRA of the head and neck to determine if there is been progression of this disease. Cervical stenosis: Is being followed by neurosurgery who is not recommending intervention at this time  -Would recommend continue with PT and OT for now for strength.  -It is possible that his movements may be due cervical stenosis as well.     Patient Active Problem List   Diagnosis Code    TIA (transient ischemic attack) G45.9    H/O prostate cancer Z85.46    HBP (high blood pressure) I10    GERD (gastroesophageal reflux disease) K21.9    CKD (chronic kidney disease) stage 3, GFR 30-59 ml/min ([facility]) N18.30    Hyperlipidemia E78.5    Carotid artery stenosis, asymptomatic I65.29    Unspecified vitamin D deficiency E55.9    History of thyroid cancer Z85.850    Stenosis of both internal carotid arteries I65.23    History of CVA (cerebrovascular accident) Z80.78    CVA (cerebral vascular accident) ([facility] I63.9    Bilateral carotid artery stenosis I65.23    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    Transient ischemic attack involving right internal carotid artery G45.1    Degenerative cervical spinal stenosis M48.02    History of benign parathyroid tumor Z86.39    Benign essential tremor syndrome G25.0    Cervical myelopathy with cervical radiculopathy M47.12    Diabetic peripheral neuropathy associated with type 2 diabetes mellitus ([facility]) E11.42    Vitamin D deficiency E55.9    B12 deficiency E53.8    Disturbance of memory R41.3    Atrial fibrillation ([facility]) I48.91    Hyponatremia E87.1    Syncope R55            I have discussed the diagnosis with the patient and the intended plan as seen in the above orders. Patient is in agreement. The patient has received an after-visit summary and questions were answered concerning future plans. I have discussed medication side effects and warnings with the patient as well.         Signed By:  Sumit England MD     March 9, 2021

## 2021-03-22 ENCOUNTER — HOSPITAL ENCOUNTER (OUTPATIENT)
Dept: MRI IMAGING | Age: 86
Discharge: HOME | End: 2021-03-22
Attending: PSYCHIATRY & NEUROLOGY
Payer: MEDICARE

## 2021-03-22 DIAGNOSIS — G24.9 DYSKINESIA: ICD-10-CM

## 2021-03-22 DIAGNOSIS — I65.21 CAROTID STENOSIS, RIGHT: ICD-10-CM

## 2021-03-22 PROCEDURE — 70547 MR ANGIOGRAPHY NECK W/O DYE: CPT

## 2021-03-22 PROCEDURE — 70544 MR ANGIOGRAPHY HEAD W/O DYE: CPT

## 2021-03-22 PROCEDURE — 70551 MRI BRAIN STEM W/O DYE: CPT

## 2021-05-07 DIAGNOSIS — C73 THYROID CANCER (HCC): ICD-10-CM

## 2021-05-07 RX ORDER — LEVOTHYROXINE SODIUM 112 UG/1
TABLET ORAL
Qty: 90 TAB | Refills: 3 | Status: SHIPPED | OUTPATIENT
Start: 2021-05-07 | End: 2022-04-29

## 2021-07-14 ENCOUNTER — OFFICE VISIT (OUTPATIENT)
Dept: NEUROLOGY | Age: 86
End: 2021-07-14
Payer: MEDICARE

## 2021-07-14 VITALS
WEIGHT: 197 LBS | OXYGEN SATURATION: 97 % | BODY MASS INDEX: 29.09 KG/M2 | SYSTOLIC BLOOD PRESSURE: 138 MMHG | HEART RATE: 64 BPM | TEMPERATURE: 98.1 F | DIASTOLIC BLOOD PRESSURE: 88 MMHG

## 2021-07-14 DIAGNOSIS — G24.9 DYSKINESIA: Primary | ICD-10-CM

## 2021-07-14 DIAGNOSIS — I65.21 CAROTID STENOSIS, RIGHT: ICD-10-CM

## 2021-07-14 DIAGNOSIS — M48.02 DEGENERATIVE CERVICAL SPINAL STENOSIS: ICD-10-CM

## 2021-07-14 PROCEDURE — 1101F PT FALLS ASSESS-DOCD LE1/YR: CPT | Performed by: PSYCHIATRY & NEUROLOGY

## 2021-07-14 PROCEDURE — G8752 SYS BP LESS 140: HCPCS | Performed by: PSYCHIATRY & NEUROLOGY

## 2021-07-14 PROCEDURE — G8419 CALC BMI OUT NRM PARAM NOF/U: HCPCS | Performed by: PSYCHIATRY & NEUROLOGY

## 2021-07-14 PROCEDURE — G8536 NO DOC ELDER MAL SCRN: HCPCS | Performed by: PSYCHIATRY & NEUROLOGY

## 2021-07-14 PROCEDURE — G8427 DOCREV CUR MEDS BY ELIG CLIN: HCPCS | Performed by: PSYCHIATRY & NEUROLOGY

## 2021-07-14 PROCEDURE — G8754 DIAS BP LESS 90: HCPCS | Performed by: PSYCHIATRY & NEUROLOGY

## 2021-07-14 PROCEDURE — G8432 DEP SCR NOT DOC, RNG: HCPCS | Performed by: PSYCHIATRY & NEUROLOGY

## 2021-07-14 PROCEDURE — 99214 OFFICE O/P EST MOD 30 MIN: CPT | Performed by: PSYCHIATRY & NEUROLOGY

## 2021-07-14 RX ORDER — GABAPENTIN 100 MG/1
100 CAPSULE ORAL
Qty: 30 CAPSULE | Refills: 0 | Status: SHIPPED | OUTPATIENT
Start: 2021-07-14 | End: 2021-09-20

## 2021-07-14 NOTE — PROGRESS NOTES
Chief Complaint: Tremors     History of Present Illness:   Lorri Galvan Sr. is a 81 y.o. male with a history of atrial fibrillation, right carotid artery stenosis, cervical stenosis who presents to neurology clinic for evaluation of tremors. It appears his tremor started several months ago however have been becoming more severe. He recently went to the emergency room as he is movements were uncontrollable. These movements do appear to be like scratching and writhing movements where he is unable to stop them. There is also some movement in the head and the neck. He does report at certain times that the symptoms are better however are worse in the morning. He tries to write for or move around the tremor resolves completely. They are also worse when he is more nervous. He was recently found to have cervical stenosis however given his age and comorbidities decided against surgery. He is being followed by neurosurgery. He does have some weakness in the right upper extremity. He is undergoing physical therapy. Interval hx:   Patient returns to clinic today and reports that his symptoms are the same. He did trial the clonazepam however he had not seen a significant improvement. Additionally one night he did get up to go the bathroom and did have a fall. He did take the entire dose but did not request a refill. He otherwise reports that his symptoms are very similar if not worse. He has had episodes where he starts slapping his hand or the side of the bed and he needs to sit on his hand to stop these movements. He otherwise talks about how he has continued with physical therapy and feels like this has made a slight improvement in his symptoms.   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 Calcium and PTH monitored by Dr. Nathalia Ward, renal.  Levels normalized after surgery.  Prostate cancer ([address]     seeds, then XRT, then seed again.   Dr. Carroll Kaitlyn Spinal stenosis     Thyroid cancer [facility]) 2015        Past Surgical History:   Procedure Laterality Date    COLONOSCOPY N/A 2018    COLONOSCOPY performed by Mariaelena Chow MD at \[facility]"" AMBULATORY OR    COLONOSCOPY N/A 2/10/2021    COLONOSCOPY performed by Diana Barraza MD at \[facility]\"" ENDOSCOPY    HX APPENDECTOMY      HX CHOLECYSTECTOMY      HX HEART CATHETERIZATION      No Stents-  Dr. Nirav Vo HX OTHER SURGICAL      vasectomy    HX PARATHYROIDECTOMY  2015    right superior parathyroid gland removed and left superior parathyroid gland removed    HX PARTIAL THYROIDECTOMY  1/14/15    right lobectomy    HX TONSILLECTOMY      HX UROLOGICAL      Seeds for prostate cancer , 4 dialations     HX UROLOGICAL  1/14/15    BLADDER NECK INCISION, Cold knife incision of bladder neck contracture, cystoscopy        Family History   Problem Relation Age of Onset    Cancer Mother         hodgkins disease    Heart Disease Father     Hypertension Father     Other Neg Hx         no hypercalcemia or kidney stones        Social History     Tobacco Use    Smoking status: Former Smoker     Years: 5.00     Quit date: 1955     Years since quittin.4    Smokeless tobacco: Never Used   Substance Use Topics    Alcohol use: Yes     Comment: occassional mixed drink or beer        Allergies   Allergen Reactions    Sulfa (Sulfonamide Antibiotics) Hives Prior to Admission medications    Medication Sig Start Date End Date Taking? Authorizing Provider   levothyroxine (SYNTHROID) 112 mcg tablet TAKE 1 TABLET BY MOUTH  DAILY BEFORE BREAKFAST 5/7/21  Yes Amy Driver MD   clonazePAM (KlonoPIN) 0.5 mg tablet Take 0.5 Tabs by mouth nightly as needed for Anxiety (tremor). Max Daily Amount: 0.25 mg. 3/9/21  Yes Ioana Ness MD   losartan (COZAAR) 100 mg tablet Take 100 mg by mouth daily. 1/19/20  Yes Provider, Historical   clopidogrel (PLAVIX) 75 mg tab TAKE 1 TABLET BY MOUTH  DAILY 7/22/19  Yes Amy Driver MD   metoprolol succinate (TOPROL-XL) 50 mg XL tablet Take 50 mg by mouth daily. Yes Provider, Historical   ELIQUIS 2.5 mg tablet Take 2.5 mg by mouth two (2) times a day. 5/3/18  Yes Provider, Historical   acetaminophen (TYLENOL EXTRA STRENGTH) 500 mg tablet Take 500 mg by mouth every six (6) hours as needed for Pain. Yes Provider, Historical   cholecalciferol, vitamin D3, (VITAMIN D3) 2,000 unit tab Take 1 Tab by mouth nightly. Yes Provider, Historical   budesonide (ENTOCORT EC) 3 mg capsule budesonide DR - ER 3 mg capsule,delayed,extended release    Provider, Historical   cyanocobalamin (VITAMIN B-12) 1,000 mcg tablet Take 1 Tab by mouth daily.  10/22/18   Kisha Jones MD       Review of Systems:  General, constitutional: negative  Eyes, vision: negative  Ears, nose, throat: negative  Cardiovascular, heart: negative  Respiratory: negative  Gastrointestinal: negative  Genitourinary: negative  Musculoskeletal: negative  Skin and integumentary: negative  Psychiatric: negative  Endocrine: negative  Neurological: negative, except for HPI  Hematologic/lymphatic: negative  Allergy/immunology: negative    Visit Vitals  /88   Pulse 64   Temp 98.1 °F (36.7 °C)   Wt 197 lb (89.4 kg)   SpO2 97%   BMI 29.09 kg/m²       Physical Exam:  General:  no acute distress  Neck: no carotid bruits  Lungs: clear to auscultation  Heart:  no murmurs, regular rate and rhythm Lower extremity: no edema    Neurological exam:  Mental Status: Awake, alert, oriented to person, place and time  Attention and Concentration: able to state the days of the week backwards   Speech and Language: No dysarthria. Able to name, repeat and follow commands   Fund of knowledge was preserved    Cranial nerves: II-XII  Pupils equal and reactive, visual fields intact by confrontation   Extraocular movements intact, no evidence of nystagmus or ptosis   Facial sensation intact   Facial movements symmetric   Hearing intact to soft rub bilaterally   Shoulder shrug symmetric and strong   Tongue protrusion full and midline without fasciculation or atrophy    Motor:   Normal tone and Bulk   Drift: No evidence of pronator drift   Abnormal movements: Did appear to have dyskinetic movements of the bilateral upper extremities. There did appear to be some facial improvements as well again appearing like dyskinesias. Strength testing:   deltoid triceps biceps Wrist ext. Wrist flex. intrinsics   Right 4 4 4 4 4 4   Left 5 5 5 5 5 5      Hip flex. Hip ext. Knee ext. Knee flex Dorsi flex Plantar flex   Right  5 5 5 5 5 5   Left  5 5 5 5 5 5       Sensory:  Station was intact to light touch and pinprick. There is no extinction. Reflexes:     Biceps Triceps  Brachiorad Patellar Achilles Plantar Hoffmans   Right  2 1 2 2 1 Down Neg   Left  2 1 2 2 1 Down Neg        Cerebellar testing:  No dysmetria. Finger-nose-finger was intact however he had some trouble with this on the right upper extremity due to the weakness. Romberg: absent    Gait: steady    Data:     INTERNAL RECORDS:  The patient's electronic medical record was reviewed.   The relevant details include:    Lab Results   Component Value Date/Time    Hemoglobin A1c 5.1 10/18/2018 04:34 AM    Sodium 134 (L) 02/16/2021 04:35 PM    Potassium 4.0 02/16/2021 04:35 PM    Chloride 100 02/16/2021 04:35 PM    Glucose 85 02/16/2021 04:35 PM    BUN 18 02/16/2021 04:35 PM    Creatinine 1.25 02/16/2021 04:35 PM    Calcium 8.6 02/16/2021 04:35 PM    WBC 6.7 02/16/2021 04:35 PM    HCT 40.3 02/16/2021 04:35 PM    HGB 13.3 02/16/2021 04:35 PM    PLATELET 392 67/25/4789 04:35 PM       CT Results (maximum last 3): Results from [place] encounter on 02/16/21    CT SPINE CERV W CONT    Narrative  EXAM: CT CERVICAL SPINE WITH CONTRAST    INDICATION: neck pain and tremors. COMPARISON: MRI 9/10/2020    TECHNIQUE: Multislice helical CT of the cervical spine was performed following  uneventful rapid bolus intravenous contrast administration. Sagittal and  coronal reformats were generated. CT dose reduction was achieved through use of  a standardized protocol tailored for this examination and automatic exposure  control for dose modulation. CONTRAST: 100    FINDINGS:    Bone mineralization is normal. There is no evidence for endplate destruction. There is normal vertebral body height. Mild kyphotic inclination at C3-4 is  unchanged. There is no demonstration of listhesis. No paraspinal soft tissue  mass, collection or enhancement abnormality is shown. There is mild C2-3, severe C3-4 and C4-5, moderate to severe C5-6, severe C6-7,  moderate C7-T1 through upper thoracic spine disc space narrowing with prominent  marginal osteophyte formation and ossification of the anterior longitudinal  ligament. Endplate osteophytes are also shown posteriorly and posterolaterally  vertically at C3-4, C4-5, C5-6 and C6-7. The posterior processes are intact. There is mild-moderate left facet osteoarthrosis at C2-3 and C7-T1. There is  mild right facet osteoarthrosis at C7-T1. C2-C3: There is no canal or foraminal stenosis. C3-4: There is moderate canal and bilateral foraminal stenosis. .    C4-5: There is moderate to severe canal and bilateral foraminal stenosis. .    C5-6: There is moderate canal and moderate to severe right greater than left  bilateral foraminal stenosis. .    C6-7: There is mild canal and moderate bilateral foraminal stenosis. .    C7-T1: There is no canal or foraminal stenosis. Bettey Grad Upper thoracic segments: There is no canal or neural foraminal stenosis. Impression  Multilevel degenerative changes with canal and foraminal stenosis as above. No  destructive bone lesion or enhancing mass demonstrated. CT HEAD WO CONT    Narrative  EXAM: CT HEAD WO CONT    INDICATION: tremors and neck pain    COMPARISON: None. CONTRAST: None. TECHNIQUE: Unenhanced CT of the head was performed using 5 mm images. Brain and  bone windows were generated. Coronal and sagittal reformats. CT dose reduction  was achieved through use of a standardized protocol tailored for this  examination and automatic exposure control for dose modulation. FINDINGS:  The ventricles are normal in size and contour. There is mild-moderate cortical  sulcal prominence consistent with atrophy, unchanged. There is no significant  white matter disease. There is no intracranial hemorrhage, extra-axial  collection, or mass effect. The basilar cisterns are open. No CT evidence of  acute infarct. The bone windows demonstrate no abnormalities. The visualized portions of the  paranasal sinuses and mastoid air cells are clear. Impression  Atrophy. No acute findings or interval change. Results from [place] encounter on 12/10/20    CT ABD PELV W CONT    Narrative  EXAM: CT ABD PELV W CONT    INDICATION: Weight loss and epigastric pain    COMPARISON: 9/23/2020    CONTRAST: 100 mL of Isovue-370. TECHNIQUE:  Following the uneventful intravenous administration of contrast, thin axial  images were obtained through the abdomen and pelvis. Coronal and sagittal  reconstructions were generated. Oral contrast was not administered. CT dose  reduction was achieved through use of a standardized protocol tailored for this  examination and automatic exposure control for dose modulation.     FINDINGS:  LOWER THORAX: Emphysematous changes at both lung bases. LIVER: No mass. Diffuse hypodensity of the liver. BILIARY TREE: Prior cholecystectomy CBD is not dilated. SPLEEN: within normal limits. PANCREAS: No mass or ductal dilatation. ADRENALS: Unremarkable. KIDNEYS: No calculus or hydronephrosis. Bilateral renal cysts, more numerous on  the left but larger on the right. One arising from the left mid kidney  demonstrates a thin rim of peripheral calcification. Stable soft tissue noted  lateral to this. Hyperdense 1 cm left lateral renal cyst redemonstrated on image  32. STOMACH: Diffuse pyloric thickening (series 3, image 23). SMALL BOWEL: No dilatation or wall thickening. COLON: No dilatation or wall thickening. APPENDIX: Not visualized  PERITONEUM: No ascites or pneumoperitoneum. RETROPERITONEUM: No lymphadenopathy or aortic aneurysm. REPRODUCTIVE ORGANS: Prostate seeds. URINARY BLADDER: No mass or calculus. BONES: No destructive bone lesion. ABDOMINAL WALL: No mass or hernia. ADDITIONAL COMMENTS: N/A    Impression  IMPRESSION:  Duodenitis  Emphysema  Complex left renal cysts. Posttreatment changes in the prostate. No evidence for tumor recurrence. MRI Results (maximum last 3): Results from Hospital Encounter encounter on 03/22/21    MRA NECK WO CONT    Narrative  CLINICAL HISTORY: Possible CVA, dyskinesia    INDICATION: Possible CVA and dyskinesia. COMPARISON:  9/21/2020  TECHNIQUE: MR examination of the brain includes axial and sagittal T1, axial T2,  axial FLAIR, axial gradient echo, axial DWI, coronal T2. Coronal T2  Next, 3-D time-of-flight MRA of the brain was performed. Multiplanar  reconstructions were obtained. Next, 2-D time-of-flight MRA of the neck was performed. Multiplanar  reconstructions were obtained. FINDINGS:  There is no intracranial mass, hemorrhage or evidence of acute infarction. There is sulcal and ventricular prominence. This is temporal predominant.   Minimal periventricular and few scattered foci of increased T2 signal intensity  in the cerebral white matter. There is no Chiari or syrinx. The pituitary and  infundibulum are grossly unremarkable. There is no skull base mass. Cerebellopontine angles are grossly unremarkable. The major intracranial  vascular flow-voids are unremarkable. The cavernous sinuses are symmetric. Optic  chiasm and infundibulum grossly unremarkable. Orbits are grossly symmetric. Dural venous sinuses are grossly patent. The brain architecture is normal. There  is no evidence of midline shift or mass-effect. There are no extra-axial fluid  collections. A2 and A3 segments within normal limits. There are A1 segments bilaterally. M1  segments are patent. M2 segments demonstrate symmetric arborization. . The  basilar artery and its branches are normal. The internal carotid, anterior  cerebral, and middle cerebral arteries are patent. There is no flow-limiting  intracranial stenosis. P1 and P2 segments are patent. Petrous and cavernous ICAs  are unremarkable. Left vertebral artery is dominant. There are bilateral  posterior communicating arteries. Dominant left vertebral artery. . The bilateral subclavian, common carotid, and  internal carotid arteries are patent . There is a degree of motion artifact  which slightly limits evaluation. .    Motion artifact slightly limits evaluation. There is suggested moderate to severe. % Stenosis in the right internal carotid  artery utilizing NASCET criteria. There is 0. % Stenosis in the left internal carotid artery utilizing NASCET  criteria. Impression  Moderate to severe temporal predominant cerebral atrophy for age. Mild chronic microvascular ischemic change. There is no major vessel occlusion. No intracranial mass, hemorrhage or evidence of acute infarction. No aneurysm or dissection. Moderate to severe stenosis of the proximal right internal carotid artery is  suggested.       MRA BRAIN WO CONT    Narrative  CLINICAL HISTORY: Possible CVA, dyskinesia    INDICATION: Possible CVA and dyskinesia. COMPARISON:  9/21/2020  TECHNIQUE: MR examination of the brain includes axial and sagittal T1, axial T2,  axial FLAIR, axial gradient echo, axial DWI, coronal T2. Coronal T2  Next, 3-D time-of-flight MRA of the brain was performed. Multiplanar  reconstructions were obtained. Next, 2-D time-of-flight MRA of the neck was performed. Multiplanar  reconstructions were obtained. FINDINGS:  There is no intracranial mass, hemorrhage or evidence of acute infarction. There is sulcal and ventricular prominence. This is temporal predominant. Minimal periventricular and few scattered foci of increased T2 signal intensity  in the cerebral white matter. There is no Chiari or syrinx. The pituitary and  infundibulum are grossly unremarkable. There is no skull base mass. Cerebellopontine angles are grossly unremarkable. The major intracranial  vascular flow-voids are unremarkable. The cavernous sinuses are symmetric. Optic  chiasm and infundibulum grossly unremarkable. Orbits are grossly symmetric. Dural venous sinuses are grossly patent. The brain architecture is normal. There  is no evidence of midline shift or mass-effect. There are no extra-axial fluid  collections. A2 and A3 segments within normal limits. There are A1 segments bilaterally. M1  segments are patent. M2 segments demonstrate symmetric arborization. . The  basilar artery and its branches are normal. The internal carotid, anterior  cerebral, and middle cerebral arteries are patent. There is no flow-limiting  intracranial stenosis. P1 and P2 segments are patent. Petrous and cavernous ICAs  are unremarkable. Left vertebral artery is dominant. There are bilateral  posterior communicating arteries. Dominant left vertebral artery. . The bilateral subclavian, common carotid, and  internal carotid arteries are patent .  There is a degree of motion artifact  which slightly limits evaluation. .    Motion artifact slightly limits evaluation. There is suggested moderate to severe. % Stenosis in the right internal carotid  artery utilizing NASCET criteria. There is 0. % Stenosis in the left internal carotid artery utilizing NASCET  criteria. Impression  Moderate to severe temporal predominant cerebral atrophy for age. Mild chronic microvascular ischemic change. There is no major vessel occlusion. No intracranial mass, hemorrhage or evidence of acute infarction. No aneurysm or dissection. Moderate to severe stenosis of the proximal right internal carotid artery is  suggested. MRI BRAIN WO CONT    Narrative  CLINICAL HISTORY: Possible CVA, dyskinesia    INDICATION: Possible CVA and dyskinesia. COMPARISON:  9/21/2020  TECHNIQUE: MR examination of the brain includes axial and sagittal T1, axial T2,  axial FLAIR, axial gradient echo, axial DWI, coronal T2. Coronal T2  Next, 3-D time-of-flight MRA of the brain was performed. Multiplanar  reconstructions were obtained. Next, 2-D time-of-flight MRA of the neck was performed. Multiplanar  reconstructions were obtained. FINDINGS:  There is no intracranial mass, hemorrhage or evidence of acute infarction. There is sulcal and ventricular prominence. This is temporal predominant. Minimal periventricular and few scattered foci of increased T2 signal intensity  in the cerebral white matter. There is no Chiari or syrinx. The pituitary and  infundibulum are grossly unremarkable. There is no skull base mass. Cerebellopontine angles are grossly unremarkable. The major intracranial  vascular flow-voids are unremarkable. The cavernous sinuses are symmetric. Optic  chiasm and infundibulum grossly unremarkable. Orbits are grossly symmetric. Dural venous sinuses are grossly patent. The brain architecture is normal. There  is no evidence of midline shift or mass-effect.  There are no extra-axial fluid  collections. A2 and A3 segments within normal limits. There are A1 segments bilaterally. M1  segments are patent. M2 segments demonstrate symmetric arborization. . The  basilar artery and its branches are normal. The internal carotid, anterior  cerebral, and middle cerebral arteries are patent. There is no flow-limiting  intracranial stenosis. P1 and P2 segments are patent. Petrous and cavernous ICAs  are unremarkable. Left vertebral artery is dominant. There are bilateral  posterior communicating arteries. Dominant left vertebral artery. . The bilateral subclavian, common carotid, and  internal carotid arteries are patent . There is a degree of motion artifact  which slightly limits evaluation. .    Motion artifact slightly limits evaluation. There is suggested moderate to severe. % Stenosis in the right internal carotid  artery utilizing NASCET criteria. There is 0. % Stenosis in the left internal carotid artery utilizing NASCET  criteria. Impression  Moderate to severe temporal predominant cerebral atrophy for age. Mild chronic microvascular ischemic change. There is no major vessel occlusion. No intracranial mass, hemorrhage or evidence of acute infarction. No aneurysm or dissection. Moderate to severe stenosis of the proximal right internal carotid artery is  suggested. Assessment and Plan   Karishma Trent  is a 81 y.o. male with a history of atrial fibrillation, right carotid artery stenosis, cervical stenosis who presents to neurology clinic for evaluation of tremors which do appear to be dyskinetic type movements as opposed to parkinsonian type movements. Unclear etiology.  MRI of the brain did reveal some white matter disease however no other cause for his symptoms.  -I did discuss that he should [facility] for ongoing evaluation of these movements.  -For his tremor specifically, they do appear dyskinetic, clonazepam did not work we will trial gabapentin 100 mg at bedtime. Again we discussed the side effects of this medication. Right carotid stenosis: Noted to be approximately 50 to 79% on most recent Doppler  -We will repeat a Doppler in approximately 1 year as this has not worsened    Cervical stenosis: Is being followed by neurosurgery who is not recommending intervention at this time  -Would recommend continue with PT and OT for now for strength.  -It is possible that his movements may be due cervical stenosis as well. 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 Patient is in agreement. The patient has received an after-visit summary and questions were answered concerning future plans. I have discussed medication side effects and warnings with the patient as well.         Signed By:  Elodia Shore MD     July 14, 2021

## 2021-09-19 DIAGNOSIS — G24.9 DYSKINESIA: ICD-10-CM

## 2021-09-20 RX ORDER — GABAPENTIN 100 MG/1
CAPSULE ORAL
Qty: 30 CAPSULE | Refills: 3 | Status: SHIPPED | OUTPATIENT
Start: 2021-09-20 | End: 2021-10-20 | Stop reason: SDUPTHER

## 2021-10-01 ENCOUNTER — TELEPHONE (OUTPATIENT)
Dept: NEUROLOGY | Age: 86
End: 2021-10-01

## 2021-10-01 NOTE — TELEPHONE ENCOUNTER
Called and spoke with Devorah Shaffer, patients daughter. She is on HIPAA form. Verified name/ of patient. Notified that Dr. Horace Warren has review the chart and the appointment can be virtual if they would like. Devorah Shaffer verbalized understanding. Appointment changed from in-person to virtual on Monday 10/4/21.

## 2021-10-04 ENCOUNTER — VIRTUAL VISIT (OUTPATIENT)
Dept: NEUROLOGY | Age: 86
End: 2021-10-04
Payer: MEDICARE

## 2021-10-04 DIAGNOSIS — M54.12 CERVICAL MYELOPATHY WITH CERVICAL RADICULOPATHY (HCC): ICD-10-CM

## 2021-10-04 DIAGNOSIS — G95.9 CERVICAL MYELOPATHY WITH CERVICAL RADICULOPATHY (HCC): ICD-10-CM

## 2021-10-04 DIAGNOSIS — G24.9 DYSKINESIA: Primary | ICD-10-CM

## 2021-10-04 DIAGNOSIS — I65.21 CAROTID STENOSIS, RIGHT: ICD-10-CM

## 2021-10-04 PROCEDURE — 99214 OFFICE O/P EST MOD 30 MIN: CPT | Performed by: PSYCHIATRY & NEUROLOGY

## 2021-10-04 PROCEDURE — G8419 CALC BMI OUT NRM PARAM NOF/U: HCPCS | Performed by: PSYCHIATRY & NEUROLOGY

## 2021-10-04 PROCEDURE — G8756 NO BP MEASURE DOC: HCPCS | Performed by: PSYCHIATRY & NEUROLOGY

## 2021-10-04 PROCEDURE — 1101F PT FALLS ASSESS-DOCD LE1/YR: CPT | Performed by: PSYCHIATRY & NEUROLOGY

## 2021-10-04 PROCEDURE — G8536 NO DOC ELDER MAL SCRN: HCPCS | Performed by: PSYCHIATRY & NEUROLOGY

## 2021-10-04 PROCEDURE — G8427 DOCREV CUR MEDS BY ELIG CLIN: HCPCS | Performed by: PSYCHIATRY & NEUROLOGY

## 2021-10-04 PROCEDURE — G8510 SCR DEP NEG, NO PLAN REQD: HCPCS | Performed by: PSYCHIATRY & NEUROLOGY

## 2021-10-04 NOTE — PROGRESS NOTES
Neurology Note    Patient ID:  Marcelle Zavala.  [id number]  81 y.o.  1935      Date of Consultation:  October 4, 2021      This is a telemedicine visit that was performed with in the originating site at patient's home and the distance site at [facility] outpatient clinic at [facility].  This telemedicine visit utilized synchronous (real-time) audio-video technology. Verbal consent to participate in the video visit was obtained. This visit occurred during the corona (COVID -19) public health emergency. I discussed with the patient the nature of our telemedicine visit, that:  - I would evaluate the patient and recommend diagnostic and treatment based on my assessment  - Our sessions are not being recorded and that personal health information is protected  - Our team will provide follow-up care in person if and when the patient needs it. Consent:  The patient is aware that this patient-initiated Telehealth encounter is a billable service, with coverage as determined by the patient's insurance carrier. The patient is aware that they may receive a bill and has provided verbal consent to proceed:     Subjective:     Chief Complaint: Tremors      History of Present Illness:   Marcelle Zavala. is a 81 y.o. male with a history of atrial fibrillation, right carotid artery stenosis, cervical stenosis who presents to neurology clinic for evaluation of tremors. It appears his tremor started several months ago however have been becoming more severe. He recently went to the emergency room as he is movements were uncontrollable. These movements do appear to be like scratching and writhing movements where he is unable to stop them. There is also some movement in the head and the neck. He does report at certain times that the symptoms are better however are worse in the morning. He tries to write for or move around the tremor resolves completely. They are also worse when he is more nervous.   He was recently found to have cervical stenosis however given his age and comorbidities decided against surgery. He is being followed by neurosurgery. He does have some weakness in the right upper extremity. He is undergoing physical therapy.     Interval hx:   Patient returns to clinic today and reports that his symptoms are the same. He did trial the gapantin however he had not seen a significant improvement. The dose tried was 100mg at bedtime. We discussed that this may have been too low of a dose. He is no longer doing physical therapy. He does feel like his hands are getting weaker and is harder for him to  objects such as his medications. 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 Calcium and PTH monitored by Dr. Amanda Hill, renal.  Levels normalized after surgery.  Prostate cancer ([address]     seeds, then XRT, then seed again.   Dr. Lisa Elder Spinal stenosis     Thyroid cancer [facility]) Jan 2015        Past Surgical History:   Procedure Laterality Date    COLONOSCOPY N/A 9/17/2018    COLONOSCOPY performed by Cali Borrego MD at [facility] AMBULATORY OR    COLONOSCOPY N/A 2/10/2021    COLONOSCOPY performed by Jasper Grant MD at [facility] ENDOSCOPY    HX APPENDECTOMY      HX CHOLECYSTECTOMY      HX HEART CATHETERIZATION      No Stents-  Dr. Sandy Cook      vasectomy    HX PARATHYROIDECTOMY  01/14/2015    right superior parathyroid gland removed and left superior parathyroid gland removed    HX PARTIAL THYROIDECTOMY  1/14/15    right lobectomy    HX TONSILLECTOMY      HX UROLOGICAL      Seeds for prostate cancer , 4 dialations     HX UROLOGICAL  1/14/15    BLADDER NECK INCISION, Cold knife incision of bladder neck contracture, cystoscopy        Family History   Problem Relation Age of Onset    Cancer Mother         hodgkins disease    Heart Disease Father     Hypertension Father     Other Neg Hx         no hypercalcemia or kidney stones        Social History     Tobacco Use    Smoking status: Former Smoker     Years: 5.00     Quit date: 1955     Years since quittin.7    Smokeless tobacco: Never Used   Substance Use Topics    Alcohol use: Yes     Comment: occassional mixed drink or beer        Allergies   Allergen Reactions    Sulfa (Sulfonamide Antibiotics) Hives        Prior to Admission medications    Medication Sig Start Date End Date Taking? Authorizing Provider   gabapentin (NEURONTIN) 100 mg capsule TAKE ONE CAPSULE BY MOUTH EVERY NIGHT AT BEDTIME 21  Yes Carly Mortensen MD   levothyroxine (SYNTHROID) 112 mcg tablet TAKE 1 TABLET BY MOUTH  DAILY BEFORE BREAKFAST 21  Yes Mame Orozco MD   clonazePAM (KlonoPIN) 0.5 mg tablet Take 0.5 Tabs by mouth nightly as needed for Anxiety (tremor). Max Daily Amount: 0.25 mg. 3/9/21  Yes Carly Mortensen MD   budesonide (ENTOCORT EC) 3 mg capsule budesonide DR - ER 3 mg capsule,delayed,extended release   Yes Provider, Historical   losartan (COZAAR) 100 mg tablet Take 100 mg by mouth daily. 20  Yes Provider, Historical   clopidogrel (PLAVIX) 75 mg tab TAKE 1 TABLET BY MOUTH  DAILY 19  Yes Mame Orozco MD   cyanocobalamin (VITAMIN B-12) 1,000 mcg tablet Take 1 Tab by mouth daily. 10/22/18  Yes nIocencia Levy MD   metoprolol succinate (TOPROL-XL) 50 mg XL tablet Take 50 mg by mouth daily.    Yes Provider, Historical   ELIQUIS 2.5 mg tablet Take 2.5 mg by mouth two (2) times a day. 5/3/18  Yes Provider, Historical   acetaminophen (TYLENOL EXTRA STRENGTH) 500 mg tablet Take 500 mg by mouth every six (6) hours as needed for Pain. Yes Provider, Historical   cholecalciferol, vitamin D3, (VITAMIN D3) 2,000 unit tab Take 1 Tab by mouth nightly. Yes Provider, Historical       Review of Systems:    General, constitutional: negative  Eyes, vision: negative  Ears, nose, throat: negative  Cardiovascular, heart: negative  Respiratory: negative  Gastrointestinal: negative  Genitourinary: negative  Musculoskeletal: negative  Skin and integumentary: negative  Psychiatric: negative  Endocrine: negative  Neurological: negative, except for HPI  Hematologic/lymphatic: negative  Allergy/immunology: negative      Objective:     No vital signs were obtained via telemedicine today. There are limitations to the neurological examination due to the technological features of telemedicine     Physical Exam:  General:  appears well nourished in no acute distress  Respiratory:  good respiratory effort. No labored breathing  Skin: intact. No obvious erythematous rashes     Neurological exam:  Awake, alert, oriented to person, place and time  Attention and concentration were intact  Language was intact. There was no aphasia  Speech: no dysarthria  Fund of knowledge was preserved     Cranial nerves:   Visual fields were full  Eomi, no evidence of nystagmus  Facial motor: normal and symmetric  Hearing intact  Tongue: midline without fasciculations     Motor:   Appears full strength in the upper and lower extremities. No drift was noted     Sensory:  Intact per patient     Reflexes:  Unable to obtain via telemedicine     Cerebellar testing:  no tremor apparent, finger/nose and suellen were intact    Abnormal movements: Did appear to have dyskinetic movements of the bilateral upper extremities. There did appear to be some facial improvements as well again appearing like dyskinesias. Romberg: absent      Labs:     Lab Results   Component Value Date/Time    Hemoglobin A1c 5.1 10/18/2018 04:34 AM    Sodium 134 (L) 02/16/2021 04:35 PM    Potassium 4.0 02/16/2021 04:35 PM    Chloride 100 02/16/2021 04:35 PM    Glucose 85 02/16/2021 04:35 PM    BUN 18 02/16/2021 04:35 PM    Creatinine 1.25 02/16/2021 04:35 PM    Calcium 8.6 02/16/2021 04:35 PM    WBC 6.7 02/16/2021 04:35 PM    HCT 40.3 02/16/2021 04:35 PM    HGB 13.3 02/16/2021 04:35 PM    PLATELET 835 99/59/0089 04:35 PM       Imaging:    Results from Hospital Encounter encounter on 03/22/21    MRA NECK WO CONT    Narrative  CLINICAL HISTORY: Possible CVA, dyskinesia    INDICATION: Possible CVA and dyskinesia. COMPARISON:  9/21/2020  TECHNIQUE: MR examination of the brain includes axial and sagittal T1, axial T2,  axial FLAIR, axial gradient echo, axial DWI, coronal T2. Coronal T2  Next, 3-D time-of-flight MRA of the brain was performed. Multiplanar  reconstructions were obtained. Next, 2-D time-of-flight MRA of the neck was performed. Multiplanar  reconstructions were obtained. FINDINGS:  There is no intracranial mass, hemorrhage or evidence of acute infarction. There is sulcal and ventricular prominence. This is temporal predominant. Minimal periventricular and few scattered foci of increased T2 signal intensity  in the cerebral white matter. There is no Chiari or syrinx. The pituitary and  infundibulum are grossly unremarkable. There is no skull base mass. Cerebellopontine angles are grossly unremarkable. The major intracranial  vascular flow-voids are unremarkable. The cavernous sinuses are symmetric. Optic  chiasm and infundibulum grossly unremarkable. Orbits are grossly symmetric. Dural venous sinuses are grossly patent. The brain architecture is normal. There  is no evidence of midline shift or mass-effect. There are no extra-axial fluid  collections. A2 and A3 segments within normal limits. There are A1 segments bilaterally. M1  segments are patent. M2 segments demonstrate symmetric arborization. . The  basilar artery and its branches are normal. The internal carotid, anterior  cerebral, and middle cerebral arteries are patent. There is no flow-limiting  intracranial stenosis. P1 and P2 segments are patent. Petrous and cavernous ICAs  are unremarkable. Left vertebral artery is dominant. There are bilateral  posterior communicating arteries. Dominant left vertebral artery. . The bilateral subclavian, common carotid, and  internal carotid arteries are patent . There is a degree of motion artifact  which slightly limits evaluation. .    Motion artifact slightly limits evaluation. There is suggested moderate to severe. % Stenosis in the right internal carotid  artery utilizing NASCET criteria. There is 0. % Stenosis in the left internal carotid artery utilizing NASCET  criteria. Impression  Moderate to severe temporal predominant cerebral atrophy for age. Mild chronic microvascular ischemic change. There is no major vessel occlusion. No intracranial mass, hemorrhage or evidence of acute infarction. No aneurysm or dissection. Moderate to severe stenosis of the proximal right internal carotid artery is  suggested. Results from [facility] encounter on 02/16/21    CT SPINE CERV W CONT    Narrative  EXAM: CT CERVICAL SPINE WITH CONTRAST    INDICATION: neck pain and tremors. COMPARISON: MRI 9/10/2020    TECHNIQUE: Multislice helical CT of the cervical spine was performed following  uneventful rapid bolus intravenous contrast administration. Sagittal and  coronal reformats were generated. CT dose reduction was achieved through use of  a standardized protocol tailored for this examination and automatic exposure  control for dose modulation. CONTRAST: 100    FINDINGS:    Bone mineralization is normal. There is no evidence for endplate destruction. There is normal vertebral body height.  Mild kyphotic inclination at C3-4 is  unchanged. There is no demonstration of listhesis. No paraspinal soft tissue  mass, collection or enhancement abnormality is shown. There is mild C2-3, severe C3-4 and C4-5, moderate to severe C5-6, severe C6-7,  moderate C7-T1 through upper thoracic spine disc space narrowing with prominent  marginal osteophyte formation and ossification of the anterior longitudinal  ligament. Endplate osteophytes are also shown posteriorly and posterolaterally  vertically at C3-4, C4-5, C5-6 and C6-7. The posterior processes are intact. There is mild-moderate left facet osteoarthrosis at C2-3 and C7-T1. There is  mild right facet osteoarthrosis at C7-T1. C2-C3: There is no canal or foraminal stenosis. C3-4: There is moderate canal and bilateral foraminal stenosis. .    C4-5: There is moderate to severe canal and bilateral foraminal stenosis. .    C5-6: There is moderate canal and moderate to severe right greater than left  bilateral foraminal stenosis. .    C6-7: There is mild canal and moderate bilateral foraminal stenosis. .    C7-T1: There is no canal or foraminal stenosis. Laurita Graven Upper thoracic segments: There is no canal or neural foraminal stenosis. Impression  Multilevel degenerative changes with canal and foraminal stenosis as above. No  destructive bone lesion or enhancing mass demonstrated. Assessment and Plan   Darius Dunne Sr. is a 81 y.o. male with a history of atrial fibrillation, right carotid artery stenosis, cervical stenosis who presents to neurology clinic for evaluation of tremors which do appear to be dyskinetic type movements as opposed to parkinsonian type movements. Unclear etiology. MRI of the brain did reveal some white matter disease however no other cause for his symptoms. Dyskinesia: etiology is unclear, has tried clonzepam   -I did discuss that he should be seen at the [facility] movement disorder clinic for ongoing evaluation of these movements.  Placed a new referral today   -For his tremor specifically, they do appear dyskinetic, clonazepam did not work we will trial gabapentin 200 mg at bedtime.   Again we discussed the side effects of this medication.     Right carotid stenosis: Noted to be approximately 50 to 79% on most recent Doppler  -We will repeat a Doppler in approximately March 2022, approx 1 year      Cervical stenosis: Is being followed by neurosurgery who is not recommending intervention at this time  -Would recommend continue with PT and OT for now for strength, patient would like to hold off on this for now   -It is possible that his movements may be due cervical stenosis as well.       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

## 2021-10-08 ENCOUNTER — TELEPHONE (OUTPATIENT)
Dept: NEUROLOGY | Age: 86
End: 2021-10-08

## 2021-10-08 ENCOUNTER — OFFICE VISIT (OUTPATIENT)
Dept: FAMILY MEDICINE CLINIC | Age: 86
End: 2021-10-08
Payer: MEDICARE

## 2021-10-08 VITALS
TEMPERATURE: 97.1 F | SYSTOLIC BLOOD PRESSURE: 134 MMHG | WEIGHT: 204.8 LBS | DIASTOLIC BLOOD PRESSURE: 74 MMHG | RESPIRATION RATE: 18 BRPM | BODY MASS INDEX: 30.33 KG/M2 | OXYGEN SATURATION: 97 % | HEART RATE: 63 BPM | HEIGHT: 69 IN

## 2021-10-08 DIAGNOSIS — B35.4 TINEA CORPORIS: Primary | ICD-10-CM

## 2021-10-08 DIAGNOSIS — Z23 ENCOUNTER FOR IMMUNIZATION: ICD-10-CM

## 2021-10-08 PROCEDURE — 1101F PT FALLS ASSESS-DOCD LE1/YR: CPT | Performed by: FAMILY MEDICINE

## 2021-10-08 PROCEDURE — G8417 CALC BMI ABV UP PARAM F/U: HCPCS | Performed by: FAMILY MEDICINE

## 2021-10-08 PROCEDURE — G8427 DOCREV CUR MEDS BY ELIG CLIN: HCPCS | Performed by: FAMILY MEDICINE

## 2021-10-08 PROCEDURE — 99213 OFFICE O/P EST LOW 20 MIN: CPT | Performed by: FAMILY MEDICINE

## 2021-10-08 PROCEDURE — G8510 SCR DEP NEG, NO PLAN REQD: HCPCS | Performed by: FAMILY MEDICINE

## 2021-10-08 PROCEDURE — G8754 DIAS BP LESS 90: HCPCS | Performed by: FAMILY MEDICINE

## 2021-10-08 PROCEDURE — G8752 SYS BP LESS 140: HCPCS | Performed by: FAMILY MEDICINE

## 2021-10-08 PROCEDURE — 90694 VACC AIIV4 NO PRSRV 0.5ML IM: CPT | Performed by: FAMILY MEDICINE

## 2021-10-08 PROCEDURE — G8536 NO DOC ELDER MAL SCRN: HCPCS | Performed by: FAMILY MEDICINE

## 2021-10-08 PROCEDURE — G0463 HOSPITAL OUTPT CLINIC VISIT: HCPCS | Performed by: FAMILY MEDICINE

## 2021-10-08 RX ORDER — CHLORPHENIRAMINE MALEATE 4 MG
TABLET ORAL 2 TIMES DAILY
COMMUNITY
End: 2021-10-08 | Stop reason: DRUGHIGH

## 2021-10-08 RX ORDER — AMLODIPINE BESYLATE 2.5 MG/1
2.5 TABLET ORAL DAILY
COMMUNITY
End: 2022-10-03 | Stop reason: SDUPTHER

## 2021-10-08 RX ORDER — CHLORPHENIRAMINE MALEATE 4 MG
TABLET ORAL 2 TIMES DAILY
Qty: 113 G | Refills: 1 | Status: SHIPPED
Start: 2021-10-08 | End: 2022-10-03

## 2021-10-08 NOTE — PROGRESS NOTES
1. Have you been to the ER, urgent care clinic since your last visit? Hospitalized since your last visit? No    2. Have you seen or consulted any other health care providers outside of the [address] [place] [address] since your last visit? Include any pap smears or colon screening. No    Health Maintenance Due   Topic Date Due    COVID-19 Vaccine (1) Never done    Shingrix Vaccine Age 50> (1 of 2) Never done    Flu Vaccine (1) 09/01/2021       Jacy Braden Tamez is a 81 y.o. male who presents for routine immunizations. He denies any symptoms , reactions or allergies that would exclude them from being immunized today. Risks and adverse reactions were discussed and the VIS was given to them. All questions were addressed. He was observed for 10 min post injection. There were no reactions observed.     Zia Nevarez LPN

## 2021-10-08 NOTE — PROGRESS NOTES
JUANA Santoro is a 81 y.o. male who presents with a rash and need for flu shot. 1 month ago started rash, small ring of red itchy skin that was growing outward. Tried triamcinolone and made it red angry and burning. Daughter is a picture of this. Few weeks later started Lotrimin with significant relief within a few days. The rash extends about 8 inch diameter on the back of the neck and extends into the hairline. 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 Calcium and PTH monitored by Dr. Ania Pratt, renal.  Levels normalized after surgery.  Prostate cancer ([address]     seeds, then XRT, then seed again. Dr. Ibis Harris Spinal stenosis     Thyroid cancer [facility]) Jan 2015       Meds:   Current Outpatient Medications   Medication Sig Dispense Refill    amLODIPine (NORVASC) 2.5 mg tablet Take 2.5 mg by mouth daily.  clotrimazole (LOTRIMIN) 1 % topical cream Apply  to affected area two (2) times a day. 113 g 1    gabapentin (NEURONTIN) 100 mg capsule TAKE ONE CAPSULE BY MOUTH EVERY NIGHT AT BEDTIME (Patient taking differently: Take 200 mg by mouth nightly as needed.) 30 Capsule 3    levothyroxine (SYNTHROID) 112 mcg tablet TAKE 1 TABLET BY MOUTH  DAILY BEFORE BREAKFAST 90 Tab 3    losartan (COZAAR) 100 mg tablet Take 100 mg by mouth daily.       clopidogrel (PLAVIX) 75 mg tab TAKE 1 TABLET BY MOUTH  DAILY 90 Tab 3    cyanocobalamin (VITAMIN B-12) 1,000 mcg tablet Take 1 Tab by mouth daily. 30 Tab 0    metoprolol succinate (TOPROL-XL) 50 mg XL tablet Take 50 mg by mouth daily.  ELIQUIS 2.5 mg tablet Take 2.5 mg by mouth two (2) times a day.  acetaminophen (TYLENOL EXTRA STRENGTH) 500 mg tablet Take 500 mg by mouth every six (6) hours as needed for Pain.  cholecalciferol, vitamin D3, (VITAMIN D3) 2,000 unit tab Take 1 Tab by mouth nightly.  clonazePAM (KlonoPIN) 0.5 mg tablet Take 0.5 Tabs by mouth nightly as needed for Anxiety (tremor). Max Daily Amount: 0.25 mg. (Patient not taking: Reported on 10/8/2021) 30 Tab 1    budesonide (ENTOCORT EC) 3 mg capsule budesonide DR - ER 3 mg capsule,delayed,extended release (Patient not taking: Reported on 10/8/2021)         Allergies: Allergies   Allergen Reactions    Sulfa (Sulfonamide Antibiotics) Hives       Smoker:  Social History     Tobacco Use   Smoking Status Former Smoker    Years: 5.00    Quit date: 1955    Years since quittin.7   Smokeless Tobacco Never Used       ETOH:   Social History     Substance and Sexual Activity   Alcohol Use Yes    Comment: occassional mixed drink or beer       FH:   Family History   Problem Relation Age of Onset    Cancer Mother         hodgkins disease    Heart Disease Father     Hypertension Father     Other Neg Hx         no hypercalcemia or kidney stones       ROS:   As listed in HPI. In addition:  Constitutional:   No headache, fever, fatigue, weight loss or weight gain      Cardiac:    No chest pain      Resp:   No cough or shortness of breath      Neuro   No loss of consciousness, dizziness, seizures      Physical Exam:  Blood pressure 134/74, pulse 63, temperature 97.1 °F (36.2 °C), temperature source Temporal, resp. rate 18, height 5' 9\" (1.753 m), weight 204 lb 12.8 oz (92.9 kg), SpO2 97 %. GEN: No apparent distress.  Alert and oriented and responds to all questions appropriately. NEUROLOGIC:  No focal neurologic deficits. Strength and sensation grossly intact. Coordination and gait grossly intact. EXT: Well perfused. No edema. SKIN: As described in HPI       Assessment and Plan     Tinea corpus  Lotrimin appropriate. They have been using it pretty sparingly because they got the small over-the-counter tubes. Provided with a bigger prescription tube for such a large area  Use Lotrimin twice daily for 1 month    Flu shot    He has not gotten his Covid shot yet mostly due to sense that he did want to leave the house to get that done. He thinks he will get that done soon. ICD-10-CM ICD-9-CM    1. Tinea corporis  B35.4 110.5 clotrimazole (LOTRIMIN) 1 % topical cream   2. Encounter for immunization  Z23 V03.89 ADMIN INFLUENZA VIRUS VAC      FLU (FLUAD QUAD INFLUENZA VACCINE,QUAD,ADJUVANTED)       AVS given.  Pt expressed understanding of instructions

## 2021-10-08 NOTE — LETTER
10/8/2021 9:20 AM    RE:    Erik Ramirez [facility] [address]      Thank you for agreeing to see Jerome Dallas    I am referring my patient to you for evaluation of dyskinesia. Please see his  pertinent patient information below. Neurology Clinic at [address] [place] [address]  Phone: [phone]  Fax: [phone]  [facility] - Referral Requisition      _________________________________________________________________________________________   Name:  Bunny Mills MRN: [id number] : 1935              Sex: M   Address:  [address] [place] [address] Home: [phone]    [facility] [address] [address] Preferred*: [phone]      Order: REFERRAL TO NEUROLOGY  Class: Outpatient Referral                Referral Priority: Routine  Associated Dx: Dyskinesia (G24.9)     Comments: Dyskinesias     If Coverage Data blank - Coverage Not Found. Primary Coverage Secondary Coverage   Payor: [place] MEDICARE []  Plan: Precious Springer PART A & B [[id number]]  ID: [id number]     Subscriber Information:  Name: Paula García.  : 1935  Address: [address]: [place]      Zip: [address]  Group No:  Payor: Alexandria Stevenson [[id number]]  Plan: [id number] [[id number]]  ID: [id number]     Subscriber Information:  Name: Paula García.  : 1935  Address: [address]  Zip: [address]  Group No: [id number]      Referral associated with order: [id number]     Referred by: Faviola Licea MD  Referral Reason: Specialty Services Required  Referred To:               Thesupa Socrates              [address] [place] [address]  [id number]  [place] [id number]         Phone:  Fax:      [phone]      Visits Requested: 1  Order Date: Oct 4, 2021  Expiration Date:        To Specialty/Loc:               Phone:  Fax:  -    -          **This is not your Insurance Authorization.  If your insurance requires an authorization, please see the Referral Coordinator or someone at our [employer] who will acquire that for you.          Ordering user:  Shellie Benítez MD Lic #  < Not on File > NPI: [id number]     Progress Notes  Shellie Benítez MD (Physician) [facility]      Neurology Note     Patient ID:  Marlee oLaiza  [id number]  81 y.o.  1935        Date of Consultation:  October 4, 2021        This is a telemedicine visit that was performed with in the originating site at patient's home and the distance site at [facility] outpatient clinic at [facility].  This telemedicine visit utilized synchronous (real-time) audio-video technology. Verbal consent to participate in the video visit was obtained. This visit occurred during the corona (COVID -19) public health emergency. I discussed with the patient the nature of our telemedicine visit, that:  - I would evaluate the patient and recommend diagnostic and treatment based on my assessment  - Our sessions are not being recorded and that personal health information is protected  - Our team will provide follow-up care in person if and when the patient needs it.     Consent:  The patient is aware that this patient-initiated Telehealth encounter is a billable service, with coverage as determined by the patient's insurance carrier. The patient is aware that they may receive a bill and has provided verbal consent to proceed:      Subjective:      Chief Complaint: Tremors      History of Present Illness:   Nish Hylton Sr. is a 81 y. o. male with a history of atrial fibrillation, right carotid artery stenosis, cervical stenosis who presents to neurology clinic for evaluation of tremors.  It appears his tremor started several months ago however have been becoming more severe. Estefany Ordonez recently went to the emergency room as he is movements were uncontrollable.  These movements do appear to be like scratching and writhing movements where he is unable to stop them. Aida Pinto is also some movement in the head and the neck.  He does report at certain times that the symptoms are better however are worse in the morning.  He tries to write for or move around the tremor resolves completely. Kira Domenica are also worse when he is more nervous. He was recently found to have cervical stenosis however given his age and comorbidities decided against surgery. Estefany Orodnez is being followed by neurosurgery. Estefany Ordonez does have some weakness in the right upper extremity.  He is undergoing physical therapy.     Interval hx:   Patient returns to clinic today and reports that his symptoms are the same. Estefany Ordonez did trial the gapantin however he had not seen a significant improvement. The dose tried was 100mg at bedtime. We discussed that this may have been too low of a dose.       He is no longer doing physical therapy. Estefany Ordonez does feel like his hands are getting weaker and is harder for him to  objects such as his medications. 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 Calcium and PTH monitored by Dr. Hemant Booker, renal.  Levels normalized after surgery.  Prostate cancer ([address]       seeds, then XRT, then seed again.   Dr. Ketty Barnes Spinal stenosis      Thyroid cancer [facility]) Jan 2015               Past Surgical History:   Procedure Laterality Date    COLONOSCOPY N/A 9/17/2018     COLONOSCOPY performed by Doe Oakes MD at [facility] AMBULATORY OR    COLONOSCOPY N/A 2/10/2021     COLONOSCOPY performed by Luc Flynn MD at [facility] ENDOSCOPY    HX APPENDECTOMY        HX CHOLECYSTECTOMY        HX HEART CATHETERIZATION         No Stents-  Dr. Shi Dodge         vasectomy    HX PARATHYROIDECTOMY   2015     right superior parathyroid gland removed and left superior parathyroid gland removed    HX PARTIAL THYROIDECTOMY   1/14/15     right lobectomy    HX TONSILLECTOMY        HX UROLOGICAL         Seeds for prostate cancer , 4 dialations     HX UROLOGICAL   1/14/15     BLADDER NECK INCISION, Cold knife incision of bladder neck contracture, cystoscopy               Family History   Problem Relation Age of Onset    Cancer Mother           hodgkins disease    Heart Disease Father      Hypertension Father      Other Neg Hx           no hypercalcemia or kidney stones         Social History            Tobacco Use    Smoking status: Former Smoker       Years: 5.00       Quit date: 1955       Years since quittin.9    Smokeless tobacco: Never Used   Substance Use Topics    Alcohol use: Yes       Comment: occassional mixed drink or beer              Allergies   Allergen Reactions    Sulfa (Sulfonamide Antibiotics) Hives                 Prior to Admission medications    Medication Sig Start Date End Date Taking? Authorizing Provider   gabapentin (NEURONTIN) 100 mg capsule TAKE ONE CAPSULE BY MOUTH EVERY NIGHT AT BEDTIME 21   Yes Barak Fox MD   levothyroxine (SYNTHROID) 112 mcg tablet TAKE 1 TABLET BY MOUTH  DAILY BEFORE BREAKFAST 21   Yes Jessica Bell MD   clonazePAM (KlonoPIN) 0.5 mg tablet Take 0.5 Tabs by mouth nightly as needed for Anxiety (tremor). Max Daily Amount: 0.25 mg. 3/9/21   Yes Barak Fox MD   budesonide (ENTOCORT EC) 3 mg capsule budesonide DR - ER 3 mg capsule,delayed,extended release     Yes Provider, Historical   losartan (COZAAR) 100 mg tablet Take 100 mg by mouth daily. 1/19/20   Yes Provider, Historical   clopidogrel (PLAVIX) 75 mg tab TAKE 1 TABLET BY MOUTH  DAILY 7/22/19   Yes Marlene Alcocer MD   cyanocobalamin (VITAMIN B-12) 1,000 mcg tablet Take 1 Tab by mouth daily. 10/22/18   Yes Stephanie Renteria MD   metoprolol succinate (TOPROL-XL) 50 mg XL tablet Take 50 mg by mouth daily.     Yes Provider, Historical   ELIQUIS 2.5 mg tablet Take 2.5 mg by mouth two (2) times a day. 5/3/18   Yes Provider, Historical   acetaminophen (TYLENOL EXTRA STRENGTH) 500 mg tablet Take 500 mg by mouth every six (6) hours as needed for Pain.     Yes Provider, Historical   cholecalciferol, vitamin D3, (VITAMIN D3) 2,000 unit tab Take 1 Tab by mouth nightly.     Yes Provider, Historical         Review of Systems:     General, constitutional: negative  Eyes, vision: negative  Ears, nose, throat: negative  Cardiovascular, heart: negative  Respiratory: negative  Gastrointestinal: negative  Genitourinary: negative  Musculoskeletal: negative  Skin and integumentary: negative  Psychiatric: negative  Endocrine: negative  Neurological: negative, except for HPI  Hematologic/lymphatic: negative  Allergy/immunology: negative        Objective:      No vital signs were obtained via telemedicine today.     There are limitations to the neurological examination due to the technological features of telemedicine     Physical Exam:  General:  appears well nourished in no acute distress  Respiratory:  good respiratory effort.  No labored breathing  Skin: intact.  No obvious erythematous rashes     Neurological exam:  Awake, alert, oriented to person, place and time  Attention and concentration were intact  Language was intact.  There was no aphasia  Speech: no dysarthria  Fund of knowledge was preserved     Cranial nerves:   Visual fields were full  Eomi, no evidence of nystagmus  Facial motor: normal and symmetric  Hearing intact  Tongue: midline without fasciculations     Motor:   Appears full strength in the upper and lower extremities.  No drift was noted     Sensory:  Intact per patient     Reflexes:  Unable to obtain via telemedicine     Cerebellar testing:  no tremor apparent, finger/nose and suellen were intact  Abnormal movements: Did appear to have dyskinetic movements of the bilateral upper extremities.  There did appear to be some facial improvements as well again appearing like dyskinesias.     Romberg: absent        Labs:            Lab Results   Component Value Date/Time     Hemoglobin A1c 5.1 10/18/2018 04:34 AM     Sodium 134 (L) 02/16/2021 04:35 PM     Potassium 4.0 02/16/2021 04:35 PM     Chloride 100 02/16/2021 04:35 PM     Glucose 85 02/16/2021 04:35 PM     BUN 18 02/16/2021 04:35 PM     Creatinine 1.25 02/16/2021 04:35 PM     Calcium 8.6 02/16/2021 04:35 PM     WBC 6.7 02/16/2021 04:35 PM     HCT 40.3 02/16/2021 04:35 PM     HGB 13.3 02/16/2021 04:35 PM     PLATELET 665 71/76/4829 04:35 PM         Imaging:     Results from Hospital Encounter encounter on 03/22/21     MRA NECK WO CONT     Narrative  CLINICAL HISTORY: Possible CVA, dyskinesia     INDICATION: Possible CVA and dyskinesia.     COMPARISON:  9/21/2020  TECHNIQUE: MR examination of the brain includes axial and sagittal T1, axial T2,  axial FLAIR, axial gradient echo, axial DWI, coronal T2. Coronal T2  Next, 3-D time-of-flight MRA of the brain was performed. Multiplanar  reconstructions were obtained. Next, 2-D time-of-flight MRA of the neck was performed. Multiplanar  reconstructions were obtained. FINDINGS:  There is no intracranial mass, hemorrhage or evidence of acute infarction. There is sulcal and ventricular prominence. This is temporal predominant. Minimal periventricular and few scattered foci of increased T2 signal intensity  in the cerebral white matter. There is no Chiari or syrinx. The pituitary and  infundibulum are grossly unremarkable. There is no skull base mass. Cerebellopontine angles are grossly unremarkable.  The major intracranial  vascular flow-voids are unremarkable. The cavernous sinuses are symmetric. Optic  chiasm and infundibulum grossly unremarkable. Orbits are grossly symmetric. Dural venous sinuses are grossly patent. The brain architecture is normal. There  is no evidence of midline shift or mass-effect. There are no extra-axial fluid  collections.     A2 and A3 segments within normal limits. There are A1 segments bilaterally. M1  segments are patent. M2 segments demonstrate symmetric arborization. . The  basilar artery and its branches are normal. The internal carotid, anterior  cerebral, and middle cerebral arteries are patent. There is no flow-limiting  intracranial stenosis. P1 and P2 segments are patent. Petrous and cavernous ICAs  are unremarkable. Left vertebral artery is dominant. There are bilateral  posterior communicating arteries.     Dominant left vertebral artery. . The bilateral subclavian, common carotid, and  internal carotid arteries are patent . There is a degree of motion artifact  which slightly limits evaluation. .     Motion artifact slightly limits evaluation. There is suggested moderate to severe. % Stenosis in the right internal carotid  artery utilizing NASCET criteria. There is 0. % Stenosis in the left internal carotid artery utilizing NASCET  criteria.     Impression  Moderate to severe temporal predominant cerebral atrophy for age. Mild chronic microvascular ischemic change.     There is no major vessel occlusion. No intracranial mass, hemorrhage or evidence of acute infarction. No aneurysm or dissection.   Moderate to severe stenosis of the proximal right internal carotid artery is  suggested.        Results from Hospital Encounter encounter on 02/16/21     CT SPINE CERV W CONT     Narrative  EXAM: CT CERVICAL SPINE WITH CONTRAST     INDICATION: neck pain and tremors.     COMPARISON: MRI 9/10/2020     TECHNIQUE: Multislice helical CT of the cervical spine was performed following  uneventful rapid bolus intravenous contrast administration. Sagittal and  coronal reformats were generated. CT dose reduction was achieved through use of  a standardized protocol tailored for this examination and automatic exposure  control for dose modulation.     CONTRAST: 100     FINDINGS:     Bone mineralization is normal. There is no evidence for endplate destruction. There is normal vertebral body height. Mild kyphotic inclination at C3-4 is  unchanged. There is no demonstration of listhesis. No paraspinal soft tissue  mass, collection or enhancement abnormality is shown.     There is mild C2-3, severe C3-4 and C4-5, moderate to severe C5-6, severe C6-7,  moderate C7-T1 through upper thoracic spine disc space narrowing with prominent  marginal osteophyte formation and ossification of the anterior longitudinal  ligament. Endplate osteophytes are also shown posteriorly and posterolaterally  vertically at C3-4, C4-5, C5-6 and C6-7. The posterior processes are intact. There is mild-moderate left facet osteoarthrosis at C2-3 and C7-T1. There is  mild right facet osteoarthrosis at C7-T1.     C2-C3: There is no canal or foraminal stenosis.     C3-4: There is moderate canal and bilateral foraminal stenosis. .     C4-5: There is moderate to severe canal and bilateral foraminal stenosis. .     C5-6: There is moderate canal and moderate to severe right greater than left  bilateral foraminal stenosis. .     C6-7: There is mild canal and moderate bilateral foraminal stenosis. .     C7-T1: There is no canal or foraminal stenosis. .     Upper thoracic segments: There is no canal or neural foraminal stenosis.     Impression  Multilevel degenerative changes with canal and foraminal stenosis as above.  No  destructive bone lesion or enhancing mass demonstrated.        Assessment and Plan   Kinjal Medina . is a 81 y.o. male with a history of atrial fibrillation, right carotid artery stenosis, cervical stenosis who presents to neurology clinic for evaluation of tremors which do appear to be dyskinetic type movements as opposed to parkinsonian type movements.  Unclear etiology. MRI of the brain did reveal some white matter disease however no other cause for his symptoms. Dyskinesia: etiology is unclear, has tried clonzepam   -I did discuss that he should be seen at the [facility] movement disorder clinic for ongoing evaluation of these movements.  Placed a new referral today   -For his tremor specifically, they do appear dyskinetic, clonazepam did not work we will trial gabapentin 200 mg at bedtime.  Again we discussed the side effects of this medication.     Right carotid stenosis: Noted to be approximately 50 to 79% on most recent Doppler  -We will repeat a Doppler in approximately March 2022, approx 1 year      Cervical stenosis: Is being followed by neurosurgery who is not recommending intervention at this time  -Would recommend continue with PT and OT for now for strength, patient would like to hold off on this for now   -It is possible that his movements may be due cervical stenosis as well.             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 Nahid Osuna care  and look forward to your findings and recommendations.         Sincerely,      Handy Bowman MD

## 2021-10-12 ENCOUNTER — TELEPHONE (OUTPATIENT)
Dept: NEUROLOGY | Age: 86
End: 2021-10-12

## 2021-10-13 NOTE — TELEPHONE ENCOUNTER
----- Message from Yaron Clement sent at 10/12/2021  7:20 AM EDT -----  Regarding: FW: Visit Follow-Up Question  Contact: [phone]    ----- Message -----  From: Theodore Pruitt. Sent: 10/11/2021  10:38 PM EDT  To: [facility]  Subject: Visit Follow-Up Question                         Did you decide to go ahead and schedule a doppler for my dad on October 27, 9am?  Should we wait and schedule his appointment with Dr. Trina Shine ([facility] after that date? He is now taking 200 mg of gabapentin before he goes to bed and 100 mg of gabapentin every morning. According to him, he thinks he is doing better. With that said, he has run out of this medication due to the increased dosage. Sregio E Justin Hurtado said it was too early to refill. Is there any way someone could call [facility] to fill a prescription for 30 days with the increased dosage and send him a script (90days) to send to [facility] so that he doesn't run out. Thank you!   We both hope you have a easy delivery and everyone is healthy!!!

## 2021-10-13 NOTE — TELEPHONE ENCOUNTER
Can you reschedule this patient's Doppler for March 2022. I think it was ordered too early and does not be need to be done yet. Additionally I have placed a new order for the gabapentin at the higher dose can you make sure that the pharmacy has no trouble with getting it to him.

## 2021-10-20 DIAGNOSIS — G24.9 DYSKINESIA: ICD-10-CM

## 2021-10-20 RX ORDER — GABAPENTIN 100 MG/1
CAPSULE ORAL
Qty: 270 CAPSULE | Refills: 4 | Status: SHIPPED | OUTPATIENT
Start: 2021-10-20 | End: 2022-10-03

## 2022-03-18 PROBLEM — G95.9 CERVICAL MYELOPATHY WITH CERVICAL RADICULOPATHY (HCC): Status: ACTIVE | Noted: 2020-09-08

## 2022-03-18 PROBLEM — E55.9 VITAMIN D DEFICIENCY: Status: ACTIVE | Noted: 2020-09-08

## 2022-03-18 PROBLEM — E53.8 B12 DEFICIENCY: Status: ACTIVE | Noted: 2020-09-08

## 2022-03-18 PROBLEM — M54.12 CERVICAL MYELOPATHY WITH CERVICAL RADICULOPATHY (HCC): Status: ACTIVE | Noted: 2020-09-08

## 2022-03-18 PROBLEM — I48.91 ATRIAL FIBRILLATION (HCC): Status: ACTIVE | Noted: 2020-09-21

## 2022-03-19 PROBLEM — R41.3 DISTURBANCE OF MEMORY: Status: ACTIVE | Noted: 2020-09-08

## 2022-03-19 PROBLEM — G45.1 TRANSIENT ISCHEMIC ATTACK INVOLVING RIGHT INTERNAL CAROTID ARTERY: Status: ACTIVE | Noted: 2018-10-18

## 2022-03-19 PROBLEM — Z86.018 HISTORY OF BENIGN PARATHYROID TUMOR: Status: ACTIVE | Noted: 2020-03-26

## 2022-03-19 PROBLEM — G25.0 BENIGN ESSENTIAL TREMOR SYNDROME: Status: ACTIVE | Noted: 2020-09-08

## 2022-03-19 PROBLEM — G45.0 VERTEBROBASILAR ARTERY INSUFFICIENCY: Status: ACTIVE | Noted: 2018-10-18

## 2022-03-20 PROBLEM — E87.1 HYPONATREMIA: Status: ACTIVE | Noted: 2020-09-22

## 2022-04-07 ENCOUNTER — OFFICE VISIT (OUTPATIENT)
Dept: NEUROLOGY | Age: 87
End: 2022-04-07
Payer: MEDICARE

## 2022-04-07 VITALS
SYSTOLIC BLOOD PRESSURE: 122 MMHG | HEIGHT: 69 IN | HEART RATE: 70 BPM | BODY MASS INDEX: 28.76 KG/M2 | TEMPERATURE: 97.5 F | OXYGEN SATURATION: 98 % | DIASTOLIC BLOOD PRESSURE: 70 MMHG | WEIGHT: 194.2 LBS

## 2022-04-07 DIAGNOSIS — G24.9 DYSKINESIA: Primary | ICD-10-CM

## 2022-04-07 DIAGNOSIS — I65.21 CAROTID STENOSIS, RIGHT: ICD-10-CM

## 2022-04-07 DIAGNOSIS — M54.12 CERVICAL MYELOPATHY WITH CERVICAL RADICULOPATHY (HCC): ICD-10-CM

## 2022-04-07 DIAGNOSIS — G95.9 CERVICAL MYELOPATHY WITH CERVICAL RADICULOPATHY (HCC): ICD-10-CM

## 2022-04-07 PROCEDURE — 1101F PT FALLS ASSESS-DOCD LE1/YR: CPT | Performed by: PSYCHIATRY & NEUROLOGY

## 2022-04-07 PROCEDURE — G8427 DOCREV CUR MEDS BY ELIG CLIN: HCPCS | Performed by: PSYCHIATRY & NEUROLOGY

## 2022-04-07 PROCEDURE — 99214 OFFICE O/P EST MOD 30 MIN: CPT | Performed by: PSYCHIATRY & NEUROLOGY

## 2022-04-07 PROCEDURE — G8536 NO DOC ELDER MAL SCRN: HCPCS | Performed by: PSYCHIATRY & NEUROLOGY

## 2022-04-07 PROCEDURE — G8417 CALC BMI ABV UP PARAM F/U: HCPCS | Performed by: PSYCHIATRY & NEUROLOGY

## 2022-04-07 PROCEDURE — G8510 SCR DEP NEG, NO PLAN REQD: HCPCS | Performed by: PSYCHIATRY & NEUROLOGY

## 2022-04-07 RX ORDER — PREGABALIN 25 MG/1
25 CAPSULE ORAL 2 TIMES DAILY
Qty: 30 CAPSULE | Refills: 0 | Status: SHIPPED | OUTPATIENT
Start: 2022-04-07 | End: 2022-05-26 | Stop reason: SDUPTHER

## 2022-04-07 NOTE — PROGRESS NOTES
Chief Complaint: Tremors     History of Present Illness:   Anamaria hSeppard Sr. is a 81 y.o. male with a history of atrial fibrillation, right carotid artery stenosis, cervical stenosis who presents to neurology clinic for evaluation of tremors. It appears his tremor started several months ago however have been becoming more severe. He recently went to the emergency room as he is movements were uncontrollable. These movements do appear to be like scratching and writhing movements where he is unable to stop them. There is also some movement in the head and the neck. He does report at certain times that the symptoms are better however are worse in the morning. He tries to write for or move around the tremor resolves completely. They are also worse when he is more nervous. He was recently found to have cervical stenosis however given his age and comorbidities decided against surgery. He is being followed by neurosurgery. He does have some weakness in the right upper extremity. He is undergoing physical therapy. Interval hx:   Patient returns to clinic today and reports that his symptoms are the same. Since patient was last seen in neurology clinic he reports that he was seen by the [facility] disorders clinic. There he was evaluated for possible Landen's disease genetic testing Which was negative. His daughter is with him and he continues to report that his symptoms are slightly improved with the gabapentin however if he takes a dose of this during the day it makes him too groggy. He is taking 200 mg at bedtime which has helped him sleep. He otherwise reports that he has had some numbness in his hands and some difficulty with grasping objects. He does feel like this is slightly worse than it was 6 months ago.      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 Calcium and PTH monitored by Dr. Kenny Hayward, renal.  Levels normalized after surgery.  Prostate cancer ([address]     seeds, then XRT, then seed again.   Dr. Sanjuanita Rojas Spinal stenosis     Thyroid cancer [facility]) 2015        Past Surgical History:   Procedure Laterality Date    COLONOSCOPY N/A 2018    COLONOSCOPY performed [facility] Tyrone [facility] at [facility] AMBULATORY OR    COLONOSCOPY N/A 2/10/2021    COLONOSCOPY performed by Akshat Schuler MD at [facility] ENDOSCOPY    HX APPENDECTOMY      HX CHOLECYSTECTOMY      HX HEART CATHETERIZATION      No Stents-  Dr. Uzma Funes HX OTHER SURGICAL      vasectomy    HX PARATHYROIDECTOMY  2015    right superior parathyroid gland removed and left superior parathyroid gland removed    HX PARTIAL THYROIDECTOMY  1/14/15    right lobectomy    HX TONSILLECTOMY      HX UROLOGICAL      Seeds for prostate cancer , 4 dialations     HX UROLOGICAL  1/14/15    BLADDER NECK INCISION, Cold knife incision of bladder neck contracture, cystoscopy        Family History   Problem Relation Age of Onset    Cancer Mother         hodgkins disease    Heart Disease Father     Hypertension Father     Other Neg Hx         no hypercalcemia or kidney stones        Social History     Tobacco Use    Smoking status: Former Smoker     Years: 5.00     Quit date: 1955     Years since quittin.2    Smokeless tobacco: Never Used   Substance Use Topics    Alcohol use: Yes     Comment: occassional mixed drink or beer        Allergies   Allergen Reactions    Sulfa (Sulfonamide Antibiotics) Hives        Prior to Admission medications    Medication Sig Start Date End Date Taking? Authorizing Provider   gabapentin (NEURONTIN) 100 mg capsule Take 1 pill each morning and 2 pills each night 10/20/21  Yes Tamela Melton MD   amLODIPine (NORVASC) 2.5 mg tablet Take 2.5 mg by mouth daily. Yes Provider, Historical   levothyroxine (SYNTHROID) 112 mcg tablet TAKE 1 TABLET BY MOUTH  DAILY BEFORE BREAKFAST 5/7/21  Yes Khoa Saucedo MD   losartan (COZAAR) 100 mg tablet Take 100 mg by mouth daily. 1/19/20  Yes Provider, Historical   clopidogrel (PLAVIX) 75 mg tab TAKE 1 TABLET BY MOUTH  DAILY 7/22/19  Yes Khoa Saucedo MD   metoprolol succinate (TOPROL-XL) 50 mg XL tablet Take 50 mg by mouth daily. Yes Provider, Historical   ELIQUIS 2.5 mg tablet Take 2.5 mg by mouth two (2) times a day. 5/3/18  Yes Provider, Historical   acetaminophen (TYLENOL EXTRA STRENGTH) 500 mg tablet Take 500 mg by mouth every six (6) hours as needed for Pain. Yes Provider, Historical   cholecalciferol, vitamin D3, (VITAMIN D3) 2,000 unit tab Take 1 Tab by mouth nightly. Yes Provider, Historical   clotrimazole (LOTRIMIN) 1 % topical cream Apply  to affected area two (2) times a day. Patient not taking: Reported on 4/7/2022 10/8/21   Khoa Saucedo MD   cyanocobalamin (VITAMIN B-12) 1,000 mcg tablet Take 1 Tab by mouth daily.   Patient not taking: Reported on 4/7/2022 10/22/18   Cali Baez MD       Review of Systems:  General, constitutional: negative  Eyes, vision: negative  Ears, nose, throat: negative  Cardiovascular, heart: negative  Respiratory: negative  Gastrointestinal: negative  Genitourinary: negative  Musculoskeletal: negative  Skin and integumentary: negative  Psychiatric: negative  Endocrine: negative  Neurological: negative, except for HPI  Hematologic/lymphatic: negative  Allergy/immunology: negative    Visit Vitals  /70   Pulse 70   Temp 97.5 °F (36.4 °C) (Temporal)   Ht 5' 9\" (1.753 m)   Wt 194 lb 3.2 oz (88.1 kg)   SpO2 98%   BMI 28.68 kg/m²       Physical Exam:  General:  no acute distress  Neck: no carotid bruits  Lungs: clear to auscultation  Heart:  no murmurs, regular rate and rhythm   Lower extremity: no edema    Neurological exam:  Mental Status: Awake, alert, oriented to person, place and time  Attention and Concentration: able to state the days of the week backwards   Speech and Language: No dysarthria. Able to name, repeat and follow commands   Fund of knowledge was preserved    Cranial nerves: II-XII  Pupils equal and reactive, visual fields intact by confrontation   Extraocular movements intact, no evidence of nystagmus or ptosis   Facial sensation intact   Facial movements symmetric   Hearing intact to soft rub bilaterally   Shoulder shrug symmetric and strong   Tongue protrusion full and midline without fasciculation or atrophy    Motor:   Normal tone and Bulk   Drift: No evidence of pronator drift   Abnormal movements: Did appear to have dyskinetic movements of the bilateral upper extremities. There did appear to be some facial improvements as well again appearing like dyskinesias. Strength testing:   deltoid triceps biceps Wrist ext. Wrist flex. intrinsics   Right 4 4 4 4 4 4   Left 5 5 5 5 5 5      Hip flex. Hip ext. Knee ext. Knee flex Dorsi flex Plantar flex   Right  5 5 5 5 5 5   Left  5 5 5 5 5 5       Sensory:  Station was intact to light touch and pinprick. There is no extinction. Reflexes:     Biceps Triceps  Brachiorad Patellar Achilles Plantar Hoffmans   Right  2 1 2 2 1 Down Neg   Left  2 1 2 2 1 Down Neg        Cerebellar testing:  No dysmetria. Finger-nose-finger was intact however he had some trouble with this on the right upper extremity due to the weakness. Romberg: absent    Gait: steady    Data:     INTERNAL RECORDS:  The patient's electronic medical record was reviewed.   The relevant details include:    Lab Results   Component Value Date/Time    Hemoglobin A1c 5.1 10/18/2018 04:34 AM    Sodium 134 (L) 02/16/2021 04:35 PM    Potassium 4.0 02/16/2021 04:35 PM    Chloride 100 02/16/2021 04:35 PM    Glucose 85 02/16/2021 04:35 PM    BUN 18 02/16/2021 04:35 PM    Creatinine 1.25 02/16/2021 04:35 PM    Calcium 8.6 02/16/2021 04:35 PM    WBC 6.7 02/16/2021 04:35 PM    HCT 40.3 02/16/2021 04:35 PM    HGB 13.3 02/16/2021 04:35 PM    PLATELET 577 56/42/4406 04:35 PM       CT Results (maximum last 3): Results from [place] encounter on 02/16/21    CT SPINE CERV W CONT    Narrative  EXAM: CT CERVICAL SPINE WITH CONTRAST    INDICATION: neck pain and tremors. COMPARISON: MRI 9/10/2020    TECHNIQUE: Multislice helical CT of the cervical spine was performed following  uneventful rapid bolus intravenous contrast administration. Sagittal and  coronal reformats were generated. CT dose reduction was achieved through use of  a standardized protocol tailored for this examination and automatic exposure  control for dose modulation. CONTRAST: 100    FINDINGS:    Bone mineralization is normal. There is no evidence for endplate destruction. There is normal vertebral body height. Mild kyphotic inclination at C3-4 is  unchanged. There is no demonstration of listhesis. No paraspinal soft tissue  mass, collection or enhancement abnormality is shown. There is mild C2-3, severe C3-4 and C4-5, moderate to severe C5-6, severe C6-7,  moderate C7-T1 through upper thoracic spine disc space narrowing with prominent  marginal osteophyte formation and ossification of the anterior longitudinal  ligament. Endplate osteophytes are also shown posteriorly and posterolaterally  vertically at C3-4, C4-5, C5-6 and C6-7. The posterior processes are intact. There is mild-moderate left facet osteoarthrosis at C2-3 and C7-T1. There is  mild right facet osteoarthrosis at C7-T1.     C2-C3: There is no canal or foraminal stenosis. C3-4: There is moderate canal and bilateral foraminal stenosis. .    C4-5: There is moderate to severe canal and bilateral foraminal stenosis. .    C5-6: There is moderate canal and moderate to severe right greater than left  bilateral foraminal stenosis. .    C6-7: There is mild canal and moderate bilateral foraminal stenosis. .    C7-T1: There is no canal or foraminal stenosis. [place] Upper thoracic segments: There is no canal or neural foraminal stenosis. Impression  Multilevel degenerative changes with canal and foraminal stenosis as above. No  destructive bone lesion or enhancing mass demonstrated. CT HEAD WO CONT    Narrative  EXAM: CT HEAD WO CONT    INDICATION: tremors and neck pain    COMPARISON: None. CONTRAST: None. TECHNIQUE: Unenhanced CT of the head was performed using 5 mm images. Brain and  bone windows were generated. Coronal and sagittal reformats. CT dose reduction  was achieved through use of a standardized protocol tailored for this  examination and automatic exposure control for dose modulation. FINDINGS:  The ventricles are normal in size and contour. There is mild-moderate cortical  sulcal prominence consistent with atrophy, unchanged. There is no significant  white matter disease. There is no intracranial hemorrhage, extra-axial  collection, or mass effect. The basilar cisterns are open. No CT evidence of  acute infarct. The bone windows demonstrate no abnormalities. The visualized portions of the  paranasal sinuses and mastoid air cells are clear. Impression  Atrophy. No acute findings or interval change. Results from [place] encounter on 12/10/20    CT ABD PELV W CONT    Narrative  EXAM: CT ABD PELV W CONT    INDICATION: Weight loss and epigastric pain    COMPARISON: 9/23/2020    CONTRAST: 100 mL of Isovue-370.     TECHNIQUE:  Following the uneventful intravenous administration of contrast, thin axial  images were obtained through the abdomen and pelvis. Coronal and sagittal  reconstructions were generated. Oral contrast was not administered. CT dose  reduction was achieved through use of a standardized protocol tailored for this  examination and automatic exposure control for dose modulation. FINDINGS:  LOWER THORAX: Emphysematous changes at both lung bases. LIVER: No mass. Diffuse hypodensity of the liver. BILIARY TREE: Prior cholecystectomy CBD is not dilated. SPLEEN: within normal limits. PANCREAS: No mass or ductal dilatation. ADRENALS: Unremarkable. KIDNEYS: No calculus or hydronephrosis. Bilateral renal cysts, more numerous on  the left but larger on the right. One arising from the left mid kidney  demonstrates a thin rim of peripheral calcification. Stable soft tissue noted  lateral to this. Hyperdense 1 cm left lateral renal cyst redemonstrated on image  32. STOMACH: Diffuse pyloric thickening (series 3, image 23). SMALL BOWEL: No dilatation or wall thickening. COLON: No dilatation or wall thickening. APPENDIX: Not visualized  PERITONEUM: No ascites or pneumoperitoneum. RETROPERITONEUM: No lymphadenopathy or aortic aneurysm. REPRODUCTIVE ORGANS: Prostate seeds. URINARY BLADDER: No mass or calculus. BONES: No destructive bone lesion. ABDOMINAL WALL: No mass or hernia. ADDITIONAL COMMENTS: N/A    Impression  IMPRESSION:  Duodenitis  Emphysema  Complex left renal cysts. Posttreatment changes in the prostate. No evidence for tumor recurrence. MRI Results (maximum last 3): Results from Hospital Encounter encounter on 03/22/21    MRA NECK WO CONT    Narrative  CLINICAL HISTORY: Possible CVA, dyskinesia    INDICATION: Possible CVA and dyskinesia. COMPARISON:  9/21/2020  TECHNIQUE: MR examination of the brain includes axial and sagittal T1, axial T2,  axial FLAIR, axial gradient echo, axial DWI, coronal T2. Coronal T2  Next, 3-D time-of-flight MRA of the brain was performed. Multiplanar  reconstructions were obtained.   Next, 2-D time-of-flight MRA of the neck was performed. Multiplanar  reconstructions were obtained. FINDINGS:  There is no intracranial mass, hemorrhage or evidence of acute infarction. There is sulcal and ventricular prominence. This is temporal predominant. Minimal periventricular and few scattered foci of increased T2 signal intensity  in the cerebral white matter. There is no Chiari or syrinx. The pituitary and  infundibulum are grossly unremarkable. There is no skull base mass. Cerebellopontine angles are grossly unremarkable. The major intracranial  vascular flow-voids are unremarkable. The cavernous sinuses are symmetric. Optic  chiasm and infundibulum grossly unremarkable. Orbits are grossly symmetric. Dural venous sinuses are grossly patent. The brain architecture is normal. There  is no evidence of midline shift or mass-effect. There are no extra-axial fluid  collections. A2 and A3 segments within normal limits. There are A1 segments bilaterally. M1  segments are patent. M2 segments demonstrate symmetric arborization. . The  basilar artery and its branches are normal. The internal carotid, anterior  cerebral, and middle cerebral arteries are patent. There is no flow-limiting  intracranial stenosis. P1 and P2 segments are patent. Petrous and cavernous ICAs  are unremarkable. Left vertebral artery is dominant. There are bilateral  posterior communicating arteries. Dominant left vertebral artery. . The bilateral subclavian, common carotid, and  internal carotid arteries are patent . There is a degree of motion artifact  which slightly limits evaluation. .    Motion artifact slightly limits evaluation. There is suggested moderate to severe. % Stenosis in the right internal carotid  artery utilizing NASCET criteria. There is 0. % Stenosis in the left internal carotid artery utilizing NASCET  criteria. Impression  Moderate to severe temporal predominant cerebral atrophy for age.   Mild chronic microvascular ischemic change. There is no major vessel occlusion. No intracranial mass, hemorrhage or evidence of acute infarction. No aneurysm or dissection. Moderate to severe stenosis of the proximal right internal carotid artery is  suggested. MRA BRAIN WO CONT    Narrative  CLINICAL HISTORY: Possible CVA, dyskinesia    INDICATION: Possible CVA and dyskinesia. COMPARISON:  9/21/2020  TECHNIQUE: MR examination of the brain includes axial and sagittal T1, axial T2,  axial FLAIR, axial gradient echo, axial DWI, coronal T2. Coronal T2  Next, 3-D time-of-flight MRA of the brain was performed. Multiplanar  reconstructions were obtained. Next, 2-D time-of-flight MRA of the neck was performed. Multiplanar  reconstructions were obtained. FINDINGS:  There is no intracranial mass, hemorrhage or evidence of acute infarction. There is sulcal and ventricular prominence. This is temporal predominant. Minimal periventricular and few scattered foci of increased T2 signal intensity  in the cerebral white matter. There is no Chiari or syrinx. The pituitary and  infundibulum are grossly unremarkable. There is no skull base mass. Cerebellopontine angles are grossly unremarkable. The major intracranial  vascular flow-voids are unremarkable. The cavernous sinuses are symmetric. Optic  chiasm and infundibulum grossly unremarkable. Orbits are grossly symmetric. Dural venous sinuses are grossly patent. The brain architecture is normal. There  is no evidence of midline shift or mass-effect. There are no extra-axial fluid  collections. A2 and A3 segments within normal limits. There are A1 segments bilaterally. M1  segments are patent. M2 segments demonstrate symmetric arborization. . The  basilar artery and its branches are normal. The internal carotid, anterior  cerebral, and middle cerebral arteries are patent. There is no flow-limiting  intracranial stenosis. P1 and P2 segments are patent.  Petrous and cavernous ICAs  are unremarkable. Left vertebral artery is dominant. There are bilateral  posterior communicating arteries. Dominant left vertebral artery. . The bilateral subclavian, common carotid, and  internal carotid arteries are patent . There is a degree of motion artifact  which slightly limits evaluation. .    Motion artifact slightly limits evaluation. There is suggested moderate to severe. % Stenosis in the right internal carotid  artery utilizing NASCET criteria. There is 0. % Stenosis in the left internal carotid artery utilizing NASCET  criteria. Impression  Moderate to severe temporal predominant cerebral atrophy for age. Mild chronic microvascular ischemic change. There is no major vessel occlusion. No intracranial mass, hemorrhage or evidence of acute infarction. No aneurysm or dissection. Moderate to severe stenosis of the proximal right internal carotid artery is  suggested. MRI BRAIN WO CONT    Narrative  CLINICAL HISTORY: Possible CVA, dyskinesia    INDICATION: Possible CVA and dyskinesia. COMPARISON:  9/21/2020  TECHNIQUE: MR examination of the brain includes axial and sagittal T1, axial T2,  axial FLAIR, axial gradient echo, axial DWI, coronal T2. Coronal T2  Next, 3-D time-of-flight MRA of the brain was performed. Multiplanar  reconstructions were obtained. Next, 2-D time-of-flight MRA of the neck was performed. Multiplanar  reconstructions were obtained. FINDINGS:  There is no intracranial mass, hemorrhage or evidence of acute infarction. There is sulcal and ventricular prominence. This is temporal predominant. Minimal periventricular and few scattered foci of increased T2 signal intensity  in the cerebral white matter. There is no Chiari or syrinx. The pituitary and  infundibulum are grossly unremarkable. There is no skull base mass. Cerebellopontine angles are grossly unremarkable. The major intracranial  vascular flow-voids are unremarkable. The cavernous sinuses are symmetric. Optic  chiasm and infundibulum grossly unremarkable. Orbits are grossly symmetric. Dural venous sinuses are grossly patent. The brain architecture is normal. There  is no evidence of midline shift or mass-effect. There are no extra-axial fluid  collections. A2 and A3 segments within normal limits. There are A1 segments bilaterally. M1  segments are patent. M2 segments demonstrate symmetric arborization. . The  basilar artery and its branches are normal. The internal carotid, anterior  cerebral, and middle cerebral arteries are patent. There is no flow-limiting  intracranial stenosis. P1 and P2 segments are patent. Petrous and cavernous ICAs  are unremarkable. Left vertebral artery is dominant. There are bilateral  posterior communicating arteries. Dominant left vertebral artery. . The bilateral subclavian, common carotid, and  internal carotid arteries are patent . There is a degree of motion artifact  which slightly limits evaluation. .    Motion artifact slightly limits evaluation. There is suggested moderate to severe. % Stenosis in the right internal carotid  artery utilizing NASCET criteria. There is 0. % Stenosis in the left internal carotid artery utilizing NASCET  criteria. Impression  Moderate to severe temporal predominant cerebral atrophy for age. Mild chronic microvascular ischemic change. There is no major vessel occlusion. No intracranial mass, hemorrhage or evidence of acute infarction. No aneurysm or dissection. Moderate to severe stenosis of the proximal right internal carotid artery is  suggested. Assessment and Plan   Shyamvet Reveal Sr. is a 81 y.o. male with a history of atrial fibrillation, right carotid artery stenosis, cervical stenosis who presents to neurology clinic for evaluation of tremors which do appear to be dyskinetic type movements as opposed to parkinsonian type movements. Unclear etiology.  MRI of the brain did reveal some white matter disease however no other cause for his symptoms.  -I did discuss that he should be seen at the [facility] movement disorder clinic for ongoing evaluation of these movements. As findings disease was ruled out, I will contact them to see if there are any other suggestions regarding diagnosis and treatment.  -We will trial Lyrica 25 mg during the day and 50 mg at bedtime to see if this helps her symptoms. We did discuss this medication and its potential side effects. Right carotid stenosis: Noted to be approximately 50 to 79% on most recent Doppler  -We will repeat a Doppler in approximately 1 year as this has not worsened    Cervical stenosis: Is being followed by neurosurgery who is not recommending intervention at this time  -Recommended that patient continue with exercises to help prevent this from worsening and maintain strength in his arms.  -It is possible that his movements may be due cervical stenosis as well. 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 Patient is in agreement.  The patient has received an after-visit summary and questions were answered concerning future plans. I have discussed medication side effects and warnings with the patient as well.         Signed By:  Franko Soliman MD     April 7, 2022

## 2022-04-15 DIAGNOSIS — G24.9 DYSKINESIA: ICD-10-CM

## 2022-04-18 RX ORDER — PREGABALIN 25 MG/1
CAPSULE ORAL
Qty: 270 CAPSULE | Refills: 1 | Status: CANCELLED | OUTPATIENT
Start: 2022-04-18

## 2022-04-18 NOTE — TELEPHONE ENCOUNTER
Future Appointments   Date Time Provider Anup Bauer   8/18/2022  2:00 PM Norvel Cockayne, MD [facility]                         Last Appointment My Department:  4/7/2022    Please review and send in refill below if warranted.  90 day supply request    Requested Prescriptions     Pending Prescriptions Disp Refills    pregabalin (LYRICA) 25 mg capsule 270 Capsule 1     Sig: Take 1 in the morning and 2 at night

## 2022-04-29 DIAGNOSIS — C73 THYROID CANCER (HCC): ICD-10-CM

## 2022-04-29 RX ORDER — LEVOTHYROXINE SODIUM 112 UG/1
TABLET ORAL
Qty: 90 TABLET | Refills: 3 | Status: SHIPPED | OUTPATIENT
Start: 2022-04-29 | End: 2022-10-04 | Stop reason: DRUGHIGH

## 2022-05-26 DIAGNOSIS — G24.9 DYSKINESIA: ICD-10-CM

## 2022-05-27 RX ORDER — PREGABALIN 25 MG/1
25 CAPSULE ORAL 2 TIMES DAILY
Qty: 180 CAPSULE | Refills: 1 | Status: SHIPPED | OUTPATIENT
Start: 2022-05-27

## 2022-08-18 ENCOUNTER — APPOINTMENT (OUTPATIENT)
Dept: MRI IMAGING | Age: 87
End: 2022-08-18
Attending: STUDENT IN AN ORGANIZED HEALTH CARE EDUCATION/TRAINING PROGRAM
Payer: MEDICARE

## 2022-08-18 ENCOUNTER — HOSPITAL ENCOUNTER (EMERGENCY)
Age: 87
Discharge: HOME | End: 2022-08-18
Attending: STUDENT IN AN ORGANIZED HEALTH CARE EDUCATION/TRAINING PROGRAM | Admitting: STUDENT IN AN ORGANIZED HEALTH CARE EDUCATION/TRAINING PROGRAM
Payer: MEDICARE

## 2022-08-18 ENCOUNTER — OFFICE VISIT (OUTPATIENT)
Dept: NEUROLOGY | Age: 87
End: 2022-08-18
Payer: MEDICARE

## 2022-08-18 VITALS
WEIGHT: 183 LBS | HEART RATE: 79 BPM | DIASTOLIC BLOOD PRESSURE: 99 MMHG | SYSTOLIC BLOOD PRESSURE: 162 MMHG | OXYGEN SATURATION: 99 % | BODY MASS INDEX: 27.11 KG/M2 | HEIGHT: 69 IN | RESPIRATION RATE: 16 BRPM | TEMPERATURE: 97.9 F

## 2022-08-18 VITALS
HEART RATE: 69 BPM | WEIGHT: 183.4 LBS | SYSTOLIC BLOOD PRESSURE: 139 MMHG | OXYGEN SATURATION: 99 % | RESPIRATION RATE: 18 BRPM | BODY MASS INDEX: 27.16 KG/M2 | DIASTOLIC BLOOD PRESSURE: 69 MMHG | HEIGHT: 69 IN | TEMPERATURE: 97.6 F

## 2022-08-18 DIAGNOSIS — M54.12 CERVICAL MYELOPATHY WITH CERVICAL RADICULOPATHY (HCC): ICD-10-CM

## 2022-08-18 DIAGNOSIS — R20.2 TINGLING OF FACE: ICD-10-CM

## 2022-08-18 DIAGNOSIS — R47.81 SLURRING OF SPEECH: ICD-10-CM

## 2022-08-18 DIAGNOSIS — M48.02 CERVICAL STENOSIS OF SPINE: ICD-10-CM

## 2022-08-18 DIAGNOSIS — M50.30 DDD (DEGENERATIVE DISC DISEASE), CERVICAL: ICD-10-CM

## 2022-08-18 DIAGNOSIS — M47.12 DEGENERATIVE ARTHRITIS OF CERVICAL SPINE WITH CORD COMPRESSION: Primary | ICD-10-CM

## 2022-08-18 DIAGNOSIS — G25.5 CHOREA: ICD-10-CM

## 2022-08-18 DIAGNOSIS — G24.9 DYSKINESIA: ICD-10-CM

## 2022-08-18 DIAGNOSIS — G45.1 TRANSIENT ISCHEMIC ATTACK INVOLVING RIGHT INTERNAL CAROTID ARTERY: ICD-10-CM

## 2022-08-18 DIAGNOSIS — G95.9 CERVICAL MYELOPATHY WITH CERVICAL RADICULOPATHY (HCC): ICD-10-CM

## 2022-08-18 DIAGNOSIS — I65.21 CAROTID STENOSIS, RIGHT: Primary | ICD-10-CM

## 2022-08-18 LAB
ALBUMIN SERPL-MCNC: 3.5 G/DL (ref 3.5–5)
ALBUMIN/GLOB SERPL: 1.1 {RATIO} (ref 1.1–2.2)
ALP SERPL-CCNC: 82 U/L (ref 45–117)
ALT SERPL-CCNC: 23 U/L (ref 12–78)
ANION GAP SERPL CALC-SCNC: 4 MMOL/L (ref 5–15)
APPEARANCE UR: CLEAR
AST SERPL-CCNC: 22 U/L (ref 15–37)
BACTERIA URNS QL MICRO: NEGATIVE /HPF
BASOPHILS # BLD: 0 K/UL (ref 0–0.1)
BASOPHILS NFR BLD: 0 % (ref 0–1)
BILIRUB SERPL-MCNC: 0.9 MG/DL (ref 0.2–1)
BILIRUB UR QL: NEGATIVE
BUN SERPL-MCNC: 21 MG/DL (ref 6–20)
BUN/CREAT SERPL: 18 (ref 12–20)
CALCIUM SERPL-MCNC: 8.8 MG/DL (ref 8.5–10.1)
CHLORIDE SERPL-SCNC: 103 MMOL/L (ref 97–108)
CO2 SERPL-SCNC: 28 MMOL/L (ref 21–32)
COLOR UR: NORMAL
CREAT SERPL-MCNC: 1.19 MG/DL (ref 0.7–1.3)
DIFFERENTIAL METHOD BLD: NORMAL
EOSINOPHIL # BLD: 0.1 K/UL (ref 0–0.4)
EOSINOPHIL NFR BLD: 2 % (ref 0–7)
EPITH CASTS URNS QL MICRO: NORMAL /LPF
ERYTHROCYTE [DISTWIDTH] IN BLOOD BY AUTOMATED COUNT: 13.4 % (ref 11.5–14.5)
GLOBULIN SER CALC-MCNC: 3.1 G/DL (ref 2–4)
GLUCOSE SERPL-MCNC: 90 MG/DL (ref 65–100)
GLUCOSE UR STRIP.AUTO-MCNC: NEGATIVE MG/DL
HCT VFR BLD AUTO: 40.4 % (ref 36.6–50.3)
HGB BLD-MCNC: 13.6 G/DL (ref 12.1–17)
HGB UR QL STRIP: NEGATIVE
HYALINE CASTS URNS QL MICRO: NORMAL /LPF (ref 0–2)
IMM GRANULOCYTES # BLD AUTO: 0 K/UL (ref 0–0.04)
IMM GRANULOCYTES NFR BLD AUTO: 0 % (ref 0–0.5)
KETONES UR QL STRIP.AUTO: NEGATIVE MG/DL
LEUKOCYTE ESTERASE UR QL STRIP.AUTO: NEGATIVE
LYMPHOCYTES # BLD: 1.4 K/UL (ref 0.8–3.5)
LYMPHOCYTES NFR BLD: 18 % (ref 12–49)
MCH RBC QN AUTO: 31.6 PG (ref 26–34)
MCHC RBC AUTO-ENTMCNC: 33.7 G/DL (ref 30–36.5)
MCV RBC AUTO: 94 FL (ref 80–99)
MONOCYTES # BLD: 0.7 K/UL (ref 0–1)
MONOCYTES NFR BLD: 9 % (ref 5–13)
NEUTS SEG # BLD: 5.3 K/UL (ref 1.8–8)
NEUTS SEG NFR BLD: 71 % (ref 32–75)
NITRITE UR QL STRIP.AUTO: NEGATIVE
NRBC # BLD: 0 K/UL (ref 0–0.01)
NRBC BLD-RTO: 0 PER 100 WBC
PH UR STRIP: 6.5 [PH] (ref 5–8)
PLATELET # BLD AUTO: 312 K/UL (ref 150–400)
PMV BLD AUTO: 9 FL (ref 8.9–12.9)
POTASSIUM SERPL-SCNC: 4.8 MMOL/L (ref 3.5–5.1)
PROT SERPL-MCNC: 6.6 G/DL (ref 6.4–8.2)
PROT UR STRIP-MCNC: NEGATIVE MG/DL
RBC # BLD AUTO: 4.3 M/UL (ref 4.1–5.7)
RBC #/AREA URNS HPF: NORMAL /HPF (ref 0–5)
SODIUM SERPL-SCNC: 135 MMOL/L (ref 136–145)
SP GR UR REFRACTOMETRY: 1.01
UA: UC IF INDICATED,UAUC: NORMAL
UROBILINOGEN UR QL STRIP.AUTO: 1 EU/DL (ref 0.2–1)
WBC # BLD AUTO: 7.5 K/UL (ref 4.1–11.1)
WBC URNS QL MICRO: NORMAL /HPF (ref 0–4)

## 2022-08-18 PROCEDURE — 80053 COMPREHEN METABOLIC PANEL: CPT

## 2022-08-18 PROCEDURE — 36415 COLL VENOUS BLD VENIPUNCTURE: CPT

## 2022-08-18 PROCEDURE — 70551 MRI BRAIN STEM W/O DYE: CPT

## 2022-08-18 PROCEDURE — 99214 OFFICE O/P EST MOD 30 MIN: CPT | Performed by: PSYCHIATRY & NEUROLOGY

## 2022-08-18 PROCEDURE — G8510 SCR DEP NEG, NO PLAN REQD: HCPCS | Performed by: PSYCHIATRY & NEUROLOGY

## 2022-08-18 PROCEDURE — G8536 NO DOC ELDER MAL SCRN: HCPCS | Performed by: PSYCHIATRY & NEUROLOGY

## 2022-08-18 PROCEDURE — 81001 URINALYSIS AUTO W/SCOPE: CPT

## 2022-08-18 PROCEDURE — 72141 MRI NECK SPINE W/O DYE: CPT

## 2022-08-18 PROCEDURE — G8427 DOCREV CUR MEDS BY ELIG CLIN: HCPCS | Performed by: PSYCHIATRY & NEUROLOGY

## 2022-08-18 PROCEDURE — 1123F ACP DISCUSS/DSCN MKR DOCD: CPT | Performed by: PSYCHIATRY & NEUROLOGY

## 2022-08-18 PROCEDURE — 1101F PT FALLS ASSESS-DOCD LE1/YR: CPT | Performed by: PSYCHIATRY & NEUROLOGY

## 2022-08-18 PROCEDURE — 99284 EMERGENCY DEPT VISIT MOD MDM: CPT

## 2022-08-18 PROCEDURE — G8417 CALC BMI ABV UP PARAM F/U: HCPCS | Performed by: PSYCHIATRY & NEUROLOGY

## 2022-08-18 PROCEDURE — 85025 COMPLETE CBC W/AUTO DIFF WBC: CPT

## 2022-08-18 RX ORDER — DIAZEPAM 10 MG/2ML
5 INJECTION INTRAMUSCULAR ONCE
Status: DISCONTINUED | OUTPATIENT
Start: 2022-08-18 | End: 2022-08-18

## 2022-08-18 NOTE — ED NOTES
Urine sent to lab.  Per the family member, pt will need meds before MRI due to involuntary movements

## 2022-08-18 NOTE — ED NOTES
Care assumed.     Plan is for patient to get an MRI tonight  MRI screening sheet is filled out--    Patient is alert  Family at bedside--  No distress  Call light within reach

## 2022-08-18 NOTE — PROGRESS NOTES
Chief Complaint: Tremors     History of Present Illness:   Prisca Jameson Sr. is a 81 y.o. male with a history of atrial fibrillation, right carotid artery stenosis, cervical stenosis who presents to neurology clinic for evaluation of tremors. It appears his tremor started several months ago however have been becoming more severe. He recently went to the emergency room as he is movements were uncontrollable. These movements do appear to be like scratching and writhing movements where he is unable to stop them. There is also some movement in the head and the neck. He does report at certain times that the symptoms are better however are worse in the morning. He tries to write for or move around the tremor resolves completely. They are also worse when he is more nervous. He was recently found to have cervical stenosis however given his age and comorbidities decided against surgery. He is being followed by neurosurgery. He does have some weakness in the right upper extremity. He is undergoing physical therapy. Interval hx:   Since patient was last seen in neurology clinic he has been worsening. His daughter reports that she went out of town over this last week and and when she came back she noticed that his speech was more slurred than it had been previously. Additionally he was complaining of paresthesias on the right side of his face. She also reports that his balance and walking have been worsening. He now uses a rolling walker at all times given his poor balance. She is most concerned with the speech changes and the tingling of the face as these are new symptoms. Regarding the chorea, his symptoms have also been progressing. She reports that he is constantly moving day or night. He does not feel like the Lyrica has been helping his symptoms. He does feel too sedated on this medicine during the day and has only been taking it at night.     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 Calcium and PTH monitored by Dr. Padma Martel, renal.  Levels normalized after surgery. Prostate cancer ([address]     seeds, then XRT, then seed again.   Dr. Rome Reap    Spinal stenosis     Thyroid cancer [facility]) 2015        Past Surgical History:   Procedure Laterality Date    COLONOSCOPY N/A 2018    COLONOSCOPY performed by Shakira Malhotra MD at [facility] AMBULATORY OR    COLONOSCOPY N/A 2/10/2021    COLONOSCOPY performed by Tere Kowalski MD at [facility] ENDOSCOPY    HX APPENDECTOMY      HX CHOLECYSTECTOMY      HX HEART CATHETERIZATION      No Stents-  Dr. Ana Dunlap      vasectomy    HX PARATHYROIDECTOMY  2015    right superior parathyroid gland removed and left superior parathyroid gland removed    HX PARTIAL THYROIDECTOMY  1/14/15    right lobectomy    HX TONSILLECTOMY      HX UROLOGICAL      Seeds for prostate cancer , 4 dialations     HX UROLOGICAL  1/14/15    BLADDER NECK INCISION, Cold knife incision of bladder neck contracture, cystoscopy        Family History   Problem Relation Age of Onset    Cancer Mother         hodgkins disease    Heart Disease Father     Hypertension Father     Other Neg Hx         no hypercalcemia or kidney stones        Social History     Tobacco Use    Smoking status: Former     Years: 5.00     Types: Cigarettes     Quit date: 1955     Years since quittin.5    Smokeless tobacco: Never   Substance Use Topics    Alcohol use: Yes     Comment: occassional mixed drink or beer        Allergies   Allergen Reactions    Sulfa (Sulfonamide Antibiotics) Hives        Prior to Admission medications    Medication Sig Start Date End Date Taking? Authorizing Provider   pregabalin (LYRICA) 25 mg capsule Take 1 Capsule by mouth two (2) times a day. Max Daily Amount: 50 mg. 5/27/22  Yes Hoa Ballard MD   levothyroxine (SYNTHROID) 112 mcg tablet TAKE 1 TABLET BY MOUTH  DAILY BEFORE BREAKFAST 4/29/22  Yes Monica Dodd MD   amLODIPine (NORVASC) 2.5 mg tablet Take 2.5 mg by mouth daily. Yes Provider, Historical   losartan (COZAAR) 100 mg tablet Take 100 mg by mouth daily. 1/19/20  Yes Provider, Historical   clopidogrel (PLAVIX) 75 mg tab TAKE 1 TABLET BY MOUTH  DAILY 7/22/19  Yes Monica Dodd MD   metoprolol succinate (TOPROL-XL) 50 mg XL tablet Take 50 mg by mouth daily. Yes Provider, Historical   ELIQUIS 2.5 mg tablet Take 2.5 mg by mouth two (2) times a day. 5/3/18  Yes Provider, Historical   acetaminophen (TYLENOL) 500 mg tablet Take 500 mg by mouth every six (6) hours as needed for Pain. Yes Provider, Historical   cholecalciferol, vitamin D3, 50 mcg (2,000 unit) tab Take 1 Tab by mouth nightly. Yes Provider, Historical   gabapentin (NEURONTIN) 100 mg capsule Take 1 pill each morning and 2 pills each night  Patient not taking: Reported on 8/18/2022 10/20/21   Sherrie Rodriguez MD   clotrimazole (LOTRIMIN) 1 % topical cream Apply  to affected area two (2) times a day. Patient not taking: No sig reported 10/8/21   Monica Dodd MD   cyanocobalamin (VITAMIN B-12) 1,000 mcg tablet Take 1 Tab by mouth daily.   Patient not taking: No sig reported 10/22/18   Diane Nevarez MD       Review of Systems:  General, constitutional: negative  Eyes, vision: negative  Ears, nose, throat: negative  Cardiovascular, heart: negative  Respiratory: negative  Gastrointestinal: negative  Genitourinary: negative  Musculoskeletal: negative  Skin and integumentary: negative  Psychiatric: negative  Endocrine: negative  Neurological: negative, except for HPI  Hematologic/lymphatic: negative  Allergy/immunology: negative    Visit Vitals  /69 (BP 1 Location: Right arm, BP Patient Position: Sitting)   Pulse 69   Temp 97.6 °F (36.4 °C) (Temporal)   Resp 18   Ht 5' 9\" (1.753 m)   Wt 183 lb 6.4 oz (83.2 kg)   SpO2 99%   BMI 27.08 kg/m²       Physical Exam:  General:  no acute distress  Neck: no carotid bruits  Lungs: clear to auscultation  Heart:  no murmurs, regular rate and rhythm   Lower extremity: no edema    Neurological exam:  Mental Status: Awake, alert, oriented to person, place and time  Attention and Concentration: able to state the days of the week backwards   Speech and Language: Moderate dysarthria. Able to name, repeat and follow commands   Fund of knowledge was preserved    Cranial nerves: II-XII  Pupils equal and reactive, visual fields intact by confrontation   Extraocular movements intact, no evidence of nystagmus or ptosis   Facial sensation intact   Facial movements symmetric   Hearing intact to soft rub bilaterally   Shoulder shrug symmetric and strong   Tongue protrusion full and midline without fasciculation or atrophy    Motor:   Normal tone and Bulk   Drift: No evidence of pronator drift   Abnormal movements: Constant chorea type movements noted throughout the upper extremities and face. Strength testing:   deltoid triceps biceps Wrist ext. Wrist flex. intrinsics   Right 4 4 4 4 4 4   Left 5 5 5 5 5 4+      Hip flex. Hip ext. Knee ext. Knee flex Dorsi flex Plantar flex   Right  5 5 5 5 5 5   Left  5 5 5 5 5 5       Sensory:  Station was intact to light touch and pinprick. There is no extinction. Reflexes:     Biceps Triceps  Brachiorad Patellar Achilles Plantar Hoffmans   Right  2 1 2 2 1 Down Neg   Left  2 1 2 2 1 Down Neg        Cerebellar testing:  No dysmetria.   Finger-nose-finger was intact however he had some trouble with this on the right upper extremity due to the weakness. Romberg: absent    Gait: steady    Data:     INTERNAL RECORDS:  The patient's electronic medical record was reviewed. The relevant details include:    Lab Results   Component Value Date/Time    Hemoglobin A1c 5.1 10/18/2018 04:34 AM    Sodium 134 (L) 02/16/2021 04:35 PM    Potassium 4.0 02/16/2021 04:35 PM    Chloride 100 02/16/2021 04:35 PM    Glucose 85 02/16/2021 04:35 PM    BUN 18 02/16/2021 04:35 PM    Creatinine 1.25 02/16/2021 04:35 PM    Calcium 8.6 02/16/2021 04:35 PM    WBC 6.7 02/16/2021 04:35 PM    HCT 40.3 02/16/2021 04:35 PM    HGB 13.3 02/16/2021 04:35 PM    PLATELET 050 50/49/9976 04:35 PM       CT Results (maximum last 3): Results from [place] encounter on 02/16/21    CT SPINE CERV W CONT    Narrative  EXAM: CT CERVICAL SPINE WITH CONTRAST    INDICATION: neck pain and tremors. COMPARISON: MRI 9/10/2020    TECHNIQUE: Multislice helical CT of the cervical spine was performed following  uneventful rapid bolus intravenous contrast administration. Sagittal and  coronal reformats were generated. CT dose reduction was achieved through use of  a standardized protocol tailored for this examination and automatic exposure  control for dose modulation. CONTRAST: 100    FINDINGS:    Bone mineralization is normal. There is no evidence for endplate destruction. There is normal vertebral body height. Mild kyphotic inclination at C3-4 is  unchanged. There is no demonstration of listhesis. No paraspinal soft tissue  mass, collection or enhancement abnormality is shown. There is mild C2-3, severe C3-4 and C4-5, moderate to severe C5-6, severe C6-7,  moderate C7-T1 through upper thoracic spine disc space narrowing with prominent  marginal osteophyte formation and ossification of the anterior longitudinal  ligament. Endplate osteophytes are also shown posteriorly and posterolaterally  vertically at C3-4, C4-5, C5-6 and C6-7. The posterior processes are intact.   There is mild-moderate left facet osteoarthrosis at C2-3 and C7-T1. There is  mild right facet osteoarthrosis at C7-T1. C2-C3: There is no canal or foraminal stenosis. C3-4: There is moderate canal and bilateral foraminal stenosis. .    C4-5: There is moderate to severe canal and bilateral foraminal stenosis. .    C5-6: There is moderate canal and moderate to severe right greater than left  bilateral foraminal stenosis. .    C6-7: There is mild canal and moderate bilateral foraminal stenosis. .    C7-T1: There is no canal or foraminal stenosis. Lorean Snare Upper thoracic segments: There is no canal or neural foraminal stenosis. Impression  Multilevel degenerative changes with canal and foraminal stenosis as above. No  destructive bone lesion or enhancing mass demonstrated. CT HEAD WO CONT    Narrative  EXAM: CT HEAD WO CONT    INDICATION: tremors and neck pain    COMPARISON: None. CONTRAST: None. TECHNIQUE: Unenhanced CT of the head was performed using 5 mm images. Brain and  bone windows were generated. Coronal and sagittal reformats. CT dose reduction  was achieved through use of a standardized protocol tailored for this  examination and automatic exposure control for dose modulation. FINDINGS:  The ventricles are normal in size and contour. There is mild-moderate cortical  sulcal prominence consistent with atrophy, unchanged. There is no significant  white matter disease. There is no intracranial hemorrhage, extra-axial  collection, or mass effect. The basilar cisterns are open. No CT evidence of  acute infarct. The bone windows demonstrate no abnormalities. The visualized portions of the  paranasal sinuses and mastoid air cells are clear. Impression  Atrophy. No acute findings or interval change.       Results from [place] encounter on 12/10/20    CT ABD PELV W CONT    Narrative  EXAM: CT ABD PELV W CONT    INDICATION: Weight loss and epigastric pain    COMPARISON: 9/23/2020    CONTRAST: 100 mL of Isovue-370. TECHNIQUE:  Following the uneventful intravenous administration of contrast, thin axial  images were obtained through the abdomen and pelvis. Coronal and sagittal  reconstructions were generated. Oral contrast was not administered. CT dose  reduction was achieved through use of a standardized protocol tailored for this  examination and automatic exposure control for dose modulation. FINDINGS:  LOWER THORAX: Emphysematous changes at both lung bases. LIVER: No mass. Diffuse hypodensity of the liver. BILIARY TREE: Prior cholecystectomy CBD is not dilated. SPLEEN: within normal limits. PANCREAS: No mass or ductal dilatation. ADRENALS: Unremarkable. KIDNEYS: No calculus or hydronephrosis. Bilateral renal cysts, more numerous on  the left but larger on the right. One arising from the left mid kidney  demonstrates a thin rim of peripheral calcification. Stable soft tissue noted  lateral to this. Hyperdense 1 cm left lateral renal cyst redemonstrated on image  32. STOMACH: Diffuse pyloric thickening (series 3, image 23). SMALL BOWEL: No dilatation or wall thickening. COLON: No dilatation or wall thickening. APPENDIX: Not visualized  PERITONEUM: No ascites or pneumoperitoneum. RETROPERITONEUM: No lymphadenopathy or aortic aneurysm. REPRODUCTIVE ORGANS: Prostate seeds. URINARY BLADDER: No mass or calculus. BONES: No destructive bone lesion. ABDOMINAL WALL: No mass or hernia. ADDITIONAL COMMENTS: N/A    Impression  IMPRESSION:  Duodenitis  Emphysema  Complex left renal cysts. Posttreatment changes in the prostate. No evidence for tumor recurrence. MRI Results (maximum last 3): Results from Hospital Encounter encounter on 03/22/21    MRA NECK WO CONT    Narrative  CLINICAL HISTORY: Possible CVA, dyskinesia    INDICATION: Possible CVA and dyskinesia.     COMPARISON:  9/21/2020  TECHNIQUE: MR examination of the brain includes axial and sagittal T1, axial T2,  axial FLAIR, axial gradient echo, axial DWI, coronal T2. Coronal T2  Next, 3-D time-of-flight MRA of the brain was performed. Multiplanar  reconstructions were obtained. Next, 2-D time-of-flight MRA of the neck was performed. Multiplanar  reconstructions were obtained. FINDINGS:  There is no intracranial mass, hemorrhage or evidence of acute infarction. There is sulcal and ventricular prominence. This is temporal predominant. Minimal periventricular and few scattered foci of increased T2 signal intensity  in the cerebral white matter. There is no Chiari or syrinx. The pituitary and  infundibulum are grossly unremarkable. There is no skull base mass. Cerebellopontine angles are grossly unremarkable. The major intracranial  vascular flow-voids are unremarkable. The cavernous sinuses are symmetric. Optic  chiasm and infundibulum grossly unremarkable. Orbits are grossly symmetric. Dural venous sinuses are grossly patent. The brain architecture is normal. There  is no evidence of midline shift or mass-effect. There are no extra-axial fluid  collections. A2 and A3 segments within normal limits. There are A1 segments bilaterally. M1  segments are patent. M2 segments demonstrate symmetric arborization. . The  basilar artery and its branches are normal. The internal carotid, anterior  cerebral, and middle cerebral arteries are patent. There is no flow-limiting  intracranial stenosis. P1 and P2 segments are patent. Petrous and cavernous ICAs  are unremarkable. Left vertebral artery is dominant. There are bilateral  posterior communicating arteries. Dominant left vertebral artery. . The bilateral subclavian, common carotid, and  internal carotid arteries are patent . There is a degree of motion artifact  which slightly limits evaluation. .    Motion artifact slightly limits evaluation. There is suggested moderate to severe. % Stenosis in the right internal carotid  artery utilizing NASCET criteria.   There is 0. % Stenosis in the left internal carotid artery utilizing NASCET  criteria. Impression  Moderate to severe temporal predominant cerebral atrophy for age. Mild chronic microvascular ischemic change. There is no major vessel occlusion. No intracranial mass, hemorrhage or evidence of acute infarction. No aneurysm or dissection. Moderate to severe stenosis of the proximal right internal carotid artery is  suggested. MRA BRAIN WO CONT    Narrative  CLINICAL HISTORY: Possible CVA, dyskinesia    INDICATION: Possible CVA and dyskinesia. COMPARISON:  9/21/2020  TECHNIQUE: MR examination of the brain includes axial and sagittal T1, axial T2,  axial FLAIR, axial gradient echo, axial DWI, coronal T2. Coronal T2  Next, 3-D time-of-flight MRA of the brain was performed. Multiplanar  reconstructions were obtained. Next, 2-D time-of-flight MRA of the neck was performed. Multiplanar  reconstructions were obtained. FINDINGS:  There is no intracranial mass, hemorrhage or evidence of acute infarction. There is sulcal and ventricular prominence. This is temporal predominant. Minimal periventricular and few scattered foci of increased T2 signal intensity  in the cerebral white matter. There is no Chiari or syrinx. The pituitary and  infundibulum are grossly unremarkable. There is no skull base mass. Cerebellopontine angles are grossly unremarkable. The major intracranial  vascular flow-voids are unremarkable. The cavernous sinuses are symmetric. Optic  chiasm and infundibulum grossly unremarkable. Orbits are grossly symmetric. Dural venous sinuses are grossly patent. The brain architecture is normal. There  is no evidence of midline shift or mass-effect. There are no extra-axial fluid  collections. A2 and A3 segments within normal limits. There are A1 segments bilaterally. M1  segments are patent. M2 segments demonstrate symmetric arborization. . The  basilar artery and its branches are normal. The internal carotid, anterior  cerebral, and middle cerebral arteries are patent. There is no flow-limiting  intracranial stenosis. P1 and P2 segments are patent. Petrous and cavernous ICAs  are unremarkable. Left vertebral artery is dominant. There are bilateral  posterior communicating arteries. Dominant left vertebral artery. . The bilateral subclavian, common carotid, and  internal carotid arteries are patent . There is a degree of motion artifact  which slightly limits evaluation. .    Motion artifact slightly limits evaluation. There is suggested moderate to severe. % Stenosis in the right internal carotid  artery utilizing NASCET criteria. There is 0. % Stenosis in the left internal carotid artery utilizing NASCET  criteria. Impression  Moderate to severe temporal predominant cerebral atrophy for age. Mild chronic microvascular ischemic change. There is no major vessel occlusion. No intracranial mass, hemorrhage or evidence of acute infarction. No aneurysm or dissection. Moderate to severe stenosis of the proximal right internal carotid artery is  suggested. MRI BRAIN WO CONT    Narrative  CLINICAL HISTORY: Possible CVA, dyskinesia    INDICATION: Possible CVA and dyskinesia. COMPARISON:  9/21/2020  TECHNIQUE: MR examination of the brain includes axial and sagittal T1, axial T2,  axial FLAIR, axial gradient echo, axial DWI, coronal T2. Coronal T2  Next, 3-D time-of-flight MRA of the brain was performed. Multiplanar  reconstructions were obtained. Next, 2-D time-of-flight MRA of the neck was performed. Multiplanar  reconstructions were obtained. FINDINGS:  There is no intracranial mass, hemorrhage or evidence of acute infarction. There is sulcal and ventricular prominence. This is temporal predominant. Minimal periventricular and few scattered foci of increased T2 signal intensity  in the cerebral white matter. There is no Chiari or syrinx. The pituitary and  infundibulum are grossly unremarkable.  There is no skull base mass.  Cerebellopontine angles are grossly unremarkable. The major intracranial  vascular flow-voids are unremarkable. The cavernous sinuses are symmetric. Optic  chiasm and infundibulum grossly unremarkable. Orbits are grossly symmetric. Dural venous sinuses are grossly patent. The brain architecture is normal. There  is no evidence of midline shift or mass-effect. There are no extra-axial fluid  collections. A2 and A3 segments within normal limits. There are A1 segments bilaterally. M1  segments are patent. M2 segments demonstrate symmetric arborization. . The  basilar artery and its branches are normal. The internal carotid, anterior  cerebral, and middle cerebral arteries are patent. There is no flow-limiting  intracranial stenosis. P1 and P2 segments are patent. Petrous and cavernous ICAs  are unremarkable. Left vertebral artery is dominant. There are bilateral  posterior communicating arteries. Dominant left vertebral artery. . The bilateral subclavian, common carotid, and  internal carotid arteries are patent . There is a degree of motion artifact  which slightly limits evaluation. .    Motion artifact slightly limits evaluation. There is suggested moderate to severe. % Stenosis in the right internal carotid  artery utilizing NASCET criteria. There is 0. % Stenosis in the left internal carotid artery utilizing NASCET  criteria. Impression  Moderate to severe temporal predominant cerebral atrophy for age. Mild chronic microvascular ischemic change. There is no major vessel occlusion. No intracranial mass, hemorrhage or evidence of acute infarction. No aneurysm or dissection. Moderate to severe stenosis of the proximal right internal carotid artery is  suggested.       Assessment and Plan   Oleg Ramirez Sr. is a 81 y.o. male with a history of atrial fibrillation, right carotid artery stenosis, cervical stenosis who presents to neurology clinic for evaluation of tremors which based on examination appears to be more consistent with chorea. MRI of the brain did reveal some white matter disease however no other cause for his symptoms. Patient was seen by [facility] where Thaxton's disease was negative. His DEBORA was slightly positive. New onset slurred speech as well as right facial paresthesias: Concerning for new stroke versus worsening cervical stenosis. -As his symptoms started a few days prior to this valuation, I have recommended that he go to the emergency room for further evaluation.  -If possible he should have an MRI of the brain as well as cervical spine to determine the cause of the symptoms. -If evidence of a stroke patient should need to be admitted for further work-up and evaluation. 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  -We will need to consider additional laboratory studies to determine the etiology of his chorea. This includes repeat DEBORA, paraneoplastic antibodies, blood smear and antiphospholipid antibodies.  -We will trial Lyrica 25 mg during the day and 50 mg at bedtime to see if this helps her symptoms. We did discuss this medication and its potential side effects. Right carotid stenosis: Noted to be approximately 50 to 79% on most recent Doppler  -We will repeat a Doppler in approximately 1 year as this has not worsened    Cervical stenosis: Is being followed by neurosurgery who is not recommending intervention at this time  -Recommended that patient continue with exercises to help prevent this from worsening and maintain strength in his arms.  -It is possible that his movements may be due cervical stenosis as well.     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 3    Atrial fibrillation (HCC) I48.91    Hyponatremia E87.1        I have discussed the diagnosis with the patient and the intended plan as seen in the above orders. Patient is in agreement. The patient has received an after-visit summary and questions were answered concerning future plans. I have discussed medication side effects and warnings with the patient as well.         Signed By:  Stevie Ashraf MD     August 18, 2022

## 2022-08-18 NOTE — ED PROVIDER NOTES
EMERGENCY DEPARTMENT HISTORY AND PHYSICAL EXAM      Date: 8/18/2022  Patient Name: Kenneth Garnett.    History of Presenting Illness     Chief Complaint   Patient presents with    Dysarthria     Pt's daughter last saw him normal on Saturday morning. When she returned she noticed that his speech is slurred. This morning when pt got up he reports feeling tingling on the right side of his face. Pt has some disorder causing involuntary movements at baseiline. Hx of L ICA stenosis, severe, hx of severe spinal stenosis. Dr Kizzy Medel, neurology sent him over for MRI C-spine. History Provided By: Patient and daughter    HPI: Kenneth Garnett., 81 y.o. male with past medical history of A. fib, CKD, COPD, right carotid artery stenosis, TIA, DDD of cervical spine with stenosis, hypertension, hyperlipidemia, currently being worked up by neurology for progressive chorea/movement disorder of unclear etiology, presenting to the ED for evaluation of slurred speech and intermittent R facial paresthesias x 1 week. Pt was seen in office by Dr. Dean Novak for these symptoms earlier today, and was referred to the ED for stat MRI brain and cervical spine studies to r/o for acute stroke vs worsening cervical stenosis. Patient does not feel his speech is significantly changed compared to baseline. Daughter feels that there has likely been a progressive worsening of dysarthria over time for quite a while now. However, over the past week, patient was staying with his son. Son apparently did not notice any changes to his speech, but when daughter saw the patient again after a week apart, she noticed obvious worsening of his speech slurring, and became concerned about this. Patient has also been complaining of intermittent right facial paresthesia. He reports this is most noticeable upon awakening early in the mornings, and seems to go away completely as the day goes on.   Patient not currently experiencing any facial numbness or tingling at this time. He thinks these facial symptoms are secondary to \"sleeping in the wrong position. \"  He denies noticing any facial droop associated with these paresthesias. He denies any other associated neurologic symptoms. PCP: Vasquez Drummond MD    No current facility-administered medications on file prior to encounter. Current Outpatient Medications on File Prior to Encounter   Medication Sig Dispense Refill    pregabalin (LYRICA) 25 mg capsule Take 1 Capsule by mouth two (2) times a day. Max Daily Amount: 50 mg. 180 Capsule 1    levothyroxine (SYNTHROID) 112 mcg tablet TAKE 1 TABLET BY MOUTH  DAILY BEFORE BREAKFAST 90 Tablet 3    gabapentin (NEURONTIN) 100 mg capsule Take 1 pill each morning and 2 pills each night (Patient not taking: Reported on 8/18/2022) 270 Capsule 4    amLODIPine (NORVASC) 2.5 mg tablet Take 2.5 mg by mouth daily. clotrimazole (LOTRIMIN) 1 % topical cream Apply  to affected area two (2) times a day. (Patient not taking: No sig reported) 113 g 1    losartan (COZAAR) 100 mg tablet Take 100 mg by mouth daily. clopidogrel (PLAVIX) 75 mg tab TAKE 1 TABLET BY MOUTH  DAILY 90 Tab 3    cyanocobalamin (VITAMIN B-12) 1,000 mcg tablet Take 1 Tab by mouth daily. (Patient not taking: No sig reported) 30 Tab 0    metoprolol succinate (TOPROL-XL) 50 mg XL tablet Take 50 mg by mouth daily. ELIQUIS 2.5 mg tablet Take 2.5 mg by mouth two (2) times a day. acetaminophen (TYLENOL) 500 mg tablet Take 500 mg by mouth every six (6) hours as needed for Pain. cholecalciferol, vitamin D3, 50 mcg (2,000 unit) tab Take 1 Tab by mouth nightly.          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 Calcium and PTH monitored by Dr. Cem Garcia, renal.  Levels normalized after surgery. Prostate cancer ([facility] [address]     seeds, then XRT, then seed again. Dr. Moe Morelos    Spinal stenosis     Thyroid cancer [facility]) 2015       Past Surgical History:  Past Surgical History:   Procedure Laterality Date    COLONOSCOPY N/A 2018    COLONOSCOPY performed by Safia Tabor MD at [facility] AMBULATORY OR    COLONOSCOPY N/A 2/10/2021    COLONOSCOPY performed by Amy Alejandro MD at [facility] ENDOSCOPY    HX APPENDECTOMY      HX CHOLECYSTECTOMY      HX HEART CATHETERIZATION      No Stents-  Dr. Joan Hubbard      vasectomy    HX PARATHYROIDECTOMY  2015    right superior parathyroid gland removed and left superior parathyroid gland removed    HX PARTIAL THYROIDECTOMY  1/14/15    right lobectomy    HX TONSILLECTOMY      HX UROLOGICAL      Seeds for prostate cancer , 4 dialations     HX UROLOGICAL  1/14/15    BLADDER NECK INCISION, Cold knife incision of bladder neck contracture, cystoscopy       Family History:  Family History   Problem Relation Age of Onset    Cancer Mother         hodgkins disease    Heart Disease Father     Hypertension Father     Other Neg Hx         no hypercalcemia or kidney stones       Social History:  Social History     Tobacco Use    Smoking status: Former     Years: 5.00     Types: Cigarettes     Quit date: 1955     Years since quittin.5    Smokeless tobacco: Never   Vaping Use    Vaping Use: Never used   Substance Use Topics    Alcohol use: Yes     Comment: occassional mixed drink or beer    Drug use: Never       Allergies:   Allergies   Allergen Reactions    Sulfa (Sulfonamide Antibiotics) Hives         Review of Systems   Review of Systems   Constitutional:  Negative for chills and fever. HENT:  Negative for congestion and rhinorrhea. Eyes:  Negative for visual disturbance. Respiratory:  Negative for chest tightness and shortness of breath. Cardiovascular:  Negative for chest pain and palpitations. Gastrointestinal:  Negative for abdominal pain, diarrhea, nausea and vomiting. Genitourinary:  Negative for dysuria, flank pain and hematuria. Musculoskeletal:  Negative for back pain and neck pain. Skin:  Negative for rash. Allergic/Immunologic: Negative for immunocompromised state. Neurological:  Positive for speech difficulty. Negative for dizziness, tremors, seizures, syncope, facial asymmetry, weakness, light-headedness, numbness and headaches. Intermittent right facial paresthesia   Hematological:  Negative for adenopathy. Psychiatric/Behavioral:  Negative for dysphoric mood and suicidal ideas. Physical Exam   Physical Exam  Vitals and nursing note reviewed. Constitutional:       General: He is not in acute distress. Appearance: Normal appearance. He is not ill-appearing or toxic-appearing. HENT:      Head: Normocephalic and atraumatic. Nose: Nose normal.      Mouth/Throat:      Mouth: Mucous membranes are moist.   Eyes:      General: No visual field deficit. Extraocular Movements: Extraocular movements intact. Pupils: Pupils are equal, round, and reactive to light. Cardiovascular:      Rate and Rhythm: Normal rate and regular rhythm. Pulses: Normal pulses. Pulmonary:      Effort: Pulmonary effort is normal. No respiratory distress. Breath sounds: Normal breath sounds. No stridor. No wheezing or rhonchi. Abdominal:      General: Abdomen is flat. There is no distension. Palpations: There is no mass. Tenderness: There is no abdominal tenderness. Musculoskeletal:         General: Normal range of motion.       Cervical back: Normal range of motion and neck supple. Skin:     General: Skin is warm and dry. Neurological:      General: No focal deficit present. Mental Status: He is alert. Mental status is at baseline. Cranial Nerves: No cranial nerve deficit or facial asymmetry. Sensory: Sensation is intact. No sensory deficit. Motor: Motor function is intact. No weakness. Comments: Constant, involuntary jerky movements involving all extremities. Diagnostic Study Results     Labs -     Recent Results (from the past 124 hour(s))   CBC WITH AUTOMATED DIFF    Collection Time: 08/18/22  3:31 PM   Result Value Ref Range    WBC 7.5 4.1 - 11.1 K/uL    RBC 4.30 4. 10 - 5.70 M/uL    HGB 13.6 12.1 - 17.0 g/dL    HCT 40.4 36.6 - 50.3 %    MCV 94.0 80.0 - 99.0 FL    MCH 31.6 26.0 - 34.0 PG    MCHC 33.7 30.0 - 36.5 g/dL    RDW 13.4 11.5 - 14.5 %    PLATELET 091 646 - 354 K/uL    MPV 9.0 8.9 - 12.9 FL    NRBC 0.0 0  WBC    ABSOLUTE NRBC 0.00 0.00 - 0.01 K/uL    NEUTROPHILS 71 32 - 75 %    LYMPHOCYTES 18 12 - 49 %    MONOCYTES 9 5 - 13 %    EOSINOPHILS 2 0 - 7 %    BASOPHILS 0 0 - 1 %    IMMATURE GRANULOCYTES 0 0.0 - 0.5 %    ABS. NEUTROPHILS 5.3 1.8 - 8.0 K/UL    ABS. LYMPHOCYTES 1.4 0.8 - 3.5 K/UL    ABS. MONOCYTES 0.7 0.0 - 1.0 K/UL    ABS. EOSINOPHILS 0.1 0.0 - 0.4 K/UL    ABS. BASOPHILS 0.0 0.0 - 0.1 K/UL    ABS. IMM.  GRANS. 0.0 0.00 - 0.04 K/UL    DF AUTOMATED     METABOLIC PANEL, COMPREHENSIVE    Collection Time: 08/18/22  4:16 PM   Result Value Ref Range    Sodium 135 (L) 136 - 145 mmol/L    Potassium 4.8 3.5 - 5.1 mmol/L    Chloride 103 97 - 108 mmol/L    CO2 28 21 - 32 mmol/L    Anion gap 4 (L) 5 - 15 mmol/L    Glucose 90 65 - 100 mg/dL    BUN 21 (H) 6 - 20 MG/DL    Creatinine 1.19 0.70 - 1.30 MG/DL    BUN/Creatinine ratio 18 12 - 20      GFR est AA >60 >60 ml/min/1.73m2    GFR est non-AA 58 (L) >60 ml/min/1.73m2    Calcium 8.8 8.5 - 10.1 MG/DL    Bilirubin, total 0.9 0.2 - 1.0 MG/DL    ALT (SGPT) 23 12 - 78 U/L    AST (SGOT) 22 15 - 37 U/L    Alk. phosphatase 82 45 - 117 U/L    Protein, total 6.6 6.4 - 8.2 g/dL    Albumin 3.5 3.5 - 5.0 g/dL    Globulin 3.1 2.0 - 4.0 g/dL    A-G Ratio 1.1 1.1 - 2.2     URINALYSIS W/ REFLEX CULTURE    Collection Time: 08/18/22  6:11 PM    Specimen: Urine   Result Value Ref Range    Color YELLOW/STRAW      Appearance CLEAR CLEAR      Specific gravity 1.012      pH (UA) 6.5 5.0 - 8.0      Protein Negative NEG mg/dL    Glucose Negative NEG mg/dL    Ketone Negative NEG mg/dL    Bilirubin Negative NEG      Blood Negative NEG      Urobilinogen 1.0 0.2 - 1.0 EU/dL    Nitrites Negative NEG      Leukocyte Esterase Negative NEG      UA:UC IF INDICATED CULTURE NOT INDICATED BY UA RESULT      WBC 0-4 0 - 4 /hpf    RBC 0-5 0 - 5 /hpf    Epithelial cells FEW FEW /lpf    Bacteria Negative NEG /hpf    Hyaline cast 0-2 0 - 2 /lpf       Radiologic Studies -   MRI BRAIN WO CONT   Final Result   1. No acute findings no mass. 2. Minimal nonspecific white matter disease stable. MRI CERV SPINE WO CONT   Final Result   A large degenerative findings with canal and foraminal stenosis. Findings are   detailed by level above. CT Results  (Last 48 hours)      None          CXR Results  (Last 48 hours)      None            Medical Decision Making   Tashi MCCRACKEN MD am the first provider for this patient, and I am the attending of record for this patient encounter. I reviewed the vital signs, available nursing notes, past medical history, past surgical history, family history and social history. Vital Signs-Reviewed the patient's vital signs.   Patient Vitals for the past 24 hrs:   Temp Pulse Resp BP SpO2   08/18/22 1522 97.9 °F (36.6 °C) 70 16 136/75 100 %       Records Reviewed: Nursing Notes and Old Medical Records    Provider Notes (Medical Decision Making):   DDx: CVA, cervical stenosis, progression of movement disorder with associated motor dysarthria, UTI, etc.    Plan: Labs, UA, MRI brain, MRI cervical spine. Labs largely unremarkable. MRI brain negative for acute ischemic changes. Nonspecific white matter disease-stable. MRI cervical spine show multilevel degenerative changes with canal and foraminal stenosis. At several levels, there appears to be mild to moderate cord compression. No cord signal is normal in the visualized portion of the brainstem and cerebellum are normal.    MRI findings were briefly discussed with spine surgeon on-call, Dr. Wanda Mcintosh, who felt patient's MRI findings are most likely secondary to chronic degenerative changes, though in light of his progressive symptoms --recommended having the patient follow-up with him in office first thing tomorrow morning at [facility] [address].  This recommendation was relayed to the patient as well as daughter at bedside. Dr. Jeanette Ledbetter office contact information provided for daughter on discharge paperwork. Advised to calling first thing tomorrow morning to confirm appointment, and to ensure early specialist in office evaluation. Patient and daughter felt comfortable with this plan. Early follow-up with Dr. Kamran Mares also advised for ongoing evaluation of progressive motor/movement disorder. Reasons to return to the ED were discussed. ED Course:   Initial assessment performed. The patient's presenting problems have been discussed, and they are in agreement with the care plan formulated and outlined with them. I have encouraged them to ask questions as they arise throughout their visit. Katelin Finch MD      Disposition:  Discharge      DISCHARGE PLAN:  1. Discharge Medication List as of 8/18/2022 10:19 PM        2.    Follow-up Information       Follow up With Specialties Details Why Contact Info    Mercedes Freedman MD Neurosurgery Call in 1 day  [address] [facility] [address] [facility]      Barak Fox MD Neurology Schedule an appointment as soon as possible for a visit   [address] [address]  [phone]            3. Return to ED if worse     Diagnosis     Clinical Impression:   1. 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 Slurring of speech    3. Tingling of face    4. DDD (degenerative disc disease), cervical    5. Cervical stenosis of spine    6. Chorea        Attestations:    Katelin Finch MD    Please note that this dictation was completed with [email or web address], the computer voice recognition software. Quite often unanticipated grammatical, syntax, homophones, and other interpretive errors are inadvertently transcribed by the computer software. Please disregard these errors. Please excuse any errors that have escaped final proofreading. Thank you.

## 2022-08-18 NOTE — PROGRESS NOTES
MRI Pending:    Need completed online [facility] screening form. Sign and fax to [phone]. Call [phone] once faxed.

## 2022-08-19 NOTE — ED NOTES
Went over discharge instructions at this time. Patient verbalized understanding with all instructions at this time. Patient ambulated out independently, gait steady in no distress.

## 2022-08-26 NOTE — PROGRESS NOTES
Cholesterol is  a little high diet guidelines and increase exercise  needs ionized calcium; repat cbc 1 month due to high hg  often seen in smokers or  dehydration all other labs ok Patient contacted regarding recent discharge and COVID-19 risk. Discussed COVID-19 related testing which was not done at this time. Test results were not done. Patient informed of results, if available? no    Outreach made within 2 business days of discharge: Yes    Care Transition Nurse/ Ambulatory Care Manager/ LPN Care Coordinator contacted the family- DaughterMerly- by telephone to perform post discharge assessment. Verified name and  with family as identifiers. Patient has following risk factors of: diabetes and chronic kidney disease. CTN/ACM/LPN reviewed discharge instructions, medical action plan and red flags related to discharge diagnosis. Reviewed and educated them on any new and changed medications related to discharge diagnosis. Advised obtaining a 90-day supply of all daily and as-needed medications. Advance Care Planning:   Does patient have an Advance Directive: yes; reviewed and current     Education provided regarding infection prevention, and signs and symptoms of COVID-19 and when to seek medical attention with family who verbalized understanding. Discussed exposure protocols and quarantine from [address] you at higher risk for severe illness  and given an opportunity for questions and concerns. The family agrees to contact the COVID-19 hotline [phone] or PCP office for questions related to their healthcare. CTN/ACM/LPN provided contact information for future reference. From CDC: Are you at higher risk for severe illness?  Wash your hands often.  Avoid close contact (6 feet, which is about two arm lengths) with people who are sick.  Put distance between yourself and other people if COVID-19 is spreading in your community.  Clean and disinfect frequently touched surfaces.  Avoid all cruise travel and non-essential air travel.    Call your healthcare professional if you have concerns about COVID-19 and your underlying condition or if you are sick.    For more information on steps you can take to protect yourself, see CDC's How to Protect Yourself      Patient/family/caregiver given information for GetWell Loop and agrees to enroll no    Declined need for further COVID education at this time. Resolving Episode. Offered Case Management Services, declined at this time but did provide ACM contact information should future needs arise.

## 2022-10-03 ENCOUNTER — OFFICE VISIT (OUTPATIENT)
Dept: FAMILY MEDICINE CLINIC | Age: 87
End: 2022-10-03
Payer: MEDICARE

## 2022-10-03 ENCOUNTER — TELEPHONE (OUTPATIENT)
Dept: FAMILY MEDICINE CLINIC | Age: 87
End: 2022-10-03

## 2022-10-03 ENCOUNTER — DOCUMENTATION ONLY (OUTPATIENT)
Dept: FAMILY MEDICINE CLINIC | Age: 87
End: 2022-10-03

## 2022-10-03 VITALS
RESPIRATION RATE: 18 BRPM | BODY MASS INDEX: 26.33 KG/M2 | HEART RATE: 65 BPM | OXYGEN SATURATION: 96 % | HEIGHT: 69 IN | TEMPERATURE: 98.2 F | DIASTOLIC BLOOD PRESSURE: 68 MMHG | WEIGHT: 177.8 LBS | SYSTOLIC BLOOD PRESSURE: 124 MMHG

## 2022-10-03 DIAGNOSIS — E55.9 VITAMIN D DEFICIENCY: ICD-10-CM

## 2022-10-03 DIAGNOSIS — E78.5 HYPERLIPIDEMIA, UNSPECIFIED HYPERLIPIDEMIA TYPE: ICD-10-CM

## 2022-10-03 DIAGNOSIS — N18.30 STAGE 3 CHRONIC KIDNEY DISEASE, UNSPECIFIED WHETHER STAGE 3A OR 3B CKD (HCC): ICD-10-CM

## 2022-10-03 DIAGNOSIS — I48.91 ATRIAL FIBRILLATION, UNSPECIFIED TYPE (HCC): ICD-10-CM

## 2022-10-03 DIAGNOSIS — Z23 ENCOUNTER FOR IMMUNIZATION: ICD-10-CM

## 2022-10-03 DIAGNOSIS — C73 CANCER OF THYROID (HCC): ICD-10-CM

## 2022-10-03 DIAGNOSIS — R29.898 WEAKNESS OF BOTH LEGS: ICD-10-CM

## 2022-10-03 DIAGNOSIS — G95.9 CERVICAL MYELOPATHY WITH CERVICAL RADICULOPATHY (HCC): ICD-10-CM

## 2022-10-03 DIAGNOSIS — I73.9 PERIPHERAL VASCULAR DISEASE (HCC): ICD-10-CM

## 2022-10-03 DIAGNOSIS — M54.12 CERVICAL MYELOPATHY WITH CERVICAL RADICULOPATHY (HCC): ICD-10-CM

## 2022-10-03 DIAGNOSIS — I10 PRIMARY HYPERTENSION: ICD-10-CM

## 2022-10-03 DIAGNOSIS — Z00.00 ROUTINE GENERAL MEDICAL EXAMINATION AT A HEALTH CARE FACILITY: Primary | ICD-10-CM

## 2022-10-03 DIAGNOSIS — G25.5 CHOREA: ICD-10-CM

## 2022-10-03 DIAGNOSIS — E53.8 B12 DEFICIENCY: ICD-10-CM

## 2022-10-03 DIAGNOSIS — E21.5 DISORDER OF PARATHYROID GLAND, UNSPECIFIED (HCC): ICD-10-CM
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
## 2022-10-03 PROCEDURE — G0439 PPPS, SUBSEQ VISIT: HCPCS | Performed by: FAMILY MEDICINE

## 2022-10-03 PROCEDURE — G8427 DOCREV CUR MEDS BY ELIG CLIN: HCPCS | Performed by: FAMILY MEDICINE

## 2022-10-03 PROCEDURE — 1123F ACP DISCUSS/DSCN MKR DOCD: CPT | Performed by: FAMILY MEDICINE

## 2022-10-03 PROCEDURE — G8536 NO DOC ELDER MAL SCRN: HCPCS | Performed by: FAMILY MEDICINE

## 2022-10-03 PROCEDURE — G0463 HOSPITAL OUTPT CLINIC VISIT: HCPCS | Performed by: FAMILY MEDICINE

## 2022-10-03 PROCEDURE — 90694 VACC AIIV4 NO PRSRV 0.5ML IM: CPT | Performed by: FAMILY MEDICINE

## 2022-10-03 PROCEDURE — G8417 CALC BMI ABV UP PARAM F/U: HCPCS | Performed by: FAMILY MEDICINE

## 2022-10-03 PROCEDURE — G8432 DEP SCR NOT DOC, RNG: HCPCS | Performed by: FAMILY MEDICINE

## 2022-10-03 PROCEDURE — 1101F PT FALLS ASSESS-DOCD LE1/YR: CPT | Performed by: FAMILY MEDICINE

## 2022-10-03 PROCEDURE — 99214 OFFICE O/P EST MOD 30 MIN: CPT | Performed by: FAMILY MEDICINE

## 2022-10-03 RX ORDER — AMLODIPINE BESYLATE 2.5 MG/1
2.5 TABLET ORAL DAILY
Qty: 90 TABLET | Refills: 3 | Status: SHIPPED | OUTPATIENT
Start: 2022-10-03

## 2022-10-03 NOTE — PATIENT INSTRUCTIONS
Vaccine Information Statement    Influenza (Flu) Vaccine (Inactivated or Recombinant): What You Need to Know    Many vaccine information statements are available in Italian and other languages. See www.immunize.org/vis. Hojas de información sobre vacunas están disponibles en español y en muchos otros idiomas. Visite www.immunize.org/vis. 1. Why get vaccinated? Influenza vaccine can prevent influenza (flu). Flu is a contagious disease that spreads around the [facility] every year, usually between October and May. Anyone can get the flu, but it is more dangerous for some people. Infants and young children, people 72 years and older, pregnant people, and people with certain health conditions or a weakened immune system are at greatest risk of flu complications. Pneumonia, bronchitis, sinus infections, and ear infections are examples of flu-related complications. If you have a medical condition, such as heart disease, cancer, or diabetes, flu can make it worse. Flu can cause fever and chills, sore throat, muscle aches, fatigue, cough, headache, and runny or stuffy nose. Some people may have vomiting and diarrhea, though this is more common in children than adults. In an average year, thousands of people in the [facility] die from flu, and many more are hospitalized. Flu vaccine prevents millions of illnesses and flu-related visits to the doctor each year. 2. Influenza vaccines     CDC recommends everyone 6 months and older get vaccinated every flu season. Children 6 months through 6years of age may need 2 doses during a single flu season. Everyone else needs only 1 dose each flu season. It takes about 2 weeks for protection to develop after vaccination. There are many flu viruses, and they are always changing. Each year a new flu vaccine is made to protect against the influenza viruses believed to be likely to cause disease in the upcoming flu season.  Even when the vaccine doesnt exactly match these viruses, it may still provide some protection. Influenza vaccine does not cause flu. Influenza vaccine may be given at the same time as other vaccines. 3. Talk with your health care provider    Tell your vaccination provider if the person getting the vaccine:  Has had an allergic reaction after a previous dose of influenza vaccine, or has any severe, life-threatening allergies   Has ever had Guillain-Barré Syndrome (also called GBS)    In some cases, your health care provider may decide to postpone influenza vaccination until a future visit. Influenza vaccine can be administered at any time during pregnancy. People who are or will be pregnant during influenza season should receive inactivated influenza vaccine. People with minor illnesses, such as a cold, may be vaccinated. People who are moderately or severely ill should usually wait until they recover before getting influenza vaccine. Your health care provider can give you more information. 4. Risks of a vaccine reaction    Soreness, redness, and swelling where the shot is given, fever, muscle aches, and headache can happen after influenza vaccination. There may be a very small increased risk of Guillain-Barré Syndrome (GBS) after inactivated influenza vaccine (the flu shot). Hayder Villeda children who get the flu shot along with pneumococcal vaccine (PCV13) and/or DTaP vaccine at the same time might be slightly more likely to have a seizure caused by fever. Tell your health care provider if a child who is getting flu vaccine has ever had a seizure. People sometimes faint after medical procedures, including vaccination. Tell your provider if you feel dizzy or have vision changes or ringing in the ears. As with any medicine, there is a very remote chance of a vaccine causing a severe allergic reaction, other serious injury, or death. 5. What if there is a serious problem?     An allergic reaction could occur after the vaccinated person leaves the clinic. If you see signs of a severe allergic reaction (hives, swelling of the face and throat, difficulty breathing, a fast heartbeat, dizziness, or weakness), call 9-1-1 and get the person to the nearest hospital.    For other signs that concern you, call your health care provider. Adverse reactions should be reported to the Vaccine Adverse Event Reporting System (VAERS). Your health care provider will usually file this report, or you can do it yourself. Visit the VAERS website at www.vaers. [facility].gov or call 8-823.935.8756. VAERS is only for reporting reactions, and VAERS staff members do not give medical advice. 6. The National Vaccine Injury Compensation Program    The [facility] Vaccine Injury Compensation Program (VICP) is a federal program that was created to compensate people who may have been injured by certain vaccines. Claims regarding alleged injury or death due to vaccination have a time limit for filing, which may be as short as two years. Visit the VICP website at www.[facility].gov/vaccinecompensation or call 5-300.508.6499 to learn about the program and about filing a claim. 7. How can I learn more? Ask your health care provider. Call your local or state health department. Visit the website of the Food and Drug Administration (FDA) for vaccine package inserts and additional information at www.fda.gov/vaccines-blood-biologics/vaccines. Contact the Centers for Disease Control and Prevention (CDC): Call 8-143.683.3525 (1-800-CDC-INFO) or  Visit CDCs influenza website at www.cdc.gov/flu. Vaccine Information Statement   Inactivated Influenza Vaccine   8/6/2021  [address] [place] [id number]   Department of Health and Human Services  Centers for Disease Control and Prevention    Office Use Only

## 2022-10-03 NOTE — PROGRESS NOTES
Medicare Annual Wellness Visit     I have reviewed the patient's medical history in detail and updated the computerized patient record. History   Amari Dean Sr. is a 81 y.o. male who presents to follow-up on hypertension, hypothyroid, chorea. Recent fall    Most acute issue is a fall earlier this week when he was squatting down to clean something off the floor. Found that he was unable to stand from a squatting position and in his effort fell over backwards and hit his head on a cabinet. No loss of consciousness but was unable to get up off the floor. Had to crawl to the next room and use his chair to get off the floor. Is walking with a walker, rolling walker with seat and brakes. Finds that this is sufficient to help him maintain his balance but finds that he cannot turn quickly or he will lose his balance. Family concerned about him being alone for much of the day. He is no longer driving and has sold his car. He is unable to shower regularly because he feels unsafe with bathing. Has been seeing neurology over the last year. [employer] neurology and Dr. Katherine Madera to [facility] movement disorder specialist.    Reviewed the U note from February 2022. 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 It appears that the genetic panel ruled out Morrow's. Gabapentin was tried. Sounds like he took 100 mg at night, could not tolerate daytime dose because he was too sedated. Switched to Lyrica which she is taking 2 times daily. He cannot tell me what benefit this medicine is giving him. He was having trouble answering this question    Neck rolling ,shoulder rolling ,and arm rolling chorea noted in clinic. These are easily stopped with distraction. I had patient do a heel shin drag which he was able to do no problem and that stopped his chorea. He also did not have any chorea with standing.   He was able to stand on tiptoe without balance problem. Reports that shakes are worse when he is nervous. Always seems to be worse in the doctor's office. Always seems to be worse in the morning      Had a hospital stay 8/18 for slurred speech and tingling on the right side of his face that was brought to the attention of neurologist at a routine visit. Referred on to the emergency room for stat MRI. MRI was negative for acute ischemic changes. Cervical spine MRI showed multilevel degenerative changes with canal and foraminal stenosis and mild to moderate cord compression. Discussed with neurosurgeon and suggested outpatient follow-up which she did not do. 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 Calcium and PTH monitored by Dr. Delphine Hope, renal.  Levels normalized after surgery. Prostate cancer ([address]     seeds, then XRT, then seed again.   Dr. Sherman Plummer    Spinal stenosis     Thyroid cancer [facility]) Jan 2015      Past Surgical History:   Procedure Laterality Date    COLONOSCOPY N/A 9/17/2018    COLONOSCOPY performed by Benita Goddard MD at [facility] AMBULATORY OR    COLONOSCOPY N/A 2/10/2021    COLONOSCOPY performed by Ham Kelly MD at [facility] ENDOSCOPY    HX APPENDECTOMY      HX CHOLECYSTECTOMY      HX HEART CATHETERIZATION      No Stents-  Dr. Erick Art      vasectomy    HX PARATHYROIDECTOMY  01/14/2015    right superior parathyroid gland removed and left superior parathyroid gland removed    HX PARTIAL THYROIDECTOMY  1/14/15    right lobectomy    HX TONSILLECTOMY      HX UROLOGICAL      Seeds for prostate cancer , 4 dialations     HX UROLOGICAL  1/14/15    BLADDER NECK INCISION, Cold knife incision of bladder neck contracture, cystoscopy     Current Outpatient Medications   Medication Sig Dispense Refill    amLODIPine (NORVASC) 2.5 mg tablet Take 1 Tablet by mouth daily. 90 Tablet 3    pregabalin (LYRICA) 25 mg capsule Take 1 Capsule by mouth two (2) times a day. Max Daily Amount: 50 mg. 180 Capsule 1    levothyroxine (SYNTHROID) 112 mcg tablet TAKE 1 TABLET BY MOUTH  DAILY BEFORE BREAKFAST 90 Tablet 3    losartan (COZAAR) 100 mg tablet Take 100 mg by mouth daily. clopidogrel (PLAVIX) 75 mg tab TAKE 1 TABLET BY MOUTH  DAILY 90 Tab 3    metoprolol succinate (TOPROL-XL) 50 mg XL tablet Take 50 mg by mouth daily. ELIQUIS 2.5 mg tablet Take 2.5 mg by mouth two (2) times a day. acetaminophen (TYLENOL) 500 mg tablet Take 500 mg by mouth every six (6) hours as needed for Pain. cholecalciferol, vitamin D3, 50 mcg (2,000 unit) tab Take 1 Tab by mouth nightly.        Allergies   Allergen Reactions    Sulfa (Sulfonamide Antibiotics) Hives     Family History   Problem Relation Age of Onset    Cancer Mother         hodgkins disease    Heart Disease Father     Hypertension Father     Other Neg Hx         no hypercalcemia or kidney stones     Social History     Tobacco Use    Smoking status: Former     Years: 5.00     Types: Cigarettes     Quit date: 1955     Years since quittin.7    Smokeless tobacco: Never   Substance Use Topics    Alcohol use: Yes     Comment: occassional mixed drink or beer     Patient Active Problem List   Diagnosis Code    H/O prostate cancer Z85.46    HBP (high blood pressure) I10    GERD (gastroesophageal reflux disease) K21.9    CKD (chronic kidney disease) stage 3, GFR 30-59 ml/min ([facility]) N18.30 Hyperlipidemia E78.5    Carotid artery stenosis, asymptomatic I65.29    Cancer of thyroid ([facility] C73    History of thyroid cancer Z85.850    Stenosis of both internal carotid arteries I65.23    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 3    Atrial fibrillation (HCC) I48.91    Hyponatremia E87.1    Peripheral vascular disease (HCC) I73.9       Depression Risk Factor Screening:     3 most recent PHQ Screens 10/3/2022   Little interest or pleasure in doing things Not at all   Feeling down, depressed, irritable, or hopeless Not at all   Total Score PHQ 2 0     Alcohol Risk Factor Screening: On any occasion during the past 3 months, have you had more than 4 drinks containing alcohol?  no    Do you average more than 14 drinks per week?  no      Functional Ability and Level of Safety:     Hearing Loss   none    Activities of Daily Living   Self-care. Requires assistance with: no ADLs    Fall Risk     Fall Risk Assessment, last 12 mths 10/3/2022   Able to walk? Yes   Fall in past 12 months? 1   Do you feel unsteady? 1   Are you worried about falling -   Is TUG test greater than 12 seconds? -   Is the gait abnormal? -   Number of falls in past 12 months 2   Fall with injury? 0     Abuse Screen   Patient is not abused    Review of Systems   ROS:  As listed in HPI.  In addition:  Constitutional:   No headache, fever, fatigue, weight loss or weight gain      Eyes:   No redness, pruritis, pain, visual changes, swelling, or discharge      Ears:    No pain, loss or changes in hearing     Cardiac:    No chest pain      Resp:   No cough or shortness of breath      Neuro   No loss of consciousness, dizziness, seizure    Physical Examination     Evaluation of Cognitive Function:  Mood/affect:  happy  Appearance: age appropriate  Family member/caregiver input:     Physical Exam:  Blood pressure 124/68, pulse 65, temperature 98.2 °F (36.8 °C), temperature source Temporal, resp. rate 18, height 5' 9\" (1.753 m), weight 177 lb 12.8 oz (80.6 kg), SpO2 96 %. GEN: No apparent distress. Alert and oriented and responds to all questions appropriately. EYES:  Conjunctiva clear; pupils round and reactive to light; extraocular movements are intact. EAR: Bilateral tympanic membrane obscured by thick impacted cerumen  OROPHYARYNX: No oral lesions or exudates. NECK:  Supple; no masses; thyroid normal           LUNGS: Respirations unlabored; clear to auscultation bilaterally  CARDIOVASCULAR: Regular, rate, and rhythm without murmurs   NEUROLOGIC: Not examined in detail. Perseverative and tangential thought process. Choreiform movements of the neck, shoulders, upper arms. Not the legs. Movements stop with task/distraction. Normal heel shin drag. Able to stand on tiptoe with some assistance but reasonably good balance   EXT: Well perfused. No edema. SKIN: No obvious rashes. Patient Care Team:  Quinten Arriaga MD as PCP - General (Family Medicine)  Quinten Arriaga MD as PCP - [facility]  Lizandro Andre MD as Surgeon (General Surgery)  Ingrid Andersen MD as Physician (Nephrology)  Halle Raman MD as Physician (Cardiovascular Disease Physician)  Franci Valladares MD (Endocrinology Physician)    Advice/Referrals/Counseling   Education and counseling provided:    Flu shot today    New COVID shot when available    Assessment/Plan     Accompanied by daughter today. Patient is not a great historian for symptoms. Unable to report the benefit of medicine like Lyrica and gabapentin. Leg weakness, balance problem, fall  Using a rolling walker  Possibly related to cervical myelopathy?   Possibly related to choreiform movement  Unable to do some IADLs. Unable to bathe  Would benefit from home physical therapy to assist with leg weakness balance and fall prevention  Would benefit from a home health aide to assist with IADLs and ADLs  He is homebound and requires the assistance of another to leave the home    Choreiform movement  This has evolved over the last year. 1.5 years ago this looked more like a tremor in the hands and head. Now distinctly choreiform  Worse with anxiety/stress  Care of neurology, now [facility] movement specialist Dr. Aleena Matthews ruled out  Has an appointment later this week  Unclear benefit of gabapentin. Probable sedating side effect  Unclear benefit of Lyrica. He may consider stopping this for a few days to see if he can tell the benefit. Dr. Ariana Plummer suggested benzodiazepine which I broached the subject of today. Patient \"do not want to be on anything too strong\" and declines. If he were to consider this would suggest a short acting benzo as a trial run    Cervical stenosis  Cord compression noted on MRI  I noted apparent weakness of upper extremities 3/2021 checkup. I did not repeat this exam today but patient does not complain of a perceived problem with upper extremity weakness. He has seen neurosurgery. He does not want to consider surgery if possible. He would certainly have trouble staying still during recovery    Remote TIA, carotid PAD  Chronic Plavix. History of prostate cancer  Following with urology  Had an elevated PSA after being undetectable in 2020. Parathyroidectomy, partial thyroidectomy 2014  History of thyroid cancer  Synthroid  Per endocrine just monitor TSH    Paroxysmal A. fib  Cardiology  Eliquis  Metoprolol    Hypertension  Well-controlled  Amlodipine 2.5  Losartan 100  Metoprolol 50    CKD 3  Following with nephrology      ICD-10-CM ICD-9-CM    1. Routine general medical examination at a health care facility  Z00.00 V70.0       2.  Encounter for immunization  Z23 V03.89 INFLUENZA, FLUAD, (AGE 65 Y+), IM, PF, 0.5 ML      3. 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 Peripheral vascular disease (HCC)  I73.9 443.9       5. Atrial fibrillation, unspecified type ([facility]  I48.91 427.31       6. Seizure-like activity (HCC)  R56.9 780.39       7. Stage 3 chronic kidney disease, unspecified whether stage 3a or 3b CKD (HCC)  N18.30 585.3       8. Disorder of parathyroid gland, unspecified ([facility])  E21.5 252.9       9. Cancer of thyroid ([place] [address]  C73 193       10. Fall, initial encounter  [address] Hilario Aponte O158.8 REFERRAL TO HOME HEALTH      11. Cervical myelopathy with cervical radiculopathy ([facility]  G95.9 721.1 [facility]    M54.12        12. Weakness of both legs  R29.898 729.89 REFERRAL TO HOME HEALTH      13. Hyperlipidemia, unspecified hyperlipidemia type  E78.5 272.4       14. Primary hypertension  I10 401.9 LIPID PANEL      METABOLIC PANEL, COMPREHENSIVE      TSH 3RD GENERATION      amLODIPine (NORVASC) 2.5 mg tablet      TSH 3RD GENERATION      METABOLIC PANEL, COMPREHENSIVE      LIPID PANEL      15. Vitamin D deficiency  E55.9 268.9 VITAMIN D, 25 HYDROXY      VITAMIN D, 25 HYDROXY      16.  B12 deficiency  E53.8 266.2 VITAMIN B12 & FOLATE      VITAMIN B12 & FOLATE

## 2022-10-03 NOTE — PROGRESS NOTES
Health Maintenance Due   Topic Date Due    Shingrix Vaccine Age 49> (1 of 2) Never done    Medicare Yearly Exam  03/09/2022    COVID-19 Vaccine (3 - Booster for Moderna series) 07/06/2022    Flu Vaccine (1) 08/01/2022     1. \"Have you been to the ER, urgent care clinic since your last visit? Hospitalized since your last visit? \"  Yes.    2. \"Have you seen or consulted any other health care providers outside of the [address] [place] [address] since your last visit? \"  Yes. [facility]      3. For patients aged 39-70: Has the patient had a colonoscopy / FIT/ Cologuard? NA - based on age      If the patient is female:    4. For patients aged 41-77: Has the patient had a mammogram within the past 2 years? NA - based on age or sex      11. For patients aged 21-65: Has the patient had a pap smear?  NA - based on age or sex

## 2022-10-04 ENCOUNTER — TELEPHONE (OUTPATIENT)
Dept: FAMILY MEDICINE CLINIC | Age: 87
End: 2022-10-04

## 2022-10-04 LAB
25(OH)D3 SERPL-MCNC: 40.9 NG/ML (ref 30–100)
ALBUMIN SERPL-MCNC: 3.7 G/DL (ref 3.5–5)
ALBUMIN/GLOB SERPL: 1.2 {RATIO} (ref 1.1–2.2)
ALP SERPL-CCNC: 89 U/L (ref 45–117)
ALT SERPL-CCNC: 22 U/L (ref 12–78)
ANION GAP SERPL CALC-SCNC: 4 MMOL/L (ref 5–15)
AST SERPL-CCNC: 20 U/L (ref 15–37)
BILIRUB SERPL-MCNC: 0.7 MG/DL (ref 0.2–1)
BUN SERPL-MCNC: 19 MG/DL (ref 6–20)
BUN/CREAT SERPL: 15 (ref 12–20)
CALCIUM SERPL-MCNC: 9 MG/DL (ref 8.5–10.1)
CHLORIDE SERPL-SCNC: 99 MMOL/L (ref 97–108)
CHOLEST SERPL-MCNC: 248 MG/DL
CO2 SERPL-SCNC: 28 MMOL/L (ref 21–32)
COMMENT, HOLDF: NORMAL
CREAT SERPL-MCNC: 1.27 MG/DL (ref 0.7–1.3)
FOLATE SERPL-MCNC: 17.6 NG/ML (ref 5–21)
GLOBULIN SER CALC-MCNC: 3 G/DL (ref 2–4)
GLUCOSE SERPL-MCNC: 97 MG/DL (ref 65–100)
HDLC SERPL-MCNC: 76 MG/DL
HDLC SERPL: 3.3 {RATIO} (ref 0–5)
LDLC SERPL CALC-MCNC: 153.4 MG/DL (ref 0–100)
POTASSIUM SERPL-SCNC: 4.8 MMOL/L (ref 3.5–5.1)
PROT SERPL-MCNC: 6.7 G/DL (ref 6.4–8.2)
SAMPLES BEING HELD,HOLD: NORMAL
SODIUM SERPL-SCNC: 131 MMOL/L (ref 136–145)
TRIGL SERPL-MCNC: 93 MG/DL (ref ?–150)
TSH SERPL DL<=0.05 MIU/L-ACNC: 0.09 UIU/ML (ref 0.36–3.74)
VIT B12 SERPL-MCNC: 176 PG/ML (ref 193–986)
VLDLC SERPL CALC-MCNC: 18.6 MG/DL

## 2022-10-04 RX ORDER — ATORVASTATIN CALCIUM 20 MG/1
20 TABLET, FILM COATED ORAL DAILY
Qty: 90 TABLET | Refills: 3 | Status: CANCELLED | OUTPATIENT
Start: 2022-10-04

## 2022-10-04 RX ORDER — LEVOTHYROXINE SODIUM 100 UG/1
100 TABLET ORAL
Qty: 90 TABLET | Refills: 3 | Status: SHIPPED | OUTPATIENT
Start: 2022-10-04

## 2022-10-04 NOTE — TELEPHONE ENCOUNTER
verified by Deidra Head. Daughter prefers another home health agency. Declined OT services. Demographics, referral, and office note faxed to [address]

## 2022-10-04 NOTE — TELEPHONE ENCOUNTER
Labs reviewed    B12 level is surprisingly low. This is important for nerve health. Recommend starting B12 injections. Help arrange weekly B12 injections for the next month and then monthly B12 injections thereafter    Thyroid level is off. You are taking too much thyroid medicine. Should reduce to Synthroid 100 mcg and recheck this in mid November (6 weeks). Sodium level is a little low. It is probably not causing you any symptoms at this level. Your cholesterol is quite high compared to last time it was checked would benefit from a cholesterol medicine. Used to take atorvastatin.   I did not write down why he stopped    Atorvastatin pended to encounter if agreeable

## 2022-10-04 NOTE — TELEPHONE ENCOUNTER
Rola Leblanc from [facility] called. They can not accommodate the home health aid referral, but mentioned maybe doing occupational therapy instead.

## 2022-10-05 NOTE — TELEPHONE ENCOUNTER
verified by [place]. She will look @ [facility] to look over results. She will bring him in Friday for B12 injections. She will call back r/t pravastatin.

## 2022-10-07 ENCOUNTER — TELEPHONE (OUTPATIENT)
Dept: FAMILY MEDICINE CLINIC | Age: 87
End: 2022-10-07

## 2022-10-07 ENCOUNTER — CLINICAL SUPPORT (OUTPATIENT)
Dept: FAMILY MEDICINE CLINIC | Age: 87
End: 2022-10-07
Payer: MEDICARE

## 2022-10-07 DIAGNOSIS — E53.8 B12 DEFICIENCY: Primary | ICD-10-CM

## 2022-10-07 PROCEDURE — 96372 THER/PROPH/DIAG INJ SC/IM: CPT | Performed by: FAMILY MEDICINE

## 2022-10-07 RX ORDER — CYANOCOBALAMIN 1000 UG/ML
1000 INJECTION, SOLUTION INTRAMUSCULAR; SUBCUTANEOUS ONCE
Status: COMPLETED | OUTPATIENT
Start: 2022-10-07 | End: 2022-10-07

## 2022-10-07 RX ADMIN — CYANOCOBALAMIN 1000 MCG: 1000 INJECTION, SOLUTION INTRAMUSCULAR at 14:48

## 2022-10-07 NOTE — TELEPHONE ENCOUNTER
verified. Robertkelby Daquan is requesting an order for home health aide and OT twice a day * 9 weeks.

## 2022-10-07 NOTE — PROGRESS NOTES
After obtaining consent, and per orders of Dr. Stefania Goodrich, injection of B12 given by Sai Schrader LPN. No reaction noted to site of injection.  Refused post-injection observation and AVS.

## 2022-10-14 ENCOUNTER — CLINICAL SUPPORT (OUTPATIENT)
Dept: FAMILY MEDICINE CLINIC | Age: 87
End: 2022-10-14
Payer: MEDICARE

## 2022-10-14 DIAGNOSIS — E53.8 B12 DEFICIENCY: Primary | ICD-10-CM

## 2022-10-14 PROCEDURE — 96372 THER/PROPH/DIAG INJ SC/IM: CPT | Performed by: FAMILY MEDICINE

## 2022-10-14 RX ORDER — CYANOCOBALAMIN 1000 UG/ML
1000 INJECTION, SOLUTION INTRAMUSCULAR; SUBCUTANEOUS ONCE
Status: COMPLETED | OUTPATIENT
Start: 2022-10-14 | End: 2022-10-14

## 2022-10-14 RX ADMIN — CYANOCOBALAMIN 1000 MCG: 1000 INJECTION, SOLUTION INTRAMUSCULAR at 14:43

## 2022-10-21 ENCOUNTER — CLINICAL SUPPORT (OUTPATIENT)
Dept: FAMILY MEDICINE CLINIC | Age: 87
End: 2022-10-21
Payer: MEDICARE

## 2022-10-21 VITALS — BODY MASS INDEX: 26.05 KG/M2 | WEIGHT: 176.4 LBS

## 2022-10-21 DIAGNOSIS — E53.8 B12 DEFICIENCY: Primary | ICD-10-CM

## 2022-10-21 PROCEDURE — 96372 THER/PROPH/DIAG INJ SC/IM: CPT | Performed by: FAMILY MEDICINE

## 2022-10-21 RX ORDER — CYANOCOBALAMIN 1000 UG/ML
1000 INJECTION, SOLUTION INTRAMUSCULAR; SUBCUTANEOUS ONCE
Status: COMPLETED | OUTPATIENT
Start: 2022-10-21 | End: 2022-10-21

## 2022-10-21 RX ADMIN — CYANOCOBALAMIN 1000 MCG: 1000 INJECTION, SOLUTION INTRAMUSCULAR at 14:46

## 2022-10-21 NOTE — PROGRESS NOTES
After obtaining consent, and per orders of Dr. Kaya Alanis, injection of B12 given by Farzaneh Wilburn LPN. No reaction noted to RD site of injection.  Refused post-injection observation and AVS.

## 2022-10-28 ENCOUNTER — CLINICAL SUPPORT (OUTPATIENT)
Dept: FAMILY MEDICINE CLINIC | Age: 87
End: 2022-10-28
Payer: MEDICARE

## 2022-10-28 VITALS — WEIGHT: 175.4 LBS | BODY MASS INDEX: 25.9 KG/M2

## 2022-10-28 DIAGNOSIS — E53.8 B12 DEFICIENCY: Primary | ICD-10-CM

## 2022-10-28 PROCEDURE — 96372 THER/PROPH/DIAG INJ SC/IM: CPT | Performed by: FAMILY MEDICINE

## 2022-10-28 RX ORDER — CYANOCOBALAMIN 1000 UG/ML
1000 INJECTION, SOLUTION INTRAMUSCULAR; SUBCUTANEOUS ONCE
Status: COMPLETED | OUTPATIENT
Start: 2022-10-28 | End: 2022-10-28

## 2022-10-28 RX ADMIN — CYANOCOBALAMIN 1000 MCG: 1000 INJECTION, SOLUTION INTRAMUSCULAR at 14:40

## 2022-10-28 NOTE — PROGRESS NOTES
After obtaining consent, and per orders of Dr. Hailey Almanza, injection of B12 given by Ne Amador LPN. No reaction noted to site of injection. Refused post-injection observation and AVS.    Today is the last weekly injection, Pt will be coming back monthly now.

## 2022-12-02 ENCOUNTER — CLINICAL SUPPORT (OUTPATIENT)
Dept: FAMILY MEDICINE CLINIC | Age: 87
End: 2022-12-02
Payer: MEDICARE

## 2022-12-02 DIAGNOSIS — E53.8 B12 DEFICIENCY: Primary | ICD-10-CM

## 2022-12-02 RX ORDER — CYANOCOBALAMIN 1000 UG/ML
1000 INJECTION, SOLUTION INTRAMUSCULAR; SUBCUTANEOUS ONCE
Status: COMPLETED | OUTPATIENT
Start: 2022-12-02 | End: 2022-12-02

## 2022-12-02 RX ADMIN — CYANOCOBALAMIN 1000 MCG: 1000 INJECTION, SOLUTION INTRAMUSCULAR; SUBCUTANEOUS at 15:09

## 2023-02-01 ENCOUNTER — OFFICE VISIT (OUTPATIENT)
Dept: FAMILY MEDICINE CLINIC | Age: 88
End: 2023-02-01
Payer: MEDICARE

## 2023-02-01 VITALS
SYSTOLIC BLOOD PRESSURE: 103 MMHG | HEIGHT: 69 IN | BODY MASS INDEX: 25 KG/M2 | WEIGHT: 168.8 LBS | OXYGEN SATURATION: 95 % | HEART RATE: 65 BPM | DIASTOLIC BLOOD PRESSURE: 59 MMHG | TEMPERATURE: 97.7 F | RESPIRATION RATE: 18 BRPM

## 2023-02-01 DIAGNOSIS — C73 CANCER OF THYROID (HCC): ICD-10-CM

## 2023-02-01 DIAGNOSIS — E86.0 DEHYDRATION: ICD-10-CM

## 2023-02-01 DIAGNOSIS — I48.91 ATRIAL FIBRILLATION, UNSPECIFIED TYPE (HCC): ICD-10-CM

## 2023-02-01 DIAGNOSIS — E53.8 B12 DEFICIENCY: Primary | ICD-10-CM

## 2023-02-01 DIAGNOSIS — I73.9 PERIPHERAL VASCULAR DISEASE (HCC): ICD-10-CM

## 2023-02-01 DIAGNOSIS — M54.12 CERVICAL MYELOPATHY WITH CERVICAL RADICULOPATHY (HCC): ICD-10-CM

## 2023-02-01 DIAGNOSIS — E21.5 DISORDER OF PARATHYROID GLAND, UNSPECIFIED (HCC): ICD-10-CM

## 2023-02-01 DIAGNOSIS — N18.30 STAGE 3 CHRONIC KIDNEY DISEASE, UNSPECIFIED WHETHER STAGE 3A OR 3B CKD (HCC): ICD-10-CM

## 2023-02-01 DIAGNOSIS — G25.5 CHOREA: ICD-10-CM

## 2023-02-01 DIAGNOSIS — E55.9 VITAMIN D DEFICIENCY: ICD-10-CM

## 2023-02-01 DIAGNOSIS — R63.4 WEIGHT LOSS: ICD-10-CM

## 2023-02-01 DIAGNOSIS — E61.1 IRON DEFICIENCY: ICD-10-CM

## 2023-02-01 DIAGNOSIS — H61.21 IMPACTED CERUMEN OF RIGHT EAR: ICD-10-CM

## 2023-02-01 DIAGNOSIS — G95.9 CERVICAL MYELOPATHY WITH CERVICAL RADICULOPATHY (HCC): ICD-10-CM

## 2023-02-01 PROCEDURE — 96372 THER/PROPH/DIAG INJ SC/IM: CPT | Performed by: FAMILY MEDICINE

## 2023-02-01 PROCEDURE — G8427 DOCREV CUR MEDS BY ELIG CLIN: HCPCS | Performed by: FAMILY MEDICINE

## 2023-02-01 PROCEDURE — 1101F PT FALLS ASSESS-DOCD LE1/YR: CPT | Performed by: FAMILY MEDICINE

## 2023-02-01 PROCEDURE — G8510 SCR DEP NEG, NO PLAN REQD: HCPCS | Performed by: FAMILY MEDICINE

## 2023-02-01 PROCEDURE — 99214 OFFICE O/P EST MOD 30 MIN: CPT | Performed by: FAMILY MEDICINE

## 2023-02-01 PROCEDURE — G0463 HOSPITAL OUTPT CLINIC VISIT: HCPCS | Performed by: FAMILY MEDICINE

## 2023-02-01 PROCEDURE — 1123F ACP DISCUSS/DSCN MKR DOCD: CPT | Performed by: FAMILY MEDICINE

## 2023-02-01 PROCEDURE — G8420 CALC BMI NORM PARAMETERS: HCPCS | Performed by: FAMILY MEDICINE

## 2023-02-01 PROCEDURE — G8536 NO DOC ELDER MAL SCRN: HCPCS | Performed by: FAMILY MEDICINE

## 2023-02-01 RX ORDER — CYANOCOBALAMIN 1000 UG/ML
1000 INJECTION, SOLUTION INTRAMUSCULAR; SUBCUTANEOUS ONCE
Status: COMPLETED | OUTPATIENT
Start: 2023-02-01 | End: 2023-02-01

## 2023-02-01 RX ORDER — OLANZAPINE 2.5 MG/1
2.5 TABLET ORAL
COMMUNITY
Start: 2023-01-03

## 2023-02-01 RX ADMIN — CYANOCOBALAMIN 1000 MCG: 1000 INJECTION, SOLUTION INTRAMUSCULAR at 09:14

## 2023-02-01 NOTE — PROGRESS NOTES
JUANA Keller Sr. is a 81 y.o. male who presents with dehydration and weight loss. Had follow-up with [facility] neurology movement disorder in October. Neurologist noted that elevated thyroid hormone levels could be worsening chorea as well as weight loss and wondered if reducing levothyroxine may improve his chorea. Was also started on olanzapine 2.5 mg to try to help out with anxiety and movements. Patient noticed that when he started the olanzapine sleep got substantially better. He is now sleeping 4 hours, waking up to urinate and then sleeping an additional 4 hours. The is possible his movements improved for a little while but daughter thinks that they are back to his premedicated baseline. In the last 2 weeks reports that he has had decreased fluid intake, increased confusion. I noted 10 pound weight loss since last time he started on my scale. He does weigh himself but does not write the numbers down and is having trouble recalling when he lost the weight. His blood pressure is also low today, he is not endorsing orthostatic dizziness but does report that when he stands up he needs to get himself straight before moving. It is unclear from his report whether this is a worsening issue    RecallReviewed the U note from February 2022. Movements \"consistent with chorea concerning for late onset [place]'s versus autoimmune versus paraneoplastic versus hypothyroid\". It appears that the genetic panel ruled out Monmouth's. Gabapentin was tried. Sounds like he took 100 mg at night, could not tolerate daytime dose because he was too sedated. Switched to Lyrica which she is taking 2 times daily. He cannot tell me what benefit this medicine is giving him. He was having trouble answering this question    Neck rolling ,shoulder rolling ,and arm rolling chorea noted in clinic. These are easily stopped with distraction.   I had patient do a heel shin drag which he was able to do no problem and that stopped his chorea. He also did not have any chorea with standing. He was able to stand on tiptoe without balance problem. Reports that shakes are worse when he is nervous. Always seems to be worse in the doctor's office. Always seems to be worse in the morning        Had a hospital stay 8/18 for slurred speech and tingling on the right side of his face that was brought to the attention of neurologist at a routine visit. Referred on to the emergency room for stat MRI. MRI was negative for acute ischemic changes. Cervical spine MRI showed multilevel degenerative changes with canal and foraminal stenosis and mild to moderate cord compression. Discussed with neurosurgeon and suggested outpatient follow-up which she did not do. 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 Calcium and PTH monitored by Dr. Roxana Gonsalez, renal.  Levels normalized after surgery. Prostate cancer ([address]     seeds, then XRT, then seed again. Dr. Sabine Mccormick    Spinal stenosis     Thyroid cancer [facility]) Jan 2015       Meds:   Current Outpatient Medications   Medication Sig Dispense Refill    OLANZapine (ZyPREXA) 2.5 mg tablet Take 2.5 mg by mouth nightly. levothyroxine (SYNTHROID) 100 mcg tablet Take 1 Tablet by mouth Daily (before breakfast). 90 Tablet 3    amLODIPine (NORVASC) 2.5 mg tablet Take 1 Tablet by mouth daily.  90 Tablet 3    pregabalin (LYRICA) 25 mg capsule Take 1 Capsule by mouth two (2) times a day. Max Daily Amount: 50 mg. 180 Capsule 1    losartan (COZAAR) 100 mg tablet Take 100 mg by mouth daily. clopidogrel (PLAVIX) 75 mg tab TAKE 1 TABLET BY MOUTH  DAILY 90 Tab 3    metoprolol succinate (TOPROL-XL) 50 mg XL tablet Take 50 mg by mouth daily. ELIQUIS 2.5 mg tablet Take 2.5 mg by mouth two (2) times a day. acetaminophen (TYLENOL) 500 mg tablet Take 500 mg by mouth every six (6) hours as needed for Pain. cholecalciferol, vitamin D3, 50 mcg (2,000 unit) tab Take 1 Tab by mouth nightly. Allergies: Allergies   Allergen Reactions    Sulfa (Sulfonamide Antibiotics) Hives       Smoker:  Social History     Tobacco Use   Smoking Status Former    Years: 5.00    Types: Cigarettes    Quit date: 1955    Years since quittin.0   Smokeless Tobacco Never       ETOH:   Social History     Substance and Sexual Activity   Alcohol Use Not Currently    Comment: occassional mixed drink or beer       FH:   Family History   Problem Relation Age of Onset    Cancer Mother         hodgkins disease    Heart Disease Father     Hypertension Father     Other Neg Hx         no hypercalcemia or kidney stones       ROS:   As listed in HPI. In addition:  Constitutional:   No headache, fever, fatigue, weight loss or weight gain      Cardiac:    No chest pain      Resp:   No cough or shortness of breath      Neuro   No loss of consciousness, dizziness, seizures      Physical Exam:  Blood pressure (!) 103/59, pulse 65, temperature 97.7 °F (36.5 °C), temperature source Temporal, resp. rate 18, height 5' 9\" (1.753 m), weight 168 lb 12.8 oz (76.6 kg), SpO2 95 %. GEN: No apparent distress. Alert and oriented and responds to all questions appropriately. NEUROLOGIC: Choreiform movement, stops during conversation although does have a mouth chorea when he pauses for thought  EXT: Well perfused. No edema. SKIN: No obvious rashes.   Lungs clear to auscultation bilaterally  CV regular rate rhythm no murmur  Right tympanic membrane scared by thick impacted cerumen  Dry mucous membranes       Assessment and Plan     Recently developed slurred speech, intermittent confusion, dehydration  Unclear inciting event but is probably not a natural progression of his movement disorder. We should expect some improvement  Start by reducing blood pressure medicine bring blood pressure up  Oral rehydration  Check thyroid  Check vitamin labs    Stop amlodipine 2.5  Monitor blood pressure for the next 3 days and consider dose decrease in losartan  Increase hydration, family to monitor and assist  Dose change in Synthroid if appropriate    A note on the intermittent confusion. Interestingly today he is a better historian than he was when I saw him in October  Accompanied by daughter today who augments his history    Choreiform movement  [facility] movements disorder center  Following every 6-9 months  Most recently started olanzapine which might of had a positive benefit on his movement  Will consider an olanzapine dose change  but address the acute issue first    [place]'s ruled out  Has an appointment later this week  Unclear benefit of gabapentin. Probable sedating side effect  Unclear benefit of Lyrica. He may consider stopping this for a few days to see if he can tell the benefit. Dr. Melany Cummings suggested benzodiazepine which I broached the subject of today. Patient \"do not want to be on anything too strong\" and declines. If he were to consider this would suggest a short acting benzo as a trial run       Cervical stenosis  Cord compression noted on MRI  I noted apparent weakness of upper extremities 3/2021 checkup. I did not repeat this exam today but patient does not complain of a perceived problem with upper extremity weakness. He has seen neurosurgery. He does not want to consider surgery if possible.   He would certainly have trouble staying still during recovery     Remote TIA, carotid PAD  Chronic Plavix. History of prostate cancer  Following with urology  Had an elevated PSA after being undetectable in 2020. Parathyroidectomy, partial thyroidectomy 2014  History of thyroid cancer  Synthroid  Per endocrine just monitor TSH    Paroxysmal A. fib  Cardiology  Eliquis  Metoprolol    Hypertension  Well-controlled  Amlodipine 2.5  Losartan 100  Metoprolol 50    CKD 3  Following with nephrology        ICD-10-CM ICD-9-CM    1. 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 Cervical myelopathy with cervical radiculopathy (HCC)  G95.9 721.1     M54.12        4. Peripheral vascular disease ([facility])  I73.9 443.9       5. Atrial fibrillation, unspecified type ([facility]  I48.91 427.31       6. Cancer of thyroid ([facility]  C73 193 TSH 3RD GENERATION      T4, FREE      7. Stage 3 chronic kidney disease, unspecified whether stage 3a or 3b CKD (HCC)  P04.34 194.1 METABOLIC PANEL, COMPREHENSIVE      CBC WITH AUTOMATED DIFF      8. Disorder of parathyroid gland, unspecified (HCC)  E21.5 252.9       9. Vitamin D deficiency  E55.9 268.9 VITAMIN D, 25 HYDROXY      10. Iron deficiency  E61.1 280.9 IRON PROFILE      FERRITIN      11. Dehydration  E86.0 276.51       12. Weight loss  R63.4 783.21           AVS given.  Pt expressed understanding of instructions

## 2023-02-01 NOTE — PROGRESS NOTES
Health Maintenance Due   Topic Date Due    Shingles Vaccine (1 of 2) Never done    COVID-19 Vaccine (3 - Booster for Moderna series) 04/03/2022     1. \"Have you been to the ER, urgent care clinic since your last visit? Hospitalized since your last visit? \" No    2. \"Have you seen or consulted any other health care providers outside of the [address] [place] [address] since your last visit? \" No     3. For patients aged 39-70: Has the patient had a colonoscopy / FIT/ Cologuard? NA - based on age      If the patient is female:    4. For patients aged 41-77: Has the patient had a mammogram within the past 2 years? NA - based on age or sex      11. For patients aged 21-65: Has the patient had a pap smear?  NA - based on age or sex

## 2023-02-02 LAB
25(OH)D3 SERPL-MCNC: 57.3 NG/ML (ref 30–100)
ALBUMIN SERPL-MCNC: 3.7 G/DL (ref 3.5–5)
ALBUMIN/GLOB SERPL: 1.4 (ref 1.1–2.2)
ALP SERPL-CCNC: 90 U/L (ref 45–117)
ALT SERPL-CCNC: 20 U/L (ref 12–78)
ANION GAP SERPL CALC-SCNC: 3 MMOL/L (ref 5–15)
AST SERPL-CCNC: 17 U/L (ref 15–37)
BASOPHILS # BLD: 0 K/UL (ref 0–0.1)
BASOPHILS NFR BLD: 1 % (ref 0–1)
BILIRUB SERPL-MCNC: 0.7 MG/DL (ref 0.2–1)
BUN SERPL-MCNC: 22 MG/DL (ref 6–20)
BUN/CREAT SERPL: 17 (ref 12–20)
CALCIUM SERPL-MCNC: 8.9 MG/DL (ref 8.5–10.1)
CHLORIDE SERPL-SCNC: 101 MMOL/L (ref 97–108)
CO2 SERPL-SCNC: 30 MMOL/L (ref 21–32)
CREAT SERPL-MCNC: 1.28 MG/DL (ref 0.7–1.3)
DIFFERENTIAL METHOD BLD: ABNORMAL
EOSINOPHIL # BLD: 0.1 K/UL (ref 0–0.4)
EOSINOPHIL NFR BLD: 2 % (ref 0–7)
ERYTHROCYTE [DISTWIDTH] IN BLOOD BY AUTOMATED COUNT: 13.3 % (ref 11.5–14.5)
FERRITIN SERPL-MCNC: 78 NG/ML (ref 26–388)
FOLATE SERPL-MCNC: 18.9 NG/ML (ref 5–21)
GLOBULIN SER CALC-MCNC: 2.7 G/DL (ref 2–4)
GLUCOSE SERPL-MCNC: 90 MG/DL (ref 65–100)
HCT VFR BLD AUTO: 37.7 % (ref 36.6–50.3)
HGB BLD-MCNC: 12.6 G/DL (ref 12.1–17)
IMM GRANULOCYTES # BLD AUTO: 0 K/UL (ref 0–0.04)
IMM GRANULOCYTES NFR BLD AUTO: 0 % (ref 0–0.5)
IRON SATN MFR SERPL: 30 % (ref 20–50)
IRON SERPL-MCNC: 90 UG/DL (ref 35–150)
LYMPHOCYTES # BLD: 0.9 K/UL (ref 0.8–3.5)
LYMPHOCYTES NFR BLD: 15 % (ref 12–49)
MCH RBC QN AUTO: 33.4 PG (ref 26–34)
MCHC RBC AUTO-ENTMCNC: 33.4 G/DL (ref 30–36.5)
MCV RBC AUTO: 100 FL (ref 80–99)
MONOCYTES # BLD: 0.6 K/UL (ref 0–1)
MONOCYTES NFR BLD: 10 % (ref 5–13)
NEUTS SEG # BLD: 4.4 K/UL (ref 1.8–8)
NEUTS SEG NFR BLD: 72 % (ref 32–75)
NRBC # BLD: 0 K/UL (ref 0–0.01)
NRBC BLD-RTO: 0 PER 100 WBC
PLATELET # BLD AUTO: 357 K/UL (ref 150–400)
PMV BLD AUTO: 9.7 FL (ref 8.9–12.9)
POTASSIUM SERPL-SCNC: 4.8 MMOL/L (ref 3.5–5.1)
PROT SERPL-MCNC: 6.4 G/DL (ref 6.4–8.2)
RBC # BLD AUTO: 3.77 M/UL (ref 4.1–5.7)
SODIUM SERPL-SCNC: 134 MMOL/L (ref 136–145)
T4 FREE SERPL-MCNC: 1.1 NG/DL (ref 0.8–1.5)
TIBC SERPL-MCNC: 296 UG/DL (ref 250–450)
TSH SERPL DL<=0.05 MIU/L-ACNC: 2.08 UIU/ML (ref 0.36–3.74)
VIT B12 SERPL-MCNC: >2000 PG/ML (ref 193–986)
WBC # BLD AUTO: 6 K/UL (ref 4.1–11.1)

## 2023-03-09 ENCOUNTER — HOSPITAL ENCOUNTER (INPATIENT)
Age: 88
LOS: 10 days | Discharge: NURSING, REHAB OR LONG-TERM CARE | DRG: 644 | End: 2023-03-20
Attending: EMERGENCY MEDICINE | Admitting: STUDENT IN AN ORGANIZED HEALTH CARE EDUCATION/TRAINING PROGRAM
Payer: MEDICARE

## 2023-03-09 DIAGNOSIS — E87.1 HYPONATREMIA: Primary | ICD-10-CM

## 2023-03-09 DIAGNOSIS — R45.1 AGITATION: ICD-10-CM

## 2023-03-09 PROCEDURE — 99285 EMERGENCY DEPT VISIT HI MDM: CPT

## 2023-03-09 PROCEDURE — 96374 THER/PROPH/DIAG INJ IV PUSH: CPT

## 2023-03-10 ENCOUNTER — APPOINTMENT (OUTPATIENT)
Dept: CT IMAGING | Age: 88
DRG: 644 | End: 2023-03-10
Attending: EMERGENCY MEDICINE
Payer: MEDICARE

## 2023-03-10 LAB
ALBUMIN SERPL-MCNC: 3.1 G/DL (ref 3.5–5)
ALBUMIN/GLOB SERPL: 1.1 (ref 1.1–2.2)
ALP SERPL-CCNC: 80 U/L (ref 45–117)
ALT SERPL-CCNC: 20 U/L (ref 12–78)
ANION GAP SERPL CALC-SCNC: 5 MMOL/L (ref 5–15)
AST SERPL-CCNC: 20 U/L (ref 15–37)
ATRIAL RATE: 57 BPM
BASOPHILS # BLD: 0 K/UL (ref 0–0.1)
BASOPHILS NFR BLD: 0 % (ref 0–1)
BILIRUB SERPL-MCNC: 0.6 MG/DL (ref 0.2–1)
BNP SERPL-MCNC: 346 PG/ML
BUN SERPL-MCNC: 19 MG/DL (ref 6–20)
BUN/CREAT SERPL: 20 (ref 12–20)
CALCIUM SERPL-MCNC: 8.2 MG/DL (ref 8.5–10.1)
CALCULATED P AXIS, ECG09: 54 DEGREES
CALCULATED R AXIS, ECG10: 12 DEGREES
CALCULATED T AXIS, ECG11: 44 DEGREES
CHLORIDE SERPL-SCNC: 95 MMOL/L (ref 97–108)
CO2 SERPL-SCNC: 26 MMOL/L (ref 21–32)
CREAT SERPL-MCNC: 0.94 MG/DL (ref 0.7–1.3)
DIAGNOSIS, 93000: NORMAL
DIFFERENTIAL METHOD BLD: ABNORMAL
EOSINOPHIL # BLD: 0.1 K/UL (ref 0–0.4)
EOSINOPHIL NFR BLD: 2 % (ref 0–7)
ERYTHROCYTE [DISTWIDTH] IN BLOOD BY AUTOMATED COUNT: 13 % (ref 11.5–14.5)
FLUAV AG NPH QL IA: NEGATIVE
FLUBV AG NOSE QL IA: NEGATIVE
GLOBULIN SER CALC-MCNC: 2.8 G/DL (ref 2–4)
GLUCOSE SERPL-MCNC: 85 MG/DL (ref 65–100)
HCT VFR BLD AUTO: 33.2 % (ref 36.6–50.3)
HGB BLD-MCNC: 11.6 G/DL (ref 12.1–17)
IMM GRANULOCYTES # BLD AUTO: 0 K/UL (ref 0–0.04)
IMM GRANULOCYTES NFR BLD AUTO: 1 % (ref 0–0.5)
LYMPHOCYTES # BLD: 1.1 K/UL (ref 0.8–3.5)
LYMPHOCYTES NFR BLD: 17 % (ref 12–49)
MCH RBC QN AUTO: 32 PG (ref 26–34)
MCHC RBC AUTO-ENTMCNC: 34.9 G/DL (ref 30–36.5)
MCV RBC AUTO: 91.5 FL (ref 80–99)
MONOCYTES # BLD: 0.7 K/UL (ref 0–1)
MONOCYTES NFR BLD: 10 % (ref 5–13)
NEUTS SEG # BLD: 4.7 K/UL (ref 1.8–8)
NEUTS SEG NFR BLD: 70 % (ref 32–75)
NRBC # BLD: 0 K/UL (ref 0–0.01)
NRBC BLD-RTO: 0 PER 100 WBC
P-R INTERVAL, ECG05: 266 MS
PLATELET # BLD AUTO: 268 K/UL (ref 150–400)
PMV BLD AUTO: 8.6 FL (ref 8.9–12.9)
POTASSIUM SERPL-SCNC: 4.1 MMOL/L (ref 3.5–5.1)
PROT SERPL-MCNC: 5.9 G/DL (ref 6.4–8.2)
Q-T INTERVAL, ECG07: 434 MS
QRS DURATION, ECG06: 98 MS
QTC CALCULATION (BEZET), ECG08: 422 MS
RBC # BLD AUTO: 3.63 M/UL (ref 4.1–5.7)
SARS-COV-2 RDRP RESP QL NAA+PROBE: NOT DETECTED
SODIUM SERPL-SCNC: 126 MMOL/L (ref 136–145)
SOURCE, COVRS: NORMAL
TROPONIN I SERPL HS-MCNC: 5 NG/L (ref 0–76)
VENTRICULAR RATE, ECG03: 57 BPM
WBC # BLD AUTO: 6.6 K/UL (ref 4.1–11.1)

## 2023-03-10 PROCEDURE — 84484 ASSAY OF TROPONIN QUANT: CPT

## 2023-03-10 PROCEDURE — 74011000636 HC RX REV CODE- 636: Performed by: EMERGENCY MEDICINE

## 2023-03-10 PROCEDURE — 93005 ELECTROCARDIOGRAM TRACING: CPT

## 2023-03-10 PROCEDURE — 97530 THERAPEUTIC ACTIVITIES: CPT | Performed by: PHYSICAL THERAPIST

## 2023-03-10 PROCEDURE — 36415 COLL VENOUS BLD VENIPUNCTURE: CPT

## 2023-03-10 PROCEDURE — 85025 COMPLETE CBC W/AUTO DIFF WBC: CPT

## 2023-03-10 PROCEDURE — 65270000046 HC RM TELEMETRY

## 2023-03-10 PROCEDURE — 92610 EVALUATE SWALLOWING FUNCTION: CPT

## 2023-03-10 PROCEDURE — 70491 CT SOFT TISSUE NECK W/DYE: CPT

## 2023-03-10 PROCEDURE — 97162 PT EVAL MOD COMPLEX 30 MIN: CPT | Performed by: PHYSICAL THERAPIST

## 2023-03-10 PROCEDURE — 74011250637 HC RX REV CODE- 250/637: Performed by: INTERNAL MEDICINE

## 2023-03-10 PROCEDURE — 87635 SARS-COV-2 COVID-19 AMP PRB: CPT

## 2023-03-10 PROCEDURE — 80053 COMPREHEN METABOLIC PANEL: CPT

## 2023-03-10 PROCEDURE — 83880 ASSAY OF NATRIURETIC PEPTIDE: CPT

## 2023-03-10 PROCEDURE — 87804 INFLUENZA ASSAY W/OPTIC: CPT

## 2023-03-10 PROCEDURE — 74011250636 HC RX REV CODE- 250/636: Performed by: INTERNAL MEDICINE

## 2023-03-10 PROCEDURE — 74011250636 HC RX REV CODE- 250/636: Performed by: EMERGENCY MEDICINE

## 2023-03-10 RX ORDER — LORAZEPAM 2 MG/ML
1 INJECTION INTRAMUSCULAR
Status: COMPLETED | OUTPATIENT
Start: 2023-03-10 | End: 2023-03-10

## 2023-03-10 RX ORDER — OLANZAPINE 2.5 MG/1
2.5 TABLET ORAL
Status: DISCONTINUED | OUTPATIENT
Start: 2023-03-10 | End: 2023-03-15

## 2023-03-10 RX ORDER — LANOLIN ALCOHOL/MO/W.PET/CERES
3 CREAM (GRAM) TOPICAL
Status: DISCONTINUED | OUTPATIENT
Start: 2023-03-10 | End: 2023-03-20 | Stop reason: HOSPADM

## 2023-03-10 RX ORDER — CLOPIDOGREL BISULFATE 75 MG/1
75 TABLET ORAL DAILY
Status: DISCONTINUED | OUTPATIENT
Start: 2023-03-10 | End: 2023-03-20 | Stop reason: HOSPADM

## 2023-03-10 RX ORDER — SODIUM CHLORIDE 9 MG/ML
75 INJECTION, SOLUTION INTRAVENOUS CONTINUOUS
Status: DISCONTINUED | OUTPATIENT
Start: 2023-03-10 | End: 2023-03-11

## 2023-03-10 RX ORDER — MELATONIN
2000
Status: DISCONTINUED | OUTPATIENT
Start: 2023-03-10 | End: 2023-03-20 | Stop reason: HOSPADM

## 2023-03-10 RX ORDER — METOPROLOL SUCCINATE 50 MG/1
50 TABLET, EXTENDED RELEASE ORAL DAILY
Status: DISCONTINUED | OUTPATIENT
Start: 2023-03-10 | End: 2023-03-16

## 2023-03-10 RX ORDER — LOSARTAN POTASSIUM 100 MG/1
100 TABLET ORAL DAILY
Status: DISCONTINUED | OUTPATIENT
Start: 2023-03-10 | End: 2023-03-20 | Stop reason: HOSPADM

## 2023-03-10 RX ORDER — AMLODIPINE BESYLATE 2.5 MG/1
2.5 TABLET ORAL DAILY
Status: DISCONTINUED | OUTPATIENT
Start: 2023-03-10 | End: 2023-03-17

## 2023-03-10 RX ORDER — DIAZEPAM 10 MG/2ML
2 INJECTION INTRAMUSCULAR
Status: COMPLETED | OUTPATIENT
Start: 2023-03-10 | End: 2023-03-10

## 2023-03-10 RX ORDER — ACETAMINOPHEN 325 MG/1
650 TABLET ORAL
Status: DISCONTINUED | OUTPATIENT
Start: 2023-03-10 | End: 2023-03-10 | Stop reason: SDUPTHER

## 2023-03-10 RX ORDER — ONDANSETRON 2 MG/ML
4 INJECTION INTRAMUSCULAR; INTRAVENOUS
Status: DISCONTINUED | OUTPATIENT
Start: 2023-03-10 | End: 2023-03-20 | Stop reason: HOSPADM

## 2023-03-10 RX ORDER — PREGABALIN 25 MG/1
25 CAPSULE ORAL 2 TIMES DAILY
Status: DISCONTINUED | OUTPATIENT
Start: 2023-03-10 | End: 2023-03-10

## 2023-03-10 RX ORDER — LEVOTHYROXINE SODIUM 100 UG/1
100 TABLET ORAL
Status: DISCONTINUED | OUTPATIENT
Start: 2023-03-10 | End: 2023-03-20 | Stop reason: HOSPADM

## 2023-03-10 RX ORDER — ACETAMINOPHEN 500 MG
500 TABLET ORAL
Status: DISCONTINUED | OUTPATIENT
Start: 2023-03-10 | End: 2023-03-20 | Stop reason: HOSPADM

## 2023-03-10 RX ADMIN — DIAZEPAM 2 MG: 5 INJECTION, SOLUTION INTRAMUSCULAR; INTRAVENOUS at 05:00

## 2023-03-10 RX ADMIN — IOPAMIDOL 100 ML: 755 INJECTION, SOLUTION INTRAVENOUS at 02:17

## 2023-03-10 RX ADMIN — OLANZAPINE 2.5 MG: 2.5 TABLET, FILM COATED ORAL at 21:29

## 2023-03-10 RX ADMIN — SODIUM CHLORIDE 75 ML/HR: 9 INJECTION, SOLUTION INTRAVENOUS at 09:26

## 2023-03-10 RX ADMIN — SODIUM CHLORIDE 1000 ML: 9 INJECTION, SOLUTION INTRAVENOUS at 03:50

## 2023-03-10 RX ADMIN — APIXABAN 2.5 MG: 2.5 TABLET, FILM COATED ORAL at 17:15

## 2023-03-10 RX ADMIN — MELATONIN 3 MG: at 21:28

## 2023-03-10 RX ADMIN — CLOPIDOGREL BISULFATE 75 MG: 75 TABLET ORAL at 14:05

## 2023-03-10 RX ADMIN — CHOLECALCIFEROL TAB 25 MCG (1000 UNIT) 2000 UNITS: 25 TAB at 21:28

## 2023-03-10 RX ADMIN — LORAZEPAM 1 MG: 2 INJECTION INTRAMUSCULAR; INTRAVENOUS at 03:47

## 2023-03-10 NOTE — PROGRESS NOTES
Problem: Dysphagia (Adult)  Goal: *Acute Goals and Plan of Care (Insert Text)  Description: Speech Pathology Goals  Initiated 3/10/2023    1. Patient will tolerate least restrictive diet without signs of aspiration or adverse effects within 7 days. Outcome: Progressing Towards Goal     SPEECH LANGUAGE PATHOLOGY BEDSIDE SWALLOW EVALUATION  Patient: Eva Burton Sr. (81 y.o. male)  Date: 3/10/2023  Primary Diagnosis: Hyponatremia [E87.1]       Precautions:        ASSESSMENT :  Based on the objective data described below, the patient presents with a grossly functional oropharyngeal swallow and moderate hyperkinetic dysarthria. Patient alert and Ox4. Patient's grandson and his wife were at bedside. Patient admitted following \"scratchy, tight throat\" and reported difficulty swallowing with dinner last night. Patient on room air, denying throat pain, and states he feels as though he has returned to baseline at the time of SLP evaluation. Patient with frequent chorea of upper and lower face, however patient demonstrated grossly functional mastication and oral clearance. Patient with no overt signs of aspiration with any consistency tried. Patient denies any history of pneumonia and WBC observed to be [facility]. Chart reviewed revealed history of EGD on 2/10/21, which showed \"The esophageal mucosa in the proximal, mid and distal esophagus is normal. There is a small hiatal hernia. \"    Per chart review, patient followed by [facility]. \"He has progressive chorea since 2016 with unknown etiology. He has previously been ruled out for [place]'s disease. \" Patient observed with moderate hyperkinetic dysarthria, characterized by transient breathiness, involuntary head, jaw, face, and tongue movements, and vowel prolongation. Patient's grandson at bedside reports that patient's motor speech is at baseline on this date.  At this time, patient felt safe to continue Regular/Thin Liquid diet with aspiration precautions outlined below. Patient will benefit from skilled intervention to address the above impairments. Patients rehabilitation potential is considered to be fair. PLAN :  Recommendations and Planned Interventions:  -- Regular/Thin Liquid diet  -- Smaller pills, 1 at a time with thin liquid and larger pills with puree (per patient preference)  -- Strict oral care  -- Upright in bed  Frequency/Duration: Patient will be followed by speech-language pathology 1-2 times a week to address goals. Discharge Recommendations: To Be Determined     SUBJECTIVE:   Patient stated my sister had to have her esophagus stretched. 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 Calcium and PTH monitored by Dr. Samantha Mckenzie, renal.  Levels normalized after surgery. Prostate cancer ([address]     seeds, then XRT, then seed again.   Dr. Melissa Torre    Spinal stenosis     Thyroid cancer [facility]) Jan 2015     Past Surgical History:   Procedure Laterality Date    COLONOSCOPY N/A 9/17/2018    COLONOSCOPY performed by Scott Mcdonald MD at [facility] AMBULATORY OR    COLONOSCOPY N/A 2/10/2021    COLONOSCOPY performed by Liborio Lyle MD at [facility] ENDOSCOPY    HX APPENDECTOMY      HX CHOLECYSTECTOMY      HX HEART CATHETERIZATION      No Stents-  Dr. Danis Hernandez      vasectomy    HX PARATHYROIDECTOMY  01/14/2015    right superior parathyroid gland removed and left superior parathyroid gland removed    HX PARTIAL THYROIDECTOMY  1/14/15    right lobectomy    HX TONSILLECTOMY      HX UROLOGICAL      Seeds for prostate cancer , 4 dialations     HX UROLOGICAL  1/14/15    BLADDER NECK INCISION, Cold knife incision of bladder neck contracture, cystoscopy     Prior Level of Function/Home Situation:   Home Situation  Home Environment: Private residence  One/Two Story Residence: One story  Living Alone: No  Support Systems: Child(handy)  Patient Expects to be Discharged to[de-identified] Home  Current DME Used/Available at Home: [address] prior to admission: Regular/Thin Liquid   Current Diet: Regular/Thin Liquid     Cognitive and Communication Status:  Neurologic State: Alert  Orientation Level: Oriented X4  Cognition: Follows commands  Perception: Appears intact     Safety/Judgement: Awareness of environment, Insight into deficits    Oral Assessment:  Oral Assessment  Labial: Other (comment) (Frequent chorea of upper and lower face)  Dentition: Natural  Oral Hygiene: Moist oral mucosa  Lingual: Other (comment) (Frequent chorea of upper and lower face)  Velum: Unable to visualize  Mandible: No impairment    P.O. Trials:  Patient Position: Upright in bed  Vocal quality prior to P.O.: Other (comment) (?Hyperkinetic dysarthria)  Consistency Presented: Thin liquid;Puree; Solid  How Presented: Self-fed/presented;Straw;Successive swallows;Spoon     Bolus Acceptance: No impairment  Bolus Formation/Control: Impaired  Type of Impairment: Delayed  Propulsion: Discoordination;Delayed (# of seconds)  Oral Residue: None        Aspiration Signs/Symptoms: None  Pharyngeal Phase Characteristics: No impairment, issues, or problems                    NOMS:   The NOMS functional outcome measure was used to quantify this patient's level of swallowing impairment. Based on the NOMS, the patient was determined to be at level 7 for swallow function.      NOMS Swallowing Levels:  Level 1 (CN): NPO  Level 2 (CM): NPO but takes consistency in therapy  Level 3 (CL): Takes less than 50% of nutrition p.o. and continues with nonoral feedings; and/or safe with mod cues; and/or max diet restriction  Level 4 (CK): Safe swallow but needs mod cues; and/or mod diet restriction; and/or still requires some nonoral feeding/supplements  Level 5 (CJ): Safe swallow with min diet restriction; and/or needs min cues  Level 6 (CI): Independent with p.o.; rare cues; usually self cues; may need to avoid some foods or needs extra time  Level 7 ([address] [place] [address]): Independent for all p.o.  KALLI. (2003). National Outcomes Measurement System (NOMS): Adult Speech-Language Pathology User's Guide. Pain:  Pain Scale 1: Numeric (0 - 10)  Pain Intensity 1: 0       After treatment:   Patient left in no apparent distress in bed, Call bell within reach, Nursing notified, and Caregiver / family present    COMMUNICATION/EDUCATION:   The patient's plan of care including recommendations, planned interventions, and recommended diet changes were discussed with: Registered nurse and Patient and Patient's family . Patient/family have participated as able in goal setting and plan of care. Patient/family agree to work toward stated goals and plan of care.     Thank you for this referral.  ИРИНА Padilla  Time Calculation: 40 mins

## 2023-03-10 NOTE — PROGRESS NOTES
Occupational Therapy   Chart reviewed; orders acknowledged for this medically complex PMH; will defer OT evaluation until MD assessment completed. Continuing to follow.  Di Cox OTR/L

## 2023-03-10 NOTE — ED NOTES
eTRANSFER SBAR NOTE    IP UNIT CALLED NOTE IS READY: Yes Spoke to Cristina Beckwith    IF there are questions Call transferring nurse (your name) Carly, ED RN at phone # [phone]    SITUATION/BACKGROUND:    Patient is being transferred to [address], Room# 2119    Patient's Chief Complaint on arrival to ED was SOB and dysphagia and is admitted for hyponatremia. CODE STATUS: Full Code    VITAL SIGNS (MUST BE WITHIN 1 HOUR OF TRANSFER TO IP UNIT:    TEMP: 97.8  PULSE: 78  RESP: 16  BP: 129/86  PAIN SCORE: 0  MEWS: 1    ISOLATION/PRECAUTIONS: No   ISOLATION TYPE:     Called outstanding consults: No      Are there sign and held orders that need to be released? No   Are all STAT orders completed: Yes    STAT labs collected: Yes  REPEAT LACTIC ACID DUE? No      Are there any titrating drips? No   If so what? ASSESSMENT:    CRAIGWA Assessment: No    NEURO: AOx4, pleasant, conversational  NIH SCORE: n/a     BRIGETTE SCREENING:   Swallow Screening  Is the Patient Unable to Remain Alert for Testing?: No (03/10/23 9911)  Is the Patient on a Modified Diet (Thickened Liquids) Due to Pre-existing Dysphagia?: No (03/10/23 0923)  Is There Presence of Existing Enteral Tube Feeding via the Stomach or Nose?: No (03/10/23 0615)  Is There Presence of Head-of-Bed Restrictions (Less than 30 Degrees)?: No (03/10/23 0627)  Is There Presence of Tracheotomy Tube?: No (03/10/23 0627)  Is the Patient Ordered Nothing-by-Mouth Status?: No (03/10/23 0627)  3 oz Water Swallow Screen: (!) Fail (03/10/23 5987)  Reason for Fail: Unable to drink 3 oz in sequential swallows without interruption (03/10/23 7116)      NEURO ASSESSMENT:     Is patient impulsive? No   Is patient oriented? Yes   Do they follow commands? Yes  Is the patient ambulatory? No  Device need Walker    FALL RISK? Yes   Is the [place] [address] Assessment completed? Yes  INTERVENTIONS: Standard safety precautions    INTEGUMENTARY:   IS THE PATIENT UNDRESSED? Yes  WOUNDS PRESENT?  No RESPIRATORY:   Is patient on Oxygen? No    OXYGEN: Oxygen Therapy  O2 Device: None (Room air) (03/10/23 0639)  O2 Flow Rate (L/min): 6 l/min (238)    CARDIAC:   Is cardiac monitoring ordered? Yes Last Rhythm: NSR  Patient to transfer with tele box on? Yes  Is patient using a LIFE VEST? No     LINE ACCESS:   Peripheral IV 03/10/23 Left Forearm (Active)   Site Assessment Clean, dry, & intact 03/10/23 0629   Phlebitis Assessment 0 03/10/23 06   Infiltration Assessment 0 03/10/23 06   Dressing Status Clean, dry, & intact 03/10/23 0629   Dressing Type Transparent 03/10/23 005   Hub Color/Line Status Flushed 03/10/23 0629   Action Taken Blood drawn 03/10/23 005        /GI:   CONTINENT BOWEL/BLADDER? Yes  URINARY OUTPUT: voiding   WAS UA WITH REFLEX SENT TO LAB? Yes     RESTRAINTS IN USE: No      IS DOCUMENTATION COMPLETE: Yes  Is there a current Order? Yes When does it ? Ongoing nursing interventions    Additional details as Needed:  Diazepam has left Pt with some slurred speech but is much less restless and remains AOx4, pleasant.

## 2023-03-10 NOTE — ED NOTES
Pt increasingly restless over last 60-90 minutes. Daughter stated \"he's almost kind of paranoid, and this is what happened last time he had low sodium. \" Ativan ordered by Isidoro Hillman. Ativan did not impact Pt's increased agitation/restlessness. Provider notified and diazepam ordered. Upon administration, Pt immediately became less restless and appeared to relax. Nasal cannula at bedside as precaution.

## 2023-03-10 NOTE — PROGRESS NOTES
0800: Pt arrived to unit. A/o x4. Speech delayed. Answers questions appropriately. VSS. Admission database completed. 0930: Per Dr. Storm Gould hold off on giving pt food/meds until evaluated by speech. Pt given a sip of orange juice with no signs of aspiration. States he takes his pills whole one a time with orange juice at home. Will continue to hold off per MD for SLP.

## 2023-03-10 NOTE — ED PROVIDER NOTES
EMERGENCY DEPARTMENT HISTORY AND PHYSICAL EXAM     ----------------------------------------------------------------------------  Please note that this dictation was completed with [employer], the [employer] voice recognition software. Quite often unanticipated grammatical, syntax, homophones, and other interpretive errors are inadvertently transcribed by the computer software. Please disregard these errors. Please excuse any errors that have escaped final proofreading  ----------------------------------------------------------------------------      Date: 3/9/2023  Patient Name: Krzysztof Ramirez    History of Presenting Illness     Chief Complaint   Patient presents with    Dysphagia     Pt arrives via EMS from home where family called for help. Initial c/c generalized weakness. On scene, Pt also complained of \"scratchy, tight throat\". On scene, O2 high 70s room air, 4L nc given with no increase, 15L NRB brought to low 80s sustained, neb trx provided and brought O2 to 90s. HR 39-80 with hx afib. Aox4. No O2 at baseline. History obtainted from:  Patient    Other independent source of history: family    HPI: Krzysztof Ramirez. is a 81 y.o. 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 Patient reports that he was having scratchy, tight feeling throat after eating dinner without associated choking, nausea, vomiting or associated drooling. Patient is having resolution of previously reported symptoms at time of my evaluation but was reportedly hypoxic by EMS and arrived on nonrebreather. Patient subsequently been able to transition to room air without associated respiratory distress. Does not wear oxygen at baseline.   Patient's family notes that he is currently followed by home health for previous low sodium counts and physical therapy. Family reports patient has had not received his B12 injections for some time and that was attributed to his low sodium previously. Patient without complaints of visual disturbance, nausea, vomiting, headache, recent fall or injury. Patient is reportedly followed by [facility] neurology, Dr. Merline Koller.       PCP: Kathy Rothman MD    Allergies   Allergen Reactions    Sulfa (Sulfonamide Antibiotics) Hives       Current Facility-Administered Medications   Medication Dose Route Frequency Provider Last Rate Last Admin    ondansetron (ZOFRAN) injection 4 mg  4 mg IntraVENous Q4H PRN Thereasa Frankel, MD        amLODIPine (NORVASC) tablet 2.5 mg  2.5 mg Oral DAILY Kaylyn Art MD        acetaminophen (TYLENOL) tablet 500 mg  500 mg Oral Q6H PRN Kaylyn Art MD        clopidogreL (PLAVIX) tablet 75 mg  75 mg Oral DAILY Kaylyn Art MD        OLANZapine (ZyPREXA) tablet 2.5 mg  2.5 mg Oral QHS Kaylyn Art MD        metoprolol succinate (TOPROL-XL) XL tablet 50 mg  50 mg Oral DAILY Kaylyn Art MD        losartan (COZAAR) tablet 100 mg  100 mg Oral DAILY Kaylyn Art MD        levothyroxine (SYNTHROID) tablet 100 mcg  100 mcg Oral ACB Kaylyn Art MD        apixaban Rick Butts) tablet 2.5 mg  2.5 mg Oral BID Kaylyn Art MD        cholecalciferol (VITAMIN D3) (1000 Units /25 mcg) tablet 2,000 Units  2,000 Units Oral QHS Kaylyn Art MD        0.9% sodium chloride infusion  75 mL/hr IntraVENous CONTINUOUS Kaylyn Art MD 75 mL/hr at 03/10/23 0926 75 mL/hr at 03/10/23 0926    melatonin tablet 3 mg  3 mg Oral QHS Kaylyn Art MD           Past History     Past Medical History:  Past Medical History:   Diagnosis Date    Atrial fibrillation ([facility] [address]     Carotid artery stenosis, asymptomatic 8/1/2014    Right side 50-79%     Chronic kidney disease     stage 3    Chronic obstructive pulmonary disease ([facility] [address]     emphemsa GERD (gastroesophageal reflux disease)     Gout     Hiatal hernia     Hyperlipidemia     Hyperparathyroidism ([address]     Hypertension     Long term (current) use of anticoagulants     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 Calcium and PTH monitored by Dr. Kapil Monsivais, renal.  Levels normalized after surgery. Prostate cancer ([address]     seeds, then XRT, then seed again. Dr. Ronna Taylor    Spinal stenosis     Thyroid cancer [facility]) 2015       Past Surgical History:  Past Surgical History:   Procedure Laterality Date    COLONOSCOPY N/A 2018    COLONOSCOPY performed by Maira Amor MD at [facility] AMBULATORY OR    COLONOSCOPY N/A 2/10/2021    COLONOSCOPY performed by Feliberto Fonseca MD at [facility] ENDOSCOPY    HX APPENDECTOMY      HX CHOLECYSTECTOMY      HX HEART CATHETERIZATION      No Stents-  Dr. Debo Balderas      vasectomy    HX PARATHYROIDECTOMY  2015    right superior parathyroid gland removed and left superior parathyroid gland removed    HX PARTIAL THYROIDECTOMY  1/14/15    right lobectomy    HX TONSILLECTOMY      HX UROLOGICAL      Seeds for prostate cancer , 4 dialations     HX UROLOGICAL  1/14/15    BLADDER NECK INCISION, Cold knife incision of bladder neck contracture, cystoscopy       Family History:  Family History   Problem Relation Age of Onset    Cancer Mother         hodgkins disease    Heart Disease Father     Hypertension Father     Other Neg Hx         no hypercalcemia or kidney stones       Social History:  Social History     Tobacco Use    Smoking status: Former     Years: 5.00     Types: Cigarettes     Quit date: 1955     Years since quittin.1    Smokeless tobacco: Never   Vaping Use    Vaping Use: Never used   Substance Use Topics    Alcohol use: Not Currently     Comment: occassional mixed drink or beer    Drug use: Never       Allergies:   Allergies   Allergen Reactions Sulfa (Sulfonamide Antibiotics) Hives         Review of Systems   Review of Systems   Constitutional:  Negative for fever. HENT:  Positive for sore throat and trouble swallowing. Respiratory:  Negative for shortness of breath. Cardiovascular:  Negative for chest pain. Gastrointestinal:  Negative for abdominal pain, diarrhea, nausea and vomiting. Physical Exam     ED Triage Vitals [03/09/23 2211]   ED Encounter Vitals Group      BP (!) 151/82      Pulse (Heart Rate) 61      Resp Rate 21      Temp 97.1 °F (36.2 °C)      Temp src       O2 Sat (%) 100 %      Weight 165 lb      Height 5' 10\"      Physical Exam  Vitals and nursing note reviewed. Constitutional:       General: He is not in acute distress. Appearance: He is well-developed. He is not ill-appearing or diaphoretic. HENT:      Head: Normocephalic and atraumatic. Nose: Nose normal.      Mouth/Throat:      Mouth: Mucous membranes are moist.      Pharynx: No oropharyngeal exudate. Neck:      Vascular: No JVD. Cardiovascular:      Rate and Rhythm: Normal rate and regular rhythm. Pulmonary:      Effort: Pulmonary effort is normal. No respiratory distress. Breath sounds: Normal breath sounds. No stridor. Musculoskeletal:         General: Normal range of motion. Skin:     General: Skin is warm and dry. Findings: No rash. Neurological:      General: No focal deficit present. Mental Status: He is alert and oriented to person, place, and time. Mental status is at baseline. GCS: GCS eye subscore is 4. GCS verbal subscore is 5. GCS motor subscore is 6. Cranial Nerves: No cranial nerve deficit, dysarthria or facial asymmetry. Sensory: No sensory deficit. Motor: No weakness. Comments: Patient with constant, choreiform type movement of upper extremities. Patient family member notes that this is his baseline.   Patient able to speak in his normal speech pattern per family without associated facial droop/asymmetry. Psychiatric:         Speech: Speech normal.         CURRENT MEDICATIONS      Current Discharge Medication List        CONTINUE these medications which have NOT CHANGED    Details   OLANZapine (ZyPREXA) 2.5 mg tablet Take 2.5 mg by mouth nightly. levothyroxine (SYNTHROID) 100 mcg tablet Take 1 Tablet by mouth Daily (before breakfast). Qty: 90 Tablet, Refills: 3      amLODIPine (NORVASC) 2.5 mg tablet Take 1 Tablet by mouth daily. Qty: 90 Tablet, Refills: 3    Associated Diagnoses: Primary hypertension      losartan (COZAAR) 100 mg tablet Take 100 mg by mouth daily. clopidogrel (PLAVIX) 75 mg tab TAKE 1 TABLET BY MOUTH  DAILY  Qty: 90 Tab, Refills: 3    Associated Diagnoses: History of TIA (transient ischemic attack)      metoprolol succinate (TOPROL-XL) 50 mg XL tablet Take 50 mg by mouth daily. ELIQUIS 2.5 mg tablet Take 2.5 mg by mouth two (2) times a day. cholecalciferol, vitamin D3, 50 mcg (2,000 unit) tab Take 1 Tab by mouth nightly. pregabalin (LYRICA) 25 mg capsule Take 1 Capsule by mouth two (2) times a day. Max Daily Amount: 50 mg.  Qty: 180 Capsule, Refills: 1    Associated Diagnoses: Dyskinesia      acetaminophen (TYLENOL) 500 mg tablet Take 500 mg by mouth every six (6) hours as needed for Pain.                 Diagnostic Study Results     Labs:     Recent Results (from the past 24 hour(s))   CBC WITH AUTOMATED DIFF    Collection Time: 03/10/23 12:41 AM   Result Value Ref Range    WBC 6.6 4.1 - 11.1 K/uL    RBC 3.63 (L) 4.10 - 5.70 M/uL    HGB 11.6 (L) 12.1 - 17.0 g/dL    HCT 33.2 (L) 36.6 - 50.3 %    MCV 91.5 80.0 - 99.0 FL    MCH 32.0 26.0 - 34.0 PG    MCHC 34.9 30.0 - 36.5 g/dL    RDW 13.0 11.5 - 14.5 %    PLATELET 270 151 - 239 K/uL    MPV 8.6 (L) 8.9 - 12.9 FL    NRBC 0.0 0  WBC    ABSOLUTE NRBC 0.00 0.00 - 0.01 K/uL    NEUTROPHILS 70 32 - 75 %    LYMPHOCYTES 17 12 - 49 %    MONOCYTES 10 5 - 13 %    EOSINOPHILS 2 0 - 7 %    BASOPHILS 0 0 - 1 % IMMATURE GRANULOCYTES 1 (H) 0.0 - 0.5 %    ABS. NEUTROPHILS 4.7 1.8 - 8.0 K/UL    ABS. LYMPHOCYTES 1.1 0.8 - 3.5 K/UL    ABS. MONOCYTES 0.7 0.0 - 1.0 K/UL    ABS. EOSINOPHILS 0.1 0.0 - 0.4 K/UL    ABS. BASOPHILS 0.0 0.0 - 0.1 K/UL    ABS. IMM. GRANS. 0.0 0.00 - 0.04 K/UL    DF AUTOMATED     METABOLIC PANEL, COMPREHENSIVE    Collection Time: 03/10/23 12:41 AM   Result Value Ref Range    Sodium 126 (L) 136 - 145 mmol/L    Potassium 4.1 3.5 - 5.1 mmol/L    Chloride 95 (L) 97 - 108 mmol/L    CO2 26 21 - 32 mmol/L    Anion gap 5 5 - 15 mmol/L    Glucose 85 65 - 100 mg/dL    BUN 19 6 - 20 MG/DL    Creatinine 0.94 0.70 - 1.30 MG/DL    BUN/Creatinine ratio 20 12 - 20      eGFR >60 >60 ml/min/1.73m2    Calcium 8.2 (L) 8.5 - 10.1 MG/DL    Bilirubin, total 0.6 0.2 - 1.0 MG/DL    ALT (SGPT) 20 12 - 78 U/L    AST (SGOT) 20 15 - 37 U/L    Alk.  phosphatase 80 45 - 117 U/L    Protein, total 5.9 (L) 6.4 - 8.2 g/dL    Albumin 3.1 (L) 3.5 - 5.0 g/dL    Globulin 2.8 2.0 - 4.0 g/dL    A-G Ratio 1.1 1.1 - 2.2     TROPONIN-HIGH SENSITIVITY    Collection Time: 03/10/23 12:41 AM   Result Value Ref Range    Troponin-High Sensitivity 5 0 - 76 ng/L   NT-PRO BNP    Collection Time: 03/10/23 12:41 AM   Result Value Ref Range    NT pro- <450 PG/ML   COVID-19 RAPID TEST    Collection Time: 03/10/23 12:41 AM   Result Value Ref Range    Specimen source Nasopharyngeal      COVID-19 rapid test Not detected NOTD     INFLUENZA A+B VIRAL AGS    Collection Time: 03/10/23 12:41 AM   Result Value Ref Range    Influenza A Antigen Negative NEG      Influenza B Antigen Negative NEG     EKG, 12 LEAD, INITIAL    Collection Time: 03/10/23 12:51 AM   Result Value Ref Range    Ventricular Rate 57 BPM    Atrial Rate 57 BPM    P-R Interval 266 ms    QRS Duration 98 ms    Q-T Interval 434 ms    QTC Calculation (Bezet) 422 ms    Calculated P Axis 54 degrees    Calculated R Axis 12 degrees    Calculated T Axis 44 degrees    Diagnosis       Sinus bradycardia with 1st degree AV block  When compared with ECG of 21-SEP-2020 18:30,  ST no longer depressed in Anterior leads  Confirmed by Lissette Buitrago ([id number]) on 3/10/2023 8:41:37 AM         EKG: If performed, independent interpretation documented below      RADIOLOGY:  Non-plain film images such as CT, Ultrasound and MRI are read by the radiologist. Plain radiographic images are visualized and preliminarily interpreted by the ED Provider with the findings documented in the MDM section. Interpretation per the Radiologist below, if available at the time of this note:  Radiologic Studies:  CT NECK SOFT TISSUE W CONT   Final Result   1. No acute abnormality in the neck. 2. Partially visualized 7 mm groundglass pulmonary nodule in the left upper   lobe. If prior imaging is available for comparison, consider follow-up chest CT   in 3-6 months. CT Results  (Last 48 hours)                 03/10/23 0218  CT NECK SOFT TISSUE W CONT Final result    Impression:  1. No acute abnormality in the neck. 2. Partially visualized 7 mm groundglass pulmonary nodule in the left upper   lobe. If prior imaging is available for comparison, consider follow-up chest CT   in 3-6 months. Narrative:  EXAM:  CT NECK SOFT TISSUE W CONT       INDICATION:  Difficulty swallowing       COMPARISON:  MRI cervical spine 8/18/2022       TECHNIQUE: Helical neck CT is performed with intravenous contrast. Coronal and   sagittal reformatted images are obtained. CT dose reduction was achieved   through use of a standardized protocol tailored for this examination and   automatic exposure control for dose modulation. FINDINGS:    Their is no soft tissue abscess or drainable collection. The nasopharyngeal and   oropharyngeal soft tissues are unremarkable. There are no enlarged lymph nodes. The cervical vasculature is patent. The visualized parotid and submandibular glands are unremarkable.  Right   thyroidectomy surgical changes are noted. The left thyroid gland is   unremarkable. There are peripheral reticular opacities in the visualized upper lungs with a   partially visualized 7 mm groundglass nodule in the left upper lobe (series 2,   image 102). Limited intracranial images are unremarkable. There are multilevel degenerative changes in the cervical spine. CXR Results  (Last 48 hours)      None                SCREENINGS               No data recorded        ED Course     I am the first provider for this patient. Initial assessment performed. The patients presenting problems have been discussed, and they are in agreement with the care plan formulated and outlined with them. I have encouraged them to ask questions as they arise throughout their visit. Records Review:  I reviewed and interpreted the nursing notes and and vital signs from today's visit, as well as the electronic medical record system for any external medical records that were available that may contribute to the patients current condition, including independent interpretation of previous neurology notes from [facility] notes will be reviewed and interpreted by me as they become available in realtime while the pt has been in the ED. Vital Signs-Reviewed the patient's vital signs.   Patient Vitals for the past 12 hrs:   Temp Pulse Resp BP SpO2   03/10/23 1036 97.8 °F (36.6 °C) 77 16 131/76 97 %   03/10/23 0753 97.7 °F (36.5 °C) 77 17 137/83 --   03/10/23 0639 97.8 °F (36.6 °C) 78 16 129/86 100 %   03/10/23 0530 97.9 °F (36.6 °C) 69 16 133/78 99 %   03/10/23 0401 -- 69 16 138/82 100 %       Pulse Oximetry Analysis:  100% on RA, normal    Heart Monitory Analysis:  Rate: 88 bpm  Rhythm: afib    EKG interpretation 0051: sinus la,  nl Axis, rate 57; , QRS 98, QTc 434; NO STEMI interpreted by Yunior Arrington MD    DDX:  COVID, strep, dysphagia, electrolyte derangement, progression of previous neurologic disease, pneumonia, impacted esophageal foreign body    Plan:  EKG, labs, CT soft tissue neck         MDM:  Patient is a nontoxic-appearing 41-year-old male who presents to the emergency department with episode of difficulty swallowing, hypoxia with EMS that was subsequently resolved and did not recur. Patient noted to have improvement of swallowing difficulty and respiratory status while here in the emergency department but became  progressively more agitated and restless. Further discussion with patient's family regarding no significant findings on CT scan but noted hyponatremia. Patient's family notes that he the patient's agitated behavior is consistent with previous episodes of hyponatremia with increased restlessness and agitation. Will provide anxiolytic and discussed with hospitalist for admission. CONSULT NOTE:   1:56 PM  Lien Hubbard MD spoke with Dr. Seth Callaway,   Specialty: Tera Callaway due to hyponatremia. Discussed pt's HPI and available diagnostic results thus far. Expressed concerns for needed admission. Consultant will evaluate for admission. Lien Hubbard MD      ADMISSION NOTE:  1:56 PM  Patient is being admitted to the hospital by Dr. Seth Callaway. The results of their tests and reasons for their admission have been discussed with them and/or available family. They convey agreement and understanding for the need to be admitted and for their admission diagnosis. Lien Hubbard MD    PROCEDURES   None      CRITICAL CARE TIME      None    FINAL IMPRESSION     1. Hyponatremia    2. Agitation          DISPOSITION/PLAN     Admit Note: Pt is being admitted by the hospitalist. The results of their tests and reason(s) for their admission have been discussed with pt and/or available family. They convey agreement and understanding for the need to be admitted and for the admission diagnosis. I am the Primary Clinician of Record.    Lien Hubbard MD (electronically signed)    (Please note that parts of this dictation were completed with voice recognition software. Quite often unanticipated grammatical, syntax, homophones, and other interpretive errors are inadvertently transcribed by the computer software. Please disregards these errors.  Please excuse any errors that have escaped final proofreading.)

## 2023-03-10 NOTE — PROGRESS NOTES
Problem: Mobility Impaired (Adult and Pediatric)  Goal: *Acute Goals and Plan of Care (Insert Text)  Description: FUNCTIONAL STATUS PRIOR TO ADMISSION: Patient was modified independent using a rollator for functional mobility. HOME SUPPORT PRIOR TO ADMISSION: The patient lived with daughter but did not require assist. Patient bathed only when family is home. Daughter works during the day    Physical Therapy Goals  Initiated 3/10/2023  1. Patient will move from supine to sit and sit to supine , scoot up and down, and roll side to side in bed with independence within 7 day(s). 2.  Patient will transfer from bed to chair and chair to bed with supervision/set-up using the least restrictive device within 7 day(s). 3.  Patient will perform sit to stand with supervision/set-up within 7 day(s). 4.  Patient will ambulate with supervision/set-up for 100 feet with the least restrictive device within 7 day(s). Outcome: Not Met   PHYSICAL THERAPY EVALUATION  Patient: Nargis Mullen Sr. (81 y.o. male)  Date: 3/10/2023  Primary Diagnosis: Hyponatremia [E87.1]       Precautions: falls       ASSESSMENT  Based on the objective data described below, the patient presents with h/o chorea like movement disorder- (family stating it is not [place]'s chorea) involving UEs, head and trunk. B LEs are noted to be mildly braced in extension at rest with mild ataxia noted during active movement. Speech is difficult to understand and patient reports issues with eating. Movement disorder impairs patient's functional mobility, balance, coordination and endurance. He was able to complete bed mobility and transfer to chair using RW with CGA. Patient noted to demonstrate decreasing ability to maintain balance when sitting unsupported for greater than 10 minutes with increasing posterior lean and mild losses of balance noted. Patient able to take a few steps bed to chair and gait is mildly unsteady but no overt LOB noted.  Patient remained in chair at end of session, chair alarm activated and family present at bedside. Patient reports modified independence using rollator for ambulation, but admits to occasional falls. He is alone during the day when daughter is at work and expresses concern regarding eating when alone. Patient would benefit from increased supervision at home when daughter is at work. If family is not able to provide 24 hour supervision, patient would benefit from a paid caregiver. Patient expressing desire to remain in his home, but may need eventual LTC placement if movement disorder progresses and family is not able to care for him full time    Current Level of Function Impacting Discharge (mobility/balance): CGA      Other factors to consider for discharge: home alone while daughter at work; patient expressing concern regarding eating when home alone     Patient will benefit from skilled therapy intervention to address the above noted impairments. PLAN :  Recommendations and Planned Interventions: bed mobility training, transfer training, gait training, therapeutic exercises, patient and family training/education, and therapeutic activities      Frequency/Duration: Patient will be followed by physical therapy:  4 times a week to address goals. Recommendation for discharge: (in order for the patient to meet his/her long term goals)  Physical therapy at least 2 days/week in the home AND ensure assist and/or supervision for safety with mobility     This discharge recommendation:  Has not yet been discussed the attending provider and/or case management    IF patient discharges home will need the following DME: bedside commode         SUBJECTIVE:   Patient stated I can walk straight fine, but turning, that is a different story. 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 Calcium and PTH monitored by Dr. Samantha Mckenzie, renal.  Levels normalized after surgery. Prostate cancer ([facility] [address]     seeds, then XRT, then seed again.   Dr. Melissa Torre    Spinal stenosis     Thyroid cancer [facility]) Jan 2015     Past Surgical History:   Procedure Laterality Date    COLONOSCOPY N/A 9/17/2018    COLONOSCOPY performed by Scott Mcdonald MD at [facility] AMBULATORY OR    COLONOSCOPY N/A 2/10/2021    COLONOSCOPY performed by Liborio Lyle MD at [facility] ENDOSCOPY    HX APPENDECTOMY      HX CHOLECYSTECTOMY      HX HEART CATHETERIZATION      No Stents-  Dr. Danis Hernandez      vasectomy    HX PARATHYROIDECTOMY  01/14/2015    right superior parathyroid gland removed and left superior parathyroid gland removed    HX PARTIAL THYROIDECTOMY  1/14/15    right lobectomy    HX TONSILLECTOMY      HX UROLOGICAL      Seeds for prostate cancer , 4 dialations     HX UROLOGICAL  1/14/15    BLADDER NECK INCISION, Cold knife incision of bladder neck contracture, cystoscopy         Home Situation  Home Environment: Private residence  # Steps to Enter: 1 (threshold steps)  Wheelchair Ramp: No  One/Two Story Residence: One story  Living Alone: No (daughter works during day)  [place]: Child(handy)  Patient Expects to be Discharged to[de-identified] Home  Current DME Used/Available at Home: Sarah Sarabia, [employer], [id number] chair, Grab bars  Tub or Shower Type: Shower    EXAMINATION/PRESENTATION/DECISION MAKING:   Critical Behavior:  Neurologic State: Alert  Orientation Level: Oriented X4  Cognition: Follows commands  Safety/Judgement: Awareness of environment, Insight into deficits  Hearing: Auditory  Auditory Impairment: None    Range Of Motion:  AROM: Within functional limits                       Strength:    Strength: Within functional limits                    Tone & Sensation:   Tone: Abnormal (chorea-like movements of trunk, head and UEs)                              Coordination:  Coordination: Generally decreased, functional       Functional Mobility:  Bed Mobility:     Supine to Sit: Contact guard assistance     Scooting: Contact guard assistance  Transfers:  Sit to Stand: Contact guard assistance  Stand to Sit: Contact guard assistance        Bed to Chair: Contact guard assistance              Balance:   Sitting: Impaired  Sitting - Static:  (good initially progressing to fair with increasing posterior lean with time in sitting)  Sitting - Dynamic: Not tested  Standing: Impaired  Standing - Static: Good;Constant support  Standing - Dynamic : Fair;Constant support  Ambulation/Gait Training:  Distance (ft):  (few steps bed to chair)  Assistive Device: Walker, rolling;Gait belt  Ambulation - Level of Assistance: Contact guard assistance        Gait Abnormalities: Step to gait (mild)        Base of Support: Widened     Speed/Jossy: Pace decreased (<100 feet/min)  Step Length: Left shortened;Right shortened            Gait is mildly unsteady but no overt LOB noted. Mild ataxic steps present but trunk and B UEs stable with no increased movement present. Activity Tolerance:   Fair    After treatment patient left in no apparent distress:   Sitting in chair, Call bell within reach, Bed / chair alarm activated, and Caregiver / family present    COMMUNICATION/EDUCATION:   The patients plan of care was discussed with: Registered nurse. Fall prevention education was provided and the patient/caregiver indicated understanding., Patient/family have participated as able in goal setting and plan of care. , and Patient/family agree to work toward stated goals and plan of care.     Thank you for this referral.  Rico Bralina, PT   Time Calculation: 47 mins

## 2023-03-10 NOTE — H&P
Hospitalist Admission Note    NAME: Dylon Quesada Sr.   :  1935   MRN:  [id number]     Date/Time:  3/10/2023 9:38 AM    Patient PCP: Jasiel Sterling MD  _____________________________________________________________________  Given the patient's current clinical presentation, I have a high level of concern for decompensation if discharged from the emergency department. Complex decision making was performed, which includes reviewing the patient's available past medical records, laboratory results, and x-ray films. My assessment of this patient's clinical condition and my plan of care is as follows. Assessment / Plan:  Dysphagia  Hyponatremia    CT neck showed 1. No acute abnormality in the neck. 2. Partially visualized 7 mm groundglass pulmonary nodule in the left upper  lobe. If prior imaging is available for comparison, consider follow-up chest CT  in 3-6 months. Trial of IVF NS @ 75 cc / hr  Trend BMP    SLP eval    CKD  Cr appears to be at baseline    Choreiform movement disorder  Zyprexa 2.5 mg PO Qhs. To consider increasing  Patient follows with movement specialist as outpatient  Melaonin Qhs  Delirium precautions    History of A fib, currently in NSR  C/w eliquis  C/w Toprol XL    HTN  C/w amlodipine and losartan and toprol    Hypothyroid  C/w synthroid    History of CVA  C/w plavix and eliquis  C/w BB      Code Status:  DNR  Surrogate Decision Maker: DaughterAddie Soto    DVT Prophylaxis: Eliquis  GI Prophylaxis: not indicated    Baseline: Independent        Subjective:   CHIEF COMPLAINT:  Difficulty swallowing    HISTORY OF PRESENT ILLNESS:     Dylon Quesada is a 81 y.o. male With Pmhx significant for a-fib, CKD, COPD, GERD, gout, HTN, hypothyroidism, right carotid artery stenosis, TIA, DDD of cervical spine with stenosis, progressive chorea/movement disorder of unclear etiology  who presents to the ED with difficulty swallowing.   Most of the history was obtained from patient's daughter, Popeye Olmstead via telephone. Patient himself is alert awake and oriented to person place and time and is able to give some history but goes off on tangents. As per the daughter, patient has been having some difficulty swallowing for the past 6 weeks or so. She states that the patient has to tilt his head back and try to take the pills to make sure they go down smoothly. Last night, while they were eating dinner patient began to have difficulty swallowing and felt like his throat was closing. They got the patient to the chair and called the ambulance. Patient did require supplemental oxygen on initial presentation to the ED. At the time my examination, patient is not requiring any oxygen denies any shortness of breath. He says he feels back to his normal but has not tried to eat yet this morning. Patient has never had a thing like this in the past.  He denies any chest pain or shortness of breath. No nausea vomiting. No diarrhea. Of note, patient was agitated this morning for which she received Ativan. We were asked to admit for work up and evaluation of the above problems. 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 Calcium and PTH monitored by Dr. Donaldo Blood, renal.  Levels normalized after surgery. Prostate cancer ([address]     seeds, then XRT, then seed again.   Dr. Minesh Mallory    Spinal stenosis     Thyroid cancer [facility]) Jan 2015        Past Surgical History:   Procedure Laterality Date    COLONOSCOPY N/A 2018    COLONOSCOPY performed by Fior Cano MD at [facility] AMBULATORY OR    COLONOSCOPY N/A 2/10/2021    COLONOSCOPY performed by Santino Smith MD at [facility] ENDOSCOPY    HX APPENDECTOMY      HX CHOLECYSTECTOMY      HX HEART CATHETERIZATION      No Stents-  Dr. Jorje Dias      vasectomy    HX PARATHYROIDECTOMY  2015    right superior parathyroid gland removed and left superior parathyroid gland removed    HX PARTIAL THYROIDECTOMY  1/14/15    right lobectomy    HX TONSILLECTOMY      HX UROLOGICAL      Seeds for prostate cancer , 4 dialations     HX UROLOGICAL  1/14/15    BLADDER NECK INCISION, Cold knife incision of bladder neck contracture, cystoscopy       Social History     Tobacco Use    Smoking status: Former     Years: 5.00     Types: Cigarettes     Quit date: 1955     Years since quittin.1    Smokeless tobacco: Never   Substance Use Topics    Alcohol use: Not Currently     Comment: occassional mixed drink or beer        Family History   Problem Relation Age of Onset    Cancer Mother         hodgkins disease    Heart Disease Father     Hypertension Father     Other Neg Hx         no hypercalcemia or kidney stones     Allergies   Allergen Reactions    Sulfa (Sulfonamide Antibiotics) Hives        Prior to Admission medications    Medication Sig Start Date End Date Taking? Authorizing Provider   OLANZapine (ZyPREXA) 2.5 mg tablet Take 2.5 mg by mouth nightly. 1/3/23  Yes Provider, Historical   levothyroxine (SYNTHROID) 100 mcg tablet Take 1 Tablet by mouth Daily (before breakfast). 10/4/22  Yes Su Barnett MD   amLODIPine (NORVASC) 2.5 mg tablet Take 1 Tablet by mouth daily. 10/3/22  Yes Su Barnett MD   losartan (COZAAR) 100 mg tablet Take 100 mg by mouth daily.  20  Yes Provider, Historical   clopidogrel (PLAVIX) 75 mg tab TAKE 1 TABLET BY MOUTH  DAILY 19  Yes Su Barnett MD   metoprolol succinate (TOPROL-XL) 50 mg XL tablet Take 50 mg by mouth daily. Yes Provider, Historical   ELIQUIS 2.5 mg tablet Take 2.5 mg by mouth two (2) times a day. 5/3/18  Yes Provider, Historical   cholecalciferol, vitamin D3, 50 mcg (2,000 unit) tab Take 1 Tab by mouth nightly. Yes Provider, Historical   pregabalin (LYRICA) 25 mg capsule Take 1 Capsule by mouth two (2) times a day. Max Daily Amount: 50 mg. Patient not taking: Reported on 3/10/2023 5/27/22   Nick Rodriguez MD   acetaminophen (TYLENOL) 500 mg tablet Take 500 mg by mouth every six (6) hours as needed for Pain. Provider, Historical       REVIEW OF SYSTEMS:     I am not able to complete the review of systems because:    The patient is intubated and sedated    The patient has altered mental status due to his acute medical problems    The patient has baseline aphasia from prior stroke(s)    The patient has baseline dementia and is not reliable historian    The patient is in acute medical distress and unable to provide information           Total of 12 systems reviewed as follows:       POSITIVE= underlined text  Negative = text not underlined  General:  fever, chills, sweats, generalized weakness, weight loss/gain,      loss of appetite   Eyes:    blurred vision, eye pain, loss of vision, double vision  ENT:    rhinorrhea, pharyngitis   Respiratory:   cough, sputum production, SOB, MARVIN, wheezing, pleuritic pain   Cardiology:   chest pain, palpitations, orthopnea, PND, edema, syncope   Gastrointestinal:  abdominal pain , N/V, diarrhea, dysphagia, constipation, bleeding   Genitourinary:  frequency, urgency, dysuria, hematuria, incontinence   Muskuloskeletal :  arthralgia, myalgia, back pain  Hematology:  easy bruising, nose or gum bleeding, lymphadenopathy   Dermatological: rash, ulceration, pruritis, color change / jaundice  Endocrine:   hot flashes or polydipsia   Neurological:  headache, dizziness, confusion, focal weakness, paresthesia,     Speech difficulties, memory loss, gait difficulty  Psychological: Feelings of anxiety, depression, agitation    Objective:   VITALS:    Visit Vitals  /83   Pulse 77   Temp 97.7 °F (36.5 °C)   Resp 17   Ht 5' 10\" (1.778 m)   Wt 74.8 kg (165 lb)   SpO2 100%   BMI 23.68 kg/m²       PHYSICAL EXAM:    General:    Alert, cooperative, no distress, appears stated age. HEENT: Atraumatic, anicteric sclerae, pink conjunctivae     No oral ulcers, mucosa moist, throat clear, dentition fair  Neck:  Supple, symmetrical,  thyroid: non tender  Lungs:   Clear to auscultation bilaterally. No Wheezing or Rhonchi. No rales. Chest wall:  No tenderness  No Accessory muscle use. Heart:   Regular  rhythm,  No  murmur   No edema  Abdomen:   Soft, non-tender. Not distended. Bowel sounds normal  Extremities: No cyanosis. No clubbing,      Skin turgor normal, Capillary refill normal, Radial dial pulse 2+  Skin:     Not pale. Not Jaundiced  No rashes   Psych:  Good insight. Not depressed. Not anxious or agitated. Neurologic: EOMs intact. No facial asymmetry. No aphasia or slurred speech. Symmetrical strength, Sensation grossly intact. Alert and oriented X 4.     _______________________________________________________________________  Care Plan discussed with:    Comments   Patient     Family      RN     Care Manager                    Consultant:      _______________________________________________________________________  Expected  Disposition:   Home with Family    HH/PT/OT/RN    SNF/LTC    ER[facility]    ________________________________________________________________________  TOTAL TIME:  48  Minutes    Critical Care Provided     Minutes non procedure based      Comments     Reviewed previous records   >50% of visit spent in counseling and coordination of care  Discussion with patient and/or family and questions answered       Given the patient's current clinical presentation, I have a high level of concern for decompensation if discharged from the ED.  Complex decision making was performed which includes reviewing the patient's available past medical records, laboratory results, and Xray films. I have also directly communicated my plan and discussed this case with the involved ED physician.     ____________________________________________________________________  Clover Lainez MD    Procedures: see electronic medical records for all procedures/Xrays and details which were not copied into this note but were reviewed prior to creation of Plan. LAB DATA REVIEWED:    Recent Results (from the past 24 hour(s))   CBC WITH AUTOMATED DIFF    Collection Time: 03/10/23 12:41 AM   Result Value Ref Range    WBC 6.6 4.1 - 11.1 K/uL    RBC 3.63 (L) 4.10 - 5.70 M/uL    HGB 11.6 (L) 12.1 - 17.0 g/dL    HCT 33.2 (L) 36.6 - 50.3 %    MCV 91.5 80.0 - 99.0 FL    MCH 32.0 26.0 - 34.0 PG    MCHC 34.9 30.0 - 36.5 g/dL    RDW 13.0 11.5 - 14.5 %    PLATELET 049 422 - 882 K/uL    MPV 8.6 (L) 8.9 - 12.9 FL    NRBC 0.0 0  WBC    ABSOLUTE NRBC 0.00 0.00 - 0.01 K/uL    NEUTROPHILS 70 32 - 75 %    LYMPHOCYTES 17 12 - 49 %    MONOCYTES 10 5 - 13 %    EOSINOPHILS 2 0 - 7 %    BASOPHILS 0 0 - 1 %    IMMATURE GRANULOCYTES 1 (H) 0.0 - 0.5 %    ABS. NEUTROPHILS 4.7 1.8 - 8.0 K/UL    ABS. LYMPHOCYTES 1.1 0.8 - 3.5 K/UL    ABS. MONOCYTES 0.7 0.0 - 1.0 K/UL    ABS. EOSINOPHILS 0.1 0.0 - 0.4 K/UL    ABS. BASOPHILS 0.0 0.0 - 0.1 K/UL    ABS. IMM.  GRANS. 0.0 0.00 - 0.04 K/UL    DF AUTOMATED     METABOLIC PANEL, COMPREHENSIVE    Collection Time: 03/10/23 12:41 AM   Result Value Ref Range    Sodium 126 (L) 136 - 145 mmol/L    Potassium 4.1 3.5 - 5.1 mmol/L    Chloride 95 (L) 97 - 108 mmol/L    CO2 26 21 - 32 mmol/L    Anion gap 5 5 - 15 mmol/L    Glucose 85 65 - 100 mg/dL    BUN 19 6 - 20 MG/DL    Creatinine 0.94 0.70 - 1.30 MG/DL    BUN/Creatinine ratio 20 12 - 20      eGFR >60 >60 ml/min/1.73m2    Calcium 8.2 (L) 8.5 - 10.1 MG/DL    Bilirubin, total 0.6 0.2 - 1.0 MG/DL    ALT (SGPT) 20 12 - 78 U/L AST (SGOT) 20 15 - 37 U/L    Alk.  phosphatase 80 45 - 117 U/L    Protein, total 5.9 (L) 6.4 - 8.2 g/dL    Albumin 3.1 (L) 3.5 - 5.0 g/dL    Globulin 2.8 2.0 - 4.0 g/dL    A-G Ratio 1.1 1.1 - 2.2     TROPONIN-HIGH SENSITIVITY    Collection Time: 03/10/23 12:41 AM   Result Value Ref Range    Troponin-High Sensitivity 5 0 - 76 ng/L   NT-PRO BNP    Collection Time: 03/10/23 12:41 AM   Result Value Ref Range    NT pro- <450 PG/ML   COVID-19 RAPID TEST    Collection Time: 03/10/23 12:41 AM   Result Value Ref Range    Specimen source Nasopharyngeal      COVID-19 rapid test Not detected NOTD     INFLUENZA A+B VIRAL AGS    Collection Time: 03/10/23 12:41 AM   Result Value Ref Range    Influenza A Antigen Negative NEG      Influenza B Antigen Negative NEG     EKG, 12 LEAD, INITIAL    Collection Time: 03/10/23 12:51 AM   Result Value Ref Range    Ventricular Rate 57 BPM    Atrial Rate 57 BPM    P-R Interval 266 ms    QRS Duration 98 ms    Q-T Interval 434 ms    QTC Calculation (Bezet) 422 ms    Calculated P Axis 54 degrees    Calculated R Axis 12 degrees    Calculated T Axis 44 degrees    Diagnosis       Sinus bradycardia with 1st degree AV block  When compared with ECG of 21-SEP-2020 18:30,  ST no longer depressed in Anterior leads  Confirmed by Chapis Rea ([id number]) on 3/10/2023 8:41:37 AM

## 2023-03-10 NOTE — PROGRESS NOTES
Transition of Care Plan:    RUR: 12% (low RUR)  Disposition: Home with follow ups, may want HH on discharge  If SNF or IPR: Date FOC offered: N/A  Date FOC received: N/A  Date authorization started with reference number: N/A  Date authorization received and expires: N/A  Accepting facility: N/A  Follow up appointments: PCP/specialists if needed. DME needed: None. Patient has a RW and cane at home. Transportation at Discharge: [facility] or means to access home:  Patient has access     IM Medicare Letter: 2nd IM needed prior to discharge  Is patient a  and connected with the [place]? No.              If yes, was [employer] transfer form completed and [facility] notified? N/A  Caregiver Contact: Pita Mendez - [phone]  Discharge Caregiver contacted prior to discharge? Patient to contact. Care Conference needed?:  No.                  Reason for Admission:  hyponatremia                   RUR Score:  12% (low RUR)                   Plan for utilizing home health:  Has previously used [facility] before, unsure of company name, maybe [facility].       PCP: First and Last name:  Nancy Grant MD     Name of Practice: [facility]   Are you a current patient: Yes/No: Yes   Approximate date of last visit: 2/1/2023   Can you participate in a virtual visit with your PCP: No.                    Current Advanced Directive/Advance Care Plan: DNR  Advance Care Planning     General Advance Care Planning (ACP) Conversation    Date of Conversation: 3/10/2023  Conducted with: Healthcare Decision Maker: Named in Advance Directive or Healthcare Power of [employer] Decision Maker:     Primary Decision Maker: Scott holden - Daughter - [phone]    Secondary Decision Maker: Oleksandr Clark - [phone]  Click here to complete [address] including selection of the Healthcare Decision Maker Relationship (ie \"Primary\")    Today we documented Decision Maker(s) consistent with ACP documents on file.    Content/Action Overview: Has ACP document(s) on file - reflects the patient's care preferences  Reviewed DNR/DNI and patient confirms current DNR status - completed forms on file (place new order if needed)  Topics discussed: treatment goals    Length of Voluntary ACP Conversation in minutes:  <16 minutes (Non-Billable)    Nikko Herrera RN            Healthcare Decision Maker:   Click here to complete [place] including selection of the Healthcare Decision Maker Relationship (ie \"Primary\")             Primary Decision MakerNia Lemus - Daughter - [phone]    Secondary Decision Maker: Arabella Monahan - Son - [phone]                  Transition of Care Plan:                       5 - CM contacted attending via  to notify that patient has a DDNR on file but code status is listed as full code. Registration notified of need for 1st IM letter. Patient's code status changed to DNR.    1450 - CM spoke with patient's DTR who was at bedside to introduce self/role and verify demographic, contact, PCP and insurance information. Patient lives with DTR in one story home with 0 CEDRIC. Patient is fairly independent with ADLs and has a RW/cane at home. Family members assist patient with driving. Patient has a DDNR and AD on file. They are unsure if patient will need HH on discharge but they believe they previously worked with [facility]. Care Management Interventions  PCP Verified by CM:  Yes  Last Visit to PCP: 02/01/23  Palliative Care Criteria Met (RRAT>21 & CHF Dx)?: No  Mode of Transport at Discharge: Self  Transition of Care Consult (CM Consult): Discharge Planning  MyChart Signup: No  Discharge Durable Medical Equipment: No  Speech Therapy Consult: No  Support Systems: Child(handy)  Confirm Follow Up Transport: Family  The Patient and/or Patient Representative was Provided with a Choice of Provider and Agrees with the Discharge Plan?: Yes  Name of the Patient Representative Who was Provided with a Choice of Provider and Agrees with the Discharge Plan: João Calzada (patient)  [place] of Choice List was Provided with Basic Dialogue that Supports the Patient's Individualized Plan of Care/Goals, Treatment Preferences and Shares the Quality Data Associated with the Providers?: Yes  [place] Resource Information Provided?: No  Discharge Location  Patient Expects to be Discharged to[de-identified] Home    SHAHRIAR Mcwilliams, RN    Care Management  [phone]

## 2023-03-11 LAB
ANION GAP SERPL CALC-SCNC: 4 MMOL/L (ref 5–15)
BASOPHILS # BLD: 0 K/UL (ref 0–0.1)
BASOPHILS NFR BLD: 0 % (ref 0–1)
BUN SERPL-MCNC: 15 MG/DL (ref 6–20)
BUN/CREAT SERPL: 17 (ref 12–20)
CALCIUM SERPL-MCNC: 8.1 MG/DL (ref 8.5–10.1)
CHLORIDE SERPL-SCNC: 97 MMOL/L (ref 97–108)
CO2 SERPL-SCNC: 25 MMOL/L (ref 21–32)
CREAT SERPL-MCNC: 0.86 MG/DL (ref 0.7–1.3)
DIFFERENTIAL METHOD BLD: ABNORMAL
EOSINOPHIL # BLD: 0.1 K/UL (ref 0–0.4)
EOSINOPHIL NFR BLD: 2 % (ref 0–7)
ERYTHROCYTE [DISTWIDTH] IN BLOOD BY AUTOMATED COUNT: 13.2 % (ref 11.5–14.5)
GLUCOSE SERPL-MCNC: 86 MG/DL (ref 65–100)
HCT VFR BLD AUTO: 35.1 % (ref 36.6–50.3)
HGB BLD-MCNC: 12 G/DL (ref 12.1–17)
IMM GRANULOCYTES # BLD AUTO: 0 K/UL (ref 0–0.04)
IMM GRANULOCYTES NFR BLD AUTO: 0 % (ref 0–0.5)
LYMPHOCYTES # BLD: 0.9 K/UL (ref 0.8–3.5)
LYMPHOCYTES NFR BLD: 15 % (ref 12–49)
MAGNESIUM SERPL-MCNC: 1.6 MG/DL (ref 1.6–2.4)
MCH RBC QN AUTO: 31.6 PG (ref 26–34)
MCHC RBC AUTO-ENTMCNC: 34.2 G/DL (ref 30–36.5)
MCV RBC AUTO: 92.4 FL (ref 80–99)
MONOCYTES # BLD: 0.6 K/UL (ref 0–1)
MONOCYTES NFR BLD: 10 % (ref 5–13)
NEUTS SEG # BLD: 4.4 K/UL (ref 1.8–8)
NEUTS SEG NFR BLD: 73 % (ref 32–75)
NRBC # BLD: 0 K/UL (ref 0–0.01)
NRBC BLD-RTO: 0 PER 100 WBC
PHOSPHATE SERPL-MCNC: 2.4 MG/DL (ref 2.6–4.7)
PLATELET # BLD AUTO: 254 K/UL (ref 150–400)
PMV BLD AUTO: 8.6 FL (ref 8.9–12.9)
POTASSIUM SERPL-SCNC: 4 MMOL/L (ref 3.5–5.1)
RBC # BLD AUTO: 3.8 M/UL (ref 4.1–5.7)
SODIUM SERPL-SCNC: 126 MMOL/L (ref 136–145)
SODIUM UR-SCNC: 112 MMOL/L
WBC # BLD AUTO: 6 K/UL (ref 4.1–11.1)

## 2023-03-11 PROCEDURE — 97535 SELF CARE MNGMENT TRAINING: CPT | Performed by: OCCUPATIONAL THERAPIST

## 2023-03-11 PROCEDURE — 36415 COLL VENOUS BLD VENIPUNCTURE: CPT

## 2023-03-11 PROCEDURE — 80048 BASIC METABOLIC PNL TOTAL CA: CPT

## 2023-03-11 PROCEDURE — 74011250636 HC RX REV CODE- 250/636: Performed by: INTERNAL MEDICINE

## 2023-03-11 PROCEDURE — 83735 ASSAY OF MAGNESIUM: CPT

## 2023-03-11 PROCEDURE — 65270000046 HC RM TELEMETRY

## 2023-03-11 PROCEDURE — 97165 OT EVAL LOW COMPLEX 30 MIN: CPT | Performed by: OCCUPATIONAL THERAPIST

## 2023-03-11 PROCEDURE — 84100 ASSAY OF PHOSPHORUS: CPT

## 2023-03-11 PROCEDURE — 74011250637 HC RX REV CODE- 250/637: Performed by: INTERNAL MEDICINE

## 2023-03-11 PROCEDURE — 97530 THERAPEUTIC ACTIVITIES: CPT | Performed by: OCCUPATIONAL THERAPIST

## 2023-03-11 PROCEDURE — 84300 ASSAY OF URINE SODIUM: CPT

## 2023-03-11 PROCEDURE — 85025 COMPLETE CBC W/AUTO DIFF WBC: CPT

## 2023-03-11 PROCEDURE — 92526 ORAL FUNCTION THERAPY: CPT

## 2023-03-11 RX ADMIN — MELATONIN 3 MG: at 21:07

## 2023-03-11 RX ADMIN — AMLODIPINE BESYLATE 2.5 MG: 2.5 TABLET ORAL at 08:01

## 2023-03-11 RX ADMIN — SODIUM CHLORIDE 75 ML/HR: 9 INJECTION, SOLUTION INTRAVENOUS at 00:48

## 2023-03-11 RX ADMIN — CLOPIDOGREL BISULFATE 75 MG: 75 TABLET ORAL at 08:01

## 2023-03-11 RX ADMIN — OLANZAPINE 2.5 MG: 2.5 TABLET, FILM COATED ORAL at 21:07

## 2023-03-11 RX ADMIN — CHOLECALCIFEROL TAB 25 MCG (1000 UNIT) 2000 UNITS: 25 TAB at 21:07

## 2023-03-11 RX ADMIN — LEVOTHYROXINE SODIUM 100 MCG: 0.1 TABLET ORAL at 08:01

## 2023-03-11 RX ADMIN — LOSARTAN POTASSIUM 100 MG: 100 TABLET, FILM COATED ORAL at 08:01

## 2023-03-11 RX ADMIN — APIXABAN 2.5 MG: 2.5 TABLET, FILM COATED ORAL at 17:29

## 2023-03-11 RX ADMIN — METOPROLOL SUCCINATE 50 MG: 50 TABLET, EXTENDED RELEASE ORAL at 08:01

## 2023-03-11 RX ADMIN — APIXABAN 2.5 MG: 2.5 TABLET, FILM COATED ORAL at 08:01

## 2023-03-11 NOTE — PROGRESS NOTES
0700- Bedside shift change report given to Joaquim (oncoming nurse) by Jojo Valdovinos (offgoing nurse). Report included the following information SBAR, Kardex, Intake/Output, MAR, Accordion, and Recent Results. 1000- Urine labs collected and sent off.     1400- NS maintenance fluid discontinued. 1900- Bedside shift change report given to [address] [place] [address] (oncoming nurse) by Sendy Stewart (offgoing nurse). Report included the following information SBAR, Kardex, Intake/Output, MAR, Accordion, and Recent Results.

## 2023-03-11 NOTE — PROGRESS NOTES
Problem: Dysphagia (Adult)  Goal: *Acute Goals and Plan of Care (Insert Text)  Description: Speech Pathology Goals  Initiated 3/10/2023    1. Patient will tolerate least restrictive diet without signs of aspiration or adverse effects within 7 days. Outcome: Progressing Towards Goal   SPEECH LANGUAGE PATHOLOGY DYSPHAGIA TREATMENT  Patient: Latesha White Sr. (81 y.o. male)  Date: 3/11/2023  Diagnosis: Hyponatremia [E87.1] <principal problem not specified>      Precautions: aspiration      ASSESSMENT:  Patient tolerating regular diet, thin liquids without s/s aspiration or concerns. Choreic motion continues     PLAN:  Recommendations and Planned Interventions:  Continue regular diet, thin liquids. Patient continues to benefit from skilled intervention to address the above impairments. Continue treatment per established plan of care. Discharge Recommendations:  None     SUBJECTIVE:   Patient stated My daughter and oldest son gobble their food down. They both have had their esophagus stretched. But I take my time and enjoy. OBJECTIVE:   Cognitive and Communication Status:  Neurologic State: Alert  Orientation Level: Oriented X4  Cognition: Appropriate decision making  Perception: Appears intact  Perseveration: No perseveration noted  Safety/Judgement: Insight into deficits  Dysphagia Treatment:  Oral Assessment:  Oral Assessment  Labial:  (constant facial and arm choreic movements)  P.O. Trials:  Patient Position: upright in bed  Vocal quality prior to P.O.: No impairment  Consistency Presented: Thin liquid; Solid (seen at entire meal)  How Presented: Self-fed/presented;Straw;Spoon; Successive swallows   ORAL PHASE:  Bolus Acceptance: No impairment  Bolus Formation/Control: No impairment     Propulsion: No impairment  Oral Residue: None  PHARYNGEAL PHASE:   Initiation of Swallow: No impairment  Laryngeal Elevation: Functional  Aspiration Signs/Symptoms: None  Pharyngeal Phase Characteristics: No impairment, issues, or problems          WE discussed his pill concerns and compensatory strategies. Exercises:  Laryngeal Exercises:                                                                                                                                   Pain:  Pain Scale 1: Numeric (0 - 10)  Pain Intensity 1: 0       After treatment:   Patient left in no apparent distress in bed and Nursing notified    COMMUNICATION/EDUCATION:   Patient was educated regarding his deficit(s) of potential dysphagia as this relates to his diagnosis of hyponatremia. He demonstrated Good understanding as evidenced by discussion. .    The patient's plan of care including recommendations, planned interventions, and recommended diet changes were discussed with: Registered nurse.      ИРИНА Gates  Time Calculation: 30 mins

## 2023-03-11 NOTE — PROGRESS NOTES
Problem: Aspiration - Risk of  Goal: *Absence of aspiration  Outcome: Progressing Towards Goal     Problem: Patient Education: Go to Patient Education Activity  Goal: Patient/Family Education  Outcome: Progressing Towards Goal     Problem: Pressure Injury - Risk of  Goal: *Prevention of pressure injury  Description: Document Satish Scale and appropriate interventions in the flowsheet.   Outcome: Progressing Towards Goal  Note: Pressure Injury Interventions:  Sensory Interventions: Assess changes in LOC    Moisture Interventions: Absorbent underpads, Internal/External urinary devices    Activity Interventions: Assess need for specialty bed, PT/OT evaluation    Mobility Interventions: PT/OT evaluation    Nutrition Interventions: Document food/fluid/supplement intake

## 2023-03-11 NOTE — PROGRESS NOTES
Hospitalist Progress Note    NAME: João Calzada Sr.   :  1935   MRN:  [id number]       Assessment / Plan:  Dysphagia  Hyponatremia     CT neck showed 1. No acute abnormality in the neck. 2. Partially visualized 7 mm groundglass pulmonary nodule in the left upper  lobe. If prior imaging is available for comparison, consider follow-up chest CT  in 3-6 months. DC IVF as no improvement in NA  Check urine lytes and osm  Trend BMP     SLP eval appreciated   \"-- Regular/Thin Liquid diet  -- Smaller pills, 1 at a time with thin liquid and larger pills with puree (per patient preference)  -- Strict oral care  -- Upright in bed\"     CKD  Cr appears to be at baseline     Choreiform movement disorder  Zyprexa 2.5 mg PO Qhs. To consider increasing  Patient follows with movement specialist as outpatient  Melaonin Qhs  Delirium precautions     History of A fib, currently in NSR  C/w eliquis  C/w Toprol XL     HTN  C/w amlodipine and losartan and toprol     Hypothyroid  C/w synthroid     History of CVA  C/w plavix and eliquis  C/w BB        Code Status:  DNR  Surrogate Decision Maker: Daughter. Trinidad       18.5 - 24.9 Normal weight / Body mass index is 23.68 kg/m². Estimated discharge date:   Barriers:Hyponatremia    Code status: DNR  Prophylaxis: Eliquis  Recommended Disposition: Home w/Family     Subjective:     Chief Complaint / Reason for Physician Visit  Patient seen and examined at the bedside. Patient's daughter at bedside. Patient is at his baseline. Tolerated diet this morning. Did work with speech therapy yesterday. Requesting to have his \"manwick\" fixed. Discussed with RN events overnight.      Review of Systems:  Symptom Y/N Comments  Symptom Y/N Comments   Fever/Chills    Chest Pain     Poor Appetite    Edema     Cough    Abdominal Pain     Sputum    Joint Pain     SOB/MARVIN    Pruritis/Rash     Nausea/vomit    Tolerating PT/OT     Diarrhea    Tolerating Diet     Constipation    Other Could NOT obtain due to:      Objective:     VITALS:   Last 24hrs VS reviewed since prior progress note. Most recent are:  Patient Vitals for the past 24 hrs:   Temp Pulse Resp BP SpO2   03/11/23 1112 97.3 °F (36.3 °C) (!) 54 14 133/89 99 %   03/11/23 0735 97.4 °F (36.3 °C) (!) 59 15 (!) 137/98 98 %   03/11/23 0250 97.9 °F (36.6 °C) (!) 57 12 132/82 99 %   03/10/23 2340 98.1 °F (36.7 °C) 60 12 109/79 98 %   03/10/23 2257 98.7 °F (37.1 °C) (!) 58 15 -- 98 %   03/10/23 1940 97.9 °F (36.6 °C) 65 19 136/76 100 %       Intake/Output Summary (Last 24 hours) at 3/11/2023 1514  Last data filed at 3/11/2023 1500  Gross per 24 hour   Intake 2142.5 ml   Output 750 ml   Net 1392.5 ml        I had a face to face encounter and independently examined this patient on 3/11/2023, as outlined below:  PHYSICAL EXAM:  General: WD, WN. Alert, cooperative, no acute distress    EENT:  EOMI. Anicteric sclerae. MMM  Resp:  CTA bilaterally, no wheezing or rales. No accessory muscle use  CV:  Regular  rhythm,  No edema  GI:  Soft, Non distended, Non tender. +Bowel sounds  Neurologic:  Alert and oriented X 3, normal speech, +ve choreform movements  Psych:   Good insight. Not anxious nor agitated  Skin:  No rashes. No jaundice    Reviewed most current lab test results and cultures  YES  Reviewed most current radiology test results   YES  Review and summation of old records today    NO  Reviewed patient's current orders and MAR    YES  PMH/SH reviewed - no change compared to H&P  ________________________________________________________________________  Care Plan discussed with:    Comments   Patient     Family      RN     Care Manager     Consultant                        Multidiciplinary team rounds were held today with , nursing, pharmacist and clinical coordinator. Patient's plan of care was discussed; medications were reviewed and discharge planning was addressed. ________________________________________________________________________  Total NON critical care TIME:  40   Minutes    Total CRITICAL CARE TIME Spent:   Minutes non procedure based      Comments   >50% of visit spent in counseling and coordination of care     ________________________________________________________________________  Ladona Bence, MD     Procedures: see electronic medical records for all procedures/Xrays and details which were not copied into this note but were reviewed prior to creation of Plan. LABS:  I reviewed today's most current labs and imaging studies.   Pertinent labs include:  Recent Labs     03/11/23 0557 03/10/23  0041   WBC 6.0 6.6   HGB 12.0* 11.6*   HCT 35.1* 33.2*    268     Recent Labs     03/11/23 0557 03/10/23  0041   * 126*   K 4.0 4.1   CL 97 95*   CO2 25 26   GLU 86 85   BUN 15 19   CREA 0.86 0.94   CA 8.1* 8.2*   MG 1.6  --    PHOS 2.4*  --    ALB  --  3.1*   TBILI  --  0.6   ALT  --  20       Signed: Ladona Bence, MD

## 2023-03-11 NOTE — PROGRESS NOTES
Problem: Self Care Deficits Care Plan (Adult)  Goal: *Acute Goals and Plan of Care (Insert Text)  Description:     FUNCTIONAL STATUS PRIOR TO ADMISSION: Patient ambulates with a rollator, and is independent to mod I for ADLs. HOME SUPPORT: The patient lived with daughter but did not require assist. Patient bathed only when family is home. Daughter works during the day     Occupational Therapy Goals  Initiated 3/11/2023  1. Patient will perform grooming standing at sink with supervision/setup within 7 day(s). 2.  Patient will perform upper body dressing with supervision/set-up within 7 day(s). 3.  Patient will perform lower body dressing with supervision/set-up within 7 day(s). 4.  Patient will perform toilet transfers with supervision/set-up within 7 day(s). 5.  Patient will perform all aspects of toileting with supervision/set-up within 7 day(s). 6.  Patient will perform sponge bathing with supervision/set-up within 7 day(s). 3/11/2023 1656 by MIHAI Wang/L  Outcome: Not Met    OCCUPATIONAL THERAPY EVALUATION  Patient: Maci Rider Sr. (81 y.o. male)  Date: 3/11/2023  Primary Diagnosis: Hyponatremia [E87.1]       Precautions: falls       ASSESSMENT  Based on the objective data described below, the patient presents with acute worsening of his baseline chorea like movements that occur t/o his trunk and all 4 extremities. In addition to his chorea like movements, he is also demonstrating GW, decreased activity tolerance, decreased safety awareness and decreased balance, all of which is impairing his functional independence. The patient is functioning below his independent to mod I baseline, now performing ADLs at a supervision/setup to min A level and is CGA for functional mobility. The patient will benefit from acute OT intervention and will need HHOT, PT and supervision after discharge. Functional Outcome Measure:   The patient scored 40/100 on the Barthel Index outcome measure which is indicative of a 60% impairment in function. PLAN :  Recommendations and Planned Interventions: self care training, functional mobility training, therapeutic exercise, balance training, therapeutic activities, endurance activities, neuromuscular re-education, patient education, home safety training, and family training/education    Frequency/Duration: Patient will be followed by occupational therapy 4 times a week to address goals. Recommendation for discharge: (in order for the patient to meet his/her long term goals)  Occupational therapy at least 2 days/week in the home AND ensure assist and/or supervision for safety with ADLs and functional mobility. 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 Calcium and PTH monitored by Dr. Katie Zamora, renal.  Levels normalized after surgery. Prostate cancer ([address]     seeds, then XRT, then seed again.   Dr. Aly Person    Spinal stenosis     Thyroid cancer [facility]) Jan 2015     Past Surgical History:   Procedure Laterality Date    COLONOSCOPY N/A 9/17/2018    COLONOSCOPY performed by Tami Carorll MD at [facility] AMBULATORY OR    COLONOSCOPY N/A 2/10/2021    COLONOSCOPY performed by Keara Goldsmith MD at [facility] ENDOSCOPY    HX APPENDECTOMY      HX CHOLECYSTECTOMY      HX HEART CATHETERIZATION      No Stents-  Dr. David Roman vasectomy    HX PARATHYROIDECTOMY  01/14/2015    right superior parathyroid gland removed and left superior parathyroid gland removed    HX PARTIAL THYROIDECTOMY  1/14/15    right lobectomy    HX TONSILLECTOMY      HX UROLOGICAL      Seeds for prostate cancer , 4 dialations     HX UROLOGICAL  1/14/15    BLADDER NECK INCISION, Cold knife incision of bladder neck contracture, cystoscopy       Expanded or extensive additional review of patient history:     Home Situation  Home Environment: Private residence  # Steps to Enter: 1 (threshold steps)  Wheelchair Ramp: No  One/Two Story Residence: One story  Living Alone: No (daughter works during day)  [place]: Child(handy)  Patient Expects to be Discharged to[de-identified] Home  Current DME Used/Available at Home: Elaine Fragmin, rollator, [address] chair, Grab bars  Tub or Shower Type: Shower    Hand dominance: Left    EXAMINATION OF PERFORMANCE DEFICITS:  Cognitive/Behavioral Status:  Neurologic State: Alert  Orientation Level: Oriented X4  Cognition: Appropriate decision making  Perception: Appears intact  Perseveration: No perseveration noted  Safety/Judgement: Insight into deficits      Hearing:   Auditory  Auditory Impairment: None    Vision/Perceptual:    Acuity: Within Defined Limits    Corrective Lenses: Glasses    Range of Motion:  AROM: Within functional limits    Strength:  Strength: Generally decreased, functional    Coordination:  Coordination: Generally decreased, functional  Fine Motor Skills-Upper: Left Impaired;Right Impaired    Gross Motor Skills-Upper: Left Impaired;Right Impaired    Tone & Sensation:  Tone: Abnormal (Chorea like movements)          Balance:  Sitting: Impaired  Sitting - Static: Good (unsupported)  Sitting - Dynamic: Fair (occasional)  Standing: Impaired  Standing - Static: Good;Constant support  Standing - Dynamic : Fair;Constant support    Functional Mobility and Transfers for ADLs:  Bed Mobility:  Rolling: Contact guard assistance  Supine to Sit: Contact guard assistance  Scooting: Contact guard assistance    Transfers:  Sit to Stand: Contact guard assistance  Stand to Sit: Contact guard assistance  Bed to Chair: Contact guard assistance (ambulating with a RW.)  Bathroom Mobility: Contact guard assistance (ambulating with a RW.)  Tub Transfer: Contact guard assistance (using grab bar)    ADL Assessment:  Feeding: Supervision;Setup    Oral Facial Hygiene/Grooming: Contact guard assistance;Setup    Bathing: Minimum assistance; Additional time    Upper Body Dressing: Minimum assistance; Additional time    Lower Body Dressing: Minimum assistance; Additional time    Toileting: Minimum assistance; Additional time         ADL Intervention and task modifications:  Initiated bed mobility, dressing ADL, transfer, toileting, standing grooming and safety training. Functional Measure:  Barthel Index:    Bathin  Bladder: 0  Bowels: 10  Groomin  Dressin  Feedin  Mobility: 0  Stairs: 0  Toilet Use: 5  Transfer (Bed to Chair and Back): 10  Total: 40/100        Percentage of impairment   0%   1-19%   20-39%   40-59%   60-79%   80-99%   100%   Barthel Score 0-100 100 99-80 79-60 59-40 20-39 1-19   0     The Barthel ADL Index: Guidelines  1. The index should be used as a record of what a patient does, not as a record of what a patient could do. 2. The main aim is to establish degree of independence from any help, physical or verbal, however minor and for whatever reason. 3. The need for supervision renders the patient not independent. 4. A patient's performance should be established using the best available evidence. Asking the patient, friends/relatives and nurses are the usual sources, but direct observation and common sense are also important. However direct testing is not needed. 5. Usually the patient's performance over the preceding 24-48 hours is important, but occasionally longer periods will be relevant.   6. Middle categories imply that the patient supplies over 50 per cent of the effort. 7. Use of aids to be independent is allowed. Ellwood Pallas., Barthel, D.W. (4128). Functional evaluation: the Barthel Index. [address] [facility] (14)2. [place], J.J.M.F, Michelle Metzger., Kylah Moore., [place], [address] [place] [address] (1999). Measuring the change indisability after inpatient rehabilitation; comparison of the responsiveness of the Barthel Index and Functional [place] Measure. Journal of Neurology, Neurosurgery, and Psychiatry, 66(4), 160-837. Deysi Mullins, [place].MICH, AMY Galarza, & Yves Erickson M.A. (2004.) Assessment of post-stroke quality of life in cost-effectiveness studies: The usefulness of the Barthel Index and the EuroQoL-5D. Quality of Life Research, 13, 427-43        Pain Rating:  No complaint of pain    Activity Tolerance:   Fair    VSS with activity    After treatment patient left in no apparent distress:    Sitting in chair, Call bell within reach, and Bed / chair alarm activated    COMMUNICATION/EDUCATION:   The patients plan of care was discussed with: Physical Therapist, Registered Nurse, and Certified Nursing Assistant/Patient Care Technician. Home safety education was provided and the patient/caregiver indicated understanding., Patient/family have participated as able in goal setting and plan of care. , and Patient/family agree to work toward stated goals and plan of care. This patients plan of care is appropriate for delegation to [facility].     Thank you for this referral.  Oral Pham, OTR/L  Time Calculation: 34 mins

## 2023-03-12 LAB
ANION GAP SERPL CALC-SCNC: 4 MMOL/L (ref 5–15)
BASOPHILS # BLD: 0 K/UL (ref 0–0.1)
BASOPHILS NFR BLD: 0 % (ref 0–1)
BUN SERPL-MCNC: 15 MG/DL (ref 6–20)
BUN/CREAT SERPL: 18 (ref 12–20)
CALCIUM SERPL-MCNC: 9.1 MG/DL (ref 8.5–10.1)
CHLORIDE SERPL-SCNC: 97 MMOL/L (ref 97–108)
CO2 SERPL-SCNC: 26 MMOL/L (ref 21–32)
CREAT SERPL-MCNC: 0.82 MG/DL (ref 0.7–1.3)
DIFFERENTIAL METHOD BLD: ABNORMAL
EOSINOPHIL # BLD: 0.2 K/UL (ref 0–0.4)
EOSINOPHIL NFR BLD: 2 % (ref 0–7)
ERYTHROCYTE [DISTWIDTH] IN BLOOD BY AUTOMATED COUNT: 12.8 % (ref 11.5–14.5)
GLUCOSE SERPL-MCNC: 105 MG/DL (ref 65–100)
HCT VFR BLD AUTO: 38.6 % (ref 36.6–50.3)
HGB BLD-MCNC: 13.4 G/DL (ref 12.1–17)
IMM GRANULOCYTES # BLD AUTO: 0 K/UL (ref 0–0.04)
IMM GRANULOCYTES NFR BLD AUTO: 0 % (ref 0–0.5)
LYMPHOCYTES # BLD: 1 K/UL (ref 0.8–3.5)
LYMPHOCYTES NFR BLD: 13 % (ref 12–49)
MAGNESIUM SERPL-MCNC: 1.7 MG/DL (ref 1.6–2.4)
MCH RBC QN AUTO: 31.9 PG (ref 26–34)
MCHC RBC AUTO-ENTMCNC: 34.7 G/DL (ref 30–36.5)
MCV RBC AUTO: 91.9 FL (ref 80–99)
MONOCYTES # BLD: 0.7 K/UL (ref 0–1)
MONOCYTES NFR BLD: 9 % (ref 5–13)
NEUTS SEG # BLD: 5.4 K/UL (ref 1.8–8)
NEUTS SEG NFR BLD: 76 % (ref 32–75)
NRBC # BLD: 0 K/UL (ref 0–0.01)
NRBC BLD-RTO: 0 PER 100 WBC
PHOSPHATE SERPL-MCNC: 2.9 MG/DL (ref 2.6–4.7)
PLATELET # BLD AUTO: 276 K/UL (ref 150–400)
PMV BLD AUTO: 8.5 FL (ref 8.9–12.9)
POTASSIUM SERPL-SCNC: 3.9 MMOL/L (ref 3.5–5.1)
RBC # BLD AUTO: 4.2 M/UL (ref 4.1–5.7)
SODIUM SERPL-SCNC: 127 MMOL/L (ref 136–145)
WBC # BLD AUTO: 7.3 K/UL (ref 4.1–11.1)

## 2023-03-12 PROCEDURE — 74011250636 HC RX REV CODE- 250/636: Performed by: INTERNAL MEDICINE

## 2023-03-12 PROCEDURE — 83735 ASSAY OF MAGNESIUM: CPT

## 2023-03-12 PROCEDURE — 84100 ASSAY OF PHOSPHORUS: CPT

## 2023-03-12 PROCEDURE — 85025 COMPLETE CBC W/AUTO DIFF WBC: CPT

## 2023-03-12 PROCEDURE — 74011250637 HC RX REV CODE- 250/637: Performed by: INTERNAL MEDICINE

## 2023-03-12 PROCEDURE — 36415 COLL VENOUS BLD VENIPUNCTURE: CPT

## 2023-03-12 PROCEDURE — 80048 BASIC METABOLIC PNL TOTAL CA: CPT

## 2023-03-12 PROCEDURE — 65270000046 HC RM TELEMETRY

## 2023-03-12 RX ORDER — CYANOCOBALAMIN 1000 UG/ML
1000 INJECTION, SOLUTION INTRAMUSCULAR; SUBCUTANEOUS ONCE
Status: CANCELLED | OUTPATIENT
Start: 2023-03-12 | End: 2023-03-13

## 2023-03-12 RX ORDER — CYANOCOBALAMIN 1000 UG/ML
1000 INJECTION, SOLUTION INTRAMUSCULAR; SUBCUTANEOUS ONCE
Status: COMPLETED | OUTPATIENT
Start: 2023-03-12 | End: 2023-03-12

## 2023-03-12 RX ADMIN — CLOPIDOGREL BISULFATE 75 MG: 75 TABLET ORAL at 08:59

## 2023-03-12 RX ADMIN — LOSARTAN POTASSIUM 100 MG: 100 TABLET, FILM COATED ORAL at 08:59

## 2023-03-12 RX ADMIN — MELATONIN 3 MG: at 22:40

## 2023-03-12 RX ADMIN — LEVOTHYROXINE SODIUM 100 MCG: 0.1 TABLET ORAL at 08:59

## 2023-03-12 RX ADMIN — Medication 1 PACKET: at 11:00

## 2023-03-12 RX ADMIN — ACETAMINOPHEN 500 MG: 500 TABLET ORAL at 17:24

## 2023-03-12 RX ADMIN — APIXABAN 2.5 MG: 2.5 TABLET, FILM COATED ORAL at 08:59

## 2023-03-12 RX ADMIN — AMLODIPINE BESYLATE 2.5 MG: 2.5 TABLET ORAL at 08:58

## 2023-03-12 RX ADMIN — APIXABAN 2.5 MG: 2.5 TABLET, FILM COATED ORAL at 17:24

## 2023-03-12 RX ADMIN — CHOLECALCIFEROL TAB 25 MCG (1000 UNIT) 2000 UNITS: 25 TAB at 22:40

## 2023-03-12 RX ADMIN — OLANZAPINE 2.5 MG: 2.5 TABLET, FILM COATED ORAL at 23:10

## 2023-03-12 RX ADMIN — CYANOCOBALAMIN 1000 MCG: 1000 INJECTION, SOLUTION INTRAMUSCULAR; SUBCUTANEOUS at 12:35

## 2023-03-12 RX ADMIN — METOPROLOL SUCCINATE 50 MG: 50 TABLET, EXTENDED RELEASE ORAL at 08:58

## 2023-03-12 RX ADMIN — Medication 1 PACKET: at 17:24

## 2023-03-12 NOTE — PROGRESS NOTES
End of Shift Note    Bedside shift change report given to [address] (oncoming nurse) by Sophie Best (offgoing nurse). Report included the following information SBAR and Kardex    Shift worked:  1348-0690     Shift summary and any significant changes:    Uneventful shift   Concerns for physician to address:    Zone phone for oncoming shift:       Activity:  Activity Level:  Up with Assistance  Number times ambulated in hallways past shift: 0  Number of times OOB to chair past shift: 0    Cardiac:   Cardiac Monitoring: Yes      Cardiac Rhythm: Sinus Rhythm    Access:  Current line(s): PIV     Genitourinary:   Urinary status: voiding, incontinent, and external catheter    Respiratory:   O2 Device: None (Room air)  Chronic home O2 use?: NO  Incentive spirometer at bedside: N/A       GI:  Last Bowel Movement Date: 03/09/23  Current diet:  ADULT DIET Regular  ADULT ORAL NUTRITION SUPPLEMENT Breakfast, Lunch, Dinner; Standard High Calorie/High Protein  Passing flatus: YES  Tolerating current diet: YES       Pain Management:   Patient states pain is manageable on current regimen: YES    Skin:  Satish Score: 17  Interventions: increase time out of bed    Patient Safety:  Fall Score:    Interventions: bed/chair alarm, gripper socks, and pt to call before getting OOB       Length of Stay:  Expected LOS: - - -  Actual LOS:   [facility]

## 2023-03-12 NOTE — PROGRESS NOTES
0700 Bedside and Verbal shift change report given to SANTIAGO Hutchins (oncoming nurse) by Nestor Morales RN (offgoing nurse). Report included the following information SBAR, Kardex, ED Summary, and Recent Results. 0800 Shift assessment completed - see flow sheets    1000 MD Stephanie at bedside. Decided to monitor sodium for one more day    1100 PCT bathed patient     1200 Reassessment completed. No new changes. Daughter at bedside     1500 Patient in bed resting    1600 Reassessment completed. No new changes    1900 End of Shift Note    Bedside shift change report given to Nestor Morales RN (oncoming nurse) by Jermaine Cobso (offgoing nurse). Report included the following information SBAR, Kardex, ED Summary, and Procedure Summary    Shift worked:  DAY     Shift summary and any significant changes:     Uneventful Day  MD keeping patient another day to monitor improvement on serum NA      Concerns for physician to address:       Zone phone for oncoming shift:   [phone]       Activity:  Activity Level:  Up with Assistance  Number times ambulated in hallways past shift: 0  Number of times OOB to chair past shift: 0    Cardiac:   Cardiac Monitoring: Yes      Cardiac Rhythm: Sinus Rhythm    Access:  Current line(s): PIV     Genitourinary:   Urinary status: external catheter    Respiratory:   O2 Device: None (Room air)  Chronic home O2 use?: NO  Incentive spirometer at bedside: YES       GI:  Last Bowel Movement Date: 03/09/23  Current diet:  ADULT DIET Regular  ADULT ORAL NUTRITION SUPPLEMENT Breakfast, Lunch, Dinner; Standard High Calorie/High Protein  Passing flatus: YES  Tolerating current diet: YES       Pain Management:   Patient states pain is manageable on current regimen: N/A    Skin:  Satish Score: 17  Interventions: float heels and PT/OT consult    Patient Safety:  Fall Score:    Interventions: bed/chair alarm       Length of Stay:  Expected LOS: - - -  Actual LOS: 2      Jermaine Cobos

## 2023-03-12 NOTE — PROGRESS NOTES
Hospitalist Progress Note    NAME: Latesha White Sr.   :  1935   MRN:  [id number]       Assessment / Plan:  Dysphagia  Hyponatremia     CT neck showed 1. No acute abnormality in the neck. 2. Partially visualized 7 mm groundglass pulmonary nodule in the left upper  lobe. If prior imaging is available for comparison, consider follow-up chest CT  in 3-6 months. Holding IVF as no improvement in NA  Check urine lytes , sodium noted, awaiting osm  Patient fluid restricted, add urea- na packet  Trend BMP     SLP eval appreciated   -- \"Regular/Thin Liquid diet  -- Smaller pills, 1 at a time with thin liquid and larger pills with puree (per patient preference)  -- Strict oral care  -- Upright in bed\"     CKD  Cr appears to be at baseline     Choreiform movement disorder  Zyprexa 2.5 mg PO Qhs. To consider increasing  Patient follows with movement specialist as outpatient  Melaonin Qhs  Delirium precautions     History of A fib, currently in NSR  C/w eliquis  C/w Toprol XL     HTN  C/w amlodipine and losartan and toprol     Hypothyroid  C/w synthroid     History of CVA  C/w plavix and eliquis  C/w BB        Code Status:  DNR  Surrogate Decision Maker: Daughter. Trinidad       18.5 - 24.9 Normal weight / Body mass index is 23.68 kg/m². Estimated discharge date:   Barriers:Hyponatremia    Code status: DNR  Prophylaxis: Eliquis  Recommended Disposition: Home w/Family     Subjective:     Chief Complaint / Reason for Physician Visit  Patient seen and examined at the bedside. Eating breakfast with no issues. Also tolerating pills. He currently feels well and has no complaints. Discussed with RN events overnight.      Review of Systems:  Symptom Y/N Comments  Symptom Y/N Comments   Fever/Chills    Chest Pain     Poor Appetite    Edema     Cough    Abdominal Pain     Sputum    Joint Pain     SOB/MARVIN    Pruritis/Rash     Nausea/vomit    Tolerating PT/OT     Diarrhea    Tolerating Diet     Constipation Other       Could NOT obtain due to:      Objective:     VITALS:   Last 24hrs VS reviewed since prior progress note. Most recent are:  Patient Vitals for the past 24 hrs:   Temp Pulse Resp BP SpO2   03/12/23 0736 97.6 °F (36.4 °C) 68 16 (!) 161/84 100 %   03/12/23 0355 97.8 °F (36.6 °C) (!) 59 16 (!) 168/89 100 %   03/11/23 2254 97.4 °F (36.3 °C) (!) 58 14 (!) 167/81 --   03/11/23 1916 97.6 °F (36.4 °C) 66 13 (!) 143/80 100 %   03/11/23 1623 98.6 °F (37 °C) 62 15 (!) 150/95 100 %   03/11/23 1112 97.3 °F (36.3 °C) (!) 54 14 133/89 99 %       Intake/Output Summary (Last 24 hours) at 3/12/2023 1001  Last data filed at 3/12/2023 0119  Gross per 24 hour   Intake 450 ml   Output 1550 ml   Net -1100 ml        I had a face to face encounter and independently examined this patient on 3/12/2023, as outlined below:  PHYSICAL EXAM:  General: WD, WN. Alert, cooperative, no acute distress    EENT:  EOMI. Anicteric sclerae. MMM  Resp:  CTA bilaterally, no wheezing or rales. No accessory muscle use  CV:  Regular  rhythm,  No edema  GI:  Soft, Non distended, Non tender. +Bowel sounds  Neurologic:  Alert and oriented X 3, normal speech, +ve choreform movements  Psych:   Good insight. Not anxious nor agitated  Skin:  No rashes. No jaundice    Reviewed most current lab test results and cultures  YES  Reviewed most current radiology test results   YES  Review and summation of old records today    NO  Reviewed patient's current orders and MAR    YES  PMH/SH reviewed - no change compared to H&P  ________________________________________________________________________  Care Plan discussed with:    Comments   Patient     Family      RN     Care Manager     Consultant                        Multidiciplinary team rounds were held today with , nursing, pharmacist and clinical coordinator. Patient's plan of care was discussed; medications were reviewed and discharge planning was addressed. ________________________________________________________________________  Total NON critical care TIME:  40   Minutes    Total CRITICAL CARE TIME Spent:   Minutes non procedure based      Comments   >50% of visit spent in counseling and coordination of care     ________________________________________________________________________  Jimbo Paz MD     Procedures: see electronic medical records for all procedures/Xrays and details which were not copied into this note but were reviewed prior to creation of Plan. LABS:  I reviewed today's most current labs and imaging studies.   Pertinent labs include:  Recent Labs     03/12/23 0457 03/11/23 0557 03/10/23  0041   WBC 7.3 6.0 6.6   HGB 13.4 12.0* 11.6*   HCT 38.6 35.1* 33.2*    254 268     Recent Labs     03/12/23 0457 03/11/23 0557 03/10/23  0041   * 126* 126*   K 3.9 4.0 4.1   CL 97 97 95*   CO2 26 25 26   * 86 85   BUN 15 15 19   CREA 0.82 0.86 0.94   CA 9.1 8.1* 8.2*   MG 1.7 1.6  --    PHOS 2.9 2.4*  --    ALB  --   --  3.1*   TBILI  --   --  0.6   ALT  --   --  20       Signed: Jimbo Paz MD

## 2023-03-13 ENCOUNTER — TELEPHONE (OUTPATIENT)
Dept: FAMILY MEDICINE CLINIC | Age: 88
End: 2023-03-13

## 2023-03-13 LAB
ANION GAP SERPL CALC-SCNC: 6 MMOL/L (ref 5–15)
BUN SERPL-MCNC: 43 MG/DL (ref 6–20)
BUN/CREAT SERPL: 43 (ref 12–20)
CALCIUM SERPL-MCNC: 9 MG/DL (ref 8.5–10.1)
CHLORIDE SERPL-SCNC: 93 MMOL/L (ref 97–108)
CO2 SERPL-SCNC: 27 MMOL/L (ref 21–32)
CREAT SERPL-MCNC: 1 MG/DL (ref 0.7–1.3)
GLUCOSE SERPL-MCNC: 84 MG/DL (ref 65–100)
OSMOLALITY UR: 578 MOSM/KG H2O
POTASSIUM SERPL-SCNC: 4.3 MMOL/L (ref 3.5–5.1)
SODIUM SERPL-SCNC: 126 MMOL/L (ref 136–145)

## 2023-03-13 PROCEDURE — 80048 BASIC METABOLIC PNL TOTAL CA: CPT

## 2023-03-13 PROCEDURE — 36415 COLL VENOUS BLD VENIPUNCTURE: CPT

## 2023-03-13 PROCEDURE — 74011250637 HC RX REV CODE- 250/637: Performed by: INTERNAL MEDICINE

## 2023-03-13 PROCEDURE — 65270000046 HC RM TELEMETRY

## 2023-03-13 PROCEDURE — 83935 ASSAY OF URINE OSMOLALITY: CPT

## 2023-03-13 RX ORDER — FUROSEMIDE 20 MG/1
20 TABLET ORAL ONCE
Status: COMPLETED | OUTPATIENT
Start: 2023-03-13 | End: 2023-03-13

## 2023-03-13 RX ADMIN — Medication 1 PACKET: at 17:25

## 2023-03-13 RX ADMIN — METOPROLOL SUCCINATE 50 MG: 50 TABLET, EXTENDED RELEASE ORAL at 08:32

## 2023-03-13 RX ADMIN — CHOLECALCIFEROL TAB 25 MCG (1000 UNIT) 2000 UNITS: 25 TAB at 21:44

## 2023-03-13 RX ADMIN — OLANZAPINE 2.5 MG: 2.5 TABLET, FILM COATED ORAL at 21:44

## 2023-03-13 RX ADMIN — LOSARTAN POTASSIUM 100 MG: 100 TABLET, FILM COATED ORAL at 08:34

## 2023-03-13 RX ADMIN — LEVOTHYROXINE SODIUM 100 MCG: 0.1 TABLET ORAL at 08:34

## 2023-03-13 RX ADMIN — Medication 1 PACKET: at 08:34

## 2023-03-13 RX ADMIN — Medication 5 DROP: at 17:25

## 2023-03-13 RX ADMIN — AMLODIPINE BESYLATE 2.5 MG: 2.5 TABLET ORAL at 08:34

## 2023-03-13 RX ADMIN — MELATONIN 3 MG: at 21:44

## 2023-03-13 RX ADMIN — FUROSEMIDE 20 MG: 20 TABLET ORAL at 12:00

## 2023-03-13 RX ADMIN — APIXABAN 2.5 MG: 2.5 TABLET, FILM COATED ORAL at 17:25

## 2023-03-13 RX ADMIN — CLOPIDOGREL BISULFATE 75 MG: 75 TABLET ORAL at 08:34

## 2023-03-13 RX ADMIN — APIXABAN 2.5 MG: 2.5 TABLET, FILM COATED ORAL at 08:34

## 2023-03-13 NOTE — PROGRESS NOTES
Transition of Care Plan:     RUR: 13% (low RUR)  Disposition: Home with [facility] (accepted)  If SNF or IPR: Date FOC offered: N/A  Date FOC received: N/A  Date authorization started with reference number: N/A  Date authorization received and expires: N/A  Accepting facility: N/A  Follow up appointments: PCP/specialists if needed. DME needed: None. Patient has a RW and cane at home. Transportation at Discharge: [facility] or means to access home:  Patient has access     IM Medicare Letter: 2nd IM needed prior to discharge  Is patient a [place] and connected with the [place]? No.              If yes, was [employer] transfer form completed and [facility] notified? N/A  Caregiver Contact: [facility] - [phone]  Discharge Caregiver contacted prior to discharge? Patient to contact. Care Conference needed?:  No.       3932 - CM to speak with patient regarding [facility] options and will continue to follow for discharge needs. 1040 - CM spoke with patient who would like to use the previous agency he worked with. He gave CM permission to speak with his DTR but said she might be at work. [facility] list left at bedside for patient to review. Patient appears to have used [facility] in 2020. 1115 - CM spoke with patient's Ibrahima Blanka, who is agreeable to using [facility]. FOC verbally offered. CM let her know that referral would be sent out to [facility].      1125 - Referral sent to [address] [place] [address],[address] Floor accepted.       DELANEY WardN, RN    Care Management  [phone]

## 2023-03-13 NOTE — PROGRESS NOTES
Problem: Aspiration - Risk of  Goal: *Absence of aspiration  Outcome: Progressing Towards Goal     Problem: Patient Education: Go to Patient Education Activity  Goal: Patient/Family Education  Outcome: Progressing Towards Goal     Problem: Pressure Injury - Risk of  Goal: *Prevention of pressure injury  Description: Document Satish Scale and appropriate interventions in the flowsheet.   Outcome: Progressing Towards Goal  Note: Pressure Injury Interventions:  Sensory Interventions: Assess changes in LOC    Moisture Interventions: Absorbent underpads    Activity Interventions: Assess need for specialty bed    Mobility Interventions: Assess need for specialty bed    Nutrition Interventions: Document food/fluid/supplement intake

## 2023-03-13 NOTE — PROGRESS NOTES
Hospitalist Progress Note    NAME: Radha Kapoor Sr.   :  1935   MRN:  [id number]       Assessment / Plan:  Dysphagia  Hyponatremia     CT neck showed 1. No acute abnormality in the neck. 2. Partially visualized 7 mm groundglass pulmonary nodule in the left upper  lobe. If prior imaging is available for comparison, consider follow-up chest CT  in 3-6 months. Holding IVF as no improvement in NA  Check urine lytes , sodium noted, urine osm 578  Patient fluid restricted, add urea- na packet, HCTZ added  Nephrology consulted for assistance. Appreciate recs  Trend BMP     SLP shiraal appreciated   -- \"Regular/Thin Liquid diet  -- Smaller pills, 1 at a time with thin liquid and larger pills with puree (per patient preference)  -- Strict oral care  -- Upright in bed\"     CKD  Cr appears to be at baseline     Choreiform movement disorder  Zyprexa 2.5 mg PO Qhs. To consider increasing  Patient follows with movement specialist as outpatient  Melaonin Qhs  Delirium precautions     History of A fib, currently in NSR  C/w eliquis  C/w Toprol XL     HTN  C/w amlodipine and losartan and toprol     Hypothyroid  C/w synthroid     History of CVA  C/w plavix and eliquis  C/w BB        Code Status:  DNR  Surrogate Decision Maker: Daughter. Trinidad       18.5 - 24.9 Normal weight / Body mass index is 23.68 kg/m². Estimated discharge date:   Barriers:Hyponatremia    Code status: DNR  Prophylaxis: Eliquis  Recommended Disposition: Home w/Family     Subjective:     Chief Complaint / Reason for Physician Visit  Patient seen and examined at the bedside. Continues to tolerate PO without problems. Sodium level remains low. Discussed with RN events overnight. Discussed care with daughter, Jesus Dickerson.     Review of Systems:  Symptom Y/N Comments  Symptom Y/N Comments   Fever/Chills    Chest Pain     Poor Appetite    Edema     Cough    Abdominal Pain     Sputum    Joint Pain     SOB/MARVIN    Pruritis/Rash     Nausea/vomit Tolerating PT/OT     Diarrhea    Tolerating Diet     Constipation    Other       Could NOT obtain due to:      Objective:     VITALS:   Last 24hrs VS reviewed since prior progress note. Most recent are:  Patient Vitals for the past 24 hrs:   Temp Pulse Resp BP SpO2   03/13/23 1501 97.6 °F (36.4 °C) 67 18 106/62 96 %   03/13/23 1200 98 °F (36.7 °C) 65 20 (!) 117/52 94 %   03/13/23 1035 97.5 °F (36.4 °C) 63 15 98/63 95 %   03/13/23 0748 97.1 °F (36.2 °C) 66 18 135/87 94 %   03/13/23 0249 99 °F (37.2 °C) 64 21 138/83 92 %   03/12/23 2350 97.6 °F (36.4 °C) 68 16 122/84 95 %   03/12/23 2342 -- 72 17 (!) 163/149 --   03/12/23 2245 97.6 °F (36.4 °C) 75 20 (!) 155/128 97 %   03/12/23 1930 97.9 °F (36.6 °C) 75 24 115/82 91 %         Intake/Output Summary (Last 24 hours) at 3/13/2023 1536  Last data filed at 3/13/2023 1500  Gross per 24 hour   Intake 1020 ml   Output 2050 ml   Net -1030 ml          I had a face to face encounter and independently examined this patient on 3/13/2023, as outlined below:  PHYSICAL EXAM:  General: WD, WN. Alert, cooperative, no acute distress    EENT:  EOMI. Anicteric sclerae. MMM  Resp:  CTA bilaterally, no wheezing or rales. No accessory muscle use  CV:  Regular  rhythm,  No edema  GI:  Soft, Non distended, Non tender. +Bowel sounds  Neurologic:  Alert and oriented X 3, normal speech, +ve choreform movements  Psych:   Good insight. Not anxious nor agitated  Skin:  No rashes.   No jaundice    Reviewed most current lab test results and cultures  YES  Reviewed most current radiology test results   YES  Review and summation of old records today    NO  Reviewed patient's current orders and MAR    YES  PMH/SH reviewed - no change compared to H&P  ________________________________________________________________________  Care Plan discussed with:    Comments   Patient     Family      RN     Care Manager     Consultant                        Multidiciplinary team rounds were held today with case manager, nursing, pharmacist and clinical coordinator. Patient's plan of care was discussed; medications were reviewed and discharge planning was addressed. ________________________________________________________________________  Total NON critical care TIME:  40   Minutes    Total CRITICAL CARE TIME Spent:   Minutes non procedure based      Comments   >50% of visit spent in counseling and coordination of care     ________________________________________________________________________  Jimbo Paz MD     Procedures: see electronic medical records for all procedures/Xrays and details which were not copied into this note but were reviewed prior to creation of Plan. LABS:  I reviewed today's most current labs and imaging studies.   Pertinent labs include:  Recent Labs     03/12/23  0457 03/11/23  0557   WBC 7.3 6.0   HGB 13.4 12.0*   HCT 38.6 35.1*    254       Recent Labs     03/13/23  0420 03/12/23  0457 03/11/23  0557   * 127* 126*   K 4.3 3.9 4.0   CL 93* 97 97   CO2 27 26 25   GLU 84 105* 86   BUN 43* 15 15   CREA 1.00 0.82 0.86   CA 9.0 9.1 8.1*   MG  --  1.7 1.6   PHOS  --  2.9 2.4*         Signed: Jimbo Paz MD

## 2023-03-13 NOTE — PROGRESS NOTES
Bedside shift change report given to [address] Deepak (oncoming nurse) by Armani Wiley (offgoing nurse). Report included the following information SBAR and Kardex. 4430 - Full assessment complete. 0930 - Nephrology consult called. 1040 - Updated patient's daughter, Flynn Thomas, over the phone. 1200 - Reassessment complete. 18 - Dr. Ruddy Burris aware patient's daughter is asking if we can check a PSA level, as pt had an appointment with Dr. Bevin Ahumada Friday for this. 1525 - Reassessment complete. 5 - Patient's daughter at bedside. Assisting pt with dinner. End of Shift Note    Bedside shift change report given to Dena Sotomayor (oncoming nurse) by Alba Sterling (offgoing nurse). Report included the following information SBAR and Kardex    Shift worked:  7a-7p     Shift summary and any significant changes:     Nephrology consulted. Lasix administered. 1200 mL FR. Start ear drops d/t ear wax ball per daughter. Concerns for physician to address:  N/a     [facility] phone for oncoming shift:   [phone]       Activity:  Activity Level:  Up with Assistance  Number times ambulated in hallways past shift: 0  Number of times OOB to chair past shift: 1    Cardiac:   Cardiac Monitoring: Yes      Cardiac Rhythm: Sinus Rhythm    Access:  Current line(s): PIV     Genitourinary:   Urinary status: voiding and external catheter    Respiratory:   O2 Device: None (Room air)  Chronic home O2 use?: NO  Incentive spirometer at bedside: NO       GI:  Last Bowel Movement Date: 03/12/23  Current diet:  ADULT ORAL NUTRITION SUPPLEMENT Breakfast, Lunch, Dinner; Standard High Calorie/High Protein  ADULT DIET Regular; 1200 ml  Passing flatus: YES  Tolerating current diet: YES       Pain Management:   Patient states pain is manageable on current regimen: YES    Skin:  Satish Score: 16  Interventions: increase time out of bed, PT/OT consult, and nutritional support     Patient Safety:  Fall Score:    Interventions: bed/chair alarm, gripper socks, pt to call before getting OOB, and stay with me (per policy)       Length of Stay:  Expected LOS: 3d 12h  Actual LOS: 100 [facility]

## 2023-03-13 NOTE — CONSULTS
Consultation Note    NAME: Cesar Granados Sr.   :  1935   MRN:  [id number]     Date/Time:  3/13/2023 10:53 AM    I have been asked to see this patient by Dr. Chip Vu  for advice/opinion re: hyponatremia. Assessment :    Plan:  Hyponatremia/SIADH Sodium stuck at 126    Ure-Na added 3/12; add FR (1200 ml/day); give a low dose loop as well       Subjective:   CHIEF COMPLAINT:  hyponatremia    HISTORY OF PRESENT ILLNESS:     Roxana Gillette is a 81 y.o.   male who has a history of the below. Denies n/v/sob. Not a great historian. Chart reviewed. 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 Calcium and PTH monitored by Dr. Darlene Benton, renal.  Levels normalized after surgery. Prostate cancer ([address]     seeds, then XRT, then seed again.   Dr. Kristen Thomason    Spinal stenosis     Thyroid cancer [facility]) 2015      Past Surgical History:   Procedure Laterality Date    COLONOSCOPY N/A 2018    COLONOSCOPY performed by Solis Waddell MD at [facility] AMBULATORY OR    COLONOSCOPY N/A 2/10/2021    COLONOSCOPY performed by Jennifer Rizzo MD at [facility] ENDOSCOPY    HX APPENDECTOMY      HX CHOLECYSTECTOMY      HX HEART CATHETERIZATION      No Stents-  Dr. Arora Grade      vasectomy    HX PARATHYROIDECTOMY  2015    right superior parathyroid gland removed and left superior parathyroid gland removed    HX PARTIAL THYROIDECTOMY  1/14/15    right lobectomy    HX TONSILLECTOMY      HX UROLOGICAL      Seeds for prostate cancer , 4 dialations     HX UROLOGICAL  1/14/15    BLADDER NECK INCISION, Cold knife incision of bladder neck contracture, cystoscopy     Social History     Tobacco Use    Smoking status: Former     Years: 5.00     Types: Cigarettes     Quit date: 1955     Years since quittin.1    Smokeless tobacco: Never   Substance Use Topics    Alcohol use: Not Currently     Comment: occassional mixed drink or beer      Family History   Problem Relation Age of Onset    Cancer Mother         hodgkins disease    Heart Disease Father     Hypertension Father     Other Neg Hx         no hypercalcemia or kidney stones      Allergies   Allergen Reactions    Sulfa (Sulfonamide Antibiotics) Hives      Prior to Admission medications    Medication Sig Start Date End Date Taking? Authorizing Provider   OLANZapine (ZyPREXA) 2.5 mg tablet Take 2.5 mg by mouth nightly. 1/3/23  Yes Provider, Historical   levothyroxine (SYNTHROID) 100 mcg tablet Take 1 Tablet by mouth Daily (before breakfast). 10/4/22  Yes Edita Mcrae MD   amLODIPine (NORVASC) 2.5 mg tablet Take 1 Tablet by mouth daily. 10/3/22  Yes Edita Mcrae MD   losartan (COZAAR) 100 mg tablet Take 100 mg by mouth daily. 20  Yes Provider, Historical   clopidogrel (PLAVIX) 75 mg tab TAKE 1 TABLET BY MOUTH  DAILY 19  Yes Edita Mcrae MD   metoprolol succinate (TOPROL-XL) 50 mg XL tablet Take 50 mg by mouth daily. Yes Provider, Historical   ELIQUIS 2.5 mg tablet Take 2.5 mg by mouth two (2) times a day. 5/3/18  Yes Provider, Historical   cholecalciferol, vitamin D3, 50 mcg (2,000 unit) tab Take 1 Tab by mouth nightly. Yes Provider, Historical   pregabalin (LYRICA) 25 mg capsule Take 1 Capsule by mouth two (2) times a day. Max Daily Amount: 50 mg. Patient not taking: Reported on 3/10/2023 5/27/22   Vanna Sandy MD   acetaminophen (TYLENOL) 500 mg tablet Take 500 mg by mouth every six (6) hours as needed for Pain.     Provider, Historical     REVIEW OF SYSTEMS:     []  Unable to obtain reliable ROS due to  [] mental status  [] sedated   [] intubated   [x] Total of 12 systems reviewed as follows:  Constitutional: negative fever, negative chills, negative weight loss  Eyes:   negative visual changes  ENT:   negative sore throat, tongue or lip swelling  Respiratory:  negative cough, negative dyspnea  Cards:  negative for chest pain, palpitations, lower extremity edema  GI:   negative for nausea, vomiting, diarrhea, and abdominal pain  :  negative for frequency, dysuria  Integument:  negative for rash and pruritus  Heme:  negative for easy bruising and gum/nose bleeding  Musculoskel: negative for myalgias,  back pain and muscle weakness  Neuro:  negative for headaches, dizziness, vertigo  Psych:  negative for feelings of anxiety, depression   Travel?: none    Objective:   VITALS:    Visit Vitals  BP 98/63   Pulse 63   Temp 97.1 °F (36.2 °C)   Resp 15   Ht 5' 10\" (1.778 m)   Wt 74.8 kg (165 lb)   SpO2 95%   BMI 23.68 kg/m²     PHYSICAL EXAM:  Gen:  []  WD []  WN  [] cachectic [x]  thin []  obese []  disheveled             []  ill apearing  []   Critical  []   Chronic    [x]  No acute distress    HEENT:   [x] NC/AT/PERRL    [] pink conjunctivae      [] pale conjunctivae                  PERRL  [] yes  [] no      [] moist mucosa    [] dry mucosa    hearing intact to voice [x] yes  [] No                 NECK:   supple [] yes  [] no        masses [] yes  [] No               thyroid  []  non tender  []  tender    RESP:   [x] CTA bilaterally/no wheezing/rhonchi/rales/crackles    [] rhonchi bilaterally - no dullness  [] wheezing   [] rhonchi   [] crackles     use of accessory muscles [] yes [] no    CARD:   [x]  regular rate and rhythm/No murmurs/rubs/gallops    murmur  [] yes ()  [] no      Rubs  [] yes  [] no       Gallops [] yes  [] no    Rate []  regular  []  irregular        carotid bruits  [] Right  []  Left                 LE edema [] yes  [x] no           JVP  [] yes   []  no    ABD:    [x] soft/non distended/non tender/+bowel sounds/no HSM    []  Rigid    tenderness [] yes [] no   Liver enlargement  []  yes []  no                Spleen enlargement  []  yes []  no     distended []  yes [] no     bowel sound  [] hypoactive   [] hyperactive    LYMPH:    Neck []  yes []  no       Axillae []  yes []  no    SKIN:   Rashes []  yes   []  no    Ulcers []  yes   []  no               [] tight to palpitation    skin turgor []  good  [] poor  [] decreased               Cyanosis/clubbing []  yes []  no    NEUR:   [x] cranial nerves II-XII grossly intact       [] Cranial nerves deficit                 []  facial droop    []  slurred speech   [] aphasic     [] Strength normal     []  weakness  []  LUE  []   RUE/ []  LLE  []   RLE    follows commands  [x]  yes []  no           PSYCH:   insight [] poor [] good   Alert and Oriented to  [x] person  [x] place  []  time                    [] depressed [] anxious [] agitated  [] lethargic [] stuporous  [] sedated     LAB DATA REVIEWED:    Recent Labs     03/12/23  0457 03/11/23  0557   WBC 7.3 6.0   HGB 13.4 12.0*   HCT 38.6 35.1*    254     Recent Labs     03/13/23  0420 03/12/23 0457 03/11/23  0557   * 127* 126*   K 4.3 3.9 4.0   CL 93* 97 97   CO2 27 26 25   BUN 43* 15 15   CREA 1.00 0.82 0.86   GLU 84 105* 86   CA 9.0 9.1 8.1*   MG  --  1.7 1.6   PHOS  --  2.9 2.4*     No results for input(s): ALT, AP, TBIL, TBILI, ALB, GLOB, GGT, AML, LPSE in the last 72 hours. No lab exists for component: SGOT, GPT, AMYP, HLPSE  No results for input(s): INR, PTP, APTT, INREXT in the last 72 hours. No results for input(s): FE, TIBC, PSAT, FERR in the last 72 hours. No results for input(s): PH, PCO2, PO2 in the last 72 hours. No results for input(s): CPK, CKMB in the last 72 hours.     No lab exists for component: TROPONINI  Lab Results   Component Value Date/Time    Glucose (POC) 85 09/24/2020 11:17 AM    Glucose (POC) 80 09/24/2020 07:31 AM    Glucose (POC) 115 (H) 09/10/2020 11:45 AM       Procedures: see electronic medical records for all procedures/Xrays and details which were not copied into this note but were reviewed prior to creation of Plan.    ________________________________________________________________________       ___________________________________________________  Consulting Physician:  Lynsey Blancas MD

## 2023-03-13 NOTE — TELEPHONE ENCOUNTER
----- Message from Man Martinez sent at 3/13/2023  2:17 PM EDT -----  Subject: Message to Provider    QUESTIONS  Information for Provider? Hannah with Home health called to ask if Dr Andrea Cole   would sign off on home health papers. [facility] sent referral for home   health but they would need Dr Andrea Cole to sign off on. Please fax to   [phone] and phone [phone].   ---------------------------------------------------------------------------  --------------  [address]  [phone]; OK to leave message on voicemail  ---------------------------------------------------------------------------  --------------  SCRIPT ANSWERS  Relationship to Patient? Third Party  Third Party Type? Home Health Care? Representative Name?  Hannah

## 2023-03-13 NOTE — PROGRESS NOTES
End of Shift Note    Bedside shift change report given to  (oncoming nurse) by Sophie Best (offgoing nurse). Report included the following information SBAR and Kardex    Shift worked:  [id number]     Shift summary and any significant changes:    Pt had one episode of confusion overnight. Concerns for physician to address: Na was down to 126 this AM      Zone phone for oncoming shift:          Activity:  Activity Level:  Up with Assistance  Number times ambulated in hallways past shift: 0  Number of times OOB to chair past shift: 0    Cardiac:   Cardiac Monitoring: Yes      Cardiac Rhythm: Sinus Rhythm    Access:  Current line(s): PIV     Genitourinary:   Urinary status: voiding    Respiratory:   O2 Device: None (Room air)  Chronic home O2 use?: NO  Incentive spirometer at bedside: N/A       GI:  Last Bowel Movement Date: 03/09/23  Current diet:  ADULT DIET Regular  ADULT ORAL NUTRITION SUPPLEMENT Breakfast, Lunch, Dinner; Standard High Calorie/High Protein  Passing flatus: YES  Tolerating current diet: YES       Pain Management:   Patient states pain is manageable on current regimen: YES    Skin:  Satish Score: 17  Interventions: increase time out of bed and PT/OT consult    Patient Safety:  Fall Score:    Interventions: bed/chair alarm, assistive device (walker, cane, etc), gripper socks, and pt to call before getting OOB       Length of Stay:  Expected LOS: - - -  Actual LOS: [facility] yes

## 2023-03-13 NOTE — PROGRESS NOTES
Problem: Aspiration - Risk of  Goal: *Absence of aspiration  3/13/2023 1528 by Skyla Kaitlyn  Outcome: Progressing Towards Goal  3/13/2023 1427 by Skyla Kaitlyn  Outcome: Progressing Towards Goal     Problem: Patient Education: Go to Patient Education Activity  Goal: Patient/Family Education  3/13/2023 1528 by Skyla Kaitlyn  Outcome: Progressing Towards Goal  3/13/2023 1427 by Skyla Kaitlyn  Outcome: Progressing Towards Goal     Problem: Pressure Injury - Risk of  Goal: *Prevention of pressure injury  Description: Document Satish Scale and appropriate interventions in the flowsheet. 3/13/2023 1528 by Skyla Kaitlyn  Outcome: Progressing Towards Goal  Note: Pressure Injury Interventions:  Sensory Interventions: Assess changes in LOC, Assess need for specialty bed, Avoid rigorous massage over bony prominences, Check visual cues for pain, Discuss PT/OT consult with provider, Float heels, Keep linens dry and wrinkle-free, Maintain/enhance activity level, Minimize linen layers, Pad between skin to skin, Monitor skin under medical devices, Pressure redistribution bed/mattress (bed type), Suspension boots, Turn and reposition approx. every two hours (pillows and wedges if needed)    Moisture Interventions: Absorbent underpads, Apply protective barrier, creams and emollients, Assess need for specialty bed, Check for incontinence Q2 hours and as needed, Internal/External urinary devices, Limit adult briefs, Moisture barrier, Offer toileting Q_hr, Minimize layers    Activity Interventions: Assess need for specialty bed, Increase time out of bed, Pressure redistribution bed/mattress(bed type), Chair cushion, PT/OT evaluation    Mobility Interventions: Assess need for specialty bed, Float heels, HOB 30 degrees or less, Pressure redistribution bed/mattress (bed type), PT/OT evaluation, Suspension boots, Turn and reposition approx.  every two hours(pillow and wedges)    Nutrition Interventions: Document food/fluid/supplement intake, Discuss nutritional consult with provider, Offer support with meals,snacks and hydration    Friction and Shear Interventions: Apply protective barrier, creams and emollients, Feet elevated on foot rest, HOB 30 degrees or less, Lift sheet, Lift team/patient mobility team, Minimize layers, Transferring/repositioning devices             3/13/2023 1427 by Wilber Chou  Outcome: Progressing Towards Goal  Note: Pressure Injury Interventions:  Sensory Interventions: Assess changes in LOC, Assess need for specialty bed, Avoid rigorous massage over bony prominences, Check visual cues for pain, Discuss PT/OT consult with provider, Float heels, Keep linens dry and wrinkle-free, Maintain/enhance activity level, Minimize linen layers, Pad between skin to skin, Monitor skin under medical devices, Pressure redistribution bed/mattress (bed type), Suspension boots, Turn and reposition approx. every two hours (pillows and wedges if needed)    Moisture Interventions: Absorbent underpads, Apply protective barrier, creams and emollients, Assess need for specialty bed, Check for incontinence Q2 hours and as needed, Internal/External urinary devices, Limit adult briefs, Moisture barrier, Offer toileting Q_hr, Minimize layers    Activity Interventions: Assess need for specialty bed, Increase time out of bed, Pressure redistribution bed/mattress(bed type), Chair cushion, PT/OT evaluation    Mobility Interventions: Assess need for specialty bed, Float heels, HOB 30 degrees or less, Pressure redistribution bed/mattress (bed type), PT/OT evaluation, Suspension boots, Turn and reposition approx.  every two hours(pillow and wedges)    Nutrition Interventions: Document food/fluid/supplement intake, Discuss nutritional consult with provider, Offer support with meals,snacks and hydration    Friction and Shear Interventions: Apply protective barrier, creams and emollients, Feet elevated on foot rest, HOB 30 degrees or less, Lift sheet, Lift team/patient mobility team, Minimize layers, Transferring/repositioning devices                Problem: Patient Education: Go to Patient Education Activity  Goal: Patient/Family Education  3/13/2023 1528 by Loretta Marie  Outcome: Progressing Towards Goal  3/13/2023 1427 by Loretta Marie  Outcome: Progressing Towards Goal     Problem: Falls - Risk of  Goal: *Absence of Falls  Description: Document Lukas Barbara Fall Risk and appropriate interventions in the flowsheet.   Outcome: Progressing Towards Goal  Note: Fall Risk Interventions:                                Problem: Patient Education: Go to Patient Education Activity  Goal: Patient/Family Education  Outcome: Progressing Towards Goal

## 2023-03-14 LAB
ANION GAP SERPL CALC-SCNC: 7 MMOL/L (ref 5–15)
BUN SERPL-MCNC: 60 MG/DL (ref 6–20)
BUN/CREAT SERPL: 57 (ref 12–20)
CALCIUM SERPL-MCNC: 9.3 MG/DL (ref 8.5–10.1)
CHLORIDE SERPL-SCNC: 96 MMOL/L (ref 97–108)
CO2 SERPL-SCNC: 27 MMOL/L (ref 21–32)
CREAT SERPL-MCNC: 1.06 MG/DL (ref 0.7–1.3)
GLUCOSE SERPL-MCNC: 92 MG/DL (ref 65–100)
POTASSIUM SERPL-SCNC: 4.4 MMOL/L (ref 3.5–5.1)
SODIUM SERPL-SCNC: 130 MMOL/L (ref 136–145)

## 2023-03-14 PROCEDURE — 97535 SELF CARE MNGMENT TRAINING: CPT | Performed by: OCCUPATIONAL THERAPIST

## 2023-03-14 PROCEDURE — 65270000046 HC RM TELEMETRY

## 2023-03-14 PROCEDURE — 80048 BASIC METABOLIC PNL TOTAL CA: CPT

## 2023-03-14 PROCEDURE — 74011250637 HC RX REV CODE- 250/637: Performed by: INTERNAL MEDICINE

## 2023-03-14 PROCEDURE — 51798 US URINE CAPACITY MEASURE: CPT

## 2023-03-14 PROCEDURE — 84154 ASSAY OF PSA FREE: CPT

## 2023-03-14 PROCEDURE — 97530 THERAPEUTIC ACTIVITIES: CPT | Performed by: OCCUPATIONAL THERAPIST

## 2023-03-14 PROCEDURE — 97530 THERAPEUTIC ACTIVITIES: CPT

## 2023-03-14 PROCEDURE — 36415 COLL VENOUS BLD VENIPUNCTURE: CPT

## 2023-03-14 RX ORDER — BALSAM PERU/CASTOR OIL
OINTMENT (GRAM) TOPICAL 3 TIMES DAILY
Status: DISCONTINUED | OUTPATIENT
Start: 2023-03-14 | End: 2023-03-20 | Stop reason: HOSPADM

## 2023-03-14 RX ADMIN — MELATONIN 3 MG: at 22:22

## 2023-03-14 RX ADMIN — CLOPIDOGREL BISULFATE 75 MG: 75 TABLET ORAL at 09:03

## 2023-03-14 RX ADMIN — CHOLECALCIFEROL TAB 25 MCG (1000 UNIT) 2000 UNITS: 25 TAB at 22:22

## 2023-03-14 RX ADMIN — CASTOR OIL AND BALSAM, PERU: 788; 87 OINTMENT TOPICAL at 22:22

## 2023-03-14 RX ADMIN — APIXABAN 2.5 MG: 2.5 TABLET, FILM COATED ORAL at 09:03

## 2023-03-14 RX ADMIN — LEVOTHYROXINE SODIUM 100 MCG: 0.1 TABLET ORAL at 09:03

## 2023-03-14 RX ADMIN — AMLODIPINE BESYLATE 2.5 MG: 2.5 TABLET ORAL at 09:02

## 2023-03-14 RX ADMIN — Medication 1 PACKET: at 09:03

## 2023-03-14 RX ADMIN — Medication 1 PACKET: at 17:12

## 2023-03-14 RX ADMIN — METOPROLOL SUCCINATE 50 MG: 50 TABLET, EXTENDED RELEASE ORAL at 09:03

## 2023-03-14 RX ADMIN — Medication 5 DROP: at 17:15

## 2023-03-14 RX ADMIN — LOSARTAN POTASSIUM 100 MG: 100 TABLET, FILM COATED ORAL at 09:03

## 2023-03-14 RX ADMIN — APIXABAN 2.5 MG: 2.5 TABLET, FILM COATED ORAL at 17:12

## 2023-03-14 RX ADMIN — OLANZAPINE 2.5 MG: 2.5 TABLET, FILM COATED ORAL at 22:22

## 2023-03-14 RX ADMIN — Medication 5 DROP: at 09:03

## 2023-03-14 NOTE — PROGRESS NOTES
Problem: Self Care Deficits Care Plan (Adult)  Goal: *Acute Goals and Plan of Care (Insert Text)  Description:     FUNCTIONAL STATUS PRIOR TO ADMISSION: Patient ambulates with a rollator, and is independent to mod I for ADLs. HOME SUPPORT: The patient lived with daughter but did not require assist. Patient bathed only when family is home. Daughter works during the day     Occupational Therapy Goals  Initiated 3/11/2023  1. Patient will perform grooming standing at sink with supervision/setup within 7 day(s). 2.  Patient will perform upper body dressing with supervision/set-up within 7 day(s). 3.  Patient will perform lower body dressing with supervision/set-up within 7 day(s). 4.  Patient will perform toilet transfers with supervision/set-up within 7 day(s). 5.  Patient will perform all aspects of toileting with supervision/set-up within 7 day(s). 6.  Patient will perform sponge bathing with supervision/set-up within 7 day(s). Outcome: Not Progressing Towards Goal     OCCUPATIONAL THERAPY TREATMENT  Patient: Mary Ch  (81 y.o. male)  Date: 3/14/2023  Diagnosis: Hyponatremia [E87.1] <principal problem not specified>      Precautions: Fall, DNR  Chart, occupational therapy assessment, plan of care, and goals were reviewed. ASSESSMENT  Patient continues with skilled OT services and is not progressing towards goals. Pt noted with significant decline in function since last seen last week. Pt with significant choreic movements at rest and with intention with pt now requiring total A for ADLs including self feeding and mod-total A x2 for functional mobility. In addition pt with impaired cognition and poor safety awareness. Recommend SNF rehab based on above unless pt has 24/7 physical assist at home. Pt's son states movements increase when pt is frustrated or angry.     Current Level of Function Impacting Discharge (ADLs): total A for ADLs including self feeding and mod-total A x2 for functional mobility    Other factors to consider for discharge: see above         PLAN :  Patient continues to benefit from skilled intervention to address the above impairments. Continue treatment per established plan of care to address goals. Recommend with staff: bed level toileting for safety at this time    Recommend next OT session: ADLs    Recommendation for discharge: (in order for the patient to meet his/her long term goals)  Therapy up to 5 days/week in SNF setting    This discharge recommendation:  Has not yet been discussed the attending provider and/or case management    IF patient discharges home will need the following DME: TBD       SUBJECTIVE:   Patient stated Odalys Torres just left me here to feed myself when clearly I cannot.  - pt with bed chux on chest covered in coffee upon arrival    OBJECTIVE DATA SUMMARY:   Cognitive/Behavioral Status:  Neurologic State: Alert  Orientation Level: Oriented to person;Oriented to place  Cognition: Decreased attention/concentration; Follows commands  Perception: Cues to maintain midline in sitting;Cues to maintain midline in standing; Tactile;Verbal;Visual  Perseveration: Perseverates during ADLS;Perseverates during conversation  Safety/Judgement: Awareness of environment;Decreased awareness of need for assistance;Decreased awareness of need for safety;Decreased insight into deficits; Fall prevention    Functional Mobility and Transfers for ADLs:  Bed Mobility:  Supine to Sit: Moderate assistance  Sit to Supine: Moderate assistance  Scooting: Minimum assistance (for safety to prevent falling off bed)    Transfers:  Sit to Stand: Moderate assistance;Assist x2     Bed to Chair: Total assistance;Assist x2 (unsafe to attempt this date due to constant chorea movements)    Balance:  Sitting: Impaired  Sitting - Static: Fair (occasional); Poor (constant support)  Sitting - Dynamic: Poor (constant support)  Standing: Impaired; With support  Standing - Static: Constant support;Poor  Standing - Dynamic : Constant support;Poor    ADL Intervention:  Feeding  Feeding Assistance: Total assistance (dependent) (significant choreic movements)  Container Management: Total assistance (dependent)  Cutting Food: Total assistance (dependent)  Utensil Management: Total assistance (dependent)  Food to Mouth: Total assistance (dependent)  Drink to Mouth: Total assistance (dependent)  Cues: Physical assistance; Tactile cues provided;Verbal cues provided;Visual cues provided    Lower Body Dressing Assistance  Socks: Total assistance (dependent)  Leg Crossed Method Used: No  Position Performed: Long sitting on bed;Seated edge of bed  Cues: Don;Physical assistance; Tactile cues provided;Visual cues provided;Verbal cues provided      Cognitive Retraining  Safety/Judgement: Awareness of environment;Decreased awareness of need for assistance;Decreased awareness of need for safety;Decreased insight into deficits; Fall prevention      Pain:  No c/o pain    Activity Tolerance:   Fair and requires frequent rest breaks    After treatment patient left in no apparent distress:   Supine in bed, Call bell within reach, and Bed / chair alarm activated    COMMUNICATION/COLLABORATION:   The patients plan of care was discussed with: Physical therapist, Registered nurse, and Certified nursing assistant/patient care technician.      Opal Min OT  Time Calculation: 24 mins

## 2023-03-14 NOTE — PROGRESS NOTES
Problem: Mobility Impaired (Adult and Pediatric)  Goal: *Acute Goals and Plan of Care (Insert Text)  Description: FUNCTIONAL STATUS PRIOR TO ADMISSION: Patient was modified independent using a rollator for functional mobility. HOME SUPPORT PRIOR TO ADMISSION: The patient lived with daughter but did not require assist. Patient bathed only when family is home. Daughter works during the day    Physical Therapy Goals  Initiated 3/10/2023  1. Patient will move from supine to sit and sit to supine , scoot up and down, and roll side to side in bed with independence within 7 day(s). 2.  Patient will transfer from bed to chair and chair to bed with supervision/set-up using the least restrictive device within 7 day(s). 3.  Patient will perform sit to stand with supervision/set-up within 7 day(s). 4.  Patient will ambulate with supervision/set-up for 100 feet with the least restrictive device within 7 day(s). Outcome: Not Progressing Towards Goal   PHYSICAL THERAPY TREATMENT  Patient: Elaina Lopez Sr. (81 y.o. male)  Date: 3/14/2023  Diagnosis: Hyponatremia [E87.1] <principal problem not specified>      Precautions: Fall, DNR  Chart, physical therapy assessment, plan of care and goals were reviewed. ASSESSMENT  Patient continues with skilled PT services and is not progressing towards goals. Pt received supine in bed and agreeable to therapy. Pt tolerated session fairly. Pt continues to be limited by significant chorea movements at rest and with activity, decreased functional mobility, impaired seated and standing balance, impaired gait, and at high risk for falls. Pt required up to mod A supine to sit EOB. While seated EOB pt with constant forward flexed trunk movements and feet sliding forward from the floor. Pt required constant support while seated EOB to prevent falling. Pt performed sit<>Stand with mod A x 2 and briefly side stepped along EOB.  Pt unsafe to transfer to the chair at this time due to constant chorea movements. At this time, there has been a decline in pt's mobility and will benefit from SNF placement upon discharge. .     Current Level of Function Impacting Discharge (mobility/balance): mod A supine<>sit, mod A x 2 sit<>stand, total A bed<>chair transfers (unsafe to attempt at this time)    Other factors to consider for discharge:          PLAN :  Patient continues to benefit from skilled intervention to address the above impairments. Continue treatment per established plan of care. to address goals. Recommendation for discharge: (in order for the patient to meet his/her long term goals)  Therapy up to 5 days/week in SNF setting    This discharge recommendation:  Has been made in collaboration with the attending provider and/or case management    IF patient discharges home will need the following DME: to be determined (TBD)       SUBJECTIVE:   Patient stated I can't feed myself.     OBJECTIVE DATA SUMMARY:   Critical Behavior:  Neurologic State: Alert  Orientation Level: Oriented to person  Cognition: Follows commands  Safety/Judgement: Insight into deficits  Functional Mobility Training:  Bed Mobility:     Supine to Sit: Moderate assistance  Sit to Supine: Moderate assistance  Scooting: Minimum assistance (for safety to prevent falling off bed)        Transfers:  Sit to Stand: Moderate assistance;Assist x2  Stand to Sit: Moderate assistance;Assist x2        Bed to Chair: Total assistance;Assist x2 (unsafe to attempt this date due to constant chorea movements)                    Balance:  Sitting: Impaired  Sitting - Static: Fair (occasional); Poor (constant support)  Sitting - Dynamic: Poor (constant support)  Standing: Impaired; With support  Standing - Static: Constant support;Poor  Standing - Dynamic : Constant support;Poor  Ambulation/Gait Training:  Distance (ft): 2 Feet (ft) (side step along EOB)  Assistive Device: Gait belt (HHA x 2)  Ambulation - Level of Assistance:  Moderate assistance;Assist x2        Gait Abnormalities: Step to gait;Trunk sway increased; Other (constant movements of trunk, UES, and picking up LEs)        Base of Support: Center of gravity altered;Narrowed                             Stairs: Therapeutic Exercises:     Pain Rating:  Pt denied pain    Activity Tolerance:   Good and Fair    After treatment patient left in no apparent distress:   Supine in bed, Call bell within reach, Bed / chair alarm activated, and Side rails x 3    COMMUNICATION/COLLABORATION:   The patients plan of care was discussed with: Occupational therapist and Registered nurse.      Elsa uNñez, PT, DPT   Time Calculation: 17 mins

## 2023-03-14 NOTE — PROGRESS NOTES
1211 - CM received message from therapy that patient is needing SNF on discharge. CM discussed this with Lee Montgomery (DTR) via phone call while speaking with Mac (son) at bedside. Mac provided with physical SNF list and list emailed to Lee Montgomery at [email or web address].  Lee Montgomery confirmed that Mac can sign 2nd IM for patient. Mac signed 2nd IM. [address] left at bedside for Mac to write choices on and CM to sign once choices are provided.         Chloé Crockett, DELANEYN, RN    Care Management  [phone]

## 2023-03-14 NOTE — PROGRESS NOTES
1930: Bedside report received from [facility]: Pt ambulated with walker and 2 assist to [facility]. Pt very unsteady on feet, requiring additional staff to help. Once on BSC, Pt had BM and was cleaned up. Returned to bed. New malewick placed. 0600: Pt has not voided since getting up to [facility] earlier in shift. Bladder scan showing 550ml. Pt voided 150ml with malewick. Reached out to [facility] NP about whether to still straight cath pt. Orders to wait and see if he voids again. 5422: Malewick leaked. Changed underpad and completed pericare. New malewick applied.     0700: Bedside report given to Amarjit Membreno RN

## 2023-03-14 NOTE — PROGRESS NOTES
This RN gave handoff to Sonia Bailey (oncoming RN) in routine progression of care. All questions answered. This RN assumed care around 1600. Pt spent time with his family who facilitated to feeding his dinner. Pt had no acute events. Of note, Veneflex also ordered for pt's blanchable redness on the sacrum. At handoff, pt awake but had slid himself down in bed. Pt pulled up and tucked back into bed.

## 2023-03-14 NOTE — PROGRESS NOTES
Hospitalist Progress Note    NAME: Leora Vidal Sr.   :  1935   MRN:  [id number]       Assessment / Plan:  Dysphagia  Hyponatremia     CT neck showed 1. No acute abnormality in the neck. 2. Partially visualized 7 mm groundglass pulmonary nodule in the left upper  lobe. If prior imaging is available for comparison, consider follow-up chest CT  in 3-6 months. Holding IVF as no improvement in NA  Check urine lytes , sodium noted, urine osm 578  Patient fluid restricted, add urea- na packet, HCTZ added  FR 1200 cc  Nephrology consulted for assistance. Appreciate recs  Trend BMP  Na improved to 130 today     SLP gabriel appreciated   -- \"Regular/Thin Liquid diet  -- Smaller pills, 1 at a time with thin liquid and larger pills with puree (per patient preference)  -- Strict oral care  -- Upright in bed\"     PT/OT SNF    CKD  Cr appears to be at baseline     Choreiform movement disorder  Zyprexa 2.5 mg PO Qhs. To consider increasing  Patient follows with movement specialist as outpatient  Melaonin Qhs  Delirium precautions     History of A fib, currently in NSR  C/w eliquis  C/w Toprol XL     HTN  C/w amlodipine and losartan and toprol     Hypothyroid  C/w synthroid     History of CVA  C/w plavix and eliquis  C/w BB       History of prostate cancer  Marcelo PSA labs at request of daughter. They will follow up with Urology       Code Status:  DNR  Surrogate Decision Maker: DaughterAddie Abdi       18.5 - 24.9 Normal weight / Body mass index is 23.68 kg/m². Estimated discharge date: March 15  Barriers:Hyponatremia, dispo    Code status: DNR  Prophylaxis: Eliquis  Recommended Disposition: Home w/Family     Subjective:     Chief Complaint / Reason for Physician Visit  Patient seen and examined at the bedside. Son at bedside. Continues to tolerate PO without problems. Sodium level improved. Working on rehab placement. Discussed with RN events overnight.          Review of Systems:  Symptom Y/N Comments  Symptom Y/N Comments   Fever/Chills    Chest Pain     Poor Appetite    Edema     Cough    Abdominal Pain     Sputum    Joint Pain     SOB/MARVIN    Pruritis/Rash     Nausea/vomit    Tolerating PT/OT     Diarrhea    Tolerating Diet     Constipation    Other       Could NOT obtain due to:      Objective:     VITALS:   Last 24hrs VS reviewed since prior progress note. Most recent are:  Patient Vitals for the past 24 hrs:   Temp Pulse Resp BP SpO2   03/14/23 1130 98 °F (36.7 °C) 74 17 (!) 145/98 --   03/14/23 0734 97.3 °F (36.3 °C) 70 16 116/69 92 %   03/14/23 0252 97.2 °F (36.2 °C) 67 14 125/89 96 %   03/13/23 2246 97.3 °F (36.3 °C) 61 11 116/68 95 %   03/13/23 1942 97.4 °F (36.3 °C) 68 15 103/78 93 %         Intake/Output Summary (Last 24 hours) at 3/14/2023 1630  Last data filed at 3/14/2023 8704  Gross per 24 hour   Intake 240 ml   Output 1200 ml   Net -960 ml          I had a face to face encounter and independently examined this patient on 3/14/2023, as outlined below:  PHYSICAL EXAM:  General: WD, WN. Alert, cooperative, no acute distress    EENT:  EOMI. Anicteric sclerae. MMM  Resp:  CTA bilaterally, no wheezing or rales. No accessory muscle use  CV:  Regular  rhythm,  No edema  GI:  Soft, Non distended, Non tender. +Bowel sounds  Neurologic:  Alert and oriented X 3, normal speech, +ve choreform movements  Psych:   Good insight. Not anxious nor agitated  Skin:  No rashes.   No jaundice    Reviewed most current lab test results and cultures  YES  Reviewed most current radiology test results   YES  Review and summation of old records today    NO  Reviewed patient's current orders and MAR    YES  PMH/SH reviewed - no change compared to H&P  ________________________________________________________________________  Care Plan discussed with:    Comments   Patient     Family      RN     Care Manager     Consultant                        Multidiciplinary team rounds were held today with , nursing, pharmacist and clinical coordinator. Patient's plan of care was discussed; medications were reviewed and discharge planning was addressed. ________________________________________________________________________  Total NON critical care TIME:  40   Minutes    Total CRITICAL CARE TIME Spent:   Minutes non procedure based      Comments   >50% of visit spent in counseling and coordination of care     ________________________________________________________________________  Dayna Greer MD     Procedures: see electronic medical records for all procedures/Xrays and details which were not copied into this note but were reviewed prior to creation of Plan. LABS:  I reviewed today's most current labs and imaging studies.   Pertinent labs include:  Recent Labs     03/12/23 0457   WBC 7.3   HGB 13.4   HCT 38.6          Recent Labs     03/14/23  0255 03/13/23  0420 03/12/23 0457   * 126* 127*   K 4.4 4.3 3.9   CL 96* 93* 97   CO2 27 27 26   GLU 92 84 105*   BUN 60* 43* 15   CREA 1.06 1.00 0.82   CA 9.3 9.0 9.1   MG  --   --  1.7   PHOS  --   --  2.9         Signed: Dayna Greer MD

## 2023-03-14 NOTE — PROGRESS NOTES
Spiritual Care Assessment/Progress Note  [facility]      NAME: Pineda Moscoso Sr. MRN: [id number]  AGE: 81 y.o. SEX: male  Buddhism Affiliation: Anabaptist   Language: English     3/14/2023     Total Time (in minutes): 17     Spiritual Assessment begun in [facility]ARD[facility]ULMON[facility] through conversation with:         [x]Patient        [] Family    [] Friend(s)        Reason for Consult: Initial/Spiritual assessment, patient floor     Spiritual beliefs: (Please include comment if needed)     [x] Identifies with a nat tradition:         [x] Supported by a nat community:            [] Claims no spiritual orientation:           [] Seeking spiritual identity:                [] Adheres to an individual form of spirituality:           [] Not able to assess:                           Identified resources for coping:      [x] Prayer                               [] Music                  [] Guided Imagery     [x] Family/friends                 [] Pet visits     [] Devotional reading                         [] Unknown     [] Other:                                               Interventions offered during this visit: (See comments for more details)    Patient Interventions: Initial/Spiritual assessment, patient floor           Plan of Care:     [x] Support spiritual and/or cultural needs    [] Support AMD and/or advance care planning process      [] Support grieving process   [] Coordinate Rites and/or Rituals    [] Coordination with community clergy   [] No spiritual needs identified at this time   [] Detailed Plan of Care below (See Comments)  [] Make referral to Music Therapy  [] Make referral to Pet Therapy     [] Make referral to Addiction services  [] Make referral to [facility]  [] Make referral to Spiritual Care Partner  [] No future visits requested        [x] Contact Spiritual Care for further referrals     Comments:  reviewed the patient's chart prior to the visit.   Luna son Marcus Carter.) was visiting with him when the  entered his room.  was patient as Mr. Luke Mackenzie shared his love for Ep[employer] and his service to others. He has a positive demeanor and allowed the  into his sacred space.  provided empathetic listening as the family shared their thoughts.  encouraged Mr. Luke Mackenzie when he became emotional after the prayer.  assured the family of the availability of pastoral care services 24 hours a day as requested. Rev. ANGIE Hernandes.  [facility]   Paging Service [phone])

## 2023-03-14 NOTE — PROGRESS NOTES
NAME: João Calzada Sr.        :  1935        MRN:  [id number]                    Assessment   :                                               Plan:  Hyponatremia/SIADH Sodium 126 to 130     Continue Ure-Na and FR (1200 ml/day); s/p low dose loop on 3/13       Subjective:     Chief Complaint: Working with physical therapy    Review of Systems:    Symptom Y/N Comments  Symptom Y/N Comments   Fever/Chills    Chest Pain     Poor Appetite    Edema     Cough    Abdominal Pain     Sputum    Joint Pain     SOB/MARVIN    Pruritis/Rash     Nausea/vomit    Tolerating PT/OT     Diarrhea    Tolerating Diet     Constipation    Other       Could not obtain due to:      Objective:     VITALS:   Last 24hrs VS reviewed since prior progress note. Most recent are:  Visit Vitals  /89 (BP 1 Location: Right upper arm, BP Patient Position: At rest)   Pulse 67   Temp 97.2 °F (36.2 °C)   Resp 14   Ht 5' 10\" (1.778 m)   Wt 74.8 kg (165 lb)   SpO2 96%   BMI 23.68 kg/m²       Intake/Output Summary (Last 24 hours) at 3/14/2023 0610  Last data filed at 3/13/2023 2148  Gross per 24 hour   Intake 960 ml   Output 2400 ml   Net -1440 ml      Telemetry Reviewed:     PHYSICAL EXAM:  General: NAD      Lab Data Reviewed: (see below)    Medications Reviewed: (see below)    PMH/SH reviewed - no change compared to H&P  ________________________________________________________________________  Care Plan discussed with:  Patient     Family      RN     Care Manager                    Consultant:          Comments   >50% of visit spent in counseling and coordination of care       ________________________________________________________________________  Torrie Rogers MD     Procedures: see electronic medical records for all procedures/Xrays and details which  were not copied into this note but were reviewed prior to creation of Plan.       LABS:  Recent Labs 03/12/23 0457 03/11/23  0557   WBC 7.3 6.0   HGB 13.4 12.0*   HCT 38.6 35.1*    254     Recent Labs     03/14/23  0255 03/13/23  0420 03/12/23 0457 03/11/23  0557   * 126* 127* 126*   K 4.4 4.3 3.9 4.0   CL 96* 93* 97 97   CO2 27 27 26 25   BUN 60* 43* 15 15   CREA 1.06 1.00 0.82 0.86   GLU 92 84 105* 86   CA 9.3 9.0 9.1 8.1*   MG  --   --  1.7 1.6   PHOS  --   --  2.9 2.4*     No results for input(s): AP, TBIL, TP, ALB, GLOB, GGT, AML, LPSE in the last 72 hours. No lab exists for component: SGOT, GPT, AMYP, HLPSE  No results for input(s): INR, PTP, APTT, INREXT in the last 72 hours. No results for input(s): FE, TIBC, PSAT, FERR in the last 72 hours. Lab Results   Component Value Date/Time    Folate 18.9 02/01/2023 10:11 AM      No results for input(s): PH, PCO2, PO2 in the last 72 hours. No results for input(s): CPK, CKMB in the last 72 hours.     No lab exists for component: TROPONINI  No components found for: [place]  Lab Results   Component Value Date/Time    Color YELLOW/STRAW 08/18/2022 06:11 PM    Appearance CLEAR 08/18/2022 06:11 PM    Specific gravity 1.012 08/18/2022 06:11 PM    pH (UA) 6.5 08/18/2022 06:11 PM    Protein Negative 08/18/2022 06:11 PM    Glucose Negative 08/18/2022 06:11 PM    Ketone Negative 08/18/2022 06:11 PM    Bilirubin Negative 08/18/2022 06:11 PM    Urobilinogen 1.0 08/18/2022 06:11 PM    Nitrites Negative 08/18/2022 06:11 PM    Leukocyte Esterase Negative 08/18/2022 06:11 PM    Epithelial cells FEW 08/18/2022 06:11 PM    Bacteria Negative 08/18/2022 06:11 PM    WBC 0-4 08/18/2022 06:11 PM    RBC 0-5 08/18/2022 06:11 PM       MEDICATIONS:  Current Facility-Administered Medications   Medication Dose Route Frequency    carbamide peroxide (DEBROX) 6.5 % otic solution 5 Drop  5 Drop Both Ears BID    urea (URE-NA) 15 gram packet 1 Packet  1 Packet Oral BID    ondansetron (ZOFRAN) injection 4 mg  4 mg IntraVENous Q4H PRN    amLODIPine (NORVASC) tablet 2.5 mg  2.5 mg Oral DAILY    acetaminophen (TYLENOL) tablet 500 mg  500 mg Oral Q6H PRN    clopidogreL (PLAVIX) tablet 75 mg  75 mg Oral DAILY    OLANZapine (ZyPREXA) tablet 2.5 mg  2.5 mg Oral QHS    metoprolol succinate (TOPROL-XL) XL tablet 50 mg  50 mg Oral DAILY    losartan (COZAAR) tablet 100 mg  100 mg Oral DAILY    levothyroxine (SYNTHROID) tablet 100 mcg  100 mcg Oral ACB    apixaban (ELIQUIS) tablet 2.5 mg  2.5 mg Oral BID    cholecalciferol (VITAMIN D3) (1000 Units /25 mcg) tablet 2,000 Units  2,000 Units Oral QHS    melatonin tablet 3 mg  3 mg Oral QHS

## 2023-03-14 NOTE — TELEPHONE ENCOUNTER
Spoke with Hannah with home health and informed provider will sign off on papers. Hannah verified understanding and they will be faxing papers once patient is all set up with home health.

## 2023-03-15 LAB
ANION GAP SERPL CALC-SCNC: 5 MMOL/L (ref 5–15)
BUN SERPL-MCNC: 75 MG/DL (ref 6–20)
BUN/CREAT SERPL: 58 (ref 12–20)
CALCIUM SERPL-MCNC: 9.6 MG/DL (ref 8.5–10.1)
CHLORIDE SERPL-SCNC: 96 MMOL/L (ref 97–108)
CO2 SERPL-SCNC: 27 MMOL/L (ref 21–32)
CREAT SERPL-MCNC: 1.29 MG/DL (ref 0.7–1.3)
GLUCOSE SERPL-MCNC: 105 MG/DL (ref 65–100)
POTASSIUM SERPL-SCNC: 4.3 MMOL/L (ref 3.5–5.1)
PSA FREE MFR SERPL: 12.6 %
PSA FREE SERPL-MCNC: 0.44 NG/ML
PSA SERPL-MCNC: 3.5 NG/ML (ref 0–4)
SODIUM SERPL-SCNC: 128 MMOL/L (ref 136–145)

## 2023-03-15 PROCEDURE — 74011250637 HC RX REV CODE- 250/637: Performed by: STUDENT IN AN ORGANIZED HEALTH CARE EDUCATION/TRAINING PROGRAM

## 2023-03-15 PROCEDURE — 92526 ORAL FUNCTION THERAPY: CPT

## 2023-03-15 PROCEDURE — 65270000046 HC RM TELEMETRY

## 2023-03-15 PROCEDURE — 80048 BASIC METABOLIC PNL TOTAL CA: CPT

## 2023-03-15 PROCEDURE — 97530 THERAPEUTIC ACTIVITIES: CPT

## 2023-03-15 PROCEDURE — 97535 SELF CARE MNGMENT TRAINING: CPT | Performed by: OCCUPATIONAL THERAPIST

## 2023-03-15 PROCEDURE — 36415 COLL VENOUS BLD VENIPUNCTURE: CPT

## 2023-03-15 PROCEDURE — 74011250637 HC RX REV CODE- 250/637: Performed by: INTERNAL MEDICINE

## 2023-03-15 RX ORDER — OLANZAPINE 5 MG/1
5 TABLET ORAL
Status: DISCONTINUED | OUTPATIENT
Start: 2023-03-15 | End: 2023-03-20 | Stop reason: HOSPADM

## 2023-03-15 RX ADMIN — CLOPIDOGREL BISULFATE 75 MG: 75 TABLET ORAL at 08:39

## 2023-03-15 RX ADMIN — Medication 5 DROP: at 08:40

## 2023-03-15 RX ADMIN — CHOLECALCIFEROL TAB 25 MCG (1000 UNIT) 2000 UNITS: 25 TAB at 21:33

## 2023-03-15 RX ADMIN — CASTOR OIL AND BALSAM, PERU: 788; 87 OINTMENT TOPICAL at 21:41

## 2023-03-15 RX ADMIN — MELATONIN 3 MG: at 21:33

## 2023-03-15 RX ADMIN — LOSARTAN POTASSIUM 100 MG: 100 TABLET, FILM COATED ORAL at 08:39

## 2023-03-15 RX ADMIN — CASTOR OIL AND BALSAM, PERU: 788; 87 OINTMENT TOPICAL at 08:39

## 2023-03-15 RX ADMIN — AMLODIPINE BESYLATE 2.5 MG: 2.5 TABLET ORAL at 08:39

## 2023-03-15 RX ADMIN — Medication 1 PACKET: at 17:24

## 2023-03-15 RX ADMIN — METOPROLOL SUCCINATE 50 MG: 50 TABLET, EXTENDED RELEASE ORAL at 08:39

## 2023-03-15 RX ADMIN — APIXABAN 2.5 MG: 2.5 TABLET, FILM COATED ORAL at 17:24

## 2023-03-15 RX ADMIN — CASTOR OIL AND BALSAM, PERU: 788; 87 OINTMENT TOPICAL at 17:23

## 2023-03-15 RX ADMIN — APIXABAN 2.5 MG: 2.5 TABLET, FILM COATED ORAL at 08:39

## 2023-03-15 RX ADMIN — Medication 1 PACKET: at 08:39

## 2023-03-15 RX ADMIN — OLANZAPINE 5 MG: 5 TABLET, FILM COATED ORAL at 21:33

## 2023-03-15 RX ADMIN — LEVOTHYROXINE SODIUM 100 MCG: 0.1 TABLET ORAL at 05:52

## 2023-03-15 RX ADMIN — Medication 5 DROP: at 17:24

## 2023-03-15 NOTE — PROGRESS NOTES
1500: Bedside, Verbal, and Written shift change report given to Villa Regina Saul (oncoming nurse) by Mercy Andrew (offgoing nurse). Report included the following information SBAR, Kardex, Intake/Output, MAR, and Recent Results. End of Shift Note    Bedside shift change report given to [address] (oncoming nurse) by Tricia Littlejohn RN (offgoing nurse). Report included the following information SBAR, Kardex, Intake/Output, MAR, and Recent Results    Shift worked:  3p-7p     Shift summary and any significant changes:     No significant changes, pending placement for SNF at [place]     Concerns for physician to address:       Zone phone for oncoming shift:          Activity:  Activity Level: Up with Assistance  Number times ambulated in hallways past shift: 0  Number of times OOB to chair past shift: 0    Cardiac:   Cardiac Monitoring: Yes      Cardiac Rhythm: Sinus Rhythm    Access:  Current line(s): PIV     Genitourinary:   Urinary status: external catheter    Respiratory:   O2 Device: None (Room air)  Chronic home O2 use?: NO  Incentive spirometer at bedside: NO       GI:  Last Bowel Movement Date: 03/13/23  Current diet:  ADULT ORAL NUTRITION SUPPLEMENT Breakfast, Lunch, Dinner; Standard High Calorie/High Protein  ADULT DIET Regular; 1200 ml  Passing flatus: YES  Tolerating current diet: YES       Pain Management:   Patient states pain is manageable on current regimen: YES    Skin:  Satish Score: 16  Interventions: float heels, increase time out of bed, and PT/OT consult    Patient Safety:  Fall Score:  Total Score: 4  Interventions: bed/chair alarm, gripper socks, and pt to call before getting OOB  High Fall Risk: Yes    Length of Stay:  Expected LOS: 3d 12h  Actual LOS: [address] [place], RN Alert and interactive, no focal deficits

## 2023-03-15 NOTE — PROGRESS NOTES
Hospitalist Progress Note    NAME: Cesar Granados Sr.   :  1935   MRN:  [id number]       Assessment / Plan:  Dysphagia  Hyponatremia     CT neck showed 1. No acute abnormality in the neck. 2. Partially visualized 7 mm groundglass pulmonary nodule in the left upper  lobe. If prior imaging is available for comparison, consider follow-up chest CT  in 3-6 months. Holding IVF as no improvement in NA  Checked urine lytes , sodium noted, urine osm 578  Patient fluid restricted, add urea- na packet, HCTZ added  FR 1200 cc  Nephrology consulted for assistance. Appreciate recs  Trend BMP  Na 128 today  - ok for discharge per Nephrology     SLP gabriel appreciated   -- \"Regular/Thin Liquid diet  -- Smaller pills, 1 at a time with thin liquid and larger pills with puree (per patient preference)  -- Strict oral care  -- Upright in bed\"     PT/OT SNF    CKD  Cr appears to be at baseline  - Nephrology discussed as above     Choreiform movement disorder  Zyprexa increased to 5 qhs for   Patient follows with movement specialist as outpatient  Melaonin Qhs  Delirium precautions     History of A fib, currently in NSR  C/w eliquis  C/w Toprol XL     HTN  C/w amlodipine and losartan and toprol     Hypothyroid  C/w synthroid     History of CVA  C/w plavix and eliquis  C/w BB     History of prostate cancer  Marcelo PSA labs at request of daughter. They will follow up with Urology    Code Status:  DNR  Surrogate Decision Maker: Daughter. Trinidad    18.5 - 24.9 Normal weight / Body mass index is 23.68 kg/m². Estimated discharge date:     Code status: DNR  Prophylaxis: Eliquis  Recommended Disposition: SNF  Barriers: medically stable for discharge, waiting on placement     Subjective:     Chief Complaint / Reason for Physician Visit  Discussed with RN events overnight. Patient remains with choreiform movements. No other complaints.       Review of Systems:  Full 14 point ROS assessed and neg except as noted above    Objective:     VITALS:   Last 24hrs VS reviewed since prior progress note. Most recent are:  Patient Vitals for the past 24 hrs:   Temp Pulse Resp BP SpO2   03/15/23 1611 -- 65 -- 101/71 96 %   03/15/23 1512 97.6 °F (36.4 °C) 68 16 (!) 97/58 95 %   03/15/23 1150 -- 66 -- 115/77 --   03/15/23 1140 -- 67 -- (!) 80/51 --   03/15/23 1110 97.6 °F (36.4 °C) 67 16 (!) 142/76 99 %   03/15/23 0730 98.2 °F (36.8 °C) 74 18 122/88 96 %   03/15/23 0445 97.9 °F (36.6 °C) 66 16 124/62 95 %   03/14/23 2303 97.3 °F (36.3 °C) 77 15 109/79 96 %   03/14/23 2000 98.8 °F (37.1 °C) 78 17 112/71 96 %   03/14/23 1943 -- 80 15 -- --         Intake/Output Summary (Last 24 hours) at 3/15/2023 1841  Last data filed at 3/15/2023 0947  Gross per 24 hour   Intake 240 ml   Output 800 ml   Net -560 ml        I had a face to face encounter and independently examined this patient on 3/15/2023, as outlined below:  PHYSICAL EXAM:  General: WD, WN. Alert, cooperative, no acute distress    EENT:  EOMI. Anicteric sclerae. MMM  Resp:  CTA bilaterally, no wheezing or rales. No accessory muscle use  CV:  Regular  rhythm,  No edema  GI:  Soft, Non distended, Non tender. +Bowel sounds  Neurologic:  Alert and oriented X 3, normal speech, +ve choreform movements  Psych:   Good insight. Not anxious nor agitated  Skin:  No rashes. No jaundice    Reviewed most current lab test results and cultures  YES  Reviewed most current radiology test results   YES  Review and summation of old records today    NO  Reviewed patient's current orders and MAR    YES  PMH/SH reviewed - no change compared to H&P  ________________________________________________________________________  Care Plan discussed with:    Comments   Patient y    Family      RN y    Care Manager     Consultant                       y Multidiciplinary team rounds were held today with , nursing, pharmacist and clinical coordinator.   Patient's plan of care was discussed; medications were reviewed and discharge planning was addressed. ________________________________________________________________________    Procedures: see electronic medical records for all procedures/Xrays and details which were not copied into this note but were reviewed prior to creation of Plan. LABS:  I reviewed today's most current labs and imaging studies. Pertinent labs include:  No results for input(s): WBC, HGB, HCT, PLT, HGBEXT, HCTEXT, PLTEXT, HGBEXT, HCTEXT, PLTEXT in the last 72 hours.     Recent Labs     03/15/23  0459 03/14/23  0255 03/13/23  0420   * 130* 126*   K 4.3 4.4 4.3   CL 96* 96* 93*   CO2 27 27 27   * 92 84   BUN 75* 60* 43*   CREA 1.29 1.06 1.00   CA 9.6 9.3 9.0         Signed: Miguel Terry MD

## 2023-03-15 NOTE — PROGRESS NOTES
Problem: Aspiration - Risk of  Goal: *Absence of aspiration  Outcome: Progressing Towards Goal     Problem: Patient Education: Go to Patient Education Activity  Goal: Patient/Family Education  Outcome: Progressing Towards Goal     Problem: Pressure Injury - Risk of  Goal: *Prevention of pressure injury  Description: Document Satish Scale and appropriate interventions in the flowsheet. Outcome: Progressing Towards Goal  Note: Pressure Injury Interventions:  Sensory Interventions: Assess changes in LOC, Assess need for specialty bed, Avoid rigorous massage over bony prominences, Check visual cues for pain, Float heels, Keep linens dry and wrinkle-free, Maintain/enhance activity level, Minimize linen layers, Monitor skin under medical devices, Pad between skin to skin    Moisture Interventions: Check for incontinence Q2 hours and as needed, Internal/External urinary devices, Absorbent underpads, Assess need for specialty bed, Limit adult briefs, Minimize layers    Activity Interventions: Increase time out of bed, Assess need for specialty bed    Mobility Interventions: Float heels, Assess need for specialty bed    Nutrition Interventions: Document food/fluid/supplement intake, Offer support with meals,snacks and hydration    Friction and Shear Interventions: Feet elevated on foot rest, Minimize layers (pharmacy messaged for venelex; will apply with foam dressing to sacrum)                Problem: Patient Education: Go to Patient Education Activity  Goal: Patient/Family Education  Outcome: Progressing Towards Goal     Problem: Patient Education: Go to Patient Education Activity  Goal: Patient/Family Education  Outcome: Progressing Towards Goal     Problem: Falls - Risk of  Goal: *Absence of Falls  Description: Document Jose Woody Fall Risk and appropriate interventions in the flowsheet.   Outcome: Progressing Towards Goal  Note: Fall Risk Interventions:  Mobility Interventions: Communicate number of staff needed for ambulation/transfer, Utilize walker, cane, or other assistive device, Utilize gait belt for transfers/ambulation, Patient to call before getting OOB, Bed/chair exit alarm    Mentation Interventions: Bed/chair exit alarm, Door open when patient unattended, Gait belt with transfers/ambulation, Adequate sleep, hydration, pain control, More frequent rounding, Room close to nurse's station, Update white board    Medication Interventions: Bed/chair exit alarm, Patient to call before getting OOB, Utilize gait belt for transfers/ambulation    Elimination Interventions: Call light in reach, Bed/chair exit alarm, Patient to call for help with toileting needs              Problem: Patient Education: Go to Patient Education Activity  Goal: Patient/Family Education  Outcome: Progressing Towards Goal     Problem: Patient Education: Go to Patient Education Activity  Goal: Patient/Family Education  Outcome: Progressing Towards Goal     Problem: Patient Education: Go to Patient Education Activity  Goal: Patient/Family Education  Outcome: Progressing Towards Goal

## 2023-03-15 NOTE — PROGRESS NOTES
Problem: Mobility Impaired (Adult and Pediatric)  Goal: *Acute Goals and Plan of Care (Insert Text)  Description: FUNCTIONAL STATUS PRIOR TO ADMISSION: Patient was modified independent using a rollator for functional mobility. HOME SUPPORT PRIOR TO ADMISSION: The patient lived with daughter but did not require assist. Patient bathed only when family is home. Daughter works during the day    Physical Therapy Goals  Initiated 3/10/2023  1. Patient will move from supine to sit and sit to supine , scoot up and down, and roll side to side in bed with independence within 7 day(s). 2.  Patient will transfer from bed to chair and chair to bed with supervision/set-up using the least restrictive device within 7 day(s). 3.  Patient will perform sit to stand with supervision/set-up within 7 day(s). 4.  Patient will ambulate with supervision/set-up for 100 feet with the least restrictive device within 7 day(s). Outcome: Not Progressing Towards Goal   PHYSICAL THERAPY TREATMENT  Patient: Dennie Gaucher Sr. (81 y.o. male)  Date: 3/15/2023  Diagnosis: Hyponatremia [E87.1] <principal problem not specified>      Precautions: Fall, DNR  Chart, physical therapy assessment, plan of care and goals were reviewed. ASSESSMENT  Patient continues with skilled PT services and is not progressing towards goals. Pt received sitting in the chair. He continues to exhibit choreic movements but actually became still during BP measurement with light pressure on hand and upper arm. He c/o being \"woozy\" upon therapist entry into room. BP taken and noted to be 80/51. BP had been 142/76 30 min earlier noted in chart. Pt was assisted back to bed, BP increasing to 115/77. Sit to stand was with min A of 2. Turning with rolling walker with turning steps required mod to max A of 2. Min A of 2 to return to supine. BP began to recover once in bed. RN informed and arrived to monitor pt as well.    Given this session, pt may benefit from short SNF rehab stay prior to return home. Current Level of Function Impacting Discharge (mobility/balance): see above details; pt had amb in prior session with OT but unsafe to attempt today due to BP issues    Other factors to consider for discharge: low BP with sitting out in chair; high fall risk         PLAN :  Patient continues to benefit from skilled intervention to address the above impairments. Continue treatment per established plan of care. to address goals. Recommendation for discharge: (in order for the patient to meet his/her long term goals)  Therapy up to 5 days/week in SNF setting    This discharge recommendation:  A follow-up discussion with the attending provider and/or case management is planned    IF patient discharges home will need the following DME: to be determined (TBD)       SUBJECTIVE:   Patient stated Woozy.     OBJECTIVE DATA SUMMARY:   Critical Behavior:  Neurologic State: Alert  Orientation Level: Oriented X4  Cognition: Follows commands, Decreased attention/concentration, Poor safety awareness  Safety/Judgement: Decreased insight into deficits, Decreased awareness of need for safety  Functional Mobility Training:  Bed Mobility:        Sit to Supine: Minimum assistance;Assist x2           Transfers:  Sit to Stand: Minimum assistance;Assist x2  Stand to Sit: Minimum assistance;Assist x2        Bed to Chair: Moderate assistance;Maximum assistance;Assist x2 (with rolling walker)                    Balance:  Sitting: Impaired  Sitting - Static: Fair (occasional)  Sitting - Dynamic: Poor (constant support)  Standing: Impaired; With support  Standing - Static: Poor;Constant support  Standing - Dynamic : Poor;Constant support  Ambulation/Gait Training:              Gait Description (WDL):  (unsafe for gait attempt due to low BP)                          Pain Rating:  No c/o    Activity Tolerance:   Poor and signs and symptoms of orthostatic hypotension    After treatment patient left in no apparent distress:   Supine in bed, Call bell within reach, Bed / chair alarm activated, and Side rails x 3    COMMUNICATION/COLLABORATION:   The patients plan of care was discussed with: Occupational therapist and Registered nurse.      Sharyle Pitcairn Doornik, PT   Time Calculation: 11 mins

## 2023-03-15 NOTE — PROGRESS NOTES
Problem: Pressure Injury - Risk of  Goal: *Prevention of pressure injury  Description: Document Satish Scale and appropriate interventions in the flowsheet. Outcome: Progressing Towards Goal  Note: Pressure Injury Interventions:  Sensory Interventions: Float heels, Keep linens dry and wrinkle-free, Pressure redistribution bed/mattress (bed type)    Moisture Interventions: Absorbent underpads, Apply protective barrier, creams and emollients, Check for incontinence Q2 hours and as needed, Internal/External urinary devices    Activity Interventions: Pressure redistribution bed/mattress(bed type), PT/OT evaluation, Increase time out of bed    Mobility Interventions: Pressure redistribution bed/mattress (bed type), PT/OT evaluation    Nutrition Interventions: Document food/fluid/supplement intake, Offer support with meals,snacks and hydration    Friction and Shear Interventions: Apply protective barrier, creams and emollients, Lift sheet                Problem: Falls - Risk of  Goal: *Absence of Falls  Description: Document [place] Fall Risk and appropriate interventions in the flowsheet.   Outcome: Progressing Towards Goal  Note: Fall Risk Interventions:  Mobility Interventions: Communicate number of staff needed for ambulation/transfer, Utilize walker, cane, or other assistive device, Utilize gait belt for transfers/ambulation, Patient to call before getting OOB, Bed/chair exit alarm    Mentation Interventions: Bed/chair exit alarm, Door open when patient unattended, Gait belt with transfers/ambulation, Adequate sleep, hydration, pain control, More frequent rounding, Room close to nurse's station, Update white board    Medication Interventions: Bed/chair exit alarm, Patient to call before getting OOB, Utilize gait belt for transfers/ambulation    Elimination Interventions: Call light in reach, Bed/chair exit alarm, Patient to call for help with toileting needs

## 2023-03-15 NOTE — PROGRESS NOTES
NAME: Eliza Arango .        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 Family is helping him shave with electric razor. We discussed the above. Review of Systems:    Symptom Y/N Comments  Symptom Y/N Comments   Fever/Chills    Chest Pain     Poor Appetite    Edema     Cough    Abdominal Pain     Sputum    Joint Pain     SOB/MARVIN    Pruritis/Rash     Nausea/vomit    Tolerating PT/OT     Diarrhea    Tolerating Diet     Constipation    Other       Could not obtain due to:      Objective:     VITALS:   Last 24hrs VS reviewed since prior progress note.  Most recent are:  Visit Vitals  /62 (BP 1 Location: Right upper arm, BP Patient Position: At rest;Semi fowlers)   Pulse 66   Temp 97.9 °F (36.6 °C)   Resp 16   Ht 5' 10\" (1.778 m)   Wt 74.8 kg (165 lb)   SpO2 95%   BMI 23.68 kg/m²       Intake/Output Summary (Last 24 hours) at 3/15/2023 0550  Last data filed at 3/14/2023 1946  Gross per 24 hour   Intake --   Output 950 ml   Net -950 ml        Telemetry Reviewed:     PHYSICAL EXAM:  General: NAD      Lab Data Reviewed: (see below)    Medications Reviewed: (see below)    PMH/SH reviewed - no change compared to H&P  ________________________________________________________________________  Care Plan discussed with:  Patient     Family      RN     Care Manager                    Consultant:          Comments   >50% of visit spent in counseling and coordination of care       ________________________________________________________________________  Antonio Milton MD     Procedures: see electronic medical records for all procedures/Xrays and details which  were not copied into this note but were reviewed prior to creation of Plan. LABS:  No results for input(s): WBC, HGB, HCT, PLT, HGBEXT, HCTEXT, PLTEXT, HGBEXT, HCTEXT, PLTEXT in the last 72 hours. Recent Labs     03/15/23  0459 03/14/23  0255 03/13/23  0420   * 130* 126*   K 4.3 4.4 4.3   CL 96* 96* 93*   CO2 27 27 27   BUN 75* 60* 43*   CREA 1.29 1.06 1.00   * 92 84   CA 9.6 9.3 9.0       No results for input(s): AP, TBIL, TP, ALB, GLOB, GGT, AML, LPSE in the last 72 hours. No lab exists for component: SGOT, GPT, AMYP, HLPSE  No results for input(s): INR, PTP, APTT, INREXT, INREXT in the last 72 hours. No results for input(s): FE, TIBC, PSAT, FERR in the last 72 hours. Lab Results   Component Value Date/Time    Folate 18.9 02/01/2023 10:11 AM        No results for input(s): PH, PCO2, PO2 in the last 72 hours. No results for input(s): CPK, CKMB in the last 72 hours.     No lab exists for component: TROPONINI  No components found for: [place]  Lab Results   Component Value Date/Time    Color YELLOW/STRAW 08/18/2022 06:11 PM    Appearance CLEAR 08/18/2022 06:11 PM    Specific gravity 1.012 08/18/2022 06:11 PM    pH (UA) 6.5 08/18/2022 06:11 PM    Protein Negative 08/18/2022 06:11 PM    Glucose Negative 08/18/2022 06:11 PM    Ketone Negative 08/18/2022 06:11 PM    Bilirubin Negative 08/18/2022 06:11 PM    Urobilinogen 1.0 08/18/2022 06:11 PM    Nitrites Negative 08/18/2022 06:11 PM    Leukocyte Esterase Negative 08/18/2022 06:11 PM    Epithelial cells FEW 08/18/2022 06:11 PM    Bacteria Negative 08/18/2022 06:11 PM    WBC 0-4 08/18/2022 06:11 PM    RBC 0-5 08/18/2022 06:11 PM       MEDICATIONS:  Current Facility-Administered Medications   Medication Dose Route Frequency    balsam peru-castor oiL (VENELEX) ointment   Topical TID    carbamide peroxide (DEBROX) 6.5 % otic solution 5 Drop  5 Drop Both Ears BID    urea (URE-NA) 15 gram packet 1 Packet  1 Packet Oral BID    ondansetron (ZOFRAN) injection 4 mg  4 mg IntraVENous Q4H PRN    amLODIPine (NORVASC) tablet 2.5 mg  2.5 mg Oral DAILY    acetaminophen (TYLENOL) tablet 500 mg  500 mg Oral Q6H PRN    clopidogreL (PLAVIX) tablet 75 mg  75 mg Oral DAILY    OLANZapine (ZyPREXA) tablet 2.5 mg  2.5 mg Oral QHS    metoprolol succinate (TOPROL-XL) XL tablet 50 mg  50 mg Oral DAILY    losartan (COZAAR) tablet 100 mg  100 mg Oral DAILY    levothyroxine (SYNTHROID) tablet 100 mcg  100 mcg Oral ACB    apixaban (ELIQUIS) tablet 2.5 mg  2.5 mg Oral BID    cholecalciferol (VITAMIN D3) (1000 Units /25 mcg) tablet 2,000 Units  2,000 Units Oral QHS    melatonin tablet 3 mg  3 mg Oral QHS

## 2023-03-15 NOTE — PROGRESS NOTES
Problem: Dysphagia (Adult)  Goal: *Acute Goals and Plan of Care (Insert Text)  Description: Speech Pathology Goals  Initiated 3/10/2023    1. Patient will tolerate least restrictive diet without signs of aspiration or adverse effects within 7 days. Outcome: Progressing Towards Goal   SPEECH LANGUAGE PATHOLOGY DYSPHAGIA TREATMENT/DISCHARGE  Patient: Niraj Tovar Sr. (81 y.o. male)  Date: 3/15/2023  Diagnosis: Hyponatremia [E87.1] <principal problem not specified>      Precautions:  Fall, DNR    ASSESSMENT:  The patient is tolerating his diet well. He is at risk to aspirate due to his choreic movements that could affect his oral phase. Loss of bolus control is possible. May want to eat softer foods and ensure foods are moist. He is currently tolerating po well. No complaints. Speech continues to be dysarthric. If any further work up is needed, recommend barium swallow to assess esophageal function. PLAN:  Regular diet/ moist foods/thins   Patient will be discharged from acute skilled speech therapy at this time. Rationale for discharge:  Goals achieved    Discharge Recommendations:  none     SUBJECTIVE:   Patient stated his sodium was better today. OBJECTIVE:   Cognitive and Communication Status:  Neurologic State: Alert  Orientation Level: Oriented X4  Cognition: Decreased attention/concentration, Impulsive, Appropriate decision making    Perception: Cues to maintain midline in sitting, Cues to maintain midline in standing, Tactile, Verbal, Visual    Perseveration: Perseverates during ADLS, Perseverates during conversation    Safety/Judgement: Awareness of environment, Decreased awareness of need for assistance, Decreased awareness of need for safety, Decreased insight into deficits, Fall prevention  Dysphagia Treatment:  Oral Assessment:     P.O. Trials:  Patient Position: upright in bed  Vocal quality prior to P.O.: No impairment  Consistency Presented:  Thin liquid;Mechanical soft  How Presented: Self-fed/presented;Straw     Bolus Acceptance: No impairment  Bolus Formation/Control: No impairment  Type of Impairment: Delayed  Propulsion: Delayed (# of seconds); Discoordination  Oral Residue: None  Initiation of Swallow: No impairment  Laryngeal Elevation: Functional  Aspiration Signs/Symptoms: None  Pharyngeal Phase Characteristics: No impairment, issues, or problems            Oral Phase Severity: Mild  Pharyngeal Phase Severity : No impairment        NOMS:   The NOMS functional outcome measure was used to quantify this patient's level of swallowing impairment. Based on the NOMS, the patient was determined to be at level 6 for swallow function     NOMS Swallowing Levels:  Level 1 (CN): NPO  Level 2 (CM): NPO but takes consistency in therapy  Level 3 (CL): Takes less than 50% of nutrition p.o. and continues with nonoral feedings; and/or safe with mod cues; and/or max diet restriction  Level 4 (CK): Safe swallow but needs mod cues; and/or mod diet restriction; and/or still requires some nonoral feeding/supplements  Level 5 (CJ): Safe swallow with min diet restriction; and/or needs min cues  Level 6 (CI): Independent with p.o.; rare cues; usually self cues; may need to avoid some foods or needs extra time  Level 7 ([address] [place] [address]): Independent for all p.o.  KALLI. (2003). National Outcomes Measurement System (NOMS): Adult Speech-Language Pathology User's Guide. Pain:  Pain Scale 1: Numeric (0 - 10)  Pain Intensity 1: 0       After treatment:   Patient left in no apparent distress in bed    COMMUNICATION/EDUCATION:   Patient was educated regarding his deficit(s) of movement The patient's plan of care including recommendations, planned interventions, and recommended diet changes were discussed with: Registered nurse.      Christian Lomas SLP  Time Calculation: 15 mins

## 2023-03-15 NOTE — PROGRESS NOTES
Pt confused; A/Ox2 and ripped off external catheter for the third time in a row  Attempted to reorient pt; Shankar pad placed under pt and man wick remains off at this time  Will frequently check for incontinence

## 2023-03-15 NOTE — PROGRESS NOTES
Bedside shift change report given to Pedro Morfin RN by Alexander Maria. Report included the following information SBAR, Kardex, ED Summary, Intake/Output, MAR, Recent Results, and Cardiac Rhythm NSR .

## 2023-03-15 NOTE — PROGRESS NOTES
End of Shift Note    Bedside shift change report given to Jessee Sood RN by Tahira Hanna RN (offgoing nurse). Report included the following information SBAR, Kardex, ED Summary, Intake/Output, MAR, Recent Results, and Cardiac Rhythm NSR    Shift worked:  2642-7220     Shift summary and any significant changes:          Concerns for physician to address:  none     Zone phone for oncoming shift:   [phone]       Activity:  Activity Level: Up with Assistance  Number times ambulated in hallways past shift: 0  Number of times OOB to chair past shift: 1    Cardiac:   Cardiac Monitoring: Yes      Cardiac Rhythm: Sinus Rhythm    Access:  Current line(s): PIV     Genitourinary:   Urinary status: voiding and external catheter    Respiratory:   O2 Device: None (Room air)  Chronic home O2 use?: NO  Incentive spirometer at bedside: N/A       GI:  Last Bowel Movement Date: 03/13/23  Current diet:  ADULT ORAL NUTRITION SUPPLEMENT Breakfast, Lunch, Dinner; Standard High Calorie/High Protein  ADULT DIET Regular; 1200 ml  Passing flatus: YES  Tolerating current diet: YES       Pain Management:   Patient states pain is manageable on current regimen: N/A    Skin:  Satish Score: 18  Interventions: increase time out of bed, PT/OT consult, and internal/external urinary devices    Patient Safety:  Fall Score:  Total Score: 4  Interventions: bed/chair alarm, assistive device (walker, cane, etc), gripper socks, pt to call before getting OOB, and stay with me (per policy)  High Fall Risk: Yes    Length of Stay:  Expected LOS: 3d 12h  Actual LOS: [address] Lucius Lundborg, RN

## 2023-03-15 NOTE — PROGRESS NOTES
Problem: Self Care Deficits Care Plan (Adult)  Goal: *Acute Goals and Plan of Care (Insert Text)  Description:     FUNCTIONAL STATUS PRIOR TO ADMISSION: Patient ambulates with a rollator, and is independent to mod I for ADLs. HOME SUPPORT: The patient lived with daughter but did not require assist. Patient bathed only when family is home. Daughter works during the day     Occupational Therapy Goals  Initiated 3/11/2023  1. Patient will perform grooming standing at sink with supervision/setup within 7 day(s). 2.  Patient will perform upper body dressing with supervision/set-up within 7 day(s). 3.  Patient will perform lower body dressing with supervision/set-up within 7 day(s). 4.  Patient will perform toilet transfers with supervision/set-up within 7 day(s). 5.  Patient will perform all aspects of toileting with supervision/set-up within 7 day(s). 6.  Patient will perform sponge bathing with supervision/set-up within 7 day(s). Outcome: Not Met    OCCUPATIONAL THERAPY TREATMENT  Patient: Angie Woods  (81 y.o. male)  Date: 3/15/2023  Diagnosis: Hyponatremia [E87.1] <principal problem not specified>      Precautions: Fall, DNR  Chart, occupational therapy assessment, plan of care, and goals were reviewed. ASSESSMENT  Patient continues with skilled OT services. Patient initially reported feeling \"woozy\". BP sitting up in the chair was noted to be 80/51. According to the monitor, he was 142/76 thirty minutes prior. Assisted him back to bed; BP was then 115/77. Discussed with RN. Patient's lunch was present. Worked to try to improve self-feeding, but is significantly limited by hyperkinetic movements. Attempted light pressure/resistance on UE to see if weighted utensils would impact efficiency, but no significant improvement noted. Patient primarily used his left hand, and often moved his food around his plate in an attempt to get it on his utensil. Provided a spoon, especially for rice.  Patient overall requiring moderate assistance and cues; significant amount of time needed and noted to spill a lot of food. Also noted that he chews each bite for an extended period of time, and often needed to take a sip of liquid to help facilitate swallowing it. Discussed with SLP. Patient will continue to benefit from skilled OT treatment to maximize independence and safety in ADL. PLAN :  Patient continues to benefit from skilled intervention to address the above impairments. Continue treatment per established plan of care to address goals. Recommendation for discharge: (in order for the patient to meet his/her long term goals)  Therapy up to 5 days/week in SNF setting    This discharge recommendation:  Has been made in collaboration with the attending provider and/or case management    IF patient discharges home will need the following DME: Defer to rehab       SUBJECTIVE:   Patient stated I can do it.  (Determined to try to feed himself)    OBJECTIVE DATA SUMMARY:   Cognitive/Behavioral Status:  Neurologic State: Alert  Orientation Level: Oriented X4  Cognition: Follows commands;Decreased attention/concentration;Poor safety awareness     Perseveration: Perseverates during ADLS;Perseverates during conversation  Safety/Judgement: Decreased insight into deficits; Decreased awareness of need for safety    Functional Mobility and Transfers for ADLs:  Bed Mobility:  Sit to Supine: Minimum assistance;Assist x2    Transfers:  Sit to Stand: Minimum assistance;Assist x2     Bed to Chair: Moderate assistance;Maximum assistance;Assist x2 (with rolling walker)    Balance:  Sitting: Impaired  Sitting - Static: Fair (occasional)  Sitting - Dynamic: Poor (constant support)  Standing: Impaired; With support  Standing - Static: Poor;Constant support  Standing - Dynamic : Poor;Constant support    ADL Intervention:  Feeding  Container Management: Maximum assistance; Compensatory technique training  Cutting Food:  Total assistance (dependent); Compensatory technique training  Utensil Management: Moderate assistance  Food to Mouth: Moderate assistance; Compensatory technique training  Drink to Mouth: Minimum assistance  Cues: Verbal cues provided;Visual cues provided; Tactile cues provided           Cognitive Retraining  Safety/Judgement: Decreased insight into deficits; Decreased awareness of need for safety    Pain:  No complaints    Activity Tolerance:   Fair    After treatment patient left in no apparent distress:   Call bell within reach, Bed / chair alarm activated, Side rails x 3, and Sitting up in bed while eating    COMMUNICATION/COLLABORATION:   The patients plan of care was discussed with: Physical therapist and Registered nurse.      Jodi Vasquez OTR/L  Time Calculation: 42 mins

## 2023-03-16 PROCEDURE — 74011250637 HC RX REV CODE- 250/637: Performed by: STUDENT IN AN ORGANIZED HEALTH CARE EDUCATION/TRAINING PROGRAM

## 2023-03-16 PROCEDURE — 97530 THERAPEUTIC ACTIVITIES: CPT

## 2023-03-16 PROCEDURE — 65270000046 HC RM TELEMETRY

## 2023-03-16 PROCEDURE — 74011250637 HC RX REV CODE- 250/637: Performed by: INTERNAL MEDICINE

## 2023-03-16 RX ORDER — METOPROLOL SUCCINATE 25 MG/1
25 TABLET, EXTENDED RELEASE ORAL DAILY
Status: DISCONTINUED | OUTPATIENT
Start: 2023-03-17 | End: 2023-03-20 | Stop reason: HOSPADM

## 2023-03-16 RX ADMIN — CASTOR OIL AND BALSAM, PERU: 788; 87 OINTMENT TOPICAL at 10:36

## 2023-03-16 RX ADMIN — CLOPIDOGREL BISULFATE 75 MG: 75 TABLET ORAL at 10:36

## 2023-03-16 RX ADMIN — METOPROLOL SUCCINATE 50 MG: 50 TABLET, EXTENDED RELEASE ORAL at 10:36

## 2023-03-16 RX ADMIN — Medication 5 DROP: at 17:44

## 2023-03-16 RX ADMIN — CHOLECALCIFEROL TAB 25 MCG (1000 UNIT) 2000 UNITS: 25 TAB at 22:32

## 2023-03-16 RX ADMIN — OLANZAPINE 5 MG: 5 TABLET, FILM COATED ORAL at 22:32

## 2023-03-16 RX ADMIN — Medication 1 PACKET: at 17:41

## 2023-03-16 RX ADMIN — APIXABAN 2.5 MG: 2.5 TABLET, FILM COATED ORAL at 17:41

## 2023-03-16 RX ADMIN — Medication 1 PACKET: at 10:36

## 2023-03-16 RX ADMIN — CASTOR OIL AND BALSAM, PERU: 788; 87 OINTMENT TOPICAL at 22:34

## 2023-03-16 RX ADMIN — CASTOR OIL AND BALSAM, PERU: 788; 87 OINTMENT TOPICAL at 17:43

## 2023-03-16 RX ADMIN — Medication 5 DROP: at 10:36

## 2023-03-16 RX ADMIN — MELATONIN 3 MG: at 22:32

## 2023-03-16 RX ADMIN — APIXABAN 2.5 MG: 2.5 TABLET, FILM COATED ORAL at 10:36

## 2023-03-16 RX ADMIN — AMLODIPINE BESYLATE 2.5 MG: 2.5 TABLET ORAL at 10:36

## 2023-03-16 RX ADMIN — LOSARTAN POTASSIUM 100 MG: 100 TABLET, FILM COATED ORAL at 10:36

## 2023-03-16 RX ADMIN — LEVOTHYROXINE SODIUM 100 MCG: 0.1 TABLET ORAL at 10:36

## 2023-03-16 NOTE — PROGRESS NOTES
2325: Bedside and Verbal shift change report given to Akila Lauren RN (oncoming nurse) by Sebas Leyva RN (offgoing nurse). Report included the following information SBAR, Kardex, Intake/Output, MAR, Recent Results, Med Rec Status, and Cardiac Rhythm NSR .    2355: Assessment completed at bedside. Bed alarm turned on at this time. Call bell within reach See flowsheet for details. [id number] 03/16/2023: Reassessment completed at bedside. Patient provided with incontinence care. New peripheral IV placed at this time. Bed alarm turned on, call bell within reach. 0893: End of Shift Note    Bedside shift change report given to Hailee Grayson RN (oncoming nurse) by Akila Lauren RN (offgoing nurse). Report included the following information SBAR, Kardex, Intake/Output, MAR, Recent Results, Med Rec Status, and Cardiac Rhythm NSR/SB    Shift worked:  [id number]     Shift summary and any significant changes:     No acute changes noted. Concerns for physician to address:  No acute changes noted. Activity:  Activity Level:  Up with Assistance  Number times ambulated in hallways past shift: 0  Number of times OOB to chair past shift: 0    Cardiac:   Cardiac Monitoring: Yes      Cardiac Rhythm: Sinus Rhythm, Sinus Al    Access:  Current line(s): PIV     Genitourinary:   Urinary status: incontinent and external catheter    Respiratory:   O2 Device: None (Room air)  Chronic home O2 use?: NO  Incentive spirometer at bedside: NO       GI:  Last Bowel Movement Date: 03/14/23  Current diet:  ADULT ORAL NUTRITION SUPPLEMENT Breakfast, Lunch, Dinner; Standard High Calorie/High Protein  ADULT DIET Regular; 1200 ml  Passing flatus: YES  Tolerating current diet: YES       Pain Management:   Patient states pain is manageable on current regimen: YES    Skin:  Satish Score: 16  Interventions: float heels, increase time out of bed, PT/OT consult, internal/external urinary devices, and nutritional support     Patient Safety:  Fall Score: Total Score: 4  Interventions: bed/chair alarm, gripper socks, pt to call before getting OOB, and stay with me (per policy)  High Fall Risk: Yes    Length of Stay:  Expected LOS: 3d 12h  Actual LOS: [address] [facility]

## 2023-03-16 NOTE — PROGRESS NOTES
End of Shift Note    Bedside shift change report given to Nguyen Perez RN (oncoming nurse) by Stephanie Heath RN (offgoing nurse). Report included the following information SBAR and Kardex    Shift worked:  7a-7p     Shift summary and any significant changes:     See prior note about decreased BP and med changes. Otherwise uneventful shift. Concerns for physician to address:       Zone phone for oncoming shift:          Activity:  Activity Level: Up with Assistance  Number times ambulated in hallways past shift: 0  Number of times OOB to chair past shift: 0    Cardiac:   Cardiac Monitoring: Yes      Cardiac Rhythm: Sinus Cleatis Roche, 1\" AV Block    Access:  Current line(s): PIV     Genitourinary:   Urinary status: external catheter    Respiratory:   O2 Device: None (Room air)  Chronic home O2 use?: NO  Incentive spirometer at bedside: NO       GI:  Last Bowel Movement Date: 03/14/23  Current diet:  ADULT ORAL NUTRITION SUPPLEMENT Breakfast, Lunch, Dinner; Standard High Calorie/High Protein  ADULT DIET Regular; 1200 ml  Passing flatus: YES  Tolerating current diet: YES       Pain Management:   Patient states pain is manageable on current regimen: YES    Skin:  Satish Score: 16  Interventions: float heels, increase time out of bed, and PT/OT consult    Patient Safety:  Fall Score:  Total Score: 4  Interventions: bed/chair alarm and stay with me (per policy)  High Fall Risk: Yes    Length of Stay:  Expected LOS: 3d 12h  Actual LOS: 6      Stephanie Heath RN

## 2023-03-16 NOTE — PROGRESS NOTES
Transition of Care Plan:     RUR: 14% (low RUR)  Disposition: SNFs pending vs. [facility] (accepted)  If SNF or IPR: Date FOC offered: 3/14/2023  Date FOC received: 3/14/2023  Date authorization started with reference number: N/A  Date authorization received and expires: N/A  Accepting facility: TBD  Follow up appointments: PCP/specialists if needed. DME needed: None. Patient has a RW and cane at home. Transportation at Discharge: [facility] or [place] to access home:  Patient has access     IM Medicare Letter: 2nd IM needed prior to discharge  Is patient a [place] and connected with the [place]? No.              If yes, was [employer] transfer form completed and [facility] notified? N/A  Caregiver Contact: Yoavmercedez Maxime - [phone]  Discharge Caregiver contacted prior to discharge? Patient to contact. Care Conference needed?:  No.     5744 - CM spoke with patient's [facility], this morning who chose more SNFs: [facility], [facility] and [facility]. CM left  for admissions at [address] [facility] and the [facility]. [facility], [address] [facility] ([phone]) admissions notified via phone call to review patient and they will notify CM of availability.         DELANEY TorresN, RN    Care Management  [phone]

## 2023-03-16 NOTE — PROGRESS NOTES
Problem: Mobility Impaired (Adult and Pediatric)  Goal: *Acute Goals and Plan of Care (Insert Text)  Description: FUNCTIONAL STATUS PRIOR TO ADMISSION: Patient was modified independent using a rollator for functional mobility. HOME SUPPORT PRIOR TO ADMISSION: The patient lived with daughter but did not require assist. Patient bathed only when family is home. Daughter works during the day    Physical Therapy Goals  Initiated 3/10/2023  1. Patient will move from supine to sit and sit to supine , scoot up and down, and roll side to side in bed with independence within 7 day(s). 2.  Patient will transfer from bed to chair and chair to bed with supervision/set-up using the least restrictive device within 7 day(s). 3.  Patient will perform sit to stand with supervision/set-up within 7 day(s). 4.  Patient will ambulate with supervision/set-up for 100 feet with the least restrictive device within 7 day(s). Outcome: Not Progressing Towards Goal     PHYSICAL THERAPY TREATMENT  Patient: Latesha White  (81 y.o. male)  Date: 3/16/2023  Diagnosis: Hyponatremia [E87.1] <principal problem not specified>      Precautions: Fall, DNR  Chart, physical therapy assessment, plan of care and goals were reviewed. ASSESSMENT  Patient continues with skilled PT services and is not progressing towards goals. Patient was cleared to be seen by nursing. BP was low in supine and dropped significantly with supine to sit. Patient did not report feeling dizzy with change in position or at any time. He required minimum assistance x 2 for supine<>sit transfer, HOB elevated. He required maximum assistance x 2 to scoot while sitting EOB, mainly due to patient was not able to keep his feet on the ground to assist with the transfer. Patient was left supine in bed, call bell within reach, no complaints. Continue to recommend SNF upon discharge.     BP supine 94/67  BP sitting 77/65  BP supine 89/59    Current Level of Function Impacting Discharge (mobility/balance): maximum assistance x 2 for scooting, Radha x 2 for bed mobility    Other factors to consider for discharge: low BP that drops with activity         PLAN :  Patient continues to benefit from skilled intervention to address the above impairments. Continue treatment per established plan of care. to address goals. Recommendation for discharge: (in order for the patient to meet his/her long term goals)  Therapy up to 5 days/week in SNF setting    This discharge recommendation:  Has not yet been discussed the attending provider and/or case management    IF patient discharges home will need the following DME: to be determined (TBD)       SUBJECTIVE:   Patient stated If I stand I will pass out.     OBJECTIVE DATA SUMMARY:   Critical Behavior:  Neurologic State: Alert  Orientation Level: Oriented to person, Oriented to place  Cognition: Decreased attention/concentration, Decreased command following  Safety/Judgement: Decreased insight into deficits, Decreased awareness of need for safety  Functional Mobility Training:  Bed Mobility:   Supine to Sit: Minimum assistance;Assist x2;Bed Modified  Sit to Supine: Minimum assistance;Assist x2    Balance:  Sitting: Impaired  Sitting - Static: Fair (occasional)  Sitting - Dynamic: Fair (occasional)      Therapeutic Exercises:   none  Pain Rating:  none    Activity Tolerance:   Poor    After treatment patient left in no apparent distress:   Supine in bed, Call bell within reach, and Side rails x 3    COMMUNICATION/COLLABORATION:   The patients plan of care was discussed with: Registered nurse.      Katie Majano PT   Time Calculation: 12 mins

## 2023-03-16 NOTE — PROGRESS NOTES
Patient's BP 90's/60's when lying down. PT assisted patient to the EOB. BP 77/65 and pt feeling dizzy. Notified MD and she placed a couple meds on hold and decreased another.

## 2023-03-16 NOTE — PROGRESS NOTES
Problem: Aspiration - Risk of  Goal: *Absence of aspiration  Outcome: Progressing Towards Goal     Problem: Patient Education: Go to Patient Education Activity  Goal: Patient/Family Education  Outcome: Progressing Towards Goal     Problem: Pressure Injury - Risk of  Goal: *Prevention of pressure injury  Description: Document Satish Scale and appropriate interventions in the flowsheet. Outcome: Progressing Towards Goal  Note: Pressure Injury Interventions:  Sensory Interventions: Assess changes in LOC, Check visual cues for pain, Discuss PT/OT consult with provider, Float heels, Keep linens dry and wrinkle-free, Maintain/enhance activity level, Minimize linen layers, Monitor skin under medical devices, Turn and reposition approx. every two hours (pillows and wedges if needed)    Moisture Interventions: Absorbent underpads, Check for incontinence Q2 hours and as needed, Internal/External urinary devices, Maintain skin hydration (lotion/cream), Minimize layers    Activity Interventions: Increase time out of bed, PT/OT evaluation    Mobility Interventions: Float heels, HOB 30 degrees or less, PT/OT evaluation, Turn and reposition approx. every two hours(pillow and wedges)    Nutrition Interventions: Document food/fluid/supplement intake, Offer support with meals,snacks and hydration    Friction and Shear Interventions: Apply protective barrier, creams and emollients, HOB 30 degrees or less, Lift sheet, Minimize layers                Problem: Patient Education: Go to Patient Education Activity  Goal: Patient/Family Education  Outcome: Progressing Towards Goal     Problem: Patient Education: Go to Patient Education Activity  Goal: Patient/Family Education  Outcome: Progressing Towards Goal     Problem: Falls - Risk of  Goal: *Absence of Falls  Description: Document [place] Fall Risk and appropriate interventions in the flowsheet.   Outcome: Progressing Towards Goal  Note: Fall Risk Interventions:  Mobility Interventions: Bed/chair exit alarm, Communicate number of staff needed for ambulation/transfer, Patient to call before getting OOB, Strengthening exercises (ROM-active/passive), PT Consult for mobility concerns    Mentation Interventions: Bed/chair exit alarm, Reorient patient, Room close to nurse's station, Toileting rounds, Update white board    Medication Interventions: Assess postural VS orthostatic hypotension, Evaluate medications/consider consulting pharmacy, Bed/chair exit alarm, Patient to call before getting OOB    Elimination Interventions: Bed/chair exit alarm, Call light in reach, Patient to call for help with toileting needs, Stay With Me (per policy)              Problem: Patient Education: Go to Patient Education Activity  Goal: Patient/Family Education  Outcome: Progressing Towards Goal     Problem: Patient Education: Go to Patient Education Activity  Goal: Patient/Family Education  Outcome: Progressing Towards Goal     Problem: Patient Education: Go to Patient Education Activity  Goal: Patient/Family Education  Outcome: Progressing Towards Goal

## 2023-03-16 NOTE — PROGRESS NOTES
NAME: Liam Enrique .        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 No new issues. Chart reviewed. Check sodium tomorrow if still here. Awaiting snf    Review of Systems:    Symptom Y/N Comments  Symptom Y/N Comments   Fever/Chills    Chest Pain     Poor Appetite    Edema     Cough    Abdominal Pain     Sputum    Joint Pain     SOB/MARVIN    Pruritis/Rash     Nausea/vomit    Tolerating PT/OT     Diarrhea    Tolerating Diet     Constipation    Other       Could not obtain due to:      Objective:     VITALS:   Last 24hrs VS reviewed since prior progress note.  Most recent are:  Visit Vitals  /63   Pulse (!) 57   Temp 97.3 °F (36.3 °C)   Resp 18   Ht 5' 10\" (1.778 m)   Wt 74.8 kg (165 lb)   SpO2 97%   BMI 23.68 kg/m²       Intake/Output Summary (Last 24 hours) at 3/16/2023 1148  Last data filed at 3/15/2023 1611  Gross per 24 hour   Intake 240 ml   Output --   Net 240 ml        Telemetry Reviewed:     PHYSICAL EXAM:  General: NAD      Lab Data Reviewed: (see below)    Medications Reviewed: (see below)    PMH/SH reviewed - no change compared to H&P  ________________________________________________________________________  Care Plan discussed with:  Patient     Family      RN     Care Manager                    Consultant:          Comments   >50% of visit spent in counseling and coordination of care       ________________________________________________________________________  Chun Gayle MD     Procedures: see electronic medical records for all procedures/Xrays and details which  were not copied into this note but were reviewed prior to creation of Plan. LABS:  No results for input(s): WBC, HGB, HCT, PLT, HGBEXT, HCTEXT, PLTEXT, HGBEXT, HCTEXT, PLTEXT in the last 72 hours. Recent Labs     03/15/23  0459 03/14/23  0255   * 130*   K 4.3 4.4   CL 96* 96*   CO2 27 27   BUN 75* 60*   CREA 1.29 1.06   * 92   CA 9.6 9.3       No results for input(s): AP, TBIL, TP, ALB, GLOB, GGT, AML, LPSE in the last 72 hours. No lab exists for component: SGOT, GPT, AMYP, HLPSE  No results for input(s): INR, PTP, APTT, INREXT, INREXT in the last 72 hours. No results for input(s): FE, TIBC, PSAT, FERR in the last 72 hours. Lab Results   Component Value Date/Time    Folate 18.9 02/01/2023 10:11 AM        No results for input(s): PH, PCO2, PO2 in the last 72 hours. No results for input(s): CPK, CKMB in the last 72 hours.     No lab exists for component: TROPONINI  No components found for: [place]  Lab Results   Component Value Date/Time    Color YELLOW/STRAW 08/18/2022 06:11 PM    Appearance CLEAR 08/18/2022 06:11 PM    Specific gravity 1.012 08/18/2022 06:11 PM    pH (UA) 6.5 08/18/2022 06:11 PM    Protein Negative 08/18/2022 06:11 PM    Glucose Negative 08/18/2022 06:11 PM    Ketone Negative 08/18/2022 06:11 PM    Bilirubin Negative 08/18/2022 06:11 PM    Urobilinogen 1.0 08/18/2022 06:11 PM    Nitrites Negative 08/18/2022 06:11 PM    Leukocyte Esterase Negative 08/18/2022 06:11 PM    Epithelial cells FEW 08/18/2022 06:11 PM    Bacteria Negative 08/18/2022 06:11 PM    WBC 0-4 08/18/2022 06:11 PM    RBC 0-5 08/18/2022 06:11 PM       MEDICATIONS:  Current Facility-Administered Medications   Medication Dose Route Frequency    OLANZapine (ZyPREXA) tablet 5 mg  5 mg Oral QHS    balsam peru-castor oiL (VENELEX) ointment   Topical TID    carbamide peroxide (DEBROX) 6.5 % otic solution 5 Drop  5 Drop Both Ears BID    urea (URE-NA) 15 gram packet 1 Packet  1 Packet Oral BID    ondansetron (ZOFRAN) injection 4 mg  4 mg IntraVENous Q4H PRN amLODIPine (NORVASC) tablet 2.5 mg  2.5 mg Oral DAILY    acetaminophen (TYLENOL) tablet 500 mg  500 mg Oral Q6H PRN    clopidogreL (PLAVIX) tablet 75 mg  75 mg Oral DAILY    metoprolol succinate (TOPROL-XL) XL tablet 50 mg  50 mg Oral DAILY    losartan (COZAAR) tablet 100 mg  100 mg Oral DAILY    levothyroxine (SYNTHROID) tablet 100 mcg  100 mcg Oral ACB    apixaban (ELIQUIS) tablet 2.5 mg  2.5 mg Oral BID    cholecalciferol (VITAMIN D3) (1000 Units /25 mcg) tablet 2,000 Units  2,000 Units Oral QHS    melatonin tablet 3 mg  3 mg Oral QHS

## 2023-03-16 NOTE — PROGRESS NOTES
Hospitalist Progress Note    NAME: Cale Mena Sr.   :  1935   MRN:  [id number]       Assessment / Plan:  Dysphagia  Hyponatremia     CT neck showed 1. No acute abnormality in the neck. 2. Partially visualized 7 mm groundglass pulmonary nodule in the left upper  lobe. If prior imaging is available for comparison, consider follow-up chest CT  in 3-6 months. Holding IVF as no improvement in NA  Checked urine lytes , sodium noted, urine osm 578  Patient fluid restricted, add urea- na packet, HCTZ added  FR 1200 cc  Nephrology consulted for assistance. Appreciate recs  Trend BMP  Na 128 on 03/15  - ok for discharge per Nephrology     SLP gabriel appreciated   -- \"Regular/Thin Liquid diet  -- Smaller pills, 1 at a time with thin liquid and larger pills with puree (per patient preference)  -- Strict oral care  -- Upright in bed\"     PT/OT >SNF    CKD  Cr appears to be at baseline  - Nephrology discussed as above     Choreiform movement disorder  Zyprexa increased to 5 qhs for   Patient follows with movement specialist as outpatient  Melaonin Qhs  Delirium precautions     History of A fib, currently in NSR  C/w eliquis  C/w Toprol XL     HTN  Hold  amlodipine and losartan due to symptomatic orthostatic hypotension   Reduce toprol     Hypothyroid  C/w synthroid     History of CVA  C/w plavix and eliquis  C/w BB     History of prostate cancer  Marcelo PSA labs at request of daughter. They will follow up with Urology    Code Status:  DNR  Surrogate Decision Maker: DaughterAddie Abdi    18.5 - 24.9 Normal weight / Body mass index is 23.68 kg/m².     Estimated discharge date:     Code status: DNR  Prophylaxis: Eliquis  Recommended Disposition: SNF  Barriers: medically stable for discharge, waiting on placement     Subjective:     Chief Complaint / Reason for Physician Visit  No acute issues      Review of Systems:  Full 14 point ROS assessed and neg except as noted above    Objective:     VITALS: Last 24hrs VS reviewed since prior progress note.  Most recent are:  Patient Vitals for the past 24 hrs:   Temp Pulse Resp BP SpO2   03/16/23 1045 97.3 °F (36.3 °C) 66 -- 99/67 97 %   03/16/23 0740 97.3 °F (36.3 °C) (!) 57 18 105/63 97 %   03/16/23 0300 97.2 °F (36.2 °C) 60 16 (!) 97/56 96 %   03/15/23 2355 98.2 °F (36.8 °C) 70 16 107/72 94 %   03/15/23 2300 98.2 °F (36.8 °C) 62 16 96/68 96 %   03/15/23 2014 -- 70 -- -- --   03/15/23 2013 -- 63 -- -- --   03/15/23 2012 -- 60 -- -- --   03/15/23 2011 -- 63 -- -- --   03/15/23 2010 -- 70 -- -- --   03/15/23 2009 -- 61 -- -- --   03/15/23 2008 -- 70 -- -- --   03/15/23 2007 -- 63 -- -- --   03/15/23 2006 -- (!) 58 -- -- --   03/15/23 2005 -- 64 -- -- --   03/15/23 2004 -- 63 -- -- --   03/15/23 2003 -- 62 -- -- --   03/15/23 2002 -- 61 -- -- --   03/15/23 2001 -- 61 -- -- --   03/15/23 2000 -- 61 -- 136/68 --   03/15/23 1959 -- 62 -- -- --   03/15/23 1958 -- 61 -- -- --   03/15/23 1957 -- 68 -- -- 91 %   03/15/23 1956 -- 66 -- -- --   03/15/23 1955 -- 66 -- -- --   03/15/23 1954 -- 64 -- -- --   03/15/23 1953 -- 63 -- -- --   03/15/23 1952 -- 64 -- -- --   03/15/23 1951 97.1 °F (36.2 °C) 61 14 120/74 100 %   03/15/23 1950 -- 65 -- -- --   03/15/23 1949 -- 63 -- -- --   03/15/23 1948 -- 63 -- -- --   03/15/23 1947 -- 71 -- -- --   03/15/23 1946 -- 68 -- -- --   03/15/23 1945 -- 62 -- -- --   03/15/23 1944 -- 65 -- -- --   03/15/23 1943 -- 64 -- -- --   03/15/23 1942 -- 63 -- -- --   03/15/23 1941 -- 64 -- -- --   03/15/23 1940 -- 64 -- -- --   03/15/23 1939 -- 64 -- -- --   03/15/23 1938 -- 63 -- -- --   03/15/23 1937 -- 65 -- -- --   03/1936 -- 68 -- -- --   03/15/23 1935 -- 64 -- -- --   03/15/23 1934 -- 65 -- -- --   03/15/23 1933 -- 65 -- -- --   03/15/23 1932 -- 64 -- -- --   03/15/23 1931 -- 64 -- -- --   03/15/23 1930 -- 64 -- -- --   03/15/23 1929 -- 61 -- -- --   03/15/23 1928 -- 59 -- -- --   03/15/23 1927 -- 71 -- -- --   03/15/23 1926 -- 77 -- -- -- 03/15/23 1925 -- 65 -- -- --   03/15/23 1924 -- 64 -- -- --   03/15/23 1923 -- 59 -- -- --   03/15/23 1922 -- 61 -- -- --   03/15/23 1611 -- 65 -- 101/71 96 %   03/15/23 1512 97.6 °F (36.4 °C) 68 16 (!) 97/58 95 %         Intake/Output Summary (Last 24 hours) at 3/16/2023 1408  Last data filed at 3/15/2023 1611  Gross per 24 hour   Intake 240 ml   Output --   Net 240 ml        I had a face to face encounter and independently examined this patient on 3/16/2023, as outlined below:  PHYSICAL EXAM:  General: WD, WN. Alert, cooperative, no acute distress    EENT:  EOMI. Anicteric sclerae. MMM  Resp:  CTA bilaterally, no wheezing or rales. No accessory muscle use  CV:  Regular  rhythm,  No edema  GI:  Soft, Non distended, Non tender. +Bowel sounds  Neurologic:  Alert and oriented X 3, normal speech, +ve choreform movements  Psych:   Good insight. Not anxious nor agitated  Skin:  No rashes. No jaundice    Reviewed most current lab test results and cultures  YES  Reviewed most current radiology test results   YES  Review and summation of old records today    NO  Reviewed patient's current orders and MAR    YES  PMH/ reviewed - no change compared to H&P  ________________________________________________________________________  Care Plan discussed with:    Comments   Patient y    Family      RN y    Care Manager     Consultant                       y Multidiciplinary team rounds were held today with , nursing, pharmacist and clinical coordinator. Patient's plan of care was discussed; medications were reviewed and discharge planning was addressed. ________________________________________________________________________    Procedures: see electronic medical records for all procedures/Xrays and details which were not copied into this note but were reviewed prior to creation of Plan. LABS:  I reviewed today's most current labs and imaging studies.   Pertinent labs include:  No results for input(s): WBC, HGB, HCT, PLT, HGBEXT, HCTEXT, PLTEXT, HGBEXT, HCTEXT, PLTEXT in the last 72 hours.     Recent Labs     03/15/23  0459 03/14/23  0255   * 130*   K 4.3 4.4   CL 96* 96*   CO2 27 27   * 92   BUN 75* 60*   CREA 1.29 1.06   CA 9.6 9.3         Signed: Sherri Griggs MD

## 2023-03-16 NOTE — PROGRESS NOTES
Problem: Aspiration - Risk of  Goal: *Absence of aspiration  Outcome: Progressing Towards Goal     Problem: Pressure Injury - Risk of  Goal: *Prevention of pressure injury  Description: Document Satish Scale and appropriate interventions in the flowsheet. Outcome: Progressing Towards Goal  Note: Pressure Injury Interventions:  Sensory Interventions: Assess changes in LOC, Check visual cues for pain, Discuss PT/OT consult with provider, Float heels, Keep linens dry and wrinkle-free, Maintain/enhance activity level, Minimize linen layers, Monitor skin under medical devices, Turn and reposition approx. every two hours (pillows and wedges if needed)    Moisture Interventions: Absorbent underpads, Check for incontinence Q2 hours and as needed, Internal/External urinary devices, Maintain skin hydration (lotion/cream), Minimize layers    Activity Interventions: Increase time out of bed, PT/OT evaluation    Mobility Interventions: Float heels, HOB 30 degrees or less, PT/OT evaluation, Turn and reposition approx. every two hours(pillow and wedges)    Nutrition Interventions: Document food/fluid/supplement intake, Offer support with meals,snacks and hydration    Friction and Shear Interventions: Apply protective barrier, creams and emollients, HOB 30 degrees or less, Lift sheet, Minimize layers                Problem: Falls - Risk of  Goal: *Absence of Falls  Description: Document Cloevr Fall Risk and appropriate interventions in the flowsheet.   Outcome: Progressing Towards Goal  Note: Fall Risk Interventions:  Mobility Interventions: Bed/chair exit alarm, Communicate number of staff needed for ambulation/transfer, Patient to call before getting OOB, Strengthening exercises (ROM-active/passive), PT Consult for mobility concerns    Mentation Interventions: Bed/chair exit alarm, Reorient patient, Room close to nurse's station, Toileting rounds, Update white board    Medication Interventions: Assess postural VS orthostatic hypotension, Evaluate medications/consider consulting pharmacy, Bed/chair exit alarm, Patient to call before getting OOB    Elimination Interventions: Bed/chair exit alarm, Call light in reach, Patient to call for help with toileting needs, Stay With Me (per policy)

## 2023-03-17 LAB
ANION GAP SERPL CALC-SCNC: 5 MMOL/L (ref 5–15)
BUN SERPL-MCNC: 110 MG/DL (ref 6–20)
BUN/CREAT SERPL: 76 (ref 12–20)
CALCIUM SERPL-MCNC: 9.1 MG/DL (ref 8.5–10.1)
CHLORIDE SERPL-SCNC: 99 MMOL/L (ref 97–108)
CO2 SERPL-SCNC: 26 MMOL/L (ref 21–32)
CREAT SERPL-MCNC: 1.45 MG/DL (ref 0.7–1.3)
ERYTHROCYTE [DISTWIDTH] IN BLOOD BY AUTOMATED COUNT: 13.2 % (ref 11.5–14.5)
GLUCOSE SERPL-MCNC: 114 MG/DL (ref 65–100)
HCT VFR BLD AUTO: 39.7 % (ref 36.6–50.3)
HGB BLD-MCNC: 13.4 G/DL (ref 12.1–17)
MCH RBC QN AUTO: 31.9 PG (ref 26–34)
MCHC RBC AUTO-ENTMCNC: 33.8 G/DL (ref 30–36.5)
MCV RBC AUTO: 94.5 FL (ref 80–99)
NRBC # BLD: 0 K/UL (ref 0–0.01)
NRBC BLD-RTO: 0 PER 100 WBC
PLATELET # BLD AUTO: 360 K/UL (ref 150–400)
PMV BLD AUTO: 9.1 FL (ref 8.9–12.9)
POTASSIUM SERPL-SCNC: 4.5 MMOL/L (ref 3.5–5.1)
RBC # BLD AUTO: 4.2 M/UL (ref 4.1–5.7)
SODIUM SERPL-SCNC: 130 MMOL/L (ref 136–145)
WBC # BLD AUTO: 9.1 K/UL (ref 4.1–11.1)

## 2023-03-17 PROCEDURE — 65270000046 HC RM TELEMETRY

## 2023-03-17 PROCEDURE — 80048 BASIC METABOLIC PNL TOTAL CA: CPT

## 2023-03-17 PROCEDURE — 74011250636 HC RX REV CODE- 250/636: Performed by: INTERNAL MEDICINE

## 2023-03-17 PROCEDURE — 97535 SELF CARE MNGMENT TRAINING: CPT | Performed by: OCCUPATIONAL THERAPIST

## 2023-03-17 PROCEDURE — 74011250637 HC RX REV CODE- 250/637: Performed by: STUDENT IN AN ORGANIZED HEALTH CARE EDUCATION/TRAINING PROGRAM

## 2023-03-17 PROCEDURE — 85027 COMPLETE CBC AUTOMATED: CPT

## 2023-03-17 PROCEDURE — 97530 THERAPEUTIC ACTIVITIES: CPT

## 2023-03-17 PROCEDURE — 36415 COLL VENOUS BLD VENIPUNCTURE: CPT

## 2023-03-17 PROCEDURE — 74011250637 HC RX REV CODE- 250/637: Performed by: INTERNAL MEDICINE

## 2023-03-17 RX ORDER — SODIUM CHLORIDE 1 G/1
1 TABLET ORAL 2 TIMES DAILY
Status: DISCONTINUED | OUTPATIENT
Start: 2023-03-17 | End: 2023-03-20 | Stop reason: HOSPADM

## 2023-03-17 RX ADMIN — CLOPIDOGREL BISULFATE 75 MG: 75 TABLET ORAL at 09:41

## 2023-03-17 RX ADMIN — Medication 1000 MG: at 10:26

## 2023-03-17 RX ADMIN — Medication 5 DROP: at 09:43

## 2023-03-17 RX ADMIN — APIXABAN 2.5 MG: 2.5 TABLET, FILM COATED ORAL at 17:46

## 2023-03-17 RX ADMIN — CASTOR OIL AND BALSAM, PERU: 788; 87 OINTMENT TOPICAL at 17:48

## 2023-03-17 RX ADMIN — MELATONIN 3 MG: at 21:36

## 2023-03-17 RX ADMIN — CASTOR OIL AND BALSAM, PERU: 788; 87 OINTMENT TOPICAL at 09:41

## 2023-03-17 RX ADMIN — CHOLECALCIFEROL TAB 25 MCG (1000 UNIT) 2000 UNITS: 25 TAB at 21:36

## 2023-03-17 RX ADMIN — CASTOR OIL AND BALSAM, PERU: 788; 87 OINTMENT TOPICAL at 21:37

## 2023-03-17 RX ADMIN — APIXABAN 2.5 MG: 2.5 TABLET, FILM COATED ORAL at 09:43

## 2023-03-17 RX ADMIN — Medication 5 DROP: at 17:49

## 2023-03-17 RX ADMIN — SODIUM CHLORIDE 500 ML: 9 INJECTION, SOLUTION INTRAVENOUS at 10:25

## 2023-03-17 RX ADMIN — LEVOTHYROXINE SODIUM 100 MCG: 0.1 TABLET ORAL at 09:41

## 2023-03-17 RX ADMIN — OLANZAPINE 5 MG: 5 TABLET, FILM COATED ORAL at 21:36

## 2023-03-17 RX ADMIN — Medication 1000 MG: at 17:46

## 2023-03-17 NOTE — PROGRESS NOTES
0945 Patients /48 but asymptomatic while lying down, patients metoprolol held and MD notified and agrees with holding med. Zoltan hose ordered and applied. 1030 PT/OT in to see pt and repositioned pt on side. Rash noted to upper back area. Pt denies itching. MD notified. BP dropped to 70's lying down so PT/OT unable to assist patient to the EOB. End of Shift Note    Bedside shift change report given to SANTIAGO Alonso (oncoming nurse) by Ryann Da Silva RN (offgoing nurse). Report included the following information SBAR and Kardex    Shift worked:  7a-7p     Shift summary and any significant changes:     See notes above. Order for D/C was discontinued. Pt's BP continues to be soft, MD aware. Concerns for physician to address:       Zone phone for oncoming shift:          Activity:  Activity Level: Up with Assistance  Number times ambulated in hallways past shift: 0  Number of times OOB to chair past shift: 0    Cardiac:   Cardiac Monitoring: Yes      Cardiac Rhythm: Sinus Rhythm, Sinus Daun Favre, 1\" AV Block    Access:  Current line(s): PIV     Genitourinary:   Urinary status: incontinent and external catheter    Respiratory:   O2 Device: None (Room air)  Chronic home O2 use?: NO  Incentive spirometer at bedside: NO       GI:  Last Bowel Movement Date: 03/14/23  Current diet:  ADULT ORAL NUTRITION SUPPLEMENT Breakfast, Lunch, Dinner; Standard High Calorie/High Protein  ADULT DIET Regular; 1200 ml; Chocolate Ensure preferred. Send Ensure Compact if Ensure Plus is not available in chocolate. Passing flatus: YES  Tolerating current diet: YES       Pain Management:   Patient states pain is manageable on current regimen: YES    Skin:  Satish Score: 16  Interventions: PT/OT consult, limit briefs, and internal/external urinary devices    Patient Safety:  Fall Score:  Total Score: 4  Interventions: bed/chair alarm, assistive device (walker, cane, etc), pt to call before getting OOB, and stay with me (per policy)  High Fall Risk: Yes    Length of Stay:  Expected LOS: 3d 12h  Actual LOS: 7      Kwadwo Ott, RN

## 2023-03-17 NOTE — PROGRESS NOTES
NAME: Leora Vidal Sr.        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 No new issues. Chart reviewed. Check sodium tomorrow if still here. Awaiting snf    Review of Systems:    Symptom Y/N Comments  Symptom Y/N Comments   Fever/Chills    Chest Pain     Poor Appetite    Edema     Cough    Abdominal Pain     Sputum    Joint Pain     SOB/MARVIN    Pruritis/Rash     Nausea/vomit    Tolerating PT/OT     Diarrhea    Tolerating Diet     Constipation    Other       Could not obtain due to:      Objective:     VITALS:   Last 24hrs VS reviewed since prior progress note.  Most recent are:  Visit Vitals  /76   Pulse 85   Temp 98.1 °F (36.7 °C)   Resp 18   Ht 5' 10\" (1.778 m)   Wt 74.8 kg (165 lb)   SpO2 92%   BMI 23.68 kg/m²       Intake/Output Summary (Last 24 hours) at 3/17/2023 0516  Last data filed at 3/17/2023 0357  Gross per 24 hour   Intake 720 ml   Output 1400 ml   Net -680 ml        Telemetry Reviewed:     PHYSICAL EXAM:  General: NAD      Lab Data Reviewed: (see below)    Medications Reviewed: (see below)    PMH/SH reviewed - no change compared to H&P  ________________________________________________________________________  Care Plan discussed with:  Patient     Family      RN     Care Manager                    Consultant:          Comments   >50% of visit spent in counseling and coordination of care       ________________________________________________________________________  Glenn Montalvo Ayesha Sam MD     Procedures: see electronic medical records for all procedures/Xrays and details which  were not copied into this note but were reviewed prior to creation of Plan. LABS:  No results for input(s): WBC, HGB, HCT, PLT, HGBEXT, HCTEXT, PLTEXT, HGBEXT, HCTEXT, PLTEXT in the last 72 hours. Recent Labs     03/17/23  0314 03/15/23  0459   * 128*   K 4.5 4.3   CL 99 96*   CO2 26 27   * 75*   CREA 1.45* 1.29   * 105*   CA 9.1 9.6       No results for input(s): AP, TBIL, TP, ALB, GLOB, GGT, AML, LPSE in the last 72 hours. No lab exists for component: SGOT, GPT, AMYP, HLPSE  No results for input(s): INR, PTP, APTT, INREXT, INREXT in the last 72 hours. No results for input(s): FE, TIBC, PSAT, FERR in the last 72 hours. Lab Results   Component Value Date/Time    Folate 18.9 02/01/2023 10:11 AM        No results for input(s): PH, PCO2, PO2 in the last 72 hours. No results for input(s): CPK, CKMB in the last 72 hours.     No lab exists for component: TROPONINI  No components found for: [place]  Lab Results   Component Value Date/Time    Color YELLOW/STRAW 08/18/2022 06:11 PM    Appearance CLEAR 08/18/2022 06:11 PM    Specific gravity 1.012 08/18/2022 06:11 PM    pH (UA) 6.5 08/18/2022 06:11 PM    Protein Negative 08/18/2022 06:11 PM    Glucose Negative 08/18/2022 06:11 PM    Ketone Negative 08/18/2022 06:11 PM    Bilirubin Negative 08/18/2022 06:11 PM    Urobilinogen 1.0 08/18/2022 06:11 PM    Nitrites Negative 08/18/2022 06:11 PM    Leukocyte Esterase Negative 08/18/2022 06:11 PM    Epithelial cells FEW 08/18/2022 06:11 PM    Bacteria Negative 08/18/2022 06:11 PM    WBC 0-4 08/18/2022 06:11 PM    RBC 0-5 08/18/2022 06:11 PM       MEDICATIONS:  Current Facility-Administered Medications   Medication Dose Route Frequency    metoprolol succinate (TOPROL-XL) XL tablet 25 mg  25 mg Oral DAILY    OLANZapine (ZyPREXA) tablet 5 mg  5 mg Oral QHS    balsam peru-castor oiL (VENELEX) ointment Topical TID    carbamide peroxide (DEBROX) 6.5 % otic solution 5 Drop  5 Drop Both Ears BID    urea (URE-NA) 15 gram packet 1 Packet  1 Packet Oral BID    ondansetron (ZOFRAN) injection 4 mg  4 mg IntraVENous Q4H PRN    [Held by provider] amLODIPine (NORVASC) tablet 2.5 mg  2.5 mg Oral DAILY    acetaminophen (TYLENOL) tablet 500 mg  500 mg Oral Q6H PRN    clopidogreL (PLAVIX) tablet 75 mg  75 mg Oral DAILY    [Held by provider] losartan (COZAAR) tablet 100 mg  100 mg Oral DAILY    levothyroxine (SYNTHROID) tablet 100 mcg  100 mcg Oral ACB    apixaban (ELIQUIS) tablet 2.5 mg  2.5 mg Oral BID    cholecalciferol (VITAMIN D3) (1000 Units /25 mcg) tablet 2,000 Units  2,000 Units Oral QHS    melatonin tablet 3 mg  3 mg Oral QHS

## 2023-03-17 NOTE — PROGRESS NOTES
Transition of Care Plan:     RUR: 15% (moderate RUR)  Disposition:     [facility]  Back up plan: Home with [facility] (accepted)    If SNF or IPR: Date FOC offered: 3/14/2023  Date FOC received: 3/14/2023, verbally offered to patient and DTR. Date authorization started with reference number: N/A  Date authorization received and expires: N/A  Accepting facility: [place] [facility] [address], bed pending  Follow up appointments: PCP/specialists if needed. DME needed: None. Patient has a RW and cane at home. Transportation at Discharge: [facility] or means to access home:  Patient has access     IM Medicare Letter: 2nd IM needed prior to discharge  Is patient a [place] and connected with the [place]? No.              If yes, was [employer] transfer form completed and [facility] notified? N/A  Caregiver Contact: Cjgil Leighann - [phone]  Discharge Caregiver contacted prior to discharge? Patient to contact. Care Conference needed?:  No.      RICARDO received VM from [facility] this morning. She provided the following information: [facility] (admissions)  -Phone Number: [phone]  -Referrals/clinicals to be sent to:      -Fax Number: [phone] with attention to Karlee Fetch or     -Email: [email or web address]    VM left for Karlee Fetch on her phone number. Referral/clinicals faxed and sent via email. RICARDO spoke with patient's [facility], regarding  facility pending. Clarita Charles is agreeable to  providing MFA Liaison with her information to give her a call around 4pm to discuss other SNF options. MFA liaison notified to contact Clarita Charles around Farrah Sherrie provided more options: Avinash Diaz of [address] [place] [address] at [facility],  at [facility]. Referrals sent via [address] [place] [address],Ground Floor.     [facility] accepted in [facility] Critical access hospital,Ground Floor, [address] responeded via [facility] to determine if they have beds available. [facility] facility liaison phone numbers provided to Eddie (DTR).     SNF options provided by DTR so far:    [facility] (accepted, bed pending)  [facility] (pending)  [facility] (pending)  [facility] (pending)  [facility] (declined)  [facility] (declined)  [id number] (declined)  [facility] (declined)  [facility] (declined, this is a LTC facility)          Goldie Shoemaker, DELANEYN, RN    Care Management  [phone]

## 2023-03-17 NOTE — PROGRESS NOTES
Comprehensive Nutrition Assessment    Type and Reason for Visit: Initial, RD nutrition re-screen/LOS    Nutrition Recommendations/Plan:   Continue current diet and supplements  Please document % meals and supplements consumed in flowsheet I/O's under intake      Malnutrition Assessment:  Malnutrition Status: At risk for malnutrition (specify) (03/17/23 1347)        Nutrition Assessment:     Chart reviewed for LOS. Pt medically noted for hyponatremia, CKD, choreiform movement disorder, afib, HTN, hx CVA and prostate ca. Dysphagia noted on admission has resolved. MST negative for malnutrition risk factors PTA. Pt working on his lunch at time of RD visit. He reports his appetite has been consistently good PTA and here. Per EMR, he has been gradually losing weight over the last year. He is down 29lb (15%), not quite significant for the time frame. He drinks something similar to Ensure at home, and is enjoying the shakes TID here, so will continue as ordered. Continue to encourage intake of meals and supplements. Wt Readings from Last 10 Encounters:   03/09/23 74.8 kg (165 lb)   02/01/23 76.6 kg (168 lb 12.8 oz)   10/28/22 79.6 kg (175 lb 6.4 oz)   10/21/22 80 kg (176 lb 6.4 oz)   10/03/22 80.6 kg (177 lb 12.8 oz)   08/18/22 83 kg (183 lb)   08/18/22 83.2 kg (183 lb 6.4 oz)   04/07/22 88.1 kg (194 lb 3.2 oz)   10/08/21 92.9 kg (204 lb 12.8 oz)   07/14/21 89.4 kg (197 lb)   ]    Nutrition Related Findings:    Labs: Na 130, Cr 1.45. Meds: vit D3, synthroid, NaCl tablet. BM 3/14. Wound Type: None    Current Nutrition Intake & Therapies:  Average Meal Intake: 26-50%  Average Supplement Intake: 51-75%  ADULT ORAL NUTRITION SUPPLEMENT Breakfast, Lunch, Dinner; Standard High Calorie/High Protein  ADULT DIET Regular; 1200 ml; Chocolate Ensure preferred. Send Ensure Compact if Ensure Plus is not available in chocolate.     Anthropometric Measures:  Height: 5' 10\" (177.8 cm)  Ideal Body Weight (IBW): 166 lbs (75 kg) Current Body Wt:  74.8 kg (165 lb), 99.4 % IBW. Not specified  Current BMI (kg/m2): 23.7        Weight Adjustment: No adjustment                 BMI Category: Normal weight (BMI 22.0-24.9) age over 72    Estimated Daily Nutrient Needs:  Energy Requirements Based On: Formula  Weight Used for Energy Requirements: Current  Energy (kcal/day): 1900 kcals (BMR x 1. 3AF)  Weight Used for Protein Requirements: Current  Protein (g/day): 90g (1.2g/kg)  Method Used for Fluid Requirements: 1 ml/kcal  Fluid (ml/day): 1900mL    Nutrition Diagnosis:   Increased nutrient needs related to catabolic illness (choreiform movement disorder/constant movement) as evidenced by weight loss    Nutrition Interventions:   Food and/or Nutrient Delivery: Continue current diet, Continue oral nutrition supplement  Nutrition Education/Counseling: No recommendations at this time  Coordination of Nutrition Care: Continue to monitor while inpatient       Goals:     Goals: PO intake 75% or greater, by next RD assessment       Nutrition Monitoring and Evaluation:   Behavioral-Environmental Outcomes: None identified  Food/Nutrient Intake Outcomes: Food and nutrient intake, Supplement intake  Physical Signs/Symptoms Outcomes: Biochemical data, GI status, Nutrition focused physical findings, Weight    Discharge Planning:    Continue current diet, Continue oral nutrition supplement    Jesus Barboza RD  Contact: [phone]

## 2023-03-17 NOTE — PROGRESS NOTES
Hospitalist Progress Note    NAME: João Calzada Sr.   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 No acute abnormality in the neck. 2. Partially visualized 7 mm groundglass pulmonary nodule in the left upper  lobe. If prior imaging is available for comparison, consider follow-up chest CT  in 3-6 months. Checked urine lytes , sodium noted, urine osm 578  - Nephrology following  - fluid restricting  - stopped Judy Aparicio, started salt tabs  - holding Losartan  - IVF per Nephro  - hold on discharge    Dysphagia   SLP eval appreciated   -- \"Regular/Thin Liquid diet  -- Smaller pills, 1 at a time with thin liquid and larger pills with puree (per patient preference)  -- Strict oral care  -- Upright in bed\"      Choreiform movement disorder  Zyprexa increased to 5 qhs for   Patient follows with movement specialist as outpatient  Melaonin Qhs  Delirium precautions     History of A fib, currently in NSR  C/w eliquis  C/w Toprol XL     HTN  stopped amlodipine due to symptomatic orthostatic hypotension   Reduce toprol  - holding losartan with ALISSA     Hypothyroid  C/w synthroid     History of CVA  C/w plavix and eliquis     History of prostate cancer  Marcelo PSA labs at request of daughter. They will follow up with Urology    PT/OT >SNF    Code Status:  DNR  Surrogate Decision Maker: DaughterAddie Abdi    18.5 - 24.9 Normal weight / Body mass index is 23.68 kg/m². Estimated discharge date:     Code status: DNR  Prophylaxis: Eliquis  Recommended Disposition: SNF  Barriers: ALISSA improvement, placement     Subjective:     Chief Complaint / Reason for Physician Visit  Patient feels ok today, has no complaints. Review of Systems:  Full 14 point ROS assessed and neg except as noted above    Objective:     VITALS:   Last 24hrs VS reviewed since prior progress note.  Most recent are:  Patient Vitals for the past 24 hrs:   Temp Pulse Resp BP SpO2 03/17/23 0941 -- 70 -- (!) 104/48 --   03/17/23 0800 -- 73 -- (!) 89/68 96 %   03/17/23 0728 -- 70 -- -- 96 %   03/17/23 0322 98.1 °F (36.7 °C) 85 18 107/76 92 %   03/16/23 2352 -- 75 -- (!) 88/53 95 %   03/16/23 2237 98 °F (36.7 °C) 82 18 96/60 94 %   03/16/23 2000 98.1 °F (36.7 °C) 78 18 99/65 96 %   03/16/23 1600 97.8 °F (36.6 °C) 74 18 92/80 93 %   03/16/23 1510 -- 68 -- (!) 77/65 97 %   03/16/23 1505 -- 64 -- 94/67 94 %         Intake/Output Summary (Last 24 hours) at 3/17/2023 1118  Last data filed at 3/17/2023 2783  Gross per 24 hour   Intake 350 ml   Output 1000 ml   Net -650 ml        I had a face to face encounter and independently examined this patient on 3/17/2023, as outlined below:  PHYSICAL EXAM:  General: WD, WN. Alert, cooperative, no acute distress    EENT:  EOMI. Anicteric sclerae. MMM  Resp:  CTA bilaterally, no wheezing or rales. No accessory muscle use  CV:  Regular  rhythm,  No edema  GI:  Soft, Non distended, Non tender. +Bowel sounds  Neurologic:  Alert and oriented X 3, normal speech, +ve choreform movements  Psych:   Good insight. Not anxious nor agitated  Skin:  No rashes. No jaundice    Reviewed most current lab test results and cultures  YES  Reviewed most current radiology test results   YES  Review and summation of old records today    NO  Reviewed patient's current orders and MAR    YES  PMH/SH reviewed - no change compared to H&P  ________________________________________________________________________  Care Plan discussed with:    Comments   Patient y    Family      RN y    Care Manager     Consultant                       y Multidiciplinary team rounds were held today with , nursing, pharmacist and clinical coordinator. Patient's plan of care was discussed; medications were reviewed and discharge planning was addressed.      ________________________________________________________________________    Procedures: see electronic medical records for all procedures/Xrays and details which were not copied into this note but were reviewed prior to creation of Plan. LABS:  I reviewed today's most current labs and imaging studies.   Pertinent labs include:  Recent Labs     03/17/23  0952   WBC 9.1   HGB 13.4   HCT 39.7          Recent Labs     03/17/23  0314 03/15/23  0459   * 128*   K 4.5 4.3   CL 99 96*   CO2 26 27   * 105*   * 75*   CREA 1.45* 1.29   CA 9.1 9.6         Signed: Raquel Yeager MD

## 2023-03-17 NOTE — PROGRESS NOTES
Problem: Self Care Deficits Care Plan (Adult)  Goal: *Acute Goals and Plan of Care (Insert Text)  Description:     FUNCTIONAL STATUS PRIOR TO ADMISSION: Patient ambulates with a rollator, and is independent to mod I for ADLs. HOME SUPPORT: The patient lived with daughter but did not require assist. Patient bathed only when family is home. Daughter works during the day     Occupational Therapy Goals  Initiated 3/11/2023, Weekly re-assessment 3/17/2023-continue all  1. Patient will perform grooming standing at sink with supervision/setup within 7 day(s). 2.  Patient will perform upper body dressing with supervision/set-up within 7 day(s). 3.  Patient will perform lower body dressing with supervision/set-up within 7 day(s). 4.  Patient will perform toilet transfers with supervision/set-up within 7 day(s). 5.  Patient will perform all aspects of toileting with supervision/set-up within 7 day(s). 6.  Patient will perform sponge bathing with supervision/set-up within 7 day(s). Outcome: Not Progressing Towards Goal    OCCUPATIONAL THERAPY TREATMENT/WEEKLY RE-EVALUATION  Patient: Leora Vidal Sr. (81 y.o. male)  Date: 3/17/2023  Diagnosis: Hyponatremia [E87.1] <principal problem not specified>      Precautions: Fall, DNR  Chart, occupational therapy assessment, plan of care, and goals were reviewed. ASSESSMENT  Based on the objective data described below, he continues to have low blood pressure and low on arrival. Participated in bed level ADL's and rolling. Unable to transfer to sitting at this time. Educated on bed level exercises. Further limited by his chorea like movements which appeared to be worse on arrival and improve throughout tx.      Current Level of Function Impacting Discharge (ADLs): unable to tolerate upright activity due to low blood pressure at rest, RN reported meds discontinued    Other factors to consider for discharge:          PLAN :  Goals have been updated based on progression since last assessment. Patient continues to benefit from skilled intervention to address the above impairments. Continue to follow patient 3 times a week to address goals. Recommend with staff: monitor blood pressure, increase activity as tolerated    Recommend next OT session: as tolerated per goals    Recommendation for discharge: (in order for the patient to meet his/her long term goals)  Therapy up to 5 days/week in SNF setting    This discharge recommendation:  Has been made in collaboration with the attending provider and/or case management    Equipment recommendations for successful discharge (if) home:        SUBJECTIVE:   Patient stated I have a sore bottom.     OBJECTIVE DATA SUMMARY:   Cognitive/Behavioral Status:  Neurologic State: Alert        Perception: Appears intact  Perseveration: No perseveration noted  Safety/Judgement: Awareness of environment; Fall prevention; Insight into deficits    Functional Mobility and Transfers for ADLs:  Bed Mobility:  Rolling: Minimum assistance  Scooting: Minimum assistance    Transfers:             Balance:  Sitting:  (unabel to tolerate due to low blood pressure)    ADL Intervention:    Blood pressure:  77/55 on arrival  82/58 with bed level activity, rolling UE dressing, LE dressing  95/62 after tx in supine, knee high teds in place and instructed in ankle pumps    Positioning: Educated on positioning in supine and sidelying to facilitate neutral alignment and increased comfort, decreased sacral pressure due to complaint of sore bottom    Instructed to perform bilateral ankle pumps throughout day, min assist to initiate, completed 20 reps    Re-educated on use of call bell     Feeding  Feeding Assistance: Total assistance (dependent)    Grooming  Grooming Assistance:  Moderate assistance  Position Performed:  (supine, head of bed elevated)  Washing Face: Minimum assistance  Washing Hands: Minimum assistance       Upper Body Dressing Assistance  Dressing Assistance: Maximum assistance (in supine)  Hospital Gown: Maximum assistance    Lower Body Dressing Assistance  Dressing Assistance: Total assistance(dependent)  Antiembolitic Stockings: Total assistance (dependent) (educated on purpose)  Leg Crossed Method Used: No  Position Performed: Supine    Toileting  Toileting Assistance: Maximum assistance; Total assistance(dependent) (bed level)  Bladder Hygiene: Total assistance (dependent) (man wick in place)  Bowel Hygiene: Maximum assistance      Cognitive Retraining  Safety/Judgement: Awareness of environment; Fall prevention; Insight into deficits    Pain:  Complaint of sacral pain    Activity Tolerance:   Poor and signs and symptoms of orthostatic hypotension, on arrival    After treatment patient left in no apparent distress:   Supine in bed, Call bell within reach, and Side rails x 3    COMMUNICATION/COLLABORATION:   The patients plan of care was discussed with: Physical Therapist and Registered Nurse    MIHAI Coelho/L  Time Calculation: 24 mins

## 2023-03-17 NOTE — PROGRESS NOTES
Problem: Mobility Impaired (Adult and Pediatric)  Goal: *Acute Goals and Plan of Care (Insert Text)  Description: FUNCTIONAL STATUS PRIOR TO ADMISSION: Patient was modified independent using a rollator for functional mobility. HOME SUPPORT PRIOR TO ADMISSION: The patient lived with daughter but did not require assist. Patient bathed only when family is home. Daughter works during the day    Physical Therapy Goals  Revised 3/17/2023  1. Patient will move from supine to sit and sit to supine , scoot up and down, and roll side to side in bed with supervision/set-up within 7 day(s). 2.  Patient will transfer from bed to chair and chair to bed with minimal assistance/contact guard assist using the least restrictive device within 7 day(s). 3.  Patient will perform sit to stand with minimal assistance/contact guard assist within 7 day(s). 4.  Patient will ambulate with minimal assistance/contact guard assist for 10 feet with the least restrictive device within 7 day(s). Physical Therapy Goals  Initiated 3/10/2023  1. Patient will move from supine to sit and sit to supine , scoot up and down, and roll side to side in bed with independence within 7 day(s). 2.  Patient will transfer from bed to chair and chair to bed with supervision/set-up using the least restrictive device within 7 day(s). 3.  Patient will perform sit to stand with supervision/set-up within 7 day(s). 4.  Patient will ambulate with supervision/set-up for 100 feet with the least restrictive device within 7 day(s). Outcome: Not Met     PHYSICAL THERAPY TREATMENT: WEEKLY REASSESSMENT  Patient: Wil Blackman Sr. (81 y.o. male)  Date: 3/17/2023  Primary Diagnosis: Hyponatremia [E87.1]       Precautions:   Fall, DNR ; Bed Alarm      ASSESSMENT  Patient continues with skilled PT services and is very slowly progressing towards goals with medical issue of symptomatic orthostatic hypotension interfering with ability to get OOB at this time.   TONY hose donned today via OT (discussed with nurse who reported there was an order). He continues with chorea-like movements of UE's, LE's, head/neck today. .. limits purposeful ROM and movement. Provided education concerning side-lying positioning to pt and nurse as pt with red buttock/sacral area and he reports soreness. He needs 24/7 care at this time. Continue to follow. Patient Vitals for the past 4 hrs:   Pulse BP   03/17/23 1015 66 (!) 82/58   03/17/23 1012 -- 95/62   03/17/23 1010 -- (!) 77/54   03/17/23 0941 70 (!) 104/48          Patient's progression toward goals since last assessment: limited due to medical deficits    Current Level of Function Impacting Discharge (mobility/balance): bed mob. Min. assist    Functional Outcome Measure: The patient scored 12/24 on the AM-PAC outcome measure. Other factors to consider for discharge: pt with symptomatic orthostatic hypotension ; TONY hose donned today         PLAN :  Goals have been updated based on progression since last assessment. Patient continues to benefit from skilled intervention to address the above impairments. Recommendations and Planned Interventions: bed mobility training, transfer training, gait training, therapeutic exercises, patient and family training/education, and therapeutic activities      Frequency/Duration: Patient will be followed by physical therapy:  3 times a week to address goals. Recommendation for discharge: (in order for the patient to meet his/her long term goals)  Therapy up to 5 days/week in SNF setting    This discharge recommendation:  Has been made in collaboration with the attending provider and/or case management    IF patient discharges home will need the following DME: to be determined (TBD) (defer to facility. .. pt has been unable to get OOB due to orthostatic hypotension)         SUBJECTIVE:   Patient stated I have a sore bottom. 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 Calcium and PTH monitored by clifton Chandler.  Levels normalized after surgery. Prostate cancer ([address]     seeds, then XRT, then seed again.   Dr. Yaron Salazar    Spinal stenosis     Thyroid cancer [facility]) Jan 2015     Past Surgical History:   Procedure Laterality Date    COLONOSCOPY N/A 9/17/2018    COLONOSCOPY performed by Hudson Earl MD at [facility] AMBULATORY OR    COLONOSCOPY N/A 2/10/2021    COLONOSCOPY performed by Ursula Boeck, MD at [facility] ENDOSCOPY    HX APPENDECTOMY      HX CHOLECYSTECTOMY      HX HEART CATHETERIZATION      No Stents-  Dr. Abel Sommer      vasectomy    HX PARATHYROIDECTOMY  01/14/2015    right superior parathyroid gland removed and left superior parathyroid gland removed    HX PARTIAL THYROIDECTOMY  1/14/15    right lobectomy    HX TONSILLECTOMY      HX UROLOGICAL      Seeds for prostate cancer , 4 dialations     HX UROLOGICAL  1/14/15    BLADDER NECK INCISION, Cold knife incision of bladder neck contracture, cystoscopy       Personal factors and/or comorbidities impacting plan of care:     Home Situation  Home Environment: Private residence  # Steps to Enter: 1 (threshold steps)  Wheelchair Ramp: No  One/Two Story Residence: One story  Living Alone: No (daughter works during day)  Jagdish Mustafa: Child(handy)  Patient Expects to be Discharged to[de-identified] Skilled nursing facility  Current DME Used/Available at Home: [address], [place] [address], [address] [facility] chair[facility]isa bars  Tub or Shower Type: Shower    EXAMINATION/PRESENTATION/DECISION MAKING:   Critical Behavior:  Neurologic State: Alert  Orientation Level: Oriented to person, Oriented to place, Oriented to situation  Cognition: Follows commands, Impaired decision making, Poor safety awareness  Safety/Judgement: Decreased insight into deficits, Decreased awareness of need for safety  Hearing: Auditory  Auditory Impairment: None  Skin:  red sacral/buttock area ; rash on back (nurse present to observe both)  Edema: none  Range Of Motion:  AROM: Generally decreased, functional           PROM: Generally decreased, functional           Strength:    Strength: Generally decreased, functional                    Tone & Sensation:   Tone: Abnormal (chorea-like movements)    Coordination:  Coordination: Generally decreased, functional    Functional Mobility:  Bed Mobility:  Rolling: Minimum assistance         Functional Measure:  [address] [facility] [address] [address] AM-PAC®      Basic Mobility Inpatient Short Form (6-Clicks) Version 2  How much HELP from another person do you currently need. .. (If the patient hasn't done an activity recently, how much help from another person do you think they would need if they tried?) Total A Lot A Little None   1. Turning from your back to your side while in a flat bed without using bedrails? []  1 []  2 [x]  3  []  4   2. Moving from lying on your back to sitting on the side of a flat bed without using bedrails? []  1 []  2 [x]  3  []  4   3. Moving to and from a bed to a chair (including a wheelchair)? []  1 [x]  2 []  3  []  4   4. Standing up from a chair using your arms (e.g. wheelchair or bedside chair)? []  1 [x]  2 []  3  []  4   5. Walking in hospital room? [x]  1 []  2 []  3  []  4   6. Climbing 3-5 steps with a railing? [x]  1 []  2 []  3  []  4     Raw Score: 12/24                            Cutoff score ?171,2,3 had higher odds of discharging home with home health or need of SNF/IPR.     1. Idalmis MÁRQUEZ Jose Antonio Vega. Validity of the AM-PAC 6-Clicks Inpatient Daily Activity and Basic Mobility Short Forms. Physical Therapy Mar 2014, [phone]) [phone]; DOI: 10.2522/ptj.32811038  2. Lydia Jackman. Association of AM-PAC \"6-Clicks\" Basic Mobility and Daily Activity Scores With Discharge Destination. Phys Ther. 2021 Apr 4;101(4):sewq713. doi: 10.1093/ptj/uyes844. PMID: 55497491. V Bijan Brumfield, Jaxon D, Marjorie Rizzo, Sergio K, Dona S. Activity Measure for Post-Acute Care \"6-Clicks\" Basic Mobility Scores Predict Discharge Destination After Acute Care Hospitalization in Select Patient Groups: A Retrospective, Observational Study. Arch Rehabil Res Clin Transl. 2022 Jul 16;4(3):934041. doi: 10.1016/j.arrct. 4923.362847. PMID: 97421440; PMCID: [id number]. 4. Olegario Campos, Sangita MASON, Alicia JONES. AM-PAC Short Forms Manual 4.0. Revised 2/2020. Pain Rating:  Reports \"soreness\" buttock/sacral area (red skin)    Activity Tolerance:   Fair and signs and symptoms of orthostatic hypotension    After treatment patient left in no apparent distress:   Patient positioned in left sidelying for pressure relief, Call bell within reach, Bed / chair alarm activated, Side rails x 3, and nurse notified     COMMUNICATION/EDUCATION:   The patients plan of care was discussed with: Occupational therapist and Registered nurse. Fall prevention education was provided and the patient/caregiver indicated understanding.     Thank you for this referral.  Perla Mendoza, PT, DPT   Time Calculation: 19 mins

## 2023-03-17 NOTE — PROGRESS NOTES
Problem: Aspiration - Risk of  Goal: *Absence of aspiration  Outcome: Progressing Towards Goal     Problem: Patient Education: Go to Patient Education Activity  Goal: Patient/Family Education  Outcome: Progressing Towards Goal     Problem: Pressure Injury - Risk of  Goal: *Prevention of pressure injury  Description: Document Satish Scale and appropriate interventions in the flowsheet. Outcome: Progressing Towards Goal  Note: Pressure Injury Interventions:  Sensory Interventions: Assess changes in LOC, Assess need for specialty bed, Discuss PT/OT consult with provider, Float heels, Keep linens dry and wrinkle-free, Maintain/enhance activity level    Moisture Interventions: Absorbent underpads, Apply protective barrier, creams and emollients, Internal/External urinary devices, Limit adult briefs    Activity Interventions: Assess need for specialty bed, PT/OT evaluation, Pressure redistribution bed/mattress(bed type)    Mobility Interventions: Pressure redistribution bed/mattress (bed type), PT/OT evaluation, Float heels    Nutrition Interventions: Document food/fluid/supplement intake    Friction and Shear Interventions: Apply protective barrier, creams and emollients, Feet elevated on foot rest, HOB 30 degrees or less, Lift team/patient mobility team                Problem: Patient Education: Go to Patient Education Activity  Goal: Patient/Family Education  Outcome: Progressing Towards Goal     Problem: Patient Education: Go to Patient Education Activity  Goal: Patient/Family Education  Outcome: Progressing Towards Goal     Problem: Falls - Risk of  Goal: *Absence of Falls  Description: Document Clover Fall Risk and appropriate interventions in the flowsheet.   Outcome: Progressing Towards Goal  Note: Fall Risk Interventions:  Mobility Interventions: Bed/chair exit alarm, Communicate number of staff needed for ambulation/transfer, Patient to call before getting OOB, Strengthening exercises (ROM-active/passive), PT Consult for mobility concerns    Mentation Interventions: Bed/chair exit alarm, Reorient patient, Room close to nurse's station, Toileting rounds, Update white board    Medication Interventions: Assess postural VS orthostatic hypotension, Evaluate medications/consider consulting pharmacy, Bed/chair exit alarm, Patient to call before getting OOB    Elimination Interventions: Bed/chair exit alarm, Call light in reach, Patient to call for help with toileting needs, Stay With Me (per policy)              Problem: Patient Education: Go to Patient Education Activity  Goal: Patient/Family Education  Outcome: Progressing Towards Goal     Problem: Patient Education: Go to Patient Education Activity  Goal: Patient/Family Education  Outcome: Progressing Towards Goal

## 2023-03-18 LAB
ANION GAP SERPL CALC-SCNC: 7 MMOL/L (ref 5–15)
BUN SERPL-MCNC: 89 MG/DL (ref 6–20)
BUN/CREAT SERPL: 67 (ref 12–20)
CALCIUM SERPL-MCNC: 9.2 MG/DL (ref 8.5–10.1)
CHLORIDE SERPL-SCNC: 104 MMOL/L (ref 97–108)
CO2 SERPL-SCNC: 25 MMOL/L (ref 21–32)
CREAT SERPL-MCNC: 1.32 MG/DL (ref 0.7–1.3)
ERYTHROCYTE [DISTWIDTH] IN BLOOD BY AUTOMATED COUNT: 13.3 % (ref 11.5–14.5)
GLUCOSE SERPL-MCNC: 84 MG/DL (ref 65–100)
HCT VFR BLD AUTO: 39.2 % (ref 36.6–50.3)
HGB BLD-MCNC: 12.8 G/DL (ref 12.1–17)
MAGNESIUM SERPL-MCNC: 2.2 MG/DL (ref 1.6–2.4)
MCH RBC QN AUTO: 31.6 PG (ref 26–34)
MCHC RBC AUTO-ENTMCNC: 32.7 G/DL (ref 30–36.5)
MCV RBC AUTO: 96.8 FL (ref 80–99)
NRBC # BLD: 0 K/UL (ref 0–0.01)
NRBC BLD-RTO: 0 PER 100 WBC
PHOSPHATE SERPL-MCNC: 3.6 MG/DL (ref 2.6–4.7)
PLATELET # BLD AUTO: 335 K/UL (ref 150–400)
PMV BLD AUTO: 9.3 FL (ref 8.9–12.9)
POTASSIUM SERPL-SCNC: 4.6 MMOL/L (ref 3.5–5.1)
RBC # BLD AUTO: 4.05 M/UL (ref 4.1–5.7)
SODIUM SERPL-SCNC: 136 MMOL/L (ref 136–145)
WBC # BLD AUTO: 8.1 K/UL (ref 4.1–11.1)

## 2023-03-18 PROCEDURE — 85027 COMPLETE CBC AUTOMATED: CPT

## 2023-03-18 PROCEDURE — 74011250637 HC RX REV CODE- 250/637: Performed by: INTERNAL MEDICINE

## 2023-03-18 PROCEDURE — 83735 ASSAY OF MAGNESIUM: CPT

## 2023-03-18 PROCEDURE — 36415 COLL VENOUS BLD VENIPUNCTURE: CPT

## 2023-03-18 PROCEDURE — 65270000046 HC RM TELEMETRY

## 2023-03-18 PROCEDURE — 80048 BASIC METABOLIC PNL TOTAL CA: CPT

## 2023-03-18 PROCEDURE — 74011250637 HC RX REV CODE- 250/637: Performed by: STUDENT IN AN ORGANIZED HEALTH CARE EDUCATION/TRAINING PROGRAM

## 2023-03-18 PROCEDURE — 84100 ASSAY OF PHOSPHORUS: CPT

## 2023-03-18 RX ADMIN — Medication 5 DROP: at 08:21

## 2023-03-18 RX ADMIN — Medication 1000 MG: at 17:17

## 2023-03-18 RX ADMIN — APIXABAN 2.5 MG: 2.5 TABLET, FILM COATED ORAL at 08:21

## 2023-03-18 RX ADMIN — OLANZAPINE 5 MG: 5 TABLET, FILM COATED ORAL at 22:11

## 2023-03-18 RX ADMIN — CASTOR OIL AND BALSAM, PERU: 788; 87 OINTMENT TOPICAL at 08:21

## 2023-03-18 RX ADMIN — CLOPIDOGREL BISULFATE 75 MG: 75 TABLET ORAL at 08:21

## 2023-03-18 RX ADMIN — LEVOTHYROXINE SODIUM 100 MCG: 0.1 TABLET ORAL at 08:21

## 2023-03-18 RX ADMIN — CHOLECALCIFEROL TAB 25 MCG (1000 UNIT) 2000 UNITS: 25 TAB at 22:11

## 2023-03-18 RX ADMIN — Medication 1000 MG: at 08:21

## 2023-03-18 RX ADMIN — CASTOR OIL AND BALSAM, PERU: 788; 87 OINTMENT TOPICAL at 22:13

## 2023-03-18 RX ADMIN — Medication 5 DROP: at 17:17

## 2023-03-18 RX ADMIN — METOPROLOL SUCCINATE 25 MG: 25 TABLET, EXTENDED RELEASE ORAL at 08:21

## 2023-03-18 RX ADMIN — APIXABAN 2.5 MG: 2.5 TABLET, FILM COATED ORAL at 17:17

## 2023-03-18 RX ADMIN — CASTOR OIL AND BALSAM, PERU: 788; 87 OINTMENT TOPICAL at 17:17

## 2023-03-18 RX ADMIN — MELATONIN 3 MG: at 22:11

## 2023-03-18 NOTE — PROGRESS NOTES
Transition of Care Plan:     RUR: 14 (moderate RUR)  Disposition:      SNF referrals pending - [facility], bed pending  Back up plan: Home with [facility] (accepted)     If SNF or IPR: Date FOC offered: 3/14/2023  Date FOC received: 3/14/2023, verbally offered to patient and DTR. Date authorization started with reference number: N/A  Date authorization received and expires: N/A  Accepting facility: [place] [address], bed pending  Follow up appointments: PCP/specialists if needed. DME needed: None. Patient has a RW and cane at home. Transportation at Discharge: [facility] or means to access home:  Patient has access     IM Medicare Letter: 2nd IM needed prior to discharge  Is patient a  and connected with the [facility]? No.              If yes, was [employer] transfer form completed and [facility] notified? N/A  Caregiver Contact: Som De La Cruz - [phone]  Discharge Caregiver contacted prior to discharge? Patient to contact. Care Conference needed?:  No.     10:10am- CM [facility]. No further CM needs identified. CM notified pt's nurse of d/cDee Quinteros [address]  [phone]

## 2023-03-18 NOTE — PROGRESS NOTES
Hospitalist Progress Note    NAME: Angie Woods Sr.   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 No acute abnormality in the neck. 2. Partially visualized 7 mm groundglass pulmonary nodule in the left upper  lobe. If prior imaging is available for comparison, consider follow-up chest CT  in 3-6 months. Checked urine lytes , sodium noted, urine osm 578  - Nephrology following  - fluid restricting  - stopped Sundar Mg, started salt tabs  - holding Losartan  - IVF per Nephro    Dysphagia   SLP eval appreciated   -- \"Regular/Thin Liquid diet  -- Smaller pills, 1 at a time with thin liquid and larger pills with puree (per patient preference)  -- Strict oral care  -- Upright in bed\"      Choreiform movement disorder  Zyprexa increased to 5 qhs for   Patient follows with movement specialist as outpatient  Melaonin Qhs  Delirium precautions     History of A fib, currently in NSR  C/w eliquis  C/w Toprol XL     HTN  stopped amlodipine due to symptomatic orthostatic hypotension   Reduce toprol  - holding losartan with ALISSA     Hypothyroid  C/w synthroid     History of CVA  C/w plavix and eliquis     History of prostate cancer  Marcelo PSA labs at request of daughter. They will follow up with Urology    PT/OT >SNF    Code Status:  DNR  Surrogate Decision Maker: DaughterAddie Abdi    18.5 - 24.9 Normal weight / Body mass index is 23.68 kg/m². Estimated discharge date:     Code status: DNR  Prophylaxis: Eliquis  Recommended Disposition: SNF  Barriers: placement     Subjective:     Chief Complaint / Reason for Physician Visit  No complaints today. Review of Systems:  Full 14 point ROS assessed and neg except as noted above    Objective:     VITALS:   Last 24hrs VS reviewed since prior progress note.  Most recent are:  Patient Vitals for the past 24 hrs:   Temp Pulse Resp BP SpO2   23 1530 98 °F (36.7 °C) 82 -- 105/82 -- 03/18/23 1052 98 °F (36.7 °C) 67 -- (!) 125/90 96 %   03/18/23 0800 97.6 °F (36.4 °C) 70 -- 122/68 93 %   03/18/23 0349 97.5 °F (36.4 °C) 65 18 129/65 97 %   03/18/23 0008 97.3 °F (36.3 °C) 70 18 101/60 95 %   03/17/23 2002 97.9 °F (36.6 °C) 71 16 113/66 100 %   03/17/23 1640 -- 73 16 (!) 92/53 97 %   03/17/23 1638 98 °F (36.7 °C) 69 14 (!) 91/53 97 %         Intake/Output Summary (Last 24 hours) at 3/18/2023 1615  Last data filed at 3/18/2023 0408  Gross per 24 hour   Intake 200 ml   Output 1600 ml   Net -1400 ml        I had a face to face encounter and independently examined this patient on 3/18/2023, as outlined below:  PHYSICAL EXAM:  General: WD, WN. Alert, cooperative, no acute distress    EENT:  EOMI. Anicteric sclerae. MMM  Resp:  CTA bilaterally, no wheezing or rales. No accessory muscle use  CV:  Regular  rhythm,  No edema  GI:  Soft, Non distended, Non tender. +Bowel sounds  Neurologic:  Alert and oriented X 3, normal speech, +ve choreform movements  Psych:   Good insight. Not anxious nor agitated  Skin:  No rashes. No jaundice    Reviewed most current lab test results and cultures  YES  Reviewed most current radiology test results   YES  Review and summation of old records today    NO  Reviewed patient's current orders and MAR    YES  PMH/SH reviewed - no change compared to H&P  ________________________________________________________________________  Care Plan discussed with:    Comments   Patient y    Family      RN y    Care Manager     Consultant                        Multidiciplinary team rounds were held today with , nursing, pharmacist and clinical coordinator. Patient's plan of care was discussed; medications were reviewed and discharge planning was addressed.      ________________________________________________________________________    Procedures: see electronic medical records for all procedures/Xrays and details which were not copied into this note but were reviewed prior to creation of Plan. LABS:  I reviewed today's most current labs and imaging studies.   Pertinent labs include:  Recent Labs     03/18/23  0400 03/17/23  0952   WBC 8.1 9.1   HGB 12.8 13.4   HCT 39.2 39.7    360       Recent Labs     03/18/23  0400 03/17/23  0314    130*   K 4.6 4.5    99   CO2 25 26   GLU 84 114*   BUN 89* 110*   CREA 1.32* 1.45*   CA 9.2 9.1   MG 2.2  --    PHOS 3.6  --          Signed: Terry Morton MD

## 2023-03-18 NOTE — PROGRESS NOTES
NAME: Luan Howard Sr.        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 BP better    Strict I/os    Awaiting snf-> stable from renal standpoint             Subjective:     Chief Complaint: in bed. No new issues. Case discussed with RN. Chart reviewed. Review of Systems:    Symptom Y/N Comments  Symptom Y/N Comments   Fever/Chills    Chest Pain     Poor Appetite    Edema     Cough    Abdominal Pain     Sputum    Joint Pain     SOB/MARVIN    Pruritis/Rash     Nausea/vomit    Tolerating PT/OT     Diarrhea    Tolerating Diet     Constipation    Other       Could not obtain due to:      Objective:     VITALS:   Last 24hrs VS reviewed since prior progress note.  Most recent are:  Visit Vitals  /65   Pulse 65   Temp 97.5 °F (36.4 °C)   Resp 18   Ht 5' 10\" (1.778 m)   Wt 74.8 kg (165 lb)   SpO2 97%   BMI 23.68 kg/m²       Intake/Output Summary (Last 24 hours) at 3/18/2023 0803  Last data filed at 3/18/2023 0408  Gross per 24 hour   Intake 200 ml   Output 1600 ml   Net -1400 ml        Telemetry Reviewed:     PHYSICAL EXAM:  General: NAD      Lab Data Reviewed: (see below)    Medications Reviewed: (see below)    PMH/SH reviewed - no change compared to H&P  ________________________________________________________________________  Care Plan discussed with:  Patient     Family      RN     Care Manager                    Consultant:          Comments   >50% of visit spent in counseling and coordination of care       ________________________________________________________________________  Rory Faria MD     Procedures: see electronic medical records for all procedures/Xrays and details which  were not copied into this note but were reviewed prior to creation of Plan. LABS:  Recent Labs     03/18/23  0400 03/17/23  0952   WBC 8.1 9.1   HGB 12.8 13.4   HCT 39.2 39.7    360       Recent Labs     03/18/23  0400 03/17/23  0314    130*   K 4.6 4.5    99   CO2 25 26   BUN 89* 110*   CREA 1.32* 1.45*   GLU 84 114*   CA 9.2 9.1   MG 2.2  --    PHOS 3.6  --        No results for input(s): AP, TBIL, TP, ALB, GLOB, GGT, AML, LPSE in the last 72 hours. No lab exists for component: SGOT, GPT, AMYP, HLPSE  No results for input(s): INR, PTP, APTT, INREXT, INREXT in the last 72 hours. No results for input(s): FE, TIBC, PSAT, FERR in the last 72 hours. Lab Results   Component Value Date/Time    Folate 18.9 02/01/2023 10:11 AM        No results for input(s): PH, PCO2, PO2 in the last 72 hours. No results for input(s): CPK, CKMB in the last 72 hours.     No lab exists for component: TROPONINI  No components found for: [place]  Lab Results   Component Value Date/Time    Color YELLOW/STRAW 08/18/2022 06:11 PM    Appearance CLEAR 08/18/2022 06:11 PM    Specific gravity 1.012 08/18/2022 06:11 PM    pH (UA) 6.5 08/18/2022 06:11 PM    Protein Negative 08/18/2022 06:11 PM    Glucose Negative 08/18/2022 06:11 PM    Ketone Negative 08/18/2022 06:11 PM    Bilirubin Negative 08/18/2022 06:11 PM    Urobilinogen 1.0 08/18/2022 06:11 PM    Nitrites Negative 08/18/2022 06:11 PM    Leukocyte Esterase Negative 08/18/2022 06:11 PM    Epithelial cells FEW 08/18/2022 06:11 PM    Bacteria Negative 08/18/2022 06:11 PM    WBC 0-4 08/18/2022 06:11 PM    RBC 0-5 08/18/2022 06:11 PM       MEDICATIONS:  Current Facility-Administered Medications   Medication Dose Route Frequency    sodium chloride soluble tablet 1,000 mg  1 g Oral BID    metoprolol succinate (TOPROL-XL) XL tablet 25 mg  25 mg Oral DAILY    OLANZapine (ZyPREXA) tablet 5 mg  5 mg Oral QHS    balsam peru-castor oiL (VENELEX) ointment   Topical TID    carbamide peroxide (DEBROX) 6.5 % otic solution 5 Drop  5 Drop Both Ears BID    ondansetron (ZOFRAN) injection 4 mg  4 mg IntraVENous Q4H PRN    acetaminophen (TYLENOL) tablet 500 mg  500 mg Oral Q6H PRN    clopidogreL (PLAVIX) tablet 75 mg  75 mg Oral DAILY    [Held by provider] losartan (COZAAR) tablet 100 mg  100 mg Oral DAILY    levothyroxine (SYNTHROID) tablet 100 mcg  100 mcg Oral ACB    apixaban (ELIQUIS) tablet 2.5 mg  2.5 mg Oral BID    cholecalciferol (VITAMIN D3) (1000 Units /25 mcg) tablet 2,000 Units  2,000 Units Oral QHS    melatonin tablet 3 mg  3 mg Oral QHS

## 2023-03-19 LAB
ANION GAP SERPL CALC-SCNC: 1 MMOL/L (ref 5–15)
BUN SERPL-MCNC: 62 MG/DL (ref 6–20)
BUN/CREAT SERPL: 56 (ref 12–20)
CALCIUM SERPL-MCNC: 8.8 MG/DL (ref 8.5–10.1)
CHLORIDE SERPL-SCNC: 106 MMOL/L (ref 97–108)
CO2 SERPL-SCNC: 28 MMOL/L (ref 21–32)
CREAT SERPL-MCNC: 1.1 MG/DL (ref 0.7–1.3)
ERYTHROCYTE [DISTWIDTH] IN BLOOD BY AUTOMATED COUNT: 13.4 % (ref 11.5–14.5)
GLUCOSE SERPL-MCNC: 95 MG/DL (ref 65–100)
HCT VFR BLD AUTO: 36.9 % (ref 36.6–50.3)
HGB BLD-MCNC: 12.3 G/DL (ref 12.1–17)
MAGNESIUM SERPL-MCNC: 2 MG/DL (ref 1.6–2.4)
MCH RBC QN AUTO: 31.7 PG (ref 26–34)
MCHC RBC AUTO-ENTMCNC: 33.3 G/DL (ref 30–36.5)
MCV RBC AUTO: 95.1 FL (ref 80–99)
NRBC # BLD: 0 K/UL (ref 0–0.01)
NRBC BLD-RTO: 0 PER 100 WBC
PHOSPHATE SERPL-MCNC: 2.9 MG/DL (ref 2.6–4.7)
PLATELET # BLD AUTO: 311 K/UL (ref 150–400)
PMV BLD AUTO: 8.9 FL (ref 8.9–12.9)
POTASSIUM SERPL-SCNC: 4.4 MMOL/L (ref 3.5–5.1)
RBC # BLD AUTO: 3.88 M/UL (ref 4.1–5.7)
SODIUM SERPL-SCNC: 135 MMOL/L (ref 136–145)
WBC # BLD AUTO: 7.4 K/UL (ref 4.1–11.1)

## 2023-03-19 PROCEDURE — 83735 ASSAY OF MAGNESIUM: CPT

## 2023-03-19 PROCEDURE — 74011250637 HC RX REV CODE- 250/637: Performed by: INTERNAL MEDICINE

## 2023-03-19 PROCEDURE — 36415 COLL VENOUS BLD VENIPUNCTURE: CPT

## 2023-03-19 PROCEDURE — 80048 BASIC METABOLIC PNL TOTAL CA: CPT

## 2023-03-19 PROCEDURE — 74011250637 HC RX REV CODE- 250/637: Performed by: STUDENT IN AN ORGANIZED HEALTH CARE EDUCATION/TRAINING PROGRAM

## 2023-03-19 PROCEDURE — 65270000046 HC RM TELEMETRY

## 2023-03-19 PROCEDURE — 84100 ASSAY OF PHOSPHORUS: CPT

## 2023-03-19 PROCEDURE — 85027 COMPLETE CBC AUTOMATED: CPT

## 2023-03-19 RX ORDER — SODIUM CHLORIDE 1 G/1
1 TABLET ORAL 2 TIMES DAILY
Qty: 60 TABLET | Refills: 0 | Status: SHIPPED | OUTPATIENT
Start: 2023-03-19 | End: 2023-04-18

## 2023-03-19 RX ORDER — LANOLIN ALCOHOL/MO/W.PET/CERES
3 CREAM (GRAM) TOPICAL
Qty: 30 TABLET | Refills: 0 | Status: SHIPPED | OUTPATIENT
Start: 2023-03-19 | End: 2023-04-18

## 2023-03-19 RX ORDER — METOPROLOL SUCCINATE 25 MG/1
25 TABLET, EXTENDED RELEASE ORAL DAILY
Qty: 30 TABLET | Refills: 0 | Status: SHIPPED | OUTPATIENT
Start: 2023-03-20 | End: 2023-04-19

## 2023-03-19 RX ORDER — OLANZAPINE 5 MG/1
5 TABLET ORAL
Qty: 30 TABLET | Refills: 0 | Status: SHIPPED | OUTPATIENT
Start: 2023-03-19 | End: 2023-04-18

## 2023-03-19 RX ADMIN — CASTOR OIL AND BALSAM, PERU: 788; 87 OINTMENT TOPICAL at 21:42

## 2023-03-19 RX ADMIN — APIXABAN 2.5 MG: 2.5 TABLET, FILM COATED ORAL at 09:13

## 2023-03-19 RX ADMIN — METOPROLOL SUCCINATE 25 MG: 25 TABLET, EXTENDED RELEASE ORAL at 09:13

## 2023-03-19 RX ADMIN — CASTOR OIL AND BALSAM, PERU: 788; 87 OINTMENT TOPICAL at 09:14

## 2023-03-19 RX ADMIN — LEVOTHYROXINE SODIUM 100 MCG: 0.1 TABLET ORAL at 09:13

## 2023-03-19 RX ADMIN — Medication 1000 MG: at 09:13

## 2023-03-19 RX ADMIN — CHOLECALCIFEROL TAB 25 MCG (1000 UNIT) 2000 UNITS: 25 TAB at 21:42

## 2023-03-19 RX ADMIN — CASTOR OIL AND BALSAM, PERU: 788; 87 OINTMENT TOPICAL at 17:21

## 2023-03-19 RX ADMIN — Medication 5 DROP: at 09:00

## 2023-03-19 RX ADMIN — Medication 5 DROP: at 18:00

## 2023-03-19 RX ADMIN — MELATONIN 3 MG: at 21:42

## 2023-03-19 RX ADMIN — APIXABAN 2.5 MG: 2.5 TABLET, FILM COATED ORAL at 17:21

## 2023-03-19 RX ADMIN — Medication 1000 MG: at 17:21

## 2023-03-19 RX ADMIN — OLANZAPINE 5 MG: 5 TABLET, FILM COATED ORAL at 21:42

## 2023-03-19 RX ADMIN — CLOPIDOGREL BISULFATE 75 MG: 75 TABLET ORAL at 09:13

## 2023-03-19 NOTE — DISCHARGE INSTRUCTIONS
You were treated for difficulty swallowing and low sodium.  You were evaluated by Nephrology, please followup with

## 2023-03-19 NOTE — PROGRESS NOTES
NAME: Maci Rider Sr.        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 Cr improving to 1.1mg/dl     Continue FR (liberated to 1500 ml/day yesterday); s/p low dose loop on 3/13    NaCl tabs    Agree with losartan being held. BP better/stable    Strict I/os    Awaiting snf-> stable from renal standpoint             Subjective:     Chief Complaint: Sleeping comfortably    Review of Systems:    Symptom Y/N Comments  Symptom Y/N Comments   Fever/Chills    Chest Pain     Poor Appetite    Edema     Cough    Abdominal Pain     Sputum    Joint Pain     SOB/MARVIN    Pruritis/Rash     Nausea/vomit    Tolerating PT/OT     Diarrhea    Tolerating Diet     Constipation    Other       Could not obtain due to:      Objective:     VITALS:   Last 24hrs VS reviewed since prior progress note.  Most recent are:  Visit Vitals  /68 (BP 1 Location: Right upper arm, BP Patient Position: At rest)   Pulse 77   Temp 97.3 °F (36.3 °C)   Resp 17   Ht 5' 10\" (1.778 m)   Wt 74.8 kg (165 lb)   SpO2 92%   BMI 23.68 kg/m²       Intake/Output Summary (Last 24 hours) at 3/19/2023 0801  Last data filed at 3/19/2023 0448  Gross per 24 hour   Intake --   Output 600 ml   Net -600 ml        Telemetry Reviewed:     PHYSICAL EXAM:  General: Sleeping  No LE edema      Lab Data Reviewed: (see below)    Medications Reviewed: (see below)    PMH/SH reviewed - no change compared to H&P  ________________________________________________________________________  Care Plan discussed with:  Patient     Family      RN     Care Manager                    Consultant:          Comments   >50% of visit spent in counseling and coordination of care ________________________________________________________________________  Francisco Javier Ramos MD     Procedures: see electronic medical records for all procedures/Xrays and details which  were not copied into this note but were reviewed prior to creation of Plan. LABS:  Recent Labs     03/19/23  0404 03/18/23  0400   WBC 7.4 8.1   HGB 12.3 12.8   HCT 36.9 39.2    335       Recent Labs     03/19/23  0404 03/18/23  0400 03/17/23  0314   * 136 130*   K 4.4 4.6 4.5    104 99   CO2 28 25 26   BUN 62* 89* 110*   CREA 1.10 1.32* 1.45*   GLU 95 84 114*   CA 8.8 9.2 9.1   MG 2.0 2.2  --    PHOS 2.9 3.6  --        No results for input(s): AP, TBIL, TP, ALB, GLOB, GGT, AML, LPSE in the last 72 hours. No lab exists for component: SGOT, GPT, AMYP, HLPSE  No results for input(s): INR, PTP, APTT, INREXT, INREXT in the last 72 hours. No results for input(s): FE, TIBC, PSAT, FERR in the last 72 hours. Lab Results   Component Value Date/Time    Folate 18.9 02/01/2023 10:11 AM        No results for input(s): PH, PCO2, PO2 in the last 72 hours. No results for input(s): CPK, CKMB in the last 72 hours.     No lab exists for component: TROPONINI  No components found for: [place]  Lab Results   Component Value Date/Time    Color YELLOW/STRAW 08/18/2022 06:11 PM    Appearance CLEAR 08/18/2022 06:11 PM    Specific gravity 1.012 08/18/2022 06:11 PM    pH (UA) 6.5 08/18/2022 06:11 PM    Protein Negative 08/18/2022 06:11 PM    Glucose Negative 08/18/2022 06:11 PM    Ketone Negative 08/18/2022 06:11 PM    Bilirubin Negative 08/18/2022 06:11 PM    Urobilinogen 1.0 08/18/2022 06:11 PM    Nitrites Negative 08/18/2022 06:11 PM    Leukocyte Esterase Negative 08/18/2022 06:11 PM    Epithelial cells FEW 08/18/2022 06:11 PM    Bacteria Negative 08/18/2022 06:11 PM    WBC 0-4 08/18/2022 06:11 PM    RBC 0-5 08/18/2022 06:11 PM       MEDICATIONS:  Current Facility-Administered Medications   Medication Dose Route Frequency sodium chloride soluble tablet 1,000 mg  1 g Oral BID    metoprolol succinate (TOPROL-XL) XL tablet 25 mg  25 mg Oral DAILY    OLANZapine (ZyPREXA) tablet 5 mg  5 mg Oral QHS    balsam peru-castor oiL (VENELEX) ointment   Topical TID    carbamide peroxide (DEBROX) 6.5 % otic solution 5 Drop  5 Drop Both Ears BID    ondansetron (ZOFRAN) injection 4 mg  4 mg IntraVENous Q4H PRN    acetaminophen (TYLENOL) tablet 500 mg  500 mg Oral Q6H PRN    clopidogreL (PLAVIX) tablet 75 mg  75 mg Oral DAILY    [Held by provider] losartan (COZAAR) tablet 100 mg  100 mg Oral DAILY    levothyroxine (SYNTHROID) tablet 100 mcg  100 mcg Oral ACB    apixaban (ELIQUIS) tablet 2.5 mg  2.5 mg Oral BID    cholecalciferol (VITAMIN D3) (1000 Units /25 mcg) tablet 2,000 Units  2,000 Units Oral QHS    melatonin tablet 3 mg  3 mg Oral QHS

## 2023-03-19 NOTE — PROGRESS NOTES
Transition of Care Plan:    RUR: 14%-\"Low Risk\"  Disposition: SNF-The patient's family was able to [address] yesterday, 3/18/23, and this is now their preferred facility. [address] [place] [address] has accepted the patient and they are working on making a bed for the patient  If SNF or IPR: Date FOC offered: 3/14/23  Date [address] received: 3/18/23  Date authorization started with reference number: N/A, the patient does not require insurance auth   Date authorization received and expires: N/A  Accepting facility: [address] [place] [address]   Follow up appointments: PCP & Specialists as indicated   DME needed: TBD by SNF  Transportation at Discharge: The patient's daughter is to transport him upon d/c   Keys or means to access home: Yes      IM Medicare Letter: To be given prior to d/c   Is patient a [place] and connected with the [place]? N/A               If yes, was [facility] transfer form completed and [facility] notified? Caregiver Contact: Chirag Villatoro, Daughter, Phone: [phone]  Discharge Caregiver contacted prior to discharge? Yes, CM spoke to the patient's daughter via phone   Care Conference needed?: No    CM spoke to the patient's daughter and she confirmed that she was able to [address] yesterday, 3/18/23, and this is now her preferred facility. CM spoke with admissions liaison for [facility] (cell phone: [phone]), and confirmed that they are able to accept the patient. CM advised that the patient is medically stable for d/c. He stated that he is going to see if he can make a bed for the patient today, 3/19/23, but if he cannot make a bed today he will likely have a bed for the patient tomorrow, 3/20/23.      Jacqueline Cuevas [address], [address]

## 2023-03-19 NOTE — DISCHARGE SUMMARY
Hospitalist Discharge Summary     Patient ID:  Yumiko Brar  [id number]  81 y.o.  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 male With Pmhx significant for a-fib, CKD, COPD, GERD, gout, HTN, hypothyroidism, right carotid artery stenosis, TIA, DDD of cervical spine with stenosis, progressive chorea/movement disorder of unclear etiology  who presents to the ED with difficulty swallowing. Most of the history was obtained from patient's daughter, Dulce Mcneal via telephone. Patient himself is alert awake and oriented to person place and time and is able to give some history but goes off on tangents. As per the daughter, patient has been having some difficulty swallowing for the past 6 weeks or so. She states that the patient has to tilt his head back and try to take the pills to make sure they go down smoothly. Last night, while they were eating dinner patient began to have difficulty swallowing and felt like his throat was closing. They got the patient to the chair and called the ambulance. Patient did require supplemental oxygen on initial presentation to the ED. At the time my examination, patient is not requiring any oxygen denies any shortness of breath. He says he feels back to his normal but has not tried to eat yet this morning. Patient has never had a thing like this in the past.  He denies any chest pain or shortness of breath. No nausea vomiting. No diarrhea.      Of note, patient was agitated this morning for which she received Ativan.    ______________________________________________________________________  DISCHARGE SUMMARY/HOSPITAL COURSE:  for full details see H&P, daily progress notes, labs, consult notes. 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 No acute abnormality in the neck. 2. Partially visualized 7 mm groundglass pulmonary nodule in the left upper  lobe. If prior imaging is available for comparison, consider follow-up chest CT  in 3-6 months. Checked urine lytes , sodium noted, urine osm 578  - Nephrology following  - fluid restricting  - stopped Lavone [place], started salt tabs  - stopped Losartan  - IVF per Nephro, now off     Dysphagia   SLP gabriel appreciated   -- \"Regular/Thin Liquid diet  -- Smaller pills, 1 at a time with thin liquid and larger pills with puree (per patient preference)  -- Strict oral care  -- Upright in bed\"      Choreiform movement disorder  Zyprexa increased to 5 qhs for sundowning  Patient follows with movement specialist as outpatient  Melaonin Qhs  Delirium precautions     History of A fib, currently in NSR  C/w eliquis  C/w Toprol XL     HTN  stopped amlodipine due to symptomatic orthostatic hypotension   Reduce toprol  - stopped losartan with ALISSA, BP ok     Hypothyroid  C/w synthroid     History of CVA  C/w plavix and eliquis     History of prostate cancer  Marcelo PSA labs at request of daughter. They will follow up with Urology  _______________________________________________________________________  Patient seen and examined by me on discharge day. Pertinent Findings:  General:          WD, WN. Alert, cooperative, no acute distress    EENT:              EOMI. Anicteric sclerae. MMM  Resp:               CTA bilaterally, no wheezing or rales. No accessory muscle use  CV:                  Regular  rhythm,  No edema  GI:                   Soft, Non distended, Non tender.   +Bowel sounds  Neurologic:       Alert and oriented X 3, normal speech, +ve choreform movements  Psych:    Good insight. Not anxious nor agitated  Skin:                No rashes. No jaundice  _______________________________________________________________________  DISCHARGE MEDICATIONS:   Current Discharge Medication List        START taking these medications    Details   melatonin 3 mg tablet Take 1 Tablet by mouth nightly for 30 days. Qty: 30 Tablet, Refills: 0  Start date: 3/19/2023, End date: 4/18/2023      sodium chloride 1,000 mg soluble tablet Take 1 Tablet by mouth two (2) times a day for 30 days. Qty: 60 Tablet, Refills: 0  Start date: 3/19/2023, End date: 4/18/2023           CONTINUE these medications which have CHANGED    Details   metoprolol succinate (TOPROL-XL) 25 mg XL tablet Take 1 Tablet by mouth daily for 30 days. Qty: 30 Tablet, Refills: 0  Start date: 3/20/2023, End date: 4/19/2023      OLANZapine (ZyPREXA) 5 mg tablet Take 1 Tablet by mouth nightly for 30 days. Qty: 30 Tablet, Refills: 0  Start date: 3/19/2023, End date: 4/18/2023           CONTINUE these medications which have NOT CHANGED    Details   levothyroxine (SYNTHROID) 100 mcg tablet Take 1 Tablet by mouth Daily (before breakfast). Qty: 90 Tablet, Refills: 3      clopidogrel (PLAVIX) 75 mg tab TAKE 1 TABLET BY MOUTH  DAILY  Qty: 90 Tab, Refills: 3    Associated Diagnoses: History of TIA (transient ischemic attack)      ELIQUIS 2.5 mg tablet Take 2.5 mg by mouth two (2) times a day. cholecalciferol, vitamin D3, 50 mcg (2,000 unit) tab Take 1 Tab by mouth nightly. acetaminophen (TYLENOL) 500 mg tablet Take 500 mg by mouth every six (6) hours as needed for Pain. STOP taking these medications       amLODIPine (NORVASC) 2.5 mg tablet Comments:   Reason for Stopping:         losartan (COZAAR) 100 mg tablet Comments:   Reason for Stopping:         pregabalin (LYRICA) 25 mg capsule Comments:   Reason for Stopping:                 Patient Follow Up Instructions:    Activity: Activity as tolerated  Diet:  -- \"Regular/Thin Liquid diet  -- Smaller pills, 1 at a time with thin liquid and larger pills with puree (per patient preference)  -- Strict oral care  -- Upright in bed\"  Wound Care: As directed    Follow-up Information       Follow up With Specialties Details Why Krzysztof  Follow up  [address], [address], [facility] [address]    [phone]    Araceli Izquierdo MD Family Medicine   [address]  [address] [place] [address]  [phone]            ________________________________________________________________    Risk of deterioration: Low    Condition at Discharge:  Stable  __________________________________________________________________    Disposition  SNF/LTC    ____________________________________________________________________    Code Status: DNR/DNI  ___________________________________________________________________      Total time in minutes spent coordinating this discharge (includes going over instructions, follow-up, prescriptions, and preparing report for sign off to her PCP) :  >30 minutes    Signed:  Kev Fam MD

## 2023-03-20 VITALS
HEIGHT: 70 IN | BODY MASS INDEX: 23.62 KG/M2 | TEMPERATURE: 98.7 F | WEIGHT: 165 LBS | SYSTOLIC BLOOD PRESSURE: 115 MMHG | RESPIRATION RATE: 18 BRPM | HEART RATE: 79 BPM | OXYGEN SATURATION: 98 % | DIASTOLIC BLOOD PRESSURE: 77 MMHG

## 2023-03-20 LAB
ANION GAP SERPL CALC-SCNC: 2 MMOL/L (ref 5–15)
BUN SERPL-MCNC: 61 MG/DL (ref 6–20)
BUN/CREAT SERPL: 46 (ref 12–20)
CALCIUM SERPL-MCNC: 8.6 MG/DL (ref 8.5–10.1)
CHLORIDE SERPL-SCNC: 106 MMOL/L (ref 97–108)
CO2 SERPL-SCNC: 28 MMOL/L (ref 21–32)
CREAT SERPL-MCNC: 1.34 MG/DL (ref 0.7–1.3)
ERYTHROCYTE [DISTWIDTH] IN BLOOD BY AUTOMATED COUNT: 13.4 % (ref 11.5–14.5)
GLUCOSE SERPL-MCNC: 97 MG/DL (ref 65–100)
HCT VFR BLD AUTO: 35.6 % (ref 36.6–50.3)
HGB BLD-MCNC: 11.8 G/DL (ref 12.1–17)
MAGNESIUM SERPL-MCNC: 2 MG/DL (ref 1.6–2.4)
MCH RBC QN AUTO: 31.9 PG (ref 26–34)
MCHC RBC AUTO-ENTMCNC: 33.1 G/DL (ref 30–36.5)
MCV RBC AUTO: 96.2 FL (ref 80–99)
NRBC # BLD: 0 K/UL (ref 0–0.01)
NRBC BLD-RTO: 0 PER 100 WBC
PHOSPHATE SERPL-MCNC: 3.2 MG/DL (ref 2.6–4.7)
PLATELET # BLD AUTO: 319 K/UL (ref 150–400)
PMV BLD AUTO: 8.8 FL (ref 8.9–12.9)
POTASSIUM SERPL-SCNC: 4.7 MMOL/L (ref 3.5–5.1)
RBC # BLD AUTO: 3.7 M/UL (ref 4.1–5.7)
SODIUM SERPL-SCNC: 136 MMOL/L (ref 136–145)
WBC # BLD AUTO: 7.4 K/UL (ref 4.1–11.1)

## 2023-03-20 PROCEDURE — 74011250637 HC RX REV CODE- 250/637: Performed by: STUDENT IN AN ORGANIZED HEALTH CARE EDUCATION/TRAINING PROGRAM

## 2023-03-20 PROCEDURE — 74011250637 HC RX REV CODE- 250/637: Performed by: INTERNAL MEDICINE

## 2023-03-20 PROCEDURE — 36415 COLL VENOUS BLD VENIPUNCTURE: CPT

## 2023-03-20 PROCEDURE — 80048 BASIC METABOLIC PNL TOTAL CA: CPT

## 2023-03-20 PROCEDURE — 85027 COMPLETE CBC AUTOMATED: CPT

## 2023-03-20 PROCEDURE — 83735 ASSAY OF MAGNESIUM: CPT

## 2023-03-20 PROCEDURE — 84100 ASSAY OF PHOSPHORUS: CPT

## 2023-03-20 RX ADMIN — MELATONIN 3 MG: at 18:33

## 2023-03-20 RX ADMIN — Medication 1000 MG: at 09:37

## 2023-03-20 RX ADMIN — CASTOR OIL AND BALSAM, PERU: 788; 87 OINTMENT TOPICAL at 17:02

## 2023-03-20 RX ADMIN — CHOLECALCIFEROL TAB 25 MCG (1000 UNIT) 2000 UNITS: 25 TAB at 18:33

## 2023-03-20 RX ADMIN — Medication 5 DROP: at 18:38

## 2023-03-20 RX ADMIN — CLOPIDOGREL BISULFATE 75 MG: 75 TABLET ORAL at 09:37

## 2023-03-20 RX ADMIN — Medication 1000 MG: at 17:55

## 2023-03-20 RX ADMIN — APIXABAN 2.5 MG: 2.5 TABLET, FILM COATED ORAL at 17:55

## 2023-03-20 RX ADMIN — APIXABAN 2.5 MG: 2.5 TABLET, FILM COATED ORAL at 09:37

## 2023-03-20 RX ADMIN — LEVOTHYROXINE SODIUM 100 MCG: 0.1 TABLET ORAL at 08:05

## 2023-03-20 RX ADMIN — OLANZAPINE 5 MG: 5 TABLET, FILM COATED ORAL at 18:33

## 2023-03-20 RX ADMIN — CASTOR OIL AND BALSAM, PERU: 788; 87 OINTMENT TOPICAL at 09:48

## 2023-03-20 RX ADMIN — METOPROLOL SUCCINATE 25 MG: 25 TABLET, EXTENDED RELEASE ORAL at 09:37

## 2023-03-20 RX ADMIN — Medication 5 DROP: at 11:37

## 2023-03-20 NOTE — PROGRESS NOTES
[facility]: [phone]    Patient is clear from a CM standpoint. Transition of Care Plan: [address] [facility] SNF     RUR: 14% (low RUR)  [facility]  If SNF or IPR: Date FOC offered: 3/14/23  Date [address] received: 3/18/23  Date authorization started with reference number: N/A  Date authorization received and expires: N/A  Accepting facility: [address] [place] [address]   Follow up appointments: defer to SNF. DME needed: defer to SNF. Transportation at Discharge: [facility] at [address] [facility], to [facility], bed 305A, nursing call report to: [phone]. Keys or means to access home: Facility has access. IM Medicare Letter: 2nd IM explained via phone call to Eddie who confirmed Bev Peters will be at bedside to sign it. Is patient a [place] and connected with the [place]? No          If yes, was [place] transfer form completed and VA notified? N/A  Caregiver Contact: Kaylah Ritchie - Daughter - [phone]  Discharge Caregiver contacted prior to discharge? Yes, CM spoke to the patient's daughter via phone   Care Conference needed?: No    5742 - CM spoke with [place] [address] and Rehab liaison notified  that bed is available today, will provide CM with bed information. 1107 - RICARDO spoke with patient who is agreeable to discharge plan to [facility] with [facility] at 2pm, aware that his family visited this facility and would like him to receive care here. CM contacted Eddie (DTR) twice and  left. CM contacted Mac (son) and notified him of updated discharge plan, he will contact [place] to contact CM. RN notified of discharge plan at 2pm to [place] [address] and [address] paperwork placed on bedside chart. 1215 - RICARDO spoke with Eddie who confirmed she is agreeable to transportation at 2pm to [facility] with [facility]. She confirmed Bev Peters would arrive at bedside prior to 2pm transport and can sign 2nd IM letter instead of emailing it to her.   2nd IM letter explained via phone call.  Charli Jimenez will notify Theron Patel and is aware that 2nd IM letter will be by the window to sign. Patient updated regarding this and patient/Trinidad had no further questions or concerns for CM. 1604 - CM contacted Charli Jimenez who is aware the transportation was pushed back to about 7.  RN notified of this and to cancel transport if they are not here by about 7:00-7:30 pm.    Transition of Care Plan to SNF/Rehab    Communication to Patient/Family:  Met with patient and family and they are agreeable to the transition plan. The Plan for Transition of Care is related to the following treatment goals: PT/OT    The Patient and/or patient representative was provided with a choice of provider and agrees  with the discharge plan. Yes [x] No []    A Freedom of choice list was provided with basic dialogue that supports the patient's individualized plan of care/goals and shares the quality data associated with the providers. Yes [x] No []    SNF/Rehab Transition:  Patient has [facility] and meets criteria for admission. Patient will [facility] and expected to leave at 2 PM.    Communication to SNF/Rehab:  Bedside RN, Phani Bustillos, has been notified to update the transition plan to the facility and call report (phone number). Discharge information has been updated on the AVS. And communicated to facility via [email or web address], or CC link. Discharge instructions to be sent with patient via paper copy. Nursing Please include all hard scripts for controlled substances, med rec and dc summary, and AVS in packet. Reviewed and confirmed with facility, Beth, can manage the patient care needs for the following:     Mesfin Langford with (X) only those applicable:  Medication:  [x]Medications are available at the facility  []IV Antibiotics    []Controlled Substance - hard copies available sent.   []Weekly Labs    Equipment:  []CPAP/BiPAP  []Wound Vacuum  []Jerez or Urinary Device  []PICC/Central Line  []Nebulizer  []Ventilator    Treatment:  []Isolation (for MRSA, VRE, etc.)  []Surgical Drain Management  []Tracheostomy Care  []Dressing Changes  []Dialysis with transportation  []PEG Care  []Oxygen  []Daily Weights for Heart Failure    Dietary:  []Any diet limitations  []Tube Feedings   []Total Parenteral Management (TPN)    Financial Resources:  []Medicaid Application Completed    []UAI Completed and copy given to pt/family  and copy given to pt/family  []A screening has previously been completed. []Level II Completed    [] Private pay individual who will not become   financially eligible for Medicaid within 6 months from admission to a [address] [place] [address] facility. [] Individual refused to have screening conducted. []Medicaid Application Completed    []The screening denied because it was determined individual did not need/did not qualify for nursing facility level of care. [] Out of state residents seeking direct admission to a [address] facility. [] Individuals who are inpatients of an out of state hospital, or in state or out of state veterans/ hospital and seek direct admission to a [address] facility  [] Individuals who are pateints or residents of a state owned/operated facility that is licensed by [employer] ([email or web address]) and seek direct admission to [address] [place] [address]  [] A screening not required for enrollment in [address] [place] [address] [address] [address] services as set out in [address] [place] [address] 33-  [] [facility]) staff shall perform screenings of the [facility] clients. Advanced Care Plan:  []Surrogate Decision Maker of Care  [x]POA  [x]Communicated Code Status and copy sent. Other:          Care Management Interventions  PCP Verified by CM:  Yes  Last Visit to PCP: 02/01/23  Palliative Care Criteria Met (RRAT>21 & CHF Dx)?: No  Mode of Transport at Discharge: [facility] Time of Discharge: 1400  Transition of Care Consult (CM Consult): SNF  Partner SNF: Yes  MyChart Signup: No  Discharge Durable Medical Equipment: No  Physical Therapy Consult: Yes  Occupational Therapy Consult: Yes  Speech Therapy Consult: No  Support Systems: Child(handy)  Confirm Follow Up Transport: Family  The Patient and/or Patient Representative was Provided with a Choice of Provider and Agrees with the Discharge Plan?: Yes  Name of the Patient Representative Who was Provided with a Choice of Provider and Agrees with the Discharge Plan: Juany Peters (patient)  Freedom of Choice List was Provided with Basic Dialogue that Supports the Patient's Individualized Plan of Care/Goals, Treatment Preferences and Shares the Quality Data Associated with the Providers?: Yes  [place] Resource Information Provided?: No  Discharge Location  Patient Expects to be Discharged to[de-identified] Skilled nursing facility    SHAHRIAR Magallon, RN    Care Management  [phone]

## 2023-03-20 NOTE — PROGRESS NOTES
Problem: Aspiration - Risk of  Goal: *Absence of aspiration  Outcome: Progressing Towards Goal     Problem: Patient Education: Go to Patient Education Activity  Goal: Patient/Family Education  Outcome: Progressing Towards Goal     Problem: Pressure Injury - Risk of  Goal: *Prevention of pressure injury  Description: Document Satish Scale and appropriate interventions in the flowsheet. Outcome: Progressing Towards Goal  Note: Pressure Injury Interventions:  Sensory Interventions: Assess changes in LOC, Assess need for specialty bed, Float heels, Keep linens dry and wrinkle-free, Maintain/enhance activity level    Moisture Interventions: Absorbent underpads, Limit adult briefs, Maintain skin hydration (lotion/cream)    Activity Interventions: Assess need for specialty bed, Increase time out of bed, PT/OT evaluation    Mobility Interventions: Assess need for specialty bed, Float heels, HOB 30 degrees or less, PT/OT evaluation, Turn and reposition approx. every two hours(pillow and wedges)    Nutrition Interventions: Document food/fluid/supplement intake, Offer support with meals,snacks and hydration    Friction and Shear Interventions: Apply protective barrier, creams and emollients, Feet elevated on foot rest, HOB 30 degrees or less, Lift sheet, Minimize layers, Sit at 90-degree angle, Transfer aides:transfer board/Yousif lift/ceiling lift                Problem: Patient Education: Go to Patient Education Activity  Goal: Patient/Family Education  Outcome: Progressing Towards Goal     Problem: Patient Education: Go to Patient Education Activity  Goal: Patient/Family Education  Outcome: Progressing Towards Goal     Problem: Falls - Risk of  Goal: *Absence of Falls  Description: Document [place] Fall Risk and appropriate interventions in the flowsheet.   Outcome: Progressing Towards Goal  Note: Fall Risk Interventions:  Mobility Interventions: Bed/chair exit alarm, Communicate number of staff needed for ambulation/transfer, Patient to call before getting OOB, Strengthening exercises (ROM-active/passive), PT Consult for mobility concerns    Mentation Interventions: Bed/chair exit alarm, Reorient patient, Room close to nurse's station, Toileting rounds, Update white board    Medication Interventions: Assess postural VS orthostatic hypotension, Evaluate medications/consider consulting pharmacy, Bed/chair exit alarm, Patient to call before getting OOB    Elimination Interventions: Bed/chair exit alarm, Call light in reach, Patient to call for help with toileting needs, Stay With Me (per policy)              Problem: Patient Education: Go to Patient Education Activity  Goal: Patient/Family Education  Outcome: Progressing Towards Goal     Problem: Patient Education: Go to Patient Education Activity  Goal: Patient/Family Education  Outcome: Progressing Towards Goal     Problem: Patient Education: Go to Patient Education Activity  Goal: Patient/Family Education  Outcome: Progressing Towards Goal

## 2023-03-20 NOTE — DISCHARGE SUMMARY
Hospitalist Discharge Summary     Patient ID:  Augustina Witt  [id number]  81 y.o.  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 male With Pmhx significant for a-fib, CKD, COPD, GERD, gout, HTN, hypothyroidism, right carotid artery stenosis, TIA, DDD of cervical spine with stenosis, progressive chorea/movement disorder of unclear etiology  who presents to the ED with difficulty swallowing. Most of the history was obtained from patient's daughter, Rubin Jean-Baptiste via telephone. Patient himself is alert awake and oriented to person place and time and is able to give some history but goes off on tangents. As per the daughter, patient has been having some difficulty swallowing for the past 6 weeks or so. She states that the patient has to tilt his head back and try to take the pills to make sure they go down smoothly. Last night, while they were eating dinner patient began to have difficulty swallowing and felt like his throat was closing. They got the patient to the chair and called the ambulance. Patient did require supplemental oxygen on initial presentation to the ED. At the time my examination, patient is not requiring any oxygen denies any shortness of breath. He says he feels back to his normal but has not tried to eat yet this morning. Patient has never had a thing like this in the past.  He denies any chest pain or shortness of breath. No nausea vomiting. No diarrhea.      Of note, patient was agitated this morning for which she received Ativan.    ______________________________________________________________________  DISCHARGE SUMMARY/HOSPITAL COURSE:  for full details see H&P, daily progress notes, labs, consult notes. 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 No acute abnormality in the neck. 2. Partially visualized 7 mm groundglass pulmonary nodule in the left upper  lobe. If prior imaging is available for comparison, consider follow-up chest CT  in 3-6 months. Checked urine lytes , sodium noted, urine osm 578  - Nephrology following  - fluid restricting  - stopped Estefanía Ferns, started salt tabs  - stopped Losartan  - IVF per Nephro, now off     Dysphagia   SLP eval appreciated   -- \"Regular/Thin Liquid diet  -- Smaller pills, 1 at a time with thin liquid and larger pills with puree (per patient preference)  -- Strict oral care  -- Upright in bed\"      Choreiform movement disorder  Zyprexa increased to 5 qhs for sundowning  Patient follows with movement specialist as outpatient  Melaonin Qhs  Delirium precautions     History of A fib, currently in NSR  C/w eliquis  C/w Toprol XL     HTN  stopped amlodipine due to symptomatic orthostatic hypotension   Reduce toprol  - stopped losartan with ALISSA, BP ok     Hypothyroid  C/w synthroid     History of CVA  C/w plavix and eliquis     History of prostate cancer  Marcelo PSA labs at request of daughter. They will follow up with Urology  _______________________________________________________________________  Patient seen and examined by me on discharge day. Pertinent Findings:  General:          WD, WN. Alert, cooperative, no acute distress    EENT:              EOMI. Anicteric sclerae. MMM  Resp:               CTA bilaterally, no wheezing or rales. No accessory muscle use  CV:                  Regular  rhythm,  No edema  GI:                   Soft, Non distended, Non tender.   +Bowel sounds  Neurologic:       Alert and oriented X 3, normal speech, +ve choreform movements  Psych:    Good insight. Not anxious nor agitated  Skin:                No rashes. No jaundice  _______________________________________________________________________  DISCHARGE MEDICATIONS:   Current Discharge Medication List        START taking these medications    Details   melatonin 3 mg tablet Take 1 Tablet by mouth nightly for 30 days. Qty: 30 Tablet, Refills: 0  Start date: 3/19/2023, End date: 4/18/2023      sodium chloride 1,000 mg soluble tablet Take 1 Tablet by mouth two (2) times a day for 30 days. Qty: 60 Tablet, Refills: 0  Start date: 3/19/2023, End date: 4/18/2023           CONTINUE these medications which have CHANGED    Details   metoprolol succinate (TOPROL-XL) 25 mg XL tablet Take 1 Tablet by mouth daily for 30 days. Qty: 30 Tablet, Refills: 0  Start date: 3/20/2023, End date: 4/19/2023      OLANZapine (ZyPREXA) 5 mg tablet Take 1 Tablet by mouth nightly for 30 days. Qty: 30 Tablet, Refills: 0  Start date: 3/19/2023, End date: 4/18/2023           CONTINUE these medications which have NOT CHANGED    Details   levothyroxine (SYNTHROID) 100 mcg tablet Take 1 Tablet by mouth Daily (before breakfast). Qty: 90 Tablet, Refills: 3      clopidogrel (PLAVIX) 75 mg tab TAKE 1 TABLET BY MOUTH  DAILY  Qty: 90 Tab, Refills: 3    Associated Diagnoses: History of TIA (transient ischemic attack)      ELIQUIS 2.5 mg tablet Take 2.5 mg by mouth two (2) times a day. cholecalciferol, vitamin D3, 50 mcg (2,000 unit) tab Take 1 Tab by mouth nightly. acetaminophen (TYLENOL) 500 mg tablet Take 500 mg by mouth every six (6) hours as needed for Pain. STOP taking these medications       amLODIPine (NORVASC) 2.5 mg tablet Comments:   Reason for Stopping:         losartan (COZAAR) 100 mg tablet Comments:   Reason for Stopping:         pregabalin (LYRICA) 25 mg capsule Comments:   Reason for Stopping:                 Patient Follow Up Instructions:    Activity: Activity as tolerated  Diet:  -- \"Regular/Thin Liquid diet  -- Smaller pills, 1 at a time with thin liquid and larger pills with puree (per patient preference)  -- Strict oral care  -- Upright in bed\"  Wound Care: As directed    Follow-up Information       Follow up With Specialties Details Why Krzysztof  Follow up  [address], [address], [address]    [phone]    Jodi Peter MD Family Medicine Follow up in 2 week(s) for routine post-hospitalization [address] [facility] Shruti [address]      Shweta Murillo MD Nephrology Follow up in 2 week(s) for your chronic kidney disease [place]   [address]  [facility]  [phone]      [address] [place] [address]  [phone]          ________________________________________________________________    Risk of deterioration: Low    Condition at Discharge:  Stable  __________________________________________________________________    Disposition  SNF/LTC    ____________________________________________________________________    Code Status: DNR/DNI  ___________________________________________________________________      Total time in minutes spent coordinating this discharge (includes going over instructions, follow-up, prescriptions, and preparing report for sign off to her PCP) :  >30 minutes    Signed:   Hayder Alvarez MD

## 2023-03-20 NOTE — PROGRESS NOTES
2000: Bedside shift change report given to Obdulio Rodriges RN (oncoming nurse) by Anuradha Mcdermott RN (offgoing nurse). Report included the following information SBAR and Kardex.

## 2023-03-20 NOTE — PROGRESS NOTES
Hospital to SNF SBAR Handoff - Kasia Garcia Sr.                                                                        81 y.o.   male    [facility]   Room: 2114/01    MRM 2 CARDIOPULMONARY CARE  Unit Phone# : [address]  MRM 2 CARDIOPULMONARY CARE  [facility]  [place] [address]  Dept: [phone]  Loc: [phone]                    SITUATION     Admitted:  3/9/2023         Attending Provider:  Kinga Aragon MD       Consultations:  IP CONSULT TO HOSPITALIST  IP CONSULT TO NEPHROLOGY    PCP:  Noni Kelly MD   [phone]    Treatment Team: Attending Provider: Kinga Aragon MD; Consulting Provider: Marilou Myrick DO; Care Manager: Rob Streeter RN; Utilization Review: Carline Gill RN; Primary Nurse: Nieves Owen RN; Physical Therapist: Anabel Cuba PT    Admitting Dx:  Hyponatremia [E87.1]       Principal Problem: <principal problem not specified>    * No surgery found * of      BY: * Surgery not found *             ON: * No surgery found *                  Code Status: DNR                Advance Directives:   Advance Care Planning 3/10/2023   Patient's Healthcare Decision Maker is: -   Confirm Advance Directive Yes, on file   Does the patient have other document types -    (Send w/patient)   Not Received       Isolation:  There are currently no Active Isolations       MDRO: No current active infections  Pain Medications given:   Tylenol   Last dose:  3/12/1023 at      Special Equipment needed: no  Type of equipment:      (Not currently on dialysis)  (Not currently on dialysis)  (Not currently on dialysis)     BACKGROUND     Allergies:   Allergies   Allergen Reactions    Sulfa (Sulfonamide Antibiotics) Hives       Past Medical History:   Diagnosis Date    Atrial fibrillation (HCC)     Carotid artery stenosis, asymptomatic 8/1/2014    Right side 50-79%     Chronic kidney disease     stage 3    Chronic obstructive pulmonary disease ([facility]     emphemsa     GERD (gastroesophageal reflux disease)     Gout     Hiatal hernia     Hyperlipidemia     Hyperparathyroidism ([facility]     Hypertension     Long term (current) use of anticoagulants     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 Calcium and PTH monitored by Dr. Larry Llanes, renal.  Levels normalized after surgery. Prostate cancer ([facility] [address] [facility]     seeds, then XRT, then seed again. Dr. Parker Potter    Spinal stenosis     Thyroid cancer [facility]) Jan 2015       Past Surgical History:   Procedure Laterality Date    COLONOSCOPY N/A 9/17/2018    COLONOSCOPY performed by Jesse Mattson MD at [facility] AMBULATORY OR    COLONOSCOPY N/A 2/10/2021    COLONOSCOPY performed by Markie Allen MD at [facility] ENDOSCOPY    HX APPENDECTOMY      HX CHOLECYSTECTOMY      HX HEART CATHETERIZATION      No Stents-  Dr. Radha Dennison      vasectomy    HX PARATHYROIDECTOMY  01/14/2015    right superior parathyroid gland removed and left superior parathyroid gland removed    HX PARTIAL THYROIDECTOMY  1/14/15    right lobectomy    HX TONSILLECTOMY      HX UROLOGICAL      Seeds for prostate cancer , 4 dialations     HX UROLOGICAL  1/14/15    BLADDER NECK INCISION, Cold knife incision of bladder neck contracture, cystoscopy       Medications Prior to Admission   Medication Sig    OLANZapine (ZyPREXA) 2.5 mg tablet Take 2.5 mg by mouth nightly. levothyroxine (SYNTHROID) 100 mcg tablet Take 1 Tablet by mouth Daily (before breakfast). amLODIPine (NORVASC) 2.5 mg tablet Take 1 Tablet by mouth daily. losartan (COZAAR) 100 mg tablet Take 100 mg by mouth daily. clopidogrel (PLAVIX) 75 mg tab TAKE 1 TABLET BY MOUTH  DAILY    metoprolol succinate (TOPROL-XL) 50 mg XL tablet Take 50 mg by mouth daily. ELIQUIS 2.5 mg tablet Take 2.5 mg by mouth two (2) times a day.     cholecalciferol, vitamin D3, 50 mcg (2,000 unit) tab Take 1 Tab by mouth nightly. pregabalin (LYRICA) 25 mg capsule Take 1 Capsule by mouth two (2) times a day. Max Daily Amount: 50 mg. (Patient not taking: Reported on 3/10/2023)    acetaminophen (TYLENOL) 500 mg tablet Take 500 mg by mouth every six (6) hours as needed for Pain. Hard scripts included in transfer packet yes    Vaccinations:    Immunization History   Administered Date(s) Administered    COVID-19, MODERNA BLUE border, Primary or Immunocompromised, (age 18y+), IM, 100 mcg/0.5mL 01/09/2022, 02/06/2022    Influenza High Dose Vaccine PF 09/22/2014, 12/15/2017, 01/28/2020    Influenza Vaccine 10/24/2016    Influenza, FLUAD, (age 72 y+), Adjuvanted 10/08/2021, 10/03/2022    Influenza, FLUARIX, FLULAVAL, FLUZONE (age 10 mo+) AND AFLURIA, (age 1 y+), PF, 0.5mL 10/22/2018    Pneumococcal Conjugate (PCV-13) 08/02/2016    Pneumococcal Polysaccharide (PPSV-23) 01/28/2020    Tdap 10/24/2016       Readmission Risks:    Known Risks: Fall Risk, Dysphagia        The Charlson CoMorbitiy Index tool is an evidenced based tool that has more automatic generated information. The tool looks at many different items such as the age of the patient, how many times they were admitted in the last calendar year, current length of stay in the hospital and their diagnosis. All of these items are pulled automatically from information documented in the chart from various places and will generate a score that predicts whether a patient is at low (less than 13), medium (13-20) or high (21 or greater) risk of being readmitted.         ASSESSMENT                Temp: 97.6 °F (36.4 °C) (03/20/23 1346) Pulse (Heart Rate): 92 (03/20/23 1346)     Resp Rate: 16 (03/20/23 1346)           BP: 115/69 (03/20/23 1346)     O2 Sat (%): 97 % (03/20/23 1346)     Weight: 74.8 kg (165 lb)    Height: 5' 10\" (177.8 cm) (03/17/23 1342)       If above not within 1 hour of discharge:    BP:_____  P:____  R:____ T:_____ O2 Sat: ___%  O2: ______    Active Orders Diet    ADULT DIET Regular; 1500 ml; Chocolate Ensure preferred. Send Ensure Compact if Ensure Plus is not available in chocolate. Orientation: oriented to time, place, person and situation Yes    Active Behaviors: None                                   Active Lines/Drains:  (Peg Tube / Jerez / CL or S/L?): yes    Urinary Status: Voiding, External catheter     Last BM: Last Bowel Movement Date: 03/19/23     Skin Integrity: Intact             Mobility: Slightly limited   Weight Bearing Status: WBAT (Weight Bearing as Tolerated)      Gait Training  Assistive Device: Gait belt (HHA x 2)  Ambulation - Level of Assistance:  Moderate assistance, Assist x2  Distance (ft): 2 Feet (ft) (side step along EOB)         Lab Results   Component Value Date/Time    Glucose 97 03/20/2023 02:00 AM    Hemoglobin A1c 5.1 10/18/2018 04:34 AM    INR 1.0 09/10/2020 12:18 PM    INR 1.0 10/17/2018 05:54 PM    HGB 11.8 (L) 03/20/2023 02:00 AM    HGB 12.3 03/19/2023 04:04 AM        RECOMMENDATION     See After Visit Summary (AVS) for:  Discharge instructions  After [address] equipment needed (entered pre-discharge by Care Management)  Medication Reconciliation    Follow up Appointment(s)         Report given/sent by:  Grady Tao RN                    Verbal report given to: Nany Mcconnell (Nurse) FAXED to:          Estimated discharge time:  3/20/2023 at

## 2023-03-20 NOTE — PROGRESS NOTES
Problem: Aspiration - Risk of  Goal: *Absence of aspiration  Outcome: Progressing Towards Goal     Problem: Patient Education: Go to Patient Education Activity  Goal: Patient/Family Education  Outcome: Progressing Towards Goal     Problem: Pressure Injury - Risk of  Goal: *Prevention of pressure injury  Description: Document Satish Scale and appropriate interventions in the flowsheet. Outcome: Progressing Towards Goal  Note: Pressure Injury Interventions:  Sensory Interventions: Assess changes in LOC, Assess need for specialty bed, Keep linens dry and wrinkle-free, Float heels    Moisture Interventions: Absorbent underpads, Limit adult briefs, Maintain skin hydration (lotion/cream)    Activity Interventions: Increase time out of bed, PT/OT evaluation    Mobility Interventions: Assess need for specialty bed    Nutrition Interventions: Document food/fluid/supplement intake    Friction and Shear Interventions: Apply protective barrier, creams and emollients                Problem: Patient Education: Go to Patient Education Activity  Goal: Patient/Family Education  Outcome: Progressing Towards Goal     Problem: Patient Education: Go to Patient Education Activity  Goal: Patient/Family Education  Outcome: Progressing Towards Goal     Problem: Falls - Risk of  Goal: *Absence of Falls  Description: Document Zulma Saha Fall Risk and appropriate interventions in the flowsheet.   Outcome: Progressing Towards Goal  Note: Fall Risk Interventions:  Mobility Interventions: Bed/chair exit alarm, Communicate number of staff needed for ambulation/transfer, Patient to call before getting OOB, Strengthening exercises (ROM-active/passive), PT Consult for mobility concerns    Mentation Interventions: Bed/chair exit alarm, Reorient patient, Room close to nurse's station, Toileting rounds, Update white board    Medication Interventions: Assess postural VS orthostatic hypotension, Evaluate medications/consider consulting pharmacy, Bed/chair exit alarm, Patient to call before getting OOB    Elimination Interventions: Bed/chair exit alarm, Call light in reach, Patient to call for help with toileting needs, Stay With Me (per policy)              Problem: Patient Education: Go to Patient Education Activity  Goal: Patient/Family Education  Outcome: Progressing Towards Goal     Problem: Patient Education: Go to Patient Education Activity  Goal: Patient/Family Education  Outcome: Progressing Towards Goal     Problem: Patient Education: Go to Patient Education Activity  Goal: Patient/Family Education  Outcome: Progressing Towards Goal   Patient is A&OX4. Patient is expected to discharge to [address] Pending transport.

## 2023-03-22 ENCOUNTER — PATIENT OUTREACH (OUTPATIENT)
Dept: CASE MANAGEMENT | Age: 88
End: 2023-03-22

## 2023-03-23 NOTE — PROGRESS NOTES
Post Acute Facility Update     *The information contained in this note was received during a weekly care coordination call with the post acute facility*    Current Facility: [address] (SNF)  Anticipated Discharge Date: 3 weeks     Facility Nursing Update: Patient started on sodium chloride today,1000 mg PO BID for history of hyponatremia. CBC and CMP to be drawn on 03/23/23. Weekly weights x4 ordered. Current weight- 165 lbs. Facility Social Work Update: Patient was living with his daughter prior to recent hospitalization, plans to return. Daughter informed discharge planner that she works full time, but her sister-in-law will be hired by family to be her father's caregiver Mon-Fri upon discharge. Bundle Program Status: none  Upper Extremity Assistance: Minimal Assistance   Lower Extremity Assistance: Minimal Assistance   Bed Mobility: Independent  Transfers: Minimal Assistance   Ambulation: Minimal Assistance   How far (in feet) is the patient ambulating?  20  Device Used: rollator   Barriers to Discharge: TBD     Johnny BESS, RN  PAC- Team

## 2023-03-24 ENCOUNTER — PATIENT OUTREACH (OUTPATIENT)
Dept: CASE MANAGEMENT | Age: 88
End: 2023-03-24

## 2023-03-24 NOTE — PROGRESS NOTES
Post Acute Facility Update     *The information contained in this note was received during a weekly [facility] Chart Review*    Current Facility: [address] (SNF)  Anticipated Discharge Date: 3 weeks     Facility Nursing Update: BP- 161/74, HR- 71, Temperature- 96.9, Respirations- 16, O2 Sat- 97% on room air. Patient seen by in house SNF provider today, no new orders. Results of labs drawn on 03/23 were reviewed by provider and noted as stable. Facility Social Work Update: No updates   Bundle Program Status: none  Upper Extremity Assistance: Minimal Assistance   Lower Extremity Assistance: Minimal Assistance   Bed Mobility: Independent  Transfers: Minimal Assistance   Ambulation: Contact Guard Assist - hands on patient for balance   How far (in feet) is the patient ambulating?  150  Device Used: rollator   Barriers to Discharge: none     Garrett BALTAZARN, RN  Jenny

## 2023-03-29 ENCOUNTER — PATIENT OUTREACH (OUTPATIENT)
Dept: CASE MANAGEMENT | Age: 88
End: 2023-03-29

## 2023-03-29 NOTE — PROGRESS NOTES
Post Acute Facility Update     *The information contained in this note was received during a weekly care coordination call with the post acute facility*    Current Facility: [address]  ([facility])  Anticipated Discharge Date: 04/13/2023     Facility Nursing Update: Patient had a psychiatric evaluation on 03/28/23 due to psychosis and psychotic agitation which happens in the evening hours. Recommendation to continue taking zyprexa 5 mg PO at bedtime as well as melatonin 3 mg PO at bedtime. Does not recommend additional psychotrpic interventions at this time. Facility Social Work Update: Return home with daughter   Bundle Program Status: none  Upper Extremity Assistance: set up assistance   Lower Extremity Assistance: set up assistance  Bed Mobility: Independent  Transfers: Independent  Ambulation: Stand by Assist - Presence and Cueing  How far (in feet) is the patient ambulating?  Ambulated to and from the gym   Device Used: rollator   Barriers to Discharge: none     Ruben BESS, RN  PAC- Team

## 2023-03-31 ENCOUNTER — PATIENT OUTREACH (OUTPATIENT)
Dept: CASE MANAGEMENT | Age: 88
End: 2023-03-31

## 2023-03-31 NOTE — PROGRESS NOTES
Post Acute Facility Update     *The information contained in this note was received during a weekly  Chart Review*    Current Facility: [address]  (SNF)  Anticipated Discharge Date: 04/13/2023    Facility Nursing Update: Vitals stable- 138/81, HR- 64, Temperature- 97.8, Respirations- 16, O2sat- 96% on room air. Patient medically stable. No new orders. Facility Social Work Update: No new updates   Bundle Program Status: none  Upper Extremity Assistance: set up assistance   Lower Extremity Assistance: set up assistance   Bed Mobility: Independent  Transfers: Independent  Ambulation: Modified independent   How far (in feet) is the patient ambulating?  About 800   Device Used: Rollator   Barriers to Discharge: none   Other: gait training outdoors     Salvador BALTAZARN, RN  Jenny

## 2023-04-05 ENCOUNTER — PATIENT OUTREACH (OUTPATIENT)
Dept: CASE MANAGEMENT | Age: 88
End: 2023-04-05

## 2023-04-05 ENCOUNTER — TELEPHONE (OUTPATIENT)
Dept: FAMILY MEDICINE CLINIC | Age: 88
End: 2023-04-05

## 2023-04-05 NOTE — TELEPHONE ENCOUNTER
Lee Slider [facility]  [phone]  ext:[phone]  Micael Mohs is calling and requesting   -Requesting a verbal ok for the [address] [place] [address] staff to administer the pt's Monthly B12 injection for the month of April  -Dosage of B12 injection   -Micael Mohs requesting a call back regarding

## 2023-04-05 NOTE — PROGRESS NOTES
Post Acute Facility Update     *The information contained in this note was received during a weekly care coordination call with the post acute facility*    Current Facility: [address] [place], [place] [address]  (SNF)  Anticipated Discharge Date: 04/13/2023    Facility Nursing Update: Patient's BP has been elevated, started on Lisinopril 5 mg PO daily on 04/03. BP has improved, currently- 110/60. Patient is incontinent of urine at times. Facility Social Work Update: Patient to return home with his daughter with a caregiver Mon-Fri. Referral for [facility] will be sent. PCP follow up visit scheduled for 04/14 @ 3:20   Bundle Program Status: none  Upper Extremity Assistance: Stand by Assist - Presence and Cueing  Lower Extremity Assistance: Stand by Assist - Presence and Cueing  Bed Mobility: Independent  Transfers: Stand by Assist - Presence and Cueing  Ambulation: modified independent   How far (in feet) is the patient ambulating?  1500   Device Used: rollator   Barriers to Discharge: none   Other: Gait training outside on even and uneven surfaces, 4 stairs with supervision      Julianne BESS, RN  PAC- Team

## 2023-04-06 NOTE — TELEPHONE ENCOUNTER
Spoke with [facility]/ [facility] and informed of vitamin b12 orders from provider. [facility] verified understanding and had no further questions.

## 2023-04-07 ENCOUNTER — PATIENT OUTREACH (OUTPATIENT)
Dept: CASE MANAGEMENT | Age: 88
End: 2023-04-07

## 2023-04-17 ENCOUNTER — PATIENT OUTREACH (OUTPATIENT)
Dept: CASE MANAGEMENT | Age: 88
End: 2023-04-17

## 2023-04-17 NOTE — PROGRESS NOTES
Writer confirmed with Mr. Esdras Campbell daughter, Clint Griggs that [facility] came out to see patient for his initial start of care visit on Saturday, 04/15/23. Referral sent to [facility] team based on severity of symptoms and risk factors. PAC UM Team will now sign off.      Cady BESS, RN  PAC-UM Team

## 2023-04-18 ENCOUNTER — PATIENT OUTREACH (OUTPATIENT)
Dept: CASE MANAGEMENT | Age: 88
End: 2023-04-18

## 2023-04-18 NOTE — PROGRESS NOTES
Ambulatory Care Management Note    Date/Time:  4/18/2023 10:03 AM    Patient Current Location: [place]     This patient was received as a referral from  [facility] team .  Ambulatory Care Manager outreached to patient today to offer care management services. Introduction to self and role of care manager provided. Patient accepted care management services at this time. Follow up call scheduled at this time. Patient has Ambulatory Care Manager's contact number for any questions or concerns. Patients daughter Richard Gifford reports that [facility] PT and nurse were out and the OT is coming today. The patient is doing great, Jaylene Huber has done a complete 360 degree turnaround\". Patients daughter reports he is swallowing without difficulty and has no c/o dysphagia. He has a good appetite and is eating well. He is going on outings with her and feeling great. No complaints at this time. [facility] provided this CM's contact info for any questions or concerns. Advised that otherwise the CM will be in touch in 1-2 wks for full CM assessment.

## 2023-04-19 DIAGNOSIS — I10 HYPERTENSION, UNSPECIFIED TYPE: ICD-10-CM

## 2023-04-19 DIAGNOSIS — E87.1 HYPONATREMIA: Primary | ICD-10-CM

## 2023-05-19 ENCOUNTER — NURSE ONLY (OUTPATIENT)
Age: 88
End: 2023-05-19
Payer: MEDICARE

## 2023-05-19 DIAGNOSIS — E53.8 DEFICIENCY OF OTHER SPECIFIED B GROUP VITAMINS: ICD-10-CM

## 2023-05-19 DIAGNOSIS — E87.1 HYPONATREMIA: Primary | ICD-10-CM

## 2023-05-19 PROCEDURE — PBSHW PBB SHADOW CHARGE: Performed by: FAMILY MEDICINE

## 2023-05-19 RX ORDER — LANOLIN ALCOHOL/MO/W.PET/CERES
3 CREAM (GRAM) TOPICAL DAILY
Qty: 90 TABLET | Refills: 3 | Status: SHIPPED | OUTPATIENT
Start: 2023-05-19

## 2023-05-19 RX ORDER — SODIUM CHLORIDE 1 G/1
1 TABLET ORAL 3 TIMES DAILY
COMMUNITY
End: 2023-05-19 | Stop reason: SDUPTHER

## 2023-05-19 RX ORDER — SODIUM CHLORIDE 1 G/1
1 TABLET ORAL 3 TIMES DAILY
Qty: 90 TABLET | Refills: 3 | Status: SHIPPED | OUTPATIENT
Start: 2023-05-19

## 2023-05-19 RX ORDER — CYANOCOBALAMIN 1000 UG/ML
1000 INJECTION, SOLUTION INTRAMUSCULAR; SUBCUTANEOUS ONCE
Status: COMPLETED | OUTPATIENT
Start: 2023-05-19 | End: 2023-05-19

## 2023-05-19 RX ORDER — LANOLIN ALCOHOL/MO/W.PET/CERES
3 CREAM (GRAM) TOPICAL DAILY
COMMUNITY
End: 2023-05-19 | Stop reason: SDUPTHER

## 2023-05-19 RX ADMIN — CYANOCOBALAMIN 1000 MCG: 1000 INJECTION, SOLUTION INTRAMUSCULAR at 16:57

## 2023-05-19 NOTE — PROGRESS NOTES
After obtaining consent, and per orders of Dr. Drake Bernard, injection of B12 given in Left deltoid by Aman Kessler, LPN. No reaction noted. AVS refused.

## 2023-05-20 LAB
ALBUMIN SERPL-MCNC: 3.2 G/DL (ref 3.5–5)
ALBUMIN/GLOB SERPL: 1.1 (ref 1.1–2.2)
ALP SERPL-CCNC: 96 U/L (ref 45–117)
ALT SERPL-CCNC: 20 U/L (ref 12–78)
ANION GAP SERPL CALC-SCNC: 4 MMOL/L (ref 5–15)
AST SERPL-CCNC: 24 U/L (ref 15–37)
BILIRUB SERPL-MCNC: 0.4 MG/DL (ref 0.2–1)
BUN SERPL-MCNC: 22 MG/DL (ref 6–20)
BUN/CREAT SERPL: 20 (ref 12–20)
CALCIUM SERPL-MCNC: 8.5 MG/DL (ref 8.5–10.1)
CHLORIDE SERPL-SCNC: 104 MMOL/L (ref 97–108)
CO2 SERPL-SCNC: 29 MMOL/L (ref 21–32)
CREAT SERPL-MCNC: 1.11 MG/DL (ref 0.7–1.3)
GLOBULIN SER CALC-MCNC: 2.9 G/DL (ref 2–4)
GLUCOSE SERPL-MCNC: 102 MG/DL (ref 65–100)
POTASSIUM SERPL-SCNC: 4.4 MMOL/L (ref 3.5–5.1)
PROT SERPL-MCNC: 6.1 G/DL (ref 6.4–8.2)
SODIUM SERPL-SCNC: 137 MMOL/L (ref 136–145)

## 2023-05-24 ENCOUNTER — TELEPHONE (OUTPATIENT)
Age: 88
End: 2023-05-24

## 2023-05-24 DIAGNOSIS — E87.1 HYPONATREMIA: Primary | ICD-10-CM

## 2023-05-24 NOTE — TELEPHONE ENCOUNTER
Results/ recommendations reviewed with Anlora Zee (daughter). She'll call back to schedule labs/B12 next month.

## 2023-05-24 NOTE — TELEPHONE ENCOUNTER
Labs reviewed. Sodium levels fine. Is he still taking his sodium tablet?   If so we can try cutting the dose in half and see how his sodium does over the next month

## 2023-06-23 ENCOUNTER — NURSE ONLY (OUTPATIENT)
Age: 88
End: 2023-06-23
Payer: MEDICARE

## 2023-06-23 VITALS
HEART RATE: 68 BPM | DIASTOLIC BLOOD PRESSURE: 69 MMHG | WEIGHT: 156.8 LBS | BODY MASS INDEX: 22.45 KG/M2 | OXYGEN SATURATION: 98 % | SYSTOLIC BLOOD PRESSURE: 122 MMHG | RESPIRATION RATE: 18 BRPM | HEIGHT: 70 IN | TEMPERATURE: 98 F

## 2023-06-23 DIAGNOSIS — E87.1 HYPONATREMIA: ICD-10-CM

## 2023-06-23 DIAGNOSIS — I10 ESSENTIAL (PRIMARY) HYPERTENSION: ICD-10-CM

## 2023-06-23 DIAGNOSIS — E53.8 B12 DEFICIENCY: ICD-10-CM

## 2023-06-23 DIAGNOSIS — S22.42XD CLOSED FRACTURE OF MULTIPLE RIBS OF LEFT SIDE WITH ROUTINE HEALING, SUBSEQUENT ENCOUNTER: Primary | ICD-10-CM
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
## 2023-06-23 PROCEDURE — 99214 OFFICE O/P EST MOD 30 MIN: CPT | Performed by: FAMILY MEDICINE

## 2023-06-23 RX ORDER — CYANOCOBALAMIN 1000 UG/ML
1000 INJECTION, SOLUTION INTRAMUSCULAR; SUBCUTANEOUS ONCE
Status: COMPLETED | OUTPATIENT
Start: 2023-06-23 | End: 2023-06-23

## 2023-06-23 RX ADMIN — CYANOCOBALAMIN 1000 MCG: 1000 INJECTION, SOLUTION INTRAMUSCULAR at 13:04

## 2023-06-23 SDOH — ECONOMIC STABILITY: FOOD INSECURITY: WITHIN THE PAST 12 MONTHS, YOU WORRIED THAT YOUR FOOD WOULD RUN OUT BEFORE YOU GOT MONEY TO BUY MORE.: NEVER TRUE

## 2023-06-23 SDOH — ECONOMIC STABILITY: HOUSING INSECURITY
IN THE LAST 12 MONTHS, WAS THERE A TIME WHEN YOU DID NOT HAVE A STEADY PLACE TO SLEEP OR SLEPT IN A SHELTER (INCLUDING NOW)?: NO

## 2023-06-23 SDOH — ECONOMIC STABILITY: FOOD INSECURITY: WITHIN THE PAST 12 MONTHS, THE FOOD YOU BOUGHT JUST DIDN'T LAST AND YOU DIDN'T HAVE MONEY TO GET MORE.: NEVER TRUE

## 2023-06-23 SDOH — ECONOMIC STABILITY: INCOME INSECURITY: HOW HARD IS IT FOR YOU TO PAY FOR THE VERY BASICS LIKE FOOD, HOUSING, MEDICAL CARE, AND HEATING?: NOT HARD AT ALL

## 2023-06-23 NOTE — PROGRESS NOTES
Room:1  I have reviewed all needed documentation in preparation for visit. Verified patient by name and date of birth  Chief Complaint   Patient presents with    Injections     B12     Pt ambulates in on rolator and per PCP orders receives a b12 injection of 100mcg Cyanocobalamin. Consent obtained for injection. Pt reports a recent fall x1 week ago 6/13/23, broke tenth rib left side. Brought to pcp attention. Pt went to urgent care to be assessed, reports Fx on left tenth rib. Notes in chart from urgent care. Pt complains of weight loss. Vitals:    06/23/23 1306   BP: 122/69   Site: Left Upper Arm   Position: Sitting   Cuff Size: Medium Adult   Pulse: 68   Resp: 18   Temp: 98 °F (36.7 °C)   TempSrc: Temporal   SpO2: 98%   Weight: 156 lb 12.8 oz (71.1 kg)   Height: 5' 10\" (1.778 m)        Health Maintenance Due   Topic Date Due    Shingles vaccine (1 of 2) Never done    COVID-19 Vaccine (3 - Booster for Moderna series) 04/03/2022        1. \"Have you been to the ER, urgent care clinic since your last visit? Hospitalized since your last visit? \" Yes urgent care for fall    2. \"Have you seen or consulted any other health care providers outside of the [address] [place] [address] since your last visit? \" no     3. For patients aged 39-70: Has the patient had a colonoscopy / FIT/ Cologuard? N/a      If the patient is female:    4. For patients aged 41-77: Has the patient had a mammogram within the past 2 years? N/a      5. For patients aged 21-65: Has the patient had a pap smear?  N/a      Chief Complaint   Patient presents with    Injections     B12       Vitals:    06/23/23 1306   BP: 122/69   Site: Left Upper Arm   Position: Sitting   Cuff Size: Medium Adult   Pulse: 68   Resp: 18   Temp: 98 °F (36.7 °C)   TempSrc: Temporal   SpO2: 98%   Weight: 156 lb 12.8 oz (71.1 kg)   Height: 5' 10\" (1.778 m)        After obtaining consent, and per orders of Dr. Jami Kennedy, injection of 1000mcg b12 given in Left deltoid by [facility]
NEUROLOGIC:  No focal neurologic deficits. Strength and sensation grossly intact. Coordination and gait grossly intact. EXT: Well perfused. No edema. SKIN: No obvious rashes. Lungs clear to auscultation bilaterally. No rales in the bases         Assessment and Plan     Fall with rib fractures  Mechanical fall  Rib fractures appear to be healing well. Does not currently have pain complaint. No longer requires Tylenol for pain. Using an incentive spirometer appropriately. Weight loss  Concerning but appears to have correlated with injury and change in bowel habits since the fall. He feels like this is getting back to normal.  Had a full meal at [address] yesterday which he considers a good sign that he will start gaining weight again soon. I recommend they keep an eye on his weight few times per week. Hyponatremia  Currently taking half a salt tab twice daily. Has not had a sodium checked recently. We can draw this today. If it is normal I can recommend that he stop taking sodium. If we do that he should get it checked again in 2 weeks. Make sure you are fluid restricting 1.5 L or less  He expressed a desire to salt his food. Feels like it is bland. That would be fine      ICD-10-CM    1. Closed fracture of multiple ribs of left side with routine healing, subsequent encounter  S22.42XD       2. B12 deficiency  E53.8 cyanocobalamin injection 1,000 mcg      3. Hyponatremia  E87.1 Comprehensive Metabolic Panel     Comprehensive Metabolic Panel      4. Essential (primary) hypertension  I10       5. Fall, subsequent encounter  W19. XXXD             AVS given.  Pt expressed understanding of instructions

## 2023-06-24 LAB
ALBUMIN SERPL-MCNC: 3 G/DL (ref 3.5–5)
ALBUMIN/GLOB SERPL: 1 (ref 1.1–2.2)
ALP SERPL-CCNC: 125 U/L (ref 45–117)
ALT SERPL-CCNC: 31 U/L (ref 12–78)
ANION GAP SERPL CALC-SCNC: 6 MMOL/L (ref 5–15)
AST SERPL-CCNC: 22 U/L (ref 15–37)
BILIRUB SERPL-MCNC: 0.4 MG/DL (ref 0.2–1)
BUN SERPL-MCNC: 20 MG/DL (ref 6–20)
BUN/CREAT SERPL: 20 (ref 12–20)
CALCIUM SERPL-MCNC: 8.8 MG/DL (ref 8.5–10.1)
CHLORIDE SERPL-SCNC: 106 MMOL/L (ref 97–108)
CO2 SERPL-SCNC: 28 MMOL/L (ref 21–32)
CREAT SERPL-MCNC: 1 MG/DL (ref 0.7–1.3)
GLOBULIN SER CALC-MCNC: 3 G/DL (ref 2–4)
GLUCOSE SERPL-MCNC: 85 MG/DL (ref 65–100)
POTASSIUM SERPL-SCNC: 4.7 MMOL/L (ref 3.5–5.1)
PROT SERPL-MCNC: 6 G/DL (ref 6.4–8.2)
SODIUM SERPL-SCNC: 140 MMOL/L (ref 136–145)

## 2023-07-21 ENCOUNTER — NURSE ONLY (OUTPATIENT)
Age: 88
End: 2023-07-21
Payer: MEDICARE

## 2023-07-21 DIAGNOSIS — E53.8 B12 DEFICIENCY: Primary | ICD-10-CM

## 2023-07-21 PROCEDURE — PBSHW PBB SHADOW CHARGE: Performed by: FAMILY MEDICINE

## 2023-07-21 RX ORDER — CYANOCOBALAMIN 1000 UG/ML
1000 INJECTION, SOLUTION INTRAMUSCULAR; SUBCUTANEOUS ONCE
Status: COMPLETED | OUTPATIENT
Start: 2023-07-21 | End: 2023-07-21

## 2023-07-21 RX ADMIN — CYANOCOBALAMIN 1000 MCG: 1000 INJECTION, SOLUTION INTRAMUSCULAR at 15:31

## 2023-07-24 ENCOUNTER — TELEPHONE (OUTPATIENT)
Age: 88
End: 2023-07-24

## 2023-07-24 RX ORDER — METOPROLOL SUCCINATE 50 MG/1
50 TABLET, EXTENDED RELEASE ORAL DAILY
Qty: 90 TABLET | Refills: 3 | Status: SHIPPED | OUTPATIENT
Start: 2023-07-24

## 2023-07-24 NOTE — TELEPHONE ENCOUNTER
Pt came in Friday for nurse visit and he states the pharmacy won't fill his Metoprolol 50 mg and has refills. Pt's daughter in law would like to know if you can resend a prescription?

## 2023-07-25 NOTE — TELEPHONE ENCOUNTER
verified with pts daughter. Informed daughter of medication refilled and verified pharmacy. Daughter verified understanding and had no further questions.

## 2023-08-04 ENCOUNTER — TELEPHONE (OUTPATIENT)
Age: 88
End: 2023-08-04

## 2023-08-04 NOTE — TELEPHONE ENCOUNTER
----- Message from Drake Molly sent at 8/3/2023  4:42 PM EDT -----  Subject: Message to Provider    QUESTIONS  Information for Provider? Pt daughter Waqas Paul) is calling on behalf of pt. Would like to schedule b12 shot towrds the end of August. Pleaase contact   to discuss as soon as possible.  ---------------------------------------------------------------------------  --------------  Emile Vasquez [facility]  [phone]; OK to leave message on voicemail  ---------------------------------------------------------------------------  --------------  SCRIPT ANSWERS  Relationship to Patient? Other/Third Party  Representative Name? Sister? Brynn Santillan  Is the representative on the Communication Release of Information (SHAYNA)   form in Epic?  Yes

## 2023-08-24 ENCOUNTER — TELEPHONE (OUTPATIENT)
Age: 88
End: 2023-08-24

## 2023-08-24 ENCOUNTER — NURSE ONLY (OUTPATIENT)
Age: 88
End: 2023-08-24
Payer: MEDICARE

## 2023-08-24 DIAGNOSIS — E53.8 B12 DEFICIENCY: Primary | ICD-10-CM

## 2023-08-24 PROCEDURE — PBSHW PBB SHADOW CHARGE

## 2023-08-24 RX ORDER — ATORVASTATIN CALCIUM 20 MG/1
TABLET, FILM COATED ORAL
Qty: 90 TABLET | Refills: 3 | Status: SHIPPED | OUTPATIENT
Start: 2023-08-24 | End: 2023-08-25 | Stop reason: SDUPTHER

## 2023-08-24 RX ORDER — CYANOCOBALAMIN 1000 UG/ML
1000 INJECTION, SOLUTION INTRAMUSCULAR; SUBCUTANEOUS ONCE
Status: COMPLETED | OUTPATIENT
Start: 2023-08-24 | End: 2023-08-24

## 2023-08-24 RX ADMIN — CYANOCOBALAMIN 1000 MCG: 1000 INJECTION, SOLUTION INTRAMUSCULAR at 14:24

## 2023-08-25 RX ORDER — ATORVASTATIN CALCIUM 20 MG/1
20 TABLET, FILM COATED ORAL DAILY
Qty: 90 TABLET | Refills: 3 | Status: SHIPPED | OUTPATIENT
Start: 2023-08-25

## 2023-09-01 RX ORDER — LEVOTHYROXINE SODIUM 0.1 MG/1
TABLET ORAL
Qty: 90 TABLET | Refills: 3 | Status: SHIPPED | OUTPATIENT
Start: 2023-09-01

## 2023-09-15 ENCOUNTER — NURSE ONLY (OUTPATIENT)
Age: 88
End: 2023-09-15
Payer: MEDICARE

## 2023-09-15 DIAGNOSIS — E53.8 B12 DEFICIENCY: Primary | ICD-10-CM

## 2023-09-15 DIAGNOSIS — Z23 ENCOUNTER FOR IMMUNIZATION: ICD-10-CM

## 2023-09-15 PROCEDURE — 90694 VACC AIIV4 NO PRSRV 0.5ML IM: CPT

## 2023-09-15 RX ORDER — CLOPIDOGREL BISULFATE 75 MG/1
75 TABLET ORAL DAILY
Qty: 90 TABLET | Refills: 3 | Status: SHIPPED | OUTPATIENT
Start: 2023-09-15

## 2023-09-15 RX ORDER — CYANOCOBALAMIN 1000 UG/ML
1000 INJECTION, SOLUTION INTRAMUSCULAR; SUBCUTANEOUS ONCE
Status: COMPLETED | OUTPATIENT
Start: 2023-09-15 | End: 2023-09-15

## 2023-09-15 RX ADMIN — CYANOCOBALAMIN 1000 MCG: 1000 INJECTION, SOLUTION INTRAMUSCULAR at 15:05

## 2023-09-15 NOTE — PROGRESS NOTES
After obtaining consent, and per orders of Dr. Claudene Pulley, injection of B12 given in Left deltoid by Jamie Talley LPN.      Immunizations Administered       Name Date Dose Route    Influenza, FLUAD, (age 72 y+), Adjuvanted, 0.5mL 9/15/2023 0.5 mL Intramuscular    Site: Deltoid- Left    Lot: [id number]    NDC: 41066-493-84

## 2023-10-13 ENCOUNTER — NURSE ONLY (OUTPATIENT)
Age: 88
End: 2023-10-13
Payer: MEDICARE

## 2023-10-13 DIAGNOSIS — E53.8 B12 DEFICIENCY: Primary | ICD-10-CM

## 2023-10-13 PROCEDURE — PBSHW PBB SHADOW CHARGE: Performed by: FAMILY MEDICINE

## 2023-10-13 RX ORDER — LISINOPRIL 5 MG/1
5 TABLET ORAL DAILY
Qty: 90 TABLET | Refills: 3 | Status: SHIPPED | OUTPATIENT
Start: 2023-10-13

## 2023-10-13 RX ORDER — CYANOCOBALAMIN 1000 UG/ML
1000 INJECTION, SOLUTION INTRAMUSCULAR; SUBCUTANEOUS ONCE
Status: COMPLETED | OUTPATIENT
Start: 2023-10-13 | End: 2023-10-13

## 2023-10-13 RX ADMIN — CYANOCOBALAMIN 1000 MCG: 1000 INJECTION, SOLUTION INTRAMUSCULAR at 15:13

## 2023-11-10 ENCOUNTER — NURSE ONLY (OUTPATIENT)
Age: 88
End: 2023-11-10
Payer: MEDICARE

## 2023-11-10 DIAGNOSIS — E53.8 B12 DEFICIENCY: Primary | ICD-10-CM

## 2023-11-10 PROCEDURE — PBSHW PBB SHADOW CHARGE: Performed by: FAMILY MEDICINE

## 2023-11-10 RX ORDER — CYANOCOBALAMIN 1000 UG/ML
1000 INJECTION, SOLUTION INTRAMUSCULAR; SUBCUTANEOUS ONCE
Status: COMPLETED | OUTPATIENT
Start: 2023-11-10 | End: 2023-11-10

## 2023-11-10 RX ADMIN — CYANOCOBALAMIN 1000 MCG: 1000 INJECTION, SOLUTION INTRAMUSCULAR at 13:23

## 2023-12-08 ENCOUNTER — NURSE ONLY (OUTPATIENT)
Age: 88
End: 2023-12-08
Payer: MEDICARE

## 2023-12-08 DIAGNOSIS — E53.8 B12 DEFICIENCY: Primary | ICD-10-CM

## 2023-12-08 PROCEDURE — PBSHW PBB SHADOW CHARGE: Performed by: FAMILY MEDICINE

## 2023-12-08 RX ORDER — CYANOCOBALAMIN 1000 UG/ML
1000 INJECTION, SOLUTION INTRAMUSCULAR; SUBCUTANEOUS ONCE
Status: COMPLETED | OUTPATIENT
Start: 2023-12-08 | End: 2023-12-08

## 2023-12-08 RX ADMIN — CYANOCOBALAMIN 1000 MCG: 1000 INJECTION, SOLUTION INTRAMUSCULAR at 13:05

## 2024-01-05 ENCOUNTER — NURSE ONLY (OUTPATIENT)
Age: 89
End: 2024-01-05
Payer: MEDICARE

## 2024-01-05 DIAGNOSIS — E53.8 B12 DEFICIENCY: Primary | ICD-10-CM

## 2024-01-05 PROCEDURE — PBSHW PBB SHADOW CHARGE: Performed by: FAMILY MEDICINE

## 2024-01-05 RX ORDER — CYANOCOBALAMIN 1000 UG/ML
1000 INJECTION, SOLUTION INTRAMUSCULAR; SUBCUTANEOUS ONCE
Status: COMPLETED | OUTPATIENT
Start: 2024-01-05 | End: 2024-01-05

## 2024-01-05 RX ADMIN — CYANOCOBALAMIN 1000 MCG: 1000 INJECTION, SOLUTION INTRAMUSCULAR at 13:32

## 2024-02-02 ENCOUNTER — NURSE ONLY (OUTPATIENT)
Age: 89
End: 2024-02-02
Payer: MEDICARE

## 2024-02-02 DIAGNOSIS — E53.8 B12 DEFICIENCY: Primary | ICD-10-CM

## 2024-02-02 PROCEDURE — PBSHW PBB SHADOW CHARGE: Performed by: FAMILY MEDICINE

## 2024-02-02 RX ORDER — CYANOCOBALAMIN 1000 UG/ML
1000 INJECTION, SOLUTION INTRAMUSCULAR; SUBCUTANEOUS ONCE
Status: COMPLETED | OUTPATIENT
Start: 2024-02-02 | End: 2024-02-02

## 2024-02-02 RX ADMIN — CYANOCOBALAMIN 1000 MCG: 1000 INJECTION, SOLUTION INTRAMUSCULAR at 13:58

## 2024-02-15 ENCOUNTER — TELEPHONE (OUTPATIENT)
Age: 89
End: 2024-02-15

## 2024-02-15 NOTE — TELEPHONE ENCOUNTER
----- Message from Nathalia Loraine sent at 2/15/2024  9:58 AM EST -----  Subject: Message to Provider    QUESTIONS  Information for Provider? Patient's daughter Casi is wanting to schedule   B12 shots for 3 months.   ---------------------------------------------------------------------------  --------------  CALL BACK INFO  [phone]; OK to leave message on voicemail  ---------------------------------------------------------------------------  --------------  SCRIPT ANSWERS  Relationship to Patient? Covered Entity  Covered Entity Type? Other  Other Covered Entity Type? daughter  Representative Name? Casi

## 2024-03-08 ENCOUNTER — NURSE ONLY (OUTPATIENT)
Age: 89
End: 2024-03-08
Payer: MEDICARE

## 2024-03-08 DIAGNOSIS — E53.8 B12 DEFICIENCY: Primary | ICD-10-CM

## 2024-03-08 PROCEDURE — PBSHW PBB SHADOW CHARGE: Performed by: FAMILY MEDICINE

## 2024-03-08 RX ORDER — CYANOCOBALAMIN 1000 UG/ML
1000 INJECTION, SOLUTION INTRAMUSCULAR; SUBCUTANEOUS ONCE
Status: COMPLETED | OUTPATIENT
Start: 2024-03-08 | End: 2024-03-08

## 2024-03-08 RX ADMIN — CYANOCOBALAMIN 1000 MCG: 1000 INJECTION, SOLUTION INTRAMUSCULAR at 13:38

## 2024-03-29 RX ORDER — LISINOPRIL 5 MG/1
5 TABLET ORAL DAILY
Qty: 90 TABLET | Refills: 3 | Status: SHIPPED | OUTPATIENT
Start: 2024-03-29

## 2024-04-12 ENCOUNTER — NURSE ONLY (OUTPATIENT)
Age: 89
End: 2024-04-12
Payer: MEDICARE

## 2024-04-12 DIAGNOSIS — I71.40 ABDOMINAL AORTIC ANEURYSM (AAA) WITHOUT RUPTURE, UNSPECIFIED PART (HCC): Primary | ICD-10-CM

## 2024-04-12 DIAGNOSIS — E53.8 B12 DEFICIENCY: Primary | ICD-10-CM

## 2024-04-12 PROCEDURE — 96372 THER/PROPH/DIAG INJ SC/IM: CPT | Performed by: FAMILY MEDICINE

## 2024-04-12 PROCEDURE — PBSHW PBB SHADOW CHARGE: Performed by: FAMILY MEDICINE

## 2024-04-12 RX ORDER — CYANOCOBALAMIN 1000 UG/ML
1000 INJECTION, SOLUTION INTRAMUSCULAR; SUBCUTANEOUS ONCE
Status: COMPLETED | OUTPATIENT
Start: 2024-04-12 | End: 2024-04-12

## 2024-04-12 RX ADMIN — CYANOCOBALAMIN 1000 MCG: 1000 INJECTION, SOLUTION INTRAMUSCULAR at 15:28

## 2024-05-03 ENCOUNTER — NURSE ONLY (OUTPATIENT)
Age: 89
End: 2024-05-03
Payer: MEDICARE

## 2024-05-03 DIAGNOSIS — E53.8 B12 DEFICIENCY: Primary | ICD-10-CM

## 2024-05-03 PROCEDURE — 96372 THER/PROPH/DIAG INJ SC/IM: CPT | Performed by: FAMILY MEDICINE

## 2024-05-03 PROCEDURE — PBSHW PBB SHADOW CHARGE: Performed by: FAMILY MEDICINE

## 2024-05-03 RX ORDER — CYANOCOBALAMIN 1000 UG/ML
1000 INJECTION, SOLUTION INTRAMUSCULAR; SUBCUTANEOUS ONCE
Status: COMPLETED | OUTPATIENT
Start: 2024-05-03 | End: 2024-05-03

## 2024-05-03 RX ADMIN — CYANOCOBALAMIN 1000 MCG: 1000 INJECTION, SOLUTION INTRAMUSCULAR at 13:08

## 2024-06-07 ENCOUNTER — NURSE ONLY (OUTPATIENT)
Age: 89
End: 2024-06-07
Payer: MEDICARE

## 2024-06-07 DIAGNOSIS — E53.8 B12 DEFICIENCY: Primary | ICD-10-CM

## 2024-06-07 PROCEDURE — PBSHW PBB SHADOW CHARGE: Performed by: FAMILY MEDICINE

## 2024-06-07 PROCEDURE — 96372 THER/PROPH/DIAG INJ SC/IM: CPT | Performed by: FAMILY MEDICINE

## 2024-06-07 RX ORDER — CYANOCOBALAMIN 1000 UG/ML
1000 INJECTION, SOLUTION INTRAMUSCULAR; SUBCUTANEOUS ONCE
Status: COMPLETED | OUTPATIENT
Start: 2024-06-07 | End: 2024-06-07

## 2024-06-07 RX ADMIN — CYANOCOBALAMIN 1000 MCG: 1000 INJECTION, SOLUTION INTRAMUSCULAR at 12:59

## 2024-06-11 RX ORDER — METOPROLOL SUCCINATE 50 MG/1
50 TABLET, EXTENDED RELEASE ORAL DAILY
Qty: 90 TABLET | Refills: 3 | Status: SHIPPED | OUTPATIENT
Start: 2024-06-11

## 2024-06-24 RX ORDER — CLOPIDOGREL BISULFATE 75 MG/1
75 TABLET ORAL DAILY
Qty: 90 TABLET | Refills: 3 | Status: SHIPPED | OUTPATIENT
Start: 2024-06-24

## 2024-06-24 RX ORDER — ATORVASTATIN CALCIUM 20 MG/1
20 TABLET, FILM COATED ORAL DAILY
Qty: 90 TABLET | Refills: 3 | Status: SHIPPED | OUTPATIENT
Start: 2024-06-24

## 2024-07-03 ENCOUNTER — OFFICE VISIT (OUTPATIENT)
Age: 89
End: 2024-07-03
Payer: MEDICARE

## 2024-07-03 ENCOUNTER — TELEPHONE (OUTPATIENT)
Age: 89
End: 2024-07-03

## 2024-07-03 VITALS
HEIGHT: 70 IN | RESPIRATION RATE: 18 BRPM | BODY MASS INDEX: 24.17 KG/M2 | DIASTOLIC BLOOD PRESSURE: 85 MMHG | SYSTOLIC BLOOD PRESSURE: 171 MMHG | OXYGEN SATURATION: 97 % | WEIGHT: 168.8 LBS | HEART RATE: 55 BPM | TEMPERATURE: 98.1 F

## 2024-07-03 DIAGNOSIS — I10 ESSENTIAL (PRIMARY) HYPERTENSION: ICD-10-CM

## 2024-07-03 DIAGNOSIS — E55.9 VITAMIN D DEFICIENCY: ICD-10-CM

## 2024-07-03 DIAGNOSIS — E03.9 ACQUIRED HYPOTHYROIDISM: ICD-10-CM

## 2024-07-03 DIAGNOSIS — I73.9 PERIPHERAL VASCULAR DISEASE, UNSPECIFIED (HCC): ICD-10-CM

## 2024-07-03 DIAGNOSIS — C73 MALIGNANT NEOPLASM OF THYROID GLAND (HCC): ICD-10-CM

## 2024-07-03 DIAGNOSIS — Z00.00 ROUTINE GENERAL MEDICAL EXAMINATION AT A HEALTH CARE FACILITY: Primary | ICD-10-CM

## 2024-07-03 DIAGNOSIS — I48.91 ATRIAL FIBRILLATION, UNSPECIFIED TYPE (HCC): ICD-10-CM

## 2024-07-03 DIAGNOSIS — E53.8 B12 DEFICIENCY: ICD-10-CM

## 2024-07-03 DIAGNOSIS — G95.9 DISEASE OF SPINAL CORD, UNSPECIFIED (HCC): ICD-10-CM

## 2024-07-03 DIAGNOSIS — N18.30 STAGE 3 CHRONIC KIDNEY DISEASE, UNSPECIFIED WHETHER STAGE 3A OR 3B CKD (HCC): ICD-10-CM

## 2024-07-03 DIAGNOSIS — E22.2 SYNDROME OF INAPPROPRIATE SECRETION OF ANTIDIURETIC HORMONE (HCC): ICD-10-CM

## 2024-07-03 PROCEDURE — 1036F TOBACCO NON-USER: CPT | Performed by: FAMILY MEDICINE

## 2024-07-03 PROCEDURE — 96372 THER/PROPH/DIAG INJ SC/IM: CPT | Performed by: FAMILY MEDICINE

## 2024-07-03 PROCEDURE — G0439 PPPS, SUBSEQ VISIT: HCPCS | Performed by: FAMILY MEDICINE

## 2024-07-03 PROCEDURE — 1123F ACP DISCUSS/DSCN MKR DOCD: CPT | Performed by: FAMILY MEDICINE

## 2024-07-03 PROCEDURE — G8420 CALC BMI NORM PARAMETERS: HCPCS | Performed by: FAMILY MEDICINE

## 2024-07-03 PROCEDURE — PBSHW PBB SHADOW CHARGE: Performed by: FAMILY MEDICINE

## 2024-07-03 PROCEDURE — 99214 OFFICE O/P EST MOD 30 MIN: CPT | Performed by: FAMILY MEDICINE

## 2024-07-03 PROCEDURE — G8427 DOCREV CUR MEDS BY ELIG CLIN: HCPCS | Performed by: FAMILY MEDICINE

## 2024-07-03 RX ORDER — CYANOCOBALAMIN 1000 UG/ML
1000 INJECTION, SOLUTION INTRAMUSCULAR; SUBCUTANEOUS ONCE
Status: COMPLETED | OUTPATIENT
Start: 2024-07-03 | End: 2024-07-03

## 2024-07-03 RX ORDER — ATORVASTATIN CALCIUM 20 MG/1
20 TABLET, FILM COATED ORAL DAILY
Qty: 90 TABLET | Refills: 3 | Status: SHIPPED | OUTPATIENT
Start: 2024-07-03

## 2024-07-03 RX ADMIN — CYANOCOBALAMIN 1000 MCG: 1000 INJECTION, SOLUTION INTRAMUSCULAR at 14:27

## 2024-07-03 SDOH — ECONOMIC STABILITY: FOOD INSECURITY: WITHIN THE PAST 12 MONTHS, YOU WORRIED THAT YOUR FOOD WOULD RUN OUT BEFORE YOU GOT MONEY TO BUY MORE.: NEVER TRUE

## 2024-07-03 SDOH — ECONOMIC STABILITY: FOOD INSECURITY: WITHIN THE PAST 12 MONTHS, THE FOOD YOU BOUGHT JUST DIDN'T LAST AND YOU DIDN'T HAVE MONEY TO GET MORE.: NEVER TRUE

## 2024-07-03 SDOH — ECONOMIC STABILITY: INCOME INSECURITY: HOW HARD IS IT FOR YOU TO PAY FOR THE VERY BASICS LIKE FOOD, HOUSING, MEDICAL CARE, AND HEATING?: NOT HARD AT ALL

## 2024-07-03 ASSESSMENT — PATIENT HEALTH QUESTIONNAIRE - PHQ9
2. FEELING DOWN, DEPRESSED OR HOPELESS: NOT AT ALL
SUM OF ALL RESPONSES TO PHQ QUESTIONS 1-9: 0
1. LITTLE INTEREST OR PLEASURE IN DOING THINGS: NOT AT ALL
SUM OF ALL RESPONSES TO PHQ9 QUESTIONS 1 & 2: 0
SUM OF ALL RESPONSES TO PHQ QUESTIONS 1-9: 0

## 2024-07-03 ASSESSMENT — LIFESTYLE VARIABLES: HOW MANY STANDARD DRINKS CONTAINING ALCOHOL DO YOU HAVE ON A TYPICAL DAY: PATIENT DOES NOT DRINK

## 2024-07-03 NOTE — PROGRESS NOTES
Chief Complaint   Patient presents with    Medicare AWV         Health Maintenance Due   Topic Date Due    Shingles vaccine (1 of 2) Never done    Respiratory Syncytial Virus (RSV) Pregnant or age 60 yrs+ (1 - 1-dose 60+ series) Never done    COVID-19 Vaccine (3 - 2023-24 season) 09/01/2023    Lipids  10/03/2023    Annual Wellness Visit (Medicare)  11/27/2023    Prostate Specific Antigen (PSA) Screening or Monitoring  03/14/2024    Depression Screen  04/14/2024         \"Have you been to the ER, urgent care clinic since your last visit?  Hospitalized since your last visit?\"    NO    “Have you seen or consulted any other health care providers outside of [facility] since your last visit?”    NO         
History of thyroid cancer    Hyponatremia    Peripheral vascular disease (HCC)    Cancer of thyroid (HCC)       Depression Risk Factor Screening:          No data to display              Alcohol Risk Factor Screening:   On any occasion during the past 3 months, have you had more than 4 drinks containing alcohol?  No    Do you average more than 14 drinks per week?  No      Functional Ability and Level of Safety:     Hearing Loss   none    Activities of Daily Living   Self-care.   Requires assistance with: no ADLs    Fall Risk   Failed to redirect to the Timeline version of the [employer] SmartLink.  Abuse Screen   Patient is not abused    Review of Systems   ROS:  As listed in HPI. In addition:  Constitutional:   No headache, fever, fatigue, weight loss or weight gain      Eyes:   No redness, pruritis, pain, visual changes, swelling, or discharge      Ears:    No pain, loss or changes in hearing     Cardiac:    No chest pain      Resp:   No cough or shortness of breath      Neuro   No loss of consciousness, dizziness, seizure    Physical Examination     Evaluation of Cognitive Function:  Mood/affect:  happy  Appearance: age appropriate  Family member/caregiver input:     Physical Exam:  Blood pressure (!) 191/97, pulse 55, temperature 98.1 °F (36.7 °C), temperature source Temporal, resp. rate 18, height 1.778 m (5' 10\"), weight 76.6 kg (168 lb 12.8 oz), SpO2 97 %.  GEN: No apparent distress. Alert and oriented and responds to all questions appropriately.   EAR: External ears are normal.  Tympanic membranes obscured by cerumen bilaterally but this does not appear to be affecting his hearing  NOSE: Turbinates are within normal limits.  No drainage  OROPHYARYNX: No oral lesions or exudates.  NECK:  Supple; no masses; thyroid normal           LUNGS: Respirations unlabored; clear to auscultation bilaterally  CARDIOVASCULAR: Regular, rate, and rhythm without murmurs   EXT: Well perfused. No edema.  SKIN: No obvious

## 2024-07-03 NOTE — TELEPHONE ENCOUNTER
Blood pressure was high and we need to double check what blood pressure medicine he is actually taking at home.  Please contact patient and/or daughter.  Interested in the medicine that he actually has at home and that he is actually taking.  Based on this information I can make a change that will help bring his blood pressure down

## 2024-07-04 LAB
25(OH)D3 SERPL-MCNC: 49.1 NG/ML (ref 30–100)
ALBUMIN SERPL-MCNC: 3.7 G/DL (ref 3.5–5)
ALBUMIN/GLOB SERPL: 1.3 (ref 1.1–2.2)
ALP SERPL-CCNC: 96 U/L (ref 45–117)
ALT SERPL-CCNC: 26 U/L (ref 12–78)
ANION GAP SERPL CALC-SCNC: 4 MMOL/L (ref 5–15)
AST SERPL-CCNC: 27 U/L (ref 15–37)
BASOPHILS # BLD: 0 K/UL (ref 0–0.1)
BASOPHILS NFR BLD: 1 % (ref 0–1)
BILIRUB SERPL-MCNC: 0.6 MG/DL (ref 0.2–1)
BUN SERPL-MCNC: 21 MG/DL (ref 6–20)
BUN/CREAT SERPL: 19 (ref 12–20)
CALCIUM SERPL-MCNC: 9 MG/DL (ref 8.5–10.1)
CHLORIDE SERPL-SCNC: 99 MMOL/L (ref 97–108)
CHOLEST SERPL-MCNC: 190 MG/DL
CO2 SERPL-SCNC: 31 MMOL/L (ref 21–32)
CREAT SERPL-MCNC: 1.09 MG/DL (ref 0.7–1.3)
DIFFERENTIAL METHOD BLD: ABNORMAL
EOSINOPHIL # BLD: 0.1 K/UL (ref 0–0.4)
EOSINOPHIL NFR BLD: 2 % (ref 0–7)
ERYTHROCYTE [DISTWIDTH] IN BLOOD BY AUTOMATED COUNT: 13.1 % (ref 11.5–14.5)
FOLATE SERPL-MCNC: 24.2 NG/ML (ref 5–21)
GLOBULIN SER CALC-MCNC: 2.9 G/DL (ref 2–4)
GLUCOSE SERPL-MCNC: 88 MG/DL (ref 65–100)
HCT VFR BLD AUTO: 38.5 % (ref 36.6–50.3)
HDLC SERPL-MCNC: 93 MG/DL
HDLC SERPL: 2 (ref 0–5)
HGB BLD-MCNC: 12.6 G/DL (ref 12.1–17)
IMM GRANULOCYTES # BLD AUTO: 0 K/UL (ref 0–0.04)
IMM GRANULOCYTES NFR BLD AUTO: 0 % (ref 0–0.5)
LDLC SERPL CALC-MCNC: 82.8 MG/DL (ref 0–100)
LYMPHOCYTES # BLD: 0.9 K/UL (ref 0.8–3.5)
LYMPHOCYTES NFR BLD: 16 % (ref 12–49)
MCH RBC QN AUTO: 31.2 PG (ref 26–34)
MCHC RBC AUTO-ENTMCNC: 32.7 G/DL (ref 30–36.5)
MCV RBC AUTO: 95.3 FL (ref 80–99)
MONOCYTES # BLD: 0.5 K/UL (ref 0–1)
MONOCYTES NFR BLD: 9 % (ref 5–13)
NEUTS SEG # BLD: 4.4 K/UL (ref 1.8–8)
NEUTS SEG NFR BLD: 72 % (ref 32–75)
NRBC # BLD: 0 K/UL (ref 0–0.01)
NRBC BLD-RTO: 0 PER 100 WBC
PLATELET # BLD AUTO: 267 K/UL (ref 150–400)
PMV BLD AUTO: 9.2 FL (ref 8.9–12.9)
POTASSIUM SERPL-SCNC: 5 MMOL/L (ref 3.5–5.1)
PROT SERPL-MCNC: 6.6 G/DL (ref 6.4–8.2)
RBC # BLD AUTO: 4.04 M/UL (ref 4.1–5.7)
SODIUM SERPL-SCNC: 134 MMOL/L (ref 136–145)
T4 FREE SERPL-MCNC: 1.1 NG/DL (ref 0.8–1.5)
TRIGL SERPL-MCNC: 71 MG/DL
TSH SERPL DL<=0.05 MIU/L-ACNC: 1.78 UIU/ML (ref 0.36–3.74)
VIT B12 SERPL-MCNC: >2000 PG/ML (ref 193–986)
VLDLC SERPL CALC-MCNC: 14.2 MG/DL
WBC # BLD AUTO: 5.9 K/UL (ref 4.1–11.1)

## 2024-07-05 NOTE — TELEPHONE ENCOUNTER
Okay.  Took losartan off his medication list which was the source of the confusion    Check blood pressure at home and will increase lisinopril if we need to

## 2024-07-05 NOTE — TELEPHONE ENCOUNTER
Daughter states pt is taking Lisinopril 5mg and Metoprolol 50mg at home.  She states she ordered a new blood pressure machine that will arrive tomorrow morning and would like to wait on any med changes. She will call back Mon with BP readings from new machine.

## 2024-07-08 ENCOUNTER — TELEPHONE (OUTPATIENT)
Age: 89
End: 2024-07-08

## 2024-07-08 RX ORDER — LISINOPRIL 10 MG/1
10 TABLET ORAL DAILY
Qty: 90 TABLET | Refills: 3
Start: 2024-07-08 | End: 2024-07-10

## 2024-07-08 NOTE — TELEPHONE ENCOUNTER
Patients daughter is calling to let Dr. Holcomb know that she just sent a message through My Chart in regards to patients meds and Bps. Please feel free to message daughter back through My Chart.

## 2024-07-10 RX ORDER — AMLODIPINE BESYLATE 5 MG/1
5 TABLET ORAL DAILY
Qty: 90 TABLET | Refills: 3 | Status: SHIPPED | OUTPATIENT
Start: 2024-07-10

## 2024-07-10 RX ORDER — LISINOPRIL 10 MG/1
10 TABLET ORAL DAILY
Qty: 90 TABLET | Refills: 3 | OUTPATIENT
Start: 2024-07-10

## 2024-07-26 DIAGNOSIS — U07.1 COVID: Primary | ICD-10-CM

## 2024-07-26 NOTE — PROGRESS NOTES
He may benefit from molnupiravir.  This antiviral medication is intended to prevent progression to worse form of COVID but does not necessarily make you feel better faster.    Side effects could include nausea vomiting diarrhea.  Do not take the medication if you get a side effect.    I did send molnupiravir to [facility] but could not confirm that they have the medication in stock.  I sent a backup prescription to [facility]

## 2024-08-26 RX ORDER — LEVOTHYROXINE SODIUM 100 UG/1
TABLET ORAL
Qty: 90 TABLET | Refills: 3 | Status: SHIPPED | OUTPATIENT
Start: 2024-08-26

## 2024-09-12 ENCOUNTER — NURSE ONLY (OUTPATIENT)
Age: 89
End: 2024-09-12
Payer: MEDICARE

## 2024-09-12 DIAGNOSIS — E53.8 B12 DEFICIENCY: Primary | ICD-10-CM

## 2024-09-12 DIAGNOSIS — Z23 NEED FOR IMMUNIZATION AGAINST INFLUENZA: ICD-10-CM

## 2024-09-12 PROCEDURE — PBSHW PBB SHADOW CHARGE: Performed by: FAMILY MEDICINE

## 2024-09-12 PROCEDURE — 90471 IMMUNIZATION ADMIN: CPT | Performed by: FAMILY MEDICINE

## 2024-09-12 PROCEDURE — PBSHW INFLUENZA, FLUAD TRIVALENT, (AGE 65 Y+), IM, PRESERVATIVE FREE, 0.5ML: Performed by: FAMILY MEDICINE

## 2024-09-12 PROCEDURE — 96372 THER/PROPH/DIAG INJ SC/IM: CPT | Performed by: FAMILY MEDICINE

## 2024-09-12 RX ORDER — CYANOCOBALAMIN 1000 UG/ML
1000 INJECTION, SOLUTION INTRAMUSCULAR; SUBCUTANEOUS ONCE
Status: COMPLETED | OUTPATIENT
Start: 2024-09-12 | End: 2024-09-12

## 2024-09-12 RX ADMIN — CYANOCOBALAMIN 1000 MCG: 1000 INJECTION, SOLUTION INTRAMUSCULAR at 10:34

## 2024-10-11 ENCOUNTER — NURSE ONLY (OUTPATIENT)
Age: 89
End: 2024-10-11
Payer: MEDICARE

## 2024-10-11 DIAGNOSIS — E53.8 B12 DEFICIENCY: Primary | ICD-10-CM

## 2024-10-11 PROCEDURE — PBSHW PBB SHADOW CHARGE: Performed by: FAMILY MEDICINE

## 2024-10-11 PROCEDURE — 96372 THER/PROPH/DIAG INJ SC/IM: CPT | Performed by: FAMILY MEDICINE

## 2024-10-11 RX ORDER — CYANOCOBALAMIN 1000 UG/ML
1000 INJECTION, SOLUTION INTRAMUSCULAR; SUBCUTANEOUS ONCE
Status: COMPLETED | OUTPATIENT
Start: 2024-10-11 | End: 2024-10-11

## 2024-10-11 RX ADMIN — CYANOCOBALAMIN 1000 MCG: 1000 INJECTION, SOLUTION INTRAMUSCULAR at 13:31

## 2024-11-14 ENCOUNTER — NURSE ONLY (OUTPATIENT)
Age: 89
End: 2024-11-14
Payer: MEDICARE

## 2024-11-14 VITALS — BODY MASS INDEX: 24.05 KG/M2 | WEIGHT: 167.6 LBS

## 2024-11-14 DIAGNOSIS — E53.8 B12 DEFICIENCY: Primary | ICD-10-CM

## 2024-11-14 PROCEDURE — 96372 THER/PROPH/DIAG INJ SC/IM: CPT | Performed by: FAMILY MEDICINE

## 2024-11-14 PROCEDURE — PBSHW PBB SHADOW CHARGE: Performed by: FAMILY MEDICINE

## 2024-11-14 RX ORDER — CYANOCOBALAMIN 1000 UG/ML
1000 INJECTION, SOLUTION INTRAMUSCULAR; SUBCUTANEOUS ONCE
Status: COMPLETED | OUTPATIENT
Start: 2024-11-14 | End: 2024-11-14

## 2024-11-14 RX ADMIN — CYANOCOBALAMIN 1000 MCG: 1000 INJECTION, SOLUTION INTRAMUSCULAR at 14:59

## 2024-12-13 ENCOUNTER — NURSE ONLY (OUTPATIENT)
Age: 89
End: 2024-12-13
Payer: MEDICARE

## 2024-12-13 DIAGNOSIS — E53.8 B12 DEFICIENCY: Primary | ICD-10-CM

## 2024-12-13 PROCEDURE — PBSHW PBB SHADOW CHARGE: Performed by: FAMILY MEDICINE

## 2024-12-13 PROCEDURE — 96372 THER/PROPH/DIAG INJ SC/IM: CPT | Performed by: FAMILY MEDICINE

## 2024-12-13 RX ORDER — CYANOCOBALAMIN 1000 UG/ML
1000 INJECTION, SOLUTION INTRAMUSCULAR; SUBCUTANEOUS ONCE
Status: COMPLETED | OUTPATIENT
Start: 2024-12-13 | End: 2024-12-13

## 2024-12-13 RX ADMIN — CYANOCOBALAMIN 1000 MCG: 1000 INJECTION, SOLUTION INTRAMUSCULAR at 13:09

## 2024-12-13 NOTE — PROGRESS NOTES
The patient identity was confirmed with  and First/Last Name. Patient expressed they did not want to wait after shot for any interactions. I administered one(1)  B-12 shot to the patient in the Right Deltoid. See Administration details for more information.

## 2025-01-03 ENCOUNTER — HOSPITAL ENCOUNTER (INPATIENT)
Facility: HOSPITAL | Age: 89
LOS: 11 days | Discharge: NURSING, REHAB OR LONG-TERM CARE | DRG: 641 | End: 2025-01-14
Attending: STUDENT IN AN ORGANIZED HEALTH CARE EDUCATION/TRAINING PROGRAM | Admitting: STUDENT IN AN ORGANIZED HEALTH CARE EDUCATION/TRAINING PROGRAM
Payer: MEDICARE

## 2025-01-03 ENCOUNTER — OFFICE VISIT (OUTPATIENT)
Age: 89
End: 2025-01-03
Payer: MEDICARE

## 2025-01-03 ENCOUNTER — APPOINTMENT (OUTPATIENT)
Facility: HOSPITAL | Age: 89
DRG: 641 | End: 2025-01-03
Payer: MEDICARE

## 2025-01-03 VITALS
HEIGHT: 70 IN | OXYGEN SATURATION: 95 % | RESPIRATION RATE: 17 BRPM | SYSTOLIC BLOOD PRESSURE: 101 MMHG | DIASTOLIC BLOOD PRESSURE: 64 MMHG | WEIGHT: 161.2 LBS | TEMPERATURE: 97.8 F | HEART RATE: 58 BPM | BODY MASS INDEX: 23.08 KG/M2

## 2025-01-03 DIAGNOSIS — R47.01 EXPRESSIVE APHASIA: Primary | ICD-10-CM

## 2025-01-03 DIAGNOSIS — R53.1 WEAKNESS: ICD-10-CM

## 2025-01-03 DIAGNOSIS — R47.01 APHASIA: ICD-10-CM

## 2025-01-03 DIAGNOSIS — I44.1 SECOND DEGREE HEART BLOCK: ICD-10-CM

## 2025-01-03 DIAGNOSIS — R47.1 DYSARTHRIA: Primary | ICD-10-CM

## 2025-01-03 DIAGNOSIS — E87.1 HYPONATREMIA: ICD-10-CM

## 2025-01-03 DIAGNOSIS — E53.8 B12 DEFICIENCY: ICD-10-CM

## 2025-01-03 LAB
ALBUMIN SERPL-MCNC: 3.6 G/DL (ref 3.5–5)
ALBUMIN/GLOB SERPL: 1.1 (ref 1.1–2.2)
ALP SERPL-CCNC: 115 U/L (ref 45–117)
ALT SERPL-CCNC: 35 U/L (ref 12–78)
ANION GAP SERPL CALC-SCNC: 3 MMOL/L (ref 2–12)
APPEARANCE UR: CLEAR
AST SERPL-CCNC: 32 U/L (ref 15–37)
BACTERIA URNS QL MICRO: NEGATIVE /HPF
BASOPHILS # BLD: 0 K/UL (ref 0–0.1)
BASOPHILS NFR BLD: 0 % (ref 0–1)
BILIRUB SERPL-MCNC: 0.7 MG/DL (ref 0.2–1)
BILIRUB UR QL: NEGATIVE
BUN SERPL-MCNC: 23 MG/DL (ref 6–20)
BUN/CREAT SERPL: 21 (ref 12–20)
CALCIUM SERPL-MCNC: 9.4 MG/DL (ref 8.5–10.1)
CHLORIDE SERPL-SCNC: 94 MMOL/L (ref 97–108)
CO2 SERPL-SCNC: 30 MMOL/L (ref 21–32)
COLOR UR: NORMAL
CREAT SERPL-MCNC: 1.07 MG/DL (ref 0.7–1.3)
CREAT UR-MCNC: <13 MG/DL
DIFFERENTIAL METHOD BLD: ABNORMAL
EOSINOPHIL # BLD: 0 K/UL (ref 0–0.4)
EOSINOPHIL NFR BLD: 1 % (ref 0–7)
EPITH CASTS URNS QL MICRO: NORMAL /LPF
ERYTHROCYTE [DISTWIDTH] IN BLOOD BY AUTOMATED COUNT: 13.2 % (ref 11.5–14.5)
GLOBULIN SER CALC-MCNC: 3.4 G/DL (ref 2–4)
GLUCOSE BLD STRIP.AUTO-MCNC: 89 MG/DL (ref 65–117)
GLUCOSE SERPL-MCNC: 92 MG/DL (ref 65–100)
GLUCOSE UR STRIP.AUTO-MCNC: NEGATIVE MG/DL
HCT VFR BLD AUTO: 37.3 % (ref 36.6–50.3)
HGB BLD-MCNC: 12.8 G/DL (ref 12.1–17)
HGB UR QL STRIP: NEGATIVE
HYALINE CASTS URNS QL MICRO: NORMAL /LPF (ref 0–2)
IMM GRANULOCYTES # BLD AUTO: 0 K/UL (ref 0–0.04)
IMM GRANULOCYTES NFR BLD AUTO: 0 % (ref 0–0.5)
KETONES UR QL STRIP.AUTO: NEGATIVE MG/DL
LEUKOCYTE ESTERASE UR QL STRIP.AUTO: NEGATIVE
LYMPHOCYTES # BLD: 0.9 K/UL (ref 0.8–3.5)
LYMPHOCYTES NFR BLD: 12 % (ref 12–49)
MCH RBC QN AUTO: 31.6 PG (ref 26–34)
MCHC RBC AUTO-ENTMCNC: 34.3 G/DL (ref 30–36.5)
MCV RBC AUTO: 92.1 FL (ref 80–99)
MONOCYTES # BLD: 0.4 K/UL (ref 0–1)
MONOCYTES NFR BLD: 6 % (ref 5–13)
NEUTS SEG # BLD: 6.2 K/UL (ref 1.8–8)
NEUTS SEG NFR BLD: 81 % (ref 32–75)
NITRITE UR QL STRIP.AUTO: NEGATIVE
NRBC # BLD: 0 K/UL (ref 0–0.01)
NRBC BLD-RTO: 0 PER 100 WBC
PH UR STRIP: 7.5 (ref 5–8)
PLATELET # BLD AUTO: 240 K/UL (ref 150–400)
PMV BLD AUTO: 9.7 FL (ref 8.9–12.9)
POTASSIUM SERPL-SCNC: 5.1 MMOL/L (ref 3.5–5.1)
PROT SERPL-MCNC: 7 G/DL (ref 6.4–8.2)
PROT UR STRIP-MCNC: NEGATIVE MG/DL
RBC # BLD AUTO: 4.05 M/UL (ref 4.1–5.7)
RBC #/AREA URNS HPF: NORMAL /HPF (ref 0–5)
SERVICE CMNT-IMP: NORMAL
SODIUM SERPL-SCNC: 127 MMOL/L (ref 136–145)
SP GR UR REFRACTOMETRY: 1.01
TROPONIN I SERPL HS-MCNC: 7 NG/L (ref 0–76)
TSH SERPL DL<=0.05 MIU/L-ACNC: 2 UIU/ML (ref 0.36–3.74)
URINE CULTURE IF INDICATED: NORMAL
UROBILINOGEN UR QL STRIP.AUTO: 0.2 EU/DL (ref 0.2–1)
WBC # BLD AUTO: 7.6 K/UL (ref 4.1–11.1)
WBC URNS QL MICRO: NORMAL /HPF (ref 0–4)

## 2025-01-03 PROCEDURE — 83935 ASSAY OF URINE OSMOLALITY: CPT

## 2025-01-03 PROCEDURE — 84300 ASSAY OF URINE SODIUM: CPT

## 2025-01-03 PROCEDURE — 82962 GLUCOSE BLOOD TEST: CPT

## 2025-01-03 PROCEDURE — 82570 ASSAY OF URINE CREATININE: CPT

## 2025-01-03 PROCEDURE — 84484 ASSAY OF TROPONIN QUANT: CPT

## 2025-01-03 PROCEDURE — 85025 COMPLETE CBC W/AUTO DIFF WBC: CPT

## 2025-01-03 PROCEDURE — 99214 OFFICE O/P EST MOD 30 MIN: CPT

## 2025-01-03 PROCEDURE — 70450 CT HEAD/BRAIN W/O DYE: CPT

## 2025-01-03 PROCEDURE — 6370000000 HC RX 637 (ALT 250 FOR IP): Performed by: STUDENT IN AN ORGANIZED HEALTH CARE EDUCATION/TRAINING PROGRAM

## 2025-01-03 PROCEDURE — 96372 THER/PROPH/DIAG INJ SC/IM: CPT

## 2025-01-03 PROCEDURE — 36415 COLL VENOUS BLD VENIPUNCTURE: CPT

## 2025-01-03 PROCEDURE — 83930 ASSAY OF BLOOD OSMOLALITY: CPT

## 2025-01-03 PROCEDURE — 80061 LIPID PANEL: CPT

## 2025-01-03 PROCEDURE — 93005 ELECTROCARDIOGRAM TRACING: CPT | Performed by: STUDENT IN AN ORGANIZED HEALTH CARE EDUCATION/TRAINING PROGRAM

## 2025-01-03 PROCEDURE — 2060000000 HC ICU INTERMEDIATE R&B

## 2025-01-03 PROCEDURE — 82436 ASSAY OF URINE CHLORIDE: CPT

## 2025-01-03 PROCEDURE — 2580000003 HC RX 258: Performed by: STUDENT IN AN ORGANIZED HEALTH CARE EDUCATION/TRAINING PROGRAM

## 2025-01-03 PROCEDURE — 84443 ASSAY THYROID STIM HORMONE: CPT

## 2025-01-03 PROCEDURE — 80053 COMPREHEN METABOLIC PANEL: CPT

## 2025-01-03 PROCEDURE — 82533 TOTAL CORTISOL: CPT

## 2025-01-03 PROCEDURE — 99285 EMERGENCY DEPT VISIT HI MDM: CPT

## 2025-01-03 PROCEDURE — 81001 URINALYSIS AUTO W/SCOPE: CPT

## 2025-01-03 PROCEDURE — 70551 MRI BRAIN STEM W/O DYE: CPT

## 2025-01-03 RX ORDER — LOSARTAN POTASSIUM 100 MG/1
100 TABLET ORAL DAILY
Status: DISCONTINUED | OUTPATIENT
Start: 2025-01-04 | End: 2025-01-04

## 2025-01-03 RX ORDER — ACETAMINOPHEN 650 MG/1
650 SUPPOSITORY RECTAL EVERY 6 HOURS PRN
Status: DISCONTINUED | OUTPATIENT
Start: 2025-01-03 | End: 2025-01-14 | Stop reason: HOSPADM

## 2025-01-03 RX ORDER — SODIUM CHLORIDE 9 MG/ML
INJECTION, SOLUTION INTRAVENOUS PRN
Status: DISCONTINUED | OUTPATIENT
Start: 2025-01-03 | End: 2025-01-14 | Stop reason: HOSPADM

## 2025-01-03 RX ORDER — OLANZAPINE 5 MG/1
10 TABLET, ORALLY DISINTEGRATING ORAL NIGHTLY
Status: DISCONTINUED | OUTPATIENT
Start: 2025-01-03 | End: 2025-01-03

## 2025-01-03 RX ORDER — OLANZAPINE 5 MG/1
2.5 TABLET ORAL NIGHTLY
Status: DISCONTINUED | OUTPATIENT
Start: 2025-01-04 | End: 2025-01-14 | Stop reason: HOSPADM

## 2025-01-03 RX ORDER — 0.9 % SODIUM CHLORIDE 0.9 %
500 INTRAVENOUS SOLUTION INTRAVENOUS ONCE
Status: COMPLETED | OUTPATIENT
Start: 2025-01-03 | End: 2025-01-03

## 2025-01-03 RX ORDER — LEVOTHYROXINE SODIUM 100 UG/1
100 TABLET ORAL
Status: DISCONTINUED | OUTPATIENT
Start: 2025-01-04 | End: 2025-01-14 | Stop reason: HOSPADM

## 2025-01-03 RX ORDER — CLOPIDOGREL BISULFATE 75 MG/1
75 TABLET ORAL DAILY
Status: DISCONTINUED | OUTPATIENT
Start: 2025-01-04 | End: 2025-01-14 | Stop reason: HOSPADM

## 2025-01-03 RX ORDER — METOPROLOL SUCCINATE 50 MG/1
50 TABLET, EXTENDED RELEASE ORAL DAILY
Status: DISCONTINUED | OUTPATIENT
Start: 2025-01-04 | End: 2025-01-09

## 2025-01-03 RX ORDER — SODIUM CHLORIDE 0.9 % (FLUSH) 0.9 %
5-40 SYRINGE (ML) INJECTION EVERY 12 HOURS SCHEDULED
Status: DISCONTINUED | OUTPATIENT
Start: 2025-01-03 | End: 2025-01-14 | Stop reason: HOSPADM

## 2025-01-03 RX ORDER — ATORVASTATIN CALCIUM 20 MG/1
20 TABLET, FILM COATED ORAL DAILY
Status: DISCONTINUED | OUTPATIENT
Start: 2025-01-04 | End: 2025-01-14 | Stop reason: HOSPADM

## 2025-01-03 RX ORDER — AMLODIPINE BESYLATE 5 MG/1
5 TABLET ORAL DAILY
Status: DISCONTINUED | OUTPATIENT
Start: 2025-01-04 | End: 2025-01-09

## 2025-01-03 RX ORDER — ONDANSETRON 2 MG/ML
4 INJECTION INTRAMUSCULAR; INTRAVENOUS EVERY 6 HOURS PRN
Status: DISCONTINUED | OUTPATIENT
Start: 2025-01-03 | End: 2025-01-14 | Stop reason: HOSPADM

## 2025-01-03 RX ORDER — SODIUM CHLORIDE 0.9 % (FLUSH) 0.9 %
5-40 SYRINGE (ML) INJECTION PRN
Status: DISCONTINUED | OUTPATIENT
Start: 2025-01-03 | End: 2025-01-14 | Stop reason: HOSPADM

## 2025-01-03 RX ORDER — ONDANSETRON 4 MG/1
4 TABLET, ORALLY DISINTEGRATING ORAL EVERY 8 HOURS PRN
Status: DISCONTINUED | OUTPATIENT
Start: 2025-01-03 | End: 2025-01-14 | Stop reason: HOSPADM

## 2025-01-03 RX ORDER — POLYETHYLENE GLYCOL 3350 17 G/17G
17 POWDER, FOR SOLUTION ORAL DAILY PRN
Status: DISCONTINUED | OUTPATIENT
Start: 2025-01-03 | End: 2025-01-14 | Stop reason: HOSPADM

## 2025-01-03 RX ORDER — ACETAMINOPHEN 325 MG/1
650 TABLET ORAL EVERY 6 HOURS PRN
Status: DISCONTINUED | OUTPATIENT
Start: 2025-01-03 | End: 2025-01-14 | Stop reason: HOSPADM

## 2025-01-03 RX ORDER — CYANOCOBALAMIN 1000 UG/ML
1000 INJECTION, SOLUTION INTRAMUSCULAR; SUBCUTANEOUS ONCE
Status: COMPLETED | OUTPATIENT
Start: 2025-01-03 | End: 2025-01-03

## 2025-01-03 RX ADMIN — SODIUM CHLORIDE 500 ML: 9 INJECTION, SOLUTION INTRAVENOUS at 22:53

## 2025-01-03 RX ADMIN — APIXABAN 2.5 MG: 5 TABLET, FILM COATED ORAL at 22:54

## 2025-01-03 RX ADMIN — CYANOCOBALAMIN 1000 MCG: 1000 INJECTION, SOLUTION INTRAMUSCULAR at 14:54

## 2025-01-03 RX ADMIN — OLANZAPINE 10 MG: 5 TABLET, ORALLY DISINTEGRATING ORAL at 20:36

## 2025-01-03 ASSESSMENT — LIFESTYLE VARIABLES
HOW OFTEN DO YOU HAVE A DRINK CONTAINING ALCOHOL: NEVER
HOW MANY STANDARD DRINKS CONTAINING ALCOHOL DO YOU HAVE ON A TYPICAL DAY: PATIENT DOES NOT DRINK

## 2025-01-03 ASSESSMENT — PATIENT HEALTH QUESTIONNAIRE - PHQ9
2. FEELING DOWN, DEPRESSED OR HOPELESS: NOT AT ALL
SUM OF ALL RESPONSES TO PHQ QUESTIONS 1-9: 0
SUM OF ALL RESPONSES TO PHQ9 QUESTIONS 1 & 2: 0
SUM OF ALL RESPONSES TO PHQ QUESTIONS 1-9: 0
1. LITTLE INTEREST OR PLEASURE IN DOING THINGS: NOT AT ALL

## 2025-01-03 ASSESSMENT — PAIN SCALES - GENERAL
PAINLEVEL_OUTOF10: 0
PAINLEVEL_OUTOF10: 0

## 2025-01-03 NOTE — PROGRESS NOTES
CC:  Chief Complaint   Patient presents with    Fatigue     Sleeping excessively, wobbly.Patient is having issues standing on his own. Sleeping 12-13 hours, which is not normal. He feels weak, and patient daughter has found him leaning over corners due to weakness/fall. He has been hospitalized for this before, leading to having to go to rehab to relearn to walk, from low sodium.       HPI:  Mateus Tran Sr. is a 89 y.o. year old male who presents to the clinic for evaluation. He has been seen by other providers in the clinic but this is the first time I am meeting him.    He presents to the clinic with his daughter. Most of the history is coming from her. He has been feeling off-balanced and weak for the last several weeks. Denies dizziness, unilateral weakness.    During today's visit, he has slurred speech and mild aphasia that has been ongoing for the last week. After speaking with his daughter, this is not normal for him.    Reviewed PmHx, RxHx, FmHx, SocHx, AllgHx and updated in chart.    ROS:  General:  Denies weight changes, fever, headache. Admits generalized weakness, bumping into walls.  Opthalmologic:  Denies blurred vision, changes in vision  ENT:  Denies difficulty swallowing, decreased hearing  Respiratory:  Denies shortness of breath, cough, coughing up blood, wheezing  Cardiovascular:  Denies chest pain, edema  Skin:  Easy bruising, on Plavix and Eliquis  Neurological:  Admits difficulty speaking. Denies dizziness, fainting         Vitals:    01/03/25 1459 01/03/25 1510   BP: 101/64    Site: Right Upper Arm    Position: Sitting    Cuff Size: Medium Adult    Pulse: 58    Resp: 17    Temp: 97.8 °F (36.6 °C)    TempSrc: Axillary    SpO2: (!) 82% 95%   Weight: 73.1 kg (161 lb 3.2 oz)    Height: 1.778 m (5' 10\")        PHYSICAL EXAM:  General:  Alert and oriented x 3. No acute distress  Head:  Normocephalic. Atraumatic  Eyes:  Pupils PERRLA. EOMs intact. Conjunctiva clear  ENT:  Oropharynx without

## 2025-01-03 NOTE — PROGRESS NOTES
The patient identity was confirmed with  and First/Last Name. Medications and Allergies reviewed with patient, as well as any new diagnosis/procedures. Depression Screening and SDOH Screening done today.    Chief Complaint   Patient presents with    Fatigue     Sleeping excessively, wobbly.Patient is having issues standing on his own. Sleeping 12-13 hours, which is not normal. He feels weak, and patient daughter has found him leaning over corners due to weakness/fall. He has been hospitalized for this before, leading to having to go to rehab to relearn to walk, from low sodium.        Vitals:    25 1459   BP: 101/64   Pulse: 58   Resp: 17   Temp: 97.8 °F (36.6 °C)   SpO2: (!) 82%       Health Maintenance Due   Topic Date Due    Shingles vaccine (1 of 2) Never done    Respiratory Syncytial Virus (RSV) Pregnant or age 60 yrs+ (1 - 1-dose 75+ series) Never done    Prostate Specific Antigen (PSA) Screening or Monitoring  2024    COVID-19 Vaccine (3 - 2023- season) 2024      Patient also got their B12 Injection while in office.   Patient says they do not want to be watched or wait after for any reactions.  I administered the B12 shot into deltoid with no issue.   No comments or concerns from patient.      \"Have you been to the ER, urgent care clinic since your last visit?  Hospitalized since your last visit?\"    NO    “Have you seen or consulted any other health care providers outside our system since your last visit?”    NO

## 2025-01-03 NOTE — ED PROVIDER NOTES
YELLOW/STRAW      Appearance CLEAR CLEAR      Specific Gravity, UA 1.006      pH, Urine 7.5 5.0 - 8.0      Protein, UA Negative NEG mg/dL    Glucose, Ur Negative NEG mg/dL    Ketones, Urine Negative NEG mg/dL    Bilirubin, Urine Negative NEG      Blood, Urine Negative NEG      Urobilinogen, Urine 0.2 0.2 - 1.0 EU/dL    Nitrite, Urine Negative NEG      Leukocyte Esterase, Urine Negative NEG      WBC, UA 0-4 0 - 4 /hpf    RBC, UA 0-5 0 - 5 /hpf    Epithelial Cells, UA FEW FEW /lpf    BACTERIA, URINE Negative NEG /hpf    Urine Culture if Indicated CULTURE NOT INDICATED BY UA RESULT CNI      Hyaline Casts, UA 0-2 0 - 2 /lpf   Sodium, urine, random    Collection Time: 01/03/25 10:53 PM   Result Value Ref Range    SODIUM, RANDOM URINE 92 MMOL/L   Creatinine, Random Urine    Collection Time: 01/03/25 10:53 PM   Result Value Ref Range    Creatinine, Ur <13.00 mg/dL   Chloride, Random Urine    Collection Time: 01/03/25 10:53 PM   Result Value Ref Range    Chloride 92 MMOL/L   Osmolality, Urine    Collection Time: 01/03/25 10:53 PM   Result Value Ref Range    Osmolality, Ur 276 MOSM/kg H2O   EKG 12 Lead    Collection Time: 01/04/25  2:35 AM   Result Value Ref Range    Ventricular Rate 41 BPM    QRS Duration 100 ms    Q-T Interval 486 ms    QTc Calculation (Bazett) 400 ms    R Axis 13 degrees    T Axis 42 degrees    Diagnosis       Atrial fibrillation with slow ventricular response  Abnormal ECG  When compared with ECG of 03-JAN-2025 16:26,  MANUAL COMPARISON REQUIRED, DATA IS UNCONFIRMED     Basic Metabolic Panel    Collection Time: 01/04/25  2:44 AM   Result Value Ref Range    Sodium 129 (L) 136 - 145 mmol/L    Potassium 4.5 3.5 - 5.1 mmol/L    Chloride 96 (L) 97 - 108 mmol/L    CO2 28 21 - 32 mmol/L    Anion Gap 5 2 - 12 mmol/L    Glucose 80 65 - 100 mg/dL    BUN 18 6 - 20 MG/DL    Creatinine 0.95 0.70 - 1.30 MG/DL    BUN/Creatinine Ratio 19 12 - 20      Est, Glom Filt Rate 77 >60 ml/min/1.73m2    Calcium 8.7 8.5 - 10.1

## 2025-01-04 ENCOUNTER — APPOINTMENT (OUTPATIENT)
Facility: HOSPITAL | Age: 89
DRG: 641 | End: 2025-01-04
Payer: MEDICARE

## 2025-01-04 LAB
ALBUMIN SERPL-MCNC: 2.7 G/DL (ref 3.5–5)
ALBUMIN/GLOB SERPL: 1 (ref 1.1–2.2)
ALP SERPL-CCNC: 96 U/L (ref 45–117)
ALT SERPL-CCNC: 33 U/L (ref 12–78)
ANION GAP SERPL CALC-SCNC: 5 MMOL/L (ref 2–12)
ANION GAP SERPL CALC-SCNC: 5 MMOL/L (ref 2–12)
AST SERPL-CCNC: 26 U/L (ref 15–37)
BASOPHILS # BLD: 0 K/UL (ref 0–0.1)
BASOPHILS # BLD: 0 K/UL (ref 0–0.1)
BASOPHILS NFR BLD: 0 % (ref 0–1)
BASOPHILS NFR BLD: 1 % (ref 0–1)
BILIRUB SERPL-MCNC: 0.4 MG/DL (ref 0.2–1)
BUN SERPL-MCNC: 18 MG/DL (ref 6–20)
BUN SERPL-MCNC: 25 MG/DL (ref 6–20)
BUN/CREAT SERPL: 17 (ref 12–20)
BUN/CREAT SERPL: 19 (ref 12–20)
CALCIUM SERPL-MCNC: 8.2 MG/DL (ref 8.5–10.1)
CALCIUM SERPL-MCNC: 8.7 MG/DL (ref 8.5–10.1)
CHLORIDE SERPL-SCNC: 96 MMOL/L (ref 97–108)
CHLORIDE SERPL-SCNC: 99 MMOL/L (ref 97–108)
CHLORIDE UR-SCNC: 92 MMOL/L
CHOLEST SERPL-MCNC: 168 MG/DL
CO2 SERPL-SCNC: 25 MMOL/L (ref 21–32)
CO2 SERPL-SCNC: 28 MMOL/L (ref 21–32)
CORTIS AM PEAK SERPL-MCNC: 10.5 UG/DL (ref 4.3–22.45)
CREAT SERPL-MCNC: 0.95 MG/DL (ref 0.7–1.3)
CREAT SERPL-MCNC: 1.49 MG/DL (ref 0.7–1.3)
DIFFERENTIAL METHOD BLD: ABNORMAL
DIFFERENTIAL METHOD BLD: ABNORMAL
EOSINOPHIL # BLD: 0 K/UL (ref 0–0.4)
EOSINOPHIL # BLD: 0.1 K/UL (ref 0–0.4)
EOSINOPHIL NFR BLD: 0 % (ref 0–7)
EOSINOPHIL NFR BLD: 2 % (ref 0–7)
ERYTHROCYTE [DISTWIDTH] IN BLOOD BY AUTOMATED COUNT: 13 % (ref 11.5–14.5)
ERYTHROCYTE [DISTWIDTH] IN BLOOD BY AUTOMATED COUNT: 13.1 % (ref 11.5–14.5)
GLOBULIN SER CALC-MCNC: 2.8 G/DL (ref 2–4)
GLUCOSE BLD STRIP.AUTO-MCNC: 120 MG/DL (ref 65–117)
GLUCOSE SERPL-MCNC: 120 MG/DL (ref 65–100)
GLUCOSE SERPL-MCNC: 80 MG/DL (ref 65–100)
HCT VFR BLD AUTO: 33.5 % (ref 36.6–50.3)
HCT VFR BLD AUTO: 34.3 % (ref 36.6–50.3)
HDLC SERPL-MCNC: 114 MG/DL
HDLC SERPL: 1.5 (ref 0–5)
HGB BLD-MCNC: 11.4 G/DL (ref 12.1–17)
HGB BLD-MCNC: 12.1 G/DL (ref 12.1–17)
IMM GRANULOCYTES # BLD AUTO: 0 K/UL (ref 0–0.04)
IMM GRANULOCYTES # BLD AUTO: 0 K/UL (ref 0–0.04)
IMM GRANULOCYTES NFR BLD AUTO: 0 % (ref 0–0.5)
IMM GRANULOCYTES NFR BLD AUTO: 1 % (ref 0–0.5)
LDLC SERPL CALC-MCNC: 45.6 MG/DL (ref 0–100)
LYMPHOCYTES # BLD: 1 K/UL (ref 0.8–3.5)
LYMPHOCYTES # BLD: 1.1 K/UL (ref 0.8–3.5)
LYMPHOCYTES NFR BLD: 15 % (ref 12–49)
LYMPHOCYTES NFR BLD: 18 % (ref 12–49)
MAGNESIUM SERPL-MCNC: 1.9 MG/DL (ref 1.6–2.4)
MCH RBC QN AUTO: 31.7 PG (ref 26–34)
MCH RBC QN AUTO: 31.8 PG (ref 26–34)
MCHC RBC AUTO-ENTMCNC: 34 G/DL (ref 30–36.5)
MCHC RBC AUTO-ENTMCNC: 35.3 G/DL (ref 30–36.5)
MCV RBC AUTO: 90 FL (ref 80–99)
MCV RBC AUTO: 93.1 FL (ref 80–99)
MONOCYTES # BLD: 0.4 K/UL (ref 0–1)
MONOCYTES # BLD: 0.6 K/UL (ref 0–1)
MONOCYTES NFR BLD: 6 % (ref 5–13)
MONOCYTES NFR BLD: 9 % (ref 5–13)
NEUTS SEG # BLD: 4.2 K/UL (ref 1.8–8)
NEUTS SEG # BLD: 4.9 K/UL (ref 1.8–8)
NEUTS SEG NFR BLD: 70 % (ref 32–75)
NEUTS SEG NFR BLD: 78 % (ref 32–75)
NRBC # BLD: 0 K/UL (ref 0–0.01)
NRBC # BLD: 0 K/UL (ref 0–0.01)
NRBC BLD-RTO: 0 PER 100 WBC
NRBC BLD-RTO: 0 PER 100 WBC
OSMOLALITY SERPL: 275 MOSM/KG H2O
OSMOLALITY UR: 276 MOSM/KG H2O
PLATELET # BLD AUTO: 214 K/UL (ref 150–400)
PLATELET # BLD AUTO: 220 K/UL (ref 150–400)
PMV BLD AUTO: 10.1 FL (ref 8.9–12.9)
PMV BLD AUTO: 10.1 FL (ref 8.9–12.9)
POTASSIUM SERPL-SCNC: 4.5 MMOL/L (ref 3.5–5.1)
POTASSIUM SERPL-SCNC: 4.6 MMOL/L (ref 3.5–5.1)
PROT SERPL-MCNC: 5.5 G/DL (ref 6.4–8.2)
RBC # BLD AUTO: 3.6 M/UL (ref 4.1–5.7)
RBC # BLD AUTO: 3.81 M/UL (ref 4.1–5.7)
SERVICE CMNT-IMP: ABNORMAL
SODIUM SERPL-SCNC: 129 MMOL/L (ref 136–145)
SODIUM SERPL-SCNC: 129 MMOL/L (ref 136–145)
SODIUM UR-SCNC: 92 MMOL/L
TRIGL SERPL-MCNC: 42 MG/DL
TROPONIN I SERPL HS-MCNC: 8 NG/L (ref 0–76)
VLDLC SERPL CALC-MCNC: 8.4 MG/DL
WBC # BLD AUTO: 6 K/UL (ref 4.1–11.1)
WBC # BLD AUTO: 6.3 K/UL (ref 4.1–11.1)

## 2025-01-04 PROCEDURE — 93005 ELECTROCARDIOGRAM TRACING: CPT | Performed by: NURSE PRACTITIONER

## 2025-01-04 PROCEDURE — 6370000000 HC RX 637 (ALT 250 FOR IP): Performed by: STUDENT IN AN ORGANIZED HEALTH CARE EDUCATION/TRAINING PROGRAM

## 2025-01-04 PROCEDURE — 97116 GAIT TRAINING THERAPY: CPT

## 2025-01-04 PROCEDURE — 97530 THERAPEUTIC ACTIVITIES: CPT

## 2025-01-04 PROCEDURE — 84484 ASSAY OF TROPONIN QUANT: CPT

## 2025-01-04 PROCEDURE — 80053 COMPREHEN METABOLIC PANEL: CPT

## 2025-01-04 PROCEDURE — 85025 COMPLETE CBC W/AUTO DIFF WBC: CPT

## 2025-01-04 PROCEDURE — 97162 PT EVAL MOD COMPLEX 30 MIN: CPT

## 2025-01-04 PROCEDURE — 99222 1ST HOSP IP/OBS MODERATE 55: CPT | Performed by: PSYCHIATRY & NEUROLOGY

## 2025-01-04 PROCEDURE — 2060000000 HC ICU INTERMEDIATE R&B

## 2025-01-04 PROCEDURE — 82962 GLUCOSE BLOOD TEST: CPT

## 2025-01-04 PROCEDURE — 83735 ASSAY OF MAGNESIUM: CPT

## 2025-01-04 PROCEDURE — 97166 OT EVAL MOD COMPLEX 45 MIN: CPT

## 2025-01-04 PROCEDURE — 2500000003 HC RX 250 WO HCPCS: Performed by: STUDENT IN AN ORGANIZED HEALTH CARE EDUCATION/TRAINING PROGRAM

## 2025-01-04 PROCEDURE — 93005 ELECTROCARDIOGRAM TRACING: CPT | Performed by: INTERNAL MEDICINE

## 2025-01-04 PROCEDURE — 36415 COLL VENOUS BLD VENIPUNCTURE: CPT

## 2025-01-04 PROCEDURE — 6370000000 HC RX 637 (ALT 250 FOR IP): Performed by: INTERNAL MEDICINE

## 2025-01-04 PROCEDURE — 6360000004 HC RX CONTRAST MEDICATION: Performed by: FAMILY MEDICINE

## 2025-01-04 PROCEDURE — 70498 CT ANGIOGRAPHY NECK: CPT

## 2025-01-04 PROCEDURE — 97535 SELF CARE MNGMENT TRAINING: CPT

## 2025-01-04 PROCEDURE — 2580000003 HC RX 258: Performed by: INTERNAL MEDICINE

## 2025-01-04 RX ORDER — SODIUM CHLORIDE 9 MG/ML
INJECTION, SOLUTION INTRAVENOUS CONTINUOUS
Status: DISCONTINUED | OUTPATIENT
Start: 2025-01-04 | End: 2025-01-04

## 2025-01-04 RX ORDER — 0.9 % SODIUM CHLORIDE 0.9 %
500 INTRAVENOUS SOLUTION INTRAVENOUS ONCE
Status: COMPLETED | OUTPATIENT
Start: 2025-01-04 | End: 2025-01-04

## 2025-01-04 RX ORDER — LOSARTAN POTASSIUM 50 MG/1
50 TABLET ORAL DAILY
Status: DISCONTINUED | OUTPATIENT
Start: 2025-01-05 | End: 2025-01-09

## 2025-01-04 RX ORDER — SODIUM CHLORIDE 9 MG/ML
INJECTION, SOLUTION INTRAVENOUS CONTINUOUS
Status: ACTIVE | OUTPATIENT
Start: 2025-01-04 | End: 2025-01-05

## 2025-01-04 RX ORDER — IOPAMIDOL 755 MG/ML
100 INJECTION, SOLUTION INTRAVASCULAR
Status: COMPLETED | OUTPATIENT
Start: 2025-01-04 | End: 2025-01-04

## 2025-01-04 RX ADMIN — CLOPIDOGREL BISULFATE 75 MG: 75 TABLET ORAL at 08:35

## 2025-01-04 RX ADMIN — AMLODIPINE BESYLATE 5 MG: 5 TABLET ORAL at 08:35

## 2025-01-04 RX ADMIN — LEVOTHYROXINE SODIUM 100 MCG: 0.1 TABLET ORAL at 08:35

## 2025-01-04 RX ADMIN — SODIUM CHLORIDE 500 ML: 9 INJECTION, SOLUTION INTRAVENOUS at 18:28

## 2025-01-04 RX ADMIN — IOPAMIDOL 100 ML: 755 INJECTION, SOLUTION INTRAVENOUS at 11:53

## 2025-01-04 RX ADMIN — SODIUM CHLORIDE: 9 INJECTION, SOLUTION INTRAVENOUS at 22:06

## 2025-01-04 RX ADMIN — SODIUM CHLORIDE, PRESERVATIVE FREE 10 ML: 5 INJECTION INTRAVENOUS at 08:37

## 2025-01-04 RX ADMIN — LOSARTAN POTASSIUM 100 MG: 100 TABLET, FILM COATED ORAL at 08:35

## 2025-01-04 RX ADMIN — APIXABAN 2.5 MG: 5 TABLET, FILM COATED ORAL at 22:04

## 2025-01-04 RX ADMIN — ATORVASTATIN CALCIUM 20 MG: 20 TABLET, FILM COATED ORAL at 08:35

## 2025-01-04 RX ADMIN — Medication 3 MG: at 22:04

## 2025-01-04 RX ADMIN — APIXABAN 2.5 MG: 5 TABLET, FILM COATED ORAL at 08:35

## 2025-01-04 RX ADMIN — OLANZAPINE 2.5 MG: 5 TABLET, FILM COATED ORAL at 22:04

## 2025-01-04 RX ADMIN — Medication 15 G: at 22:04

## 2025-01-04 RX ADMIN — SODIUM CHLORIDE, PRESERVATIVE FREE 10 ML: 5 INJECTION INTRAVENOUS at 22:05

## 2025-01-04 RX ADMIN — SODIUM CHLORIDE 500 ML: 9 INJECTION, SOLUTION INTRAVENOUS at 15:31

## 2025-01-04 ASSESSMENT — PAIN SCALES - GENERAL
PAINLEVEL_OUTOF10: 0

## 2025-01-04 NOTE — CONSULTS
Pt seen and examined     Full consult dictated    Agree with holding metoprolol    Cont telemetry    No definite indication for perm pacer

## 2025-01-04 NOTE — ED NOTES
Unable to obtain oral or axillary temperature at this time. Warm blankets applied per DO. Reassessed after application, temperature of 94.7F observed. Replaced pulse ox clip with stick-on wrap pulse ox due to cold temperature, O2 saturation of 96% on room air observed at this time. DO notified.

## 2025-01-04 NOTE — SIGNIFICANT EVENT
RAPID RESPONSE TEAM    Overhead rapid response paged to room # 2306/01  at 1526    Reason for rapid response: Hypotension    Per primary RN, patient was in chair and became unresponsive prompting rapid response.     Initial assessment:   Upon my arrival, patient is supine in bed, alert and oriented with delayed speech. HR 59- SB on monitor, BP 70s/40s. Respirations even and unlabored, spo2 98% on RA. Denies dizziness, sob and CP.    Dr. Mike at bedside, orders received for the following Interventions:     - 500 NS bolus   - CBC, CMP, Mag, troponin    Outcome:   BP responded to fluid bolus, now 100s/50s. pt to remain in room # 2306/01         Please call with any questions or concerns    Lita Piña RN  Rapid Response Team  Ext [phone]     Recent Results (from the past 8 hour(s))   POCT Glucose    Collection Time: 01/04/25  3:28 PM   Result Value Ref Range    POC Glucose 120 (H) 65 - 117 mg/dL    Performed by: No Myers RN

## 2025-01-04 NOTE — SIGNIFICANT EVENT
Hospitalist    RRT call    Sitting up in chair, became lethargic, drooling outside of mouth    Became hypotensive to 70/51    Transferred back to bed    Telemetry with no clear bradycardia   Several wide-complex runs for 10-15 beats   Looks more like artifact, they were doing sternal rubs about that time    IV fluid bolus started    When I got to the room he was still hypotensive in the 80s  However much more awake and alert   Able to tell me his birthday, his name, current year, where he was   Smile symmetric, no pronator drift, symmetric  strength 4+/5   Symmetric motor strength is legs 4+/5 bilaterally   Speech fluent  Eventually hypotension resolved    Complete IV fluid bolus  Holding Toprol-XL with the likely type II second-degree block overnight  I reduce the losartan from 100 to 50 mg in the morning, continue amlodipine 5 mg every morning for now  Check stat labs   I reviewed them sodium stable at 129, negative troponin, hemoglobin stable    Remained in bed for now  Await cardiology input    Ruben Mike Jr, MD     .

## 2025-01-04 NOTE — H&P
Hospitalist Admission Note    NAME:  Mateus Tran Sr.   :  1935   MRN:  [id number]     Date/Time:  1/3/2025 9:46 PM    Patient PCP: Bashir Holcomb MD    ______________________________________________________________________  Given the patient's current clinical presentation, I have a high level of concern for decompensation if discharged from the emergency department.  Complex decision making was performed, which includes reviewing the patient's available past medical records, laboratory results, and x-ray films.       My assessment of this patient's clinical condition and my plan of care is as follows.    Assessment / Plan:    Active Problems:  Slurred speech, gait instability  Symptomatic hyponatremia  Choreiform movement disorder  Atrial fibrillation on Eliquis  Essential hypertension  Hyperlipidemia  Hypothyroidism  History of prostate cancer    Plan:  Slurred speech, gait instability  Symptomatic hyponatremia  Admit to Down unit  Obtain MRI brain with months of caution  -Low suspicion for CVA  100 cc normal saline bolus now for suspected hypovolemic hyponatremia  Nephrology consulted, greatly appreciate their expertise  -Hyponatremia labs: Serum cortisol, osmolality, lipid panel, UA with reflex, urine sodium/chloride/creatinine, urine osmolality  TSH WNL    Choreiform movement disorder  Continue PTA Zyprexa    Atrial fibrillation on Eliquis  Essential hypertension  Hyperlipidemia  Continue PTA amlodipine, Eliquis, atorvastatin, Plavix, losartan, metoprolol    Hypothyroidism  Continue PTA Synthroid  TSH WNL    History of prostate cancer  Bladder management protocol  Status post radiation therapy    Medical Decision Making:   I personally reviewed labs: Yes, as listed below  I personally reviewed imaging: CT head  Toxic drug monitoring: None  Discussed case with: ED provider. After discussion I am in agreement that acuity of patient's medical condition necessitates hospital stay.      Code Status:

## 2025-01-04 NOTE — CONSULTS
CONSULT - Neurology      Name:  Mateus Tran Sr.       MRN: [id number]  Location: 2306/01    Date: 1/4/2025  Time:  10:01 AM        Chief Complaint:   Chief Complaint   Patient presents with    Fatigue     Pt ambulatory to triage with c/o weakness x 1 week and and slurred speech for a couple of weeks. Pt was seen at PCPs office and was referred to ED for r/o stroke.        HPI:  It is a great pleasure to see Mateus Tran Sr., a 89 y.o. male today in the hospital . Briefly these are the events happened as per the chart H&P and taken from the chart. The patient was doing fine and the symptoms started with worsening generalized weakness, unsteady gait, slurred speech over the past 1-2 weeks . The symptoms fluctuated in the beginning. Was evaluated in PCP office today who referred to emergency department for CVA evaluation. The patient was brought to the emergency room for further evaluation. Has known choreiform movement disorder (ruled out for Yosi's by genetics) taking nightly olanzapine.       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  Calcium and PTH monitored by Dr. Cardenas, renal.  Levels normalized after surgery.    Prostate cancer (HCC)     seeds, then XRT, then seed again.  Dr. Peterson    Spinal stenosis     Thyroid cancer (HCC) Jan 2015    Urinary incontinence      PAST SURGICAL HISTORY:    Past Surgical History:

## 2025-01-05 LAB
ALBUMIN SERPL-MCNC: 2.6 G/DL (ref 3.5–5)
ALBUMIN/GLOB SERPL: 1 (ref 1.1–2.2)
ALP SERPL-CCNC: 86 U/L (ref 45–117)
ALT SERPL-CCNC: 29 U/L (ref 12–78)
ANION GAP SERPL CALC-SCNC: 5 MMOL/L (ref 2–12)
AST SERPL-CCNC: 25 U/L (ref 15–37)
BASOPHILS # BLD: 0 K/UL (ref 0–0.1)
BASOPHILS NFR BLD: 0 % (ref 0–1)
BILIRUB SERPL-MCNC: 0.4 MG/DL (ref 0.2–1)
BUN SERPL-MCNC: 51 MG/DL (ref 6–20)
BUN/CREAT SERPL: 35 (ref 12–20)
CALCIUM SERPL-MCNC: 8.4 MG/DL (ref 8.5–10.1)
CHLORIDE SERPL-SCNC: 101 MMOL/L (ref 97–108)
CO2 SERPL-SCNC: 25 MMOL/L (ref 21–32)
CORTIS SERPL-MCNC: 46.8 UG/DL
CREAT SERPL-MCNC: 1.44 MG/DL (ref 0.7–1.3)
DIFFERENTIAL METHOD BLD: ABNORMAL
EOSINOPHIL # BLD: 0.1 K/UL (ref 0–0.4)
EOSINOPHIL NFR BLD: 1 % (ref 0–7)
ERYTHROCYTE [DISTWIDTH] IN BLOOD BY AUTOMATED COUNT: 13.3 % (ref 11.5–14.5)
GLOBULIN SER CALC-MCNC: 2.6 G/DL (ref 2–4)
GLUCOSE SERPL-MCNC: 77 MG/DL (ref 65–100)
HCT VFR BLD AUTO: 30.5 % (ref 36.6–50.3)
HGB BLD-MCNC: 10.5 G/DL (ref 12.1–17)
IMM GRANULOCYTES # BLD AUTO: 0 K/UL (ref 0–0.04)
IMM GRANULOCYTES NFR BLD AUTO: 0 % (ref 0–0.5)
LACTATE SERPL-SCNC: 1.3 MMOL/L (ref 0.4–2)
LYMPHOCYTES # BLD: 1.5 K/UL (ref 0.8–3.5)
LYMPHOCYTES NFR BLD: 20 % (ref 12–49)
MCH RBC QN AUTO: 31.5 PG (ref 26–34)
MCHC RBC AUTO-ENTMCNC: 34.4 G/DL (ref 30–36.5)
MCV RBC AUTO: 91.6 FL (ref 80–99)
MONOCYTES # BLD: 0.7 K/UL (ref 0–1)
MONOCYTES NFR BLD: 9 % (ref 5–13)
NEUTS SEG # BLD: 5.2 K/UL (ref 1.8–8)
NEUTS SEG NFR BLD: 70 % (ref 32–75)
NRBC # BLD: 0 K/UL (ref 0–0.01)
NRBC BLD-RTO: 0 PER 100 WBC
PLATELET # BLD AUTO: 205 K/UL (ref 150–400)
PMV BLD AUTO: 10 FL (ref 8.9–12.9)
POTASSIUM SERPL-SCNC: 4.7 MMOL/L (ref 3.5–5.1)
PROCALCITONIN SERPL-MCNC: <0.05 NG/ML
PROT SERPL-MCNC: 5.2 G/DL (ref 6.4–8.2)
RBC # BLD AUTO: 3.33 M/UL (ref 4.1–5.7)
SODIUM SERPL-SCNC: 131 MMOL/L (ref 136–145)
TROPONIN I SERPL HS-MCNC: 12 NG/L (ref 0–76)
WBC # BLD AUTO: 7.4 K/UL (ref 4.1–11.1)

## 2025-01-05 PROCEDURE — 83605 ASSAY OF LACTIC ACID: CPT

## 2025-01-05 PROCEDURE — 2580000003 HC RX 258: Performed by: INTERNAL MEDICINE

## 2025-01-05 PROCEDURE — 36415 COLL VENOUS BLD VENIPUNCTURE: CPT

## 2025-01-05 PROCEDURE — 84145 PROCALCITONIN (PCT): CPT

## 2025-01-05 PROCEDURE — 85025 COMPLETE CBC W/AUTO DIFF WBC: CPT

## 2025-01-05 PROCEDURE — 2060000000 HC ICU INTERMEDIATE R&B

## 2025-01-05 PROCEDURE — 82533 TOTAL CORTISOL: CPT

## 2025-01-05 PROCEDURE — 87040 BLOOD CULTURE FOR BACTERIA: CPT

## 2025-01-05 PROCEDURE — 80053 COMPREHEN METABOLIC PANEL: CPT

## 2025-01-05 PROCEDURE — 6370000000 HC RX 637 (ALT 250 FOR IP): Performed by: INTERNAL MEDICINE

## 2025-01-05 PROCEDURE — 6360000002 HC RX W HCPCS: Performed by: INTERNAL MEDICINE

## 2025-01-05 PROCEDURE — 84484 ASSAY OF TROPONIN QUANT: CPT

## 2025-01-05 PROCEDURE — 6370000000 HC RX 637 (ALT 250 FOR IP): Performed by: STUDENT IN AN ORGANIZED HEALTH CARE EDUCATION/TRAINING PROGRAM

## 2025-01-05 PROCEDURE — 2500000003 HC RX 250 WO HCPCS: Performed by: STUDENT IN AN ORGANIZED HEALTH CARE EDUCATION/TRAINING PROGRAM

## 2025-01-05 RX ORDER — MIDODRINE HYDROCHLORIDE 5 MG/1
5 TABLET ORAL
Status: DISCONTINUED | OUTPATIENT
Start: 2025-01-05 | End: 2025-01-07

## 2025-01-05 RX ORDER — DEXAMETHASONE SODIUM PHOSPHATE 10 MG/ML
4 INJECTION, SOLUTION INTRAMUSCULAR; INTRAVENOUS ONCE
Status: COMPLETED | OUTPATIENT
Start: 2025-01-05 | End: 2025-01-05

## 2025-01-05 RX ORDER — COSYNTROPIN 0.25 MG/ML
250 INJECTION, POWDER, FOR SOLUTION INTRAMUSCULAR; INTRAVENOUS ONCE
Status: COMPLETED | OUTPATIENT
Start: 2025-01-05 | End: 2025-01-05

## 2025-01-05 RX ORDER — 0.9 % SODIUM CHLORIDE 0.9 %
250 INTRAVENOUS SOLUTION INTRAVENOUS ONCE
Status: COMPLETED | OUTPATIENT
Start: 2025-01-05 | End: 2025-01-05

## 2025-01-05 RX ADMIN — APIXABAN 2.5 MG: 5 TABLET, FILM COATED ORAL at 21:03

## 2025-01-05 RX ADMIN — Medication 3 MG: at 21:03

## 2025-01-05 RX ADMIN — MIDODRINE HYDROCHLORIDE 5 MG: 5 TABLET ORAL at 17:02

## 2025-01-05 RX ADMIN — APIXABAN 2.5 MG: 5 TABLET, FILM COATED ORAL at 09:04

## 2025-01-05 RX ADMIN — DEXAMETHASONE SODIUM PHOSPHATE 4 MG: 10 INJECTION, SOLUTION INTRAMUSCULAR; INTRAVENOUS at 13:22

## 2025-01-05 RX ADMIN — OLANZAPINE 2.5 MG: 5 TABLET, FILM COATED ORAL at 21:06

## 2025-01-05 RX ADMIN — ATORVASTATIN CALCIUM 20 MG: 20 TABLET, FILM COATED ORAL at 09:03

## 2025-01-05 RX ADMIN — SODIUM CHLORIDE, PRESERVATIVE FREE 10 ML: 5 INJECTION INTRAVENOUS at 09:04

## 2025-01-05 RX ADMIN — SODIUM CHLORIDE, PRESERVATIVE FREE 10 ML: 5 INJECTION INTRAVENOUS at 21:03

## 2025-01-05 RX ADMIN — MIDODRINE HYDROCHLORIDE 5 MG: 5 TABLET ORAL at 12:10

## 2025-01-05 RX ADMIN — CLOPIDOGREL BISULFATE 75 MG: 75 TABLET ORAL at 09:04

## 2025-01-05 RX ADMIN — SODIUM CHLORIDE 250 ML: 9 INJECTION, SOLUTION INTRAVENOUS at 12:07

## 2025-01-05 RX ADMIN — COSYNTROPIN 250 MCG: 0.25 INJECTION, POWDER, LYOPHILIZED, FOR SOLUTION INTRAVENOUS at 13:23

## 2025-01-05 RX ADMIN — LEVOTHYROXINE SODIUM 100 MCG: 0.1 TABLET ORAL at 05:57

## 2025-01-05 ASSESSMENT — PAIN SCALES - GENERAL
PAINLEVEL_OUTOF10: 0

## 2025-01-05 NOTE — CASE COMMUNICATION
COMMUNICATION NOTE - Neurology Service    Name:  Mateus Tran Sr.     MRN: [id number]         Communication Note:   CTA head demonstrates no large vessel occlusion. Nonspecific patchy mild  stenosis of the left MCA may suggest vasospasm versus noncalcified plaques.  Clinical correlation advised. No intracranial aneurysm.  CTA neck demonstrates no large vessel occlusion.  Probable short segment intimal flap versus linear chronic thrombus extending  across the right ICA orifice is age-indeterminate. Clinical correlation advised.    Patient does not have any acute symptoms- No Focal weakness  The above findings could be age-indeterminate  Patient already on maximal medical therapy - Eliquis and plavix.   Follow up with Neurology in the out patient clinic.  I explained in detail about the current condition.   Rest of the management per primary team.  Neurology will sign off.   Please do not hesitate to call with questions or concerns.  Please let us know if we can provide any additional information.     No

## 2025-01-06 ENCOUNTER — APPOINTMENT (OUTPATIENT)
Facility: HOSPITAL | Age: 89
DRG: 641 | End: 2025-01-06
Attending: INTERNAL MEDICINE
Payer: MEDICARE

## 2025-01-06 LAB
ALBUMIN SERPL-MCNC: 2.8 G/DL (ref 3.5–5)
ALBUMIN/GLOB SERPL: 0.9 (ref 1.1–2.2)
ALP SERPL-CCNC: 89 U/L (ref 45–117)
ALT SERPL-CCNC: 27 U/L (ref 12–78)
ANION GAP SERPL CALC-SCNC: 5 MMOL/L (ref 2–12)
AST SERPL-CCNC: 22 U/L (ref 15–37)
BASOPHILS # BLD: 0 K/UL (ref 0–0.1)
BASOPHILS NFR BLD: 0 % (ref 0–1)
BILIRUB SERPL-MCNC: 0.6 MG/DL (ref 0.2–1)
BUN SERPL-MCNC: 47 MG/DL (ref 6–20)
BUN/CREAT SERPL: 37 (ref 12–20)
CALCIUM SERPL-MCNC: 8.7 MG/DL (ref 8.5–10.1)
CHLORIDE SERPL-SCNC: 102 MMOL/L (ref 97–108)
CO2 SERPL-SCNC: 24 MMOL/L (ref 21–32)
CREAT SERPL-MCNC: 1.27 MG/DL (ref 0.7–1.3)
DIFFERENTIAL METHOD BLD: ABNORMAL
EKG ATRIAL RATE: 50 BPM
EKG ATRIAL RATE: 63 BPM
EKG ATRIAL RATE: 69 BPM
EKG ATRIAL RATE: 76 BPM
EKG DIAGNOSIS: NORMAL
EKG P AXIS: 21 DEGREES
EKG P AXIS: 43 DEGREES
EKG P AXIS: 46 DEGREES
EKG P AXIS: 76 DEGREES
EKG P-R INTERVAL: 236 MS
EKG P-R INTERVAL: 258 MS
EKG P-R INTERVAL: 260 MS
EKG P-R INTERVAL: 306 MS
EKG Q-T INTERVAL: 368 MS
EKG Q-T INTERVAL: 374 MS
EKG Q-T INTERVAL: 428 MS
EKG Q-T INTERVAL: 432 MS
EKG Q-T INTERVAL: 486 MS
EKG QRS DURATION: 100 MS
EKG QRS DURATION: 108 MS
EKG QRS DURATION: 92 MS
EKG QTC CALCULATION (BAZETT): 393 MS
EKG QTC CALCULATION (BAZETT): 400 MS
EKG QTC CALCULATION (BAZETT): 400 MS
EKG QTC CALCULATION (BAZETT): 414 MS
EKG QTC CALCULATION (BAZETT): 437 MS
EKG R AXIS: 13 DEGREES
EKG R AXIS: 2 DEGREES
EKG R AXIS: 25 DEGREES
EKG R AXIS: 36 DEGREES
EKG R AXIS: 7 DEGREES
EKG T AXIS: -45 DEGREES
EKG T AXIS: 39 DEGREES
EKG T AXIS: 42 DEGREES
EKG T AXIS: 48 DEGREES
EKG T AXIS: 71 DEGREES
EKG VENTRICULAR RATE: 41 BPM
EKG VENTRICULAR RATE: 50 BPM
EKG VENTRICULAR RATE: 63 BPM
EKG VENTRICULAR RATE: 69 BPM
EKG VENTRICULAR RATE: 76 BPM
EOSINOPHIL # BLD: 0 K/UL (ref 0–0.4)
EOSINOPHIL NFR BLD: 0 % (ref 0–7)
ERYTHROCYTE [DISTWIDTH] IN BLOOD BY AUTOMATED COUNT: 13.2 % (ref 11.5–14.5)
GLOBULIN SER CALC-MCNC: 3 G/DL (ref 2–4)
GLUCOSE SERPL-MCNC: 125 MG/DL (ref 65–100)
HCT VFR BLD AUTO: 31.4 % (ref 36.6–50.3)
HGB BLD-MCNC: 10.7 G/DL (ref 12.1–17)
IMM GRANULOCYTES # BLD AUTO: 0.1 K/UL (ref 0–0.04)
IMM GRANULOCYTES NFR BLD AUTO: 1 % (ref 0–0.5)
LYMPHOCYTES # BLD: 0.7 K/UL (ref 0.8–3.5)
LYMPHOCYTES NFR BLD: 6 % (ref 12–49)
MAGNESIUM SERPL-MCNC: 1.9 MG/DL (ref 1.6–2.4)
MCH RBC QN AUTO: 31.8 PG (ref 26–34)
MCHC RBC AUTO-ENTMCNC: 34.1 G/DL (ref 30–36.5)
MCV RBC AUTO: 93.2 FL (ref 80–99)
MONOCYTES # BLD: 0.1 K/UL (ref 0–1)
MONOCYTES NFR BLD: 1 % (ref 5–13)
NEUTS SEG # BLD: 11.4 K/UL (ref 1.8–8)
NEUTS SEG NFR BLD: 92 % (ref 32–75)
NRBC # BLD: 0 K/UL (ref 0–0.01)
NRBC BLD-RTO: 0 PER 100 WBC
PLATELET # BLD AUTO: 232 K/UL (ref 150–400)
PMV BLD AUTO: 9.9 FL (ref 8.9–12.9)
POTASSIUM SERPL-SCNC: 4.7 MMOL/L (ref 3.5–5.1)
PROT SERPL-MCNC: 5.8 G/DL (ref 6.4–8.2)
RBC # BLD AUTO: 3.37 M/UL (ref 4.1–5.7)
RBC MORPH BLD: ABNORMAL
SODIUM SERPL-SCNC: 131 MMOL/L (ref 136–145)
WBC # BLD AUTO: 12.3 K/UL (ref 4.1–11.1)

## 2025-01-06 PROCEDURE — 2500000003 HC RX 250 WO HCPCS: Performed by: STUDENT IN AN ORGANIZED HEALTH CARE EDUCATION/TRAINING PROGRAM

## 2025-01-06 PROCEDURE — 80053 COMPREHEN METABOLIC PANEL: CPT

## 2025-01-06 PROCEDURE — 2060000000 HC ICU INTERMEDIATE R&B

## 2025-01-06 PROCEDURE — 36415 COLL VENOUS BLD VENIPUNCTURE: CPT

## 2025-01-06 PROCEDURE — 6370000000 HC RX 637 (ALT 250 FOR IP): Performed by: INTERNAL MEDICINE

## 2025-01-06 PROCEDURE — 83735 ASSAY OF MAGNESIUM: CPT

## 2025-01-06 PROCEDURE — 93005 ELECTROCARDIOGRAM TRACING: CPT | Performed by: INTERNAL MEDICINE

## 2025-01-06 PROCEDURE — 6370000000 HC RX 637 (ALT 250 FOR IP): Performed by: STUDENT IN AN ORGANIZED HEALTH CARE EDUCATION/TRAINING PROGRAM

## 2025-01-06 PROCEDURE — 85025 COMPLETE CBC W/AUTO DIFF WBC: CPT

## 2025-01-06 RX ADMIN — LEVOTHYROXINE SODIUM 100 MCG: 0.1 TABLET ORAL at 05:32

## 2025-01-06 RX ADMIN — OLANZAPINE 2.5 MG: 5 TABLET, FILM COATED ORAL at 20:54

## 2025-01-06 RX ADMIN — Medication 3 MG: at 20:54

## 2025-01-06 RX ADMIN — APIXABAN 2.5 MG: 5 TABLET, FILM COATED ORAL at 20:54

## 2025-01-06 RX ADMIN — APIXABAN 2.5 MG: 5 TABLET, FILM COATED ORAL at 08:42

## 2025-01-06 RX ADMIN — MIDODRINE HYDROCHLORIDE 5 MG: 5 TABLET ORAL at 12:30

## 2025-01-06 RX ADMIN — SODIUM CHLORIDE, PRESERVATIVE FREE 10 ML: 5 INJECTION INTRAVENOUS at 20:54

## 2025-01-06 RX ADMIN — CLOPIDOGREL BISULFATE 75 MG: 75 TABLET ORAL at 08:42

## 2025-01-06 RX ADMIN — ATORVASTATIN CALCIUM 20 MG: 20 TABLET, FILM COATED ORAL at 08:42

## 2025-01-06 RX ADMIN — MIDODRINE HYDROCHLORIDE 5 MG: 5 TABLET ORAL at 16:35

## 2025-01-06 RX ADMIN — MIDODRINE HYDROCHLORIDE 5 MG: 5 TABLET ORAL at 08:42

## 2025-01-06 RX ADMIN — SODIUM CHLORIDE, PRESERVATIVE FREE 10 ML: 5 INJECTION INTRAVENOUS at 08:42

## 2025-01-06 ASSESSMENT — PAIN SCALES - GENERAL
PAINLEVEL_OUTOF10: 0

## 2025-01-06 NOTE — CONSULTS
[facility]              [address] [place] [address]                              CONSULTATION      PATIENT NAME: ARTURO PALMER                : 1935  MED REC NO: [id number]                       ROOM: 2306  ACCOUNT NO: [id number]                       ADMIT DATE: 2025  PROVIDER: Parish Samaniego MD    DATE OF SERVICE:  2025    ATTENDING PHYSICIAN:  RUBEN MIKE JR    REQUESTING PHYSICIAN:  Ruben Mike Jr., MD    REASON FOR CONSULTATION:  Evaluate bradycardia.    CHIEF COMPLAINT:  Weakness.    HISTORY OF PRESENT ILLNESS:  The patient is an 89-year-old man with a history of paroxysmal atrial fibrillation, but no other cardiac history.  He also has a history of dyslipidemia and a number of other medical issues noted below.  The patient was in his usual state of health until recently over the last few days in which the patient's family has noticed he has had slurred speech and unsteadiness on his feet.  He has had no syncope.  He denies chest pain or shortness of breath.  He was seen in his PCP's office yesterday and there was concern about a possible stroke.  He was sent to the [facility] ER and admitted to the hospitalist service.  Overnight, the patient had episodes of bradycardia with his heart rate in the 30s.  Metoprolol was held.  He has had no high-grade heart block, but did have some transient second-degree AV block, which appeared to be Mobitz 1.  He did not have any prolonged pauses.  Currently, his heart rate is in the 60s.  His blood pressure is low, but he denies chest discomfort.  He has been followed in the past by Dr. Corbin.  He had an echocardiogram in , which showed a normal EF.  He has been maintained on Toprol-XL for his atrial fibrillation and is also on Eliquis.  No history of coronary artery disease.  No recent chest pain or shortness of breath.  As stated above, no syncope.  The patient also has a history of

## 2025-01-07 LAB
ALBUMIN SERPL-MCNC: 2.6 G/DL (ref 3.5–5)
ALBUMIN/GLOB SERPL: 1 (ref 1.1–2.2)
ALP SERPL-CCNC: 81 U/L (ref 45–117)
ALT SERPL-CCNC: 32 U/L (ref 12–78)
ANION GAP SERPL CALC-SCNC: 6 MMOL/L (ref 2–12)
AST SERPL-CCNC: 25 U/L (ref 15–37)
BASOPHILS # BLD: 0.02 K/UL (ref 0–0.1)
BASOPHILS NFR BLD: 0.2 % (ref 0–1)
BILIRUB SERPL-MCNC: 0.4 MG/DL (ref 0.2–1)
BUN SERPL-MCNC: 43 MG/DL (ref 6–20)
BUN/CREAT SERPL: 33 (ref 12–20)
CALCIUM SERPL-MCNC: 8.6 MG/DL (ref 8.5–10.1)
CHLORIDE SERPL-SCNC: 103 MMOL/L (ref 97–108)
CO2 SERPL-SCNC: 26 MMOL/L (ref 21–32)
CREAT SERPL-MCNC: 1.3 MG/DL (ref 0.7–1.3)
DIFFERENTIAL METHOD BLD: ABNORMAL
EOSINOPHIL # BLD: 0.07 K/UL (ref 0–0.4)
EOSINOPHIL NFR BLD: 0.6 % (ref 0–7)
ERYTHROCYTE [DISTWIDTH] IN BLOOD BY AUTOMATED COUNT: 13.7 % (ref 11.5–14.5)
GLOBULIN SER CALC-MCNC: 2.6 G/DL (ref 2–4)
GLUCOSE SERPL-MCNC: 94 MG/DL (ref 65–100)
HCT VFR BLD AUTO: 28.6 % (ref 36.6–50.3)
HGB BLD-MCNC: 9.8 G/DL (ref 12.1–17)
IMM GRANULOCYTES # BLD AUTO: 0.05 K/UL (ref 0–0.04)
IMM GRANULOCYTES NFR BLD AUTO: 0.4 % (ref 0–0.5)
LYMPHOCYTES # BLD: 1.24 K/UL (ref 0.8–3.5)
LYMPHOCYTES NFR BLD: 10.7 % (ref 12–49)
MCH RBC QN AUTO: 32.2 PG (ref 26–34)
MCHC RBC AUTO-ENTMCNC: 34.3 G/DL (ref 30–36.5)
MCV RBC AUTO: 94.1 FL (ref 80–99)
MONOCYTES # BLD: 0.71 K/UL (ref 0–1)
MONOCYTES NFR BLD: 6.1 % (ref 5–13)
NEUTS SEG # BLD: 9.51 K/UL (ref 1.8–8)
NEUTS SEG NFR BLD: 82 % (ref 32–75)
NRBC # BLD: 0 K/UL (ref 0–0.01)
NRBC BLD-RTO: 0 PER 100 WBC
PLATELET # BLD AUTO: 231 K/UL (ref 150–400)
PMV BLD AUTO: 9.6 FL (ref 8.9–12.9)
POTASSIUM SERPL-SCNC: 4.5 MMOL/L (ref 3.5–5.1)
PROT SERPL-MCNC: 5.2 G/DL (ref 6.4–8.2)
RBC # BLD AUTO: 3.04 M/UL (ref 4.1–5.7)
SODIUM SERPL-SCNC: 135 MMOL/L (ref 136–145)
WBC # BLD AUTO: 11.6 K/UL (ref 4.1–11.1)

## 2025-01-07 PROCEDURE — 6370000000 HC RX 637 (ALT 250 FOR IP): Performed by: INTERNAL MEDICINE

## 2025-01-07 PROCEDURE — 85025 COMPLETE CBC W/AUTO DIFF WBC: CPT

## 2025-01-07 PROCEDURE — 2060000000 HC ICU INTERMEDIATE R&B

## 2025-01-07 PROCEDURE — 6370000000 HC RX 637 (ALT 250 FOR IP): Performed by: STUDENT IN AN ORGANIZED HEALTH CARE EDUCATION/TRAINING PROGRAM

## 2025-01-07 PROCEDURE — 80053 COMPREHEN METABOLIC PANEL: CPT

## 2025-01-07 PROCEDURE — 2500000003 HC RX 250 WO HCPCS: Performed by: STUDENT IN AN ORGANIZED HEALTH CARE EDUCATION/TRAINING PROGRAM

## 2025-01-07 PROCEDURE — 36415 COLL VENOUS BLD VENIPUNCTURE: CPT

## 2025-01-07 RX ORDER — MIDODRINE HYDROCHLORIDE 5 MG/1
2.5 TABLET ORAL
Status: DISCONTINUED | OUTPATIENT
Start: 2025-01-07 | End: 2025-01-09

## 2025-01-07 RX ADMIN — OLANZAPINE 2.5 MG: 5 TABLET, FILM COATED ORAL at 22:01

## 2025-01-07 RX ADMIN — SODIUM CHLORIDE, PRESERVATIVE FREE 10 ML: 5 INJECTION INTRAVENOUS at 22:02

## 2025-01-07 RX ADMIN — LEVOTHYROXINE SODIUM 100 MCG: 0.1 TABLET ORAL at 05:12

## 2025-01-07 RX ADMIN — CLOPIDOGREL BISULFATE 75 MG: 75 TABLET ORAL at 09:02

## 2025-01-07 RX ADMIN — APIXABAN 2.5 MG: 5 TABLET, FILM COATED ORAL at 09:02

## 2025-01-07 RX ADMIN — MIDODRINE HYDROCHLORIDE 2.5 MG: 5 TABLET ORAL at 18:46

## 2025-01-07 RX ADMIN — MIDODRINE HYDROCHLORIDE 2.5 MG: 5 TABLET ORAL at 11:58

## 2025-01-07 RX ADMIN — APIXABAN 2.5 MG: 5 TABLET, FILM COATED ORAL at 22:02

## 2025-01-07 RX ADMIN — SODIUM CHLORIDE, PRESERVATIVE FREE 10 ML: 5 INJECTION INTRAVENOUS at 09:03

## 2025-01-07 RX ADMIN — MIDODRINE HYDROCHLORIDE 5 MG: 5 TABLET ORAL at 09:02

## 2025-01-07 RX ADMIN — ATORVASTATIN CALCIUM 20 MG: 20 TABLET, FILM COATED ORAL at 09:02

## 2025-01-07 ASSESSMENT — PAIN SCALES - GENERAL
PAINLEVEL_OUTOF10: 0

## 2025-01-08 ENCOUNTER — APPOINTMENT (OUTPATIENT)
Facility: HOSPITAL | Age: 89
DRG: 641 | End: 2025-01-08
Attending: INTERNAL MEDICINE
Payer: MEDICARE

## 2025-01-08 LAB
ANION GAP SERPL CALC-SCNC: 3 MMOL/L (ref 2–12)
BASOPHILS # BLD: 0.03 K/UL (ref 0–0.1)
BASOPHILS NFR BLD: 0.3 % (ref 0–1)
BUN SERPL-MCNC: 41 MG/DL (ref 6–20)
BUN/CREAT SERPL: 34 (ref 12–20)
CALCIUM SERPL-MCNC: 9 MG/DL (ref 8.5–10.1)
CHLORIDE SERPL-SCNC: 102 MMOL/L (ref 97–108)
CO2 SERPL-SCNC: 28 MMOL/L (ref 21–32)
CREAT SERPL-MCNC: 1.22 MG/DL (ref 0.7–1.3)
DIFFERENTIAL METHOD BLD: ABNORMAL
ECHO BSA: 1.91 M2
ECHO LV EF PHYSICIAN: 80 %
EOSINOPHIL # BLD: 0.25 K/UL (ref 0–0.4)
EOSINOPHIL NFR BLD: 2.9 % (ref 0–7)
ERYTHROCYTE [DISTWIDTH] IN BLOOD BY AUTOMATED COUNT: 13.7 % (ref 11.5–14.5)
GLUCOSE SERPL-MCNC: 76 MG/DL (ref 65–100)
HCT VFR BLD AUTO: 32.4 % (ref 36.6–50.3)
HGB BLD-MCNC: 11 G/DL (ref 12.1–17)
IMM GRANULOCYTES # BLD AUTO: 0.03 K/UL (ref 0–0.04)
IMM GRANULOCYTES NFR BLD AUTO: 0.3 % (ref 0–0.5)
LYMPHOCYTES # BLD: 1.24 K/UL (ref 0.8–3.5)
LYMPHOCYTES NFR BLD: 14.2 % (ref 12–49)
MCH RBC QN AUTO: 32.3 PG (ref 26–34)
MCHC RBC AUTO-ENTMCNC: 34 G/DL (ref 30–36.5)
MCV RBC AUTO: 95 FL (ref 80–99)
MONOCYTES # BLD: 0.72 K/UL (ref 0–1)
MONOCYTES NFR BLD: 8.2 % (ref 5–13)
NEUTS SEG # BLD: 6.47 K/UL (ref 1.8–8)
NEUTS SEG NFR BLD: 74.1 % (ref 32–75)
NRBC # BLD: 0 K/UL (ref 0–0.01)
NRBC BLD-RTO: 0 PER 100 WBC
PLATELET # BLD AUTO: 259 K/UL (ref 150–400)
PMV BLD AUTO: 9.4 FL (ref 8.9–12.9)
POTASSIUM SERPL-SCNC: 5.3 MMOL/L (ref 3.5–5.1)
RBC # BLD AUTO: 3.41 M/UL (ref 4.1–5.7)
SODIUM SERPL-SCNC: 133 MMOL/L (ref 136–145)
WBC # BLD AUTO: 8.7 K/UL (ref 4.1–11.1)

## 2025-01-08 PROCEDURE — 97530 THERAPEUTIC ACTIVITIES: CPT

## 2025-01-08 PROCEDURE — 85025 COMPLETE CBC W/AUTO DIFF WBC: CPT

## 2025-01-08 PROCEDURE — 97116 GAIT TRAINING THERAPY: CPT

## 2025-01-08 PROCEDURE — 6370000000 HC RX 637 (ALT 250 FOR IP): Performed by: INTERNAL MEDICINE

## 2025-01-08 PROCEDURE — 97535 SELF CARE MNGMENT TRAINING: CPT

## 2025-01-08 PROCEDURE — 2500000003 HC RX 250 WO HCPCS: Performed by: STUDENT IN AN ORGANIZED HEALTH CARE EDUCATION/TRAINING PROGRAM

## 2025-01-08 PROCEDURE — 36415 COLL VENOUS BLD VENIPUNCTURE: CPT

## 2025-01-08 PROCEDURE — C8924 2D TTE W OR W/O FOL W/CON,FU: HCPCS

## 2025-01-08 PROCEDURE — 6360000004 HC RX CONTRAST MEDICATION: Performed by: INTERNAL MEDICINE

## 2025-01-08 PROCEDURE — 80048 BASIC METABOLIC PNL TOTAL CA: CPT

## 2025-01-08 PROCEDURE — 6370000000 HC RX 637 (ALT 250 FOR IP): Performed by: STUDENT IN AN ORGANIZED HEALTH CARE EDUCATION/TRAINING PROGRAM

## 2025-01-08 PROCEDURE — 2060000000 HC ICU INTERMEDIATE R&B

## 2025-01-08 RX ADMIN — PERFLUTREN 1.5 ML: 6.52 INJECTION, SUSPENSION INTRAVENOUS at 11:05

## 2025-01-08 RX ADMIN — CLOPIDOGREL BISULFATE 75 MG: 75 TABLET ORAL at 08:54

## 2025-01-08 RX ADMIN — APIXABAN 2.5 MG: 5 TABLET, FILM COATED ORAL at 08:54

## 2025-01-08 RX ADMIN — SODIUM CHLORIDE, PRESERVATIVE FREE 10 ML: 5 INJECTION INTRAVENOUS at 20:39

## 2025-01-08 RX ADMIN — SODIUM CHLORIDE, PRESERVATIVE FREE 10 ML: 5 INJECTION INTRAVENOUS at 08:53

## 2025-01-08 RX ADMIN — ATORVASTATIN CALCIUM 20 MG: 20 TABLET, FILM COATED ORAL at 08:54

## 2025-01-08 RX ADMIN — OLANZAPINE 2.5 MG: 5 TABLET, FILM COATED ORAL at 20:39

## 2025-01-08 RX ADMIN — SODIUM ZIRCONIUM CYCLOSILICATE 5 G: 5 POWDER, FOR SUSPENSION ORAL at 16:12

## 2025-01-08 RX ADMIN — MIDODRINE HYDROCHLORIDE 2.5 MG: 5 TABLET ORAL at 12:14

## 2025-01-08 RX ADMIN — MIDODRINE HYDROCHLORIDE 2.5 MG: 5 TABLET ORAL at 16:12

## 2025-01-08 RX ADMIN — MIDODRINE HYDROCHLORIDE 2.5 MG: 5 TABLET ORAL at 08:54

## 2025-01-08 RX ADMIN — LEVOTHYROXINE SODIUM 100 MCG: 0.1 TABLET ORAL at 06:36

## 2025-01-08 RX ADMIN — APIXABAN 2.5 MG: 5 TABLET, FILM COATED ORAL at 20:39

## 2025-01-08 ASSESSMENT — PAIN SCALES - GENERAL
PAINLEVEL_OUTOF10: 0
PAINLEVEL_OUTOF10: 0

## 2025-01-09 LAB
ANION GAP SERPL CALC-SCNC: 5 MMOL/L (ref 2–12)
BUN SERPL-MCNC: 43 MG/DL (ref 6–20)
BUN/CREAT SERPL: 43 (ref 12–20)
CALCIUM SERPL-MCNC: 8.4 MG/DL (ref 8.5–10.1)
CHLORIDE SERPL-SCNC: 101 MMOL/L (ref 97–108)
CO2 SERPL-SCNC: 28 MMOL/L (ref 21–32)
CREAT SERPL-MCNC: 1 MG/DL (ref 0.7–1.3)
GLUCOSE SERPL-MCNC: 84 MG/DL (ref 65–100)
POTASSIUM SERPL-SCNC: 4.5 MMOL/L (ref 3.5–5.1)
SODIUM SERPL-SCNC: 134 MMOL/L (ref 136–145)

## 2025-01-09 PROCEDURE — 97530 THERAPEUTIC ACTIVITIES: CPT

## 2025-01-09 PROCEDURE — 97116 GAIT TRAINING THERAPY: CPT

## 2025-01-09 PROCEDURE — 2060000000 HC ICU INTERMEDIATE R&B

## 2025-01-09 PROCEDURE — 80048 BASIC METABOLIC PNL TOTAL CA: CPT

## 2025-01-09 PROCEDURE — 2500000003 HC RX 250 WO HCPCS: Performed by: STUDENT IN AN ORGANIZED HEALTH CARE EDUCATION/TRAINING PROGRAM

## 2025-01-09 PROCEDURE — 6370000000 HC RX 637 (ALT 250 FOR IP): Performed by: STUDENT IN AN ORGANIZED HEALTH CARE EDUCATION/TRAINING PROGRAM

## 2025-01-09 PROCEDURE — 36415 COLL VENOUS BLD VENIPUNCTURE: CPT

## 2025-01-09 PROCEDURE — 6370000000 HC RX 637 (ALT 250 FOR IP): Performed by: INTERNAL MEDICINE

## 2025-01-09 PROCEDURE — 97535 SELF CARE MNGMENT TRAINING: CPT

## 2025-01-09 RX ORDER — MIDODRINE HYDROCHLORIDE 5 MG/1
5 TABLET ORAL
Status: DISCONTINUED | OUTPATIENT
Start: 2025-01-10 | End: 2025-01-14 | Stop reason: HOSPADM

## 2025-01-09 RX ADMIN — APIXABAN 2.5 MG: 5 TABLET, FILM COATED ORAL at 21:02

## 2025-01-09 RX ADMIN — SODIUM CHLORIDE, PRESERVATIVE FREE 10 ML: 5 INJECTION INTRAVENOUS at 09:05

## 2025-01-09 RX ADMIN — LEVOTHYROXINE SODIUM 100 MCG: 0.1 TABLET ORAL at 05:05

## 2025-01-09 RX ADMIN — ATORVASTATIN CALCIUM 20 MG: 20 TABLET, FILM COATED ORAL at 09:04

## 2025-01-09 RX ADMIN — SODIUM CHLORIDE, PRESERVATIVE FREE 10 ML: 5 INJECTION INTRAVENOUS at 21:04

## 2025-01-09 RX ADMIN — MIDODRINE HYDROCHLORIDE 2.5 MG: 5 TABLET ORAL at 11:58

## 2025-01-09 RX ADMIN — OLANZAPINE 2.5 MG: 5 TABLET, FILM COATED ORAL at 21:02

## 2025-01-09 RX ADMIN — MIDODRINE HYDROCHLORIDE 2.5 MG: 5 TABLET ORAL at 09:04

## 2025-01-09 RX ADMIN — CLOPIDOGREL BISULFATE 75 MG: 75 TABLET ORAL at 09:04

## 2025-01-09 RX ADMIN — APIXABAN 2.5 MG: 5 TABLET, FILM COATED ORAL at 09:04

## 2025-01-09 ASSESSMENT — PAIN SCALES - GENERAL
PAINLEVEL_OUTOF10: 0
PAINLEVEL_OUTOF10: 0

## 2025-01-10 LAB
ANION GAP SERPL CALC-SCNC: 2 MMOL/L (ref 2–12)
APPEARANCE UR: CLEAR
BACTERIA SPEC CULT: NORMAL
BACTERIA URNS QL MICRO: NEGATIVE /HPF
BILIRUB UR QL: NEGATIVE
BUN SERPL-MCNC: 40 MG/DL (ref 6–20)
BUN/CREAT SERPL: 35 (ref 12–20)
CALCIUM SERPL-MCNC: 8.7 MG/DL (ref 8.5–10.1)
CHLORIDE SERPL-SCNC: 102 MMOL/L (ref 97–108)
CO2 SERPL-SCNC: 29 MMOL/L (ref 21–32)
COLOR UR: ABNORMAL
CREAT SERPL-MCNC: 1.15 MG/DL (ref 0.7–1.3)
EPITH CASTS URNS QL MICRO: ABNORMAL /LPF
GLUCOSE SERPL-MCNC: 87 MG/DL (ref 65–100)
GLUCOSE UR STRIP.AUTO-MCNC: NEGATIVE MG/DL
HGB UR QL STRIP: NEGATIVE
HYALINE CASTS URNS QL MICRO: ABNORMAL /LPF (ref 0–2)
KETONES UR QL STRIP.AUTO: ABNORMAL MG/DL
LEUKOCYTE ESTERASE UR QL STRIP.AUTO: ABNORMAL
NITRITE UR QL STRIP.AUTO: NEGATIVE
PH UR STRIP: 6.5 (ref 5–8)
POTASSIUM SERPL-SCNC: 4.6 MMOL/L (ref 3.5–5.1)
PROT UR STRIP-MCNC: NEGATIVE MG/DL
RBC #/AREA URNS HPF: ABNORMAL /HPF (ref 0–5)
SERVICE CMNT-IMP: NORMAL
SODIUM SERPL-SCNC: 133 MMOL/L (ref 136–145)
SP GR UR REFRACTOMETRY: 1.03
URINE CULTURE IF INDICATED: ABNORMAL
UROBILINOGEN UR QL STRIP.AUTO: 1 EU/DL (ref 0.2–1)
WBC URNS QL MICRO: ABNORMAL /HPF (ref 0–4)

## 2025-01-10 PROCEDURE — 2060000000 HC ICU INTERMEDIATE R&B

## 2025-01-10 PROCEDURE — 2500000003 HC RX 250 WO HCPCS: Performed by: STUDENT IN AN ORGANIZED HEALTH CARE EDUCATION/TRAINING PROGRAM

## 2025-01-10 PROCEDURE — 36415 COLL VENOUS BLD VENIPUNCTURE: CPT

## 2025-01-10 PROCEDURE — 97530 THERAPEUTIC ACTIVITIES: CPT

## 2025-01-10 PROCEDURE — 81001 URINALYSIS AUTO W/SCOPE: CPT

## 2025-01-10 PROCEDURE — 97116 GAIT TRAINING THERAPY: CPT

## 2025-01-10 PROCEDURE — 80048 BASIC METABOLIC PNL TOTAL CA: CPT

## 2025-01-10 PROCEDURE — 6370000000 HC RX 637 (ALT 250 FOR IP): Performed by: STUDENT IN AN ORGANIZED HEALTH CARE EDUCATION/TRAINING PROGRAM

## 2025-01-10 PROCEDURE — 6370000000 HC RX 637 (ALT 250 FOR IP): Performed by: INTERNAL MEDICINE

## 2025-01-10 PROCEDURE — 2580000003 HC RX 258: Performed by: INTERNAL MEDICINE

## 2025-01-10 RX ORDER — 0.9 % SODIUM CHLORIDE 0.9 %
500 INTRAVENOUS SOLUTION INTRAVENOUS ONCE
Status: COMPLETED | OUTPATIENT
Start: 2025-01-10 | End: 2025-01-10

## 2025-01-10 RX ADMIN — MIDODRINE HYDROCHLORIDE 5 MG: 5 TABLET ORAL at 08:53

## 2025-01-10 RX ADMIN — SODIUM CHLORIDE 500 ML: 9 INJECTION, SOLUTION INTRAVENOUS at 12:08

## 2025-01-10 RX ADMIN — MIDODRINE HYDROCHLORIDE 5 MG: 5 TABLET ORAL at 11:12

## 2025-01-10 RX ADMIN — LEVOTHYROXINE SODIUM 100 MCG: 0.1 TABLET ORAL at 08:53

## 2025-01-10 RX ADMIN — APIXABAN 2.5 MG: 5 TABLET, FILM COATED ORAL at 21:54

## 2025-01-10 RX ADMIN — SODIUM CHLORIDE, PRESERVATIVE FREE 10 ML: 5 INJECTION INTRAVENOUS at 08:53

## 2025-01-10 RX ADMIN — ATORVASTATIN CALCIUM 20 MG: 20 TABLET, FILM COATED ORAL at 08:53

## 2025-01-10 RX ADMIN — OLANZAPINE 2.5 MG: 5 TABLET, FILM COATED ORAL at 21:54

## 2025-01-10 RX ADMIN — CLOPIDOGREL BISULFATE 75 MG: 75 TABLET ORAL at 08:53

## 2025-01-10 RX ADMIN — MIDODRINE HYDROCHLORIDE 5 MG: 5 TABLET ORAL at 16:24

## 2025-01-10 RX ADMIN — SODIUM CHLORIDE, PRESERVATIVE FREE 10 ML: 5 INJECTION INTRAVENOUS at 21:55

## 2025-01-10 RX ADMIN — APIXABAN 2.5 MG: 5 TABLET, FILM COATED ORAL at 08:53

## 2025-01-10 ASSESSMENT — PAIN SCALES - GENERAL
PAINLEVEL_OUTOF10: 0

## 2025-01-11 LAB
ANION GAP SERPL CALC-SCNC: 5 MMOL/L (ref 2–12)
BUN SERPL-MCNC: 33 MG/DL (ref 6–20)
BUN/CREAT SERPL: 33 (ref 12–20)
CALCIUM SERPL-MCNC: 8.5 MG/DL (ref 8.5–10.1)
CHLORIDE SERPL-SCNC: 101 MMOL/L (ref 97–108)
CO2 SERPL-SCNC: 27 MMOL/L (ref 21–32)
CREAT SERPL-MCNC: 0.99 MG/DL (ref 0.7–1.3)
GLUCOSE SERPL-MCNC: 78 MG/DL (ref 65–100)
POTASSIUM SERPL-SCNC: 4.4 MMOL/L (ref 3.5–5.1)
SODIUM SERPL-SCNC: 133 MMOL/L (ref 136–145)

## 2025-01-11 PROCEDURE — 6370000000 HC RX 637 (ALT 250 FOR IP): Performed by: STUDENT IN AN ORGANIZED HEALTH CARE EDUCATION/TRAINING PROGRAM

## 2025-01-11 PROCEDURE — 2500000003 HC RX 250 WO HCPCS: Performed by: STUDENT IN AN ORGANIZED HEALTH CARE EDUCATION/TRAINING PROGRAM

## 2025-01-11 PROCEDURE — 80048 BASIC METABOLIC PNL TOTAL CA: CPT

## 2025-01-11 PROCEDURE — 6370000000 HC RX 637 (ALT 250 FOR IP): Performed by: INTERNAL MEDICINE

## 2025-01-11 PROCEDURE — 36415 COLL VENOUS BLD VENIPUNCTURE: CPT

## 2025-01-11 PROCEDURE — 2060000000 HC ICU INTERMEDIATE R&B

## 2025-01-11 RX ADMIN — APIXABAN 2.5 MG: 5 TABLET, FILM COATED ORAL at 20:48

## 2025-01-11 RX ADMIN — LEVOTHYROXINE SODIUM 100 MCG: 0.1 TABLET ORAL at 08:29

## 2025-01-11 RX ADMIN — APIXABAN 2.5 MG: 5 TABLET, FILM COATED ORAL at 08:55

## 2025-01-11 RX ADMIN — Medication 3 MG: at 20:48

## 2025-01-11 RX ADMIN — ATORVASTATIN CALCIUM 20 MG: 20 TABLET, FILM COATED ORAL at 08:55

## 2025-01-11 RX ADMIN — SODIUM CHLORIDE, PRESERVATIVE FREE 10 ML: 5 INJECTION INTRAVENOUS at 08:52

## 2025-01-11 RX ADMIN — SODIUM CHLORIDE, PRESERVATIVE FREE 10 ML: 5 INJECTION INTRAVENOUS at 20:51

## 2025-01-11 RX ADMIN — CLOPIDOGREL BISULFATE 75 MG: 75 TABLET ORAL at 08:55

## 2025-01-11 RX ADMIN — OLANZAPINE 2.5 MG: 5 TABLET, FILM COATED ORAL at 20:48

## 2025-01-11 RX ADMIN — MIDODRINE HYDROCHLORIDE 5 MG: 5 TABLET ORAL at 12:18

## 2025-01-11 RX ADMIN — MIDODRINE HYDROCHLORIDE 5 MG: 5 TABLET ORAL at 17:39

## 2025-01-12 PROCEDURE — 6370000000 HC RX 637 (ALT 250 FOR IP): Performed by: INTERNAL MEDICINE

## 2025-01-12 PROCEDURE — 6370000000 HC RX 637 (ALT 250 FOR IP): Performed by: STUDENT IN AN ORGANIZED HEALTH CARE EDUCATION/TRAINING PROGRAM

## 2025-01-12 PROCEDURE — 2500000003 HC RX 250 WO HCPCS: Performed by: STUDENT IN AN ORGANIZED HEALTH CARE EDUCATION/TRAINING PROGRAM

## 2025-01-12 PROCEDURE — 2060000000 HC ICU INTERMEDIATE R&B

## 2025-01-12 RX ADMIN — APIXABAN 2.5 MG: 5 TABLET, FILM COATED ORAL at 09:15

## 2025-01-12 RX ADMIN — Medication 3 MG: at 21:09

## 2025-01-12 RX ADMIN — OLANZAPINE 2.5 MG: 5 TABLET, FILM COATED ORAL at 21:08

## 2025-01-12 RX ADMIN — ATORVASTATIN CALCIUM 20 MG: 20 TABLET, FILM COATED ORAL at 09:15

## 2025-01-12 RX ADMIN — SODIUM CHLORIDE, PRESERVATIVE FREE 10 ML: 5 INJECTION INTRAVENOUS at 21:10

## 2025-01-12 RX ADMIN — MIDODRINE HYDROCHLORIDE 5 MG: 5 TABLET ORAL at 12:06

## 2025-01-12 RX ADMIN — SODIUM CHLORIDE, PRESERVATIVE FREE 10 ML: 5 INJECTION INTRAVENOUS at 09:16

## 2025-01-12 RX ADMIN — MIDODRINE HYDROCHLORIDE 5 MG: 5 TABLET ORAL at 17:23

## 2025-01-12 RX ADMIN — APIXABAN 2.5 MG: 5 TABLET, FILM COATED ORAL at 21:08

## 2025-01-12 RX ADMIN — CLOPIDOGREL BISULFATE 75 MG: 75 TABLET ORAL at 09:14

## 2025-01-12 RX ADMIN — MIDODRINE HYDROCHLORIDE 5 MG: 5 TABLET ORAL at 09:15

## 2025-01-12 RX ADMIN — LEVOTHYROXINE SODIUM 100 MCG: 0.1 TABLET ORAL at 06:46

## 2025-01-12 ASSESSMENT — PAIN SCALES - GENERAL
PAINLEVEL_OUTOF10: 0

## 2025-01-13 PROCEDURE — 6370000000 HC RX 637 (ALT 250 FOR IP): Performed by: STUDENT IN AN ORGANIZED HEALTH CARE EDUCATION/TRAINING PROGRAM

## 2025-01-13 PROCEDURE — 97116 GAIT TRAINING THERAPY: CPT

## 2025-01-13 PROCEDURE — 2500000003 HC RX 250 WO HCPCS: Performed by: STUDENT IN AN ORGANIZED HEALTH CARE EDUCATION/TRAINING PROGRAM

## 2025-01-13 PROCEDURE — 6370000000 HC RX 637 (ALT 250 FOR IP): Performed by: INTERNAL MEDICINE

## 2025-01-13 PROCEDURE — 2060000000 HC ICU INTERMEDIATE R&B

## 2025-01-13 RX ADMIN — OLANZAPINE 2.5 MG: 5 TABLET, FILM COATED ORAL at 20:48

## 2025-01-13 RX ADMIN — SODIUM CHLORIDE, PRESERVATIVE FREE 10 ML: 5 INJECTION INTRAVENOUS at 08:33

## 2025-01-13 RX ADMIN — MIDODRINE HYDROCHLORIDE 5 MG: 5 TABLET ORAL at 17:56

## 2025-01-13 RX ADMIN — LEVOTHYROXINE SODIUM 100 MCG: 0.1 TABLET ORAL at 08:32

## 2025-01-13 RX ADMIN — MIDODRINE HYDROCHLORIDE 5 MG: 5 TABLET ORAL at 13:24

## 2025-01-13 RX ADMIN — CLOPIDOGREL BISULFATE 75 MG: 75 TABLET ORAL at 08:32

## 2025-01-13 RX ADMIN — SODIUM CHLORIDE, PRESERVATIVE FREE 10 ML: 5 INJECTION INTRAVENOUS at 20:49

## 2025-01-13 RX ADMIN — APIXABAN 2.5 MG: 5 TABLET, FILM COATED ORAL at 20:48

## 2025-01-13 RX ADMIN — MIDODRINE HYDROCHLORIDE 5 MG: 5 TABLET ORAL at 08:32

## 2025-01-13 RX ADMIN — APIXABAN 2.5 MG: 5 TABLET, FILM COATED ORAL at 08:32

## 2025-01-13 RX ADMIN — Medication 3 MG: at 20:48

## 2025-01-13 RX ADMIN — ATORVASTATIN CALCIUM 20 MG: 20 TABLET, FILM COATED ORAL at 08:32

## 2025-01-14 ENCOUNTER — OFFICE VISIT (OUTPATIENT)
Facility: CLINIC | Age: 89
End: 2025-01-14

## 2025-01-14 ENCOUNTER — CARE COORDINATION (OUTPATIENT)
Dept: CARE COORDINATION | Age: 89
End: 2025-01-14

## 2025-01-14 VITALS
SYSTOLIC BLOOD PRESSURE: 113 MMHG | HEIGHT: 71 IN | HEART RATE: 83 BPM | TEMPERATURE: 98.3 F | RESPIRATION RATE: 16 BRPM | DIASTOLIC BLOOD PRESSURE: 96 MMHG | OXYGEN SATURATION: 94 % | BODY MASS INDEX: 22.65 KG/M2 | WEIGHT: 161.82 LBS

## 2025-01-14 DIAGNOSIS — Z85.46 HISTORY OF PROSTATE CANCER: Chronic | ICD-10-CM

## 2025-01-14 DIAGNOSIS — E78.2 MIXED HYPERLIPIDEMIA: Chronic | ICD-10-CM

## 2025-01-14 DIAGNOSIS — M54.12 CERVICAL MYELOPATHY WITH CERVICAL RADICULOPATHY (HCC): Chronic | ICD-10-CM

## 2025-01-14 DIAGNOSIS — F01.B0 MODERATE VASCULAR DEMENTIA WITHOUT BEHAVIORAL DISTURBANCE, PSYCHOTIC DISTURBANCE, MOOD DISTURBANCE, OR ANXIETY (HCC): Chronic | ICD-10-CM

## 2025-01-14 DIAGNOSIS — E03.9 ACQUIRED HYPOTHYROIDISM: Chronic | ICD-10-CM

## 2025-01-14 DIAGNOSIS — E55.9 VITAMIN D DEFICIENCY: Chronic | ICD-10-CM

## 2025-01-14 DIAGNOSIS — I10 ESSENTIAL HYPERTENSION: Chronic | ICD-10-CM

## 2025-01-14 DIAGNOSIS — I65.23 STENOSIS OF BOTH INTERNAL CAROTID ARTERIES: Chronic | ICD-10-CM

## 2025-01-14 DIAGNOSIS — K21.9 GASTROESOPHAGEAL REFLUX DISEASE WITHOUT ESOPHAGITIS: Chronic | ICD-10-CM

## 2025-01-14 DIAGNOSIS — G95.9 CERVICAL MYELOPATHY WITH CERVICAL RADICULOPATHY (HCC): Chronic | ICD-10-CM

## 2025-01-14 DIAGNOSIS — M50.30 DEGENERATIVE DISC DISEASE, CERVICAL: Chronic | ICD-10-CM

## 2025-01-14 DIAGNOSIS — E53.8 B12 DEFICIENCY: Chronic | ICD-10-CM

## 2025-01-14 DIAGNOSIS — I48.0 PAROXYSMAL ATRIAL FIBRILLATION (HCC): Primary | Chronic | ICD-10-CM

## 2025-01-14 DIAGNOSIS — E22.2 SIADH (SYNDROME OF INAPPROPRIATE ADH PRODUCTION) (HCC): Chronic | ICD-10-CM

## 2025-01-14 DIAGNOSIS — N18.31 STAGE 3A CHRONIC KIDNEY DISEASE (HCC): Chronic | ICD-10-CM

## 2025-01-14 DIAGNOSIS — I73.9 PERIPHERAL ARTERY DISEASE (HCC): Chronic | ICD-10-CM

## 2025-01-14 DIAGNOSIS — G45.0 VERTEBROBASILAR ARTERY INSUFFICIENCY: Chronic | ICD-10-CM

## 2025-01-14 DIAGNOSIS — Z85.850 HISTORY OF THYROID CANCER: Chronic | ICD-10-CM

## 2025-01-14 DIAGNOSIS — Z86.73 OLD CEREBELLAR INFARCT WITHOUT LATE EFFECT: Chronic | ICD-10-CM

## 2025-01-14 PROCEDURE — 6370000000 HC RX 637 (ALT 250 FOR IP): Performed by: INTERNAL MEDICINE

## 2025-01-14 PROCEDURE — 6370000000 HC RX 637 (ALT 250 FOR IP): Performed by: STUDENT IN AN ORGANIZED HEALTH CARE EDUCATION/TRAINING PROGRAM

## 2025-01-14 PROCEDURE — 2500000003 HC RX 250 WO HCPCS: Performed by: STUDENT IN AN ORGANIZED HEALTH CARE EDUCATION/TRAINING PROGRAM

## 2025-01-14 RX ORDER — MIDODRINE HYDROCHLORIDE 5 MG/1
5 TABLET ORAL
Qty: 90 TABLET | Refills: 3 | Status: SHIPPED
Start: 2025-01-14

## 2025-01-14 RX ADMIN — CLOPIDOGREL BISULFATE 75 MG: 75 TABLET ORAL at 08:27

## 2025-01-14 RX ADMIN — ATORVASTATIN CALCIUM 20 MG: 20 TABLET, FILM COATED ORAL at 08:27

## 2025-01-14 RX ADMIN — MIDODRINE HYDROCHLORIDE 5 MG: 5 TABLET ORAL at 06:08

## 2025-01-14 RX ADMIN — LEVOTHYROXINE SODIUM 100 MCG: 0.1 TABLET ORAL at 06:08

## 2025-01-14 RX ADMIN — APIXABAN 2.5 MG: 5 TABLET, FILM COATED ORAL at 08:27

## 2025-01-14 RX ADMIN — SODIUM CHLORIDE, PRESERVATIVE FREE 10 ML: 5 INJECTION INTRAVENOUS at 08:27

## 2025-01-14 ASSESSMENT — PAIN SCALES - GENERAL: PAINLEVEL_OUTOF10: 0

## 2025-01-14 NOTE — DISCHARGE INSTRUCTIONS
Patient Discharge Instructions    Mateus Tran . / [id number] : 1935    Admitted 1/3/2025 Discharged: 2025         DISCHARGE DIAGNOSIS:   Recurrent hypotension  Acute kidney injury   Transient type I second-degree heart block since admission  Hyponatremia   Slurred speech, gait instability POA   Choreiform movement disorder POA  Atrial fibrillation on Eliquis  Essential hypertension  Hyperlipidemia  Hypothyroidism  prostate cancer  Nonspecific left upper lobe groundglass pulmonary nodule measuring up to 1.2 x 1.6 cm.  Mild hyperkalemia      Take Home Medications     {Medication reconciliation information is now added to the patient's AVS automatically when it is printed.  There is no need to use this SmartLink in discharge instructions.  Highlight this text and delete it to clear this message}      General drug facts     If you have a very bad allergy, wear an allergy ID at all times.   It is important that you take the medication exactly as they are prescribed.   Keep your medication in the bottles provided by the pharmacist.  Keep a list of all your drugs (prescription, natural products, vitamins, OTC) with you. Give this list to your doctor.  Do not take other medications without consulting your doctor.    Do not share your drugs with others and do not take anyone else's drugs.   Keep all drugs out of the reach of children and pets.    Most drugs may be thrown away in household trash after mixing with coffee grounds or tj litter and sealing in a plastic bag.    Keep a list Call your doctor for help with any side effects. If in the [place] you may also call the FDA at 6-692-FDA-6394    Talk with the doctor before starting any new drug, including OTC, natural products, or vitamins.        What to do at Home    1. Recommended diet: regular     2. Recommended activity: activity as tolerated    3. If you experience any of the following symptoms then please call your primary care physician or return

## 2025-01-14 NOTE — PROGRESS NOTES
Cardiology Progress Note  1/10/2025     Admit Date: 1/3/2025  Admit Diagnosis: Hyponatremia [E87.1]  CC: none currently  Cardiac Assessment/Plan (as noted by Dr Samaniego):     1- recent weakness and slurred speech with episodic hypotension     2- Hx PAF, Asx; Chronic AC on metoprolol, Nl EF on echo  3- sinus teressa and 1st deg AVB, transient Mobitz 1  4- CHANG/CKD stage 3s (Dr Yao)  5- Dyslipidemia  6- hyponatremia  7- Hx prostate  CA  8- Hx hypertension (prior losartan 100; toproxl 50; norvasc 5)  9- advanced age and frailty     10) h/o KNUTSON/atypical CP: Min CAD @ cath 2011; Neg MPI 2018; Echo 3/2024: Nl LV/RV; AoV sclerosis; mild LVH; Nl Pap  11) Remote TIA; chronic cerebellar infarct on MRI 1/2025: on chronic plavix rather than asa  12) Movement d/o: followed at [facility] [place]  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     1/7: Not orthostatic yesterday afternoon; No CP/resting dyspnea  BP 90-130s; HR 60-70s; sinus; 95% RA  Na 135; K 4.5 Cr 1.3; Hg 9.8  Cardiac meds: eliquis 2.5 bid; plavix; lipitor 20; midodrine 5 tid; (off prior losartan 50; toprol XL 50; norvasc 5).  Rec: watch orthostatics; wean midodrine to off as able, decrease today; no current indication for PPM.    1/8: No CP/resting dyspnea  BP  (dont see orthostatics from yesterday); HR 60s-70s; Sinus 94% RA  Hg 11; Chems pending    1/9: No CP/resting dyspnea  BP 100s-140s; HR 70-80s; sinus; 95% RA; good UOP  Na 134; K 4.5; CR 1;   Cardiac meds: eliquis 2.5 bid; plavix; lipitor 20; midodrine 2.5 tid; (off prior losartan 50; toprol XL 50; norvasc 5).  Rec: still needs orthostatics (d/w nursing); wean midodrine to off as able (decreased 1/6);   No indication for PPM.  Stable cardiac status if BP ok; Dispo per primary team. F/U with me in 2 months.    1/10: No CP/resting dyspnea; Orthostatic hypotension yesterday so midodrine to 5 tid & comp stockings.  BP  (to 78 w/ standing yesterday); HR 70-90s; Sinus; 98% RA; good  UOP  Na 133; K 4.6; 
                     Cardiology Progress Note  1/13/2025     Admit Date: 1/3/2025  Admit Diagnosis: Hyponatremia [E87.1]  CC: none currently  Cardiac Assessment/Plan (as noted by Dr Samaniego):     1- recent weakness and slurred speech with episodic hypotension     2- Hx PAF, Asx; Chronic AC on metoprolol, Nl EF on echo  3- sinus teressa and 1st deg AVB, transient Mobitz 1  4- CHANG/CKD stage 3s (Dr Yao)  5- Dyslipidemia  6- hyponatremia  7- Hx prostate  CA  8- Hx hypertension (prior losartan 100; toproxl 50; norvasc 5)  9- advanced age and frailty     10) h/o KNUTSON/atypical CP: Min CAD @ cath 2011; Neg MPI 2018; Echo 3/2024: Nl LV/RV; AoV sclerosis; mild LVH; Nl Pap  11) Remote TIA; chronic cerebellar infarct on MRI 1/2025: on chronic plavix rather than asa  12) Movement d/o: followed at [facility] [place]  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     1/7: Not orthostatic yesterday afternoon; No CP/resting dyspnea  BP 90-130s; HR 60-70s; sinus; 95% RA  Na 135; K 4.5 Cr 1.3; Hg 9.8  Cardiac meds: eliquis 2.5 bid; plavix; lipitor 20; midodrine 5 tid; (off prior losartan 50; toprol XL 50; norvasc 5).  Rec: watch orthostatics; wean midodrine to off as able, decrease today; no current indication for PPM.    1/8: No CP/resting dyspnea  BP  (dont see orthostatics from yesterday); HR 60s-70s; Sinus 94% RA  Hg 11; Chems pending    1/9: No CP/resting dyspnea  BP 100s-140s; HR 70-80s; sinus; 95% RA; good UOP  Na 134; K 4.5; CR 1;   Cardiac meds: eliquis 2.5 bid; plavix; lipitor 20; midodrine 2.5 tid; (off prior losartan 50; toprol XL 50; norvasc 5).  Rec: still needs orthostatics (d/w nursing); wean midodrine to off as able (decreased 1/6);   No indication for PPM.  Stable cardiac status if BP ok; Dispo per primary team. F/U with me in 2 months.    1/10: No CP/resting dyspnea; Orthostatic hypotension yesterday so midodrine to 5 tid & comp stockings.  BP  (to 78 w/ standing yesterday); HR 70-90s; Sinus; 98% RA; good  UOP  Na 133; K 4.6; 
                     Cardiology Progress Note  1/8/2025     Admit Date: 1/3/2025  Admit Diagnosis: Hyponatremia [E87.1]  CC: none currently  Cardiac Assessment/Plan (as noted by Dr Samaniego):     1- recent weakness and slurred speech with episodic hypotension     2- Hx PAF, Asx; Chronic AC on metoprolol, Nl EF on echo  3- sinus teressa and 1st deg AVB, transient Mobitz 1  4- CHANG/CKD stage 3s (Dr Yao)  5- Dyslipidemia  6- hyponatremia  7- Hx prostate  CA  8- Hx hypertension (prior losartan 100; toproxl 50; norvasc 5)  9- advanced age and frailty     10) h/o KNUTSON/atypical CP: Min CAD @ cath 2011; Neg MPI 2018; Echo 3/2024: Nl LV/RV; AoV sclerosis; mild LVH; Nl Pap  11) Remote TIA; chronic cerebellar infarct on MRI 1/2025: on chronic plavix rather than asa  12) Movement d/o: followed at [facility] [place]  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     1/6: No angina/KNUTSON; not OOB yet today.  BP 64-140s, current 123; HR 60-80s; sinus/first deg AVB; 92% RA  Na 131; K 4.7; Cr 1.27; alb 2.8; Hg 10.7  Cardiac meds: eliquis 2.5 bid; plavix; lipitor 20; midodrine 5 tid; (off prior losartan 50; toprol XL 50; norvasc 5).  Rec: watch orthostatics; wean midodrine as able; no current indication for PPM.    1/7: Not orthostatic yesterday afternoon; No CP/resting dyspnea  BP 90-130s; HR 60-70s; sinus; 95% RA  Na 135; K 4.5 Cr 1.3; Hg 9.8  Cardiac meds: eliquis 2.5 bid; plavix; lipitor 20; midodrine 5 tid; (off prior losartan 50; toprol XL 50; norvasc 5).  Rec: watch orthostatics; wean midodrine to off as able, decrease today; no current indication for PPM.    1/8: No CP/resting dyspnea  BP  (dont see orthostatics from yesterday); HR 60s-70s; Sinus 94% RA  Hg 11; Chems pending    Cardiac meds: eliquis 2.5 bid; plavix; lipitor 20; midodrine 2.5 tid; (off prior losartan 50; toprol XL 50; norvasc 5).    Rec: watch orthostatics; wean midodrine to off as able (decreased yesterday);   No indication for PPM.  Stable cardiac status if BP ok; Dispo per 
     Nutrition Note    Screened for MST2, pt reported weight loss, decreased intakes. No recent significant weight loss over the past year per EMR wt hx. >76% breakfast this AM. No acute concerns, will follow as a LOS, otherwise please consult PRN.    Electronically signed by Sweetie Jacobo RD on 1/4/25 at 10:24 AM EST    Contact: [id number]    
    Hospitalist Progress Note    NAME:   Mateus Tran Sr.   : 1935   MRN: [id number]     Date: 2025    Patient PCP: Bashir Holcomb MD    Hospital Problem list:     Recurrent hypotension  Initial episode pt sitting up on , BP into 60s - 70s and lethargic   Episode resolved getting into bed and IVF   Held BP meds  Another episode to 64/55 overnight, then again this Am to 75/46  In between BP stable and asymptomatic  Not associated with Bradycardia, but HR not increased either  No evidence of sepsis or bleeding  Cortisol 10.5   Cosyntropin stim test 2025 was disrupted by nurse not labeling blood   Initial cortisol level was not run because of the lack of a label   She had already given the IV cosyntropin at 1323   Total cortisol level at 1453 was 46.8, effectively rules out AI even if the test was not completed  2D echocardiogram shows normal ejection fraction  Lactate 1.3 with the hypotension   Procalcitonin less than 0.05   Serial troponin levels 7-12  Continue midodrine 5 mg 3 times daily.  Continue added compression stockings.  S/p fluids as well.  Orthostatics are improved.  Continue compression stockings and also midodrine.  Discharge in a.m. tomorrow  :  Orthostatic vitals are improved.  Continue midodrine.  Continue added compression stockings.  Encourage good oral intake.  Continue PT OT.      Acute kidney injury   Question related to IV contrast or  hypotension  Holding BP meds, hemodynamic support  Received significant IV fluids will hold further  Serial labs  Creatinine is improved and is 1.2    Transient type I second-degree heart block since admission  Holding beta-blocker  Bradycardia was not temporally related to the hypotension  Discussed with Dr. Bello he 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 rather than type II  No indication for pacemaker    Hyponatremia   Chronic issue several previous admits for sodiums were as low as 125   Often associated with generalized 
    Hospitalist Progress Note    NAME:   Mateus Tran Sr.   : 1935   MRN: [id number]     Date: 2025    Patient PCP: Bashir Holcomb MD    Hospital Problem list:     Recurrent hypotension since 2024  Initial episode pt sitting up on , BP into 60s - 70s and lethargic   Episode resolved getting into bed and IVF   Held BP meds  Another episode to 64/55 overnight, then again this Am to 75/46  In between BP stable and asymptomatic  Not associated with Bradycardia, but HR not increased either  No evidence of sepsis or bleeding  Cortisol 10.5, will check cosyntropin stim test  Check echocardiogram  Check repeat troponin, procalcitonin, lactate  Just hypotensive now give additional normal saline bolus  P.o. midodrine  IV Decadron  Holding all BP meds right now  Always seems to respond to fluids but reluctant to give too much  So far is not clear to me why he is having his recurrent episodes of hypotension    Acute kidney injury 0.95 --> 1.49 --> 1.44  Question related to IV contrast or  hypotension  Holding BP meds  IV fluids, now improving  Serial labs  Check renal ultrasound if not improving    Possible type II second-degree heart block, episodes past 24 hours  Heart rates to 30 to 40s, dropping 3rd beat   KY not increasing that I can tell   Self terminate  Awaiting official EKG read  Stopping beta-blocker  Dr Samaniego following, no current indication for PPM at present  Continue telemetry    Hyponatremia Na 127 --> 131 POA  Chronic issue several previous admits for sodiums were as low as 125   Often associated with generalized weakness  Family reports no known etiology  No clear culprit medications  Urine studies   Urine sodium 92   Urine osmolarity 276     Random cortisol 10.5   TSH 2.0  Given recurrent nature and inappropriately concentrated urine   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 fluid restrict to 1500 cc  Urea p.o. x 1 on 2025, repeat x 1 on 2025  Serial labs    Slurred speech, gait instability 
  Discharge Summary     Patient: Mateus Tran Sr. MRN: [id number]  SSN: [id number]    YOB: 1935  Age: 89 y.o.  Sex: male       Code Status: DNR  Allergies:   Allergies   Allergen Reactions    Sulfa Antibiotics Hives       Admit Date: 1/3/2025    Discharge Date: 2025      Admission Diagnoses: Hyponatremia [E87.1]    PMH:   Past Medical History:   Diagnosis Date    Atrial fibrillation (HCC)     Carotid artery stenosis, asymptomatic 2014    Right side 50-79%     Chronic kidney disease     stage 3    Chronic obstructive pulmonary disease (HCC)     emphemsa     Emphysema of lung (HCC)     GERD (gastroesophageal reflux disease)     Gout     Hiatal hernia     Hyperlipidemia     Hyperparathyroidism (HCC)     Hypertension     Hypothyroidism     Long term (current) use of anticoagulants     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  Calcium and PTH monitored by Dr. Cardenas, renal.  Levels normalized after surgery.    Prostate cancer (HCC)     seeds, then XRT, then seed again.  Dr. Peterson    Spinal stenosis     Thyroid cancer (HCC) 2015    Urinary incontinence        Social History:  Social history   Social History     Tobacco Use    Smoking status: Former     Current packs/day: 0.00     Types: Cigarettes     Quit date: 1955     Years since quittin.9    Smokeless tobacco: Never   Substance Use Topics    Alcohol use: Not Currently     Drug History   Social History     Substance and Sexual Activity   Drug Use Never     Familiy History   Family History   Problem Relation Age of Onset    Heart Disease Father     Hypertension Father     Cancer Mother         hodgkins disease    Other Neg Hx         no hypercalcemia or kidney stones       Consults: None    Discharge Condition: Fair    Disposition: SNF    Discharge Medications:   Current Discharge Medication List        START taking these medications 
  End of Shift Note    Bedside shift change report given to Astrid MARTIN (oncoming nurse) by Esthela Amin RN (offgoing nurse).  Report included the following information SBAR, MAR, and Cardiac Rhythm normal sinus    Shift worked:  7a-7.30p     Shift summary and any significant changes:     Uneventful.   -Orthostatic BP and Pulse taken and recorded.     Concerns for physician to address:       Zone phone for oncoming shift:          Activity:  Level of Assistance: Moderate assist, patient does 50-74%  Number times ambulated in hallways past shift: 0  Number of times OOB to chair past shift: 0    Cardiac:   Cardiac Monitoring: Yes      Cardiac Rhythm: Sinus rhythm    Access:  Current line(s): PIV     Genitourinary:   Urinary Status: Voiding, External catheter    Respiratory:   O2 Device: None (Room air)  Chronic home O2 use?: NO  Incentive spirometer at bedside: NO    GI:  Last BM (including prior to admit): 01/06/25  Current diet:  ADULT ORAL NUTRITION SUPPLEMENT; Breakfast, Lunch, Dinner; Standard High Calorie/High Protein Oral Supplement  ADULT DIET; Regular; 1500 ml  Passing flatus: YES    Pain Management:   Patient states pain is manageable on current regimen: YES    Skin:  Nikhil Scale Score: 17  Interventions: Wound Offloading (Prevention Methods): Elevate heels, Repositioning, Pillows, Turning    Patient Safety:  Fall Risk: Nursing Judgement-Fall Risk High(Add Comments): Yes  Fall Risk Interventions  Nursing Judgement-Fall Risk High(Add Comments): Yes  Toilet Every 2 Hours-In Advance of Need: Yes  Hourly Visual Checks: In bed, Awake  Fall Visual Posted: Fall sign posted, Socks  Room Door Open: Yes  Alarm On: Bed  Patient Moved Closer to Nursing Station: No    Active Consults:   IP CONSULT TO NEUROLOGY  IP CONSULT TO CARDIOLOGY    Length of Stay:  Expected LOS: 6  Actual LOS: 3    Esthela Amin RN                            
  Physician Progress Note      PATIENT:               ARTURO PALMER  CSN #:                  [id number]  :                       1935  ADMIT DATE:       1/3/2025 4:18 PM  DISCH DATE:  RESPONDING  PROVIDER #:        Herrera Aguayo MD          QUERY TEXT:    Good morning.    Patient admitted with hyponatremia, noted to have atrial fibrillation and is   maintained on Eliquis.    If possible, please document in progress notes and discharge summary if you   are evaluating and/or treating any of the following:?  ?  The medical record reflects the following:    Risk Factors: 89 yr old male, HTN    Clinical Indicators: from  Cardiology consult: \"Hx PAF, Asx; Chronic AC on   metoprolol, Nl EF on echo\"    Treatment: Eliquis      Thank you,  Alethea Romero RN, CDI  Options provided:  -- Secondary hypercoagulable state in a patient with atrial fibrillation  -- Other - I will add my own diagnosis  -- Disagree - Not applicable / Not valid  -- Disagree - Clinically unable to determine / Unknown  -- Refer to Clinical Documentation Reviewer    PROVIDER RESPONSE TEXT:    This patient has secondary hypercoagulable state in a patient with atrial   fibrillation.    Query created by: Alethea Romero on 2025 10:37 AM      Electronically signed by:  Herrera Aguayo MD 2025 8:41 AM          
0210 pt resting in bed HR becoming teressa as low as the 30. Pt asymptomatic  /86, HR goes back to normal when patient is awake and talking.  Notified the overnight NP order for EKG to be done and morning labs to be drawn now.  No other orders.            End of Shift Note    Bedside shift change report given to  VERONICA Bauman(oncoming nurse) by Laurel Pyle RN (offgoing nurse).  Report included the following information SBAR, Kardex, Procedure Summary, Intake/Output, MAR, and Recent Results    Shift worked:  night     Shift summary and any significant changes:    HR teressa overnight; no other issues overnight; SEE NP note also     Concerns for physician to address: HR     Zone phone for oncoming shift:       Activity:     Number times ambulated in hallways past shift: 0  Number of times OOB to chair past shift: 0    Cardiac:   Cardiac Monitoring: Yes      Cardiac Rhythm: Sinus teressa    Access:  Current line(s): PIV     Genitourinary:        Respiratory:      Chronic home O2 use?: NO  Incentive spirometer at bedside: NO    GI:     Current diet:  ADULT DIET; Regular  ADULT ORAL NUTRITION SUPPLEMENT; Breakfast, AM Snack, Lunch, PM Snack, Dinner, HS Snack; Standard High Calorie/High Protein Oral Supplement  Passing flatus: YES    Pain Management:   Patient states pain is manageable on current regimen: YES    Skin:  Nikhil Scale Score: 20  Interventions: Wound Offloading (Prevention Methods): Bed, pressure redistribution/air, Bed, pressure reduction mattress, Repositioning, Pillows, Turning    Patient Safety:  Fall Risk:    Fall Risk Interventions  Toilet Every 2 Hours-In Advance of Need: Yes  Hourly Visual Checks: Awake, In bed  Fall Visual Posted: Socks  Room Door Open: Yes  Alarm On: Bed  Patient Moved Closer to Nursing Station: No    Active Consults:   IP CONSULT TO NEPHROLOGY  IP CONSULT TO NEUROLOGY    Length of Stay:  Expected LOS: 3  Actual LOS: 1    Laurel Pyle RN                                 
0700: Bedside and Verbal shift change report given to VERONICA Roman (oncoming nurse) by VERONICA Alonzo  (offgoing nurse). Report included the following information Intake/Output, MAR, Recent Results, and Cardiac Rhythm Normal Sinus    0730: Vitals and shift assessment complete. Patient states he slept well.     0800: Patient moved to chair with PCT for breakfast. Patient ate 90% of meal.     0900: AM Medications given, urine emptied (see flowsheet for output).    1015: Cardiology at bedside. Reminder for orthostatics Q shift.     1045: Orthostatics complete per order.    1100: Reassessment complete.     1240: Compression stocking placed on patient by PCT per order from MD    1415: PT/OT to bedside to work with patient, he ambulated in the adam about 50 feet.     1530: Patient back to bed per request of daughter. Vitals and reassessment complete.    End of Shift Note    Bedside shift change report given to VERONICA Segura  (oncoming nurse) by Jessie Wynn RN (offgoing nurse).  Report included the following information SBAR, Intake/Output, and Cardiac Rhythm Normal Sinus    Shift worked:  [id number]     Shift summary and any significant changes:     See above     Concerns for physician to address:  DC tomorrow to [facility] phone for oncoming shift:          Activity:  Level of Assistance: Moderate assist, patient does 50-74%  Number times ambulated in hallways past shift: 1  Number of times OOB to chair past shift: 1    Cardiac:   Cardiac Monitoring: Yes      Cardiac Rhythm: Sinus rhythm, Sinus tachy    Access:  Current line(s): PIV     Genitourinary:   Urinary Status: Voiding, External catheter    Respiratory:   O2 Device: None (Room air)  Chronic home O2 use?: NO  Incentive spirometer at bedside: NO    GI:  Last BM (including prior to admit): 01/08/25  Current diet:  ADULT ORAL NUTRITION SUPPLEMENT; Breakfast, Lunch, Dinner; Standard High Calorie/High Protein Oral Supplement  ADULT DIET; Regular; 1500 ml  Passing 
1155: Patient's latest  BP =84/45. Ran multiple times. Called Dr. Mike, he put the order for IV and labs. 250 ml NS bolus and midodrine given. Labs sent.    End of Shift Note    Bedside shift change report given to Astrid MARTIN  (oncoming nurse) by Esthela Amin RN (offgoing nurse).  Report included the following information SBAR, MAR, and Cardiac Rhythm sinus rhythm    Shift worked:  7a-7.30p     Shift summary and any significant changes:     See notes above     Concerns for physician to address:       Zone phone for oncoming shift:          Activity:  Level of Assistance: Moderate assist, patient does 50-74%  Number times ambulated in hallways past shift: 0  Number of times OOB to chair past shift: 0    Cardiac:   Cardiac Monitoring: Yes      Cardiac Rhythm: Sinus rhythm, 1° AV Block    Access:  Current line(s): PIV     Genitourinary:   Urinary Status: Voiding, External catheter    Respiratory:   O2 Device: None (Room air)  Chronic home O2 use?: YES  Incentive spirometer at bedside: NO    GI:     Current diet:  ADULT ORAL NUTRITION SUPPLEMENT; Breakfast, Lunch, Dinner; Standard High Calorie/High Protein Oral Supplement  ADULT DIET; Regular; 1500 ml  Passing flatus: YES    Pain Management:   Patient states pain is manageable on current regimen: YES    Skin:  Nikhil Scale Score: 16  Interventions: Wound Offloading (Prevention Methods): Pillows, Repositioning, Turning, Elevate heels    Patient Safety:  Fall Risk: Nursing Judgement-Fall Risk High(Add Comments): Yes  Fall Risk Interventions  Nursing Judgement-Fall Risk High(Add Comments): Yes  Toilet Every 2 Hours-In Advance of Need: Yes (Has manwick)  Hourly Visual Checks: In bed, Awake  Fall Visual Posted: Socks, Fall sign posted  Room Door Open: Yes  Alarm On: Bed  Patient Moved Closer to Nursing Station: No    Active Consults:   IP CONSULT TO NEUROLOGY  IP CONSULT TO CARDIOLOGY    Length of Stay:  Expected LOS: 3  Actual LOS: 2    Esthela Amin 
1420: Orthostatic BP and pulse done.   Supine: BP=90/73, P=78   Sitting: EA=972/67, P= 75   Standing: BP= 117/65, P =80  
4 Eyes Skin Assessment     NAME:  Mateus Tran Sr.  YOB: 1935  MEDICAL RECORD NUMBER:  [id number]    The patient is being assessed for  Admission    I agree that at least one RN has performed a thorough Head to Toe Skin Assessment on the patient. ALL assessment sites listed below have been assessed.      Areas assessed by both nurses:    Head, Face, Ears, Shoulders, Back, Chest, Arms, Elbows, Hands, Sacrum. Buttock, Coccyx, Ischium, and Legs. Feet and Heels        Does the Patient have a Wound? No noted wound(s)       Nikhil Prevention initiated by RN: Yes  Wound Care Orders initiated by RN: No    Pressure Injury (Stage 3,4, Unstageable, DTI, NWPT, and Complex wounds) if present, place Wound referral order by RN under : No    New Ostomies, if present place, Ostomy referral order under : No     Nurse 1 eSignature: Electronically signed by Laurel Pyle RN on 1/4/25 at 3:40 AM EST    **SHARE this note so that the co-signing nurse can place an eSignature**    Nurse 2 eSignature: Electronically signed by CHERI CORREA RN on 1/4/25 at 6:52 AM EST   
Bedside and Verbal shift change report given to Imelda RN (oncoming nurse) by Dianelys MARTIN (offgoing nurse). Report included the following information Nurse Handoff Report.       End of Shift Note    Bedside shift change report given to Natalie MARTIN (oncoming nurse) by Imelda Maria RN (offgoing nurse).  Report included the following information SBAR    Shift worked:  7p-7a     Shift summary and any significant changes:    Pt was able to get sleep with the melatonin and woke up less restless.     Concerns for physician to address:  See above     Zone phone for oncoming shift:          Activity:  Level of Assistance: Moderate assist, patient does 50-74%  Number times ambulated in hallways past shift: 0  Number of times OOB to chair past shift: 2    Cardiac:   Cardiac Monitoring: Yes      Cardiac Rhythm: Sinus rhythm, 1° AV Block    Access:  Current line(s): PIV     Genitourinary:   Urinary Status: Voiding, External catheter    Respiratory:   O2 Device: None (Room air)  Chronic home O2 use?: NO  Incentive spirometer at bedside: NO    GI:  Last BM (including prior to admit): 01/11/25  Current diet:  ADULT DIET; Regular  ADULT ORAL NUTRITION SUPPLEMENT; Dinner; Standard High Calorie/High Protein Oral Supplement  Passing flatus: YES    Pain Management:   Patient states pain is manageable on current regimen: YES    Skin:  Nikhil Scale Score: 17  Interventions: Wound Offloading (Prevention Methods): Pillows, Repositioning, Turning    Patient Safety:  Fall Risk: Nursing Judgement-Fall Risk High(Add Comments): Yes  Fall Risk Interventions  Nursing Judgement-Fall Risk High(Add Comments): Yes  Toilet Every 2 Hours-In Advance of Need: Yes  Hourly Visual Checks: Awake, In bed  Fall Visual Posted: Socks  Room Door Open: Deferred to promote rest  Alarm On: Bed  Patient Moved Closer to Nursing Station: No    Active Consults:   IP CONSULT TO NEUROLOGY  IP CONSULT TO CARDIOLOGY    Length of Stay:  Expected LOS: 8  Actual LOS: 8    Imelda 
Bedside and Verbal shift change report given to Imelda RN (oncoming nurse) by Jessie RN (offgoing nurse). Report included the following information Nurse Handoff Report.       End of Shift Note    Bedside shift change report given to Osvaldo RN  (oncoming nurse) by Imelda Maria RN (offgoing nurse).  Report included the following information SBAR    Shift worked:  7p-7a     Shift summary and any significant changes:    Pts bp sligthtly elevated at cos. But maintained all night. New midodrine dosage to start during day shift     Concerns for physician to address:  See above     Zone phone for oncoming shift:          Activity:  Level of Assistance: Moderate assist, patient does 50-74%  Number times ambulated in hallways past shift: 0  Number of times OOB to chair past shift: 0    Cardiac:   Cardiac Monitoring: Yes      Cardiac Rhythm: Sinus rhythm, Sinus tachy    Access:  Current line(s): PIV     Genitourinary:   Urinary Status: Voiding, External catheter    Respiratory:   O2 Device: None (Room air)  Chronic home O2 use?: NO  Incentive spirometer at bedside: NO    GI:  Last BM (including prior to admit): 01/08/25  Current diet:  ADULT ORAL NUTRITION SUPPLEMENT; Breakfast, Lunch, Dinner; Standard High Calorie/High Protein Oral Supplement  ADULT DIET; Regular; 1500 ml  Passing flatus: YES    Pain Management:   Patient states pain is manageable on current regimen: YES    Skin:  Nikhil Scale Score: 18  Interventions: Wound Offloading (Prevention Methods): Bed, pressure reduction mattress, Pillows, Repositioning    Patient Safety:  Fall Risk: Nursing Judgement-Fall Risk High(Add Comments): Yes  Fall Risk Interventions  Nursing Judgement-Fall Risk High(Add Comments): Yes  Toilet Every 2 Hours-In Advance of Need: Yes  Hourly Visual Checks: In chair, Quiet  Fall Visual Posted: Socks  Room Door Open: Yes  Alarm On: Bed, Chair  Patient Moved Closer to Nursing Station: No    Active Consults:   IP CONSULT TO NEUROLOGY  IP CONSULT TO 
Bedside and Verbal shift change report given to Imelda RN (oncoming nurse) by Osvaldo RN (offgoing nurse). Report included the following information Nurse Handoff Report.       End of Shift Note    Bedside shift change report given to Dianelys MARTIN (oncoming nurse) by Imelda Maria RN (offgoing nurse).  Report included the following information SBAR    Shift worked:  7P-7A     Shift summary and any significant changes:    Pt had more trouble sleeping through the night feeling a bit restless.     Concerns for physician to address:  SEE ABOVE     Zone phone for oncoming shift:          Activity:  Level of Assistance: Moderate assist, patient does 50-74%  Number times ambulated in hallways past shift: 0  Number of times OOB to chair past shift: 0    Cardiac:   Cardiac Monitoring: Yes      Cardiac Rhythm: Sinus rhythm, Sinus tachy    Access:  Current line(s): PIV     Genitourinary:   Urinary Status: Voiding, External catheter, Incontinent    Respiratory:   O2 Device: None (Room air)  Chronic home O2 use?: NO  Incentive spirometer at bedside: NO    GI:  Last BM (including prior to admit): 01/08/25  Current diet:  ADULT ORAL NUTRITION SUPPLEMENT; Breakfast, Lunch, Dinner; Standard High Calorie/High Protein Oral Supplement  ADULT DIET; Regular  Passing flatus: YES    Pain Management:   Patient states pain is manageable on current regimen: YES    Skin:  Nikhil Scale Score: 16  Interventions: Wound Offloading (Prevention Methods): Elevate heels, Pillows, Repositioning, Turning    Patient Safety:  Fall Risk: Nursing Judgement-Fall Risk High(Add Comments): Yes  Fall Risk Interventions  Nursing Judgement-Fall Risk High(Add Comments): Yes  Toilet Every 2 Hours-In Advance of Need: Yes  Hourly Visual Checks: Awake, In bed  Fall Visual Posted: Armband  Room Door Open: Yes  Alarm On: Bed, Chair  Patient Moved Closer to Nursing Station: Yes    Active Consults:   IP CONSULT TO NEUROLOGY  IP CONSULT TO CARDIOLOGY    Length of Stay:  Expected 
Bedside shift change report given to VERONICA Ivory (oncoming nurse) by VERONICA Segura (offgoing nurse). Report included the following information Nurse Handoff Report and Cardiac Rhythm NSR .      0850--Assessment complete--see flowsheets.     1515--Dr. Aguayo at bedside. Per MD, due to drop in BP after patient walked around and got into chair, will keep one more day. MD notified about increase in tremors today and slight murmur noted with heart sounds.     1540--Talked to daughter on phone and gave update. Per daughter, tremors are worse when patient is feeling unsettled or anxious. She plans to come visit patient tomorrow.     End of Shift Note    Bedside shift change report given to VERONICA Segura (oncoming nurse) by Dianelys Ybarra RN (offgoing nurse).  Report included the following information SBAR and Cardiac Rhythm NSR with 1st degree AV block    Shift worked:  5916-1528     Shift summary and any significant changes:     Orthostatic vitals done. Per MD, patient likely discharging tomorrow.   Patient had a large bowel movement this shift. He ambulated well to the bathroom and sat in chair today. Patient denied dizziness with getting up.    Concerns for physician to address:  --discharge planning for [facility] phone for oncoming shift:   [phone]       Activity:  Level of Assistance: Moderate assist, patient does 50-74%  Number times ambulated in hallways past shift: 0  Number of times OOB to chair past shift: 1    Cardiac:   Cardiac Monitoring: Yes      Cardiac Rhythm: Sinus rhythm, 1° AV Block    Access:  Current line(s): PIV     Genitourinary:   Urinary Status: Voiding, External catheter    Respiratory:   O2 Device: None (Room air)  Chronic home O2 use?: NO  Incentive spirometer at bedside:     GI:  Last BM (including prior to admit): 01/11/25  Current diet:  ADULT DIET; Regular  ADULT ORAL NUTRITION SUPPLEMENT; Dinner; Standard High Calorie/High Protein Oral Supplement  Passing flatus: YES    Pain Management: 
Bedside shift change report given to William MARTIN (oncoming nurse) by Delfino MARTIN (offgoing nurse). Report included the following information Nurse Handoff Report, Index, MAR, Recent Results, Med Rec Status, Cardiac Rhythm  , Neuro Assessment, and Event Log.              End of Shift Note    Bedside shift change report given to Delfino MARTIN (oncoming nurse) by William Pyle RN (offgoing nurse).  Report included the following information SBAR, Intake/Output, MAR, Med Rec Status, Cardiac Rhythm  , Alarm Parameters , Procedure Verification, and Quality Measures    Shift worked:  7am-7pm     Shift summary and any significant changes:     BP remained stable for the shift. Blood works done, condition stable, observation and care continues     Concerns for physician to address:  xxx     Zone phone for oncoming shift:   xxx       Activity:  Level of Assistance: Moderate assist, patient does 50-74%  Number times ambulated in hallways past shift: 0  Number of times OOB to chair past shift: 0    Cardiac:   Cardiac Monitoring: Yes      Cardiac Rhythm: 1° AV Block, Sinus arrythmia    Access:  Current line(s): PIV     Genitourinary:   Urinary Status: External catheter, Voiding    Respiratory:   O2 Device: None (Room air)  Chronic home O2 use?: YES  Incentive spirometer at bedside: YES    GI:  Last BM (including prior to admit): 01/06/25  Current diet:  ADULT ORAL NUTRITION SUPPLEMENT; Breakfast, Lunch, Dinner; Standard High Calorie/High Protein Oral Supplement  ADULT DIET; Regular; 1500 ml  Passing flatus: YES    Pain Management:   Patient states pain is manageable on current regimen: YES    Skin:  Nikhli Scale Score: 18  Interventions: Wound Offloading (Prevention Methods): Bed, pressure reduction mattress, Bed, pressure redistribution/air, Repositioning, Pillows    Patient Safety:  Fall Risk: Nursing Judgement-Fall Risk High(Add Comments): Yes  Fall Risk Interventions  Nursing Judgement-Fall Risk High(Add Comments): Yes  Toilet Every 2 
Brief note:    Responded to rapid response out of concern for hypotension systolic 70s/50s  Patient currently receiving 500 cc normal saline bolus with approximately 400 cc infused    On arrival to the room, patient asymptomatic and without complaint or concern.  On chart review, appears he had similar episodes yesterday that were fluid responsive.    Repositioned blood pressure cuff with repeat blood pressure of 101/60.  Will continue to monitor.  Note: Patient's arm appears to be slightly too small for regular cuff, which I discussed with nursing and I suspect oversized cuff is contributing to hypotensive readings.    
Cardiology Progress Note      1/5/2025 1:09 PM    Admit Date: 1/3/2025          Subjective:  Hypotension but no severe bradycardia or pauses overnight          BP (!) 127/104   Pulse 71   Temp 97.6 °F (36.4 °C) (Oral)   Resp 13   Ht 1.803 m (5' 11\")   Wt 73.1 kg (161 lb 2.5 oz)   SpO2 97%   BMI 22.48 kg/m²   01/03 1901 - 01/05 0700  In: 474 [P.O.:474]  Out: 2150 [Urine:2150]        Objective:      Physical Exam:  VS as above  Chest CTA  Card RRR no changes     Data Review:   Labs:    Hgb 10.5  BUN 51  Creat 1.4  Na 131     Telemetry: SR 1st AV block       Assessment:     1- recent weakness and slurred speech with episodic hypotension   2- Hx PAF on metoprolol, Nl EF on echo  3- sinus teressa and 1st deg AVB, transient Mobitz 1  4- CHANG/CKD stage 3  5- Dyslipidemia  6- hyponatremia  7- Hx prostate  CA  8- Hx hypertension  9- advanced age and frailty     Plan:  Cont to hold metoprolol , Cause of low BP unclear Dr Bello will f/u tomm              
Comprehensive Nutrition Assessment    Type and Reason for Visit:  Initial, LOS    Nutrition Recommendations/Plan:   Continue regular diet  Decrease Ensure plus to daily     Malnutrition Assessment:  Malnutrition Status:  Insufficient data (01/11/25 1418)    Context:  Acute Illness     Findings of the 6 clinical characteristics of malnutrition:  Energy Intake:  No decrease in energy intake  Weight Loss:  No weight loss     Body Fat Loss:  Unable to assess     Muscle Mass Loss:  Unable to assess    Fluid Accumulation:  Unable to assess     Strength:  Not Performed    Nutrition Assessment:    Patient medically noted for recurrent hypotension, CHANG, Transient type 1 second-degree heart block, Hyponatremia, Choreiform movement disorder, AFIB, HTN, and HLD. Chart reviewed remotely for length of stay. Patient receiving a regular diet and ensure plus supplements TID. Good PO intake noted per flowsheets; mostly eating >75% of meals. No weight loss noted on chart review. Will decrease ensure to daily for now. Continue regular diet; encourage intake of meals.     Patient Vitals for the past 120 hrs:   PO Meals Eaten (%)   01/11/25 0920 76 - 100%   01/10/25 1754 51 - 75%   01/10/25 1204 1 - 25%   01/10/25 0953 51 - 75%   01/09/25 1028 76 - 100%   01/08/25 1740 76 - 100%   01/08/25 1300 76 - 100%   01/08/25 0900 76 - 100%   01/07/25 1800 76 - 100%   01/07/25 1300 76 - 100%   01/07/25 1046 76 - 100%     Patient Vitals for the past 120 hrs:   PO Supplement (%)   01/11/25 1120 76 - 100%     Nutrition Related Findings:    Na 133   BM 1/8   Atorvastatin, Plavix, Synthroid, Midodrine, Zyprexa   Wound Type: None       Current Nutrition Intake & Therapies:    Average Meal Intake: 51-75%, %     ADULT ORAL NUTRITION SUPPLEMENT; Breakfast, Lunch, Dinner; Standard High Calorie/High Protein Oral Supplement  ADULT DIET; Regular    Anthropometric Measures:  Height: 180.3 cm (5' 11\")  Ideal Body Weight (IBW): 172 lbs (78 kg)     
End of Shift Note    Bedside shift change report given Bette (oncoming nurse) by Mitul Stuart RN (offgoing nurse).  Report included the following information SBAR, Kardex, ED Summary, Intake/Output, MAR, Recent Results, and Cardiac Rhythm NSR w/ 1 AVB; SB    Shift worked:  7 am to 7 pm     Shift summary and any significant changes:    Pt worked with therapy today. ~1523 RN called rapid response d/t pt's unresponsiveness and hypotension. When RN came into help pt back to bed, RN noticed that pt was drooling out of mouth, and leaning to left side. RN and tech placed pt in bed. While in bed, pt's BP was 70s/50s-60s. 500 cc bolus given and labs were drawn. MD Mike came to pt's bedside. Pt was eventually able to answer questions and BP came up to 100s/50s. After dinner, pt's BP 80s/50-60s, pt asymptomatic. RN spoke with MD Mike who informed to give pt another 500 cc bolus; pt's BP came up to 100s/50s.     Concerns for physician to address:  none     Zone phone for oncoming shift:   [phone]       Activity:  Level of Assistance: Moderate assist, patient does 50-74%  Number times ambulated in hallways past shift: 0  Number of times OOB to chair past shift: 1    Cardiac:   Cardiac Monitoring: Yes      Cardiac Rhythm: Sinus rhythm, 1° AV Block    Access:  Current line(s): PIV     Genitourinary:        Respiratory:   O2 Device: None (Room air)  Chronic home O2 use?: NO  Incentive spirometer at bedside: NO    GI:     Current diet:  ADULT DIET; Regular  ADULT ORAL NUTRITION SUPPLEMENT; Breakfast, Lunch, Dinner; Standard High Calorie/High Protein Oral Supplement  Passing flatus: YES    Pain Management:   Patient states pain is manageable on current regimen: N/A    Skin:  Nikhil Scale Score: 18  Interventions: Wound Offloading (Prevention Methods): Bed, pressure redistribution/air, Pillows, Repositioning, Turning    Patient Safety:  Fall Risk:    Fall Risk Interventions  Toilet Every 2 Hours-In Advance of Need: 
End of Shift Note    Bedside shift change report given to  VERONICA Degroot(oncoming nurse) by Laurel Pyle RN (offgoing nurse).  Report included the following information SBAR, Kardex, Intake/Output, MAR, and Recent Results    Shift worked:  night     Shift summary and any significant changes:    No significant changes overnight   Concerns for physician to address:       Zone phone for oncoming shift:          Activity:  Level of Assistance: Minimal assist, patient does 75% or more  Number times ambulated in hallways past shift: 0  Number of times OOB to chair past shift: 0    Cardiac:   Cardiac Monitoring: Yes      Cardiac Rhythm: Sinus rhythm, 1° AV Block    Access:  Current line(s): PIV     Genitourinary:   Urinary Status: Voiding, External catheter    Respiratory:   O2 Device: None (Room air)  Chronic home O2 use?: NO  Incentive spirometer at bedside: NO    GI:  Last BM (including prior to admit): 01/11/25  Current diet:  ADULT DIET; Regular  ADULT ORAL NUTRITION SUPPLEMENT; Dinner; Standard High Calorie/High Protein Oral Supplement  Passing flatus: YES    Pain Management:   Patient states pain is manageable on current regimen: YES    Skin:  Nikhil Scale Score: 18  Interventions: Wound Offloading (Prevention Methods): Elevate heels, Repositioning, Turning    Patient Safety:  Fall Risk: Nursing Judgement-Fall Risk High(Add Comments): Yes  Fall Risk Interventions  Nursing Judgement-Fall Risk High(Add Comments): Yes  Toilet Every 2 Hours-In Advance of Need: No (Comment)  Hourly Visual Checks: Awake, In bed  Fall Visual Posted: Armband, Socks  Room Door Open: Yes  Alarm On: Bed  Patient Moved Closer to Nursing Station: No    Active Consults:   IP CONSULT TO NEUROLOGY  IP CONSULT TO CARDIOLOGY    Length of Stay:  Expected LOS: 10  Actual LOS: 9    Laurel Pyle RN                            
End of Shift Note    Bedside shift change report given to  VERONICA Saleh(oncoming nurse) by Laurel Pyle RN (offgoing nurse).  Report included the following information SBAR, Kardex, Intake/Output, MAR, and Recent Results    Shift worked:  night     Shift summary and any significant changes:     No significant changes overnight     Concerns for physician to address: none     Zone phone for oncoming shift:       Activity:  Level of Assistance: Minimal assist, patient does 75% or more  Number times ambulated in hallways past shift: 0  Number of times OOB to chair past shift: 0    Cardiac:   Cardiac Monitoring: Yes      Cardiac Rhythm: Sinus rhythm, 1° AV Block    Access:  Current line(s): PIV     Genitourinary:   Urinary Status: Voiding, External catheter    Respiratory:   O2 Device: None (Room air)  Chronic home O2 use?: NO  Incentive spirometer at bedside: NO    GI:  Last BM (including prior to admit): 01/13/25  Current diet:  ADULT DIET; Regular  ADULT ORAL NUTRITION SUPPLEMENT; Dinner; Standard High Calorie/High Protein Oral Supplement  Passing flatus: YES    Pain Management:   Patient states pain is manageable on current regimen: YES    Skin:  Nikhil Scale Score: 18  Interventions: Wound Offloading (Prevention Methods): Bed, pressure reduction mattress, Elevate heels, Repositioning, Pillows, Turning    Patient Safety:  Fall Risk: Nursing Judgement-Fall Risk High(Add Comments): Yes  Fall Risk Interventions  Nursing Judgement-Fall Risk High(Add Comments): Yes  Toilet Every 2 Hours-In Advance of Need: Yes  Hourly Visual Checks: Awake, In chair  Fall Visual Posted: Socks  Room Door Open: Yes  Alarm On: Bed, Chair  Patient Moved Closer to Nursing Station: No    Active Consults:   IP CONSULT TO NEUROLOGY  IP CONSULT TO CARDIOLOGY    Length of Stay:  Expected LOS: 11  Actual LOS: 11    Laurel Pyle RN                            
End of Shift Note    Bedside shift change report given to Delfino (oncoming nurse) by Dulce Maria Goldberg RN (offgoing nurse).  Report included the following information SBAR, Intake/Output, MAR, Recent Results, and Cardiac Rhythm sinus arrhythmia/ first degree HB    Shift worked:  7597-1749     Shift summary and any significant changes:    No significant changes overnight. BP remained stable      Concerns for physician to address:    Zone phone for oncoming shift:       Activity:  Level of Assistance: Moderate assist, patient does 50-74%  Number times ambulated in hallways past shift: 0  Number of times OOB to chair past shift: 0    Cardiac:   Cardiac Monitoring: Yes      Cardiac Rhythm: 1° AV Block, Sinus arrythmia    Access:  Current line(s): PIV     Genitourinary:   Urinary Status: External catheter    Respiratory:   O2 Device: None (Room air)  Chronic home O2 use?: NO  Incentive spirometer at bedside: NO    GI:  Last BM (including prior to admit): 01/06/25  Current diet:  ADULT ORAL NUTRITION SUPPLEMENT; Breakfast, Lunch, Dinner; Standard High Calorie/High Protein Oral Supplement  ADULT DIET; Regular; 1500 ml  Passing flatus: YES    Pain Management:   Patient states pain is manageable on current regimen: N/A    Skin:  Nikhil Scale Score: 17  Interventions: Wound Offloading (Prevention Methods): Elevate heels, Repositioning, Pillows, Turning    Patient Safety:  Fall Risk: Nursing Judgement-Fall Risk High(Add Comments): Yes  Fall Risk Interventions  Nursing Judgement-Fall Risk High(Add Comments): Yes  Toilet Every 2 Hours-In Advance of Need: Yes  Hourly Visual Checks: In bed, Awake  Fall Visual Posted: Socks, Fall sign posted  Room Door Open: Yes  Alarm On: Bed  Patient Moved Closer to Nursing Station: No    Active Consults:   IP CONSULT TO NEUROLOGY  IP CONSULT TO CARDIOLOGY    Length of Stay:  Expected LOS: 6  Actual LOS: 4    Dulce Maria Goldberg RN                            
End of Shift Note    Bedside shift change report given to Delfino MARTIN (oncoming nurse) by  (offgoing nurse).  Report included the following information SBAR, Recent Results, and Cardiac Rhythm      Shift worked:  9921-6205     Shift summary and any significant changes:    Patient tolerated diet, assist to chair. BP stable. No c/o's this shift.      Concerns for physician to address:       Zone phone for oncoming shift:          Activity:  Level of Assistance: Moderate assist, patient does 50-74%  Number times ambulated in hallways past shift: 0  Number of times OOB to chair past shift: 1    Cardiac:   Cardiac Monitoring: Yes      Cardiac Rhythm: 1° AV Block, Sinus arrythmia    Access:  Current line(s): PIV     Genitourinary:   Urinary Status: External catheter, Voiding    Respiratory:   O2 Device: None (Room air)  Chronic home O2 use?: NO  Incentive spirometer at bedside: NO    GI:  Last BM (including prior to admit): 01/06/25  Current diet:  ADULT ORAL NUTRITION SUPPLEMENT; Breakfast, Lunch, Dinner; Standard High Calorie/High Protein Oral Supplement  ADULT DIET; Regular; 1500 ml  Passing flatus: YES    Pain Management:   Patient states pain is manageable on current regimen: YES    Skin:  Nikhil Scale Score: 18  Interventions: Wound Offloading (Prevention Methods): Bed, pressure reduction mattress, Bed, pressure redistribution/air, Repositioning, Pillows    Patient Safety:  Fall Risk: Nursing Judgement-Fall Risk High(Add Comments): Yes  Fall Risk Interventions  Nursing Judgement-Fall Risk High(Add Comments): Yes  Toilet Every 2 Hours-In Advance of Need: Yes  Hourly Visual Checks: Awake, In chair, Quiet  Fall Visual Posted: Socks  Room Door Open: Yes  Alarm On: Bed  Patient Moved Closer to Nursing Station: No    Active Consults:   IP CONSULT TO NEUROLOGY  IP CONSULT TO CARDIOLOGY    Length of Stay:  Expected LOS: 6  Actual LOS: 5    Delfino MARTIN                          
End of Shift Note    Bedside shift change report given to Laurel MARTIN (oncoming nurse) by Mikayla Cisneros RN (offgoing nurse).  Report included the following information SBAR, Intake/Output, MAR, Recent Results, and Cardiac Rhythm Afib, !st degree transient    Shift worked:  0700 - 1900     Shift summary and any significant changes:     Pt has dc order waiting on bed, pt daughter Casi updated, plan for dc tomorrow     Concerns for physician to address:  Pt had uneventful shift     Zone phone for oncoming shift:          Activity:  Level of Assistance: Minimal assist, patient does 75% or more  Number times ambulated in hallways past shift: 1  Number of times OOB to chair past shift: 3    Cardiac:   Cardiac Monitoring: Yes      Cardiac Rhythm: Sinus rhythm, 1° AV Block    Access:  Current line(s): PIV     Genitourinary:   Urinary Status: Voiding, External catheter    Respiratory:   O2 Device: None (Room air)  Chronic home O2 use?: NO  Incentive spirometer at bedside: N/A    GI:  Last BM (including prior to admit): 01/11/25  Current diet:  ADULT DIET; Regular  ADULT ORAL NUTRITION SUPPLEMENT; Dinner; Standard High Calorie/High Protein Oral Supplement  Passing flatus: YES    Pain Management:   Patient states pain is manageable on current regimen: YES    Skin:  Nikhil Scale Score: 18  Interventions: Wound Offloading (Prevention Methods): Bed, pressure reduction mattress, Pillows, Repositioning, Turning    Patient Safety:  Fall Risk: Nursing Judgement-Fall Risk High(Add Comments): Yes  Fall Risk Interventions  Nursing Judgement-Fall Risk High(Add Comments): Yes  Toilet Every 2 Hours-In Advance of Need: No (Comment)  Hourly Visual Checks: Awake, In bed  Fall Visual Posted: Armband, Socks  Room Door Open: Yes  Alarm On: Bed  Patient Moved Closer to Nursing Station: No    Active Consults:   IP CONSULT TO NEUROLOGY  IP CONSULT TO CARDIOLOGY    Length of Stay:  Expected LOS: 11  Actual LOS: 10    Mikayla Cisneros 
End of Shift Note    Bedside shift change report given to Makenna MARTIN (oncoming nurse) by  (offgoing nurse).  Report included the following information SBAR, Recent Results, and Cardiac Rhythm      Shift worked:  5691-9668     Shift summary and any significant changes:    Patient tolerated diet, assist to chair. BP stable. No c/o's this shift.      Concerns for physician to address:       Zone phone for oncoming shift:          Activity:  Level of Assistance: Moderate assist, patient does 50-74%  Number times ambulated in hallways past shift: 0  Number of times OOB to chair past shift: 1    Cardiac:   Cardiac Monitoring: Yes      Cardiac Rhythm: 1° AV Block, Sinus arrythmia    Access:  Current line(s): PIV     Genitourinary:   Urinary Status: Voiding, External catheter    Respiratory:   O2 Device: None (Room air)  Chronic home O2 use?: NO  Incentive spirometer at bedside: NO    GI:  Last BM (including prior to admit): 01/06/25  Current diet:  ADULT ORAL NUTRITION SUPPLEMENT; Breakfast, Lunch, Dinner; Standard High Calorie/High Protein Oral Supplement  ADULT DIET; Regular; 1500 ml  Passing flatus: YES    Pain Management:   Patient states pain is manageable on current regimen: YES    Skin:  Nikhil Scale Score: 18  Interventions: Wound Offloading (Prevention Methods): Bed, pressure redistribution/air, Elevate heels, Pillows, Repositioning    Patient Safety:  Fall Risk: Nursing Judgement-Fall Risk High(Add Comments): Yes  Fall Risk Interventions  Nursing Judgement-Fall Risk High(Add Comments): Yes  Toilet Every 2 Hours-In Advance of Need: Yes  Hourly Visual Checks: Awake, In chair, Quiet  Fall Visual Posted: Socks  Room Door Open: Yes  Alarm On: Bed  Patient Moved Closer to Nursing Station: No    Active Consults:   IP CONSULT TO NEUROLOGY  IP CONSULT TO CARDIOLOGY    Length of Stay:  Expected LOS: 6  Actual LOS: 4    Delfino MARTIN                          
MRI PENDING    Completion of MRI Screening Sheet    Fax to [phone] when completed    Please call [phone]  When this is done    Thank You  
Secure Messaged [place] NP @ 9860  BP 73/48, 64/55, 74/52  Patient non-symptomatic.    Similar episode yesterday.  She requested that I call a Rapid.     Rapid called @ 0222  Rapid Nurse, Supervisor, and MD arrived.    Patient non-symptomatic.  /60  No new orders.  Rapid cancelled @ 3404      
Up to chair. Bp dropped 87/43. Pt felt dizzy stated he saw spots. Assisted back to bed. Supine bp 91/59. Pt stated he felt better. Scheduled midodrine given. Message sent to Dr Aguayo to notify  
  PND X none  overnight       Orthopnea X none  improved  unchanged  worse   Presyncope X none  improved  unchanged  worse     Ambulated in hallway without symptoms   Yes   Ambulated in room without symptoms  Yes   Objective:     Physical Exam:  Overall VSSAF;  /71   Pulse 86   Temp 97.6 °F (36.4 °C) (Oral)   Resp 22   Ht 1.803 m (5' 11\")   Wt 75.8 kg (167 lb 1.7 oz)   SpO2 95%   BMI 23.31 kg/m²   Temp (24hrs), Av.8 °F (36.6 °C), Min:97.5 °F (36.4 °C), Max:98.4 °F (36.9 °C)    Patient Vitals for the past 8 hrs:   Pulse   25 0900 86   25 0800 66   25 0739 69   25 0700 67   25 0317 60     Patient Vitals for the past 8 hrs:   Resp   25 0900 22   25 0800 19   25 0739 19   25 0700 17   25 0317 23     Patient Vitals for the past 8 hrs:   BP   25 0739 137/71   25 0317 131/71       Intake/Output Summary (Last 24 hours) at 2025 0928  Last data filed at 2025 0700  Gross per 24 hour   Intake 1300 ml   Output 1900 ml   Net -600 ml     General Appearance: Well developed, well nourished, no acute distress.   Ears/Nose/Mouth/Throat:   Normal MM; anicteric.   JVP: WNL   Resp:   Lungs clear to auscultation bilaterally.  Nl resp effort.   Cardiovascular:  RRR, S1, S2 normal, no new murmur. No gallop or rub.   Abdomen:   Soft, non-tender, bowel sounds are present.   Extremities: No edema bilaterally.    Skin:  Neuro: Warm and dry.  A/O x3, grossly nonfocal       cath site intact w/o hematoma or new bruit; distal pulse unchanged  Yes   Data Review:     Telemetry independently reviewed x sinus  chronic afib  parox afib  NSVT     ECG independently reviewed  NSR  afib  no significant changes  NSST-Tw chgs     x no new ECG provided for review   Lab results reviewed as noted below.  Current medications reviewed as noted below.    No results for input(s): \"PH\", \"PCO2\", \"PO2\" in the last 72 hours.  No results for input(s): \"CPK\", \"CKMB\" in the 
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 fluid restrict to 1500 cc  Urea p.o. x 1 on 1/4/2025  -Sodium is improved    Slurred speech, gait instability POA unclear etiology  Seen by neurology felt possible encephalopathy  When I saw he was oriented x 3  Brain MRI IMPRESSION:  Mild cerebral atrophy.    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  Moderate temporal predominant cerebral atrophy.  Degenerative changes of the cervical spine with moderate to severe canal  stenosis at C3-C4.  There is no intracranial mass, hemorrhage or evidence of acute infarction.   No acute intracranial process is demonstrated.  IVF  Sodium 127 unclear if this is actually related to his symptoms, now 135  Neuroexam otherwise nonfocal  PT and OT consults  CTA head and neck 1/4 IMPRESSION:  1. CTA head demonstrates no large vessel occlusion. Nonspecific patchy mild  stenosis of the left MCA may suggest vasospasm versus noncalcified plaques.  Clinical correlation advised. No intracranial aneurysm.  2. CTA neck demonstrates no large vessel occlusion.  Probable short segment intimal flap versus linear chronic thrombus extending  across the right ICA orifice is age-indeterminate. Clinical correlation advised.     Others:  3. No obvious intracranial or cervical mass or enhancing lesion.  Nonspecific left upper lobe groundglass pulmonary nodule measuring up to 1.2 x  1.6 cm.  I discussed the CTA findings with Dr. Jorge from neurology   He felt patient already on maximal treatment with Plavix and Eliquis, nothing else to do.  Continue PT OT    Choreiform movement disorder 
75.5 kg (166 lb 7.2 oz)   SpO2 92%   BMI 23.21 kg/m²   Temp (24hrs), Av.3 °F (36.8 °C), Min:97.6 °F (36.4 °C), Max:98.7 °F (37.1 °C)    Patient Vitals for the past 8 hrs:   Pulse   25 0710 70   25 0230 81     Patient Vitals for the past 8 hrs:   Resp   25 0710 14   25 0230 15     Patient Vitals for the past 8 hrs:   BP   25 0710 123/84   25 0230 (!) 147/62       Intake/Output Summary (Last 24 hours) at 2025 0939  Last data filed at 2025 0533  Gross per 24 hour   Intake 1250 ml   Output 1350 ml   Net -100 ml     General Appearance: Well developed, well nourished, no acute distress.   Ears/Nose/Mouth/Throat:   Normal MM; anicteric.   JVP: WNL   Resp:   Lungs clear to auscultation bilaterally.  Nl resp effort.   Cardiovascular:  RRR, S1, S2 normal, no new murmur. No gallop or rub.   Abdomen:   Soft, non-tender, bowel sounds are present.   Extremities: No edema bilaterally.    Skin:  Neuro: Warm and dry.  A/O x3, grossly nonfocal       cath site intact w/o hematoma or new bruit; distal pulse unchanged  Yes   Data Review:     Telemetry independently reviewed x sinus  chronic afib  parox afib  NSVT     ECG independently reviewed  NSR  afib  no significant changes  NSST-Tw chgs     x no new ECG provided for review   Lab results reviewed as noted below.  Current medications reviewed as noted below.    No results for input(s): \"PH\", \"PCO2\", \"PO2\" in the last 72 hours.  No results for input(s): \"CPK\", \"CKMB\" in the last 72 hours.    Invalid input(s): \"CKNDX\", \"TROIQ\"  Recent Labs     25  1619 25  0556 25  0529   * 131* 131*   K 4.6 4.7 4.7   CL 99 101 102   CO2 25 25 24   BUN 25* 51* 47*   WBC 6.3 7.4 12.3*   HGB 11.4* 10.5* 10.7*   HCT 33.5* 30.5* 31.4*    205 232     Lab Results   Component Value Date/Time    CHOL 168 2025 10:48 PM     2025 10:48 PM    LDL 45.6 2025 10:48 PM    .4 10/03/2022 11:45 AM     Recent 
CONSULT TO CARDIOLOGY    Length of Stay:  Expected LOS: 3  Actual LOS: 3    Dulce Maria Goldberg, RN                            
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 fluid restrict to 1500 cc  Urea p.o. x 1 on 1/4/2025  Serial labs    Slurred speech, gait instability POA unclear etiology  Seen by neurology felt possible encephalopathy  When I saw he was oriented x 3  Brain MRI IMPRESSION:  Mild cerebral atrophy.    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  Moderate temporal predominant cerebral atrophy.  Degenerative changes of the cervical spine with moderate to severe canal  stenosis at C3-C4.  There is no intracranial mass, hemorrhage or evidence of acute infarction.   No acute intracranial process is demonstrated.  IVF  Sodium 127 unclear if this is actually related to his symptoms  Neuroexam otherwise nonfocal  PT and OT consults  CTA head and neck 1/4 IMPRESSION:  1. CTA head demonstrates no large vessel occlusion. Nonspecific patchy mild  stenosis of the left MCA may suggest vasospasm versus noncalcified plaques.  Clinical correlation advised. No intracranial aneurysm.  2. CTA neck demonstrates no large vessel occlusion.  Probable short segment intimal flap versus linear chronic thrombus extending  across the right ICA orifice is age-indeterminate. Clinical correlation advised.     Others:  3. No obvious intracranial or cervical mass or enhancing lesion.  Nonspecific left upper lobe groundglass pulmonary nodule measuring up to 1.2 x  1.6 cm.  I discussed the CTA findings with Dr. Jorge from neurology   He felt patient already on maximal treatment with Plavix and Eliquis, nothing else to do    Choreiform movement disorder POA  Continue PTA Zyprexa and melatonin     Atrial fibrillation on Eliquis  Essential 
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 rather than type II  No indication for pacemaker    Hyponatremia Na 127 --> 135 POA  Chronic issue several previous admits for sodiums were as low as 125   Often associated with generalized weakness  Family reports no known etiology  No clear culprit medications  Urine studies   Urine sodium 92   Urine osmolarity 276     Random cortisol 10.5,     Cosyntropin stim test 1/5/2025 was disrupted by nurse not labeling blood    Initial cortisol level was not run because of the lack of a labile    She had already given the IV cosyntropin at 1323    Total cortisol level at 1453 was 46.8, effectively rules out AI even if the test was not completed   TSH 2.0  Given recurrent nature and inappropriately concentrated urine   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 fluid restrict to 1500 cc  Urea p.o. x 1 on 1/4/2025 1/8: Sodium is improved and is 133    Slurred speech, gait instability POA unclear etiology  Seen by neurology felt possible encephalopathy  When I saw he was oriented x 3  Brain MRI IMPRESSION:  Mild cerebral atrophy.    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  Moderate temporal predominant cerebral atrophy.  Degenerative changes of the cervical spine with moderate to severe canal  stenosis at C3-C4.  There is no intracranial mass, hemorrhage or evidence of acute infarction.   No acute intracranial process is demonstrated.  IVF  Sodium 127 unclear if this is actually related to his symptoms, now 135  Neuroexam otherwise nonfocal  PT and OT consults  CTA head and neck 1/4 IMPRESSION:  1. CTA head demonstrates no large vessel occlusion. Nonspecific patchy mild  stenosis of the left MCA may suggest vasospasm versus noncalcified plaques.  Clinical correlation advised. No intracranial aneurysm.  2. CTA neck demonstrates no large vessel occlusion.  Probable short segment intimal flap versus linear chronic thrombus extending  across the right ICA orifice is age-indeterminate. 
  01/04/25 0206 -- -- -- (!) 31 -- -- -- --   01/03/25 2330 (!) 146/80 97.4 °F (36.3 °C) Oral 63 12 -- -- --   01/03/25 2307 -- -- -- -- -- -- 1.803 m (5' 11\") 73.1 kg (161 lb 2.5 oz)   01/03/25 2300 -- -- -- 61 -- 99 % -- --   01/03/25 2130 (!) 157/113 -- -- 60 18 -- -- --   01/03/25 2115 (!) 181/88 -- -- 57 12 (!) 64 % -- --   01/03/25 2045 (!) 164/87 -- -- 81 18 -- -- --   01/03/25 2030 (!) 173/87 -- -- 54 14 96 % -- --   01/03/25 2015 (!) 149/97 -- -- 56 18 100 % -- --   01/03/25 2000 (!) 170/91 97.4 °F (36.3 °C) Oral 56 22 -- -- --   01/03/25 1945 (!) 119/101 -- -- -- -- -- -- --         Intake/Output Summary (Last 24 hours) at 1/4/2025 1619  Last data filed at 1/4/2025 1430  Gross per 24 hour   Intake 237 ml   Output 2150 ml   Net -1913 ml        I had a face to face encounter and independently examined this patient on 1/4/2025, as outlined below:    PHYSICAL EXAM:  General: Alert, cooperative  EENT:  Anicteric sclerae.  Resp:  CTA bilaterally, no wheezing or rales.  No accessory muscle use  CV:  Regular  rhythm,  No edema  GI:  Soft, Non distended, Non tender.  +Bowel sounds  Neurologic:  Alert and oriented X 3, normal speech, smile symmetric, no pronator drift, symmetric motor strength  Psych:   Not anxious nor agitated  Skin:  No rashes.  No jaundice    Reviewed most current lab test results and cultures  YES  Reviewed most current radiology test results   YES  Review and summation of old records today    NO  Reviewed patient's current orders and MAR    YES  PMH/SH reviewed - no change compared to H&P    ________________________________________________________________________        Comments   >50% of visit spent in counseling and coordination of care     ________________________________________________________________________  Ruben Mike Jr, MD     Procedures: see electronic medical records for all procedures/Xrays and details which were not copied into this note but were reviewed prior to creation 
Eliquis  Essential hypertension  Hyperlipidemia  Continue plavix, eliquis, lipitor  Metoprolol on hold with possible second-degree heart  Holding losartan and also Norvasc due to low blood pressure     Hypothyroidism  Continue PTA Synthroid  TSH WNL     prostate cancer  Bladder management protocol  Status post radiation therapy    Nonspecific left upper lobe groundglass pulmonary nodule measuring up to 1.2 x 1.6 cm.  Etiology unclear, would recommend repeat CT scan in 3 to 6 months to assess for resolution    Mild hyperkalemia  -Potassium is improved after Lokelma.     Code Status: DNR-paperwork on file  DVT Prophylaxis: Eliquis  GI Prophylaxis: Not indicated  Baseline: Home with family      History, assessment and plan for 1/10/2025:     Estimated discharge date: 1/11    Needs to be done before discharge:    Improvement of orthostatic hypotension    Reason for physician visit:  He developed orthostatic hypotension this morning while getting up and became dizzy and also lightheaded and had vision changes.          Medical Decision Making:   I personally reviewed labs: CBC, CMP  I personally reviewed imaging:  I personally reviewed telemetry:        Total CRITICAL CARE TIME Spent:   Minutes non procedure based    Objective:     VITALS:   Last 24hrs VS reviewed since prior progress note. Most recent are:  Patient Vitals for the past 24 hrs:   BP Temp Temp src Pulse Resp SpO2   01/10/25 1300 116/70 -- -- 72 13 93 %   01/10/25 1202 (!) 116/94 -- -- 81 16 --   01/10/25 1110 (!) 91/59 -- -- 95 17 --   01/10/25 1107 (!) 87/73 -- -- 94 13 --   01/10/25 1106 (!) 74/59 -- -- 96 22 --   01/10/25 1105 (!) 84/44 -- -- 87 20 --   01/10/25 1053 -- 98.1 °F (36.7 °C) Oral -- -- --   01/10/25 0930 132/60 -- -- (!) 105 14 --   01/10/25 0805 120/66 98.3 °F (36.8 °C) Oral 75 11 98 %   01/10/25 0715 120/66 98.3 °F (36.8 °C) -- 75 11 98 %   01/10/25 0308 97/66 97.8 °F (36.6 °C) Oral 77 11 95 %   01/09/25 2312 118/69 98.1 °F (36.7 °C) Oral 
already on maximal treatment with Plavix and Eliquis, nothing else to do    Choreiform movement disorder POA  Continue PTA Zyprexa and melatonin     Atrial fibrillation on Eliquis  Essential hypertension  Hyperlipidemia  Continue plavix, eliquis, lipitor  Metoprolol on hold with possible second-degree heart  Holding losartan 100 mg daily, norvasc 5 mg daily     Hypothyroidism  Continue PTA Synthroid  TSH WNL     prostate cancer  Bladder management protocol  Status post radiation therapy    Nonspecific left upper lobe groundglass pulmonary nodule measuring up to 1.2 x 1.6 cm.  Etiology unclear, would recommend repeat CT scan in 3 to 6 months to assess for resolution    Mild hyperkalemia  -Potassium is 5.3.  Will give Lokelma.  Check BMP in a.m. tomorrow.  Low potassium diet.     Code Status: DNR-paperwork on file  DVT Prophylaxis: Eliquis  GI Prophylaxis: Not indicated  Baseline: Home with family      History, assessment and plan for 1/8/2025:     Estimated discharge date: 1/9    Needs to be done before discharge:    Stable heart rate and blood pressure  PT and OT consults    Reason for physician visit:    He is sitting up in chair this morning.  Potassium is 5.3.  Blood pressure stable.  No chest pain or shortness of breath          Medical Decision Making:   I personally reviewed labs: CBC, CMP  I personally reviewed imaging:  I personally reviewed telemetry:    Total NON critical care TIME:  33  Minutes    Total CRITICAL CARE TIME Spent:   Minutes non procedure based    Objective:     VITALS:   Last 24hrs VS reviewed since prior progress note. Most recent are:  Patient Vitals for the past 24 hrs:   BP Temp Temp src Pulse Resp SpO2 Height Weight   01/08/25 1200 125/75 -- -- 86 18 -- -- --   01/08/25 1100 -- -- -- 72 11 -- -- --   01/08/25 1052 104/60 -- -- 70 20 -- 1.803 m (5' 11\") 73 kg (161 lb)   01/08/25 1031 104/60 97.9 °F (36.6 °C) Oral -- -- -- -- --   01/08/25 1000 -- -- -- 79 18 -- -- --   01/08/25 0800 
        Intake/Output Summary (Last 24 hours) at 1/13/2025 1544  Last data filed at 1/13/2025 0600  Gross per 24 hour   Intake --   Output 900 ml   Net -900 ml        I had a face to face encounter and independently examined this patient on 1/13/2025, as outlined below:    PHYSICAL EXAM:  General: Alert, cooperative  EENT:  Anicteric sclerae.  Resp:  CTA bilaterally, no wheezing or rales.  No accessory muscle use  CV:  Regular  rhythm,  No edema  GI:  Soft, Non distended, Non tender.  +Bowel sounds  Neurologic:  Alert and oriented X 2, normal speech, smile symmetric, no pronator drift, symmetric motor strength  Psych:   Not anxious nor agitated  Skin:  No rashes.  No jaundice    Reviewed most current lab test results and cultures  YES  Reviewed most current radiology test results   YES  Review and summation of old records today    NO  Reviewed patient's current orders and MAR    YES  PMH/SH reviewed - no change compared to H&P    ________________________________________________________________________        Comments   >50% of visit spent in counseling and coordination of care     ________________________________________________________________________  Herrera Aguayo MD     Procedures: see electronic medical records for all procedures/Xrays and details which were not copied into this note but were reviewed prior to creation of Plan.      LABS:  I reviewed today's most current labs and imaging studies.  Pertinent labs include:  No results for input(s): \"WBC\", \"HGB\", \"HCT\", \"PLT\", \"BANDS\" in the last 72 hours.    Recent Labs     01/11/25  0738   *   K 4.4      CO2 27   GLUCOSE 78   BUN 33*   CREATININE 0.99   CALCIUM 8.5     Invalid input(s): \"CK\", \"TROPONIN\", \"PBNP\"    Signed: Herrera Aguayo MD          
16 95 %         Intake/Output Summary (Last 24 hours) at 1/11/2025 2037  Last data filed at 1/11/2025 1840  Gross per 24 hour   Intake 1110 ml   Output 780 ml   Net 330 ml        I had a face to face encounter and independently examined this patient on 1/11/2025, as outlined below:    PHYSICAL EXAM:  General: Alert, cooperative  EENT:  Anicteric sclerae.  Resp:  CTA bilaterally, no wheezing or rales.  No accessory muscle use  CV:  Regular  rhythm,  No edema  GI:  Soft, Non distended, Non tender.  +Bowel sounds  Neurologic:  Alert and oriented X 2, normal speech, smile symmetric, no pronator drift, symmetric motor strength  Psych:   Not anxious nor agitated  Skin:  No rashes.  No jaundice    Reviewed most current lab test results and cultures  YES  Reviewed most current radiology test results   YES  Review and summation of old records today    NO  Reviewed patient's current orders and MAR    YES  PMH/SH reviewed - no change compared to H&P    ________________________________________________________________________        Comments   >50% of visit spent in counseling and coordination of care     ________________________________________________________________________  Herrera Aguayo MD     Procedures: see electronic medical records for all procedures/Xrays and details which were not copied into this note but were reviewed prior to creation of Plan.      LABS:  I reviewed today's most current labs and imaging studies.  Pertinent labs include:  No results for input(s): \"WBC\", \"HGB\", \"HCT\", \"PLT\", \"BANDS\" in the last 72 hours.    Recent Labs     01/09/25  0317 01/10/25  0520 01/11/25  0738   * 133* 133*   K 4.5 4.6 4.4    102 101   CO2 28 29 27   GLUCOSE 84 87 78   BUN 43* 40* 33*   CREATININE 1.00 1.15 0.99   CALCIUM 8.4* 8.7 8.5     Invalid input(s): \"CK\", \"TROPONIN\", \"PBNP\"    Signed: Herrera Aguayo MD          
IntraVENous PRN    0.9 % sodium chloride infusion   IntraVENous PRN    ondansetron (ZOFRAN-ODT) disintegrating tablet 4 mg  4 mg Oral Q8H PRN    Or    ondansetron (ZOFRAN) injection 4 mg  4 mg IntraVENous Q6H PRN    polyethylene glycol (GLYCOLAX) packet 17 g  17 g Oral Daily PRN    acetaminophen (TYLENOL) tablet 650 mg  650 mg Oral Q6H PRN    Or    acetaminophen (TYLENOL) suppository 650 mg  650 mg Rectal Q6H PRN        Daryl Bello MD     
medical records for all procedures/Xrays and details which were not copied into this note but were reviewed prior to creation of Plan.      LABS:  I reviewed today's most current labs and imaging studies.  Pertinent labs include:  Recent Labs     01/05/25 0556 01/06/25 0529 01/07/25  0511   WBC 7.4 12.3* 11.6*   HGB 10.5* 10.7* 9.8*   HCT 30.5* 31.4* 28.6*    232 231     Recent Labs     01/05/25  0556 01/06/25 0529 01/07/25  0511   * 131* 135*   K 4.7 4.7 4.5    102 103   CO2 25 24 26   GLUCOSE 77 125* 94   BUN 51* 47* 43*   CREATININE 1.44* 1.27 1.30   CALCIUM 8.4* 8.7 8.6   MG  --  1.9  --    BILITOT 0.4 0.6 0.4   AST 25 22 25   ALT 29 27 32     Invalid input(s): \"CK\", \"TROPONIN\", \"PBNP\"    Signed: Ruben Mike Jr, MD

## 2025-01-14 NOTE — CARE COORDINATION
CM verified patient has a qualifying hospital stay for Skilled Nursing Facility.  .  Admit date of 01/03/2024. [facility] SCOTT  [id number]  
[facility] at [facility] to [facility]; report to room 419 at [phone], clear from CM standpoint for discharge    Transition of Care Plan:    RUR: 18% Moderate RUR  Prior Level of Functioning: needs assistance  Disposition: SNF  FAHAD: 1/14  If SNF or IPR: Date FOC offered: 1/06  Date FOC received: 1/6  Accepting facility: [facility] [phone], cell # [phone]  Date authorization started with reference number: NA  Date authorization received and expires: NA  Follow up appointments: PCP/Specialist   DME needed: deferred to SNF  Transportation at discharge: [facility] Phone: [phone]  IM/IMM Medicare/ letter given: 2nd IM 1/13  Is patient a  and connected with VA? na   If yes, was  transfer form completed and VA notified? na  Caregiver Contact: Casi Gonzalez, (Child) [phone]   Discharge Caregiver contacted prior to discharge? contacted  Care Conference needed? no  Barriers to discharge: NONE    CM contacted [facility] [phone], cell # [phone] requesting a bed as the patient has been ready for discharge. He requested to call the CM back after he checks on beds availability.     0900 CM received a call back from [facility] confirming the facility ability to accommmodate at 11 a.m. to room 419. CM contacted the patient's family Caregiver Contact: Casi Gonzalez, (Child) [phone] who confirmed acceptance. Moreover, CM informed the patient and the primary nurse Delfino.    Transition of Care Plan to SNF/Rehab    Communication to Patient/Family:  Met with patient and family and they are agreeable to the transition plan. The Plan for Transition of Care is related to the following treatment goals: Care Transitions: Facilitate smooth transitions of care between different healthcare settings, such as rehabilitation facilities, and home care, ensuring that patients receive appropriate follow-up care and support to 
Transition of Care Plan:    RUR: 18% Moderate RUR  Prior Level of Functioning: needs assistance  Disposition: SNF  FAHAD: 1/9  If SNF or IPR: Date FOC offered: 1/06  Date FOC received: 1/6  Accepting facility: [facility] - [phone], cell # [phone]  Date authorization started with reference number: NA  Date authorization received and expires: NA  Follow up appointments: PCP/Specialist   DME needed: deferred to SNF  Transportation at discharge: [facility] Phone: [phone]  IM/IMM Medicare/ letter given: 2nd IM 1/8  Is patient a  and connected with VA? na   If yes, was [place] transfer form completed and VA notified? na  Caregiver Contact: Casi Gonzalez, (Child) [phone]   Discharge Caregiver contacted prior to discharge? contacted  Care Conference needed? no  Barriers to discharge: K levels      [facility] - [phone], cell # [phone] confirmed the facility ability to accommodate once medically cleared.    The patient requires assistance with Activities of Daily Living (ADLs) and Instrumental Activities of Daily Living (IADLs) and not able to drive  but able to communicate needs. The patient lives in a house that is one level, residing with his daughter and uses a walker (However, it has been malfunctioning). The patient and his daughter elected [facility] and [facility].       Juan Pablo Pyle RN  Case Management  [phone]    
Transition of Care Plan:    RUR: 18% Moderate RUR  Prior Level of Functioning: needs assistance  Disposition: SNF  FAHAD: 1/9  If SNF or IPR: Date FOC offered: 1/06  Date FOC received: 1/6  Accepting facility: [facility] - [phone], cell # [phone]  Date authorization started with reference number: NA  Date authorization received and expires: NA  Follow up appointments: PCP/Specialist   DME needed: deferred to SNF  Transportation at discharge: [facility] Phone: [phone]  IM/IMM Medicare/ letter given: 2nd IM 1/8  Is patient a  and connected with VA? na   If yes, was  transfer form completed and VA notified? na  Caregiver Contact: Casi Gonzalez, (Child) [phone]   Discharge Caregiver contacted prior to discharge? contacted  Care Conference needed? no  Barriers to discharge: K levels      CM contacted [facility] - [phone], cell # [phone] who confirmed the facility ability to accommodate the patient to room 419 B at 3 p.m. or 3:30 p.m. However, Dr. Aguayo stated that potassium is high and deferred the discharge to tomorrow. CM updated Cliff via phone and notified  regarding the situation.       The patient requires assistance with Activities of Daily Living (ADLs) and Instrumental Activities of Daily Living (IADLs) and not able to drive  but able to communicate needs. The patient lives in a house that is one level, residing with his daughter and uses a walker (However, it has been malfunctioning). The patient and his daughter elected [facility].       Juan Pablo Pyle RN  Case Management  [phone]    
Transition of Care Plan:    RUR: 18% Moderate RUR  Prior Level of Functioning: needs assistance  Disposition: SNF  FHAAD: 1/13  If SNF or IPR: Date FOC offered: 1/06  Date FOC received: 1/6  Accepting facility: [facility] - [phone], cell # [phone]  Date authorization started with reference number: NA  Date authorization received and expires: NA  Follow up appointments: PCP/Specialist   DME needed: deferred to SNF  Transportation at discharge: [facility] Phone: [phone]  IM/IMM Medicare/ letter given: 2nd IM 1/8  Is patient a  and connected with VA? na   If yes, was  transfer form completed and VA notified? na  Caregiver Contact: Casi Gonzalez, (Child) [phone]   Discharge Caregiver contacted prior to discharge? contacted  Care Conference needed? no  Barriers to discharge: K levels    CM contacted [facility] and spoke to admissions. However, they could not confirm having a bed and stated that [facility] will contact CM back.     [facility] Address: [address] [place] [address] Phone: [phone]    The patient requires assistance with Activities of Daily Living (ADLs) and Instrumental Activities of Daily Living (IADLs) and not able to drive  but able to communicate needs. The patient lives in a house that is one level, residing with his daughter and uses a walker (However, it has been malfunctioning). The patient and his daughter elected [facility].       Juan Pablo Pyle RN  Case Management  [phone]    
(Child) [phone] , formally introducing themselves and clarifying their role in discharge planning and transitional care. Demographic and insurance details were verified for accuracy. Notably, the patient has no prior history with home health, skilled nursing facilities (SNF), or inpatient rehabilitation (IPR). The patient requires assistance with Activities of Daily Living (ADLs) and Instrumental Activities of Daily Living (IADLs) and not able to drive  but able to communicate needs. The patient lives in a house that is one level, residing with his daughter and uses a walker (However, it has been malfunctioning). The patient and his daughter elected [facility] and rehab.     A SNF list was provided, emphasizing the patient/caregiver right to choose any agency/facility within network to meet their preference. The  has requested the selection of five preferred options within 24 hours to ensure a smooth discharge process and urges contacting CM once the choices are finalized to avoid any delays. Patients and caregivers are encouraged to ask questions, tour the facilities, and reach out for specific inquiries or accommodations.      Advance Care Planning     General Advance Care Planning (ACP) Conversation    Date of Conversation: 1/6/2025  Conducted with: Patient with Decision Making Capacity  Other persons present: None    Healthcare Decision Maker:   Primary Decision Maker: Casi Gonzalez - Child - [phone]    Secondary Decision Maker: Deep Tran - Child - [phone]       Content/Action Overview:  Has ACP document(s) on file - reflects the patient's care preferences  Reviewed DNR/DNI and patient elects DNR order - referred to ACP Clinical Specialist & placed order        Length of Voluntary ACP Conversation in minutes:  <16 minutes (Non-Billable)    Juan Pablo Pyle, RN  Case Management  [phone]

## 2025-01-14 NOTE — PLAN OF CARE
Problem: Discharge Planning  Goal: Discharge to home or other facility with appropriate resources  1/8/2025 0225 by William Pyle RN  Outcome: Progressing  1/7/2025 1414 by Ludmila Rodriges RN  Outcome: Progressing  Flowsheets (Taken 1/7/2025 0700)  Discharge to home or other facility with appropriate resources:   Identify barriers to discharge with patient and caregiver   Arrange for needed discharge resources and transportation as appropriate   Identify discharge learning needs (meds, wound care, etc)   Refer to discharge planning if patient needs post-hospital services based on physician order or complex needs related to functional status, cognitive ability or social support system     Problem: Pain  Goal: Verbalizes/displays adequate comfort level or baseline comfort level  1/8/2025 0225 by William Pyle RN  Outcome: Progressing  1/7/2025 1414 by Ludmila Rodriges RN  Outcome: Progressing     Problem: ABCDS Injury Assessment  Goal: Absence of physical injury  1/8/2025 0225 by William Pyle RN  Outcome: Progressing  1/7/2025 1414 by Ludmila Rodriges RN  Outcome: Progressing     Problem: Safety - Adult  Goal: Free from fall injury  1/8/2025 0225 by William Pyle RN  Outcome: Progressing  1/7/2025 1414 by Ludmila Rodriges RN  Outcome: Progressing  Flowsheets (Taken 1/7/2025 0739)  Free From Fall Injury: Instruct family/caregiver on patient safety     Problem: Skin/Tissue Integrity  Goal: Absence of new skin breakdown  Description: 1.  Monitor for areas of redness and/or skin breakdown  2.  Assess vascular access sites hourly  3.  Every 4-6 hours minimum:  Change oxygen saturation probe site  4.  Every 4-6 hours:  If on nasal continuous positive airway pressure, respiratory therapy assess nares and determine need for appliance change or resting period.  1/7/2025 1414 by Ludmila Rodriges RN  Outcome: Progressing     
  Problem: Discharge Planning  Goal: Discharge to home or other facility with appropriate resources  1/9/2025 0917 by Jessie Wynn RN  Outcome: Progressing  1/8/2025 2026 by Cheri Michelle RN  Outcome: Progressing  Flowsheets (Taken 1/8/2025 2005)  Discharge to home or other facility with appropriate resources: Identify barriers to discharge with patient and caregiver     Problem: Pain  Goal: Verbalizes/displays adequate comfort level or baseline comfort level  1/9/2025 0917 by Jessie Wynn RN  Outcome: Progressing  1/8/2025 2026 by Cheri Michelle RN  Outcome: Progressing  Flowsheets (Taken 1/8/2025 2003)  Verbalizes/displays adequate comfort level or baseline comfort level: Encourage patient to monitor pain and request assistance     Problem: ABCDS Injury Assessment  Goal: Absence of physical injury  1/9/2025 0917 by Jessie Wynn RN  Outcome: Progressing  1/8/2025 2026 by Cheri Michelle RN  Outcome: Progressing  Flowsheets (Taken 1/8/2025 2005)  Absence of Physical Injury: Implement safety measures based on patient assessment     Problem: Safety - Adult  Goal: Free from fall injury  1/9/2025 0917 by Jessie Wynn RN  Outcome: Progressing  1/8/2025 2026 by Cheri Michelle RN  Outcome: Progressing  Flowsheets (Taken 1/8/2025 2005)  Free From Fall Injury: Instruct family/caregiver on patient safety     Problem: Skin/Tissue Integrity  Goal: Absence of new skin breakdown  Description: 1.  Monitor for areas of redness and/or skin breakdown  2.  Assess vascular access sites hourly  3.  Every 4-6 hours minimum:  Change oxygen saturation probe site  4.  Every 4-6 hours:  If on nasal continuous positive airway pressure, respiratory therapy assess nares and determine need for appliance change or resting period.  1/9/2025 0917 by Jessie Wynn RN  Outcome: Progressing  1/8/2025 2026 by Cheir Michelle RN  Outcome: Progressing     
  Problem: Discharge Planning  Goal: Discharge to home or other facility with appropriate resources  Outcome: Progressing     Problem: Pain  Goal: Verbalizes/displays adequate comfort level or baseline comfort level  Outcome: Progressing     Problem: ABCDS Injury Assessment  Goal: Absence of physical injury  Outcome: Progressing     Problem: Safety - Adult  Goal: Free from fall injury  Outcome: Progressing     
  Problem: Discharge Planning  Goal: Discharge to home or other facility with appropriate resources  Outcome: Progressing     Problem: Pain  Goal: Verbalizes/displays adequate comfort level or baseline comfort level  Outcome: Progressing     Problem: ABCDS Injury Assessment  Goal: Absence of physical injury  Outcome: Progressing     Problem: Safety - Adult  Goal: Free from fall injury  Outcome: Progressing     
  Problem: Discharge Planning  Goal: Discharge to home or other facility with appropriate resources  Outcome: Progressing     Problem: Pain  Goal: Verbalizes/displays adequate comfort level or baseline comfort level  Outcome: Progressing     Problem: ABCDS Injury Assessment  Goal: Absence of physical injury  Outcome: Progressing     Problem: Safety - Adult  Goal: Free from fall injury  Outcome: Progressing     Problem: Skin/Tissue Integrity  Goal: Absence of new skin breakdown  Description: 1.  Monitor for areas of redness and/or skin breakdown  2.  Assess vascular access sites hourly  3.  Every 4-6 hours minimum:  Change oxygen saturation probe site  4.  Every 4-6 hours:  If on nasal continuous positive airway pressure, respiratory therapy assess nares and determine need for appliance change or resting period.  Outcome: Progressing     
  Problem: Discharge Planning  Goal: Discharge to home or other facility with appropriate resources  Outcome: Progressing     Problem: Pain  Goal: Verbalizes/displays adequate comfort level or baseline comfort level  Outcome: Progressing     Problem: ABCDS Injury Assessment  Goal: Absence of physical injury  Outcome: Progressing     Problem: Safety - Adult  Goal: Free from fall injury  Outcome: Progressing     Problem: Skin/Tissue Integrity  Goal: Absence of new skin breakdown  Description: 1.  Monitor for areas of redness and/or skin breakdown  2.  Assess vascular access sites hourly  3.  Every 4-6 hours minimum:  Change oxygen saturation probe site  4.  Every 4-6 hours:  If on nasal continuous positive airway pressure, respiratory therapy assess nares and determine need for appliance change or resting period.  Outcome: Progressing     
  Problem: Discharge Planning  Goal: Discharge to home or other facility with appropriate resources  Outcome: Progressing     Problem: Pain  Goal: Verbalizes/displays adequate comfort level or baseline comfort level  Outcome: Progressing  Flowsheets (Taken 1/9/2025 1925 by Imelda Maria RN)  Verbalizes/displays adequate comfort level or baseline comfort level:   Encourage patient to monitor pain and request assistance   Assess pain using appropriate pain scale   Administer analgesics based on type and severity of pain and evaluate response   Implement non-pharmacological measures as appropriate and evaluate response   Consider cultural and social influences on pain and pain management   Notify Licensed Independent Practitioner if interventions unsuccessful or patient reports new pain     Problem: ABCDS Injury Assessment  Goal: Absence of physical injury  Outcome: Progressing     Problem: Safety - Adult  Goal: Free from fall injury  Outcome: Progressing     Problem: Skin/Tissue Integrity  Goal: Absence of new skin breakdown  Description: 1.  Monitor for areas of redness and/or skin breakdown  2.  Assess vascular access sites hourly  3.  Every 4-6 hours minimum:  Change oxygen saturation probe site  4.  Every 4-6 hours:  If on nasal continuous positive airway pressure, respiratory therapy assess nares and determine need for appliance change or resting period.  Outcome: Progressing     
  Problem: Discharge Planning  Goal: Discharge to home or other facility with appropriate resources  Outcome: Progressing  Flowsheets (Taken 1/8/2025 2005)  Discharge to home or other facility with appropriate resources: Identify barriers to discharge with patient and caregiver     Problem: Pain  Goal: Verbalizes/displays adequate comfort level or baseline comfort level  Outcome: Progressing  Flowsheets (Taken 1/8/2025 2003)  Verbalizes/displays adequate comfort level or baseline comfort level: Encourage patient to monitor pain and request assistance     Problem: ABCDS Injury Assessment  Goal: Absence of physical injury  Outcome: Progressing  Flowsheets (Taken 1/8/2025 2005)  Absence of Physical Injury: Implement safety measures based on patient assessment     Problem: Safety - Adult  Goal: Free from fall injury  Outcome: Progressing  Flowsheets (Taken 1/8/2025 2005)  Free From Fall Injury: Instruct family/caregiver on patient safety     Problem: Occupational Therapy - Adult  Goal: By Discharge: Performs self-care activities at highest level of function for planned discharge setting.  See evaluation for individualized goals.  Description: FUNCTIONAL STATUS PRIOR TO ADMISSION:  pt's family reports diminished balance in last ~2 weeks w/ progressive decline requiring increased support w/ ADL and ambulation. Pt was assisting w/ IADLs around the home, but has not been in last ~ 2-4 weeks   , Prior Level of Assist for ADLs: Needs assistance,  ,  ,  ,  ,  , Prior Level of Assist for Homemaking: Needs assistance,  ,  ,       HOME SUPPORT: Patient lived w/ daughter and sons come to house to assist w/ bathing (supervision/SBA for safety).    Occupational Therapy Goals:  Initiated 1/4/2025  1.  Patient will perform grooming while in stance w/ use of RW and Contact Guard Assist within 7 day(s).  2.  Patient will perform upper body dressing with Supervision within 7 day(s).  3.  Patient will perform lower body dressing with 
  Problem: Occupational Therapy - Adult  Goal: By Discharge: Performs self-care activities at highest level of function for planned discharge setting.  See evaluation for individualized goals.  Description: FUNCTIONAL STATUS PRIOR TO ADMISSION:  pt's family reports diminished balance in last ~2 weeks w/ progressive decline requiring increased support w/ ADL and ambulation. Pt was assisting w/ IADLs around the home, but has not been in last ~ 2-4 weeks   , Prior Level of Assist for ADLs: Needs assistance,  ,  ,  ,  ,  , Prior Level of Assist for Homemaking: Needs assistance,  ,  ,       HOME SUPPORT: Patient lived w/ daughter and sons come to house to assist w/ bathing (supervision/SBA for safety).    Occupational Therapy Goals:  Initiated 1/4/2025  1.  Patient will perform grooming while in stance w/ use of RW and Contact Guard Assist within 7 day(s).  2.  Patient will perform upper body dressing with Supervision within 7 day(s).  3.  Patient will perform lower body dressing with Stand by Assist within 7 day(s).  4.  Patient will perform toilet transfers with Minimal Assist  within 7 day(s).  5.  Patient will perform all aspects of toileting with Stand by Assist within 7 day(s).  6.  Patient will participate in upper extremity therapeutic exercise/activities with Stand by Assist for 5 minutes within 7 day(s).    7.  Patient will utilize energy conservation techniques during functional activities with verbal cues within 7 day(s).   Outcome: Progressing   OCCUPATIONAL THERAPY TREATMENT  Patient: Mateus Tran  (89 y.o. male)  Date: 1/8/2025  Primary Diagnosis: Hyponatremia [E87.1]       Precautions: Fall Risk, Bed Alarm                Chart, occupational therapy assessment, plan of care, and goals were reviewed.    ASSESSMENT  Patient continues to benefit from skilled OT services and is progressing towards goals. Patient received semisupine in bed with daughter present in room and cleared for therapy by nursing. He 
  Problem: Occupational Therapy - Adult  Goal: By Discharge: Performs self-care activities at highest level of function for planned discharge setting.  See evaluation for individualized goals.  Description: FUNCTIONAL STATUS PRIOR TO ADMISSION:  pt's family reports diminished balance in last ~2 weeks w/ progressive decline requiring increased support w/ ADL and ambulation. Pt was assisting w/ IADLs around the home, but has not been in last ~ 2-4 weeks   , Prior Level of Assist for ADLs: Needs assistance,  ,  ,  ,  ,  , Prior Level of Assist for Homemaking: Needs assistance,  ,  ,       HOME SUPPORT: Patient lived w/ daughter and sons come to house to assist w/ bathing (supervision/SBA for safety).    Occupational Therapy Goals:  Initiated 1/4/2025; Reviewed during weekly reassessment 1/9/2025- updated below  1.  Patient will perform grooming while in stance w/ use of RW and Contact Guard Assist within 7 day(s). Met, upgrade to stand-by assist  2.  Patient will perform upper body dressing with Supervision within 7 day(s). Not met, continue   3.  Patient will perform lower body dressing with Stand by Assist within 7 day(s). Not met, continue   4.  Patient will perform toilet transfers with Minimal Assist  within 7 day(s). Not met, continue   5.  Patient will perform all aspects of toileting with Stand by Assist within 7 day(s). Not met, continue   6.  Patient will participate in upper extremity therapeutic exercise/activities with Stand by Assist for 5 minutes within 7 day(s). Not met, continue   7.  Patient will utilize energy conservation techniques during functional activities with verbal cues within 7 day(s). Not met, continue   1/9/2025 1511 by Jessie Lerner OT  Outcome: Progressing  1/9/2025 1510 by Jessie Lerner, OT  Outcome: Progressing   OCCUPATIONAL THERAPY TREATMENT: WEEKLY REASSESSMENT    Patient: Mateus Tran  (89 y.o. male)  Date: 1/9/2025  Primary Diagnosis: Hyponatremia [E87.1]       Precautions: 
  Problem: Pain  Goal: Verbalizes/displays adequate comfort level or baseline comfort level  1/4/2025 1347 by Mitul Stuart, RN  Outcome: Progressing  1/4/2025 0058 by Laurel Pyle RN  Outcome: Progressing     Problem: Safety - Adult  Goal: Free from fall injury  1/4/2025 1347 by Mitul Stuart, RN  Outcome: Progressing  1/4/2025 0058 by Laurel Pyle RN  Outcome: Progressing     
  Problem: Physical Therapy - Adult  Goal: By Discharge: Performs mobility at highest level of function for planned discharge setting.  See evaluation for individualized goals.  Description: FUNCTIONAL STATUS PRIOR TO ADMISSION: Patient was modified independent using a rollator for functional mobility. Increased assistance for ADL and limitations in ambulation for about 2 weeks due to progressive decline in balance.     HOME SUPPORT PRIOR TO ADMISSION: The patient lived with daughter who assists with meals, sons were coming over to provide supervision for bathing activity. Requiring increased assistance for about 2 weeks due to decline in progressive balance impairments.     Physical Therapy Goals  Initiated 1/4/2025  1.  Patient will move from supine to sit and sit to supine, scoot up and down, and roll side to side in bed with modified independence within 7 day(s).    2.  Patient will perform sit to stand with minimal assistance within 7 day(s).  3.  Patient will transfer from bed to chair and chair to bed with minimal assistance using the least restrictive device within 7 day(s).  4.  Patient will ambulate with minimal assistance for 75 feet with the least restrictive device within 7 day(s).     Outcome: Progressing     PHYSICAL THERAPY TREATMENT    Patient: Mateus Tran  (89 y.o. male)  Date: 1/8/2025  Diagnosis: Hyponatremia [E87.1] Hyponatremia      Precautions: Fall Risk, Bed Alarm                      ASSESSMENT:  Patient continues to benefit from skilled PT services and is progressing towards goals. Pt encountered semi-reclined in bed in NAD, cleared by RN and agreeable to participate in therapy. Transferred supine > sit with Min A at trunk, cueing for sequencing. Noted intermittent posterior trunk lean while sitting EOB though able to correct with cueing. Ambulated to bedside chair and bathroom with RW, CG/Min A, cueing for RW management and gait mechanics. Pt left seated in bedside chair in NAD, all 
  Problem: Physical Therapy - Adult  Goal: By Discharge: Performs mobility at highest level of function for planned discharge setting.  See evaluation for individualized goals.  Description: FUNCTIONAL STATUS PRIOR TO ADMISSION: Patient was modified independent using a rollator for functional mobility. Increased assistance for ADL and limitations in ambulation for about 2 weeks due to progressive decline in balance.     HOME SUPPORT PRIOR TO ADMISSION: The patient lived with daughter who assists with meals, sons were coming over to provide supervision for bathing activity. Requiring increased assistance for about 2 weeks due to decline in progressive balance impairments.     Physical Therapy Goals  Initiated 1/4/2025  1.  Patient will move from supine to sit and sit to supine, scoot up and down, and roll side to side in bed with modified independence within 7 day(s).    2.  Patient will perform sit to stand with minimal assistance within 7 day(s).  3.  Patient will transfer from bed to chair and chair to bed with minimal assistance using the least restrictive device within 7 day(s).  4.  Patient will ambulate with minimal assistance for 75 feet with the least restrictive device within 7 day(s).     Outcome: Progressing     PHYSICAL THERAPY TREATMENT    Patient: Mateus Tran  (89 y.o. male)  Date: 1/9/2025  Diagnosis: Hyponatremia [E87.1] Hyponatremia      Precautions: Fall Risk, Bed Alarm                      ASSESSMENT:  Patient continues to benefit from skilled PT services and is slowly progressing towards goals. Pt encountered semi-reclined in bedside chair in NAD, cleared by RN and agreeable to participate in therapy. Transferred from bedside chair with RW multiple times, though with repeated cues for hand placement. Ambulated in hallway with CGA/Min A, noted R knee flexion during static/dynamic standing, though pt reports bending knee to increase feeling in it since it's numb, no buckling or LOB. One 
  Problem: Physical Therapy - Adult  Goal: By Discharge: Performs mobility at highest level of function for planned discharge setting.  See evaluation for individualized goals.  Description: FUNCTIONAL STATUS PRIOR TO ADMISSION: Patient was modified independent using a rollator for functional mobility. Increased assistance for ADL and limitations in ambulation for about 2 weeks due to progressive decline in balance.     HOME SUPPORT PRIOR TO ADMISSION: The patient lived with daughter who assists with meals, sons were coming over to provide supervision for bathing activity. Requiring increased assistance for about 2 weeks due to decline in progressive balance impairments.     Physical Therapy Goals  Initiated 1/4/2025  1.  Patient will move from supine to sit and sit to supine, scoot up and down, and roll side to side in bed with modified independence within 7 day(s).    2.  Patient will perform sit to stand with minimal assistance within 7 day(s).  3.  Patient will transfer from bed to chair and chair to bed with minimal assistance using the least restrictive device within 7 day(s).  4.  Patient will ambulate with minimal assistance for 75 feet with the least restrictive device within 7 day(s).     Outcome: Progressing   PHYSICAL THERAPY EVALUATION    Patient: Mateus Tran  (89 y.o. male)  Date: 1/4/2025  Primary Diagnosis: Hyponatremia [E87.1]       Precautions: Restrictions/Precautions: Fall Risk, Bed Alarm                    ASSESSMENT :   DEFICITS/IMPAIRMENTS:   The patient is limited by decreased functional mobility, independence in ADLs, high-level IADLs, ROM, strength, endurance, safety awareness, coordination, balance, fine motor control, motor planning, and difficulty with word finding.     Based on the impairments listed above patient presents below baseline for functional mobility. Patients family report progression of speech, balance and strength deficits for about 2 weeks. Noted MRI to show small 
  Problem: Physical Therapy - Adult  Goal: By Discharge: Performs mobility at highest level of function for planned discharge setting.  See evaluation for individualized goals.  Description: FUNCTIONAL STATUS PRIOR TO ADMISSION: Patient was modified independent using a rollator for functional mobility. Increased assistance for ADL and limitations in ambulation for about 2 weeks due to progressive decline in balance.     HOME SUPPORT PRIOR TO ADMISSION: The patient lived with daughter who assists with meals, sons were coming over to provide supervision for bathing activity. Requiring increased assistance for about 2 weeks due to decline in progressive balance impairments.     Physical Therapy Goals  Initiated 1/4/2025  Re-assessment 1/10/2025  1.  Patient will move from supine to sit and sit to supine, scoot up and down, and roll side to side in bed with modified independence within 7 day(s).    2.  Patient will perform sit to stand with minimal assistance within 7 day(s).  3.  Patient will transfer from bed to chair and chair to bed with minimal assistance using the least restrictive device within 7 day(s).  4.  Patient will ambulate with minimal assistance for 75 feet with the least restrictive device within 7 day(s).     Outcome: Progressing     PHYSICAL THERAPY TREATMENT    Patient: Mateus Tran  (89 y.o. male)  Date: 1/13/2025  Diagnosis: Hyponatremia [E87.1] Hyponatremia      Precautions: Fall Risk, Bed Alarm                      ASSESSMENT:  Patient continues to benefit from skilled PT services and is progressing towards goals. Patient tolerated session well with some encouragement to participate. Patient remains with impaired dynamic gait stability and increased fall risk. Has baseline choreic movements that don't appear to impact his overall balance. Reports and demo's instability with navigating turns. Patient will benefit from continued PT intervention to maximize functional independence prior to 
  Problem: Physical Therapy - Adult  Goal: By Discharge: Performs mobility at highest level of function for planned discharge setting.  See evaluation for individualized goals.  Description: FUNCTIONAL STATUS PRIOR TO ADMISSION: Patient was modified independent using a rollator for functional mobility. Increased assistance for ADL and limitations in ambulation for about 2 weeks due to progressive decline in balance.     HOME SUPPORT PRIOR TO ADMISSION: The patient lived with daughter who assists with meals, sons were coming over to provide supervision for bathing activity. Requiring increased assistance for about 2 weeks due to decline in progressive balance impairments.     Physical Therapy Goals  Initiated 1/4/2025  Re-assessment 1/10/2025  1.  Patient will move from supine to sit and sit to supine, scoot up and down, and roll side to side in bed with modified independence within 7 day(s).    2.  Patient will perform sit to stand with minimal assistance within 7 day(s).  3.  Patient will transfer from bed to chair and chair to bed with minimal assistance using the least restrictive device within 7 day(s).  4.  Patient will ambulate with minimal assistance for 75 feet with the least restrictive device within 7 day(s).     Outcome: Progressing     PHYSICAL THERAPY TREATMENT: WEEKLY REASSESSMENT    Patient: Mateus Tran  (89 y.o. male)  Date: 1/10/2025  Primary Diagnosis: Hyponatremia [E87.1]       Precautions: Fall Risk, Bed Alarm                      ASSESSMENT :  Patient continues to benefit from skilled PT services and is progressing towards goals. Transferred to sit EOB with S, sit <> stand with RW and CGA. Ambulated into bathroom with RW, CGA, cueing for safe RW management. Standing maintained during toileting with CGA. Pt left seated in bedside chair in NAD, all needs met.     Patient's progression toward goals since last assessment: bed mobility, transfers and ambulation with decreased 
with the least restrictive device within 7 day(s).     1/13/2025 1527 by Osvaldo So, PT  Outcome: Progressing     
assist (eg. physical or verbal) with assist is necessary to enable pt to complete eval   Based on the above components, the patient evaluation is determined to be of the following complexity level: Medium      
Yes

## 2025-01-14 NOTE — DISCHARGE SUMMARY
groundglass opacity and was recommended to have a repeat CT scan of chest in about 3 months and also have outpatient follow-up with [place] [facility] pulmonologist as below.  The rest of the medical problems remained stable during hospitalization.  I did discuss with patient's family at bedside about lung nodule and importance of further follow-up with lung doctor and also having a repeat CT scan in about 3 months.    Patient seen and examined today, vital signs stable, lab works at baseline and is being released in much improved condition for outpatient follow-up      Discharge Exam:  Patient seen and examined by me on discharge day.  Pertinent Findings:  Patient Vitals for the past 24 hrs:   BP Temp Temp src Pulse Resp SpO2 Weight   01/14/25 0900 -- -- -- 83 16 -- --   01/14/25 0800 -- -- -- 90 22 -- --   01/14/25 0719 (!) 113/96 -- -- 97 16 -- --   01/14/25 0718 119/72 -- -- 87 30 94 % --   01/14/25 0716 (!) 141/67 98.3 °F (36.8 °C) Oral 85 14 90 % --   01/14/25 0700 -- -- -- 77 17 94 % --   01/14/25 0620 -- -- -- -- -- -- 73.4 kg (161 lb 13.1 oz)   01/14/25 0311 (!) 117/58 97.8 °F (36.6 °C) Oral 63 12 92 % --   01/13/25 2326 (!) 115/56 98 °F (36.7 °C) Oral 61 11 96 % --   01/1936 (!) 109/49 -- -- 76 14 91 % --   01/13/25 1934 (!) 108/58 -- -- 88 24 -- --   01/13/25 1931 102/72 -- -- 86 28 -- --   01/13/25 1913 110/65 97.9 °F (36.6 °C) Oral 76 16 -- --   01/13/25 1225 (!) 118/97 -- -- -- -- -- --   01/13/25 1200 134/74 -- -- 78 15 95 % --   01/13/25 1045 119/81 -- -- 84 13 95 % --   01/13/25 1042 -- 97.4 °F (36.3 °C) Oral -- -- -- --       Gen:    Not in distress  Chest: Clear lungs  CVS:   Regular rhythm.  No edema  Abd:  Soft, not distended, not tender  Neuro: awake, moving all exts    Discharge/Recent Laboratory Results:  No results for input(s): \"NA\", \"K\", \"CL\", \"CO2\", \"BUN\", \"CREATININE\", \"GLUCOSE\", \"CALCIUM\", \"PHOS\", \"MG\" in the last 72 hours.  No results for input(s): \"HGB\", \"HCT\", \"WBC\", \"PLT\" in the  [Postmenopausal] : postmenopausal [Menopause Age____] : age at menopause was [unfilled] [Total Preg ___] : G[unfilled] [Live Births ___] : P[unfilled]  [Living ___] : Living: [unfilled]

## 2025-01-14 NOTE — CARE COORDINATION
SECURE email notification sent to identified IDT members at   [facility]

## 2025-01-15 VITALS
TEMPERATURE: 97.7 F | WEIGHT: 161 LBS | RESPIRATION RATE: 16 BRPM | BODY MASS INDEX: 22.45 KG/M2 | HEART RATE: 61 BPM | SYSTOLIC BLOOD PRESSURE: 140 MMHG | DIASTOLIC BLOOD PRESSURE: 75 MMHG | OXYGEN SATURATION: 95 %

## 2025-01-15 PROBLEM — E55.9 VITAMIN D DEFICIENCY: Chronic | Status: ACTIVE | Noted: 2020-09-08

## 2025-01-15 PROBLEM — G25.0 BENIGN ESSENTIAL TREMOR SYNDROME: Chronic | Status: ACTIVE | Noted: 2020-09-08

## 2025-01-15 PROBLEM — Z86.73 OLD CEREBELLAR INFARCT WITHOUT LATE EFFECT: Chronic | Status: ACTIVE | Noted: 2025-01-15

## 2025-01-15 PROBLEM — I48.91 ATRIAL FIBRILLATION (HCC): Status: RESOLVED | Noted: 2020-09-21 | Resolved: 2025-01-15

## 2025-01-15 PROBLEM — G45.0 VERTEBROBASILAR ARTERY INSUFFICIENCY: Chronic | Status: ACTIVE | Noted: 2018-10-18

## 2025-01-15 PROBLEM — M50.30 DEGENERATIVE DISC DISEASE, CERVICAL: Chronic | Status: ACTIVE | Noted: 2025-01-15

## 2025-01-15 PROBLEM — E53.8 B12 DEFICIENCY: Chronic | Status: ACTIVE | Noted: 2020-09-08

## 2025-01-15 PROBLEM — F01.B0 MODERATE VASCULAR DEMENTIA WITHOUT BEHAVIORAL DISTURBANCE, PSYCHOTIC DISTURBANCE, MOOD DISTURBANCE, OR ANXIETY (HCC): Chronic | Status: ACTIVE | Noted: 2025-01-15

## 2025-01-15 PROBLEM — I73.9 PERIPHERAL ARTERY DISEASE (HCC): Status: ACTIVE | Noted: 2022-10-03

## 2025-01-15 PROBLEM — E03.9 ACQUIRED HYPOTHYROIDISM: Chronic | Status: ACTIVE | Noted: 2025-01-15

## 2025-01-15 PROBLEM — I48.0 PAROXYSMAL ATRIAL FIBRILLATION (HCC): Status: ACTIVE | Noted: 2020-09-21

## 2025-01-15 PROBLEM — M54.12 CERVICAL MYELOPATHY WITH CERVICAL RADICULOPATHY (HCC): Chronic | Status: ACTIVE | Noted: 2020-09-08

## 2025-01-15 PROBLEM — E87.1 HYPONATREMIA: Status: RESOLVED | Noted: 2020-09-22 | Resolved: 2025-01-15

## 2025-01-15 PROBLEM — E22.2 SIADH (SYNDROME OF INAPPROPRIATE ADH PRODUCTION) (HCC): Chronic | Status: ACTIVE | Noted: 2025-01-15

## 2025-01-15 PROBLEM — I73.9 PERIPHERAL ARTERY DISEASE (HCC): Chronic | Status: ACTIVE | Noted: 2022-10-03

## 2025-01-15 PROBLEM — I48.0 PAROXYSMAL ATRIAL FIBRILLATION (HCC): Chronic | Status: ACTIVE | Noted: 2020-09-21

## 2025-01-15 PROBLEM — I73.9 PERIPHERAL ARTERY DISEASE (HCC): Chronic | Status: RESOLVED | Noted: 2022-10-03 | Resolved: 2025-01-15

## 2025-01-15 PROBLEM — Z86.018 HISTORY OF BENIGN PARATHYROID TUMOR: Chronic | Status: ACTIVE | Noted: 2020-03-26

## 2025-01-15 PROBLEM — G95.9 CERVICAL MYELOPATHY WITH CERVICAL RADICULOPATHY (HCC): Chronic | Status: ACTIVE | Noted: 2020-09-08

## 2025-01-15 NOTE — PROGRESS NOTES
[facility] SENIOR CARE SERVICES      Skilled Nursing Facility Admission History and Physical Examination      [facility]      Name: Mateus Tran Sr.      Room: H 421A  YOB: 1935  MRN: [id number]      ASSESSMENT:     Diagnosis Orders   1. Paroxysmal atrial fibrillation (HCC)        2. Essential hypertension        3. Stage 3a chronic kidney disease (HCC)        4. 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 Stenosis of both internal carotid arteries        6. Vertebrobasilar artery insufficiency        7. Peripheral artery disease (HCC)        8. Mixed hyperlipidemia        9. Gastroesophageal reflux disease without esophagitis        10. Vitamin D deficiency        11. Degenerative disc disease, cervical        12. Cervical myelopathy with cervical radiculopathy ([facility])        13. B12 deficiency        14. History of prostate cancer        15. History of thyroid cancer        16. SIADH (syndrome of inappropriate ADH production) ([facility])        17. Acquired hypothyroidism        18. 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 Current treatment was continued as noted above.     PAD: This problem is stable. Will continue close surveillance.    HYPERLIPIDEMIA: This problem is stable. Will continue current treatment including diet, exercise and medication.    VITAMIN D DEFICIENCY/ VITAMIN B12 DEFICIENCY: This problem is stable. Will continue close surveillance.    HX PROSTATE CANCER: This problem is stable. Will continue close surveillance.    HX THYROID CANCER/ HYPOTHYROIDISM: This problem is stable. Will continue close surveillance.    SIADH: This problem is stable. Will monitor Sodium.    DEMENTIA: This problem has deteriorated. Will continue close surveillance.      The patient is admitted to the SNF for

## 2025-01-16 ENCOUNTER — OFFICE VISIT (OUTPATIENT)
Facility: CLINIC | Age: 89
End: 2025-01-16

## 2025-01-16 VITALS
SYSTOLIC BLOOD PRESSURE: 122 MMHG | RESPIRATION RATE: 18 BRPM | TEMPERATURE: 97.8 F | DIASTOLIC BLOOD PRESSURE: 68 MMHG | OXYGEN SATURATION: 99 % | WEIGHT: 161.5 LBS | BODY MASS INDEX: 22.52 KG/M2 | HEART RATE: 98 BPM

## 2025-01-16 DIAGNOSIS — K21.9 GASTROESOPHAGEAL REFLUX DISEASE WITHOUT ESOPHAGITIS: ICD-10-CM

## 2025-01-16 DIAGNOSIS — E22.2 SIADH (SYNDROME OF INAPPROPRIATE ADH PRODUCTION) (HCC): ICD-10-CM

## 2025-01-16 DIAGNOSIS — R25.8 CHOREIFORM MOVEMENT: ICD-10-CM

## 2025-01-16 DIAGNOSIS — F01.B0 MODERATE VASCULAR DEMENTIA WITHOUT BEHAVIORAL DISTURBANCE, PSYCHOTIC DISTURBANCE, MOOD DISTURBANCE, OR ANXIETY (HCC): ICD-10-CM

## 2025-01-16 DIAGNOSIS — I48.0 PAROXYSMAL ATRIAL FIBRILLATION (HCC): ICD-10-CM

## 2025-01-16 DIAGNOSIS — Z86.73 OLD CEREBELLAR INFARCT WITHOUT LATE EFFECT: ICD-10-CM

## 2025-01-16 DIAGNOSIS — N18.31 STAGE 3A CHRONIC KIDNEY DISEASE (HCC): ICD-10-CM

## 2025-01-16 DIAGNOSIS — E78.2 MIXED HYPERLIPIDEMIA: ICD-10-CM

## 2025-01-16 DIAGNOSIS — R91.1 LEFT UPPER LOBE PULMONARY NODULE: ICD-10-CM

## 2025-01-16 DIAGNOSIS — I73.9 PERIPHERAL ARTERY DISEASE (HCC): ICD-10-CM

## 2025-01-16 DIAGNOSIS — I95.0 IDIOPATHIC HYPOTENSION: Primary | ICD-10-CM

## 2025-01-16 RX ORDER — POLYETHYLENE GLYCOL 3350 17 G/17G
17 POWDER, FOR SOLUTION ORAL DAILY PRN
COMMUNITY

## 2025-01-16 NOTE — PROGRESS NOTES
[facility] [address] [place] [address]  Phone: [phone]  Fax: [phone]  Also available via Perfect Serve     PLACE OF SERVICE:  [facility] [address] [place] [address]    SKILLED VISIT    Chief Complaint:   Chief Complaint   Patient presents with    Follow-up         HPI : Mateus Tran Sr. is a 89 y.o. male here for follow up.    Previous history prior to admission:  Patient was admitted to [facility] from 1/3/2024 until 1/14/2025.  He presented due to having recurrent episodes of hypotension his workup included cortisol and cosyntropin test that were within normal limits.  Echocardiogram was normal lactic acid procalcitonin and troponins were all normal.  His blood pressure medications were discontinued.  He did have transient second-degree block and metoprolol was stopped.  Ultimately recommended outpatient follow-up appointment.  Patient is known to have history of low sodium levels likely due to SIADH.  These were managed at the hospital with fluid restriction.  He also had been noted to have slurred speech and gait instability MRI was done that showed small chronic infarcts and neurology recommended Plavix and Eliquis.  Otherwise he had CTA of the chest that showed nonspecific left upper lobe groundglass opacity and recommended CT scan of chest in 3 months and outpatient follow-up with [place] [facility].  Patient has other past medical history of hypothyroidism hyperlipidemia vitamin D deficiency insomnia constipation choreiform movement disorder.  He was discharged to [facility] and rehabilitation for strengthening and conditioning.    Current visit:  01/16/2025 -patient was seen today lying in bed he is awake alert does have joselyn form movements of upper extremities.  He notes that he sees someone about it but cannot recall all the name.  He was seen by neurology during hospitalization.

## 2025-01-20 ENCOUNTER — OFFICE VISIT (OUTPATIENT)
Facility: CLINIC | Age: 89
End: 2025-01-20
Payer: MEDICARE

## 2025-01-20 VITALS
RESPIRATION RATE: 18 BRPM | DIASTOLIC BLOOD PRESSURE: 70 MMHG | OXYGEN SATURATION: 98 % | SYSTOLIC BLOOD PRESSURE: 121 MMHG | BODY MASS INDEX: 22.52 KG/M2 | TEMPERATURE: 97.1 F | HEART RATE: 68 BPM | WEIGHT: 161.5 LBS

## 2025-01-20 DIAGNOSIS — Z86.73 OLD CEREBELLAR INFARCT WITHOUT LATE EFFECT: ICD-10-CM

## 2025-01-20 DIAGNOSIS — I48.0 PAROXYSMAL ATRIAL FIBRILLATION (HCC): ICD-10-CM

## 2025-01-20 DIAGNOSIS — K21.9 GASTROESOPHAGEAL REFLUX DISEASE WITHOUT ESOPHAGITIS: ICD-10-CM

## 2025-01-20 DIAGNOSIS — I73.9 PERIPHERAL ARTERY DISEASE (HCC): ICD-10-CM

## 2025-01-20 DIAGNOSIS — I95.0 IDIOPATHIC HYPOTENSION: Primary | ICD-10-CM

## 2025-01-20 DIAGNOSIS — N18.31 STAGE 3A CHRONIC KIDNEY DISEASE (HCC): ICD-10-CM

## 2025-01-20 DIAGNOSIS — E22.2 SIADH (SYNDROME OF INAPPROPRIATE ADH PRODUCTION) (HCC): ICD-10-CM

## 2025-01-20 DIAGNOSIS — E78.2 MIXED HYPERLIPIDEMIA: ICD-10-CM

## 2025-01-20 DIAGNOSIS — F01.B0 MODERATE VASCULAR DEMENTIA WITHOUT BEHAVIORAL DISTURBANCE, PSYCHOTIC DISTURBANCE, MOOD DISTURBANCE, OR ANXIETY (HCC): ICD-10-CM

## 2025-01-20 DIAGNOSIS — R91.1 LEFT UPPER LOBE PULMONARY NODULE: ICD-10-CM

## 2025-01-20 DIAGNOSIS — R25.8 CHOREIFORM MOVEMENT: ICD-10-CM

## 2025-01-20 PROCEDURE — 99309 SBSQ NF CARE MODERATE MDM 30: CPT | Performed by: NURSE PRACTITIONER

## 2025-01-20 PROCEDURE — 1123F ACP DISCUSS/DSCN MKR DOCD: CPT | Performed by: NURSE PRACTITIONER

## 2025-01-20 NOTE — PROGRESS NOTES
without behavioral disturbance, psychotic disturbance, mood disturbance, or anxiety ([facility])     Alert and oriented x 3 today  He held coherent conversation was forgetful about names  Monitor mentation   6. Stage 3a chronic kidney disease (HCC)     Last labs from hospital show creatinine 0.99 BUN 33  Labs on 1/20/2025 BMP   7. Choreiform movement     He follows with neurology for this  Stable   9. 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 Mixed hyperlipidemia     Stable  Continue atorvastatin   11. Gastroesophageal reflux disease without esophagitis     Asymptomatic  Not currently on any medication for this   12. Left upper lobe pulmonary nodule     CTA of chest was done during hospitalization found left upper lobe groundglass opacity  Recommended follow-up with Charlene Lind and CT of the chest in 3 months - patient educated about this today  Have requested staff to make follow-up appointment           ALEXSANDRA Guillen - VIOLET

## 2025-01-22 ENCOUNTER — OFFICE VISIT (OUTPATIENT)
Facility: CLINIC | Age: 89
End: 2025-01-22

## 2025-01-22 VITALS
DIASTOLIC BLOOD PRESSURE: 88 MMHG | HEART RATE: 50 BPM | OXYGEN SATURATION: 93 % | RESPIRATION RATE: 18 BRPM | TEMPERATURE: 97.2 F | SYSTOLIC BLOOD PRESSURE: 150 MMHG | WEIGHT: 161.3 LBS | BODY MASS INDEX: 22.5 KG/M2

## 2025-01-22 DIAGNOSIS — N18.31 STAGE 3A CHRONIC KIDNEY DISEASE (HCC): ICD-10-CM

## 2025-01-22 DIAGNOSIS — K21.9 GASTROESOPHAGEAL REFLUX DISEASE WITHOUT ESOPHAGITIS: ICD-10-CM

## 2025-01-22 DIAGNOSIS — R91.1 LEFT UPPER LOBE PULMONARY NODULE: ICD-10-CM

## 2025-01-22 DIAGNOSIS — I95.0 IDIOPATHIC HYPOTENSION: Primary | ICD-10-CM

## 2025-01-22 DIAGNOSIS — E22.2 SIADH (SYNDROME OF INAPPROPRIATE ADH PRODUCTION) (HCC): ICD-10-CM

## 2025-01-22 DIAGNOSIS — R25.8 CHOREIFORM MOVEMENT: ICD-10-CM

## 2025-01-22 DIAGNOSIS — I73.9 PERIPHERAL ARTERY DISEASE (HCC): ICD-10-CM

## 2025-01-22 DIAGNOSIS — I48.0 PAROXYSMAL ATRIAL FIBRILLATION (HCC): ICD-10-CM

## 2025-01-22 DIAGNOSIS — E78.2 MIXED HYPERLIPIDEMIA: ICD-10-CM

## 2025-01-22 DIAGNOSIS — Z86.73 OLD CEREBELLAR INFARCT WITHOUT LATE EFFECT: ICD-10-CM

## 2025-01-22 DIAGNOSIS — F01.B0 MODERATE VASCULAR DEMENTIA WITHOUT BEHAVIORAL DISTURBANCE, PSYCHOTIC DISTURBANCE, MOOD DISTURBANCE, OR ANXIETY (HCC): ICD-10-CM

## 2025-01-22 NOTE — PROGRESS NOTES
Smokeless tobacco: Never   Substance Use Topics    Alcohol use: Not Currently    Drug use: Never       Family History:    Family History   Problem Relation Age of Onset    Heart Disease Father     Hypertension Father     Cancer Mother         hodgkins disease    Other Neg Hx         no hypercalcemia or kidney stones     Past Surgical History:   Procedure Laterality Date    APPENDECTOMY      CARDIAC CATHETERIZATION      No Stents-  Dr. Bello    CHOLECYSTECTOMY      COLONOSCOPY N/A 9/17/2018    COLONOSCOPY performed by Sina Brooks MD at [facility] AMBULATORY OR    COLONOSCOPY N/A 2/10/2021    COLONOSCOPY performed by Sina Brooks MD at [facility] ENDOSCOPY    OTHER SURGICAL HISTORY      vasectomy    PARATHYROIDECTOMY  01/14/2015    right superior parathyroid gland removed and left superior parathyroid gland removed    THYROIDECTOMY, PARTIAL  1/14/15    right lobectomy    TONSILLECTOMY      UROLOGICAL SURGERY  1/14/15    BLADDER NECK INCISION, Cold knife incision of bladder neck contracture, cystoscopy    UROLOGICAL SURGERY      Seeds for prostate cancer , 4 dialations             Vitals:   Vitals:    01/22/25 1149   BP: (!) 150/88   Pulse: 50   Resp: 18   Temp: 97.2 °F (36.2 °C)   SpO2: 93%           Exam:  Constitutional: No acute distress;   Eyes: Sclera clear, PERRLA;   Ears/nose/mouth/throat:mmm,   Cardiovascular: RRR,nml S1 and S2, no rubs murmurs or gallops, no edema, no cyanosis;   Respiratory: Clear to auscultation, symmetric, no respiratory distress;  Gastrointestinal: Abdomen soft, NT, ND, no masses, normal bowel sounds;  Neurologic: Cranial nerves II through VII grossly intact, no speech or motor deficits A&O in time place and person, has quick involuntary chorea movements  Skin: No rash, warm and dry;  Musculoskeletal: No erythema swelling or joint tenderness, extremities without cyanosis, neck supple, ROM intact spine and extremities;  Psychiatric: Not agitated.  Appropriate affect, mood, judgment and

## 2025-01-24 ENCOUNTER — OFFICE VISIT (OUTPATIENT)
Facility: CLINIC | Age: 89
End: 2025-01-24

## 2025-01-24 VITALS
OXYGEN SATURATION: 98 % | BODY MASS INDEX: 22.5 KG/M2 | SYSTOLIC BLOOD PRESSURE: 133 MMHG | TEMPERATURE: 97.3 F | WEIGHT: 161.3 LBS | RESPIRATION RATE: 18 BRPM | DIASTOLIC BLOOD PRESSURE: 82 MMHG | HEART RATE: 70 BPM

## 2025-01-24 DIAGNOSIS — K21.9 GASTROESOPHAGEAL REFLUX DISEASE WITHOUT ESOPHAGITIS: ICD-10-CM

## 2025-01-24 DIAGNOSIS — I73.9 PERIPHERAL ARTERY DISEASE (HCC): ICD-10-CM

## 2025-01-24 DIAGNOSIS — R25.8 CHOREIFORM MOVEMENT: ICD-10-CM

## 2025-01-24 DIAGNOSIS — R91.1 LEFT UPPER LOBE PULMONARY NODULE: ICD-10-CM

## 2025-01-24 DIAGNOSIS — N18.31 STAGE 3A CHRONIC KIDNEY DISEASE (HCC): ICD-10-CM

## 2025-01-24 DIAGNOSIS — I95.0 IDIOPATHIC HYPOTENSION: Primary | ICD-10-CM

## 2025-01-24 DIAGNOSIS — E78.2 MIXED HYPERLIPIDEMIA: ICD-10-CM

## 2025-01-24 DIAGNOSIS — I48.0 PAROXYSMAL ATRIAL FIBRILLATION (HCC): ICD-10-CM

## 2025-01-24 DIAGNOSIS — E22.2 SIADH (SYNDROME OF INAPPROPRIATE ADH PRODUCTION) (HCC): ICD-10-CM

## 2025-01-24 DIAGNOSIS — F01.B0 MODERATE VASCULAR DEMENTIA WITHOUT BEHAVIORAL DISTURBANCE, PSYCHOTIC DISTURBANCE, MOOD DISTURBANCE, OR ANXIETY (HCC): ICD-10-CM

## 2025-01-24 DIAGNOSIS — Z86.73 OLD CEREBELLAR INFARCT WITHOUT LATE EFFECT: ICD-10-CM

## 2025-01-24 NOTE — PROGRESS NOTES
[facility] [address] [place] [address]  Phone: [phone]  Fax: [phone]  Also available via Perfect Serve     PLACE OF SERVICE:  [facility] [address] [place] [address]    SKILLED VISIT    Chief Complaint:   Chief Complaint   Patient presents with    Follow-up         HPI : Mateus Tran Sr. is a 89 y.o. male here for follow up.    Previous history prior to admission:  Patient was admitted to [facility] from 1/3/2024 until 1/14/2025.  He presented due to having recurrent episodes of hypotension his workup included cortisol and cosyntropin test that were within normal limits.  Echocardiogram was normal lactic acid procalcitonin and troponins were all normal.  His blood pressure medications were discontinued.  He did have transient second-degree block and metoprolol was stopped.  Ultimately recommended outpatient follow-up appointment.  Patient is known to have history of low sodium levels likely due to SIADH.  These were managed at the hospital with fluid restriction.  He also had been noted to have slurred speech and gait instability MRI was done that showed small chronic infarcts and neurology recommended Plavix and Eliquis.  Otherwise he had CTA of the chest that showed nonspecific left upper lobe groundglass opacity and recommended CT scan of chest in 3 months and outpatient follow-up with [place] [facility].  Patient has other past medical history of hypothyroidism hyperlipidemia vitamin D deficiency insomnia constipation choreiform movement disorder.  He was discharged to [facility] and rehabilitation for strengthening and conditioning.    Current visit:  01/24/2025 -patient was seen today lying in bed he is awake alert does have choreaform movements of upper extremities.  He notes that he sees someone about it but cannot recall all the name.  He was seen by neurology during hospitalization.

## 2025-01-28 ENCOUNTER — OFFICE VISIT (OUTPATIENT)
Facility: CLINIC | Age: 89
End: 2025-01-28
Payer: MEDICARE

## 2025-01-28 VITALS
HEART RATE: 80 BPM | WEIGHT: 161.3 LBS | SYSTOLIC BLOOD PRESSURE: 140 MMHG | RESPIRATION RATE: 17 BRPM | OXYGEN SATURATION: 94 % | DIASTOLIC BLOOD PRESSURE: 77 MMHG | TEMPERATURE: 97.5 F | BODY MASS INDEX: 22.5 KG/M2

## 2025-01-28 DIAGNOSIS — F01.B0 MODERATE VASCULAR DEMENTIA WITHOUT BEHAVIORAL DISTURBANCE, PSYCHOTIC DISTURBANCE, MOOD DISTURBANCE, OR ANXIETY (HCC): ICD-10-CM

## 2025-01-28 DIAGNOSIS — I73.9 PERIPHERAL ARTERY DISEASE (HCC): ICD-10-CM

## 2025-01-28 DIAGNOSIS — R25.8 CHOREIFORM MOVEMENT: ICD-10-CM

## 2025-01-28 DIAGNOSIS — I48.0 PAROXYSMAL ATRIAL FIBRILLATION (HCC): ICD-10-CM

## 2025-01-28 DIAGNOSIS — I95.0 IDIOPATHIC HYPOTENSION: Primary | ICD-10-CM

## 2025-01-28 DIAGNOSIS — R91.1 LEFT UPPER LOBE PULMONARY NODULE: ICD-10-CM

## 2025-01-28 DIAGNOSIS — N18.31 STAGE 3A CHRONIC KIDNEY DISEASE (HCC): ICD-10-CM

## 2025-01-28 DIAGNOSIS — E78.2 MIXED HYPERLIPIDEMIA: ICD-10-CM

## 2025-01-28 DIAGNOSIS — Z86.73 OLD CEREBELLAR INFARCT WITHOUT LATE EFFECT: ICD-10-CM

## 2025-01-28 DIAGNOSIS — E22.2 SIADH (SYNDROME OF INAPPROPRIATE ADH PRODUCTION) (HCC): ICD-10-CM

## 2025-01-28 DIAGNOSIS — K21.9 GASTROESOPHAGEAL REFLUX DISEASE WITHOUT ESOPHAGITIS: ICD-10-CM

## 2025-01-28 PROCEDURE — 99309 SBSQ NF CARE MODERATE MDM 30: CPT | Performed by: NURSE PRACTITIONER

## 2025-01-28 PROCEDURE — 1123F ACP DISCUSS/DSCN MKR DOCD: CPT | Performed by: NURSE PRACTITIONER

## 2025-01-28 NOTE — PROGRESS NOTES
meds stopped, metoprolol stopped due to transient type 1 second continue midodrine 5 mg p.o. degree block  Twice daily  Monitor blood pressures - -140   2. SIADH (syndrome of inappropriate ADH production) ([facility])     History of SIADH and hyponatremia  Last sodium was 133  Labs scheduled on 1/20/2025 CBC BMP - sodium 133, stable  Periodically monitor   3. Paroxysmal atrial fibrillation ([facility])     Stable  No longer on metoprolol discontinued during hospitalization  Continue Eliquis 2.5 mg p.o. twice daily   4. Peripheral artery disease (HCC)     Stable  Continue Eliquis Plavix and atorvastatin   5. 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 Stage 3a chronic kidney disease ([facility])     Last labs from hospital show creatinine 0.99 BUN 33  Labs on 1/20/2025 BMP - creatinine 1.13, BUN 25.4 - GFR 63  Periodically monitor   7. Choreiform movement     He follows with neurology for this  Stable   8. 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 Mixed hyperlipidemia     Stable  Continue atorvastatin   10. Gastroesophageal reflux disease without esophagitis     Asymptomatic  Not currently on any medication for this   11. Left upper lobe pulmonary nodule     CTA of chest was done during hospitalization found left upper lobe groundglass opacity  Recommended follow-up with Charlene Lind and CT of the chest in 3 months - patient educated about this today  Have requested staff to make follow-up appointment   12. Nausea and Vomiting   Nausea and episode of emesis last night  Today symptoms improved  PRN Zofran 4mg Q6H PRN x 14 days           ALEXSANDRA Guillen - VIOLET

## 2025-01-30 ENCOUNTER — OFFICE VISIT (OUTPATIENT)
Facility: CLINIC | Age: 89
End: 2025-01-30

## 2025-01-30 VITALS
BODY MASS INDEX: 22.5 KG/M2 | SYSTOLIC BLOOD PRESSURE: 126 MMHG | TEMPERATURE: 97.6 F | OXYGEN SATURATION: 98 % | HEART RATE: 79 BPM | RESPIRATION RATE: 17 BRPM | DIASTOLIC BLOOD PRESSURE: 64 MMHG | WEIGHT: 161.3 LBS

## 2025-01-30 DIAGNOSIS — R91.1 LEFT UPPER LOBE PULMONARY NODULE: ICD-10-CM

## 2025-01-30 DIAGNOSIS — F01.B0 MODERATE VASCULAR DEMENTIA WITHOUT BEHAVIORAL DISTURBANCE, PSYCHOTIC DISTURBANCE, MOOD DISTURBANCE, OR ANXIETY (HCC): ICD-10-CM

## 2025-01-30 DIAGNOSIS — N18.31 STAGE 3A CHRONIC KIDNEY DISEASE (HCC): ICD-10-CM

## 2025-01-30 DIAGNOSIS — E78.2 MIXED HYPERLIPIDEMIA: ICD-10-CM

## 2025-01-30 DIAGNOSIS — I48.0 PAROXYSMAL ATRIAL FIBRILLATION (HCC): ICD-10-CM

## 2025-01-30 DIAGNOSIS — K21.9 GASTROESOPHAGEAL REFLUX DISEASE WITHOUT ESOPHAGITIS: ICD-10-CM

## 2025-01-30 DIAGNOSIS — Z86.73 OLD CEREBELLAR INFARCT WITHOUT LATE EFFECT: ICD-10-CM

## 2025-01-30 DIAGNOSIS — I95.0 IDIOPATHIC HYPOTENSION: Primary | ICD-10-CM

## 2025-01-30 DIAGNOSIS — R25.8 CHOREIFORM MOVEMENT: ICD-10-CM

## 2025-01-30 DIAGNOSIS — E22.2 SIADH (SYNDROME OF INAPPROPRIATE ADH PRODUCTION) (HCC): ICD-10-CM

## 2025-01-30 DIAGNOSIS — I73.9 PERIPHERAL ARTERY DISEASE (HCC): ICD-10-CM

## 2025-01-30 RX ORDER — MIDODRINE HYDROCHLORIDE 5 MG/1
5 TABLET ORAL
Qty: 90 TABLET | Refills: 0 | Status: SHIPPED | OUTPATIENT
Start: 2025-01-30

## 2025-01-30 NOTE — PROGRESS NOTES
Also blood pressures have been reviewed currently stable on midodrine 5 mg p.o. 3 times daily, running 100-140, no lightheadedness and dizziness. His daughter has spoke with discharge planner at [facility] in regards to getting 90 day supply of midodrine to [facility]. He is supposed to be discharged on 02/04/25 - will go ahead and send midodrine to [facility] so he hopefully can obtain prior to discharge. On discharge day will send small supply to [facility] if needed until his is mail delivered.  He continues on Eliquis and Plavix for history of strokes.  Continues on atorvastatin for hyperlipidemia and strokes.  He denies any  complaints complaints today. Last visit had complained of nausea, following that he had episode of diarrhea, but notes its improved this morning. Likely GI virus as have other residents with similar symptoms. He denies abdominal pain. He ate well this morning. He denies any pain. He denies constipation and last BM documented as 1/29/2025, he has PRN miralax available.  He notes that at home he normally takes prune juice or eats high-fiber cereal but that is not available at facility. He notes he has BM every 2 to 3 days at home. Otherwise his most recent physical therapy note has been reviewed from 1/29/2024 - He is making progress.  Patient does have durable DNR on file. He denies any other acute complaints. High risk for decompensation and rehospitalization.       PMH:   Past Medical History:   Diagnosis Date    Atrial fibrillation (HCC)     Carotid artery stenosis, asymptomatic 8/1/2014    Right side 50-79%     Chronic kidney disease     stage 3    Chronic obstructive pulmonary disease (HCC)     emphemsa     Emphysema of lung (HCC)     GERD (gastroesophageal reflux disease)     Gout     Hiatal hernia     Hyperlipidemia     Hyperparathyroidism (HCC)     Hypertension     Hypothyroidism     Long term (current) use of anticoagulants     Occlusion and stenosis of basilar artery with

## 2025-01-31 ENCOUNTER — TELEPHONE (OUTPATIENT)
Age: 89
End: 2025-01-31

## 2025-01-31 NOTE — TELEPHONE ENCOUNTER
Lida with [facility] is calling to see if Dr Holcomb would be willing to follow for home health?

## 2025-02-04 ENCOUNTER — OFFICE VISIT (OUTPATIENT)
Facility: CLINIC | Age: 89
End: 2025-02-04
Payer: MEDICARE

## 2025-02-04 VITALS
DIASTOLIC BLOOD PRESSURE: 80 MMHG | RESPIRATION RATE: 18 BRPM | HEART RATE: 72 BPM | WEIGHT: 161.3 LBS | TEMPERATURE: 97.3 F | SYSTOLIC BLOOD PRESSURE: 112 MMHG | OXYGEN SATURATION: 97 % | BODY MASS INDEX: 22.5 KG/M2

## 2025-02-04 DIAGNOSIS — N18.31 STAGE 3A CHRONIC KIDNEY DISEASE (HCC): ICD-10-CM

## 2025-02-04 DIAGNOSIS — F01.B0 MODERATE VASCULAR DEMENTIA WITHOUT BEHAVIORAL DISTURBANCE, PSYCHOTIC DISTURBANCE, MOOD DISTURBANCE, OR ANXIETY (HCC): ICD-10-CM

## 2025-02-04 DIAGNOSIS — I73.9 PERIPHERAL ARTERY DISEASE (HCC): ICD-10-CM

## 2025-02-04 DIAGNOSIS — E78.2 MIXED HYPERLIPIDEMIA: ICD-10-CM

## 2025-02-04 DIAGNOSIS — I95.0 IDIOPATHIC HYPOTENSION: Primary | ICD-10-CM

## 2025-02-04 DIAGNOSIS — I48.0 PAROXYSMAL ATRIAL FIBRILLATION (HCC): ICD-10-CM

## 2025-02-04 DIAGNOSIS — R91.1 LEFT UPPER LOBE PULMONARY NODULE: ICD-10-CM

## 2025-02-04 DIAGNOSIS — R25.8 CHOREIFORM MOVEMENT: ICD-10-CM

## 2025-02-04 DIAGNOSIS — E22.2 SIADH (SYNDROME OF INAPPROPRIATE ADH PRODUCTION) (HCC): ICD-10-CM

## 2025-02-04 DIAGNOSIS — K21.9 GASTROESOPHAGEAL REFLUX DISEASE WITHOUT ESOPHAGITIS: ICD-10-CM

## 2025-02-04 DIAGNOSIS — Z86.73 OLD CEREBELLAR INFARCT WITHOUT LATE EFFECT: ICD-10-CM

## 2025-02-04 PROCEDURE — 99316 NF DSCHRG MGMT 30 MIN+: CPT | Performed by: NURSE PRACTITIONER

## 2025-02-04 RX ORDER — MIDODRINE HYDROCHLORIDE 5 MG/1
5 TABLET ORAL
Qty: 30 TABLET | Refills: 0 | Status: SHIPPED | OUTPATIENT
Start: 2025-02-04 | End: 2025-02-14

## 2025-02-04 NOTE — PROGRESS NOTES
and S2, no rubs murmurs or gallops, no edema, no cyanosis;   Respiratory: Clear to auscultation, symmetric, no respiratory distress;  Gastrointestinal: Abdomen soft, NT, ND, no masses, normal bowel sounds;  Neurologic: Cranial nerves II through VII grossly intact, no speech or motor deficits A&O in time place and person, has quick involuntary chorea movements  Skin: No rash, warm and dry;  Musculoskeletal: No erythema swelling or joint tenderness, extremities without cyanosis, neck supple, ROM intact spine and extremities;  Psychiatric: Not agitated.  Appropriate affect, mood, judgment and insight.  Genitourinary: No suprapubic tenderness or flank tenderness  Heme, lymph, immuno: No pallor;         Labs:       Last labs reviewed from hospitalization 1/11/2025 sodium 133 potassium 4.4 chloride 101 BUN 33 creatinine 0.99 hemoglobin 8.7 hematocrit 11 platelet 259, hct 32.4     Last labs reviewed from 1/20/2025 white blood cell count 5 hemoglobin 10.2 hematocrit 28.7 platelet 4022 BUN 25.4 creatinine 1.13 sodium 133 potassium 5 chloride 103 CO2 23    Current Outpatient Medications   Medication Sig Dispense Refill    midodrine (PROAMATINE) 5 MG tablet Take 1 tablet by mouth 3 times daily (with meals) for 10 days 30 tablet 0    polyethylene glycol (GLYCOLAX) 17 GM/SCOOP powder Take 17 g by mouth daily as needed (constipation)      melatonin 3 MG TABS tablet Take 1 tablet by mouth nightly as needed      levothyroxine (SYNTHROID) 100 MCG tablet TAKE 1 TABLET BY MOUTH DAILY  BEFORE BREAKFAST 90 tablet 3    atorvastatin (LIPITOR) 20 MG tablet Take 1 tablet by mouth daily 90 tablet 3    clopidogrel (PLAVIX) 75 MG tablet TAKE 1 TABLET BY MOUTH DAILY 90 tablet 3    acetaminophen (TYLENOL) 500 MG tablet Take 1 tablet by mouth every 6 hours as needed      apixaban (ELIQUIS) 2.5 MG TABS tablet Take 1 tablet by mouth 2 times daily      Cholecalciferol 50 MCG (2000 UT) TABS Take 1 tablet by mouth      OLANZapine (ZYPREXA) 2.5 MG

## 2025-02-07 ENCOUNTER — OFFICE VISIT (OUTPATIENT)
Age: 89
End: 2025-02-07

## 2025-02-07 VITALS
RESPIRATION RATE: 15 BRPM | BODY MASS INDEX: 22.4 KG/M2 | DIASTOLIC BLOOD PRESSURE: 69 MMHG | SYSTOLIC BLOOD PRESSURE: 101 MMHG | HEART RATE: 96 BPM | HEIGHT: 71 IN | OXYGEN SATURATION: 99 % | WEIGHT: 160 LBS | TEMPERATURE: 97.8 F

## 2025-02-07 DIAGNOSIS — E53.8 B12 DEFICIENCY: ICD-10-CM

## 2025-02-07 DIAGNOSIS — I48.0 PAROXYSMAL ATRIAL FIBRILLATION (HCC): Chronic | ICD-10-CM

## 2025-02-07 DIAGNOSIS — R91.1 LUNG NODULE: ICD-10-CM

## 2025-02-07 DIAGNOSIS — I95.1 ORTHOSTATIC HYPOTENSION: Primary | ICD-10-CM

## 2025-02-07 DIAGNOSIS — Z86.73 HISTORY OF STROKE: ICD-10-CM

## 2025-02-07 RX ORDER — CYANOCOBALAMIN 1000 UG/ML
1000 INJECTION, SOLUTION INTRAMUSCULAR; SUBCUTANEOUS ONCE
Status: COMPLETED | OUTPATIENT
Start: 2025-02-07 | End: 2025-02-07

## 2025-02-07 RX ADMIN — CYANOCOBALAMIN 1000 MCG: 1000 INJECTION, SOLUTION INTRAMUSCULAR; SUBCUTANEOUS at 11:39

## 2025-02-07 NOTE — PROGRESS NOTES
Health Maintenance Due   Topic Date Due    Shingles vaccine (1 of 2) Never done    Respiratory Syncytial Virus (RSV) Pregnant or age 60 yrs+ (1 - 1-dose 75+ series) Never done    Prostate Specific Antigen (PSA) Screening or Monitoring  03/14/2024    COVID-19 Vaccine (3 - 2024-25 season) 09/01/2024

## 2025-02-07 NOTE — PROGRESS NOTES
STIVEN Tran Sr. is a 89 y.o. male who presents for follow-up hospital stay [facility] 1/3 - 1/14.  Subsequent stay at rehab facility and was discharged on 2/4.  He did has had patient out reach throughout including within 2 business days of discharge from rehab facility.    Tells me that he had a decline starting just before Coy where he was feeling more lethargic walking with lead and issues.  Decrease fluid intake around this time as well.  Got progressively worse and presented to our clinic and then was sent to the hospital for slurred speech and aphasia.  Discharge summary notes that he was having some hypotension when he got admitted.  His antihypertensives were held.  Serum cortisol within normal limits this.  Cardiology added midodrine which improved his blood pressure and orthostasis had an CHANG that improved with IV fluids.  Had a transient type I second-degree heart block, cardiology recommended discontinuing metoprolol and following up outpatient.    After an extensive workup was determined idiopathic orthostatic hypotension.  Given midodrine and did well    Had hyponatremia which improved with fluid resuscitation    For the slurred speech had an MRI which showed evidence of small chronic infarct.  Neurology recommended continuing Plavix and Eliquis.  There was a left upper lobe groundglass opacity incidentally noted on CT neck and recommended have repeat CT in 3 months and follow-up with pulmonology.      PMHx:  Past Medical History:   Diagnosis Date    Atrial fibrillation (HCC)     Carotid artery stenosis, asymptomatic 8/1/2014    Right side 50-79%     Chronic kidney disease     stage 3    Chronic obstructive pulmonary disease (HCC)     emphemsa     Emphysema of lung (HCC)     GERD (gastroesophageal reflux disease)     Gout     Hiatal hernia     Hyperlipidemia     Hyperparathyroidism (HCC)     Hypertension     Hypothyroidism     Long term (current) use of anticoagulants

## 2025-02-13 ENCOUNTER — TELEPHONE (OUTPATIENT)
Age: 89
End: 2025-02-13

## 2025-02-13 NOTE — TELEPHONE ENCOUNTER
[facility] called to see if you would follow pt?    Clinical to call [phone] with Dr Holcomb's response.

## 2025-03-07 ENCOUNTER — NURSE ONLY (OUTPATIENT)
Age: 89
End: 2025-03-07
Payer: MEDICARE

## 2025-03-07 DIAGNOSIS — E53.8 B12 DEFICIENCY: Primary | ICD-10-CM

## 2025-03-07 PROCEDURE — PBSHW PBB SHADOW CHARGE: Performed by: FAMILY MEDICINE

## 2025-03-07 PROCEDURE — 96372 THER/PROPH/DIAG INJ SC/IM: CPT | Performed by: FAMILY MEDICINE

## 2025-03-07 RX ORDER — CYANOCOBALAMIN 1000 UG/ML
1000 INJECTION, SOLUTION INTRAMUSCULAR; SUBCUTANEOUS ONCE
Status: COMPLETED | OUTPATIENT
Start: 2025-03-07 | End: 2025-03-07

## 2025-03-07 RX ADMIN — CYANOCOBALAMIN 1000 MCG: 1000 INJECTION, SOLUTION INTRAMUSCULAR at 13:03

## 2025-03-07 NOTE — PROGRESS NOTES
After obtaining consent, and per orders of Dr. Holcomb, injection of B12 given in Right deltoid by Gabriel Canas LPN. No reaction noted post-injection.  Refused observation.

## 2025-04-04 ENCOUNTER — CLINICAL SUPPORT (OUTPATIENT)
Age: 89
End: 2025-04-04
Payer: MEDICARE

## 2025-04-04 DIAGNOSIS — E53.8 B12 DEFICIENCY: Primary | ICD-10-CM

## 2025-04-04 PROCEDURE — PBSHW PBB SHADOW CHARGE: Performed by: NURSE PRACTITIONER

## 2025-04-04 PROCEDURE — 96372 THER/PROPH/DIAG INJ SC/IM: CPT | Performed by: NURSE PRACTITIONER

## 2025-04-04 RX ORDER — CYANOCOBALAMIN 1000 UG/ML
1000 INJECTION, SOLUTION INTRAMUSCULAR; SUBCUTANEOUS ONCE
Status: COMPLETED | OUTPATIENT
Start: 2025-04-04 | End: 2025-04-04

## 2025-04-04 RX ADMIN — CYANOCOBALAMIN 1000 MCG: 1000 INJECTION, SOLUTION INTRAMUSCULAR at 13:02

## 2025-04-16 ENCOUNTER — TRANSCRIBE ORDERS (OUTPATIENT)
Facility: HOSPITAL | Age: 89
End: 2025-04-16

## 2025-04-16 DIAGNOSIS — C61 PROSTATE CANCER (HCC): Primary | ICD-10-CM

## 2025-04-16 DIAGNOSIS — Z79.818 ENCOUNTER FOR MONITORING ANDROGEN DEPRIVATION THERAPY: ICD-10-CM

## 2025-06-05 ENCOUNTER — HOSPITAL ENCOUNTER (OUTPATIENT)
Facility: HOSPITAL | Age: 89
Discharge: HOME | End: 2025-06-08
Attending: UROLOGY
Payer: MEDICARE

## 2025-06-05 DIAGNOSIS — C61 PROSTATE CANCER (HCC): ICD-10-CM

## 2025-06-05 DIAGNOSIS — Z79.818 ENCOUNTER FOR MONITORING ANDROGEN DEPRIVATION THERAPY: ICD-10-CM

## 2025-06-05 PROCEDURE — 77080 DXA BONE DENSITY AXIAL: CPT

## 2025-06-06 ENCOUNTER — CLINICAL SUPPORT (OUTPATIENT)
Age: 89
End: 2025-06-06
Payer: MEDICARE

## 2025-06-06 DIAGNOSIS — E53.8 B12 DEFICIENCY: Primary | ICD-10-CM

## 2025-06-06 PROCEDURE — 96372 THER/PROPH/DIAG INJ SC/IM: CPT | Performed by: FAMILY MEDICINE

## 2025-06-06 PROCEDURE — PBSHW PBB SHADOW CHARGE: Performed by: FAMILY MEDICINE

## 2025-06-06 RX ORDER — CYANOCOBALAMIN 1000 UG/ML
1000 INJECTION, SOLUTION INTRAMUSCULAR; SUBCUTANEOUS ONCE
Status: COMPLETED | OUTPATIENT
Start: 2025-06-06 | End: 2025-06-06

## 2025-06-06 RX ADMIN — CYANOCOBALAMIN 1000 MCG: 1000 INJECTION, SOLUTION INTRAMUSCULAR at 13:16

## 2025-07-07 RX ORDER — ATORVASTATIN CALCIUM 20 MG/1
20 TABLET, FILM COATED ORAL DAILY
Qty: 90 TABLET | Refills: 3 | Status: SHIPPED | OUTPATIENT
Start: 2025-07-07

## 2025-07-11 ENCOUNTER — CLINICAL SUPPORT (OUTPATIENT)
Age: 89
End: 2025-07-11
Payer: MEDICARE

## 2025-07-11 DIAGNOSIS — E53.8 B12 DEFICIENCY: Primary | ICD-10-CM

## 2025-07-11 PROCEDURE — 96372 THER/PROPH/DIAG INJ SC/IM: CPT | Performed by: FAMILY MEDICINE

## 2025-07-11 PROCEDURE — PBSHW PBB SHADOW CHARGE: Performed by: FAMILY MEDICINE

## 2025-07-11 RX ORDER — CYANOCOBALAMIN 1000 UG/ML
1000 INJECTION, SOLUTION INTRAMUSCULAR; SUBCUTANEOUS ONCE
Status: COMPLETED | OUTPATIENT
Start: 2025-07-11 | End: 2025-07-11

## 2025-07-11 RX ADMIN — CYANOCOBALAMIN 1000 MCG: 1000 INJECTION, SOLUTION INTRAMUSCULAR at 13:03

## 2025-07-11 NOTE — PROGRESS NOTES
Patient confirmed identity with  and First/Last name.   Patient says they do not want to be watched or wait after for any reactions.  I administered the B12 shot into deltoid with no issue.   No comments or concerns from patient.

## 2025-07-14 RX ORDER — CLOPIDOGREL BISULFATE 75 MG/1
75 TABLET ORAL DAILY
Qty: 90 TABLET | Refills: 3 | Status: SHIPPED | OUTPATIENT
Start: 2025-07-14

## 2025-08-15 ENCOUNTER — CLINICAL SUPPORT (OUTPATIENT)
Age: 89
End: 2025-08-15
Payer: MEDICARE

## 2025-08-15 DIAGNOSIS — E53.8 B12 DEFICIENCY: Primary | ICD-10-CM

## 2025-08-15 PROCEDURE — PBSHW PBB SHADOW CHARGE: Performed by: FAMILY MEDICINE

## 2025-08-15 PROCEDURE — 96372 THER/PROPH/DIAG INJ SC/IM: CPT | Performed by: FAMILY MEDICINE

## 2025-08-15 RX ORDER — CYANOCOBALAMIN 1000 UG/ML
1000 INJECTION, SOLUTION INTRAMUSCULAR; SUBCUTANEOUS ONCE
Status: COMPLETED | OUTPATIENT
Start: 2025-08-15 | End: 2025-08-15

## 2025-08-15 RX ADMIN — CYANOCOBALAMIN 1000 MCG: 1000 INJECTION, SOLUTION INTRAMUSCULAR at 13:02

## (undated) DEVICE — SYR 3ML LL TIP 1/10ML GRAD --

## (undated) DEVICE — BLOCK BITE ENDOSCP AD 21 MM W/ DIL BLU LF DISP

## (undated) DEVICE — STRAINER URIN CALC RNL MSH -- CONVERT TO ITEM 357634

## (undated) DEVICE — SOLUTION LACTATED RINGERS INJECTION USP

## (undated) DEVICE — Device

## (undated) DEVICE — ELECTRODE,RADIOTRANSLUCENT,FOAM,5PK: Brand: MEDLINE

## (undated) DEVICE — TRAP SUC MUCOUS 70ML -- MEDICHOICE MEDLINE

## (undated) DEVICE — TOWEL 4 PLY TISS 19X30 SUE WHT

## (undated) DEVICE — SNARE ENDOSCP M L240CM W27MM SHTH DIA2.4MM CHN 2.8MM OVL

## (undated) DEVICE — FORCEPS BX L240CM JAW DIA2.8MM L CAP W/ NDL MIC MESH TOOTH

## (undated) DEVICE — KENDALL DL ECG CABLE AND LEAD WIRE SYSTEM, 3-LEAD, SINGLE PATIENT USE: Brand: KENDALL

## (undated) DEVICE — NON-REM POLYHESIVE PATIENT RETURN ELECTRODE: Brand: VALLEYLAB

## (undated) DEVICE — CONTAINER SPEC 20 ML LID NEUT BUFF FORMALIN 10 % POLYPR STS

## (undated) DEVICE — 1200 GUARD II KIT W/5MM TUBE W/O VAC TUBE: Brand: GUARDIAN

## (undated) DEVICE — NEEDLE SCLERO 25GA L240CM OD0.51MM ID0.24MM EXTN L4MM SHTH

## (undated) DEVICE — NEONATAL-ADULT SPO2 SENSOR: Brand: NELLCOR

## (undated) DEVICE — ENDO CARRY-ON PROCEDURE KIT INCLUDES ENZYMATIC SPONGE, GAUZE, BIOHAZARD LABEL, TRAY, LUBRICANT, DIRTY SCOPE LABEL, WATER LABEL, TRAY, DRAWSTRING PAD, AND DEFENDO 4-PIECE KIT.: Brand: ENDO CARRY-ON PROCEDURE KIT

## (undated) DEVICE — MEDI-VAC YANK SUCT HNDL W/TPRD BULBOUS TIP: Brand: CARDINAL HEALTH

## (undated) DEVICE — NEEDLE HYPO 18GA L1.5IN PNK S STL HUB POLYPR SHLD REG BVL

## (undated) DEVICE — SET ADMIN 16ML TBNG L100IN 2 Y INJ SITE IV PIGGY BK DISP

## (undated) DEVICE — 3M™ TEGADERM™ TRANSPARENT FILM DRESSING FRAME STYLE, 1624W, 2-3/8 IN X 2-3/4 IN (6 CM X 7 CM), 100/CT 4CT/CASE: Brand: 3M™ TEGADERM™

## (undated) DEVICE — SOLIDIFIER FLD 2OZ 1500CC N DISINF IN BTL DISP SAFESORB

## (undated) DEVICE — WORKING LENGTH 235CM, WORKING CHANNEL 2.8MM: Brand: RESOLUTION 360 CLIP

## (undated) DEVICE — CONTINU-FLO SOLUTION SET, 2 INJECTION SITES, MALE LUER LOCK ADAPTER WITH RETRACTABLE COLLAR, LARGE BORE STOPCOCK WITH ROTATING MALE LUER LOCK EXTENSION SET, 2 INJECTION SITES, MALE LUER LOCK ADAPTER WITH RETRACTABLE COLLAR: Brand: INTERLINK/CONTINU-FLO

## (undated) DEVICE — SYR 10ML LUER LOK 1/5ML GRAD --

## (undated) DEVICE — SOLIDIFIER MEDC 1200ML -- CONVERT TO 356117

## (undated) DEVICE — BASIN EMSIS 16OZ GRAPHITE PLAS KID SHP MOLD GRAD FOR ORAL

## (undated) DEVICE — BAG SPEC BIOHZRD 10 X 10 IN --

## (undated) DEVICE — FORCEPS BX L160CM DIA8MM GRSP DISECT CUP TIP NONLOCKING ROT

## (undated) DEVICE — FCPS BX HOT LG 2.2MMX240CM

## (undated) DEVICE — Z DISCONTINUED PER MEDLINE LINE GAS SAMPLING O2/CO2 LNG AD 13 FT NSL W/ TBNG FILTERLINE

## (undated) DEVICE — TRAP,MUCUS SPECIMEN, 80CC: Brand: MEDLINE

## (undated) DEVICE — (D)SYR 10ML 1/5ML GRAD NSAF -- PKGING CHANGE USE ITEM 338027

## (undated) DEVICE — YANKAUER,TAPERED BULBOUS TIP,W/O VENT: Brand: MEDLINE

## (undated) DEVICE — CATHETER IV 20GA L1.25IN FEP STR HUB TEF INTROCAN SFTY

## (undated) DEVICE — CATH IV AUTOGRD BC PNK 20GA 25 -- INSYTE